# Patient Record
Sex: MALE | Race: WHITE | NOT HISPANIC OR LATINO | Employment: OTHER | URBAN - METROPOLITAN AREA
[De-identification: names, ages, dates, MRNs, and addresses within clinical notes are randomized per-mention and may not be internally consistent; named-entity substitution may affect disease eponyms.]

---

## 2017-01-03 ENCOUNTER — TELEPHONE (OUTPATIENT)
Dept: RADIOLOGY | Facility: HOSPITAL | Age: 62
End: 2017-01-03

## 2017-01-04 ENCOUNTER — HOSPITAL ENCOUNTER (OUTPATIENT)
Dept: RADIOLOGY | Facility: HOSPITAL | Age: 62
Discharge: HOME | End: 2017-01-04
Attending: INTERNAL MEDICINE
Payer: MEDICAID

## 2017-01-04 DIAGNOSIS — R74.01 TRANSAMINITIS: ICD-10-CM

## 2017-01-04 PROCEDURE — 76700 US EXAM ABDOM COMPLETE: CPT | Mod: 26,,, | Performed by: RADIOLOGY

## 2017-01-04 PROCEDURE — 76700 US EXAM ABDOM COMPLETE: CPT | Mod: TC,PO

## 2017-01-06 ENCOUNTER — TELEPHONE (OUTPATIENT)
Dept: RHEUMATOLOGY | Facility: CLINIC | Age: 62
End: 2017-01-06

## 2017-01-06 NOTE — TELEPHONE ENCOUNTER
----- Message from Sheila Bateman MD sent at 1/6/2017  8:12 AM CST -----  Let him know ultrasound of live shows some enlargement consistent with fatty liver and this is most likely the reason for the elevated liver tests. We will repeat again at his visit. He should decrease the fat in his diet.   MB

## 2017-01-12 ENCOUNTER — TELEPHONE (OUTPATIENT)
Dept: RHEUMATOLOGY | Facility: CLINIC | Age: 62
End: 2017-01-12

## 2017-01-12 NOTE — TELEPHONE ENCOUNTER
----- Message from Rachael Tom sent at 1/12/2017 12:43 PM CST -----  Contact: Patient  Patient returned call to Constance. He can be contacted at [phone].    Thanks,  Rachael

## 2017-02-01 ENCOUNTER — TELEPHONE (OUTPATIENT)
Dept: PULMONOLOGY | Facility: CLINIC | Age: 62
End: 2017-02-01

## 2017-02-02 ENCOUNTER — HOSPITAL ENCOUNTER (OUTPATIENT)
Dept: RADIOLOGY | Facility: HOSPITAL | Age: 62
Discharge: HOME | End: 2017-02-02
Attending: INTERNAL MEDICINE
Payer: MEDICAID

## 2017-02-02 ENCOUNTER — OFFICE VISIT (OUTPATIENT)
Dept: PULMONOLOGY | Facility: CLINIC | Age: 62
End: 2017-02-02
Payer: MEDICAID

## 2017-02-02 VITALS
BODY MASS INDEX: 40.69 KG/M2 | RESPIRATION RATE: 20 BRPM | WEIGHT: 274.69 LBS | HEART RATE: 84 BPM | DIASTOLIC BLOOD PRESSURE: 80 MMHG | HEIGHT: 69 IN | SYSTOLIC BLOOD PRESSURE: 130 MMHG | OXYGEN SATURATION: 95 %

## 2017-02-02 DIAGNOSIS — R91.8 MULTIPLE LUNG NODULES ON CT: ICD-10-CM

## 2017-02-02 DIAGNOSIS — R06.89 DYSPNEA AND RESPIRATORY ABNORMALITIES: Chronic | ICD-10-CM

## 2017-02-02 DIAGNOSIS — J96.11 CHRONIC RESPIRATORY FAILURE WITH HYPOXIA: Chronic | ICD-10-CM

## 2017-02-02 DIAGNOSIS — R06.00 DYSPNEA AND RESPIRATORY ABNORMALITIES: Chronic | ICD-10-CM

## 2017-02-02 DIAGNOSIS — F17.210 CIGARETTE SMOKER: Chronic | ICD-10-CM

## 2017-02-02 DIAGNOSIS — R91.8 MULTIPLE LUNG NODULES ON CT: Primary | ICD-10-CM

## 2017-02-02 PROCEDURE — 99215 OFFICE O/P EST HI 40 MIN: CPT | Mod: S$PBB,,, | Performed by: INTERNAL MEDICINE

## 2017-02-02 PROCEDURE — 99999 PR PBB SHADOW E&M-EST. PATIENT-LVL III: CPT | Mod: PBBFAC,,, | Performed by: INTERNAL MEDICINE

## 2017-02-02 PROCEDURE — 99213 OFFICE O/P EST LOW 20 MIN: CPT | Mod: PBBFAC | Performed by: INTERNAL MEDICINE

## 2017-02-02 RX ORDER — ALBUTEROL SULFATE 90 UG/1
AEROSOL, METERED RESPIRATORY (INHALATION)
Qty: 18 G | Refills: 0 | Status: SHIPPED | OUTPATIENT
Start: 2017-02-02 | End: 2017-03-10 | Stop reason: SDUPTHER

## 2017-02-02 RX ADMIN — IOHEXOL 75 ML: 350 INJECTION, SOLUTION INTRAVENOUS at 10:02

## 2017-02-02 NOTE — ASSESSMENT & PLAN NOTE
Assistance with smoking cessation was offered, including:  []  Medications  [x]  Counseling  []  Printed Information on Smoking Cessation  [x]  Referral to a Smoking Cessation Program    Patient was counseled regarding smoking for >10 minutes.

## 2017-02-02 NOTE — PROGRESS NOTES
Subjective:      Established patient    Patient ID: Trey Stevens is a 61 y.o. male.  Patient Active Problem List   Diagnosis    Psoriatic arthritis    Vitamin D deficiency    Psoriasis    Uncontrolled diabetes mellitus type 2 without complications    Multiple lung nodules on CT    Polyarticular psoriatic arthritis    Immunosuppressed status    Long-term use of immunosuppressant medication    Cigarette smoker    Dyspnea and respiratory abnormalities    Chronic respiratory failure with hypoxia       Problem list has been reviewed.    Chief Complaint: Pulmonary Nodules (rev ct)      HPI    CT chest reviewd with pateint who voiced understanding.  All questions were answered to the patients satisfaction. He states that he  is fine and has no specific pulmonary complaints. His current respiratory therapy regimen is albuterol inhaler which provides  relief. He is  adherent with his regimen.   He denies cough sputum, hemoptysis,  pain with breathing, wheezing, asthma.   A full  review of systems, past , family  and social histories was performed except as mentioned in the note above, these are non contributory to the main issues discussed today. He still smokes. He smokes 0.5 pack per day. He is willing to quit. Counseled on smoking cessation.  He is on chronic oxygen supplementation at 2 L /min with activity and with sleep.       Previous Report Reviewed: lab reports, office notes and radiology reports     The following portions of the patient's history were reviewed and updated as appropriate: He  has a past medical history of Arthritis; Diabetes mellitus; Diabetes mellitus type 2, uncontrolled (7/19/2016); Gall stones; Obesity; Psoriasis (a type of skin inflammation); and Rheumatoid arthritis of foot.  He  has a past surgical history that includes Appendectomy and Cholecystectomy.  His family history includes Cancer in his mother; Cataracts in his mother; Diabetes in his mother; Hypertension in his mother;  "Stroke in his father. There is no history of Psoriasis.  He  reports that he has quit smoking. He does not have any smokeless tobacco history on file. He reports that he does not drink alcohol or use illicit drugs.  He has a current medication list which includes the following prescription(s): adalimumab, benzonatate, calcipotriene, clobetasol, ergocalciferol, folic acid, ketoconazole, methotrexate, montelukast, naproxen sodium, onetouch delica lancets, onetouch ultra test, onetouch ultramini, proair hfa, tramadol, triamcinolone acetonide 0.1%, and furosemide.  He has No Known Allergies..    Review of Systems   Constitutional: Negative for fever, fatigue and night sweats.   HENT: Positive for sore throat. Negative for nosebleeds, postnasal drip, rhinorrhea, congestion and hearing loss.    Eyes: Negative for itching.   Respiratory: Positive for snoring, cough, hemoptysis and wheezing. Negative for sputum production, orthopnea and Paroxysmal Nocturnal Dyspnea.    Cardiovascular: Negative for chest pain and leg swelling.   Genitourinary: Negative for difficulty urinating and hematuria.   Endocrine: Negative for cold intolerance and heat intolerance.    Musculoskeletal: Positive for arthralgias and back pain.   Skin: Positive for rash.        Diffuse Psoriasis   Gastrointestinal: Positive for abdominal pain. Negative for vomiting and acid reflux.   Neurological: Negative for weakness.   Hematological: Does not bruise/bleed easily and no excessive bruising.   Psychiatric/Behavioral: The patient is not nervous/anxious.    All other systems reviewed and are negative.       Objective:     Visit Vitals    /80    Pulse 84    Resp 20    Ht 5' 9" (1.753 m)    Wt 124.6 kg (274 lb 11.1 oz)    SpO2 95%    BMI 40.57 kg/m2     Body mass index is 40.57 kg/(m^2).     Physical Exam   Constitutional: He is oriented to person, place, and time. He appears well-developed and well-nourished.   Disheveled in appearance   HENT: "   Head: Normocephalic and atraumatic.   Eyes: EOM are normal. Pupils are equal, round, and reactive to light.   Neck: Normal range of motion. Neck supple.   Cardiovascular: Normal rate and regular rhythm.    Pulmonary/Chest: Effort normal. He has decreased breath sounds in the right upper field, the right middle field, the right lower field, the left upper field and the left middle field. He has no wheezes. He has no rales.   Abdominal: Soft. Bowel sounds are normal.   Musculoskeletal: Normal range of motion.   Neurological: He is alert and oriented to person, place, and time.   Skin: Rash noted.   Psychiatric: He has a normal mood and affect. His behavior is normal.   Nursing note and vitals reviewed.      Personal Diagnostic Review    CT Chest: Stable bilateral pulmonary nodules compared to 8/2/16. In a low risk patient an additional followup CT is recommended in 12 months, and if high risk, followup CT in 6 months.       Assessment:     1. Multiple lung nodules on CT Stable   2. Dyspnea and respiratory abnormalities Active   3. Cigarette smoker Stable   4. Chronic respiratory failure with hypoxia      Outpatient Encounter Prescriptions as of 2/2/2017   Medication Sig Dispense Refill    adalimumab (HUMIRA) PnKt injection Inject 0.8 mLs (40 mg total) into the skin every 14 (fourteen) days. 6 each 2    benzonatate (TESSALON) 100 MG capsule Take 1 capsule (100 mg total) by mouth 3 (three) times daily as needed for Cough. Swallow whole 60 capsule 0    calcipotriene (DOVONOX) 0.005 % ointment Apply on psoriasis on body twice daily 120 g 3    clobetasol (OLUX) 0.05 % Foam AAA of scalp and behind ears twice daily.  Do not use on face, underarms or groin. 100 g 3    ergocalciferol (VITAMIN D2) 50,000 unit Cap Take 1 capsule (50,000 Units total) by mouth twice a week. 8 capsule 6    folic acid (FOLVITE) 1 MG tablet Take 1 tablet (1 mg total) by mouth once daily. 90 tablet 3    ketoconazole (NIZORAL) 2 % shampoo Wash  hair with medicated shampoo at least 2x/week - let sit on scalp at least 5 minutes prior to rinsing 120 mL 5    methotrexate 2.5 MG Tab Take 6 tablets (15 mg total) by mouth every 7 days. 24 tablet 0    montelukast (SINGULAIR) 10 mg tablet Take 10 mg by mouth once daily.  0    naproxen sodium (ANAPROX) 220 MG tablet Take 220 mg by mouth every 12 (twelve) hours.      ONETOUCH DELICA LANCETS 33 gauge Misc       ONETOUCH ULTRA TEST Strp       ONETOUCH ULTRAMINI kit       PROAIR HFA 90 mcg/actuation inhaler USE 2 INHALATIONS INHALATION EVERY 4 HOURS AS NEEDED :FOR WHEEZING 18 g 0    tramadol (ULTRAM) 50 mg tablet Take 1 tablet (50 mg total) by mouth every 6 (six) hours as needed for Pain. 60 tablet 2    triamcinolone acetonide 0.1% (KENALOG) 0.1 % cream AAA bid for psoriasis on body.  Do not use on face, underarms or groin. 454 g 3    [DISCONTINUED] PROAIR HFA 90 mcg/actuation inhaler USE 2 INHALATIONS INHALATION EVERY 4 HOURS AS NEEDED :FOR WHEEZING  0    furosemide (LASIX) 20 MG tablet Take 1 tablet (20 mg total) by mouth 2 (two) times daily. 10 tablet 0     Facility-Administered Encounter Medications as of 2/2/2017   Medication Dose Route Frequency Provider Last Rate Last Dose    [COMPLETED] omnipaque 350 iohexol 75 mL  75 mL Intravenous ONCE PRN Asif Demarco MD   75 mL at 02/02/17 1020     Orders Placed This Encounter   Procedures    CT Chest With Contrast     Standing Status:   Future     Standing Expiration Date:   2/2/2018     Order Specific Question:   Reason for Exam:     Answer:   Multiple lung nodules     Order Specific Question:   Is the patient allergic to iodine or contrast? Has a steroid / antihistamine prep been administered?     Answer:   Yes     Order Specific Question:   Is the patient on ANY Metformin drug such as Glugophage/Glucovance?           Should be off drug 48 hours after contrast. Check renal function before restart.     Answer:   No     Order Specific Question:   Age > 60  years?     Answer:   No     Order Specific Question:   History of Kidney Disease - including: decreased kidney function, dialysis, kidney transplay, single kidney, kidney cancer, kidney surgery?     Answer:   None     Order Specific Question:   Does the patient have high blood preasure requiring medical treatment?     Answer:   No     Order Specific Question:   Diabetes?     Answer:   No     Order Specific Question:   May the Radiologist modify the order per protocol to meet the clinical needs of the patient?     Answer:   Yes     Order Specific Question:   Recist criteria?     Answer:   No     Order Specific Question:   Will this service be billed to a Worker's Comp policy?     Answer:   No    Complete PFT with bronchodilator     Standing Status:   Future     Standing Expiration Date:   3/15/2018     Plan:       Discussed diagnosis, its evaluation, treatment and usual course. All questions answered.      Multiple lung nodules on CT  Repeat CT chest in 1 year.     Cigarette smoker  Assistance with smoking cessation was offered, including:  []  Medications  [x]  Counseling  []  Printed Information on Smoking Cessation  [x]  Referral to a Smoking Cessation Program    Patient was counseled regarding smoking for >10 minutes.      Dyspnea and respiratory abnormalities  Albuterol refilled. PFT in 6 months.    Chronic respiratory failure with hypoxia  Continue oxygen supplementation at 2 L /min with activity and with sleep.         TIME SPENT WITH PATIENT: Time spent: 40 minutes in face to face  discussion concerning diagnosis, prognosis, review of lab and test results, benefits of treatment as well as management of disease, counseling of patient and coordination of care between various health  care providers . Greater than half the time spent was used for coordination of care and counseling of patient.        Return in about 6 months (around 8/2/2017) for Shortness of breath, Chronic Respiratory Failure, Multiple lung  nodules.

## 2017-02-02 NOTE — PATIENT INSTRUCTIONS
Lung Anatomy  Your lungs take air in to give your body oxygen, which the body needs to work. Your lungs, like all the tissues in your body, are made up of billions of tiny specialized cells. Old lung cells die and are replaced by new, identical lung cells. This natural process helps ensure healthy lungs.    © 8013-7455 [employer] [address] [place] [address], [place] [address]. All rights reserved. This information is not intended as a substitute for professional medical care. Always follow your healthcare professional's instructions.

## 2017-02-06 DIAGNOSIS — L40.59 POLYARTICULAR PSORIATIC ARTHRITIS: ICD-10-CM

## 2017-02-06 RX ORDER — METHOTREXATE 2.5 MG/1
TABLET ORAL
Qty: 24 TABLET | Refills: 0 | Status: SHIPPED | OUTPATIENT
Start: 2017-02-06 | End: 2017-03-22 | Stop reason: SDUPTHER

## 2017-02-13 ENCOUNTER — PATIENT OUTREACH (OUTPATIENT)
Dept: ADMINISTRATIVE | Facility: HOSPITAL | Age: 62
End: 2017-02-13

## 2017-02-13 NOTE — LETTER
February 13, 2017    Trey Stevens  [address] [address] [facility] [address]             [facility]  [address] [facility] [address]  Phone: [phone] Dear Mr. Stevens:    [facility] is committed to your overall health.  To help you get the most out of each of your visits, we will review your information to make sure you are up to date on all of your recommended tests and/or procedures.      Brittanie Kaba MD has found that you may be due for    Foot Exam         If you have had any of the above done at another facility, please bring the records or information with you so that your record at [facility] will be complete.    If you are currently taking medication, please bring it with you to your appointment for review.    We will be happy to assist you with scheduling any necessary appointments or you may contact the [facility] appointment desk at [phone] to schedule at your convenience.     Thank you for choosing [facility] for your healthcare needs,        Bobbye, LPN  Care Coordination Department  [facility]

## 2017-02-20 ENCOUNTER — LAB VISIT (OUTPATIENT)
Dept: LAB | Facility: HOSPITAL | Age: 62
End: 2017-02-20
Attending: INTERNAL MEDICINE
Payer: MEDICAID

## 2017-02-20 DIAGNOSIS — Z29.9 PREVENTIVE MEASURE: ICD-10-CM

## 2017-02-20 DIAGNOSIS — E55.9 VITAMIN D DEFICIENCY: ICD-10-CM

## 2017-02-20 LAB
25(OH)D3+25(OH)D2 SERPL-MCNC: 37 NG/ML
ALBUMIN SERPL BCP-MCNC: 3.9 G/DL
ALP SERPL-CCNC: 96 U/L
ALT SERPL W/O P-5'-P-CCNC: 137 U/L
ANION GAP SERPL CALC-SCNC: 11 MMOL/L
AST SERPL-CCNC: 69 U/L
BASOPHILS # BLD AUTO: 0.03 K/UL
BASOPHILS NFR BLD: 0.4 %
BILIRUB SERPL-MCNC: 0.5 MG/DL
BUN SERPL-MCNC: 9 MG/DL
CALCIUM SERPL-MCNC: 8.7 MG/DL
CHLORIDE SERPL-SCNC: 109 MMOL/L
CHOLEST/HDLC SERPL: 3.9 {RATIO}
CO2 SERPL-SCNC: 19 MMOL/L
COMPLEXED PSA SERPL-MCNC: 0.28 NG/ML
CREAT SERPL-MCNC: 1.1 MG/DL
DIFFERENTIAL METHOD: ABNORMAL
EOSINOPHIL # BLD AUTO: 0.4 K/UL
EOSINOPHIL NFR BLD: 5.9 %
ERYTHROCYTE [DISTWIDTH] IN BLOOD BY AUTOMATED COUNT: 15.1 %
EST. GFR  (AFRICAN AMERICAN): >60 ML/MIN/1.73 M^2
EST. GFR  (NON AFRICAN AMERICAN): >60 ML/MIN/1.73 M^2
GLUCOSE SERPL-MCNC: 107 MG/DL
HCT VFR BLD AUTO: 49.2 %
HDL/CHOLESTEROL RATIO: 25.4 %
HDLC SERPL-MCNC: 126 MG/DL
HDLC SERPL-MCNC: 32 MG/DL
HGB BLD-MCNC: 16.6 G/DL
LDLC SERPL CALC-MCNC: 74.2 MG/DL
LYMPHOCYTES # BLD AUTO: 2.1 K/UL
LYMPHOCYTES NFR BLD: 29.7 %
MCH RBC QN AUTO: 30.2 PG
MCHC RBC AUTO-ENTMCNC: 33.7 %
MCV RBC AUTO: 90 FL
MONOCYTES # BLD AUTO: 0.6 K/UL
MONOCYTES NFR BLD: 8.1 %
NEUTROPHILS # BLD AUTO: 3.9 K/UL
NEUTROPHILS NFR BLD: 55.8 %
NONHDLC SERPL-MCNC: 94 MG/DL
PLATELET # BLD AUTO: 236 K/UL
PMV BLD AUTO: 11.7 FL
POTASSIUM SERPL-SCNC: 3.8 MMOL/L
PROT SERPL-MCNC: 7.9 G/DL
RBC # BLD AUTO: 5.49 M/UL
SODIUM SERPL-SCNC: 139 MMOL/L
TRIGL SERPL-MCNC: 99 MG/DL
TSH SERPL DL<=0.005 MIU/L-ACNC: 1.03 UIU/ML
WBC # BLD AUTO: 7.06 K/UL

## 2017-02-20 PROCEDURE — 80053 COMPREHEN METABOLIC PANEL: CPT

## 2017-02-20 PROCEDURE — 85025 COMPLETE CBC W/AUTO DIFF WBC: CPT

## 2017-02-20 PROCEDURE — 36415 COLL VENOUS BLD VENIPUNCTURE: CPT | Mod: PO

## 2017-02-20 PROCEDURE — 83036 HEMOGLOBIN GLYCOSYLATED A1C: CPT

## 2017-02-20 PROCEDURE — 80061 LIPID PANEL: CPT

## 2017-02-20 PROCEDURE — 84443 ASSAY THYROID STIM HORMONE: CPT

## 2017-02-20 PROCEDURE — 84153 ASSAY OF PSA TOTAL: CPT

## 2017-02-20 PROCEDURE — 82306 VITAMIN D 25 HYDROXY: CPT

## 2017-02-21 LAB
ESTIMATED AVG GLUCOSE: 146 MG/DL
HBA1C MFR BLD HPLC: 6.7 %

## 2017-02-28 ENCOUNTER — OFFICE VISIT (OUTPATIENT)
Dept: INTERNAL MEDICINE | Facility: CLINIC | Age: 62
End: 2017-02-28
Payer: MEDICAID

## 2017-02-28 VITALS
TEMPERATURE: 97 F | HEART RATE: 96 BPM | WEIGHT: 272.5 LBS | SYSTOLIC BLOOD PRESSURE: 140 MMHG | HEIGHT: 69 IN | DIASTOLIC BLOOD PRESSURE: 62 MMHG | BODY MASS INDEX: 40.36 KG/M2

## 2017-02-28 DIAGNOSIS — I15.2 HYPERTENSION ASSOCIATED WITH DIABETES: ICD-10-CM

## 2017-02-28 DIAGNOSIS — L40.50 PSORIATIC ARTHRITIS: ICD-10-CM

## 2017-02-28 DIAGNOSIS — E11.59 HYPERTENSION ASSOCIATED WITH DIABETES: ICD-10-CM

## 2017-02-28 DIAGNOSIS — Z79.60 LONG-TERM USE OF IMMUNOSUPPRESSANT MEDICATION: ICD-10-CM

## 2017-02-28 DIAGNOSIS — E55.9 VITAMIN D DEFICIENCY: ICD-10-CM

## 2017-02-28 DIAGNOSIS — E11.49 DIABETES MELLITUS TYPE 2 WITH NEUROLOGICAL MANIFESTATIONS: ICD-10-CM

## 2017-02-28 DIAGNOSIS — J96.11 CHRONIC RESPIRATORY FAILURE WITH HYPOXIA: Primary | Chronic | ICD-10-CM

## 2017-02-28 DIAGNOSIS — R74.8 ELEVATED LIVER ENZYMES: ICD-10-CM

## 2017-02-28 DIAGNOSIS — Z00.00 ENCOUNTER FOR PREVENTIVE HEALTH EXAMINATION: ICD-10-CM

## 2017-02-28 DIAGNOSIS — E11.21 TYPE 2 DIABETES MELLITUS WITH NEPHROPATHY: ICD-10-CM

## 2017-02-28 DIAGNOSIS — M79.673 PAIN OF FOOT, UNSPECIFIED LATERALITY: ICD-10-CM

## 2017-02-28 PROCEDURE — 99999 PR PBB SHADOW E&M-EST. PATIENT-LVL IV: CPT | Mod: PBBFAC,,, | Performed by: INTERNAL MEDICINE

## 2017-02-28 PROCEDURE — 99214 OFFICE O/P EST MOD 30 MIN: CPT | Mod: S$PBB,,, | Performed by: INTERNAL MEDICINE

## 2017-02-28 PROCEDURE — 99214 OFFICE O/P EST MOD 30 MIN: CPT | Mod: PBBFAC,PO | Performed by: INTERNAL MEDICINE

## 2017-02-28 RX ORDER — LOSARTAN POTASSIUM 50 MG/1
50 TABLET ORAL DAILY
Qty: 30 TABLET | Refills: 6 | Status: SHIPPED | OUTPATIENT
Start: 2017-02-28 | End: 2017-03-14 | Stop reason: SDUPTHER

## 2017-02-28 NOTE — MR AVS SNAPSHOT
[facility]  [address] [place] [address]  Phone: [phone]  Fax: [phone]                  Trey Stevens   2017 2:00 PM   Office Visit    Description:  Male : 1955   Provider:  Brittanie Chaparro MD   Department:  [facility]           Reason for Visit     Follow-up           Diagnoses this Visit        Comments    Chronic respiratory failure with hypoxia    -  Primary     Type 2 diabetes mellitus with nephropathy         Vitamin D deficiency         Psoriatic arthritis         Long-term use of immunosuppressant medication         Encounter for preventive health examination         Elevated liver enzymes         Hypertension associated with diabetes         Diabetes mellitus type 2 with neurological manifestations         Pain of foot, unspecified laterality                To Do List           Future Appointments        Provider Department Dept Phone    2017 2:00 PM Brittanie Chaparro MD [facility] [phone]    3/9/2017 1:00 PM Joy Hadley DPM [facility] Podiatry [phone]    3/14/2017 1:20 PM DARREL Roca [facility] [phone]    3/22/2017 2:40 PM LABORATORY, [facility] [phone]    3/22/2017 3:00 PM Sheila Bateman MD [facility] Rheumatology [phone]      Goals (5 Years of Data)     None      Follow-Up and Disposition     Return in about 3 months (around 2017).    Follow-up and Disposition History       These Medications        Disp Refills Start End    losartan (COZAAR) 50 MG tablet 30 tablet 6 2017     Take 1 tablet (50 mg total) by mouth once daily. - Oral    Pharmacy: [facility]/pharmacy #[id number] - [place] - [address] AT [facility] Ph #: [phone]         [facility]     [facility] On Call Nurse Care Line -  Assistance  Registered nurses in the [facility] provide clinical advisement, health education, appointment  booking, and other advisory services.  Call for this free service at [phone].             Medications           Message regarding Medications     Verify the changes and/or additions to your medication regime listed below are the same as discussed with your clinician today.  If any of these changes or additions are incorrect, please notify your healthcare provider.        START taking these NEW medications        Refills    losartan (COZAAR) 50 MG tablet 6    Sig: Take 1 tablet (50 mg total) by mouth once daily.    Class: Normal    Route: Oral      STOP taking these medications     montelukast (SINGULAIR) 10 mg tablet Take 10 mg by mouth once daily.    naproxen sodium (ANAPROX) 220 MG tablet Take 220 mg by mouth every 12 (twelve) hours.           Verify that the below list of medications is an accurate representation of the medications you are currently taking.  If none reported, the list may be blank. If incorrect, please contact your healthcare provider. Carry this list with you in case of emergency.           Current Medications     adalimumab (HUMIRA) PnKt injection Inject 0.8 mLs (40 mg total) into the skin every 14 (fourteen) days.    benzonatate (TESSALON) 100 MG capsule Take 1 capsule (100 mg total) by mouth 3 (three) times daily as needed for Cough. Swallow whole    calcipotriene (DOVONOX) 0.005 % ointment Apply on psoriasis on body twice daily    clobetasol (OLUX) 0.05 % Foam AAA of scalp and behind ears twice daily.  Do not use on face, underarms or groin.    ergocalciferol (VITAMIN D2) 50,000 unit Cap Take 1 capsule (50,000 Units total) by mouth twice a week.    folic acid (FOLVITE) 1 MG tablet Take 1 tablet (1 mg total) by mouth once daily.    ketoconazole (NIZORAL) 2 % shampoo Wash hair with medicated shampoo at least 2x/week - let sit on scalp at least 5 minutes prior to rinsing    methotrexate 2.5 MG Tab TAKE 6 TABLETS (15 MG TOTAL) BY MOUTH EVERY 7 DAYS.    ONETOUCH DELICA LANCETS 33 gauge  Misc     ONETOUCH ULTRA TEST Strp     ONETOUCH ULTRAMINI kit     PROAIR HFA 90 mcg/actuation inhaler USE 2 INHALATIONS INHALATION EVERY 4 HOURS AS NEEDED :FOR WHEEZING    tramadol (ULTRAM) 50 mg tablet Take 1 tablet (50 mg total) by mouth every 6 (six) hours as needed for Pain.    triamcinolone acetonide 0.1% (KENALOG) 0.1 % cream AAA bid for psoriasis on body.  Do not use on face, underarms or groin.    furosemide (LASIX) 20 MG tablet Take 1 tablet (20 mg total) by mouth 2 (two) times daily.    losartan (COZAAR) 50 MG tablet Take 1 tablet (50 mg total) by mouth once daily.           Clinical Reference Information           Your Vitals Were     BP                   140/62           Blood Pressure          Most Recent Value    BP  (!)  140/62      Allergies as of 2/28/2017     No Known Allergies      Immunizations Administered on Date of Encounter - 2/28/2017     None      Orders Placed During Today's Visit      Normal Orders This Visit    Ambulatory consult to Podiatry     Future Labs/Procedures Expected by Expires    Basic metabolic panel  2/28/2017 2/28/2018    Comprehensive metabolic panel  2/28/2017 2/28/2018    Hemoglobin A1c  2/28/2017 4/29/2018      [facility] Sign-Up     Activating your [facility] account is as easy as 1-2-3!     1) Visit [email or web address], select Sign Up Now, enter this activation code and your date of birth, then select Next.  [email or web address]  Expires: 4/14/2017  1:34 PM      2) Create a username and password to use when you visit [facility] in the future and select a security question in case you lose your password and select Next.    3) Enter your e-mail address and click Sign Up!    Additional Information  If you have questions, please e-mail [email or web address] or call [phone] to talk to our [facility] staff. Remember, [facility] is NOT to be used for urgent needs. For medical emergencies, dial 911.         Language Assistance Services     ATTENTION: Language assistance services are  available, free of charge. Please call [phone].      ATENCIÓN: Si habla mere, tiene a mendoza disposición servicios gratuitos de asistencia lingüística. Llame al [phone].     CHÚ Ý: N?u b?n nói Ti?ng Vi?t, có các d?ch v? h? tr? ngôn ng? mi?n phí dành cho b?n. G?i s? [phone].         [facility] complies with applicable Federal civil rights laws and does not discriminate on the basis of race, color, national origin, age, disability, or sex.

## 2017-02-28 NOTE — PROGRESS NOTES
"Subjective:       Patient ID: Trey Stevens is a 61 y.o. male.    Chief Complaint: Follow-up    HPI Comments: Here for f/u medical problems and preventive exam.  On O2 at night and prn.  Chronic dyspnea on exertion.  No f/c/sw.  Occas cough, takes tessalon perles prn.  BMs normal.  No cp/palp.  Sugars AC breakfast . PP dinner 130s-160s.  1/17 abd u/s enlarged liver c/w fatty infiltration.    HM: 10/16 fluvax, 2/16 [id number], 4/13 [id number], 2/16 TDaP, Cscope in the past normal and pt refuses more, 2/16 HCV neg, 8/16 Eye Dr. Germain.        Review of Systems   Constitutional: Negative for appetite change, chills, diaphoresis, fatigue and fever.   HENT: Negative for congestion, ear pain, rhinorrhea and sinus pressure.    Respiratory: Negative for cough and shortness of breath.    Cardiovascular: Negative for chest pain and palpitations.   Gastrointestinal: Negative for abdominal distention, abdominal pain, blood in stool, constipation, diarrhea, nausea and vomiting.   Genitourinary: Negative for difficulty urinating, dysuria, frequency, hematuria and urgency.   Musculoskeletal: Negative for arthralgias.   Skin: Negative for rash.   Neurological: Negative for dizziness and headaches.   Psychiatric/Behavioral: The patient is not nervous/anxious.        Objective:   BP (!) 140/62  Pulse 96  Temp 97 °F (36.1 °C) (Tympanic)   Ht 5' 9" (1.753 m)  Wt 123.6 kg (272 lb 7.8 oz)  BMI 40.24 kg/m2    Physical Exam   Constitutional: He is oriented to person, place, and time. He appears well-developed and well-nourished.   HENT:   Right Ear: External ear normal. Tympanic membrane is not injected.   Left Ear: External ear normal. Tympanic membrane is not injected.   Mouth/Throat: Oropharynx is clear and moist.   Eyes: Conjunctivae are normal.   Neck: Normal range of motion. Neck supple. No thyromegaly present.   Cardiovascular: Normal rate, regular rhythm and intact distal pulses.  Exam reveals no gallop and no friction rub.  "   No murmur heard.  Pulses:       Dorsalis pedis pulses are 2+ on the right side, and 2+ on the left side.        Posterior tibial pulses are 2+ on the right side, and 2+ on the left side.   Pulmonary/Chest: Effort normal and breath sounds normal. He has no wheezes. He has no rales.   Abdominal: Soft. Bowel sounds are normal. He exhibits distension. He exhibits no mass. There is no tenderness.   Musculoskeletal: He exhibits no edema.   Feet:   Right Foot:   Protective Sensation: 10 sites tested. 0 sites sensed.   Skin Integrity: Negative for ulcer, blister, skin breakdown, erythema, warmth, callus or dry skin.   Left Foot:   Protective Sensation: 10 sites tested. 0 sites sensed.   Skin Integrity: Negative for ulcer, blister, skin breakdown, erythema, warmth, callus or dry skin.   Lymphadenopathy:     He has no cervical adenopathy.   Neurological: He is alert and oriented to person, place, and time.   Psychiatric: He has a normal mood and affect.     Results for SYDNEY PETERSON (MRN [id number]) as of 2/28/2017 12:55   Ref. Range 2/20/2017 07:40 2/20/2017 07:43   WBC Latest Ref Range: 3.90 - 12.70 K/uL  7.06   RBC Latest Ref Range: 4.60 - 6.20 M/uL  5.49   Hemoglobin Latest Ref Range: 14.0 - 18.0 g/dL  16.6   Hematocrit Latest Ref Range: 40.0 - 54.0 %  49.2   MCV Latest Ref Range: 82 - 98 fL  90   MCH Latest Ref Range: 27.0 - 31.0 pg  30.2   MCHC Latest Ref Range: 32.0 - 36.0 %  33.7   RDW Latest Ref Range: 11.5 - 14.5 %  15.1 (H)   Platelets Latest Ref Range: 150 - 350 K/uL  236   MPV Latest Ref Range: 9.2 - 12.9 fL  11.7   Gran% Latest Ref Range: 38.0 - 73.0 %  55.8   Gran # Latest Ref Range: 1.8 - 7.7 K/uL  3.9   Lymph% Latest Ref Range: 18.0 - 48.0 %  29.7   Lymph # Latest Ref Range: 1.0 - 4.8 K/uL  2.1   Mono% Latest Ref Range: 4.0 - 15.0 %  8.1   Mono # Latest Ref Range: 0.3 - 1.0 K/uL  0.6   Eosinophil% Latest Ref Range: 0.0 - 8.0 %  5.9   Eos # Latest Ref Range: 0.0 - 0.5 K/uL  0.4   Basophil% Latest Ref Range:  0.0 - 1.9 %  0.4   Baso # Latest Ref Range: 0.00 - 0.20 K/uL  0.03   Sodium Latest Ref Range: 136 - 145 mmol/L  139   Potassium Latest Ref Range: 3.5 - 5.1 mmol/L  3.8   Chloride Latest Ref Range: 95 - 110 mmol/L  109   CO2 Latest Ref Range: 23 - 29 mmol/L  19 (L)   Anion Gap Latest Ref Range: 8 - 16 mmol/L  11   BUN, Bld Latest Ref Range: 6 - 20 mg/dL  9   Creatinine Latest Ref Range: 0.5 - 1.4 mg/dL  1.1   eGFR if non African American Latest Ref Range: >60 mL/min/1.73 m^2  >60.0   eGFR if African American Latest Ref Range: >60 mL/min/1.73 m^2  >60.0   Glucose Latest Ref Range: 70 - 110 mg/dL  107   Calcium Latest Ref Range: 8.7 - 10.5 mg/dL  8.7   Alkaline Phosphatase Latest Ref Range: 55 - 135 U/L  96   Total Protein Latest Ref Range: 6.0 - 8.4 g/dL  7.9   Albumin Latest Ref Range: 3.5 - 5.2 g/dL  3.9   Total Bilirubin Latest Ref Range: 0.1 - 1.0 mg/dL  0.5   AST Latest Ref Range: 10 - 40 U/L  69 (H)   ALT Latest Ref Range: 10 - 44 U/L  137 (H)   Triglycerides Latest Ref Range: 30 - 150 mg/dL  99   Cholesterol Latest Ref Range: 120 - 199 mg/dL  126   HDL Latest Ref Range: 40 - 75 mg/dL  32 (L)   LDL Cholesterol Latest Ref Range: 63.0 - 159.0 mg/dL  74.2   Total Cholesterol/HDL Ratio Latest Ref Range: 2.0 - 5.0   3.9   Vit D, 25-Hydroxy Latest Ref Range: 30 - 96 ng/mL  37   Hemoglobin A1C Latest Ref Range: 4.5 - 6.2 %  6.7 (H)   Estimated Avg Glucose Latest Ref Range: 68 - 131 mg/dL  146 (H)   TSH Latest Ref Range: 0.400 - 4.000 uIU/mL  1.031   PSA, SCREEN Latest Ref Range: 0.00 - 4.00 ng/mL  0.28   Microalbum.,U,Random Latest Units: ug/mL 1253.0    Microalb Creat Ratio Latest Ref Range: 0.0 - 30.0 ug/mg 360.1 (H)      Assessment:       1. Chronic respiratory failure with hypoxia    2. Type 2 diabetes mellitus with nephropathy    3. Vitamin D deficiency    4. Psoriatic arthritis    5. Long-term use of immunosuppressant medication    6. Encounter for preventive health examination    7. Elevated liver enzymes    8.  Hypertension associated with diabetes    9. Diabetes mellitus type 2 with neurological manifestations    10. Pain of foot, unspecified laterality        Plan:       Trey was seen today for follow-up.    Diagnoses and all orders for this visit:    Chronic respiratory failure with hypoxia    Type 2 diabetes mellitus with nephropathy- add ARB.  RTC 2 weeks with BMP prior.  -     Hemoglobin A1c; Future  -     Basic metabolic panel; Future  -     losartan (COZAAR) 50 MG tablet; Take 1 tablet (50 mg total) by mouth once daily.    Vitamin D deficiency- stable on rx.    Foot pain and deformity- to Podiatry.    Psoriatic arthritis/ Long-term use of immunosuppressant medication    Encounter for preventive health examination- utd.    Elevated liver enzymes- recheck 3mo.  -     Comprehensive metabolic panel; Future    Hypertension associated with diabetes  -     Basic metabolic panel; Future  -     losartan (COZAAR) 50 MG tablet; Take 1 tablet (50 mg total) by mouth once daily.    RTC 3mo.

## 2017-03-09 ENCOUNTER — OFFICE VISIT (OUTPATIENT)
Dept: PODIATRY | Facility: CLINIC | Age: 62
End: 2017-03-09
Payer: MEDICAID

## 2017-03-09 VITALS
BODY MASS INDEX: 39.83 KG/M2 | SYSTOLIC BLOOD PRESSURE: 120 MMHG | HEIGHT: 69 IN | WEIGHT: 268.94 LBS | HEART RATE: 84 BPM | DIASTOLIC BLOOD PRESSURE: 81 MMHG

## 2017-03-09 DIAGNOSIS — E11.9 COMPREHENSIVE DIABETIC FOOT EXAMINATION, TYPE 2 DM, ENCOUNTER FOR: Primary | ICD-10-CM

## 2017-03-09 DIAGNOSIS — E11.49 TYPE II DIABETES MELLITUS WITH NEUROLOGICAL MANIFESTATIONS: ICD-10-CM

## 2017-03-09 DIAGNOSIS — L84 CORN OR CALLUS: ICD-10-CM

## 2017-03-09 DIAGNOSIS — B35.1 DERMATOPHYTOSIS OF NAIL: ICD-10-CM

## 2017-03-09 DIAGNOSIS — R23.4 SKIN FISSURES: ICD-10-CM

## 2017-03-09 PROCEDURE — 11056 PARNG/CUTG B9 HYPRKR LES 2-4: CPT | Mod: Q9,PBBFAC,PO | Performed by: PODIATRIST

## 2017-03-09 PROCEDURE — 99213 OFFICE O/P EST LOW 20 MIN: CPT | Mod: PBBFAC,PO | Performed by: PODIATRIST

## 2017-03-09 PROCEDURE — 11721 DEBRIDE NAIL 6 OR MORE: CPT | Mod: Q9,PBBFAC,PO | Performed by: PODIATRIST

## 2017-03-09 PROCEDURE — 11721 DEBRIDE NAIL 6 OR MORE: CPT | Mod: 59,S$PBB,, | Performed by: PODIATRIST

## 2017-03-09 PROCEDURE — 99999 PR PBB SHADOW E&M-EST. PATIENT-LVL III: CPT | Mod: PBBFAC,,, | Performed by: PODIATRIST

## 2017-03-09 PROCEDURE — 99204 OFFICE O/P NEW MOD 45 MIN: CPT | Mod: 25,S$PBB,, | Performed by: PODIATRIST

## 2017-03-09 PROCEDURE — 11056 PARNG/CUTG B9 HYPRKR LES 2-4: CPT | Mod: S$PBB,,, | Performed by: PODIATRIST

## 2017-03-09 RX ORDER — AMMONIUM LACTATE 12 G/100G
1 CREAM TOPICAL 2 TIMES DAILY
Qty: 1 TUBE | Refills: 3 | Status: SHIPPED | OUTPATIENT
Start: 2017-03-09 | End: 2021-10-05

## 2017-03-09 NOTE — PROGRESS NOTES
Subjective:     Patient ID: Trey Stevens is a 61 y.o. male.    Chief Complaint: Foot Pain (diabetic pt., bilateral foot pain with neuropathy, current pain rated at a 6) and feet check (dry heels, callouses are present )    Trey is a 61 y.o. male who presents to the clinic for evaluation and treatment of high risk feet. Trey has a past medical history of Arthritis; Diabetes mellitus; Diabetes mellitus type 2, uncontrolled (7/19/2016); Gall stones; Obesity; Psoriasis (a type of skin inflammation); and Rheumatoid arthritis of foot. The patient's chief complaint is thick calluses. This patient has documented high risk feet requiring routine maintenance secondary to diabetes mellitis and those secondary complications of diabetes, as mentioned. Patient states he was sent by Dr. Kaba because she did test and he can't feel the bottom of his feet.     PCP: Brittanie Kaba MD    Date Last Seen by PCP: 2/28/17    Current shoe gear:  Affected Foot: Extra depth shoes     Unaffected Foot: Extra depth shoes    Hemoglobin A1C   Date Value Ref Range Status   02/20/2017 6.7 (H) 4.5 - 6.2 % Final     Comment:     According to ADA guidelines, hemoglobin A1C <7.0% represents  optimal control in non-pregnant diabetic patients.  Different  metrics may apply to specific populations.   Standards of Medical Care in Diabetes - 2016.  For the purpose of screening for the presence of diabetes:  <5.7%     Consistent with the absence of diabetes  5.7-6.4%  Consistent with increasing risk for diabetes   (prediabetes)  >or=6.5%  Consistent with diabetes  Currently no consensus exists for use of hemoglobin A1C  for diagnosis of diabetes for children.     10/24/2016 6.5 (H) 4.5 - 6.2 % Final     Comment:     According to ADA guidelines, hemoglobin A1C <7.0% represents  optimal control in non-pregnant diabetic patients.  Different  metrics may apply to specific populations.   Standards of Medical Care in Diabetes - 2016.  For the purpose of  screening for the presence of diabetes:  <5.7%     Consistent with the absence of diabetes  5.7-6.4%  Consistent with increasing risk for diabetes   (prediabetes)  >or=6.5%  Consistent with diabetes  Currently no consensus exists for use of hemoglobin A1C  for diagnosis of diabetes for children.     04/29/2016 7.0 (H) 4.5 - 6.2 % Final           Patient Active Problem List   Diagnosis    Psoriatic arthritis    Vitamin D deficiency    Psoriasis    Multiple lung nodules on CT    Polyarticular psoriatic arthritis    Immunosuppressed status    Long-term use of immunosuppressant medication    Cigarette smoker    Dyspnea and respiratory abnormalities    Chronic respiratory failure with hypoxia    Type 2 diabetes mellitus with nephropathy       Medication List with Changes/Refills   New Medications    AMMONIUM LACTATE 12 % CREA    Apply 1 Act topically 2 (two) times daily.   Current Medications    ADALIMUMAB (HUMIRA) PNKT INJECTION    Inject 0.8 mLs (40 mg total) into the skin every 14 (fourteen) days.    BENZONATATE (TESSALON) 100 MG CAPSULE    Take 1 capsule (100 mg total) by mouth 3 (three) times daily as needed for Cough. Swallow whole    CALCIPOTRIENE (DOVONOX) 0.005 % OINTMENT    Apply on psoriasis on body twice daily    CLOBETASOL (OLUX) 0.05 % FOAM    AAA of scalp and behind ears twice daily.  Do not use on face, underarms or groin.    ERGOCALCIFEROL (VITAMIN D2) 50,000 UNIT CAP    Take 1 capsule (50,000 Units total) by mouth twice a week.    FOLIC ACID (FOLVITE) 1 MG TABLET    Take 1 tablet (1 mg total) by mouth once daily.    FUROSEMIDE (LASIX) 20 MG TABLET    Take 1 tablet (20 mg total) by mouth 2 (two) times daily.    KETOCONAZOLE (NIZORAL) 2 % SHAMPOO    Wash hair with medicated shampoo at least 2x/week - let sit on scalp at least 5 minutes prior to rinsing    LOSARTAN (COZAAR) 50 MG TABLET    Take 1 tablet (50 mg total) by mouth once daily.    METHOTREXATE 2.5 MG TAB    TAKE 6 TABLETS (15 MG TOTAL)  "BY MOUTH EVERY 7 DAYS.    ONETOUCH DELICA LANCETS 33 GAUGE MISC        ONETOUCH ULTRA TEST STRP        ONETOUCH ULTRAMINI KIT        PROAIR HFA 90 MCG/ACTUATION INHALER    USE 2 INHALATIONS INHALATION EVERY 4 HOURS AS NEEDED :FOR WHEEZING    TRAMADOL (ULTRAM) 50 MG TABLET    Take 1 tablet (50 mg total) by mouth every 6 (six) hours as needed for Pain.    TRIAMCINOLONE ACETONIDE 0.1% (KENALOG) 0.1 % CREAM    AAA bid for psoriasis on body.  Do not use on face, underarms or groin.       Review of patient's allergies indicates:  No Known Allergies    Past Surgical History:   Procedure Laterality Date    APPENDECTOMY      CHOLECYSTECTOMY         Family History   Problem Relation Age of Onset    Cancer Mother     Hypertension Mother     Diabetes Mother     Cataracts Mother     Stroke Father     Psoriasis Neg Hx        Social History     Social History    Marital status: Single     Spouse name: N/A    Number of children: N/A    Years of education: N/A     Occupational History    Not on file.     Social History Main Topics    Smoking status: Former Smoker    Smokeless tobacco: Not on file    Alcohol use No    Drug use: No    Sexual activity: No     Other Topics Concern    Not on file     Social History Narrative       Vitals:    03/09/17 1312   BP: 120/81   Pulse: 84   Weight: 122 kg (268 lb 15.4 oz)   Height: 5' 9" (1.753 m)   PainSc: 0-No pain       Hemoglobin A1C   Date Value Ref Range Status   02/20/2017 6.7 (H) 4.5 - 6.2 % Final     Comment:     According to ADA guidelines, hemoglobin A1C <7.0% represents  optimal control in non-pregnant diabetic patients.  Different  metrics may apply to specific populations.   Standards of Medical Care in Diabetes - 2016.  For the purpose of screening for the presence of diabetes:  <5.7%     Consistent with the absence of diabetes  5.7-6.4%  Consistent with increasing risk for diabetes   (prediabetes)  >or=6.5%  Consistent with diabetes  Currently no consensus exists " for use of hemoglobin A1C  for diagnosis of diabetes for children.     10/24/2016 6.5 (H) 4.5 - 6.2 % Final     Comment:     According to ADA guidelines, hemoglobin A1C <7.0% represents  optimal control in non-pregnant diabetic patients.  Different  metrics may apply to specific populations.   Standards of Medical Care in Diabetes - 2016.  For the purpose of screening for the presence of diabetes:  <5.7%     Consistent with the absence of diabetes  5.7-6.4%  Consistent with increasing risk for diabetes   (prediabetes)  >or=6.5%  Consistent with diabetes  Currently no consensus exists for use of hemoglobin A1C  for diagnosis of diabetes for children.     04/29/2016 7.0 (H) 4.5 - 6.2 % Final       Review of Systems   Constitutional: Negative for chills and fever.   Respiratory: Negative for shortness of breath.    Cardiovascular: Positive for leg swelling. Negative for chest pain, palpitations, orthopnea and claudication.   Gastrointestinal: Negative for diarrhea, nausea and vomiting.   Musculoskeletal: Negative for joint pain.   Skin: Negative for rash.   Neurological: Positive for tingling and sensory change. Negative for dizziness, focal weakness and weakness.   Psychiatric/Behavioral: Negative.          Objective:      PHYSICAL EXAM: Apperance: Alert and orient in no distress,well developed, and with good attention to grooming and body habits  Patient presents ambulating in extra depth shoes.   LOWER EXTREMITY EXAM:  VASCULAR: Dorsalis pedis pulses 0/4 bilateral and Posterior Tibial pulses 1/4 bilateral. Capillary fill time <4 seconds bilateral. Mild edema observed bilateral. Varicosities present bilateral. Skin temperature of the lower extremities is warm to warm, proximal to distal. Hair growth absent bilateral.  DERMATOLOGICAL: No skin rashes, subcutaneous nodules, lesions, or ulcers observed bilateral. Nails 1-5 bilateral elongated, thickened, and discolored with subungual debris. Webspaces 1-4 clean, dry and  without evidence of break in skin integrity bilateral. Moderate dry and hyperkeratotic tissue noted to bilateral heels and medial 1st MPJ. Skin fissures noted to bilateral heels. No erythema, drainage, or increased temp noted to area.   NEUROLOGICAL: Light touch, sharp-dull, proprioception all present and equal bilaterally.  Vibratory sensation absent at bilateral hallux. Protective sensation absent at 6/10 sites as tested with a [place]-Rusty 5.07 monofilament.   MUSCULOSKELETAL: Muscle strength is 5/5 for foot inverters, everters, plantarflexors, and dorsiflexors. Muscle tone is normal.         Assessment:       Encounter Diagnoses   Name Primary?    Comprehensive diabetic foot examination, type 2 DM, encounter for Yes    Skin fissures     Type II diabetes mellitus with neurological manifestations     Dermatophytosis of nail     Corn or callus          Plan:   Comprehensive diabetic foot examination, type 2 DM, encounter for    Skin fissures  -     ammonium lactate 12 % Crea; Apply 1 Act topically 2 (two) times daily.  Dispense: 1 Tube; Refill: 3    Type II diabetes mellitus with neurological manifestations    Dermatophytosis of nail    Corn or callus    I counseled the patient on his conditions, their implications and medical management.  1. The patient was counseled regarding findings of my examination, my impressions, and usual treatment plan.   2.Greater than 50% of this visit spent on counseling and coordination of care.  Greater than 20 minutes spent on education about the diabetic foot, neuropathy, and prevention of limb loss.  3. Shoe inspection. Diabetic Foot Education. Patient reminded of the importance of good nutrition and blood sugar control to help prevent podiatric complications of diabetes. Patient instructed on proper foot hygeine. We discussed wearing proper shoe gear, daily foot inspections, never walking without protective shoe gear, never putting sharp instruments to feet.    4. With  patient's permission, nails 1-5 bilateral were trimmed in length and thickness aggressively to their soft tissue attachment mechanically and with electric , removing all offending nail and debris. Patient relates relief following the procedure.  5. With patient's permission bilateral skin fissure and callus were trimmed in thickness with #15 blade without incident.   6. Prescription written for Ammonium Lactate 12% to be applied twice daily.   7. Patient  will continue to monitor the areas daily, inspect feet, wear protective shoe gear when ambulatory, moisturizer to maintain skin integrity. Patient reminded of the importance of good nutrition and blood sugar control to help prevent podiatric complications of diabetes.  8. Patient to return in this office in approximately 3-4 months, or sooner if needed.                     Joy Hadley DPM  [facility]

## 2017-03-09 NOTE — MR AVS SNAPSHOT
[facility]  [address] [place] [address]  Phone: [phone]  Fax: [phone]                  Trey Stevens   3/9/2017 1:00 PM   Office Visit    Description:  Male : 1955   Provider:  Joy Hadley DPM   Department:  [facility] - Pod[facility]           Reason for Visit     Foot Pain     feet check           Diagnoses this Visit        Comments    Skin fissures    -  Primary            To Do List           Future Appointments        Provider Department Dept Phone    3/14/2017 1:20 PM DARREL Roca [facility] Internal Medicine [phone]    3/22/2017 2:40 PM LABORATORY, [facility] [phone]    3/22/2017 3:00 PM Sheila Bateman MD [facility] Rheumatology [phone]    2017 10:15 AM LABORATORY, [facility] [phone]    2017 3:40 PM Brittanie Chaparro MD [facility] Medicine [phone]      Goals (5 Years of Data)     None       These Medications        Disp Refills Start End    ammonium lactate 12 % Crea 1 Tube 3 3/9/2017     Apply 1 Act topically 2 (two) times daily. - Topical (Top)    Pharmacy: [facility] #[id number] - [place] - [address] [facility] #: [phone]         [facility]     [facility] Care Line -  Assistance  Registered nurses in the [facility] provide clinical advisement, health education, appointment booking, and other advisory services.  Call for this free service at [phone].             Medications           Message regarding Medications     Verify the changes and/or additions to your medication regime listed below are the same as discussed with your clinician today.  If any of these changes or additions are incorrect, please notify your healthcare provider.        START taking these NEW medications        Refills    ammonium lactate 12 % Crea 3    Sig: Apply 1 Act topically 2 (two) times daily.    Class: Normal    Route:  Topical (Top)           Verify that the below list of medications is an accurate representation of the medications you are currently taking.  If none reported, the list may be blank. If incorrect, please contact your healthcare provider. Carry this list with you in case of emergency.           Current Medications     adalimumab (HUMIRA) PnKt injection Inject 0.8 mLs (40 mg total) into the skin every 14 (fourteen) days.    benzonatate (TESSALON) 100 MG capsule Take 1 capsule (100 mg total) by mouth 3 (three) times daily as needed for Cough. Swallow whole    calcipotriene (DOVONOX) 0.005 % ointment Apply on psoriasis on body twice daily    clobetasol (OLUX) 0.05 % Foam AAA of scalp and behind ears twice daily.  Do not use on face, underarms or groin.    ergocalciferol (VITAMIN D2) 50,000 unit Cap Take 1 capsule (50,000 Units total) by mouth twice a week.    folic acid (FOLVITE) 1 MG tablet Take 1 tablet (1 mg total) by mouth once daily.    ketoconazole (NIZORAL) 2 % shampoo Wash hair with medicated shampoo at least 2x/week - let sit on scalp at least 5 minutes prior to rinsing    losartan (COZAAR) 50 MG tablet Take 1 tablet (50 mg total) by mouth once daily.    methotrexate 2.5 MG Tab TAKE 6 TABLETS (15 MG TOTAL) BY MOUTH EVERY 7 DAYS.    ONETOUCH DELICA LANCETS 33 gauge Misc     ONETOUCH ULTRA TEST Strp     ONETOUCH ULTRAMINI kit     PROAIR HFA 90 mcg/actuation inhaler USE 2 INHALATIONS INHALATION EVERY 4 HOURS AS NEEDED :FOR WHEEZING    tramadol (ULTRAM) 50 mg tablet Take 1 tablet (50 mg total) by mouth every 6 (six) hours as needed for Pain.    triamcinolone acetonide 0.1% (KENALOG) 0.1 % cream AAA bid for psoriasis on body.  Do not use on face, underarms or groin.    ammonium lactate 12 % Crea Apply 1 Act topically 2 (two) times daily.    furosemide (LASIX) 20 MG tablet Take 1 tablet (20 mg total) by mouth 2 (two) times daily.           Clinical Reference Information           Your Vitals Were     BP Pulse Height  "Weight BMI    120/81 (BP Location: Right arm, Patient Position: Sitting, BP Method: Automatic) 84 5' 9" (1.753 m) 122 kg (268 lb 15.4 oz) 39.72 kg/m2      Blood Pressure          Most Recent Value    BP  120/81      Allergies as of 3/9/2017     No Known Allergies      Immunizations Administered on Date of Encounter - 3/9/2017     None      [facility] Sign-Up     Activating your [facility] account is as easy as 1-2-3!     1) Visit [email or web address], select Sign Up Now, enter this activation code and your date of birth, then select Next.  [email or web address]  Expires: 4/14/2017  1:34 PM      2) Create a username and password to use when you visit [facility] in the future and select a security question in case you lose your password and select Next.    3) Enter your e-mail address and click Sign Up!    Additional Information  If you have questions, please e-mail [email or web address] or call [phone] to talk to our [facility] staff. Remember, [facility] is NOT to be used for urgent needs. For medical emergencies, dial 911.         Language Assistance Services     ATTENTION: Language assistance services are available, free of charge. Please call [phone].      ATENCIÓN: Si habla español, tiene a mendoza disposición servicios gratuitos de asistencia lingüística. Llame al [phone].     CHÚ Ý: N?u b?n nói Ti?ng Vi?t, có các d?ch v? h? tr? ngôn ng? mi?n phí dành cho b?n. G?i s? [phone].         [facility] complies with applicable Federal civil rights laws and does not discriminate on the basis of race, color, national origin, age, disability, or sex.        "

## 2017-03-09 NOTE — LETTER
March 9, 2017      Brittanie Chaparro MD  [address]  [place] [address]           [facility]  [address] [place] [address]  Phone: [phone]  Fax: [phone]          Patient: Trey Stevens   MR Number: [id number]   YOB: 1955   Date of Visit: 3/9/2017       Dear Dr. Brittanie Chaparro:    Thank you for referring Trey Stevens to me for evaluation. Attached you will find relevant portions of my assessment and plan of care.    If you have questions, please do not hesitate to call me. I look forward to following Trey Stevens along with you.    Sincerely,    Joy Hadley DPM    Enclosure  CC:  No Recipients    If you would like to receive this communication electronically, please contact [email or web address] or [phone] to request more information on [facility] Link access.    For providers and/or their staff who would like to refer a patient to [facility], please contact us through our one-stop-shop provider referral line, [facility] , at [phone].    If you feel you have received this communication in error or would no longer like to receive these types of communications, please e-mail [email or web address]

## 2017-03-10 DIAGNOSIS — R06.00 DYSPNEA AND RESPIRATORY ABNORMALITIES: Chronic | ICD-10-CM

## 2017-03-10 DIAGNOSIS — R06.89 DYSPNEA AND RESPIRATORY ABNORMALITIES: Chronic | ICD-10-CM

## 2017-03-10 RX ORDER — ALBUTEROL SULFATE 90 UG/1
AEROSOL, METERED RESPIRATORY (INHALATION)
Qty: 8.5 INHALER | Refills: 11 | Status: SHIPPED | OUTPATIENT
Start: 2017-03-10 | End: 2018-07-30 | Stop reason: SDUPTHER

## 2017-03-14 ENCOUNTER — OFFICE VISIT (OUTPATIENT)
Dept: INTERNAL MEDICINE | Facility: CLINIC | Age: 62
End: 2017-03-14
Payer: MEDICAID

## 2017-03-14 VITALS
HEIGHT: 69 IN | BODY MASS INDEX: 40.29 KG/M2 | DIASTOLIC BLOOD PRESSURE: 84 MMHG | WEIGHT: 272.06 LBS | SYSTOLIC BLOOD PRESSURE: 158 MMHG | TEMPERATURE: 98 F

## 2017-03-14 DIAGNOSIS — E11.59 HYPERTENSION ASSOCIATED WITH DIABETES: Primary | ICD-10-CM

## 2017-03-14 DIAGNOSIS — I15.2 HYPERTENSION ASSOCIATED WITH DIABETES: Primary | ICD-10-CM

## 2017-03-14 PROBLEM — E66.01 OBESITY, CLASS III, BMI 40-49.9 (MORBID OBESITY): Status: ACTIVE | Noted: 2017-03-14

## 2017-03-14 PROBLEM — L80 VITILIGO: Status: ACTIVE | Noted: 2017-03-14

## 2017-03-14 PROBLEM — E66.813 OBESITY, CLASS III, BMI 40-49.9 (MORBID OBESITY): Chronic | Status: ACTIVE | Noted: 2017-03-14

## 2017-03-14 PROBLEM — E11.21 TYPE 2 DIABETES MELLITUS WITH NEPHROPATHY: Chronic | Status: ACTIVE | Noted: 2017-02-28

## 2017-03-14 PROBLEM — E66.813 OBESITY, CLASS III, BMI 40-49.9 (MORBID OBESITY): Status: ACTIVE | Noted: 2017-03-14

## 2017-03-14 PROBLEM — E66.01 OBESITY, CLASS III, BMI 40-49.9 (MORBID OBESITY): Chronic | Status: ACTIVE | Noted: 2017-03-14

## 2017-03-14 PROCEDURE — 99999 PR PBB SHADOW E&M-EST. PATIENT-LVL III: CPT | Mod: PBBFAC,,, | Performed by: PHYSICIAN ASSISTANT

## 2017-03-14 PROCEDURE — 99213 OFFICE O/P EST LOW 20 MIN: CPT | Mod: S$PBB,,, | Performed by: PHYSICIAN ASSISTANT

## 2017-03-14 PROCEDURE — 99213 OFFICE O/P EST LOW 20 MIN: CPT | Mod: PBBFAC,PO | Performed by: PHYSICIAN ASSISTANT

## 2017-03-14 RX ORDER — LOSARTAN POTASSIUM 100 MG/1
100 TABLET ORAL DAILY
Qty: 30 TABLET | Refills: 1 | Status: SHIPPED | OUTPATIENT
Start: 2017-03-14 | End: 2017-06-10 | Stop reason: SDUPTHER

## 2017-03-14 NOTE — PROGRESS NOTES
Subjective:       Patient ID: Trey Stevens is a 61 y.o./ male.    Chief Complaint: Hypertension (2 week follow up)    HPI         He comes in today as a result of seeing his PCP 2 weeks ago.  She started him on Cozaar 50 mg because his blood pressure was too high.  Today it's actually higher than it was 2 weeks ago but he says he forgot to take a pill on Sunday.  He has a bad habit of not taking his pills because he can't remember.  He hasn't experienced any dyspnea of any modality.,  CP, pleurisy, tightness in his chest, heavy sweating, tachyarrhythmia, swelling of his feet and ankles etc.    Review of Systems    Otherwise negative concerning the PULMONARY, CV, VASCULAR, and GI system review.    Objective:      Physical Exam    CHEST: Breath sounds anterior to posterior clear.  He has no wheeze, rhonchi, rale, or rub.  Intensity is about 90% of normal.  Percussion is normal.  Fremitus is normal.  HEART: RSR.  Pulse is 78 bpm and regular.  S1 is greater than S2.  There is no murmur or gallop.  He has no peripheral edema.  Carotids are quiet without bruit.    Assessment:       1. Hypertension associated with diabetes        Plan:     1.  Increase his Cozaar to 100 mg per day and recheck him in 2 weeks.  He has no means of checking his blood pressure at home except for going to the pharmacy.

## 2017-03-22 ENCOUNTER — OFFICE VISIT (OUTPATIENT)
Dept: RHEUMATOLOGY | Facility: CLINIC | Age: 62
End: 2017-03-22
Payer: MEDICAID

## 2017-03-22 ENCOUNTER — LAB VISIT (OUTPATIENT)
Dept: LAB | Facility: HOSPITAL | Age: 62
End: 2017-03-22
Attending: INTERNAL MEDICINE
Payer: MEDICAID

## 2017-03-22 VITALS
SYSTOLIC BLOOD PRESSURE: 132 MMHG | DIASTOLIC BLOOD PRESSURE: 69 MMHG | BODY MASS INDEX: 39.33 KG/M2 | WEIGHT: 266.31 LBS | HEART RATE: 84 BPM

## 2017-03-22 DIAGNOSIS — E66.01 OBESITY, CLASS III, BMI 40-49.9 (MORBID OBESITY): Chronic | ICD-10-CM

## 2017-03-22 DIAGNOSIS — Z79.60 LONG-TERM USE OF IMMUNOSUPPRESSANT MEDICATION: ICD-10-CM

## 2017-03-22 DIAGNOSIS — L40.59 POLYARTICULAR PSORIATIC ARTHRITIS: ICD-10-CM

## 2017-03-22 DIAGNOSIS — L40.9 PSORIASIS: ICD-10-CM

## 2017-03-22 DIAGNOSIS — L40.50 PSORIATIC ARTHRITIS: Primary | ICD-10-CM

## 2017-03-22 DIAGNOSIS — D84.9 IMMUNOSUPPRESSED STATUS: ICD-10-CM

## 2017-03-22 DIAGNOSIS — E55.9 VITAMIN D DEFICIENCY: ICD-10-CM

## 2017-03-22 LAB
ALBUMIN SERPL BCP-MCNC: 3.7 G/DL
ALP SERPL-CCNC: 83 U/L
ALT SERPL W/O P-5'-P-CCNC: 49 U/L
ANION GAP SERPL CALC-SCNC: 9 MMOL/L
AST SERPL-CCNC: 36 U/L
BASOPHILS # BLD AUTO: 0.05 K/UL
BASOPHILS NFR BLD: 0.8 %
BILIRUB SERPL-MCNC: 0.5 MG/DL
BUN SERPL-MCNC: 15 MG/DL
CALCIUM SERPL-MCNC: 9.5 MG/DL
CHLORIDE SERPL-SCNC: 106 MMOL/L
CO2 SERPL-SCNC: 26 MMOL/L
CREAT SERPL-MCNC: 1.2 MG/DL
CRP SERPL-MCNC: 5.4 MG/L
DIFFERENTIAL METHOD: NORMAL
EOSINOPHIL # BLD AUTO: 0.4 K/UL
EOSINOPHIL NFR BLD: 6.5 %
ERYTHROCYTE [DISTWIDTH] IN BLOOD BY AUTOMATED COUNT: 14.4 %
ERYTHROCYTE [SEDIMENTATION RATE] IN BLOOD BY WESTERGREN METHOD: 9 MM/HR
EST. GFR  (AFRICAN AMERICAN): >60 ML/MIN/1.73 M^2
EST. GFR  (NON AFRICAN AMERICAN): >60 ML/MIN/1.73 M^2
GLUCOSE SERPL-MCNC: 97 MG/DL
HCT VFR BLD AUTO: 46.7 %
HGB BLD-MCNC: 15.5 G/DL
LYMPHOCYTES # BLD AUTO: 2.7 K/UL
LYMPHOCYTES NFR BLD: 40.8 %
MCH RBC QN AUTO: 30 PG
MCHC RBC AUTO-ENTMCNC: 33.2 %
MCV RBC AUTO: 90 FL
MONOCYTES # BLD AUTO: 0.4 K/UL
MONOCYTES NFR BLD: 6.2 %
NEUTROPHILS # BLD AUTO: 3 K/UL
NEUTROPHILS NFR BLD: 45.7 %
PLATELET # BLD AUTO: 217 K/UL
PMV BLD AUTO: 10.5 FL
POTASSIUM SERPL-SCNC: 4.2 MMOL/L
PROT SERPL-MCNC: 7.7 G/DL
RBC # BLD AUTO: 5.17 M/UL
SODIUM SERPL-SCNC: 141 MMOL/L
WBC # BLD AUTO: 6.59 K/UL

## 2017-03-22 PROCEDURE — 99999 PR PBB SHADOW E&M-EST. PATIENT-LVL IV: CPT | Mod: PBBFAC,,, | Performed by: PHYSICIAN ASSISTANT

## 2017-03-22 PROCEDURE — 99214 OFFICE O/P EST MOD 30 MIN: CPT | Mod: PBBFAC,PO | Performed by: PHYSICIAN ASSISTANT

## 2017-03-22 PROCEDURE — 99214 OFFICE O/P EST MOD 30 MIN: CPT | Mod: S$PBB,,, | Performed by: PHYSICIAN ASSISTANT

## 2017-03-22 RX ORDER — METHOTREXATE 2.5 MG/1
TABLET ORAL
Qty: 24 TABLET | Refills: 5 | Status: SHIPPED | OUTPATIENT
Start: 2017-03-22 | End: 2017-10-11 | Stop reason: SDUPTHER

## 2017-03-22 RX ORDER — FOLIC ACID 1 MG/1
1 TABLET ORAL DAILY
Qty: 90 TABLET | Refills: 3 | Status: SHIPPED | OUTPATIENT
Start: 2017-03-22 | End: 2017-10-30 | Stop reason: SDUPTHER

## 2017-03-22 NOTE — MR AVS SNAPSHOT
[facility] - Rheumatology  [address] [facility] [address] [place] [address]  Phone: [phone]  Fax: [phone]                  Trey Stevens   3/22/2017 3:00 PM   Office Visit    Description:  Male : 1955   Provider:  Izzy Pedraza PA-C   Department:  [facility] - Rheumatology           Reason for Visit     Psoriatic Arthritis     immunosuppressed           Diagnoses this Visit        Comments    Psoriatic arthritis    -  Primary     Long-term use of immunosuppressant medication         Immunosuppressed status         Obesity, Class III, BMI 40-49.9 (morbid obesity)         Vitamin D deficiency         Psoriasis         Polyarticular psoriatic arthritis                To Do List           Future Appointments        Provider Department Dept Phone    3/28/2017 5:40 PM Brittanie Chaparro MD [facility] Internal Medicine [phone]    2017 10:15 AM LABORATORY, [facility] [phone]    2017 3:40 PM Brittanie Chaparro MD [facility] Internal Medicine [phone]    2017 1:00 PM Joy Hadley DPM [facility] Podiatry [phone]    2017 10:15 AM LABORATORY, [facility] [phone]      Goals (5 Years of Data)     None      Follow-Up and Disposition     Return in about 4 months (around 2017) for dr. PEREZ, reg 4 labs.       These Medications        Disp Refills Start End    folic acid (FOLVITE) 1 MG tablet 90 tablet 3 3/22/2017 3/22/2018    Take 1 tablet (1 mg total) by mouth once daily. - Oral    Pharmacy: [facility]/pharmacy #[id number] [place] [address] Ph #: [phone]       methotrexate 2.5 MG Tab 24 tablet 5 3/22/2017     TAKE 6 TABLETS (15 MG TOTAL) BY MOUTH EVERY 7 DAYS. (3 in the morning and 3 in the evening)    Pharmacy: [facility]/pharmacy #[id number] - [place] - [address] Ph #: [phone]         [facility] On Call     [facility] On Call Nurse Care Line -  Assistance  Registered nurses in  the [facility] provide clinical advisement, health education, appointment booking, and other advisory services.  Call for this free service at [phone].             Medications           Message regarding Medications     Verify the changes and/or additions to your medication regime listed below are the same as discussed with your clinician today.  If any of these changes or additions are incorrect, please notify your healthcare provider.        CHANGE how you are taking these medications     Start Taking Instead of    methotrexate 2.5 MG Tab methotrexate 2.5 MG Tab    Dosage:  TAKE 6 TABLETS (15 MG TOTAL) BY MOUTH EVERY 7 DAYS. (3 in the morning and 3 in the evening) Dosage:  TAKE 6 TABLETS (15 MG TOTAL) BY MOUTH EVERY 7 DAYS.    Reason for Change:  Reorder            Verify that the below list of medications is an accurate representation of the medications you are currently taking.  If none reported, the list may be blank. If incorrect, please contact your healthcare provider. Carry this list with you in case of emergency.           Current Medications     adalimumab (HUMIRA) PnKt injection Inject 0.8 mLs (40 mg total) into the skin every 14 (fourteen) days.    ammonium lactate 12 % Crea Apply 1 Act topically 2 (two) times daily.    benzonatate (TESSALON) 100 MG capsule Take 1 capsule (100 mg total) by mouth 3 (three) times daily as needed for Cough. Swallow whole    calcipotriene (DOVONOX) 0.005 % ointment Apply on psoriasis on body twice daily    clobetasol (OLUX) 0.05 % Foam AAA of scalp and behind ears twice daily.  Do not use on face, underarms or groin.    ergocalciferol (VITAMIN D2) 50,000 unit Cap Take 1 capsule (50,000 Units total) by mouth twice a week.    folic acid (FOLVITE) 1 MG tablet Take 1 tablet (1 mg total) by mouth once daily.    ketoconazole (NIZORAL) 2 % shampoo Wash hair with medicated shampoo at least 2x/week - let sit on scalp at least 5 minutes prior to rinsing    losartan  (COZAAR) 100 MG tablet Take 1 tablet (100 mg total) by mouth once daily.    methotrexate 2.5 MG Tab TAKE 6 TABLETS (15 MG TOTAL) BY MOUTH EVERY 7 DAYS. (3 in the morning and 3 in the evening)    ONETOUCH DELICA LANCETS 33 gauge Misc     ONETOUCH ULTRA TEST Strp     ONETOUCH ULTRAMINI kit     PROAIR HFA 90 mcg/actuation inhaler INHALE 2 PUFFS EVERY 4 HOURS AS NEEDED :FOR WHEEZING    tramadol (ULTRAM) 50 mg tablet Take 1 tablet (50 mg total) by mouth every 6 (six) hours as needed for Pain.    triamcinolone acetonide 0.1% (KENALOG) 0.1 % cream AAA bid for psoriasis on body.  Do not use on face, underarms or groin.    furosemide (LASIX) 20 MG tablet Take 1 tablet (20 mg total) by mouth 2 (two) times daily.           Clinical Reference Information           Your Vitals Were     BP Pulse Weight BMI       132/69 84 120.8 kg (266 lb 5.1 oz) 39.33 kg/m2       Blood Pressure          Most Recent Value    BP  132/69      Allergies as of 3/22/2017     No Known Allergies      Immunizations Administered on Date of Encounter - 3/22/2017     None      Orders Placed During Today's Visit     Future Labs/Procedures Expected by Expires    X-Ray Foot Complete Bilateral  3/22/2017 3/22/2018    X-Ray Hand 3 View Bilateral  3/22/2017 3/22/2018      [facility] Sign-Up     Activating your [facility] account is as easy as 1-2-3!     1) Visit [email or web address], select Sign Up Now, enter this activation code and your date of birth, then select Next.  [email or web address]  Expires: 4/14/2017  2:34 PM      2) Create a username and password to use when you visit [facility] in the future and select a security question in case you lose your password and select Next.    3) Enter your e-mail address and click Sign Up!    Additional Information  If you have questions, please e-mail [email or web address] or call [phone] to talk to our [facility] staff. Remember, [facility] is NOT to be used for urgent needs. For medical emergencies, dial 911.          Instructions    Refilled folic acid and methotrexate    No change in medications     Stay active, eat lots of vegetables and protein  Stay away from carbs, sodas       Language Assistance Services     ATTENTION: Language assistance services are available, free of charge. Please call [phone].      ATENCIÓN: Si habla mere, tiene a mendoza disposición servicios gratuitos de asistencia lingüística. Llame al [phone].     CHÚ Ý: N?u b?n nói Ti?ng Vi?t, có các d?ch v? h? tr? ngôn ng? mi?n phí dành cho b?n. G?i s? [phone].         [facility] - [facility]umatology complies with applicable Federal civil rights laws and does not discriminate on the basis of race, color, national origin, age, disability, or sex.

## 2017-03-22 NOTE — PROGRESS NOTES
Subjective:       Patient ID: Trey Stevens is a 61 y.o. male.    Chief Complaint: Psoriatic Arthritis and immunosuppressed    HPI Comments: Trey is here for routine follow up. He saw Dr. Bateman in December for psoriatic arthritis and psoriasis.  He was on mtx 20mg/week and humira every other week, but mtx was decreased to 15mg/week at his last visit due to chronic lung issues.  He has multiple lung nodules which are followed by pulm and stable per his last month pulmonary visit, he also has chronic shortness of breath.  He sees dermatology also for psoriasis as well as vitiligo.  He has some chronic arthritis changes to his dip joints bilaterally.  He complains of occasional ankle and foot swelling, he does have chronic deformities to his feet with lateral deviation of his toes.  He saw podiatry recently and said he has diabetic neuropathy.      Overall he is happy with his treatment regimen, he has a few small areas of psoriasis that he says are getting better, bilateral elbows and left hip. His pain is 0/10.  No issues with humira or mtx.      Review of Systems   Constitutional: Negative for chills, fatigue, fever and unexpected weight change.        Poor hygiene      HENT: Negative for congestion, mouth sores and rhinorrhea.    Eyes: Negative for pain, discharge and redness.   Respiratory: Positive for shortness of breath and wheezing. Negative for cough.    Cardiovascular: Positive for leg swelling. Negative for chest pain.   Gastrointestinal: Negative for abdominal pain, diarrhea, nausea and vomiting.   Endocrine: Negative for cold intolerance and heat intolerance.   Genitourinary: Negative for dysuria.   Musculoskeletal: Positive for joint swelling. Negative for arthralgias and myalgias.   Skin: Positive for color change and rash.   Allergic/Immunologic: Negative for immunocompromised state.   Neurological: Negative for weakness and headaches.        Diabetic neuropathy   Psychiatric/Behavioral: The  patient is not nervous/anxious.          Objective:   /69  Pulse 84  Wt 120.8 kg (266 lb 5.1 oz)  BMI 39.33 kg/m2     Physical Exam   Constitutional: He is oriented to person, place, and time and well-developed, well-nourished, and in no distress.   HENT:   Head: Normocephalic and atraumatic.   Eyes: Pupils are equal, round, and reactive to light. Right eye exhibits no discharge.   Neck: Normal range of motion.   Cardiovascular: Normal rate, regular rhythm and normal heart sounds.  Exam reveals no friction rub.    Pulmonary/Chest: Effort normal and breath sounds normal. No respiratory distress.   Abdominal: Soft. He exhibits no distension. There is no tenderness.   Lymphadenopathy:     He has no cervical adenopathy.   Neurological: He is alert and oriented to person, place, and time.   Skin: Rash noted. No erythema.     Psoriasis rash bilateral elbows and small patch left hip    Vitiligo bilateral forearms       Psychiatric: Mood normal.   Musculoskeletal: Normal range of motion. He exhibits edema and deformity.   Bilateral wrists, mcps no synovitis good rom   nestor dips with chronic damage, bony enlargement     nestor Right 5th dip flexion deformity,   Left thumb no flexion to the IP joint, right thumb ip joint motion decreased    Left ankle swelling, good rom to nestor ankles  nestor feet very dirty, 2-5 toes deviate laterally           Recent Results (from the past 168 hour(s))   Comprehensive metabolic panel    Collection Time: 03/22/17  1:45 PM   Result Value Ref Range    Sodium 141 136 - 145 mmol/L    Potassium 4.2 3.5 - 5.1 mmol/L    Chloride 106 95 - 110 mmol/L    CO2 26 23 - 29 mmol/L    Glucose 97 70 - 110 mg/dL    BUN, Bld 15 6 - 20 mg/dL    Creatinine 1.2 0.5 - 1.4 mg/dL    Calcium 9.5 8.7 - 10.5 mg/dL    Total Protein 7.7 6.0 - 8.4 g/dL    Albumin 3.7 3.5 - 5.2 g/dL    Total Bilirubin 0.5 0.1 - 1.0 mg/dL    Alkaline Phosphatase 83 55 - 135 U/L    AST 36 10 - 40 U/L    ALT 49 (H) 10 - 44 U/L    Anion Gap 9  8 - 16 mmol/L    eGFR if African American >60 >60 mL/min/1.73 m^2    eGFR if non African American >60 >60 mL/min/1.73 m^2   CBC auto differential    Collection Time: 03/22/17  1:45 PM   Result Value Ref Range    WBC 6.59 3.90 - 12.70 K/uL    RBC 5.17 4.60 - 6.20 M/uL    Hemoglobin 15.5 14.0 - 18.0 g/dL    Hematocrit 46.7 40.0 - 54.0 %    MCV 90 82 - 98 fL    MCH 30.0 27.0 - 31.0 pg    MCHC 33.2 32.0 - 36.0 %    RDW 14.4 11.5 - 14.5 %    Platelets 217 150 - 350 K/uL    MPV 10.5 9.2 - 12.9 fL    Gran # 3.0 1.8 - 7.7 K/uL    Lymph # 2.7 1.0 - 4.8 K/uL    Mono # 0.4 0.3 - 1.0 K/uL    Eos # 0.4 0.0 - 0.5 K/uL    Baso # 0.05 0.00 - 0.20 K/uL    Gran% 45.7 38.0 - 73.0 %    Lymph% 40.8 18.0 - 48.0 %    Mono% 6.2 4.0 - 15.0 %    Eosinophil% 6.5 0.0 - 8.0 %    Basophil% 0.8 0.0 - 1.9 %    Differential Method Automated    Sedimentation rate, manual    Collection Time: 03/22/17  1:45 PM   Result Value Ref Range    Sed Rate 9 0 - 10 mm/Hr        4/2016 XR hands reviewed: psoriatic arthritis changes noted to dip joints bilaterally, nestor thumb IP joints  Assessment:       1. Psoriatic arthritis    2. Long-term use of immunosuppressant medication    3. Immunosuppressed status    4. Obesity, Class III, BMI 40-49.9 (morbid obesity)    5. Vitamin D deficiency    6. Psoriasis    7. Polyarticular psoriatic arthritis        1. Psoriatic arthritis with psoriasis: stable on Humira 40mg SC every other week, mtx 15mg week split dose, daily folic acid,   2. Medication monitoring: no toxicity from humira or mtx, monitor lfts (last month these were elevated but improved today)  3. Immunocompromised: utd, except zoster, possible verve study?  4. Vit D deficiency: taking vit D 50,000U/weekly, last level low normal  5. Obesity:  39 BMI    Plan:       Continue humira 40mg SC every other week, cont mtx 15mg/week with daily folic acid  Cont vit D 50,000Units/weekly  Refill mtx and folic acid today   counseled patient on weight loss measures    encouraged patient to make better dietary decisions and  incorporate some regular exercise into their lifestyle      Return in 4 mos with Dr. PEREZ and reg 4 labs. Check xrays hands and feet next visit    Send copy of chart to ana maría eddy

## 2017-03-22 NOTE — PATIENT INSTRUCTIONS
Refilled folic acid and methotrexate    No change in medications     Stay active, eat lots of vegetables and protein  Stay away from carbs, sodas

## 2017-03-28 ENCOUNTER — OFFICE VISIT (OUTPATIENT)
Dept: INTERNAL MEDICINE | Facility: CLINIC | Age: 62
End: 2017-03-28
Payer: MEDICAID

## 2017-03-28 VITALS
HEIGHT: 69 IN | SYSTOLIC BLOOD PRESSURE: 130 MMHG | OXYGEN SATURATION: 96 % | BODY MASS INDEX: 39.38 KG/M2 | DIASTOLIC BLOOD PRESSURE: 78 MMHG | WEIGHT: 265.88 LBS | TEMPERATURE: 96 F | HEART RATE: 82 BPM

## 2017-03-28 DIAGNOSIS — D84.9 IMMUNOSUPPRESSED STATUS: ICD-10-CM

## 2017-03-28 DIAGNOSIS — R74.8 ELEVATED LIVER ENZYMES: ICD-10-CM

## 2017-03-28 DIAGNOSIS — J96.11 CHRONIC RESPIRATORY FAILURE WITH HYPOXIA: Chronic | ICD-10-CM

## 2017-03-28 DIAGNOSIS — E11.21 TYPE 2 DIABETES MELLITUS WITH NEPHROPATHY: Primary | Chronic | ICD-10-CM

## 2017-03-28 DIAGNOSIS — L40.50 PSORIATIC ARTHRITIS: ICD-10-CM

## 2017-03-28 DIAGNOSIS — R05.9 COUGH: ICD-10-CM

## 2017-03-28 DIAGNOSIS — E11.59 HYPERTENSION ASSOCIATED WITH DIABETES: Chronic | ICD-10-CM

## 2017-03-28 DIAGNOSIS — I15.2 HYPERTENSION ASSOCIATED WITH DIABETES: Chronic | ICD-10-CM

## 2017-03-28 PROCEDURE — 99999 PR PBB SHADOW E&M-EST. PATIENT-LVL III: CPT | Mod: PBBFAC,,, | Performed by: INTERNAL MEDICINE

## 2017-03-28 PROCEDURE — 99213 OFFICE O/P EST LOW 20 MIN: CPT | Mod: PBBFAC,PO | Performed by: INTERNAL MEDICINE

## 2017-03-28 PROCEDURE — 99213 OFFICE O/P EST LOW 20 MIN: CPT | Mod: S$PBB,,, | Performed by: INTERNAL MEDICINE

## 2017-03-28 RX ORDER — BENZONATATE 100 MG/1
100 CAPSULE ORAL 3 TIMES DAILY PRN
Qty: 60 CAPSULE | Refills: 2 | Status: SHIPPED | OUTPATIENT
Start: 2017-03-28 | End: 2018-07-10 | Stop reason: ALTCHOICE

## 2017-03-28 NOTE — MR AVS SNAPSHOT
[facility] Internal Medicine  [address] [place] [address]  Phone: [phone]  Fax: [phone]                  Trey Stevens   3/28/2017 5:40 PM   Office Visit    Description:  Male : 1955   Provider:  Brittanie Chaparro MD   Department:  [facility] - Internal Medicine           Reason for Visit     Follow-up     Hypertension           Diagnoses this Visit        Comments    Type 2 diabetes mellitus with nephropathy    -  Primary     Hypertension associated with diabetes         Chronic respiratory failure with hypoxia         Elevated liver enzymes         Cough         Immunosuppressed status         Psoriatic arthritis                To Do List           Future Appointments        Provider Department Dept Phone    3/28/2017 5:40 PM Brittanie Chaparro MD [facility] Internal Medicine [phone]    2017 1:00 PM Joy Hadley DPM [facility] Podiatry [phone]    2017 10:15 AM LAB[facility]ATORY, [facility] [phone]    2017 10:30 AM [facility] [phone]    2017 11:00 AM Sheila Bateman MD [facility] Rheumatology [phone]      Goals (5 Years of Data)     None      Follow-Up and Disposition     Return in about 5 months (around 2017).    Follow-up and Disposition History       These Medications        Disp Refills Start End    benzonatate (TESSALON) 100 MG capsule 60 capsule 2 3/28/2017     Take 1 capsule (100 mg total) by mouth 3 (three) times daily as needed for Cough. Swallow whole - Oral    Pharmacy: [facility]/pharmacy #[id number] - [place] - [address] Ph #: [phone]         [facility] On [facility]     [facility] On [facility] Care [facility] -  Assistance  Registered nurses in the [facility] provide clinical advisement, health education, appointment booking, and other advisory services.  Call for this free service at [phone].             Medications           Message  regarding Medications     Verify the changes and/or additions to your medication regime listed below are the same as discussed with your clinician today.  If any of these changes or additions are incorrect, please notify your healthcare provider.             Verify that the below list of medications is an accurate representation of the medications you are currently taking.  If none reported, the list may be blank. If incorrect, please contact your healthcare provider. Carry this list with you in case of emergency.           Current Medications     adalimumab (HUMIRA) PnKt injection Inject 0.8 mLs (40 mg total) into the skin every 14 (fourteen) days.    ammonium lactate 12 % Crea Apply 1 Act topically 2 (two) times daily.    benzonatate (TESSALON) 100 MG capsule Take 1 capsule (100 mg total) by mouth 3 (three) times daily as needed for Cough. Swallow whole    calcipotriene (DOVONOX) 0.005 % ointment Apply on psoriasis on body twice daily    clobetasol (OLUX) 0.05 % Foam AAA of scalp and behind ears twice daily.  Do not use on face, underarms or groin.    ergocalciferol (VITAMIN D2) 50,000 unit Cap Take 1 capsule (50,000 Units total) by mouth twice a week.    folic acid (FOLVITE) 1 MG tablet Take 1 tablet (1 mg total) by mouth once daily.    furosemide (LASIX) 20 MG tablet Take 1 tablet (20 mg total) by mouth 2 (two) times daily.    ketoconazole (NIZORAL) 2 % shampoo Wash hair with medicated shampoo at least 2x/week - let sit on scalp at least 5 minutes prior to rinsing    losartan (COZAAR) 100 MG tablet Take 1 tablet (100 mg total) by mouth once daily.    methotrexate 2.5 MG Tab TAKE 6 TABLETS (15 MG TOTAL) BY MOUTH EVERY 7 DAYS. (3 in the morning and 3 in the evening)    ONETOUCH DELICA LANCETS 33 gauge Misc     ONETOUCH ULTRA TEST Strp     ONETOUCH ULTRAMINI kit     PROAIR HFA 90 mcg/actuation inhaler INHALE 2 PUFFS EVERY 4 HOURS AS NEEDED :FOR WHEEZING    tramadol (ULTRAM) 50 mg tablet Take 1 tablet (50 mg total)  "by mouth every 6 (six) hours as needed for Pain.    triamcinolone acetonide 0.1% (KENALOG) 0.1 % cream AAA bid for psoriasis on body.  Do not use on face, underarms or groin.           Clinical Reference Information           Your Vitals Were     BP Pulse Temp Height Weight SpO2    130/78 (BP Location: Left arm) 82 96.2 °F (35.7 °C) (Tympanic) 5' 9" (1.753 m) 120.6 kg (265 lb 14 oz) 96%    BMI                39.26 kg/m2          Blood Pressure          Most Recent Value    BP  130/78      Allergies as of 3/28/2017     No Known Allergies      Immunizations Administered on Date of Encounter - 3/28/2017     None      Orders Placed During Today's Visit     Future Labs/Procedures Expected by Expires    Comprehensive metabolic panel  3/28/2017 3/28/2018    Hemoglobin A1c  3/28/2017 5/27/2018      [facility] Sign-Up     Activating your [facility] account is as easy as 1-2-3!     1) Visit [email or web address], select Sign Up Now, enter this activation code and your date of birth, then select Next.  [email or web address]  Expires: 4/14/2017  2:34 PM      2) Create a username and password to use when you visit [facility] in the future and select a security question in case you lose your password and select Next.    3) Enter your e-mail address and click Sign Up!    Additional Information  If you have questions, please e-mail [email or web address] or call [phone] to talk to our [facility] staff. Remember, [facility] is NOT to be used for urgent needs. For medical emergencies, dial 911.         Language Assistance Services     ATTENTION: Language assistance services are available, free of charge. Please call [phone].      ATENCIÓN: Si habla mere, tiene a mendoza disposición servicios gratuitos de asistencia lingüística. Llame al [phone].     CHÚ Ý: N?u b?n nói Ti?ng Vi?t, có các d?ch v? h? tr? ngôn ng? mi?n phí dành cho b?n. G?i s? [phone].         [facility] - Internal Medicine complies with applicable Federal civil rights " laws and does not discriminate on the basis of race, color, national origin, age, disability, or sex.

## 2017-03-28 NOTE — PROGRESS NOTES
"Subjective:       Patient ID: Trey Peterson is a 62 y.o. male.    Chief Complaint: Follow-up and Hypertension    HPI Comments: Here for follow up of medical problems.  ARB started 1 mo ago.    Updated/ annual due 2/18:  HM: 10/16 fluvax, 2/16 [id number], 4/13 [id number], 2/16 TDaP, Cscope in the past normal and pt refuses more, 2/16 HCV neg, 8/16 Eye Dr. Germain.        Hypertension   Pertinent negatives include no chest pain, palpitations or shortness of breath.     Review of Systems   Constitutional: Negative for chills, diaphoresis, fatigue and fever.   Respiratory: Negative for cough, chest tightness and shortness of breath.    Cardiovascular: Negative for chest pain, palpitations and leg swelling.   Gastrointestinal: Negative for blood in stool, constipation, diarrhea, nausea and vomiting.   Genitourinary: Negative for difficulty urinating and frequency.   Musculoskeletal: Negative for arthralgias.       Objective:   /78 (BP Location: Left arm)  Pulse 82  Temp 96.2 °F (35.7 °C) (Tympanic)   Ht 5' 9" (1.753 m)  Wt 120.6 kg (265 lb 14 oz)  SpO2 96%  BMI 39.26 kg/m2    Physical Exam   Constitutional: He is oriented to person, place, and time. He appears well-developed and well-nourished.   HENT:   Mouth/Throat: Oropharynx is clear and moist.   Neck: Normal range of motion. Neck supple.   Cardiovascular: Normal rate, regular rhythm and intact distal pulses.  Exam reveals no gallop and no friction rub.    No murmur heard.  Pulmonary/Chest: Effort normal and breath sounds normal. He has no wheezes. He has no rales.   Abdominal: Soft. Bowel sounds are normal. He exhibits no mass. There is no tenderness.   Musculoskeletal: He exhibits no edema.   Lymphadenopathy:     He has no cervical adenopathy.   Neurological: He is alert and oriented to person, place, and time.   Psychiatric: He has a normal mood and affect.     Results for TREY PETERSON (MRN [id number]) as of 3/28/2017 11:31   Ref. Range 2/20/2017 07:43 " 3/22/2017 13:45   WBC Latest Ref Range: 3.90 - 12.70 K/uL 7.06 6.59   RBC Latest Ref Range: 4.60 - 6.20 M/uL 5.49 5.17   Hemoglobin Latest Ref Range: 14.0 - 18.0 g/dL 16.6 15.5   Hematocrit Latest Ref Range: 40.0 - 54.0 % 49.2 46.7   MCV Latest Ref Range: 82 - 98 fL 90 90   MCH Latest Ref Range: 27.0 - 31.0 pg 30.2 30.0   MCHC Latest Ref Range: 32.0 - 36.0 % 33.7 33.2   RDW Latest Ref Range: 11.5 - 14.5 % 15.1 (H) 14.4   Platelets Latest Ref Range: 150 - 350 K/uL 236 217   MPV Latest Ref Range: 9.2 - 12.9 fL 11.7 10.5   Gran% Latest Ref Range: 38.0 - 73.0 % 55.8 45.7   Gran # Latest Ref Range: 1.8 - 7.7 K/uL 3.9 3.0   Lymph% Latest Ref Range: 18.0 - 48.0 % 29.7 40.8   Lymph # Latest Ref Range: 1.0 - 4.8 K/uL 2.1 2.7   Mono% Latest Ref Range: 4.0 - 15.0 % 8.1 6.2   Mono # Latest Ref Range: 0.3 - 1.0 K/uL 0.6 0.4   Eosinophil% Latest Ref Range: 0.0 - 8.0 % 5.9 6.5   Eos # Latest Ref Range: 0.0 - 0.5 K/uL 0.4 0.4   Basophil% Latest Ref Range: 0.0 - 1.9 % 0.4 0.8   Baso # Latest Ref Range: 0.00 - 0.20 K/uL 0.03 0.05   Sed Rate Latest Ref Range: 0 - 10 mm/Hr  9   Sodium Latest Ref Range: 136 - 145 mmol/L 139 141   Potassium Latest Ref Range: 3.5 - 5.1 mmol/L 3.8 4.2   Chloride Latest Ref Range: 95 - 110 mmol/L 109 106   CO2 Latest Ref Range: 23 - 29 mmol/L 19 (L) 26   Anion Gap Latest Ref Range: 8 - 16 mmol/L 11 9   BUN, Bld Latest Ref Range: 6 - 20 mg/dL 9 15   Creatinine Latest Ref Range: 0.5 - 1.4 mg/dL 1.1 1.2   eGFR if non African American Latest Ref Range: >60 mL/min/1.73 m^2 >60.0 >60   eGFR if  Latest Ref Range: >60 mL/min/1.73 m^2 >60.0 >60   Glucose Latest Ref Range: 70 - 110 mg/dL 107 97   Calcium Latest Ref Range: 8.7 - 10.5 mg/dL 8.7 9.5   Alkaline Phosphatase Latest Ref Range: 55 - 135 U/L 96 83   Total Protein Latest Ref Range: 6.0 - 8.4 g/dL 7.9 7.7   Albumin Latest Ref Range: 3.5 - 5.2 g/dL 3.9 3.7   Total Bilirubin Latest Ref Range: 0.1 - 1.0 mg/dL 0.5 0.5   AST Latest Ref Range: 10 - 40  U/L 69 (H) 36   ALT Latest Ref Range: 10 - 44 U/L 137 (H) 49 (H)     Assessment:       1. Type 2 diabetes mellitus with nephropathy    2. Hypertension associated with diabetes    3. Chronic respiratory failure with hypoxia    4. Elevated liver enzymes    5. Cough    6. Immunosuppressed status    7. Psoriatic arthritis        Plan:       Trey was seen today for follow-up and hypertension.    Diagnoses and all orders for this visit:    Type 2 diabetes mellitus with nephropathy- recheck 5mo.  -     Hemoglobin A1c; Future    Hypertension associated with diabetes- now doing well on current tx.  -     Comprehensive metabolic panel; Future    Chronic respiratory failure with hypoxia    Elevated liver enzymes- recheck 5mo.  -     Comprehensive metabolic panel; Future

## 2017-04-04 ENCOUNTER — TELEPHONE (OUTPATIENT)
Dept: PHARMACY | Facility: CLINIC | Age: 62
End: 2017-04-04

## 2017-04-04 NOTE — TELEPHONE ENCOUNTER
Spoke with pt today to inform him that his rx for humira is ready. Pt picked up his medication today and did not have any questions. Pt verbalized understanding of what was told to him.

## 2017-05-12 RX ORDER — TRAMADOL HYDROCHLORIDE 50 MG/1
TABLET ORAL
Qty: 60 TABLET | Refills: 3 | Status: SHIPPED | OUTPATIENT
Start: 2017-05-12 | End: 2017-12-26 | Stop reason: SDUPTHER

## 2017-05-16 ENCOUNTER — TELEPHONE (OUTPATIENT)
Dept: INTERNAL MEDICINE | Facility: CLINIC | Age: 62
End: 2017-05-16

## 2017-05-16 NOTE — TELEPHONE ENCOUNTER
TAI pt refill request for tramadol 50 mg was fax to [facility] pharmacy.  elma

## 2017-05-16 NOTE — TELEPHONE ENCOUNTER
----- Message from Dilia Boyce sent at 5/16/2017  1:16 PM CDT -----  Contact: self [phone]  States that he needs refill on tramadol. Pt uses     [id number] - [place] - [address] [place] [address] [address]  Phone: [phone] Fax: [phone]    Please call back at [phone]//thank you acc

## 2017-06-09 ENCOUNTER — OFFICE VISIT (OUTPATIENT)
Dept: PODIATRY | Facility: CLINIC | Age: 62
End: 2017-06-09
Payer: MEDICAID

## 2017-06-09 VITALS
BODY MASS INDEX: 40.07 KG/M2 | WEIGHT: 270.5 LBS | HEART RATE: 89 BPM | HEIGHT: 69 IN | SYSTOLIC BLOOD PRESSURE: 135 MMHG | DIASTOLIC BLOOD PRESSURE: 80 MMHG

## 2017-06-09 DIAGNOSIS — B35.1 DERMATOPHYTOSIS OF NAIL: ICD-10-CM

## 2017-06-09 DIAGNOSIS — L84 CORN OR CALLUS: ICD-10-CM

## 2017-06-09 DIAGNOSIS — R23.4 SKIN FISSURES: ICD-10-CM

## 2017-06-09 DIAGNOSIS — E11.49 TYPE II DIABETES MELLITUS WITH NEUROLOGICAL MANIFESTATIONS: Primary | ICD-10-CM

## 2017-06-09 PROCEDURE — 11056 PARNG/CUTG B9 HYPRKR LES 2-4: CPT | Mod: Q9,PBBFAC,PO | Performed by: PODIATRIST

## 2017-06-09 PROCEDURE — 11056 PARNG/CUTG B9 HYPRKR LES 2-4: CPT | Mod: S$PBB,,, | Performed by: PODIATRIST

## 2017-06-09 PROCEDURE — 11721 DEBRIDE NAIL 6 OR MORE: CPT | Mod: 59,S$PBB,, | Performed by: PODIATRIST

## 2017-06-09 PROCEDURE — 99499 UNLISTED E&M SERVICE: CPT | Mod: S$PBB,,, | Performed by: PODIATRIST

## 2017-06-09 PROCEDURE — 99213 OFFICE O/P EST LOW 20 MIN: CPT | Mod: PBBFAC,PO,25 | Performed by: PODIATRIST

## 2017-06-09 PROCEDURE — 99999 PR PBB SHADOW E&M-EST. PATIENT-LVL III: CPT | Mod: PBBFAC,,, | Performed by: PODIATRIST

## 2017-06-09 PROCEDURE — 11721 DEBRIDE NAIL 6 OR MORE: CPT | Mod: Q9,PBBFAC,PO,59 | Performed by: PODIATRIST

## 2017-06-09 NOTE — PROGRESS NOTES
Subjective:     Patient ID: Trey Stevens is a 62 y.o. male.    Chief Complaint: Nail Care (Patient states no current pain.) and Diabetes Mellitus (Patient states he is having trouble with feelins the bottom of his feet; numbness. last PCP visit with - 3/28/17. 126 mg/dL-AM glucose. )    Trey is a 62 y.o. male who presents to the clinic for evaluation and treatment of high risk feet. Trey has a past medical history of Arthritis; Diabetes mellitus; Diabetes mellitus type 2, uncontrolled (7/19/2016); Gall stones; Obesity; Psoriasis (a type of skin inflammation); and Rheumatoid arthritis of foot. The patient's chief complaint is thick calluses and  nails. This patient has documented high risk feet requiring routine maintenance secondary to diabetes mellitis and those secondary complications of diabetes, as mentioned. Patient states his blood sugar this morning was 126mg/dl.     PCP: Brittanie Kaba MD    Date Last Seen by PCP: 3/28/17    Current shoe gear:  Affected Foot: Extra depth shoes     Unaffected Foot: Extra depth shoes    Hemoglobin A1C   Date Value Ref Range Status   02/20/2017 6.7 (H) 4.5 - 6.2 % Final     Comment:     According to ADA guidelines, hemoglobin A1C <7.0% represents  optimal control in non-pregnant diabetic patients.  Different  metrics may apply to specific populations.   Standards of Medical Care in Diabetes - 2016.  For the purpose of screening for the presence of diabetes:  <5.7%     Consistent with the absence of diabetes  5.7-6.4%  Consistent with increasing risk for diabetes   (prediabetes)  >or=6.5%  Consistent with diabetes  Currently no consensus exists for use of hemoglobin A1C  for diagnosis of diabetes for children.     10/24/2016 6.5 (H) 4.5 - 6.2 % Final     Comment:     According to ADA guidelines, hemoglobin A1C <7.0% represents  optimal control in non-pregnant diabetic patients.  Different  metrics may apply to specific populations.   Standards of Medical Care  in Diabetes - 2016.  For the purpose of screening for the presence of diabetes:  <5.7%     Consistent with the absence of diabetes  5.7-6.4%  Consistent with increasing risk for diabetes   (prediabetes)  >or=6.5%  Consistent with diabetes  Currently no consensus exists for use of hemoglobin A1C  for diagnosis of diabetes for children.     04/29/2016 7.0 (H) 4.5 - 6.2 % Final           Patient Active Problem List   Diagnosis    Psoriatic arthritis    Vitamin D deficiency    Psoriasis    Multiple lung nodules on CT    Polyarticular psoriatic arthritis    Immunosuppressed status    Long-term use of immunosuppressant medication    Cigarette smoker    Dyspnea and respiratory abnormalities    Chronic respiratory failure with hypoxia    Type 2 diabetes mellitus with nephropathy    Obesity, Class III, BMI 40-49.9 (morbid obesity)    Hypertension associated with diabetes    Vitiligo       Medication List with Changes/Refills   Current Medications    ADALIMUMAB (HUMIRA) PNKT INJECTION    Inject 0.8 mLs (40 mg total) into the skin every 14 (fourteen) days.    AMMONIUM LACTATE 12 % CREA    Apply 1 Act topically 2 (two) times daily.    BENZONATATE (TESSALON) 100 MG CAPSULE    Take 1 capsule (100 mg total) by mouth 3 (three) times daily as needed for Cough. Swallow whole    CALCIPOTRIENE (DOVONOX) 0.005 % OINTMENT    Apply on psoriasis on body twice daily    CLOBETASOL (OLUX) 0.05 % FOAM    AAA of scalp and behind ears twice daily.  Do not use on face, underarms or groin.    ERGOCALCIFEROL (VITAMIN D2) 50,000 UNIT CAP    Take 1 capsule (50,000 Units total) by mouth twice a week.    FOLIC ACID (FOLVITE) 1 MG TABLET    Take 1 tablet (1 mg total) by mouth once daily.    FUROSEMIDE (LASIX) 20 MG TABLET    Take 1 tablet (20 mg total) by mouth 2 (two) times daily.    KETOCONAZOLE (NIZORAL) 2 % SHAMPOO    Wash hair with medicated shampoo at least 2x/week - let sit on scalp at least 5 minutes prior to rinsing     "METHOTREXATE 2.5 MG TAB    TAKE 6 TABLETS (15 MG TOTAL) BY MOUTH EVERY 7 DAYS. (3 in the morning and 3 in the evening)    ONETOUCH DELICA LANCETS 33 GAUGE MISC        ONETOUCH ULTRA TEST STRP        ONETOUCH ULTRAMINI KIT        PROAIR HFA 90 MCG/ACTUATION INHALER    INHALE 2 PUFFS EVERY 4 HOURS AS NEEDED :FOR WHEEZING    TRAMADOL (ULTRAM) 50 MG TABLET    TAKE 1 TABLET BY MOUTH EVERY 6 HOURS AS NEEDED FOR PAIN    TRIAMCINOLONE ACETONIDE 0.1% (KENALOG) 0.1 % CREAM    AAA bid for psoriasis on body.  Do not use on face, underarms or groin.   Changed and/or Refilled Medications    Modified Medication Previous Medication    LOSARTAN (COZAAR) 100 MG TABLET losartan (COZAAR) 100 MG tablet       TAKE 1 TABLET (100 MG TOTAL) BY MOUTH ONCE DAILY.    Take 1 tablet (100 mg total) by mouth once daily.       Review of patient's allergies indicates:  No Known Allergies    Past Surgical History:   Procedure Laterality Date    APPENDECTOMY      CHOLECYSTECTOMY         Family History   Problem Relation Age of Onset    Cancer Mother     Hypertension Mother     Diabetes Mother     Cataracts Mother     Stroke Father     Psoriasis Neg Hx        Social History     Social History    Marital status: Single     Spouse name: N/A    Number of children: N/A    Years of education: N/A     Occupational History    Not on file.     Social History Main Topics    Smoking status: Former Smoker    Smokeless tobacco: Not on file    Alcohol use No    Drug use: No    Sexual activity: No     Other Topics Concern    Not on file     Social History Narrative    No narrative on file       Vitals:    06/09/17 1303   BP: 135/80   Pulse: 89   Weight: 122.7 kg (270 lb 8.1 oz)   Height: 5' 9" (1.753 m)   PainSc: 0-No pain       Hemoglobin A1C   Date Value Ref Range Status   02/20/2017 6.7 (H) 4.5 - 6.2 % Final     Comment:     According to ADA guidelines, hemoglobin A1C <7.0% represents  optimal control in non-pregnant diabetic patients.  " Different  metrics may apply to specific populations.   Standards of Medical Care in Diabetes - 2016.  For the purpose of screening for the presence of diabetes:  <5.7%     Consistent with the absence of diabetes  5.7-6.4%  Consistent with increasing risk for diabetes   (prediabetes)  >or=6.5%  Consistent with diabetes  Currently no consensus exists for use of hemoglobin A1C  for diagnosis of diabetes for children.     10/24/2016 6.5 (H) 4.5 - 6.2 % Final     Comment:     According to ADA guidelines, hemoglobin A1C <7.0% represents  optimal control in non-pregnant diabetic patients.  Different  metrics may apply to specific populations.   Standards of Medical Care in Diabetes - 2016.  For the purpose of screening for the presence of diabetes:  <5.7%     Consistent with the absence of diabetes  5.7-6.4%  Consistent with increasing risk for diabetes   (prediabetes)  >or=6.5%  Consistent with diabetes  Currently no consensus exists for use of hemoglobin A1C  for diagnosis of diabetes for children.     04/29/2016 7.0 (H) 4.5 - 6.2 % Final       Review of Systems   Constitutional: Negative for chills and fever.   Respiratory: Negative for shortness of breath.    Cardiovascular: Positive for leg swelling. Negative for chest pain, palpitations, orthopnea and claudication.   Gastrointestinal: Negative for diarrhea, nausea and vomiting.   Musculoskeletal: Negative for joint pain.   Skin: Negative for rash.   Neurological: Positive for tingling and sensory change. Negative for dizziness, focal weakness and weakness.   Psychiatric/Behavioral: Negative.          Objective:      PHYSICAL EXAM: Apperance: Alert and orient in no distress,well developed, and with good attention to grooming and body habits  Patient presents ambulating in extra depth shoes.   LOWER EXTREMITY EXAM:  VASCULAR: Dorsalis pedis pulses 0/4 bilateral and Posterior Tibial pulses 1/4 bilateral. Capillary fill time <4 seconds bilateral. Mild edema observed  bilateral. Varicosities present bilateral. Skin temperature of the lower extremities is warm to warm, proximal to distal. Hair growth absent bilateral.  DERMATOLOGICAL: No skin rashes, subcutaneous nodules, lesions, or ulcers observed bilateral. Nails 1-5 bilateral elongated, thickened, and discolored with subungual debris. Webspaces 1-4 clean, dry and without evidence of break in skin integrity bilateral. Moderate dry and hyperkeratotic tissue noted to bilateral heels and medial 1st MPJ. Skin fissures noted to bilateral heels. No erythema, drainage, or increased temp noted to area.   NEUROLOGICAL: Light touch, sharp-dull, proprioception all present and equal bilaterally.  Vibratory sensation absent at bilateral hallux. Protective sensation absent at 6/10 sites as tested with a [place] 5.07 monofilament.   MUSCULOSKELETAL: Muscle strength is 5/5 for foot inverters, everters, plantarflexors, and dorsiflexors. Muscle tone is normal.         Assessment:       Encounter Diagnoses   Name Primary?    Type II diabetes mellitus with neurological manifestations Yes    Dermatophytosis of nail     Skin fissures     Corn or callus          Plan:   Type II diabetes mellitus with neurological manifestations    Dermatophytosis of nail    Skin fissures    Corn or callus      I counseled the patient on his conditions, their implications and medical management.  Shoe inspection. Diabetic Foot Education. Patient reminded of the importance of good nutrition and blood sugar control to help prevent podiatric complications of diabetes. Patient instructed on proper foot hygeine. We discussed wearing proper shoe gear, daily foot inspections, never walking without protective shoe gear, never putting sharp instruments to feet.    With patient's permission, nails 1-5 bilateral were trimmed in length and thickness aggressively to their soft tissue attachment mechanically and with electric , removing all offending nail and  debris. Patient relates relief following the procedure.  With patient's permission bilateral skin fissure and callus were trimmed in thickness with #15 blade without incident.   Patient  will continue to monitor the areas daily, inspect feet, wear protective shoe gear when ambulatory, moisturizer to maintain skin integrity. Patient reminded of the importance of good nutrition and blood sugar control to help prevent podiatric complications of diabetes.  Patient to return in this office in approximately 3-4 months, or sooner if needed.                     Joy Hadley DPM  [facility]

## 2017-06-10 DIAGNOSIS — E11.59 HYPERTENSION ASSOCIATED WITH DIABETES: ICD-10-CM

## 2017-06-10 DIAGNOSIS — I15.2 HYPERTENSION ASSOCIATED WITH DIABETES: ICD-10-CM

## 2017-06-12 RX ORDER — LOSARTAN POTASSIUM 100 MG/1
100 TABLET ORAL DAILY
Qty: 30 TABLET | Refills: 11 | Status: SHIPPED | OUTPATIENT
Start: 2017-06-12 | End: 2018-06-10 | Stop reason: SDUPTHER

## 2017-06-13 DIAGNOSIS — R10.9 ABDOMINAL PAIN, ACUTE: ICD-10-CM

## 2017-06-13 DIAGNOSIS — K59.09 OTHER CONSTIPATION: ICD-10-CM

## 2017-06-13 RX ORDER — LACTULOSE 10 G/15ML
10 SOLUTION ORAL; RECTAL DAILY
Qty: 150 ML | Refills: 0 | OUTPATIENT
Start: 2017-06-13 | End: 2017-06-23

## 2017-07-05 RX ORDER — ERGOCALCIFEROL 1.25 MG/1
50000 CAPSULE ORAL
Qty: 8 CAPSULE | Refills: 6 | Status: SHIPPED | OUTPATIENT
Start: 2017-07-06 | End: 2018-01-21 | Stop reason: SDUPTHER

## 2017-08-02 ENCOUNTER — HOSPITAL ENCOUNTER (OUTPATIENT)
Dept: RADIOLOGY | Facility: HOSPITAL | Age: 62
Discharge: HOME | End: 2017-08-02
Attending: INTERNAL MEDICINE
Payer: MEDICAID

## 2017-08-02 ENCOUNTER — PROCEDURE VISIT (OUTPATIENT)
Dept: PULMONOLOGY | Facility: CLINIC | Age: 62
End: 2017-08-02
Payer: MEDICAID

## 2017-08-02 ENCOUNTER — OFFICE VISIT (OUTPATIENT)
Dept: PULMONOLOGY | Facility: CLINIC | Age: 62
End: 2017-08-02
Payer: MEDICAID

## 2017-08-02 VITALS
SYSTOLIC BLOOD PRESSURE: 121 MMHG | HEIGHT: 69 IN | BODY MASS INDEX: 40.43 KG/M2 | WEIGHT: 273 LBS | HEART RATE: 79 BPM | OXYGEN SATURATION: 92 % | RESPIRATION RATE: 18 BRPM | DIASTOLIC BLOOD PRESSURE: 82 MMHG

## 2017-08-02 DIAGNOSIS — E66.01 MORBID OBESITY DUE TO EXCESS CALORIES: Chronic | ICD-10-CM

## 2017-08-02 DIAGNOSIS — J96.11 CHRONIC RESPIRATORY FAILURE WITH HYPOXIA: Chronic | ICD-10-CM

## 2017-08-02 DIAGNOSIS — R06.00 DYSPNEA AND RESPIRATORY ABNORMALITIES: Chronic | ICD-10-CM

## 2017-08-02 DIAGNOSIS — J44.9 CHRONIC AIRWAY OBSTRUCTION, MIXED TYPE: Chronic | ICD-10-CM

## 2017-08-02 DIAGNOSIS — R91.8 MULTIPLE LUNG NODULES ON CT: ICD-10-CM

## 2017-08-02 DIAGNOSIS — F17.210 CIGARETTE SMOKER: Chronic | ICD-10-CM

## 2017-08-02 DIAGNOSIS — R91.8 MULTIPLE LUNG NODULES ON CT: Primary | Chronic | ICD-10-CM

## 2017-08-02 DIAGNOSIS — R06.89 DYSPNEA AND RESPIRATORY ABNORMALITIES: Chronic | ICD-10-CM

## 2017-08-02 DIAGNOSIS — R91.8 MULTIPLE LUNG NODULES ON CT: Primary | ICD-10-CM

## 2017-08-02 DIAGNOSIS — G47.33 OSA (OBSTRUCTIVE SLEEP APNEA): ICD-10-CM

## 2017-08-02 PROCEDURE — 94726 PLETHYSMOGRAPHY LUNG VOLUMES: CPT | Mod: 26,S$PBB,, | Performed by: INTERNAL MEDICINE

## 2017-08-02 PROCEDURE — 99213 OFFICE O/P EST LOW 20 MIN: CPT | Mod: PBBFAC,25 | Performed by: INTERNAL MEDICINE

## 2017-08-02 PROCEDURE — 94729 DIFFUSING CAPACITY: CPT | Mod: 26,S$PBB,, | Performed by: INTERNAL MEDICINE

## 2017-08-02 PROCEDURE — 99215 OFFICE O/P EST HI 40 MIN: CPT | Mod: 25,S$PBB,, | Performed by: INTERNAL MEDICINE

## 2017-08-02 PROCEDURE — 94060 EVALUATION OF WHEEZING: CPT | Mod: 26,S$PBB,, | Performed by: INTERNAL MEDICINE

## 2017-08-02 PROCEDURE — 99999 PR PBB SHADOW E&M-EST. PATIENT-LVL III: CPT | Mod: PBBFAC,,, | Performed by: INTERNAL MEDICINE

## 2017-08-02 RX ADMIN — IOHEXOL 75 ML: 350 INJECTION, SOLUTION INTRAVENOUS at 12:08

## 2017-08-02 NOTE — ASSESSMENT & PLAN NOTE
My recommendation at this point would be to set up a sleep study through [facility].  We have discussed weight loss and how this may improve his situation.  We have discussed behavioral modifications, as well.  After his study, he could certainly try a CPAP.

## 2017-08-02 NOTE — PATIENT INSTRUCTIONS
What is a Sleep Study?  Do you often have problems sleeping? Do you feel tired most days of the week? Talk to your healthcare provider or a sleep specialist. He or she may suggest that you have a sleep study. It can help diagnose a sleep disorder such as sleep apnea or narcolepsy. During the study, a special machine is used to monitor your sleep.    Who needs a sleep study?  If you have sleep problems that last longer than a few weeks, you may need a sleep study. Talk to your healthcare provider. Be prepared to answer questions about your health history. Try to keep a daily sleep diary for a week or 2. Write down the time you go to bed, the time you wake up, and anything that seems to affect your sleep. Then your healthcare provider can refer you to a sleep specialist and recommend a sleep study.  Monitoring your sleep  Your sleep can be monitored at a sleep clinic or at your home. In either case, your healthcare provider will discuss the results with you at a future visit:  · At a sleep clinic. Most sleep studies are done at a sleep clinic or a sleep lab. In many cases, you will need to stay overnight. You will sleep in a private room, much like a hotel or hospital room. A family member or a friend can come along, but cannot stay overnight. Most people dont have trouble sleeping during the study. In the morning you can go home. Sometimes you may be asked to remain at the lab the next day for a daytime nap study.  · At home. At times, a sleep study can be done at home. A home sleep study provides most of the same information as a study done at a clinic. A special computer is loaned to you by a sleep clinic or a medical supplier. You will be given instructions on how to use it. Or, someone may come to your home to help. Before bedtime, the computer is turned on to monitor your sleep all night. In the morning, you return the computer.  Date Last Reviewed: 8/9/2015  © 7279-5785 [employer]. [address]  [place] [address]. All rights reserved. This information is not intended as a substitute for professional medical care. Always follow your healthcare professional's instructions.

## 2017-08-02 NOTE — PROGRESS NOTES
Subjective:      Patient ID: Trey Stevens is a 62 y.o. male.  Patient Active Problem List   Diagnosis    Psoriatic arthritis    Vitamin D deficiency    Psoriasis    Multiple lung nodules on CT    Polyarticular psoriatic arthritis    Immunosuppressed status    Long-term use of immunosuppressant medication    Cigarette smoker    Chronic airway obstruction, mixed type    Chronic respiratory failure with hypoxia    Type 2 diabetes mellitus with nephropathy    Morbid obesity due to excess calories    Hypertension associated with diabetes    Vitiligo    SCHUYLER (obstructive sleep apnea)     Problem list has been reviewed.  Chief Complaint: multiple lung nodules; Hypoxemia; and Shortness of Breath    HPI  CT and PFT reviews with patient who voiced understanding. He reports excessive daytime sleepiness and fatigue. He report snoring.   He denies cough sputum, hemoptysis,  pain with breathing, wheezing, asthma.   A full  review of systems, past , family  and social histories was performed except as mentioned in the note above, these are non contributory to the main issues discussed  Today      Snoring / Sleep Apnea:     Patient has observed snoring.   Patient reports non restful' sleep.  he reports morning headache.   hereports impairment of activity during wakefulness.    [place] sleepiness score was 11.  Neck circumference is 48 cm.  he reports recent weight gain.  Mallampati score 3  Cardiovascular risk factors: hypertension and obesity  Bed time is 2200  Wake time is 0600  Sleep onset is within  30 Minutes.  Sleep maintenance difficulties related to frequent night time awakening  Wake after sleep onset occurs three times a night.  Nocturia occurs one time a night,   Sleep aids : No  Dry mouth : Yes,   Sleep walking: No,   Sleep talking : No,   Sleep eating:No  Vivid Dreams : Yes,   Cataplexy : No,   Hypnogogic hallucinations:  No    COMORBIDITIES:  BP Readings from Last 3 Encounters:   08/02/17 121/82    06/09/17 135/80   03/28/17 130/78       CARDIAC RISK FACTORS:  hypertension, obesity      [place]spring Questionnaire (validated SCHUYLER screening questionnaire)    POSITIVE -- Snoring/apnea    POSITIVE -- Fatigue    Body mass index is 40.32 kg/m².  (>25 is overweight, >30 is obese)  Blood Pressure = Hypertension    (PreHTN 120-139/80-89, Stg1 140-159/90-99, Stg2 >160/>100)  Belspring = Three of three SCHUYLER categories are positive (high risk is 2-3 positive categories)     [place] Sleepiness Scale   EPWORTH SLEEPINESS SCALE 8/2/2017   Sitting and reading 1   Watching TV 2   Sitting, inactive in a public place (e.g. a theatre or a meeting) 2   As a passenger in a car for an hour without a break 0   Lying down to rest in the afternoon when circumstances permit 3   Sitting and talking to someone 1   Sitting quietly after a lunch without alcohol 2   In a car, while stopped for a few minutes in traffic 0   Total score 11       Reference: Artie YOUNG. A new method for measuring daytime sleepiness: The [place]  Sleepiness Scale. Sleep 1991; 14(6):540-5.    STOP-Bang Questionnaire (validated SCHUYLER screening questionnaire)  Negative unless checked off.  [x] Snoring    [x]  Tired/Fatigued/Sleepy  [x] Obstruction (apneas/choking)  [x] Pressure (HTN)  [x] BMI >35  [x] Age >50  [x] Neck >40 cm  [x] Gender male   STOP-Bang = 8 (low risk 0-2,high risk 3-8)    References:   STOP Questionnaire   A Tool to Screen Patients for Obstructive Sleep Apnea: ANDERS Monaco.R.C.P.C., CIHNTAN Barksdale.B.B.S., Barb You M.D.,Tamra Bray, Ph.D., CHINTAN Kline.B.B.S.,_ Nicolle Finley.,_ Leanne Beard M.D., ANDERS Chand.R.C.P.C.; Anesthesiology 2008; 108:812-21 Copyright © 2008, the American Society of Anesthesiologists, Inc. [employer]ian [employer]      Neck circumference 48 cm [?SCHUYLER risk if >43cm (17in) male or >41cm (15.5 in) female]    Sleep position Supine = rare    Non-supine = frequent  Mouth breathing during  sleep - frequent     Previous Report Reviewed: lab reports, office notes and radiology reports     The following portions of the patient's history were reviewed and updated as appropriate: He  has a past medical history of Arthritis; Diabetes mellitus; Diabetes mellitus type 2, uncontrolled (7/19/2016); Gall stones; Obesity; Psoriasis (a type of skin inflammation); and Rheumatoid arthritis of foot.  He  has a past surgical history that includes Appendectomy and Cholecystectomy.  His family history includes Cancer in his mother; Cataracts in his mother; Diabetes in his mother; Hypertension in his mother; Stroke in his father.  He  reports that he has quit smoking. He does not have any smokeless tobacco history on file. He reports that he does not drink alcohol or use drugs.  He has a current medication list which includes the following prescription(s): adalimumab, ammonium lactate, benzonatate, calcipotriene, clobetasol, folic acid, ketoconazole, losartan, methotrexate, onetouch delica lancets, onetouch ultra test, onetouch ultramini, proair hfa, tramadol, triamcinolone acetonide 0.1%, and vitamin d2.  He has No Known Allergies..    Review of Systems   Constitutional: Negative for fever, fatigue and night sweats.   HENT: Positive for sore throat. Negative for nosebleeds, postnasal drip, rhinorrhea, congestion and hearing loss.    Eyes: Negative for itching.   Respiratory: Positive for snoring, cough, hemoptysis and wheezing. Negative for sputum production, orthopnea and Paroxysmal Nocturnal Dyspnea.    Cardiovascular: Negative for chest pain and leg swelling.   Genitourinary: Negative for difficulty urinating and hematuria.   Endocrine: Negative for cold intolerance and heat intolerance.    Musculoskeletal: Positive for arthralgias and back pain.   Skin: Positive for rash.        Diffuse Psoriasis   Gastrointestinal: Positive for abdominal pain. Negative for vomiting and acid reflux.   Neurological: Negative for  "weakness.   Hematological: Does not bruise/bleed easily and no excessive bruising.   Psychiatric/Behavioral: The patient is not nervous/anxious.    All other systems reviewed and are negative.     Objective:   /82   Pulse 79   Resp 18   Ht 5' 9" (1.753 m)   Wt 123.8 kg (273 lb)   SpO2 (!) 92% Comment: pt on 2 liters oxygen  BMI 40.32 kg/m²   Body mass index is 40.32 kg/m².    Physical Exam   Constitutional: He is oriented to person, place, and time. He appears well-developed and well-nourished.   Disheveled in appearance   HENT:   Head: Normocephalic and atraumatic.   Mallampati 3    Eyes: EOM are normal. Pupils are equal, round, and reactive to light.   Neck: Normal range of motion. Neck supple.   Neck 48 cm   Cardiovascular: Normal rate and regular rhythm.    Pulmonary/Chest: Effort normal. He has decreased breath sounds in the right upper field, the right middle field, the right lower field, the left upper field and the left middle field. He has no wheezes. He has no rales.   Abdominal: Soft. Bowel sounds are normal.   Musculoskeletal: Normal range of motion.   Neurological: He is alert and oriented to person, place, and time.   Skin: Rash noted.   Psychiatric: He has a normal mood and affect. His behavior is normal.   Nursing note and vitals reviewed.      Personal Diagnostic Review    CT of chest performed on 08/02/17 with contrast:   Lungs: There is no change in the multiple bilateral pulmonary nodules, most noncalcified, but some calcified. The largest measures 11 mm in the left upper lobe and contains punctate central calcifications. No pulmonary infiltrates or pleural effusions. No abnormality of the airway. No pathologically enlarged hilar or mediastinal lymph nodes.  -Pleura: No thickening or fluid.  -Mediastinum/Lupe:No significant adenopathy.   -Axilla: No adenopathy.  -Thyroid: Normal lower gland.  -Heart/Aorta: No pericardial effusion. Aorta normal caliber.  -Bones/Chest Wall: Intact with " mild degenerative changes   -Upper Abdomen: Tiny myelolipoma within the left adrenal gland    Pulmonary function tests: FEV1: 1.44  (41 % predicted), FVC:  1.93 (42 % predicted), FEV1/FVC:  75, T.43 (69% predicted), RV/TLVC: 52 (140 % predicted), DLCO: 15.8 (55 % predicted)   Severe mixed obstruction with restriction. Diffusion capacity is moderately reduced.     Assessment:     1. Multiple lung nodules on CT Stable   2. SCHUYLER (obstructive sleep apnea) Active   3. Chronic airway obstruction, mixed type Stable   4. Chronic respiratory failure with hypoxia Stable   5. Morbid obesity due to excess calories Stable   6. Cigarette smoker Stable     Outpatient Encounter Prescriptions as of 2017   Medication Sig Dispense Refill    adalimumab (HUMIRA) PnKt injection Inject 0.8 mLs (40 mg total) into the skin every 14 (fourteen) days. 6 each 2    ammonium lactate 12 % Crea Apply 1 Act topically 2 (two) times daily. 1 Tube 3    benzonatate (TESSALON) 100 MG capsule Take 1 capsule (100 mg total) by mouth 3 (three) times daily as needed for Cough. Swallow whole 60 capsule 2    calcipotriene (DOVONOX) 0.005 % ointment Apply on psoriasis on body twice daily 120 g 3    clobetasol (OLUX) 0.05 % Foam AAA of scalp and behind ears twice daily.  Do not use on face, underarms or groin. 100 g 3    folic acid (FOLVITE) 1 MG tablet Take 1 tablet (1 mg total) by mouth once daily. 90 tablet 3    ketoconazole (NIZORAL) 2 % shampoo Wash hair with medicated shampoo at least 2x/week - let sit on scalp at least 5 minutes prior to rinsing 120 mL 5    losartan (COZAAR) 100 MG tablet TAKE 1 TABLET (100 MG TOTAL) BY MOUTH ONCE DAILY. 30 tablet 11    methotrexate 2.5 MG Tab TAKE 6 TABLETS (15 MG TOTAL) BY MOUTH EVERY 7 DAYS. (3 in the morning and 3 in the evening) 24 tablet 5    ONETOUCH DELICA LANCETS 33 gauge Misc       ONETOUCH ULTRA TEST Strp       ONETOUCH ULTRAMINI kit       PROAIR HFA 90 mcg/actuation inhaler INHALE 2 PUFFS  EVERY 4 HOURS AS NEEDED :FOR WHEEZING 8.5 Inhaler 11    tramadol (ULTRAM) 50 mg tablet TAKE 1 TABLET BY MOUTH EVERY 6 HOURS AS NEEDED FOR PAIN 60 tablet 3    triamcinolone acetonide 0.1% (KENALOG) 0.1 % cream AAA bid for psoriasis on body.  Do not use on face, underarms or groin. 454 g 3    VITAMIN D2 50,000 unit capsule TAKE 1 CAPSULE (50,000 UNITS TOTAL) BY MOUTH TWICE A WEEK. 8 capsule 6    [DISCONTINUED] furosemide (LASIX) 20 MG tablet Take 1 tablet (20 mg total) by mouth 2 (two) times daily. 10 tablet 0     Facility-Administered Encounter Medications as of 8/2/2017   Medication Dose Route Frequency Provider Last Rate Last Dose    [COMPLETED] omnipaque 350 iohexol 75 mL  75 mL Intravenous ONCE PRN Asif Demarco MD   75 mL at 08/02/17 1255     Orders Placed This Encounter   Procedures    Polysomnogram (CPAP will be added if patient meets diagnostic criteria.)     Standing Status:   Future     Standing Expiration Date:   8/2/2018     Plan:     Discussed diagnosis, its evaluation, treatment and usual course. All questions answered.    Chronic airway obstruction, mixed type  MIXED COPD ROS: taking medications as instructed, no medication side effects noted, no significant ongoing wheezing or shortness of breath, using bronchodilator MDI less than twice a week.   New concerns: NONE.   Exam: appears well, vitals normal, no respiratory distress, acyanotic, normal RR, chest clear, no wheezing, crepitations, rhonchi, normal symmetric air entry.   Assessment:  MIXED COPD stable.   Plan: PROAIR. Current treatment plan is effective, no change in therapy.    Multiple lung nodules on CT  STABLE RADIOLOGICALLY.  Continued radiologic monitoring.     Morbid obesity due to excess calories  General weight loss/lifestyle modification strategies discussed (elicit support from others; identify saboteurs; non-food rewards, etc).  Diet interventions: low calorie (1000 kCal/d) deficit diet.    Cigarette smoker  Assistance with  smoking cessation was offered, including:  []  Medications  [x]  Counseling  []  Printed Information on Smoking Cessation  [x]  Referral to a Smoking Cessation Program    Patient was counseled regarding smoking for >10 minutes.    Chronic respiratory failure with hypoxia  Continue oxygen supplementation at 2 L /min with activity and with sleep.     SCHUYLER (obstructive sleep apnea)  My recommendation at this point would be to set up a [facility].  We have discussed weight loss and how this may improve his situation.  We have discussed behavioral modifications, as well.  After his study, he could certainly try a CPAP.       TIME SPENT WITH PATIENT: Time spent: 40 minutes in face to face  discussion concerning diagnosis, prognosis, review of lab and test results, benefits of treatment as well as management of disease, counseling of patient and coordination of care between various health  care providers . Greater than half the time spent was used for coordination of care and counseling of patient.       Return in about 1 month (around 9/2/2017) for Mixed obstructive and restrictive lung disease, Chronic Respiratory Failure, Morbid Obesity, Tobacco use disorder, SCHUYLER.

## 2017-08-02 NOTE — ASSESSMENT & PLAN NOTE
General weight loss/lifestyle modification strategies discussed (elicit support from others; identify saboteurs; non-food rewards, etc).  Diet interventions: low calorie (1000 kCal/d) deficit diet.

## 2017-08-02 NOTE — ASSESSMENT & PLAN NOTE
MIXED COPD ROS: taking medications as instructed, no medication side effects noted, no significant ongoing wheezing or shortness of breath, using bronchodilator MDI less than twice a week.   New concerns: NONE.   Exam: appears well, vitals normal, no respiratory distress, acyanotic, normal RR, chest clear, no wheezing, crepitations, rhonchi, normal symmetric air entry.   Assessment:  MIXED COPD stable.   Plan: PROAIR. Current treatment plan is effective, no change in therapy.

## 2017-08-03 LAB
POST FEF 25 75: 1.21 L/S (ref 2.1–3.65)
POST FET 100: 9.4 S
POST FEV1 FVC: 73 %
POST FEV1: 1.38 L (ref 3.09–3.85)
POST FIF 50: 1.21 L/S
POST FVC: 1.88 L (ref 4.13–5.04)
POST PEF: 2.85 L/S (ref 7.85–10.1)
PRE DLCO: 15.83 ML/MMHG/MIN (ref 24.85–33.14)
PRE ERV: 0.16 L
PRE FEF 25 75: 1.14 L/S (ref 2.1–3.65)
PRE FET 100: 8.22 S
PRE FEV1 FVC: 75 %
PRE FEV1: 1.44 L (ref 3.09–3.85)
PRE FIF 50: 2.46 L/S
PRE FRC PL: 2.47 L (ref 2.28–3.49)
PRE FVC: 1.93 L (ref 4.13–5.04)
PRE KROGHS K: 5.19 1/MIN
PRE PEF: 3.57 L/S (ref 7.85–10.1)
PRE RV: 2.32 L (ref 1.91–2.7)
PRE SVC: 2.11 L
PRE TLC: 4.43 L (ref 5.92–6.92)
PREDICTED DLCO: 28.99 ML/MMHG/MIN (ref 24.85–33.14)
PREDICTED FEV1 FVC: 75.67 % (ref 70.83–80.51)
PREDICTED FEV1: 3.47 L (ref 3.09–3.85)
PREDICTED FRC N2: 2.89 L (ref 2.28–3.49)
PREDICTED FRC PL: 2.89 L (ref 2.28–3.49)
PREDICTED FVC: 4.59 L (ref 4.13–5.04)
PREDICTED RV: 2.3 L (ref 1.91–2.7)
PREDICTED SVC: 4.2 L
PREDICTED TLC: 6.42 L (ref 5.92–6.92)
PROVOCATION PROTOCOL: ABNORMAL

## 2017-08-12 ENCOUNTER — HOSPITAL ENCOUNTER (OUTPATIENT)
Dept: SLEEP MEDICINE | Facility: HOSPITAL | Age: 62
Discharge: HOME | End: 2017-08-12
Attending: INTERNAL MEDICINE
Payer: MEDICAID

## 2017-08-12 DIAGNOSIS — G47.33 OSA (OBSTRUCTIVE SLEEP APNEA): ICD-10-CM

## 2017-08-12 PROCEDURE — 95811 POLYSOM 6/>YRS CPAP 4/> PARM: CPT | Mod: 26,,, | Performed by: PSYCHOLOGIST

## 2017-08-12 PROCEDURE — 95811 POLYSOM 6/>YRS CPAP 4/> PARM: CPT

## 2017-08-23 ENCOUNTER — LAB VISIT (OUTPATIENT)
Dept: LAB | Facility: HOSPITAL | Age: 62
End: 2017-08-23
Attending: INTERNAL MEDICINE
Payer: MEDICAID

## 2017-08-23 DIAGNOSIS — E11.21 TYPE 2 DIABETES MELLITUS WITH NEPHROPATHY: Chronic | ICD-10-CM

## 2017-08-23 DIAGNOSIS — R74.8 ELEVATED LIVER ENZYMES: ICD-10-CM

## 2017-08-23 DIAGNOSIS — E11.59 HYPERTENSION ASSOCIATED WITH DIABETES: Chronic | ICD-10-CM

## 2017-08-23 DIAGNOSIS — I15.2 HYPERTENSION ASSOCIATED WITH DIABETES: Chronic | ICD-10-CM

## 2017-08-23 LAB
ALBUMIN SERPL BCP-MCNC: 3.6 G/DL
ALP SERPL-CCNC: 79 U/L
ALT SERPL W/O P-5'-P-CCNC: 56 U/L
ANION GAP SERPL CALC-SCNC: 10 MMOL/L
AST SERPL-CCNC: 27 U/L
BILIRUB SERPL-MCNC: 0.3 MG/DL
BUN SERPL-MCNC: 15 MG/DL
CALCIUM SERPL-MCNC: 9.7 MG/DL
CHLORIDE SERPL-SCNC: 104 MMOL/L
CO2 SERPL-SCNC: 26 MMOL/L
CREAT SERPL-MCNC: 1.2 MG/DL
EST. GFR  (AFRICAN AMERICAN): >60 ML/MIN/1.73 M^2
EST. GFR  (NON AFRICAN AMERICAN): >60 ML/MIN/1.73 M^2
GLUCOSE SERPL-MCNC: 93 MG/DL
POTASSIUM SERPL-SCNC: 4.3 MMOL/L
PROT SERPL-MCNC: 7.7 G/DL
SODIUM SERPL-SCNC: 140 MMOL/L

## 2017-08-23 PROCEDURE — 83036 HEMOGLOBIN GLYCOSYLATED A1C: CPT

## 2017-08-23 PROCEDURE — 80053 COMPREHEN METABOLIC PANEL: CPT

## 2017-08-23 PROCEDURE — 36415 COLL VENOUS BLD VENIPUNCTURE: CPT | Mod: PO

## 2017-08-24 LAB
ESTIMATED AVG GLUCOSE: 134 MG/DL
HBA1C MFR BLD HPLC: 6.3 %

## 2017-08-29 ENCOUNTER — TELEPHONE (OUTPATIENT)
Dept: PULMONOLOGY | Facility: CLINIC | Age: 62
End: 2017-08-29

## 2017-08-29 DIAGNOSIS — G47.33 OSA (OBSTRUCTIVE SLEEP APNEA): Primary | ICD-10-CM

## 2017-08-29 NOTE — TELEPHONE ENCOUNTER
Sleep study reviewed. Pateint has moderate SCHUYLER . Treatment with CPAP is recommended.  AUTO TITRATING CPAP   4 - 20  CMWP ordered. Please schedule follow up in 6 weeks for complaince evaluation and inform patient.

## 2017-08-30 ENCOUNTER — OFFICE VISIT (OUTPATIENT)
Dept: INTERNAL MEDICINE | Facility: CLINIC | Age: 62
End: 2017-08-30
Payer: MEDICAID

## 2017-08-30 VITALS
WEIGHT: 278.25 LBS | TEMPERATURE: 96 F | HEART RATE: 92 BPM | DIASTOLIC BLOOD PRESSURE: 80 MMHG | SYSTOLIC BLOOD PRESSURE: 131 MMHG | HEIGHT: 69 IN | BODY MASS INDEX: 41.21 KG/M2 | OXYGEN SATURATION: 96 %

## 2017-08-30 DIAGNOSIS — G47.33 OSA (OBSTRUCTIVE SLEEP APNEA): ICD-10-CM

## 2017-08-30 DIAGNOSIS — I15.2 HYPERTENSION ASSOCIATED WITH DIABETES: Primary | Chronic | ICD-10-CM

## 2017-08-30 DIAGNOSIS — E11.59 HYPERTENSION ASSOCIATED WITH DIABETES: Primary | Chronic | ICD-10-CM

## 2017-08-30 DIAGNOSIS — E11.21 TYPE 2 DIABETES MELLITUS WITH NEPHROPATHY: Chronic | ICD-10-CM

## 2017-08-30 DIAGNOSIS — E55.9 VITAMIN D DEFICIENCY: ICD-10-CM

## 2017-08-30 DIAGNOSIS — L40.59 POLYARTICULAR PSORIATIC ARTHRITIS: ICD-10-CM

## 2017-08-30 DIAGNOSIS — Z29.9 PREVENTIVE MEASURE: ICD-10-CM

## 2017-08-30 DIAGNOSIS — J96.11 CHRONIC RESPIRATORY FAILURE WITH HYPOXIA: Chronic | ICD-10-CM

## 2017-08-30 PROCEDURE — 3044F HG A1C LEVEL LT 7.0%: CPT | Mod: ,,, | Performed by: INTERNAL MEDICINE

## 2017-08-30 PROCEDURE — 99214 OFFICE O/P EST MOD 30 MIN: CPT | Mod: S$PBB,,, | Performed by: INTERNAL MEDICINE

## 2017-08-30 PROCEDURE — 99213 OFFICE O/P EST LOW 20 MIN: CPT | Mod: PBBFAC,PO | Performed by: INTERNAL MEDICINE

## 2017-08-30 PROCEDURE — 3079F DIAST BP 80-89 MM HG: CPT | Mod: ,,, | Performed by: INTERNAL MEDICINE

## 2017-08-30 PROCEDURE — 99999 PR PBB SHADOW E&M-EST. PATIENT-LVL III: CPT | Mod: PBBFAC,,, | Performed by: INTERNAL MEDICINE

## 2017-08-30 PROCEDURE — 4010F ACE/ARB THERAPY RXD/TAKEN: CPT | Mod: ,,, | Performed by: INTERNAL MEDICINE

## 2017-08-30 PROCEDURE — 3008F BODY MASS INDEX DOCD: CPT | Mod: ,,, | Performed by: INTERNAL MEDICINE

## 2017-08-30 PROCEDURE — 3075F SYST BP GE 130 - 139MM HG: CPT | Mod: ,,, | Performed by: INTERNAL MEDICINE

## 2017-08-30 NOTE — PROGRESS NOTES
"Subjective:       Patient ID: Trey Stevens is a 62 y.o. male.    Chief Complaint: Follow-up    Here for follow up of medical problems.  Feeling well.  AC breakfast 130-156, PC dinner 180s.  BPs at -142/ 80s.  Breathing is stable,sees Pulm, O2 at home and portable for outside but out of canisters right now.  No f/c/sw/cough.  No cp/palp.  BMs and urine ok.  Taking vit D.    Updated/ annual due 2/18:  HM: 10/16 fluvax, 2/16 [id number], 4/13 [id number], 2/16 TDaP, Cscope in the past normal and pt refuses more, 2/16 HCV neg, 8/16 Eye Dr. Germain.          Review of Systems   Constitutional: Negative for chills, diaphoresis, fatigue and fever.   Respiratory: Negative for cough, chest tightness and shortness of breath.    Cardiovascular: Negative for chest pain, palpitations and leg swelling.   Gastrointestinal: Negative for blood in stool, constipation, diarrhea, nausea and vomiting.   Genitourinary: Negative for difficulty urinating and frequency.   Musculoskeletal: Negative for arthralgias.       Objective:   /80   Pulse 92   Temp 96 °F (35.6 °C) (Tympanic)   Ht 5' 9" (1.753 m)   Wt 126.2 kg (278 lb 3.5 oz)   SpO2 96%   BMI 41.09 kg/m²     Physical Exam   Constitutional: He is oriented to person, place, and time. He appears well-developed and well-nourished.   HENT:   Mouth/Throat: Oropharynx is clear and moist.   Neck: Normal range of motion. Neck supple.   Cardiovascular: Normal rate, regular rhythm and intact distal pulses.  Exam reveals no gallop and no friction rub.    No murmur heard.  Pulmonary/Chest: Effort normal and breath sounds normal. He has no wheezes. He has no rales.   Abdominal: Soft. Bowel sounds are normal. He exhibits no mass. There is no tenderness.   Musculoskeletal: He exhibits no edema.   Lymphadenopathy:     He has no cervical adenopathy.   Neurological: He is alert and oriented to person, place, and time.   Psychiatric: He has a normal mood and affect.           Results for KRISTEN, " SYDNEY PENN (MRN [id number]) as of 8/30/2017 12:12   Ref. Range 2/20/2017 07:43 3/22/2017 13:45 3/22/2017 13:46 8/2/2017 11:14 8/23/2017 09:38   Sodium Latest Ref Range: 136 - 145 mmol/L 139 141   140   Potassium Latest Ref Range: 3.5 - 5.1 mmol/L 3.8 4.2   4.3   Chloride Latest Ref Range: 95 - 110 mmol/L 109 106   104   CO2 Latest Ref Range: 23 - 29 mmol/L 19 (L) 26   26   Anion Gap Latest Ref Range: 8 - 16 mmol/L 11 9   10   BUN, Bld Latest Ref Range: 6 - 20 mg/dL 9 15   15   Creatinine Latest Ref Range: 0.5 - 1.4 mg/dL 1.1 1.2  1.2 1.2   eGFR if non African American Latest Ref Range: >60 mL/min/1.73 m^2 >60.0 >60  >60 >60.0   eGFR if African American Latest Ref Range: >60 mL/min/1.73 m^2 >60.0 >60  >60 >60.0   Glucose Latest Ref Range: 70 - 110 mg/dL 107 97   93   Calcium Latest Ref Range: 8.7 - 10.5 mg/dL 8.7 9.5   9.7   Alkaline Phosphatase Latest Ref Range: 55 - 135 U/L 96 83   79   Total Protein Latest Ref Range: 6.0 - 8.4 g/dL 7.9 7.7   7.7   Albumin Latest Ref Range: 3.5 - 5.2 g/dL 3.9 3.7   3.6   Total Bilirubin Latest Ref Range: 0.1 - 1.0 mg/dL 0.5 0.5   0.3   AST Latest Ref Range: 10 - 40 U/L 69 (H) 36   27   ALT Latest Ref Range: 10 - 44 U/L 137 (H) 49 (H)   56 (H)   CRP Latest Ref Range: 0.0 - 8.2 mg/L   5.4     Triglycerides Latest Ref Range: 30 - 150 mg/dL 99       Cholesterol Latest Ref Range: 120 - 199 mg/dL 126       HDL Latest Ref Range: 40 - 75 mg/dL 32 (L)       LDL Cholesterol Latest Ref Range: 63.0 - 159.0 mg/dL 74.2       Total Cholesterol/HDL Ratio Latest Ref Range: 2.0 - 5.0  3.9       Vit D, 25-Hydroxy Latest Ref Range: 30 - 96 ng/mL 37       Hemoglobin A1C Latest Ref Range: 4.0 - 5.6 % 6.7 (H)    6.3 (H)   Estimated Avg Glucose Latest Ref Range: 68 - 131 mg/dL 146 (H)    134 (H)     Assessment:       1. Hypertension associated with diabetes    2. Type 2 diabetes mellitus with nephropathy    3. Vitamin D deficiency    4. Polyarticular psoriatic arthritis    5. SCHUYLER (obstructive sleep apnea)     6. Chronic respiratory failure with hypoxia    7. Preventive measure        Plan:       Trey was seen today for follow-up.    Diagnoses and all orders for this visit:    Hypertension associated with diabetes- stable at home/[facility].    Type 2 diabetes mellitus with nephropathy- doing well.  -     Hemoglobin A1c; Future  -     Microalbumin/creatinine urine ratio; Future    Vitamin D deficiency- check lab.  -     Vitamin D; Future    Polyarticular psoriatic arthritis on Humira and MTX.    SCHUYLER (obstructive sleep apnea)    Chronic respiratory failure with hypoxia, on O2- f/w Pulm.    Preventive measure- due in 6mo for annual.  -     Comprehensive metabolic panel; Future  -     Lipid panel; Future  -     PSA, Screening; Future  -     CBC auto differential; Future  -     Vitamin D; Future  -     TSH; Future  -     Hemoglobin A1c; Future  -     Microalbumin/creatinine urine ratio; Future

## 2017-08-30 NOTE — TELEPHONE ENCOUNTER
Patient notified of sleep study results and instructed to scheduled an appointment for CPAP compliance 6 weeks after he received CPAP machine. Patient verbalized understanding

## 2017-09-05 ENCOUNTER — OFFICE VISIT (OUTPATIENT)
Dept: OPHTHALMOLOGY | Facility: CLINIC | Age: 62
End: 2017-09-05
Payer: MEDICAID

## 2017-09-05 DIAGNOSIS — E11.9 DIABETES MELLITUS TYPE 2 WITHOUT RETINOPATHY: Primary | ICD-10-CM

## 2017-09-05 DIAGNOSIS — H52.7 REFRACTIVE ERROR: ICD-10-CM

## 2017-09-05 DIAGNOSIS — Z13.5 SCREENING FOR GLAUCOMA: ICD-10-CM

## 2017-09-05 PROCEDURE — 92014 COMPRE OPH EXAM EST PT 1/>: CPT | Mod: S$PBB,,, | Performed by: OPTOMETRIST

## 2017-09-05 PROCEDURE — 92015 DETERMINE REFRACTIVE STATE: CPT | Mod: ,,, | Performed by: OPTOMETRIST

## 2017-09-05 PROCEDURE — 99999 PR PBB SHADOW E&M-EST. PATIENT-LVL I: CPT | Mod: PBBFAC,,, | Performed by: OPTOMETRIST

## 2017-09-05 PROCEDURE — 99211 OFF/OP EST MAY X REQ PHY/QHP: CPT | Mod: PBBFAC,PO | Performed by: OPTOMETRIST

## 2017-09-05 NOTE — PROGRESS NOTES
HPI     NIDDM exam. Blurred vision sometimes. Last eye exam 08/01/2016 [facility]. New   patient with [facility].    Last edited by Leslie De Los Santos on 9/5/2017 10:45 AM. (History)            Assessment /Plan     For exam results, see Encounter Report.    Diabetes mellitus type 2 without retinopathy    Cataract, nuclear, bilateral    Screening for glaucoma    Refractive error      No diabetic retinopathy OU.  Mild NS cataracts OU = will follow.  OH OK OU otherwise.  Spec Rx given.  RTC one year.

## 2017-09-05 NOTE — LETTER
September 5, 2017      Brittanie Chaparro MD  [address] [facility] [address] [facility]  [place] [address]           [facility] - [facility]  [address] [place] [address]  Phone: [phone]  Fax: [phone]          Patient: Trey Stevens   MR Number: [id number]   YOB: 1955   Date of Visit: 9/5/2017       Dear Dr. Brittanie Chaparro:    Thank you for referring Trey Stevens to me for evaluation. Attached you will find relevant portions of my assessment and plan of care.    If you have questions, please do not hesitate to call me. I look forward to following Trey Stevens along with you.    Sincerely,    REINALDO Germain, OD    Enclosure  CC:  No Recipients    If you would like to receive this communication electronically, please contact [email or web address] or [phone] to request more information on [employer] Link access.    For providers and/or their staff who would like to refer a patient to [facility], please contact us through our one-stop-shop provider referral line, [facility] , at [phone].    If you feel you have received this communication in error or would no longer like to receive these types of communications, please e-mail [email or web address]

## 2017-09-15 ENCOUNTER — OFFICE VISIT (OUTPATIENT)
Dept: PODIATRY | Facility: CLINIC | Age: 62
End: 2017-09-15
Payer: MEDICAID

## 2017-09-15 VITALS
WEIGHT: 276.44 LBS | SYSTOLIC BLOOD PRESSURE: 123 MMHG | BODY MASS INDEX: 40.94 KG/M2 | DIASTOLIC BLOOD PRESSURE: 83 MMHG | HEIGHT: 69 IN | HEART RATE: 89 BPM

## 2017-09-15 DIAGNOSIS — R23.4 SKIN FISSURES: ICD-10-CM

## 2017-09-15 DIAGNOSIS — E11.49 TYPE II DIABETES MELLITUS WITH NEUROLOGICAL MANIFESTATIONS: Primary | ICD-10-CM

## 2017-09-15 DIAGNOSIS — L84 CORN OR CALLUS: ICD-10-CM

## 2017-09-15 DIAGNOSIS — B35.1 DERMATOPHYTOSIS OF NAIL: ICD-10-CM

## 2017-09-15 PROCEDURE — 11721 DEBRIDE NAIL 6 OR MORE: CPT | Mod: Q9,PBBFAC,PO | Performed by: PODIATRIST

## 2017-09-15 PROCEDURE — 99999 PR PBB SHADOW E&M-EST. PATIENT-LVL III: CPT | Mod: PBBFAC,,, | Performed by: PODIATRIST

## 2017-09-15 PROCEDURE — 99213 OFFICE O/P EST LOW 20 MIN: CPT | Mod: PBBFAC,PO | Performed by: PODIATRIST

## 2017-09-15 PROCEDURE — 99499 UNLISTED E&M SERVICE: CPT | Mod: S$PBB,,, | Performed by: PODIATRIST

## 2017-09-15 PROCEDURE — 11056 PARNG/CUTG B9 HYPRKR LES 2-4: CPT | Mod: Q9,PBBFAC,PO | Performed by: PODIATRIST

## 2017-09-15 PROCEDURE — 11056 PARNG/CUTG B9 HYPRKR LES 2-4: CPT | Mod: S$PBB,,, | Performed by: PODIATRIST

## 2017-09-15 PROCEDURE — 11721 DEBRIDE NAIL 6 OR MORE: CPT | Mod: 59,S$PBB,, | Performed by: PODIATRIST

## 2017-09-15 NOTE — PROGRESS NOTES
Subjective:     Patient ID: Trey Stevens is a 62 y.o. male.    Chief Complaint: Nail Care (Patient states no current pain other than neuropathy.) and Diabetes Mellitus (Last PCP visit with - 8/30/17.)    Trey is a 62 y.o. male who presents to the clinic for evaluation and treatment of high risk feet. Trey has a past medical history of Arthritis; Diabetes mellitus; Diabetes mellitus type 2, uncontrolled (7/19/2016); DM (diabetes mellitus) (2015); Gall stones; Obesity; Psoriasis (a type of skin inflammation); and Rheumatoid arthritis of foot. The patient's chief complaint is thick calluses and  nails. This patient has documented high risk feet requiring routine maintenance secondary to diabetes mellitis and those secondary complications of diabetes, as mentioned. Patient states his blood sugar this morning was 132mg/dl.     PCP: Brittanie Kaba MD    Date Last Seen by PCP: 8/30/17    Current shoe gear:  Affected Foot: Extra depth shoes     Unaffected Foot: Extra depth shoes    Hemoglobin A1C   Date Value Ref Range Status   08/23/2017 6.3 (H) 4.0 - 5.6 % Final     Comment:     According to ADA guidelines, hemoglobin A1c <7.0% represents  optimal control in non-pregnant diabetic patients. Different  metrics may apply to specific patient populations.   Standards of Medical Care in Diabetes-2016.  For the purpose of screening for the presence of diabetes:  <5.7%     Consistent with the absence of diabetes  5.7-6.4%  Consistent with increasing risk for diabetes   (prediabetes)  >or=6.5%  Consistent with diabetes  Currently, no consensus exists for use of hemoglobin A1c  for diagnosis of diabetes for children.  This Hemoglobin A1c assay has significant interference with fetal   hemoglobin   (HbF). The results are invalid for patients with abnormal amounts of   HbF,   including those with known Hereditary Persistence   of Fetal Hemoglobin. Heterozygous hemoglobin variants (HbAS, HbAC,   HbAD, HbAE, HbA2) do  not significantly interfere with this assay;   however, presence of multiple variants in a sample may impact the %   interference.     02/20/2017 6.7 (H) 4.5 - 6.2 % Final     Comment:     According to ADA guidelines, hemoglobin A1C <7.0% represents  optimal control in non-pregnant diabetic patients.  Different  metrics may apply to specific populations.   Standards of Medical Care in Diabetes - 2016.  For the purpose of screening for the presence of diabetes:  <5.7%     Consistent with the absence of diabetes  5.7-6.4%  Consistent with increasing risk for diabetes   (prediabetes)  >or=6.5%  Consistent with diabetes  Currently no consensus exists for use of hemoglobin A1C  for diagnosis of diabetes for children.     10/24/2016 6.5 (H) 4.5 - 6.2 % Final     Comment:     According to ADA guidelines, hemoglobin A1C <7.0% represents  optimal control in non-pregnant diabetic patients.  Different  metrics may apply to specific populations.   Standards of Medical Care in Diabetes - 2016.  For the purpose of screening for the presence of diabetes:  <5.7%     Consistent with the absence of diabetes  5.7-6.4%  Consistent with increasing risk for diabetes   (prediabetes)  >or=6.5%  Consistent with diabetes  Currently no consensus exists for use of hemoglobin A1C  for diagnosis of diabetes for children.             Patient Active Problem List   Diagnosis    Psoriatic arthritis    Vitamin D deficiency    Psoriasis    Multiple lung nodules on CT    Polyarticular psoriatic arthritis    Immunosuppressed status    Long-term use of immunosuppressant medication    Cigarette smoker    Chronic airway obstruction, mixed type    Chronic respiratory failure with hypoxia    Type 2 diabetes mellitus with nephropathy    Morbid obesity due to excess calories    Hypertension associated with diabetes    Vitiligo    SCHUYLER (obstructive sleep apnea)       Medication List with Changes/Refills   Current Medications    ADALIMUMAB (HUMIRA)  PNKT INJECTION    Inject 0.8 mLs (40 mg total) into the skin every 14 (fourteen) days.    AMMONIUM LACTATE 12 % CREA    Apply 1 Act topically 2 (two) times daily.    BENZONATATE (TESSALON) 100 MG CAPSULE    Take 1 capsule (100 mg total) by mouth 3 (three) times daily as needed for Cough. Swallow whole    CALCIPOTRIENE (DOVONOX) 0.005 % OINTMENT    Apply on psoriasis on body twice daily    CLOBETASOL (OLUX) 0.05 % FOAM    AAA of scalp and behind ears twice daily.  Do not use on face, underarms or groin.    FOLIC ACID (FOLVITE) 1 MG TABLET    Take 1 tablet (1 mg total) by mouth once daily.    KETOCONAZOLE (NIZORAL) 2 % SHAMPOO    Wash hair with medicated shampoo at least 2x/week - let sit on scalp at least 5 minutes prior to rinsing    LOSARTAN (COZAAR) 100 MG TABLET    TAKE 1 TABLET (100 MG TOTAL) BY MOUTH ONCE DAILY.    METHOTREXATE 2.5 MG TAB    TAKE 6 TABLETS (15 MG TOTAL) BY MOUTH EVERY 7 DAYS. (3 in the morning and 3 in the evening)    ONETOUCH DELICA LANCETS 33 GAUGE MISC        ONETOUCH ULTRA TEST STRP        ONETOUCH ULTRAMINI KIT        PROAIR HFA 90 MCG/ACTUATION INHALER    INHALE 2 PUFFS EVERY 4 HOURS AS NEEDED :FOR WHEEZING    TRAMADOL (ULTRAM) 50 MG TABLET    TAKE 1 TABLET BY MOUTH EVERY 6 HOURS AS NEEDED FOR PAIN    TRIAMCINOLONE ACETONIDE 0.1% (KENALOG) 0.1 % CREAM    AAA bid for psoriasis on body.  Do not use on face, underarms or groin.    VITAMIN D2 50,000 UNIT CAPSULE    TAKE 1 CAPSULE (50,000 UNITS TOTAL) BY MOUTH TWICE A WEEK.       Review of patient's allergies indicates:  No Known Allergies    Past Surgical History:   Procedure Laterality Date    APPENDECTOMY      CHOLECYSTECTOMY         Family History   Problem Relation Age of Onset    Cancer Mother     Hypertension Mother     Diabetes Mother     Cataracts Mother     Stroke Father     Psoriasis Neg Hx        Social History     Social History    Marital status: Single     Spouse name: N/A    Number of children: N/A    Years of  "education: N/A     Occupational History    Not on file.     Social History Main Topics    Smoking status: Former Smoker    Smokeless tobacco: Never Used    Alcohol use No    Drug use: No    Sexual activity: No     Other Topics Concern    Not on file     Social History Narrative    No narrative on file       Vitals:    09/15/17 1345   BP: 123/83   Pulse: 89   Weight: 125.4 kg (276 lb 7.3 oz)   Height: 5' 9" (1.753 m)   PainSc: 0-No pain       Hemoglobin A1C   Date Value Ref Range Status   08/23/2017 6.3 (H) 4.0 - 5.6 % Final     Comment:     According to ADA guidelines, hemoglobin A1c <7.0% represents  optimal control in non-pregnant diabetic patients. Different  metrics may apply to specific patient populations.   Standards of Medical Care in Diabetes-2016.  For the purpose of screening for the presence of diabetes:  <5.7%     Consistent with the absence of diabetes  5.7-6.4%  Consistent with increasing risk for diabetes   (prediabetes)  >or=6.5%  Consistent with diabetes  Currently, no consensus exists for use of hemoglobin A1c  for diagnosis of diabetes for children.  This Hemoglobin A1c assay has significant interference with fetal   hemoglobin   (HbF). The results are invalid for patients with abnormal amounts of   HbF,   including those with known Hereditary Persistence   of Fetal Hemoglobin. Heterozygous hemoglobin variants (HbAS, HbAC,   HbAD, HbAE, HbA2) do not significantly interfere with this assay;   however, presence of multiple variants in a sample may impact the %   interference.     02/20/2017 6.7 (H) 4.5 - 6.2 % Final     Comment:     According to ADA guidelines, hemoglobin A1C <7.0% represents  optimal control in non-pregnant diabetic patients.  Different  metrics may apply to specific populations.   Standards of Medical Care in Diabetes - 2016.  For the purpose of screening for the presence of diabetes:  <5.7%     Consistent with the absence of diabetes  5.7-6.4%  Consistent with increasing " risk for diabetes   (prediabetes)  >or=6.5%  Consistent with diabetes  Currently no consensus exists for use of hemoglobin A1C  for diagnosis of diabetes for children.     10/24/2016 6.5 (H) 4.5 - 6.2 % Final     Comment:     According to ADA guidelines, hemoglobin A1C <7.0% represents  optimal control in non-pregnant diabetic patients.  Different  metrics may apply to specific populations.   Standards of Medical Care in Diabetes - 2016.  For the purpose of screening for the presence of diabetes:  <5.7%     Consistent with the absence of diabetes  5.7-6.4%  Consistent with increasing risk for diabetes   (prediabetes)  >or=6.5%  Consistent with diabetes  Currently no consensus exists for use of hemoglobin A1C  for diagnosis of diabetes for children.         Review of Systems   Constitutional: Negative for chills and fever.   Respiratory: Negative for shortness of breath.    Cardiovascular: Positive for leg swelling. Negative for chest pain, palpitations, orthopnea and claudication.   Gastrointestinal: Negative for diarrhea, nausea and vomiting.   Musculoskeletal: Negative for joint pain.   Skin: Negative for rash.   Neurological: Positive for tingling and sensory change. Negative for dizziness, focal weakness and weakness.   Psychiatric/Behavioral: Negative.          Objective:      PHYSICAL EXAM: Apperance: Alert and orient in no distress,well developed, and with good attention to grooming and body habits  Patient presents ambulating in extra depth shoes.   LOWER EXTREMITY EXAM:  VASCULAR: Dorsalis pedis pulses 0/4 bilateral and Posterior Tibial pulses 1/4 bilateral. Capillary fill time <4 seconds bilateral. Mild edema observed bilateral. Varicosities present bilateral. Skin temperature of the lower extremities is warm to warm, proximal to distal. Hair growth absent bilateral.  DERMATOLOGICAL: No skin rashes, subcutaneous nodules, lesions, or ulcers observed bilateral. Nails 1,2,3,4,5 bilateral elongated, thickened,  and discolored with subungual debris. Webspaces 1-4 clean, dry and without evidence of break in skin integrity bilateral. Moderate dry and hyperkeratotic tissue noted to bilateral heels and medial 1st MPJ. Skin fissures noted to bilateral heels. No erythema, drainage, or increased temp noted to area.   NEUROLOGICAL: Light touch, sharp-dull, proprioception all present and equal bilaterally.  Vibratory sensation absent at bilateral hallux. Protective sensation absent at 6/10 sites as tested with a [employer] 5.07 monofilament.   MUSCULOSKELETAL: Muscle strength is 5/5 for foot inverters, everters, plantarflexors, and dorsiflexors. Muscle tone is normal.         Assessment:       Encounter Diagnoses   Name Primary?    Type II diabetes mellitus with neurological manifestations Yes    Dermatophytosis of nail     Skin fissures     Corn or callus          Plan:   Type II diabetes mellitus with neurological manifestations    Dermatophytosis of nail    Skin fissures    Corn or callus      I counseled the patient on his conditions, their implications and medical management.  Shoe inspection. Diabetic Foot Education. Patient reminded of the importance of good nutrition and blood sugar control to help prevent podiatric complications of diabetes. Patient instructed on proper foot hygeine. We discussed wearing proper shoe gear, daily foot inspections, never walking without protective shoe gear, never putting sharp instruments to feet.    With patient's permission, nails 1-5 bilateral were debrided/trimmed in length and thickness aggressively to their soft tissue attachment mechanically and with electric , removing all offending nail and debris. Patient relates relief following the procedure.  With patient's permission bilateral skin fissure and callus were trimmed in thickness with #15 blade without incident.   Patient  will continue to monitor the areas daily, inspect feet, wear protective shoe gear when  ambulatory, moisturizer to maintain skin integrity. Patient reminded of the importance of good nutrition and blood sugar control to help prevent podiatric complications of diabetes.  Patient to return in this office in approximately 3-4 months, or sooner if needed.                     Joy Hadley DPM  [facility]

## 2017-09-27 ENCOUNTER — TELEPHONE (OUTPATIENT)
Dept: PULMONOLOGY | Facility: CLINIC | Age: 62
End: 2017-09-27

## 2017-09-28 ENCOUNTER — OFFICE VISIT (OUTPATIENT)
Dept: PULMONOLOGY | Facility: CLINIC | Age: 62
End: 2017-09-28
Payer: MEDICAID

## 2017-09-28 VITALS
HEART RATE: 72 BPM | WEIGHT: 271.5 LBS | DIASTOLIC BLOOD PRESSURE: 7 MMHG | OXYGEN SATURATION: 92 % | SYSTOLIC BLOOD PRESSURE: 130 MMHG | HEIGHT: 69 IN | BODY MASS INDEX: 40.21 KG/M2 | RESPIRATION RATE: 18 BRPM

## 2017-09-28 DIAGNOSIS — J96.11 CHRONIC RESPIRATORY FAILURE WITH HYPOXIA: Chronic | ICD-10-CM

## 2017-09-28 DIAGNOSIS — E66.01 MORBID OBESITY DUE TO EXCESS CALORIES: Chronic | ICD-10-CM

## 2017-09-28 DIAGNOSIS — G47.33 OSA (OBSTRUCTIVE SLEEP APNEA): Primary | ICD-10-CM

## 2017-09-28 DIAGNOSIS — R91.8 MULTIPLE LUNG NODULES ON CT: Chronic | ICD-10-CM

## 2017-09-28 DIAGNOSIS — F17.210 CIGARETTE SMOKER: Chronic | ICD-10-CM

## 2017-09-28 DIAGNOSIS — J44.9 CHRONIC AIRWAY OBSTRUCTION, MIXED TYPE: Chronic | ICD-10-CM

## 2017-09-28 PROCEDURE — 3075F SYST BP GE 130 - 139MM HG: CPT | Mod: ,,, | Performed by: INTERNAL MEDICINE

## 2017-09-28 PROCEDURE — 99213 OFFICE O/P EST LOW 20 MIN: CPT | Mod: PBBFAC | Performed by: INTERNAL MEDICINE

## 2017-09-28 PROCEDURE — 99999 PR PBB SHADOW E&M-EST. PATIENT-LVL III: CPT | Mod: PBBFAC,,, | Performed by: INTERNAL MEDICINE

## 2017-09-28 PROCEDURE — 99215 OFFICE O/P EST HI 40 MIN: CPT | Mod: S$PBB,,, | Performed by: INTERNAL MEDICINE

## 2017-09-28 PROCEDURE — 3078F DIAST BP <80 MM HG: CPT | Mod: ,,, | Performed by: INTERNAL MEDICINE

## 2017-09-28 PROCEDURE — 3008F BODY MASS INDEX DOCD: CPT | Mod: ,,, | Performed by: INTERNAL MEDICINE

## 2017-09-28 NOTE — ASSESSMENT & PLAN NOTE
Assistance with smoking cessation was offered, including:  []  Medications  [x]  Counseling  []  Printed Information on Smoking Cessation  []  Referral to a Smoking Cessation Program    Patient was counseled regarding smoking for >10 minutes.

## 2017-09-28 NOTE — PROGRESS NOTES
Subjective:      Patient ID: Trey Stevens is a 62 y.o. male.  Patient Active Problem List   Diagnosis    Psoriatic arthritis    Vitamin D deficiency    Psoriasis    Multiple lung nodules on CT    Polyarticular psoriatic arthritis    Immunosuppressed status    Long-term use of immunosuppressant medication    Cigarette smoker    Chronic airway obstruction, mixed type    Chronic respiratory failure with hypoxia    Type 2 diabetes mellitus with nephropathy    Morbid obesity due to excess calories    Hypertension associated with diabetes    Vitiligo    SCHUYLER (obstructive sleep apnea)     Problem list has been reviewed.    Chief Complaint: multiple lung noduleson CT; Sleep Apnea; and Mixed obstructive / restrictive lung disease    HPI    Stable bilateral pulmonary nodules measuring up to 11 mm.      Moderate mixed obstruction with restriction with moderately reduced diffusion capacity.  He denies cough sputum, hemoptysis,  pain with breathing, wheezing, asthma.    COPD QUESTIONNAIRE  How often do you cough?: Some of the time  How often do you have phlegm (mucus) in your chest?: A little of the time  How often does your chest feel tight?: Most of the time  When you walk up a hill or one flight of stairs, how often are you breathless?: All of the time  How often are you limited doing any activities at home?: Most of the time  How often are you confident leaving the house despite your lung condition?: Some of the time  How often do you sleep soundly?: A little of the time  How often do you have energy?: Most of the time  Total score: 24     His current respiratory therapy regimen is albuterol inhaler  which provides relief. He is  adherent with his regimen.      CPAP titration PSG  performed by [facility] [place] even though patient does not have a formal diagnostic PSG.  AUTO CPAP has been ordered but per pateint it has not yet been delivered.     [place] Sleepiness Scale   EPWORTH SLEEPINESS SCALE 9/28/2017 8/2/2017    Sitting and reading 1 1   Watching TV 1 2   Sitting, inactive in a public place (e.g. a theatre or a meeting) 0 2   As a passenger in a car for an hour without a break 2 0   Lying down to rest in the afternoon when circumstances permit 2 3   Sitting and talking to someone 1 1   Sitting quietly after a lunch without alcohol 3 2   In a car, while stopped for a few minutes in traffic 0 0   Total score 10 11           A full  review of systems, past , family  and social histories was performed except as mentioned in the note above, these are non contributory to the main issues discussed  Today      Previous Report Reviewed: lab reports, office notes and radiology reports     The following portions of the patient's history were reviewed and updated as appropriate: He  has a past medical history of Arthritis; Diabetes mellitus; Diabetes mellitus type 2, uncontrolled (7/19/2016); DM (diabetes mellitus) (2015); Gall stones; Obesity; Psoriasis (a type of skin inflammation); and Rheumatoid arthritis of foot.  He  has a past surgical history that includes Appendectomy and Cholecystectomy.  His family history includes Cancer in his mother; Cataracts in his mother; Diabetes in his mother; Hypertension in his mother; Stroke in his father.  He  reports that he has quit smoking. He has never used smokeless tobacco. He reports that he does not drink alcohol or use drugs.  He has a current medication list which includes the following prescription(s): adalimumab, ammonium lactate, benzonatate, calcipotriene, clobetasol, folic acid, ketoconazole, losartan, methotrexate, onetouch delica lancets, onetouch ultra test, onetouch ultramini, proair hfa, tramadol, triamcinolone acetonide 0.1%, and vitamin d2.  He has No Known Allergies..    Review of Systems   Constitutional: Negative for fever, fatigue and night sweats.   HENT: Positive for sore throat. Negative for nosebleeds, postnasal drip, rhinorrhea, congestion and hearing loss.   "  Eyes: Negative for itching.   Respiratory: Positive for snoring, cough, hemoptysis and wheezing. Negative for sputum production, orthopnea and Paroxysmal Nocturnal Dyspnea.    Cardiovascular: Negative for chest pain and leg swelling.   Genitourinary: Negative for difficulty urinating and hematuria.   Endocrine: Negative for cold intolerance and heat intolerance.    Musculoskeletal: Positive for arthralgias and back pain.   Skin: Positive for rash.        Diffuse Psoriasis   Gastrointestinal: Positive for abdominal pain. Negative for vomiting and acid reflux.   Neurological: Negative for weakness.   Hematological: Does not bruise/bleed easily and no excessive bruising.   Psychiatric/Behavioral: The patient is not nervous/anxious.    All other systems reviewed and are negative.     Objective:   BP (!) 130/7   Pulse 72   Resp 18   Ht 5' 9" (1.753 m)   Wt 123.1 kg (271 lb 7.9 oz)   SpO2 (!) 92% Comment: 2 LITERS  BMI 40.09 kg/m²   Body mass index is 40.09 kg/m².    Physical Exam   Constitutional: He is oriented to person, place, and time. He appears well-developed and well-nourished.   Disheveled in appearance   HENT:   Head: Normocephalic and atraumatic.   Mallampati 3    Eyes: EOM are normal. Pupils are equal, round, and reactive to light.   Neck: Normal range of motion. Neck supple.   Neck 48 cm   Cardiovascular: Normal rate and regular rhythm.    Pulmonary/Chest: Effort normal. He has decreased breath sounds in the right upper field, the right middle field, the right lower field, the left upper field and the left middle field. He has no wheezes. He has no rales.   Abdominal: Soft. Bowel sounds are normal.   Musculoskeletal: Normal range of motion.   Neurological: He is alert and oriented to person, place, and time.   Skin: Rash noted.   Psychiatric: He has a normal mood and affect. His behavior is normal.   Nursing note and vitals reviewed.      Personal Diagnostic Review    CT of chest performed on 08/02/17 " with contrast:   Lungs: There is no change in the multiple bilateral pulmonary nodules, most noncalcified, but some calcified. The largest measures 11 mm in the left upper lobe and contains punctate central calcifications. No pulmonary infiltrates or pleural effusions. No abnormality of the airway. No pathologically enlarged hilar or mediastinal lymph nodes.  -Pleura: No thickening or fluid.  -Mediastinum/Lupe:No significant adenopathy.   -Axilla: No adenopathy.  -Thyroid: Normal lower gland.  -Heart/Aorta: No pericardial effusion. Aorta normal caliber.  -Bones/Chest Wall: Intact with mild degenerative changes   -Upper Abdomen: Tiny myelolipoma within the left adrenal gland    Pulmonary function tests: FEV1: 1.44  (41 % predicted), FVC:  1.93 (42 % predicted), FEV1/FVC:  75, T.43 (69% predicted), RV/TLVC: 52 (140 % predicted), DLCO: 15.8 (55 % predicted). Severe mixed obstruction with restriction. Diffusion capacity is moderately reduced.       PSG: The CPAP titration polysomnography revealed that 12 cm H2O pressure (C - Flex 3, humidity 2), the only effective pressure  completely tested, was optimal, as it produced a very low rate of respiratory event, an A + H Index = 2.0 events / hr asleep in  1.6 hrs sleep (1.3 hrs REM sleep). Snoring was eliminated at 12 cm. No higher or lower pressure was fully tested, though  the A + H I s for the three other pressures titrated 30 min or more (6 cm, 8 cm, 14 cm) were all under 2.0, but with no  REM sleep. AutoCPAP (4 cm - 20 cm) could be tried if necessary. The overall A + H Index was 2.0 / hr asleep, with 0.3 respiratory event - related arousals / hr asleep (and no RERAs) for the titration trial. The mean SpO2 value was 88.8 % throughout the study, with a minimum oxygen saturation during sleep of 81.0 % (waking baseline SpO2 was 91 %).      Assessment:     1. SCHUYLER (obstructive sleep apnea) Active   2. Multiple lung nodules on CT Stable   3. Chronic airway obstruction,  mixed type Stable   4. Chronic respiratory failure with hypoxia Stable   5. Morbid obesity due to excess calories Stable   6. Cigarette smoker Stable     Outpatient Encounter Prescriptions as of 9/28/2017   Medication Sig Dispense Refill    adalimumab (HUMIRA) PnKt injection Inject 0.8 mLs (40 mg total) into the skin every 14 (fourteen) days. 6 each 2    ammonium lactate 12 % Crea Apply 1 Act topically 2 (two) times daily. 1 Tube 3    benzonatate (TESSALON) 100 MG capsule Take 1 capsule (100 mg total) by mouth 3 (three) times daily as needed for Cough. Swallow whole 60 capsule 2    calcipotriene (DOVONOX) 0.005 % ointment Apply on psoriasis on body twice daily 120 g 3    clobetasol (OLUX) 0.05 % Foam AAA of scalp and behind ears twice daily.  Do not use on face, underarms or groin. 100 g 3    folic acid (FOLVITE) 1 MG tablet Take 1 tablet (1 mg total) by mouth once daily. 90 tablet 3    ketoconazole (NIZORAL) 2 % shampoo Wash hair with medicated shampoo at least 2x/week - let sit on scalp at least 5 minutes prior to rinsing 120 mL 5    losartan (COZAAR) 100 MG tablet TAKE 1 TABLET (100 MG TOTAL) BY MOUTH ONCE DAILY. 30 tablet 11    methotrexate 2.5 MG Tab TAKE 6 TABLETS (15 MG TOTAL) BY MOUTH EVERY 7 DAYS. (3 in the morning and 3 in the evening) 24 tablet 5    ONETOUCH DELICA LANCETS 33 gauge Misc       ONETOUCH ULTRA TEST Strp       ONETOUCH ULTRAMINI kit       PROAIR HFA 90 mcg/actuation inhaler INHALE 2 PUFFS EVERY 4 HOURS AS NEEDED :FOR WHEEZING 8.5 Inhaler 11    tramadol (ULTRAM) 50 mg tablet TAKE 1 TABLET BY MOUTH EVERY 6 HOURS AS NEEDED FOR PAIN 60 tablet 3    triamcinolone acetonide 0.1% (KENALOG) 0.1 % cream AAA bid for psoriasis on body.  Do not use on face, underarms or groin. 454 g 3    VITAMIN D2 50,000 unit capsule TAKE 1 CAPSULE (50,000 UNITS TOTAL) BY MOUTH TWICE A WEEK. 8 capsule 6     No facility-administered encounter medications on file as of 9/28/2017.      Orders Placed This  Encounter   Procedures    CPAP FOR HOME USE     Order Specific Question:   Type:     Answer:   Auto CPAP     Order Specific Question:   Auto CPAP pressure setting range (cmH20):     Answer:   4 - 20     Order Specific Question:   Length of need (1-99 months):     Answer:   99     Order Specific Question:   Humidification:     Answer:   Heated     Order Specific Question:   Type of mask:     Answer:   FFM     Order Specific Question:   Headgear?     Answer:   Yes     Order Specific Question:   Tubing?     Answer:   Yes     Order Specific Question:   Humidifier chamber?     Answer:   Yes     Order Specific Question:   Chin strap?     Answer:   Yes     Order Specific Question:   Filters?     Answer:   Yes    CT Chest With Contrast     Standing Status:   Future     Standing Expiration Date:   9/28/2018     Order Specific Question:   Reason for Exam:     Answer:   MULTIPLE LUNG NODULES.     Order Specific Question:   Is the patient allergic to iodine or contrast? Has a steroid / antihistamine prep been administered?     Answer:   No     Order Specific Question:   Is the patient on ANY Metformin drug such as Glugophage/Glucovance?           Should be off drug 48 hours after contrast. Check renal function before restart.     Answer:   No     Order Specific Question:   History of Kidney Disease - including: decreased kidney function, dialysis, kidney transplay, single kidney, kidney cancer, kidney surgery?     Answer:   None     Order Specific Question:   Does the patient have high blood pressure requiring medical treatment?     Answer:   Yes     Order Specific Question:   Diabetes?     Answer:   No     Order Specific Question:   May the Radiologist modify the order per protocol to meet the clinical needs of the patient?     Answer:   Yes     Order Specific Question:   Will this service be billed to a Worker's Comp policy?     Answer:   No    Complete PFT with bronchodilator     Standing Status:   Future     Standing  Expiration Date:   11/8/2018    Stress test, pulmonary     Standing Status:   Future     Standing Expiration Date:   11/8/2018     Plan:     Discussed diagnosis, its evaluation, treatment and usual course. All questions answered.    SCHUYLER (obstructive sleep apnea)  Start AUTOCPAP of 4 - 20 CMWP ([facility] [place] [facility] [place])     Discussed therapeutic goals for positive airway pressure therapy(CPAP or BiPAP): Ideal is usage 100% of nights for 6 - 8 hours per night. Minimum usage is 70% of night for at least 4 hours per night used. Pateint expressed understanding. All Questions answered.    Multiple lung nodules on CT  Stable and controlled. RADIOLOGIC SURVEILLANCE. Repeat CT chest in 1 year.    Fleischner Society Guidelines:    High risk patient:   Smoking history, history of malignancy or risk factors for malignancy.( non-solid ground glass opacities and partially solid nodules may require longer follow up to exclude indolent adenocarcinoma)      Nodule size Low risk patient High risk patient   4 mm  No follow up Follow up CT in 12 months. If unchanged, no further follow up.   4 - 6 mm Follow up CT in 12 months; if unchanged, no further follow up.  Initial follow up CT in 6 - 12 months, then at 18 - 24 months if no change.      6 - 8 mm  Initial follow up CT at 6 - 12 months and at 18 months if no change.  Initial follow up CT at 3 - 6 months. Then at 9-12 months and 24 months if no change.      > 8 mm Initial follow up CT at 3, 6, 9 and 24 months, dynamic contrast enhanced CT, PET-CT and/or biopsy. Initial follow up CT at 3, 6, 9 and 24 months, dynamic contrast enhanced CT, PET-CT and/or biopsy.        Chronic airway obstruction, mixed type  MIXED COPD ROS: taking medications as instructed, no medication side effects noted, no significant ongoing wheezing or shortness of breath, using bronchodilator MDI less than twice a week.   New concerns: NONE.   Exam: appears well, vitals normal, no respiratory distress, acyanotic, normal RR,  chest clear, no wheezing, crepitations, rhonchi, normal symmetric air entry.   Assessment:  MIXED COPD stable.   Plan: PROAIR. Current treatment plan is effective, no change in therapy. PFT IN 1 YEAR.      Morbid obesity due to excess calories  General weight loss/lifestyle modification strategies discussed (elicit support from others; identify saboteurs; non-food rewards, etc).  Diet interventions: low calorie (1000 kCal/d) deficit diet.    Cigarette smoker  Assistance with smoking cessation was offered, including:  []  Medications  [x]  Counseling  []  Printed Information on Smoking Cessation  []  Referral to a Smoking Cessation Program    Patient was counseled regarding smoking for >10 minutes.    Chronic respiratory failure with hypoxia  Continue oxygen supplementation at 2 L /min with activity and with sleep. 6MWT IN 1 YEAR.        TIME SPENT WITH PATIENT: Time spent: 40 minutes in face to face  discussion concerning diagnosis, prognosis, review of lab and test results, benefits of treatment as well as management of disease, counseling of patient and coordination of care between various health  care providers . Greater than half the time spent was used for coordination of care and counseling of patient.       Return in about 6 months (around 3/28/2018) for Mixed obstructive and restrictive lung disease, Tobacco use disorder, Chronic Respiratory Failure, Morbid Obesity, Multiple lung nodules, SCHUYLER.

## 2017-09-28 NOTE — ASSESSMENT & PLAN NOTE
MIXED COPD ROS: taking medications as instructed, no medication side effects noted, no significant ongoing wheezing or shortness of breath, using bronchodilator MDI less than twice a week.   New concerns: NONE.   Exam: appears well, vitals normal, no respiratory distress, acyanotic, normal RR, chest clear, no wheezing, crepitations, rhonchi, normal symmetric air entry.   Assessment:  MIXED COPD stable.   Plan: PROAIR. Current treatment plan is effective, no change in therapy. PFT IN 1 YEAR.

## 2017-09-28 NOTE — ASSESSMENT & PLAN NOTE
Stable and controlled. RADIOLOGIC SURVEILLANCE. Repeat CT chest in 1 year.    Fleischner Society Guidelines:    High risk patient:   Smoking history, history of malignancy or risk factors for malignancy.( non-solid ground glass opacities and partially solid nodules may require longer follow up to exclude indolent adenocarcinoma)      Nodule size Low risk patient High risk patient   4 mm  No follow up Follow up CT in 12 months. If unchanged, no further follow up.   4 - 6 mm Follow up CT in 12 months; if unchanged, no further follow up.  Initial follow up CT in 6 - 12 months, then at 18 - 24 months if no change.      6 - 8 mm  Initial follow up CT at 6 - 12 months and at 18 months if no change.  Initial follow up CT at 3 - 6 months. Then at 9-12 months and 24 months if no change.      > 8 mm Initial follow up CT at 3, 6, 9 and 24 months, dynamic contrast enhanced CT, PET-CT and/or biopsy. Initial follow up CT at 3, 6, 9 and 24 months, dynamic contrast enhanced CT, PET-CT and/or biopsy.

## 2017-09-28 NOTE — PATIENT INSTRUCTIONS
Lung Anatomy  Your lungs take air in to give your body oxygen, which the body needs to work. Your lungs, like all the tissues in your body, are made up of billions of tiny specialized cells. Old lung cells die and are replaced by new, identical lung cells. This natural process helps ensure healthy lungs.    Date Last Reviewed: 11/1/2016  © 6283-5401 [employer]. [address] [place] [address], [place] [address]. All rights reserved. This information is not intended as a substitute for professional medical care. Always follow your healthcare professional's instructions.

## 2017-09-28 NOTE — ASSESSMENT & PLAN NOTE
Start AUTOCPAP of 4 - 20 CMWP ([facility])     Discussed therapeutic goals for positive airway pressure therapy(CPAP or BiPAP): Ideal is usage 100% of nights for 6 - 8 hours per night. Minimum usage is 70% of night for at least 4 hours per night used. Pateint expressed understanding. All Questions answered.

## 2017-10-09 ENCOUNTER — TELEPHONE (OUTPATIENT)
Dept: INTERNAL MEDICINE | Facility: CLINIC | Age: 62
End: 2017-10-09

## 2017-10-09 NOTE — TELEPHONE ENCOUNTER
----- Message from Tiffanie Monroe sent at 10/9/2017  2:28 PM CDT -----  Contact: pt  He's returning a missed call, please advise [phone] (home)

## 2017-10-09 NOTE — TELEPHONE ENCOUNTER
----- Message from Brian Rosario sent at 10/9/2017 11:15 AM CDT -----  Contact: Self- [phone]   Pt would like to schedule a 1 week f/u  appt to see .  Please call back at [phone].  Thx-AH

## 2017-10-11 DIAGNOSIS — L40.59 POLYARTICULAR PSORIATIC ARTHRITIS: ICD-10-CM

## 2017-10-11 RX ORDER — METHOTREXATE 2.5 MG/1
TABLET ORAL
Qty: 24 TABLET | Refills: 5 | Status: SHIPPED | OUTPATIENT
Start: 2017-10-11 | End: 2017-10-30 | Stop reason: SDUPTHER

## 2017-10-30 ENCOUNTER — HOSPITAL ENCOUNTER (OUTPATIENT)
Dept: RADIOLOGY | Facility: HOSPITAL | Age: 62
Discharge: HOME | End: 2017-10-30
Attending: INTERNAL MEDICINE
Payer: MEDICAID

## 2017-10-30 ENCOUNTER — OFFICE VISIT (OUTPATIENT)
Dept: RHEUMATOLOGY | Facility: CLINIC | Age: 62
End: 2017-10-30
Payer: MEDICAID

## 2017-10-30 VITALS
BODY MASS INDEX: 40.29 KG/M2 | DIASTOLIC BLOOD PRESSURE: 97 MMHG | WEIGHT: 272.06 LBS | HEART RATE: 84 BPM | SYSTOLIC BLOOD PRESSURE: 129 MMHG | HEIGHT: 69 IN

## 2017-10-30 DIAGNOSIS — L40.9 PSORIASIS: ICD-10-CM

## 2017-10-30 DIAGNOSIS — L40.59 POLYARTICULAR PSORIATIC ARTHRITIS: ICD-10-CM

## 2017-10-30 DIAGNOSIS — Z79.631 METHOTREXATE, LONG TERM, CURRENT USE: ICD-10-CM

## 2017-10-30 DIAGNOSIS — D84.9 IMMUNOSUPPRESSED STATUS: ICD-10-CM

## 2017-10-30 DIAGNOSIS — Z79.60 LONG-TERM USE OF IMMUNOSUPPRESSANT MEDICATION: ICD-10-CM

## 2017-10-30 DIAGNOSIS — L40.52 PSORIATIC ARTHRITIS, DESTRUCTIVE TYPE: Primary | ICD-10-CM

## 2017-10-30 DIAGNOSIS — L40.50 PSORIATIC ARTHRITIS: ICD-10-CM

## 2017-10-30 DIAGNOSIS — Z23 NEED FOR INFLUENZA VACCINATION: ICD-10-CM

## 2017-10-30 PROCEDURE — 73630 X-RAY EXAM OF FOOT: CPT | Mod: 26,50,, | Performed by: RADIOLOGY

## 2017-10-30 PROCEDURE — 99999 PR PBB SHADOW E&M-EST. PATIENT-LVL III: CPT | Mod: PBBFAC,,, | Performed by: PHYSICIAN ASSISTANT

## 2017-10-30 PROCEDURE — 73130 X-RAY EXAM OF HAND: CPT | Mod: 26,50,, | Performed by: RADIOLOGY

## 2017-10-30 PROCEDURE — 99213 OFFICE O/P EST LOW 20 MIN: CPT | Mod: PBBFAC,25,PO | Performed by: PHYSICIAN ASSISTANT

## 2017-10-30 PROCEDURE — 73630 X-RAY EXAM OF FOOT: CPT | Mod: 50,TC,PO

## 2017-10-30 PROCEDURE — 73130 X-RAY EXAM OF HAND: CPT | Mod: 50,TC,PO

## 2017-10-30 PROCEDURE — 99214 OFFICE O/P EST MOD 30 MIN: CPT | Mod: S$PBB,,, | Performed by: PHYSICIAN ASSISTANT

## 2017-10-30 RX ORDER — FOLIC ACID 1 MG/1
1 TABLET ORAL DAILY
Qty: 90 TABLET | Refills: 3 | Status: SHIPPED | OUTPATIENT
Start: 2017-10-30 | End: 2018-10-18 | Stop reason: SDUPTHER

## 2017-10-30 RX ORDER — METHOTREXATE 2.5 MG/1
TABLET ORAL
Qty: 24 TABLET | Refills: 5 | Status: SHIPPED | OUTPATIENT
Start: 2017-10-30 | End: 2018-02-19 | Stop reason: SDUPTHER

## 2017-10-30 NOTE — PROGRESS NOTES
Subjective:       Patient ID: Trey Stevens is a 62 y.o. male.    Chief Complaint: Psoriatic Arthritis; Vitamin D Deficiency; and immunosuppressed    Trey is here for routine follow up. He saw Izzy Pedraza PA-C in march 2017 for  psoriatic arthritis and psoriasis.  He is on humira 40 mg Q 2 weeks and  mtx was decreased to 15mg/week (splitting dose 3 tabs am and 3 tabs pm) last  Year his mtx was cut back from 20 mg weekly  due to chronic lung issues.  He has multiple lung nodules which are followed by pulm and stable per his last month pulmonary visit, he also has chronic shortness of breath.  He sees dermatology also for psoriasis as well as vitiligo.  He has some chronic arthritis changes to his dip joints bilaterally. . he does have chronic deformities to his feet with lateral deviation of his toes.  Pain today 5/10. Feet and anklesHe missed the past 2 humira injections, out of his med. Has been lost to follow up. Missed his last apt.  His skin rash has recurred on his elbows and stomach. Joints doing ok. No issues with humira or mtx. Tolerating his meds well.       Review of Systems   Constitutional: Negative for chills, fatigue, fever and unexpected weight change.        Poor hygiene      HENT: Negative for congestion, mouth sores and rhinorrhea.    Eyes: Negative for pain, discharge and redness.   Respiratory: Positive for shortness of breath. Negative for cough and wheezing.    Cardiovascular: Positive for leg swelling. Negative for chest pain.   Gastrointestinal: Negative for abdominal pain, diarrhea, nausea and vomiting.   Endocrine: Negative for cold intolerance and heat intolerance.   Genitourinary: Negative for dysuria.   Musculoskeletal: Positive for joint swelling. Negative for arthralgias and myalgias.   Skin: Positive for color change and rash.   Allergic/Immunologic: Negative for immunocompromised state.   Neurological: Negative for weakness and headaches.        Diabetic neuropathy  "  Psychiatric/Behavioral: The patient is not nervous/anxious.          Objective:   BP (!) 129/97   Pulse 84   Ht 5' 9" (1.753 m)   Wt 123.4 kg (272 lb 0.8 oz)   BMI 40.17 kg/m²      Physical Exam   Constitutional: He is oriented to person, place, and time and well-developed, well-nourished, and in no distress.   HENT:   Head: Normocephalic and atraumatic.   Eyes: Pupils are equal, round, and reactive to light. Right eye exhibits no discharge.   Neck: Normal range of motion.   Cardiovascular: Normal rate, regular rhythm and normal heart sounds.  Exam reveals no friction rub.    Pulmonary/Chest: Effort normal and breath sounds normal. No respiratory distress.   Abdominal: Soft. He exhibits no distension. There is no tenderness.   Lymphadenopathy:     He has no cervical adenopathy.   Neurological: He is alert and oriented to person, place, and time.   Skin: Rash noted. No erythema.          Psoriasis rash bilateral elbows and small patch left hip    Vitiligo bilateral forearms       Psychiatric: Mood normal.   Musculoskeletal: Normal range of motion. He exhibits edema and deformity.   Bilateral wrists, mcps no synovitis good rom   nestor dips with chronic damage, bony enlargement     nestor Right 5th dip flexion deformity,   Left thumb no flexion to the IP joint, right thumb ip joint motion decreased    Left ankle swelling, good rom to nestor ankles  nestor feet very dirty, 2-5 toes deviate laterally           Recent Results (from the past 168 hour(s))   Comprehensive metabolic panel    Collection Time: 10/30/17  2:41 PM   Result Value Ref Range    Sodium 139 136 - 145 mmol/L    Potassium 4.4 3.5 - 5.1 mmol/L    Chloride 103 95 - 110 mmol/L    CO2 25 23 - 29 mmol/L    Glucose 72 70 - 110 mg/dL    BUN, Bld 11 6 - 20 mg/dL    Creatinine 1.0 0.5 - 1.4 mg/dL    Calcium 9.7 8.7 - 10.5 mg/dL    Total Protein 8.0 6.0 - 8.4 g/dL    Albumin 3.4 (L) 3.5 - 5.2 g/dL    Total Bilirubin 0.3 0.1 - 1.0 mg/dL    Alkaline Phosphatase 90 55 - " 135 U/L    AST 26 10 - 40 U/L    ALT 30 10 - 44 U/L    Anion Gap 11 8 - 16 mmol/L    eGFR if African American >60 >60 mL/min/1.73 m^2    eGFR if non African American >60 >60 mL/min/1.73 m^2   CBC auto differential    Collection Time: 10/30/17  2:41 PM   Result Value Ref Range    WBC 6.83 3.90 - 12.70 K/uL    RBC 4.84 4.60 - 6.20 M/uL    Hemoglobin 14.9 14.0 - 18.0 g/dL    Hematocrit 44.2 40.0 - 54.0 %    MCV 91 82 - 98 fL    MCH 30.8 27.0 - 31.0 pg    MCHC 33.7 32.0 - 36.0 g/dL    RDW 14.1 11.5 - 14.5 %    Platelets 260 150 - 350 K/uL    MPV 10.9 9.2 - 12.9 fL    Gran # 4.0 1.8 - 7.7 K/uL    Lymph # 2.0 1.0 - 4.8 K/uL    Mono # 0.6 0.3 - 1.0 K/uL    Eos # 0.3 0.0 - 0.5 K/uL    Baso # 0.04 0.00 - 0.20 K/uL    Gran% 57.8 38.0 - 73.0 %    Lymph% 29.3 18.0 - 48.0 %    Mono% 8.1 4.0 - 15.0 %    Eosinophil% 4.2 0.0 - 8.0 %    Basophil% 0.6 0.0 - 1.9 %    Differential Method Automated        Component      Latest Ref Rng & Units 2/20/2017   Vit D, 25-Hydroxy      30 - 96 ng/mL 37       10/30/17 nestor hand and foot x-ray- damage in both hand and feet consistent with PsA   4/2016 XR hands reviewed: psoriatic arthritis changes noted to dip joints bilaterally, nestor thumb IP joints        Assessment:       1. Psoriatic arthritis, destructive type    2. Psoriasis    3. Immunosuppressed status    4. Long-term use of immunosuppressant medication    5. Need for influenza vaccination    6. Psoriatic arthritis    7. Polyarticular psoriatic arthritis    8. Methotrexate, long term, current use        1. Psoriatic arthritis with psoriasis: increased rash with missing his humira X 2 shots, still on mtx 15mg week split dose, daily folic acid,   Increased skin BSA ~10% but stable joint exam    2. Medication monitoring: no toxicity from humira or mtx, monitor lfts     3. Immunocompromised: utd, except zoster, possible verve study?- needs flu vaccine     4. Vit D deficiency: taking vit D 50,000U/2Xweekly, last level low normal 37  (2/2017)        Plan:       Refill and resume his  humira 40mg SC every other week, cont mtx 15mg/week with daily folic acid, both refilled as well   HD flu shot today    Cont vit D 50,000 Units/twice weekly, check d level Q year     Return in 4 mos with aayush and  reg 4 labs.     Repeat  xrays hands and feet in 1 year    Send copy of chart to ana maría correa for verve study

## 2017-11-10 ENCOUNTER — PROCEDURE VISIT (OUTPATIENT)
Dept: PULMONOLOGY | Facility: CLINIC | Age: 62
End: 2017-11-10
Payer: MEDICAID

## 2017-11-10 ENCOUNTER — HOSPITAL ENCOUNTER (OUTPATIENT)
Dept: RADIOLOGY | Facility: HOSPITAL | Age: 62
Discharge: HOME | End: 2017-11-10
Attending: INTERNAL MEDICINE
Payer: MEDICAID

## 2017-11-10 ENCOUNTER — OFFICE VISIT (OUTPATIENT)
Dept: PULMONOLOGY | Facility: CLINIC | Age: 62
End: 2017-11-10
Payer: MEDICAID

## 2017-11-10 VITALS
BODY MASS INDEX: 40.29 KG/M2 | HEIGHT: 69 IN | WEIGHT: 272 LBS | HEART RATE: 85 BPM | DIASTOLIC BLOOD PRESSURE: 62 MMHG | HEIGHT: 69 IN | OXYGEN SATURATION: 90 % | SYSTOLIC BLOOD PRESSURE: 140 MMHG | BODY MASS INDEX: 40.14 KG/M2 | WEIGHT: 271 LBS | RESPIRATION RATE: 18 BRPM

## 2017-11-10 DIAGNOSIS — J44.9 CHRONIC AIRWAY OBSTRUCTION, MIXED TYPE: Chronic | ICD-10-CM

## 2017-11-10 DIAGNOSIS — F17.210 CIGARETTE SMOKER: Chronic | ICD-10-CM

## 2017-11-10 DIAGNOSIS — G47.33 OSA (OBSTRUCTIVE SLEEP APNEA): Chronic | ICD-10-CM

## 2017-11-10 DIAGNOSIS — R91.8 MULTIPLE LUNG NODULES ON CT: Primary | Chronic | ICD-10-CM

## 2017-11-10 DIAGNOSIS — R91.8 MULTIPLE LUNG NODULES ON CT: Chronic | ICD-10-CM

## 2017-11-10 DIAGNOSIS — J44.9 MIXED TYPE COPD (CHRONIC OBSTRUCTIVE PULMONARY DISEASE): ICD-10-CM

## 2017-11-10 DIAGNOSIS — E66.01 MORBID OBESITY DUE TO EXCESS CALORIES: Chronic | ICD-10-CM

## 2017-11-10 DIAGNOSIS — J96.11 CHRONIC RESPIRATORY FAILURE WITH HYPOXIA: Chronic | ICD-10-CM

## 2017-11-10 PROCEDURE — 94729 DIFFUSING CAPACITY: CPT | Mod: 26,S$PBB,, | Performed by: INTERNAL MEDICINE

## 2017-11-10 PROCEDURE — 94726 PLETHYSMOGRAPHY LUNG VOLUMES: CPT | Mod: PBBFAC

## 2017-11-10 PROCEDURE — 94726 PLETHYSMOGRAPHY LUNG VOLUMES: CPT | Mod: 26,S$PBB,, | Performed by: INTERNAL MEDICINE

## 2017-11-10 PROCEDURE — 94620 PR PULMONARY STRESS TESTING,SIMPLE: CPT | Mod: 26,S$PBB,, | Performed by: INTERNAL MEDICINE

## 2017-11-10 PROCEDURE — 94060 EVALUATION OF WHEEZING: CPT | Mod: PBBFAC

## 2017-11-10 PROCEDURE — 99999 PR PBB SHADOW E&M-EST. PATIENT-LVL III: CPT | Mod: PBBFAC,,, | Performed by: INTERNAL MEDICINE

## 2017-11-10 PROCEDURE — 99213 OFFICE O/P EST LOW 20 MIN: CPT | Mod: PBBFAC,25 | Performed by: INTERNAL MEDICINE

## 2017-11-10 PROCEDURE — 94729 DIFFUSING CAPACITY: CPT | Mod: PBBFAC

## 2017-11-10 PROCEDURE — 99215 OFFICE O/P EST HI 40 MIN: CPT | Mod: 25,S$PBB,, | Performed by: INTERNAL MEDICINE

## 2017-11-10 PROCEDURE — 71260 CT THORAX DX C+: CPT | Mod: TC

## 2017-11-10 PROCEDURE — 25500020 PHARM REV CODE 255: Performed by: INTERNAL MEDICINE

## 2017-11-10 PROCEDURE — 94060 EVALUATION OF WHEEZING: CPT | Mod: 26,59,S$PBB, | Performed by: INTERNAL MEDICINE

## 2017-11-10 PROCEDURE — 94620 PR PULMONARY STRESS TESTING,SIMPLE: CPT | Mod: PBBFAC

## 2017-11-10 RX ADMIN — IOHEXOL 75 ML: 350 INJECTION, SOLUTION INTRAVENOUS at 09:11

## 2017-11-10 NOTE — PROCEDURES
"[facility]  Six Minute Walk     SUMMARY     Ordering Provider: Dr Demarco      Performing nurse/tech/RT: susan  Diagnosis:  (Chronic Respiratory Failure )  Height: 5' 9" (175.3 cm)  Weight: 122.9 kg (271 lb)  BMI (Calculated): 40.1   Patient Race:             Phase Oxygen Assessment Supplemental O2 Heart   Rate Blood Pressure Christian Dyspnea Scale Rating   Resting 97 % Room Air 80 bpm 145/68 2   Exercise        Minute        1 94 % Room Air 104 bpm     2 95 % Room Air 107 bpm     3 95 % Room Air 112 bpm     4 96 % Room Air 116 bpm     5 95 % Room Air 117 bpm     6  96 % Room Air 117 bpm 182/81 5-6   Recovery        Minute        1 97 % Room Air 105 bpm     2 97 % Room Air 96 bpm     3 97 % Room Air 90 bpm     4 97 % Room Air 90 bpm 140/62 3     Six Minute Walk Summary  6MWT Status: completed without stopping  Patient Reported: Dyspnea     Interpretation:  Did the patient stop or pause?: No        Total Time Walked (Calculated): 360 seconds  Final Partial Lap Distance (feet): 25 feet  Total Distance Meters (Calculated): 312.42 meters  Predicted Distance Meters (Calculated): 500.47 meters  Percentage of Predicted (Calculated): 62.43  Peak VO2 (Calculated): 13.35  Mets: 3.81  Has The Patient Had a Previous Six Minute Walk Test?: Yes       Previous 6MWT Results  Has The Patient Had a Previous Six Minute Walk Test?: Yes  Date of Previous Test: 08/02/16  Total Time Walked: 360 seconds  Total Distance (meters): 243.84  Predicted Distance (meters): 495.54 meters  Percentage of Predicted: 49.21  Percent Change (Calculated): -0.28      PHYSICIAN INTERPRETATION:    Six minute walk distance is 312.42 / 500.47 meters (62 % predicted) with somewhat heavy dyspnea. Peak VO2 during walking was 13.35  Ml/min/kg [ 3.81 METS]. During exercise, there was no significant desaturation while breathing room air. Both blood pressure and heart rate increased significantly with walking. Hypertension was present prior " to exercise. The patient did not report non-pulmonary symptoms during exercise. Significant exercise impairment is likely due to subjective symptoms. The patient did complete the study, walking 360 seconds of the 360 second test. Since the previous study in 08/02/16, exercise capacity may be somewhat improved. Based upon age and body mass index, exercise capacity is less than predicted

## 2017-11-10 NOTE — ASSESSMENT & PLAN NOTE
Continue  AUTOCPAP of 4 - 20 CMWP ([facility])     Discussed therapeutic goals for positive airway pressure therapy(CPAP or BiPAP): Ideal is usage 100% of nights for 6 - 8 hours per night. Minimum usage is 70% of night for at least 4 hours per night used. Pateint expressed understanding. All Questions answered.    CPAP complaince evaluation in 6 weeks.

## 2017-11-10 NOTE — PROGRESS NOTES
Subjective:      Patient ID: Trey Stevens is a 62 y.o. male.  Patient Active Problem List   Diagnosis    Psoriatic arthritis, destructive type    Vitamin D deficiency    Psoriasis    Multiple lung nodules on CT    Immunosuppressed status    Long-term use of immunosuppressant medication    Cigarette smoker    Mixed type COPD (chronic obstructive pulmonary disease)    Type 2 diabetes mellitus with nephropathy    Morbid obesity due to excess calories    Hypertension associated with diabetes    Vitiligo    SCHUYLER (obstructive sleep apnea)     Problem list has been reviewed.    Chief Complaint: Sleep Apnea; multiple lung noduleason ct; and chronic respiratory failure with hypoxia    HPI    Group Spirometric Classification Exacerbations / year mMRC CAT   Group D: High risk; more sypmtoms  GOLD 3-4 > = 2 >= 2 >= 10     FEV1 = 1.55  (45 % predicted)    Stable bilateral pulmonary nodules on CT chest.     Moderate mixed obstruction with restriction with moderately reduced diffusion capacity.  He denies cough sputum, hemoptysis,  pain with breathing, wheezing, asthma.      The patient does not currently have symptoms / an exacerbation. He states that he is at his respiratory baseline and denies any new onset specific pulmonary complaints.       COPD QUESTIONNAIRE  How often do you cough?: A little of the time  How often do you have phlegm (mucus) in your chest?: A little of the time  How often does your chest feel tight?: Some of the time  When you walk up a hill or one flight of stairs, how often are you breathless?: All of the time  How often are you limited doing any activities at home?: Some of the time  How often are you confident leaving the house despite your lung condition?: All of the time  How often do you sleep soundly?: Some of the time  How often do you have energy?: Almost never  Total score: 21     His current respiratory therapy regimen is albuterol inhaler  which provides relief. He is  adherent with  his regimen.      He is on APAP 4 - 20 CMWP. He is complaint with his CPAP. He did not bring his SD card for complaince assessment. He definitely thinks PAP is beneficial to his health and he wants to continue with PAP therapy.    [place] Sleepiness Scale   EPWORTH SLEEPINESS SCALE 11/10/2017 9/28/2017 8/2/2017   Sitting and reading 0 1 1   Watching TV 2 1 2   Sitting, inactive in a public place (e.g. a theatre or a meeting) 0 0 2   As a passenger in a car for an hour without a break 0 2 0   Lying down to rest in the afternoon when circumstances permit 3 2 3   Sitting and talking to someone 0 1 1   Sitting quietly after a lunch without alcohol 2 3 2   In a car, while stopped for a few minutes in traffic 0 0 0   Total score 7 10 11           A full  review of systems, past , family  and social histories was performed except as mentioned in the note above, these are non contributory to the main issues discussed  Today    Immunization status reviewed and up to date.    Previous Report Reviewed: lab reports, office notes and radiology reports     The following portions of the patient's history were reviewed and updated as appropriate: He  has a past medical history of Arthritis; Diabetes mellitus; Diabetes mellitus type 2, uncontrolled (7/19/2016); DM (diabetes mellitus) (2015); Gall stones; Obesity; Psoriasis (a type of skin inflammation); and Rheumatoid arthritis of foot.  He  has a past surgical history that includes Appendectomy and Cholecystectomy.  His family history includes Cancer in his mother; Cataracts in his mother; Diabetes in his mother; Hypertension in his mother; Stroke in his father.  He  reports that he has quit smoking. He has never used smokeless tobacco. He reports that he does not drink alcohol or use drugs.  He has a current medication list which includes the following prescription(s): adalimumab, ammonium lactate, benzonatate, calcipotriene, clobetasol, fluzone quad [id number] (pf), folic acid,  "ketoconazole, losartan, methotrexate, onetouch delica lancets, onetouch ultra test, onetouch ultramini, proair hfa, tramadol, triamcinolone acetonide 0.1%, and vitamin d2.  He has No Known Allergies..    Review of Systems   Constitutional: Negative for fever, fatigue and night sweats.   HENT: Positive for sore throat. Negative for nosebleeds, postnasal drip, rhinorrhea, congestion and hearing loss.    Eyes: Negative for itching.   Respiratory: Positive for snoring, cough, hemoptysis and wheezing. Negative for sputum production, orthopnea and Paroxysmal Nocturnal Dyspnea.    Cardiovascular: Negative for chest pain and leg swelling.   Genitourinary: Negative for difficulty urinating and hematuria.   Endocrine: Negative for cold intolerance and heat intolerance.    Musculoskeletal: Positive for arthralgias and back pain.   Skin: Positive for rash.        Diffuse Psoriasis   Gastrointestinal: Positive for abdominal pain. Negative for vomiting and acid reflux.   Neurological: Negative for weakness.   Hematological: Does not bruise/bleed easily and no excessive bruising.   Psychiatric/Behavioral: The patient is not nervous/anxious.    All other systems reviewed and are negative.     Objective:   BP (!) 140/62   Pulse 85   Resp 18   Ht 5' 9" (1.753 m)   Wt 123.4 kg (272 lb)   SpO2 (!) 90%   BMI 40.17 kg/m²   Body mass index is 40.17 kg/m².    Physical Exam   Constitutional: He is oriented to person, place, and time. He appears well-developed and well-nourished.   Disheveled in appearance   HENT:   Head: Normocephalic and atraumatic.   Mallampati 3    Eyes: EOM are normal. Pupils are equal, round, and reactive to light.   Neck: Normal range of motion. Neck supple.   Neck 48 cm   Cardiovascular: Normal rate and regular rhythm.    Pulmonary/Chest: Effort normal. He has decreased breath sounds in the right upper field, the right middle field, the right lower field, the left upper field and the left middle field. He has " no wheezes. He has no rales.   Abdominal: Soft. Bowel sounds are normal.   Musculoskeletal: Normal range of motion.   Neurological: He is alert and oriented to person, place, and time.   Skin: Rash noted.   Psychiatric: He has a normal mood and affect. His behavior is normal.   Nursing note and vitals reviewed.      Personal Diagnostic Review    CT of chest performed on 11/10/17 with contrast:   No significant interval change in multiple bilateral pulmonary nodules. Consider one-year followup.      Pulmonary function tests: FEV1: 1.55  (45 % predicted), FVC:  2.36 (51 % predicted), FEV1/FVC:  66, T.60 (72% predicted), RV/TLVC: 49 (131 % predicted), DLCO: 17.4 (60 % predicted). Severe mixed obstruction with restriction. Diffusion capacity is moderately reduced - STABLE    Six minute walk test: Six minute walk distance is 312.42 / 500.47 meters (62 % predicted) with somewhat heavy dyspnea. Peak VO2 during walking was 13.35  Ml/min/kg [ 3.81 METS]. During exercise, there was no significant desaturation while breathing room air. Both blood pressure and heart rate increased significantly with walking. Hypertension was present prior to exercise.  The patient did not report non-pulmonary symptoms during exercise. Significant exercise impairment is likely due to subjective symptoms. The patient did complete the study, walking 360 seconds of the 360 second test. Since the previous study in 16, exercise capacity may be somewhat improved. Based upon age and body mass index, exercise capacity is less than predicted.        Assessment:     1. Multiple lung nodules on CT Stable   2. SCHUYLER (obstructive sleep apnea) Stable   3. Morbid obesity due to excess calories Stable   4. Cigarette smoker Stable   5. Mixed type COPD (chronic obstructive pulmonary disease)      Outpatient Encounter Prescriptions as of 11/10/2017   Medication Sig Dispense Refill    adalimumab (HUMIRA) PnKt injection Inject 0.8 mLs (40 mg total) into  the skin every 14 (fourteen) days. 1.6 mL 5    ammonium lactate 12 % Crea Apply 1 Act topically 2 (two) times daily. 1 Tube 3    benzonatate (TESSALON) 100 MG capsule Take 1 capsule (100 mg total) by mouth 3 (three) times daily as needed for Cough. Swallow whole 60 capsule 2    calcipotriene (DOVONOX) 0.005 % ointment Apply on psoriasis on body twice daily 120 g 3    clobetasol (OLUX) 0.05 % Foam AAA of scalp and behind ears twice daily.  Do not use on face, underarms or groin. 100 g 3    FLUZONE QUAD [id number], PF, 60 mcg (15 mcg x 4)/0.5 mL vaccine       folic acid (FOLVITE) 1 MG tablet Take 1 tablet (1 mg total) by mouth once daily. 90 tablet 3    ketoconazole (NIZORAL) 2 % shampoo Wash hair with medicated shampoo at least 2x/week - let sit on scalp at least 5 minutes prior to rinsing 120 mL 5    losartan (COZAAR) 100 MG tablet TAKE 1 TABLET (100 MG TOTAL) BY MOUTH ONCE DAILY. 30 tablet 11    methotrexate 2.5 MG Tab TAKE 6 TABLETS (15 MG TOTAL) BY MOUTH EVERY 7 DAYS. (3 IN THE MORNING AND 3 IN THE EVENING) 24 tablet 5    ONETOUCH DELICA LANCETS 33 gauge Misc       ONETOUCH ULTRA TEST Strp       ONETOUCH ULTRAMINI kit       PROAIR HFA 90 mcg/actuation inhaler INHALE 2 PUFFS EVERY 4 HOURS AS NEEDED :FOR WHEEZING 8.5 Inhaler 11    tramadol (ULTRAM) 50 mg tablet TAKE 1 TABLET BY MOUTH EVERY 6 HOURS AS NEEDED FOR PAIN 60 tablet 3    triamcinolone acetonide 0.1% (KENALOG) 0.1 % cream AAA bid for psoriasis on body.  Do not use on face, underarms or groin. 454 g 3    VITAMIN D2 50,000 unit capsule TAKE 1 CAPSULE (50,000 UNITS TOTAL) BY MOUTH TWICE A WEEK. 8 capsule 6     Facility-Administered Encounter Medications as of 11/10/2017   Medication Dose Route Frequency Provider Last Rate Last Dose    [COMPLETED] omnipaque 350 iohexol 75 mL  75 mL Intravenous ONCE PRN Asif Demarco MD   75 mL at 11/10/17 0957     Orders Placed This Encounter   Procedures    CT Chest With Contrast     Standing Status:    Future     Standing Expiration Date:   11/10/2018     Order Specific Question:   Reason for Exam:     Answer:   Multiple lung nodules     Order Specific Question:   Is the patient allergic to iodine or contrast? Has a steroid / antihistamine prep been administered?     Answer:   No     Order Specific Question:   Is the patient on ANY Metformin drug such as Glugophage/Glucovance?           Should be off drug 48 hours after contrast. Check renal function before restart.     Answer:   No     Order Specific Question:   Does the patient have any of the following risk factors?     Answer:   None     Order Specific Question:   Does the patient have any of the following risk factors?     Answer:   Diabetes Mellitus     Order Specific Question:   Age > 60 years?     Answer:   Yes     Order Specific Question:   History of Kidney Disease - including: decreased kidney function, dialysis, kidney transplay, single kidney, kidney cancer, kidney surgery?     Answer:   None     Order Specific Question:   Does the patient have high blood pressure requiring medical treatment?     Answer:   Yes     Order Specific Question:   Diabetes?     Answer:   Yes    Complete PFT with bronchodilator     Standing Status:   Future     Standing Expiration Date:   12/21/2018     Plan:     Discussed diagnosis, its evaluation, treatment and usual course. All questions answered.    Multiple lung nodules on CT  No significant interval change in multiple bilateral pulmonary nodules.   Likely drug induced lung nodules - Methotrexate   REPEAT CT CHEST IN 1 YEAR.      SCHUYLER (obstructive sleep apnea)  Continue  AUTOCPAP of 4 - 20 CMWP ([facility])     Discussed therapeutic goals for positive airway pressure therapy(CPAP or BiPAP): Ideal is usage 100% of nights for 6 - 8 hours per night. Minimum usage is 70% of night for at least 4 hours per night used. Pateint expressed understanding. All Questions answered.    CPAP complaince evaluation in 6 weeks.      Morbid obesity due to excess calories  General weight loss/lifestyle modification strategies discussed (elicit support from others; identify saboteurs; non-food rewards, etc).  Diet interventions: low calorie (1000 kCal/d) deficit diet.    Cigarette smoker  Assistance with smoking cessation was offered, including:  []  Medications  [x]  Counseling  []  Printed Information on Smoking Cessation  []  Referral to a Smoking Cessation Program    Patient was counseled regarding smoking for >10 minutes.    Mixed type COPD (chronic obstructive pulmonary disease)  MIXED COPD ROS: taking medications as instructed, no medication side effects noted, no significant ongoing wheezing or shortness of breath, using bronchodilator MDI less than twice a week.   New concerns: NONE.   Exam: appears well, vitals normal, no respiratory distress, acyanotic, normal RR, chest clear, no wheezing, crepitations, rhonchi, normal symmetric air entry.   Assessment:  MIXED COPD stable.   Plan: PROAIR. Current treatment plan is effective, no change in therapy. PFT IN 1 YEAR.         TIME SPENT WITH PATIENT: Time spent: 40 minutes in face to face  discussion concerning diagnosis, prognosis, review of lab and test results, benefits of treatment as well as management of disease, counseling of patient and coordination of care between various health  care providers . Greater than half the time spent was used for coordination of care and counseling of patient.       Return in about 6 weeks (around 12/22/2017) for Mixed obstructive and restrictive lung disease, SCHUYLER, Nocturnal hypoxemia, Multiple lung nodules.

## 2017-11-10 NOTE — PATIENT INSTRUCTIONS
Lung Anatomy  Your lungs take air in to give your body oxygen, which the body needs to work. Your lungs, like all the tissues in your body, are made up of billions of tiny specialized cells. Old lung cells die and are replaced by new, identical lung cells. This natural process helps ensure healthy lungs.    Date Last Reviewed: 11/1/2016  © 2106-0389 [employer]. [address] [place] [address], [place] [address]. All rights reserved. This information is not intended as a substitute for professional medical care. Always follow your healthcare professional's instructions.

## 2017-11-11 LAB
POST FEF 25 75: 1.12 L/S (ref 2.1–3.65)
POST FET 100: 11.94 S
POST FEV1 FVC: 73 %
POST FEV1: 1.63 L (ref 3.09–3.85)
POST FIF 50: 1.31 L/S
POST FVC: 2.24 L (ref 4.13–5.04)
POST PEF: 3.31 L/S (ref 7.85–10.1)
PRE DLCO: 17.39 ML/MMHG/MIN (ref 24.8–33.09)
PRE ERV: 0.14 L
PRE FEF 25 75: 0.61 L/S (ref 2.1–3.65)
PRE FET 100: 11.87 S
PRE FEV1 FVC: 66 %
PRE FEV1: 1.55 L (ref 3.09–3.85)
PRE FIF 50: 1.69 L/S
PRE FRC PL: 2.38 L (ref 2.29–3.5)
PRE FVC: 2.36 L (ref 4.13–5.04)
PRE KROGHS K: 4.44 1/MIN
PRE PEF: 3.08 L/S (ref 7.85–10.1)
PRE RV: 2.24 L (ref 1.91–2.7)
PRE SVC: 2.36 L
PRE TLC: 4.6 L (ref 5.92–6.92)
PREDICTED DLCO: 28.94 ML/MMHG/MIN (ref 24.8–33.09)
PREDICTED FEV1 FVC: 75.67 % (ref 70.83–80.51)
PREDICTED FEV1: 3.47 L (ref 3.09–3.85)
PREDICTED FRC N2: 2.89 L (ref 2.29–3.5)
PREDICTED FRC PL: 2.89 L (ref 2.29–3.5)
PREDICTED FVC: 4.59 L (ref 4.13–5.04)
PREDICTED RV: 2.3 L (ref 1.91–2.7)
PREDICTED SVC: 4.2 L
PREDICTED TLC: 6.42 L (ref 5.92–6.92)
PROVOCATION PROTOCOL: ABNORMAL

## 2017-12-14 ENCOUNTER — OFFICE VISIT (OUTPATIENT)
Dept: PODIATRY | Facility: CLINIC | Age: 62
End: 2017-12-14
Payer: MEDICAID

## 2017-12-14 VITALS
SYSTOLIC BLOOD PRESSURE: 126 MMHG | DIASTOLIC BLOOD PRESSURE: 76 MMHG | HEART RATE: 98 BPM | HEIGHT: 69 IN | BODY MASS INDEX: 41.47 KG/M2 | WEIGHT: 280 LBS

## 2017-12-14 DIAGNOSIS — E11.49 TYPE II DIABETES MELLITUS WITH NEUROLOGICAL MANIFESTATIONS: Primary | ICD-10-CM

## 2017-12-14 DIAGNOSIS — L84 CORN OR CALLUS: ICD-10-CM

## 2017-12-14 DIAGNOSIS — R23.4 SKIN FISSURES: ICD-10-CM

## 2017-12-14 DIAGNOSIS — B35.1 DERMATOPHYTOSIS OF NAIL: ICD-10-CM

## 2017-12-14 PROCEDURE — 99213 OFFICE O/P EST LOW 20 MIN: CPT | Mod: PBBFAC,PO | Performed by: PODIATRIST

## 2017-12-14 PROCEDURE — 11056 PARNG/CUTG B9 HYPRKR LES 2-4: CPT | Mod: Q9,PBBFAC,PO | Performed by: PODIATRIST

## 2017-12-14 PROCEDURE — 99999 PR PBB SHADOW E&M-EST. PATIENT-LVL III: CPT | Mod: PBBFAC,,, | Performed by: PODIATRIST

## 2017-12-14 PROCEDURE — 11721 DEBRIDE NAIL 6 OR MORE: CPT | Mod: 59,S$PBB,, | Performed by: PODIATRIST

## 2017-12-14 PROCEDURE — 99499 UNLISTED E&M SERVICE: CPT | Mod: S$PBB,,, | Performed by: PODIATRIST

## 2017-12-14 PROCEDURE — 11721 DEBRIDE NAIL 6 OR MORE: CPT | Mod: Q9,PBBFAC,PO | Performed by: PODIATRIST

## 2017-12-14 PROCEDURE — 11056 PARNG/CUTG B9 HYPRKR LES 2-4: CPT | Mod: S$PBB,,, | Performed by: PODIATRIST

## 2017-12-26 NOTE — PROGRESS NOTES
Subjective:     Patient ID: Trey Stevens is a 62 y.o. male.    Chief Complaint: Nail Care (Patient states pain and swelling is still occuring. ) and Diabetes Mellitus (Last PCP visit with - 8/30/17.)    Trey is a 62 y.o. male who presents to the clinic for evaluation and treatment of high risk feet. Trey has a past medical history of Arthritis; Diabetes mellitus; Diabetes mellitus type 2, uncontrolled (7/19/2016); DM (diabetes mellitus) (2015); Gall stones; Obesity; Psoriasis (a type of skin inflammation); and Rheumatoid arthritis of foot. The patient's chief complaint is thick calluses and  nails. This patient has documented high risk feet requiring routine maintenance secondary to diabetes mellitis and those secondary complications of diabetes, as mentioned. Patient states his blood sugar this morning was 117mg/dl.     PCP: Brittanie Kaba MD    Date Last Seen by PCP: 8/30/17    Current shoe gear:  Affected Foot: Extra depth shoes     Unaffected Foot: Extra depth shoes    Hemoglobin A1C   Date Value Ref Range Status   08/23/2017 6.3 (H) 4.0 - 5.6 % Final     Comment:     According to ADA guidelines, hemoglobin A1c <7.0% represents  optimal control in non-pregnant diabetic patients. Different  metrics may apply to specific patient populations.   Standards of Medical Care in Diabetes-2016.  For the purpose of screening for the presence of diabetes:  <5.7%     Consistent with the absence of diabetes  5.7-6.4%  Consistent with increasing risk for diabetes   (prediabetes)  >or=6.5%  Consistent with diabetes  Currently, no consensus exists for use of hemoglobin A1c  for diagnosis of diabetes for children.  This Hemoglobin A1c assay has significant interference with fetal   hemoglobin   (HbF). The results are invalid for patients with abnormal amounts of   HbF,   including those with known Hereditary Persistence   of Fetal Hemoglobin. Heterozygous hemoglobin variants (HbAS, HbAC,   HbAD, HbAE, HbA2) do  not significantly interfere with this assay;   however, presence of multiple variants in a sample may impact the %   interference.     02/20/2017 6.7 (H) 4.5 - 6.2 % Final     Comment:     According to ADA guidelines, hemoglobin A1C <7.0% represents  optimal control in non-pregnant diabetic patients.  Different  metrics may apply to specific populations.   Standards of Medical Care in Diabetes - 2016.  For the purpose of screening for the presence of diabetes:  <5.7%     Consistent with the absence of diabetes  5.7-6.4%  Consistent with increasing risk for diabetes   (prediabetes)  >or=6.5%  Consistent with diabetes  Currently no consensus exists for use of hemoglobin A1C  for diagnosis of diabetes for children.     10/24/2016 6.5 (H) 4.5 - 6.2 % Final     Comment:     According to ADA guidelines, hemoglobin A1C <7.0% represents  optimal control in non-pregnant diabetic patients.  Different  metrics may apply to specific populations.   Standards of Medical Care in Diabetes - 2016.  For the purpose of screening for the presence of diabetes:  <5.7%     Consistent with the absence of diabetes  5.7-6.4%  Consistent with increasing risk for diabetes   (prediabetes)  >or=6.5%  Consistent with diabetes  Currently no consensus exists for use of hemoglobin A1C  for diagnosis of diabetes for children.             Patient Active Problem List   Diagnosis    Psoriatic arthritis, destructive type    Vitamin D deficiency    Psoriasis    Multiple lung nodules on CT    Immunosuppressed status    Long-term use of immunosuppressant medication    Cigarette smoker    Mixed type COPD (chronic obstructive pulmonary disease)    Type 2 diabetes mellitus with nephropathy    Morbid obesity due to excess calories    Hypertension associated with diabetes    Vitiligo    SCHUYLER (obstructive sleep apnea)       Medication List with Changes/Refills   Current Medications    ADALIMUMAB (HUMIRA) PNKT INJECTION    Inject 0.8 mLs (40 mg total)  into the skin every 14 (fourteen) days.    AMMONIUM LACTATE 12 % CREA    Apply 1 Act topically 2 (two) times daily.    BENZONATATE (TESSALON) 100 MG CAPSULE    Take 1 capsule (100 mg total) by mouth 3 (three) times daily as needed for Cough. Swallow whole    CALCIPOTRIENE (DOVONOX) 0.005 % OINTMENT    Apply on psoriasis on body twice daily    CLOBETASOL (OLUX) 0.05 % FOAM    AAA of scalp and behind ears twice daily.  Do not use on face, underarms or groin.    FLUZONE QUAD 4663-0032, PF, 60 MCG (15 MCG X 4)/0.5 ML VACCINE        FOLIC ACID (FOLVITE) 1 MG TABLET    Take 1 tablet (1 mg total) by mouth once daily.    KETOCONAZOLE (NIZORAL) 2 % SHAMPOO    Wash hair with medicated shampoo at least 2x/week - let sit on scalp at least 5 minutes prior to rinsing    LOSARTAN (COZAAR) 100 MG TABLET    TAKE 1 TABLET (100 MG TOTAL) BY MOUTH ONCE DAILY.    METHOTREXATE 2.5 MG TAB    TAKE 6 TABLETS (15 MG TOTAL) BY MOUTH EVERY 7 DAYS. (3 IN THE MORNING AND 3 IN THE EVENING)    ONETOUCH DELICA LANCETS 33 GAUGE MISC        ONETOUCH ULTRA TEST STRP        ONETOUCH ULTRAMINI KIT        PROAIR HFA 90 MCG/ACTUATION INHALER    INHALE 2 PUFFS EVERY 4 HOURS AS NEEDED :FOR WHEEZING    TRAMADOL (ULTRAM) 50 MG TABLET    TAKE 1 TABLET BY MOUTH EVERY 6 HOURS AS NEEDED FOR PAIN    TRIAMCINOLONE ACETONIDE 0.1% (KENALOG) 0.1 % CREAM    AAA bid for psoriasis on body.  Do not use on face, underarms or groin.    VITAMIN D2 50,000 UNIT CAPSULE    TAKE 1 CAPSULE (50,000 UNITS TOTAL) BY MOUTH TWICE A WEEK.       Review of patient's allergies indicates:  No Known Allergies    Past Surgical History:   Procedure Laterality Date    APPENDECTOMY      CHOLECYSTECTOMY         Family History   Problem Relation Age of Onset    Cancer Mother     Hypertension Mother     Diabetes Mother     Cataracts Mother     Stroke Father     Psoriasis Neg Hx        Social History     Social History    Marital status: Single     Spouse name: N/A    Number of children:  "N/A    Years of education: N/A     Occupational History    Not on file.     Social History Main Topics    Smoking status: Former Smoker    Smokeless tobacco: Never Used    Alcohol use No    Drug use: No    Sexual activity: No     Other Topics Concern    Not on file     Social History Narrative    No narrative on file       Vitals:    12/14/17 1351   BP: 126/76   Pulse: 98   Weight: 127 kg (279 lb 15.8 oz)   Height: 5' 9" (1.753 m)   PainSc: 0-No pain       Hemoglobin A1C   Date Value Ref Range Status   08/23/2017 6.3 (H) 4.0 - 5.6 % Final     Comment:     According to ADA guidelines, hemoglobin A1c <7.0% represents  optimal control in non-pregnant diabetic patients. Different  metrics may apply to specific patient populations.   Standards of Medical Care in Diabetes-2016.  For the purpose of screening for the presence of diabetes:  <5.7%     Consistent with the absence of diabetes  5.7-6.4%  Consistent with increasing risk for diabetes   (prediabetes)  >or=6.5%  Consistent with diabetes  Currently, no consensus exists for use of hemoglobin A1c  for diagnosis of diabetes for children.  This Hemoglobin A1c assay has significant interference with fetal   hemoglobin   (HbF). The results are invalid for patients with abnormal amounts of   HbF,   including those with known Hereditary Persistence   of Fetal Hemoglobin. Heterozygous hemoglobin variants (HbAS, HbAC,   HbAD, HbAE, HbA2) do not significantly interfere with this assay;   however, presence of multiple variants in a sample may impact the %   interference.     02/20/2017 6.7 (H) 4.5 - 6.2 % Final     Comment:     According to ADA guidelines, hemoglobin A1C <7.0% represents  optimal control in non-pregnant diabetic patients.  Different  metrics may apply to specific populations.   Standards of Medical Care in Diabetes - 2016.  For the purpose of screening for the presence of diabetes:  <5.7%     Consistent with the absence of diabetes  5.7-6.4%  Consistent " with increasing risk for diabetes   (prediabetes)  >or=6.5%  Consistent with diabetes  Currently no consensus exists for use of hemoglobin A1C  for diagnosis of diabetes for children.     10/24/2016 6.5 (H) 4.5 - 6.2 % Final     Comment:     According to ADA guidelines, hemoglobin A1C <7.0% represents  optimal control in non-pregnant diabetic patients.  Different  metrics may apply to specific populations.   Standards of Medical Care in Diabetes - 2016.  For the purpose of screening for the presence of diabetes:  <5.7%     Consistent with the absence of diabetes  5.7-6.4%  Consistent with increasing risk for diabetes   (prediabetes)  >or=6.5%  Consistent with diabetes  Currently no consensus exists for use of hemoglobin A1C  for diagnosis of diabetes for children.         Review of Systems   Constitutional: Negative for chills and fever.   Respiratory: Negative for shortness of breath.    Cardiovascular: Positive for leg swelling. Negative for chest pain, palpitations, orthopnea and claudication.   Gastrointestinal: Negative for diarrhea, nausea and vomiting.   Musculoskeletal: Negative for joint pain.   Skin: Negative for rash.   Neurological: Positive for tingling and sensory change. Negative for dizziness, focal weakness and weakness.   Psychiatric/Behavioral: Negative.          Objective:      PHYSICAL EXAM: Apperance: Alert and orient in no distress,well developed, and with good attention to grooming and body habits  Patient presents ambulating in extra depth shoes.   LOWER EXTREMITY EXAM:  VASCULAR: Dorsalis pedis pulses 0/4 bilateral and Posterior Tibial pulses 1/4 bilateral. Capillary fill time <4 seconds bilateral. Mild edema observed bilateral. Varicosities present bilateral. Skin temperature of the lower extremities is warm to warm, proximal to distal. Hair growth absent bilateral.  DERMATOLOGICAL: No skin rashes, subcutaneous nodules, lesions, or ulcers observed bilateral. Nails 1,2,3,4,5 bilateral  elongated, thickened, and discolored with subungual debris. Webspaces 1-4 clean, dry and without evidence of break in skin integrity bilateral. Moderate dry and hyperkeratotic tissue noted to bilateral heels and medial 1st MPJ. Skin fissures noted to bilateral heels. No erythema, drainage, or increased temp noted to area.   NEUROLOGICAL: Light touch, sharp-dull, proprioception all present and equal bilaterally.  Vibratory sensation absent at bilateral hallux. Protective sensation absent at 6/10 sites as tested with a Easton-Rusty 5.07 monofilament.   MUSCULOSKELETAL: Muscle strength is 5/5 for foot inverters, everters, plantarflexors, and dorsiflexors. Muscle tone is normal.         Assessment:       Encounter Diagnoses   Name Primary?    Type II diabetes mellitus with neurological manifestations Yes    Dermatophytosis of nail     Skin fissures     Corn or callus          Plan:   Type II diabetes mellitus with neurological manifestations    Dermatophytosis of nail    Skin fissures    Corn or callus      I counseled the patient on his conditions, their implications and medical management.  Shoe inspection. Diabetic Foot Education. Patient reminded of the importance of good nutrition and blood sugar control to help prevent podiatric complications of diabetes. Patient instructed on proper foot hygeine. We discussed wearing proper shoe gear, daily foot inspections, never walking without protective shoe gear, never putting sharp instruments to feet.    With patient's permission, nails 1-5 bilateral were debrided/trimmed in length and thickness aggressively to their soft tissue attachment mechanically and with electric , removing all offending nail and debris. Patient relates relief following the procedure.  With patient's permission bilateral skin fissure and callus were trimmed in thickness with #15 blade without incident.   Patient  will continue to monitor the areas daily, inspect feet, wear protective  shoe gear when ambulatory, moisturizer to maintain skin integrity. Patient reminded of the importance of good nutrition and blood sugar control to help prevent podiatric complications of diabetes.  Patient to return in this office in approximately 3-4 months, or sooner if needed.                     Joy Hadley DPM  [facility]

## 2017-12-27 RX ORDER — TRAMADOL HYDROCHLORIDE 50 MG/1
TABLET ORAL
Qty: 60 TABLET | Refills: 2 | Status: SHIPPED | OUTPATIENT
Start: 2017-12-27 | End: 2018-10-18 | Stop reason: SDUPTHER

## 2018-01-02 ENCOUNTER — TELEPHONE (OUTPATIENT)
Dept: PULMONOLOGY | Facility: CLINIC | Age: 63
End: 2018-01-02

## 2018-01-03 ENCOUNTER — TELEPHONE (OUTPATIENT)
Dept: PHARMACY | Facility: HOSPITAL | Age: 63
End: 2018-01-03

## 2018-01-03 ENCOUNTER — OFFICE VISIT (OUTPATIENT)
Dept: PULMONOLOGY | Facility: CLINIC | Age: 63
End: 2018-01-03
Payer: MEDICAID

## 2018-01-03 VITALS
SYSTOLIC BLOOD PRESSURE: 132 MMHG | HEART RATE: 84 BPM | HEIGHT: 69 IN | RESPIRATION RATE: 19 BRPM | DIASTOLIC BLOOD PRESSURE: 80 MMHG | WEIGHT: 274.25 LBS | OXYGEN SATURATION: 98 % | BODY MASS INDEX: 40.62 KG/M2

## 2018-01-03 DIAGNOSIS — G47.33 OSA (OBSTRUCTIVE SLEEP APNEA): Primary | Chronic | ICD-10-CM

## 2018-01-03 DIAGNOSIS — F17.210 CIGARETTE SMOKER: Chronic | ICD-10-CM

## 2018-01-03 DIAGNOSIS — E66.01 MORBID OBESITY DUE TO EXCESS CALORIES: Chronic | ICD-10-CM

## 2018-01-03 DIAGNOSIS — J44.9 MIXED TYPE COPD (CHRONIC OBSTRUCTIVE PULMONARY DISEASE): Chronic | ICD-10-CM

## 2018-01-03 DIAGNOSIS — R91.8 MULTIPLE LUNG NODULES ON CT: Chronic | ICD-10-CM

## 2018-01-03 PROCEDURE — 99999 PR PBB SHADOW E&M-EST. PATIENT-LVL III: CPT | Mod: PBBFAC,,, | Performed by: INTERNAL MEDICINE

## 2018-01-03 PROCEDURE — 99214 OFFICE O/P EST MOD 30 MIN: CPT | Mod: S$PBB,,, | Performed by: INTERNAL MEDICINE

## 2018-01-03 PROCEDURE — 99213 OFFICE O/P EST LOW 20 MIN: CPT | Mod: PBBFAC | Performed by: INTERNAL MEDICINE

## 2018-01-03 NOTE — TELEPHONE ENCOUNTER
LVM- Hello [facility] received a prescription for Humira and we will contact their insurance company to find out if the medication is covered. We will update patient of status as more information is received. feel free to give us a call with  any questions at [phone].

## 2018-01-03 NOTE — ASSESSMENT & PLAN NOTE
No significant interval change in multiple bilateral pulmonary nodules.   Likely drug induced lung nodules - Methotrexate

## 2018-01-03 NOTE — ASSESSMENT & PLAN NOTE
Continue  AUTOCPAP of 4 - 20 CMWP ([facility])     Discussed therapeutic goals for positive airway pressure therapy(CPAP or BiPAP): Ideal is usage 100% of nights for 6 - 8 hours per night. Minimum usage is 70% of night for at least 4 hours per night used. Pateint expressed understanding. All Questions answered.    CPAP complaince evaluation in 6 weeks.

## 2018-01-03 NOTE — PROGRESS NOTES
Subjective:      Patient ID: Trey Stevens is a 62 y.o. male.    Patient Active Problem List   Diagnosis    Psoriatic arthritis, destructive type    Vitamin D deficiency    Psoriasis    Multiple lung nodules on CT    Immunosuppressed status    Long-term use of immunosuppressant medication    Cigarette smoker    Mixed type COPD (chronic obstructive pulmonary disease)    Type 2 diabetes mellitus with nephropathy    Morbid obesity due to excess calories    Hypertension associated with diabetes    Vitiligo    SCHUYLER (obstructive sleep apnea)     Problem list has been reviewed.      Chief Complaint: oas on cpap and multiple lung nodules         HPI:     Follow up for SCHUYLER and CPAP complaince assessment.   he  is on AUTOPAP  of  4 - 20 CMWP .     He definitely thinks PAP is beneficial to his health and He wants to continue with PAP therapy.    Patient denies snoring, headaches, excessive daytime sleepiness    Compliance Summary  12/3/2017 - 1/1/2018 (30 days)  Days with Device Usage 26 days  Days without Device Usage 4 days  Percent Days with Device Usage 86.7%  Cumulative Usage 4 days 23 hrs. 34 mins. 14 secs.  Maximum Usage (1 Day) 8 hrs. 16 mins. 40 secs.  Average Usage (All Days) 3 hrs. 59 mins. 8 secs.  Average Usage (Days Used) 4 hrs. 35 mins. 55 secs.  Minimum Usage (1 Day) 17 secs.  Percent of Days with Usage >= 4 Hours 53.3%  Percent of Days with Usage < 4 Hours 46.7%  Total Blower Time 4 days 23 hrs. 55 mins. 20 secs.  Average AHI 2.8  Auto CPAP Summary  Auto CPAP Mean Pressure 14.2 cmH2O  Auto CPAP Peak Average Pressure 16.9 cmH2O  Average Device Pressure <= 90% of Time 16.8 cmH2O  Average Time in Large Leak Per Day 2 mins. 12 secs.  [place] Sleepiness Scale       EPWORTH SLEEPINESS SCALE 1/3/2018 11/10/2017 9/28/2017 8/2/2017   Sitting and reading 1 0 1 1   Watching TV 2 2 1 2   Sitting, inactive in a public place (e.g. a theatre or a meeting) 1 0 0 2   As a passenger in a car for an hour without a  break 0 0 2 0   Lying down to rest in the afternoon when circumstances permit 3 3 2 3   Sitting and talking to someone 0 0 1 1   Sitting quietly after a lunch without alcohol 2 2 3 2   In a car, while stopped for a few minutes in traffic 0 0 0 0   Total score 9 7 10 11       He reports that he is recovering form a viral illness.     Patients reports NYHA II dyspnea    The patient does not have currently have symptoms / an exacerbation.       No recent change in breathing.      A full  review of systems, past , family  and social histories was performed except as mentioned in the note above, these are non contributory to the main issues discussed today.     Previous Report Reviewed: office notes     The following portions of the patient's history were reviewed and updated as appropriate: He  has a past medical history of Arthritis; Diabetes mellitus; Diabetes mellitus type 2, uncontrolled (7/19/2016); DM (diabetes mellitus) (2015); Gall stones; Obesity; Psoriasis (a type of skin inflammation); and Rheumatoid arthritis of foot.  He  has a past surgical history that includes Appendectomy and Cholecystectomy.  His family history includes Cancer in his mother; Cataracts in his mother; Diabetes in his mother; Hypertension in his mother; Stroke in his father.  He  reports that he has quit smoking. He has never used smokeless tobacco. He reports that he does not drink alcohol or use drugs.  He has a current medication list which includes the following prescription(s): adalimumab, ammonium lactate, benzonatate, calcipotriene, clobetasol, fluzone quad 0517-8630 (pf), folic acid, ketoconazole, losartan, methotrexate, onetouch delica lancets, onetouch ultra test, onetouch ultramini, proair hfa, tramadol, triamcinolone acetonide 0.1%, and vitamin d2.  He has No Known Allergies..    Review of Systems   Constitutional: Negative for fever, fatigue and night sweats.   HENT: Positive for sore throat. Negative for nosebleeds,  "postnasal drip, rhinorrhea, congestion and hearing loss.    Eyes: Negative for itching.   Respiratory: Positive for snoring, cough, hemoptysis and wheezing. Negative for sputum production, orthopnea and Paroxysmal Nocturnal Dyspnea.    Cardiovascular: Negative for chest pain and leg swelling.   Genitourinary: Negative for difficulty urinating and hematuria.   Endocrine: Negative for cold intolerance and heat intolerance.    Musculoskeletal: Positive for arthralgias and back pain.   Skin: Positive for rash.        Diffuse Psoriasis   Gastrointestinal: Positive for abdominal pain. Negative for vomiting and acid reflux.   Neurological: Negative for weakness.   Hematological: Does not bruise/bleed easily and no excessive bruising.   Psychiatric/Behavioral: The patient is not nervous/anxious.    All other systems reviewed and are negative.       Objective:     Vitals:    01/03/18 1113   BP: 132/80   Pulse: 84   Resp: 19   SpO2: 98%   Weight: 124.4 kg (274 lb 4 oz)   Height: 5' 9" (1.753 m)     body mass index is 40.5 kg/m².     Physical Exam   Constitutional: He is oriented to person, place, and time. He appears well-developed and well-nourished.   Disheveled in appearance   HENT:   Head: Normocephalic and atraumatic.   Mallampati 3    Eyes: EOM are normal. Pupils are equal, round, and reactive to light.   Neck: Normal range of motion. Neck supple.   Neck 48 cm   Cardiovascular: Normal rate and regular rhythm.    Pulmonary/Chest: Effort normal. He has decreased breath sounds in the right upper field, the right middle field, the right lower field, the left upper field and the left middle field. He has no wheezes. He has no rales.   Abdominal: Soft. Bowel sounds are normal.   Musculoskeletal: Normal range of motion.   Neurological: He is alert and oriented to person, place, and time.   Skin: Rash noted.   Psychiatric: He has a normal mood and affect. His behavior is normal.   Nursing note and vitals reviewed.      Personal " Diagnostic Review  CPAP complaince download.   Assessment:     1. SCHUYLER (obstructive sleep apnea) Active   2. Mixed type COPD (chronic obstructive pulmonary disease) Stable   3. Multiple lung nodules on CT Stable   4. Cigarette smoker Stable   5. Morbid obesity due to excess calories Stable       No orders of the defined types were placed in this encounter.      Plan:     Discussed diagnosis, its evaluation, treatment and usual course. All questions answered.    Multiple lung nodules on CT  No significant interval change in multiple bilateral pulmonary nodules.   Likely drug induced lung nodules - Methotrexate         Mixed type COPD (chronic obstructive pulmonary disease)  MIXED COPD ROS: taking medications as instructed, no medication side effects noted, no significant ongoing wheezing or shortness of breath, using bronchodilator MDI less than twice a week.   New concerns: NONE.   Exam: appears well, vitals normal, no respiratory distress, acyanotic, normal RR, chest clear, no wheezing, crepitations, rhonchi, normal symmetric air entry.   Assessment:  MIXED COPD stable.   Plan: PROAIR. Current treatment plan is effective, no change in therapy.     SCHUYLER (obstructive sleep apnea)  Continue  AUTOCPAP of 4 - 20 CMWP ([facility])     Discussed therapeutic goals for positive airway pressure therapy(CPAP or BiPAP): Ideal is usage 100% of nights for 6 - 8 hours per night. Minimum usage is 70% of night for at least 4 hours per night used. Pateint expressed understanding. All Questions answered.    CPAP complaince evaluation in 6 weeks.     Cigarette smoker  Assistance with smoking cessation was offered, including:  []  Medications  [x]  Counseling  []  Printed Information on Smoking Cessation  []  Referral to a Smoking Cessation Program    Patient was counseled regarding smoking for >10 minutes.        TIME SPENT WITH PATIENT: Time spent: 25 minutes in face to face  discussion concerning diagnosis, prognosis, review of lab  and test results, benefits of treatment as well as management of disease, counseling of patient and coordination of care between various health  care providers . Greater than half the time spent was used for coordination of care and counseling of patient.     Return in about 6 weeks (around 2/14/2018) for Mixed obstructive and restrictive lung disease, SCHUYLER, Multiple lung nodules, Tobacco use disorder.

## 2018-01-03 NOTE — PATIENT INSTRUCTIONS
Continuous Positive Airway Pressure (CPAP)  Your healthcare provider has prescribed continuous positive airway pressure (CPAP) therapy for you. A CPAP device helps you breathe better at night. The device sends air through your nose or mouth when you breathe in to keep your air passages open. CPAP is:  · Used most often to treat sleep apnea and some other problems. (Sleep apnea is a chronic condition with periods of sleep in which you briefly stop breathing.)  · Safe and very effective. But it takes time to get used to the mask.  Your healthcare provider, nurse, or medical supplier will give you tips for wearing and caring for your CPAP device.  General guidelines  Recommendations include the following:  · It's very important not to give up! It takes time to get used to wearing the mask at night.  · Practice using your CPAP device during the day, especially whenever you take a nap.  · Remember, there are several different types of masks. If you cant get used to your mask, ask your provider or medical supply company about trying another style.  · If you have nasal stuffiness or dryness when using your CPAP device, talk with your provider or medical supply company. There are ways to ease these problems. For example, your provider may recommend using a moistening nasal spray. Or the medical supply company may recommend a device with a humidifier.  · The goal is to use your CPAP all night, every night, during all naps, and even when you travel.  · Keep your mask clean. Wash it with soap and water. Be sure to rinse the mask and tubing well with water to remove any soap. Let them air-dry completely before using.  · Make yourself comfortable when sleeping with CPAP. Try using extra pillows.  Work with your medical supply company so that you know how to correctly use your CPAP. The company's representative will be able to help you:  · Use the CPAP correctly  · Troubleshoot any problems that come up  · Learn to clean and  maintain the device  · Adjust to regular use of the CPAP     The CPAP device settings are given as centimeters of water, or cm/H2O. Each persons pressure settings are different. Your healthcare provider will tell you what settings to use. Never change your CPAP pressure setting unless your provider tells you to.  CPAP ____________cm/H20 pressure when you breathe in   Date Last Reviewed: 5/1/2016  © 6255-1041 [employer]. [address] [place] [address]. All rights reserved. This information is not intended as a substitute for professional medical care. Always follow your healthcare professional's instructions.

## 2018-01-21 RX ORDER — ERGOCALCIFEROL 1.25 MG/1
50000 CAPSULE ORAL
Qty: 8 CAPSULE | Refills: 6 | Status: SHIPPED | OUTPATIENT
Start: 2018-01-22 | End: 2018-06-19 | Stop reason: ALTCHOICE

## 2018-02-05 NOTE — TELEPHONE ENCOUNTER
Humira initial shipment. Declined consult. Experienced to medication. Copay $0 (004). Address confirmed: [id number] [place] [address] [place] [address]. Shipping out 2/5/18. Dose due 2/7/18.

## 2018-02-19 ENCOUNTER — LAB VISIT (OUTPATIENT)
Dept: LAB | Facility: HOSPITAL | Age: 63
End: 2018-02-19
Attending: INTERNAL MEDICINE
Payer: MEDICAID

## 2018-02-19 ENCOUNTER — OFFICE VISIT (OUTPATIENT)
Dept: RHEUMATOLOGY | Facility: CLINIC | Age: 63
End: 2018-02-19
Payer: MEDICAID

## 2018-02-19 VITALS
SYSTOLIC BLOOD PRESSURE: 147 MMHG | HEART RATE: 87 BPM | WEIGHT: 266.56 LBS | HEIGHT: 69 IN | BODY MASS INDEX: 39.48 KG/M2 | DIASTOLIC BLOOD PRESSURE: 83 MMHG

## 2018-02-19 DIAGNOSIS — Z79.631 METHOTREXATE, LONG TERM, CURRENT USE: ICD-10-CM

## 2018-02-19 DIAGNOSIS — D84.9 IMMUNOSUPPRESSED STATUS: ICD-10-CM

## 2018-02-19 DIAGNOSIS — L40.59 POLYARTICULAR PSORIATIC ARTHRITIS: ICD-10-CM

## 2018-02-19 DIAGNOSIS — L40.52 PSORIATIC ARTHRITIS, DESTRUCTIVE TYPE: Primary | ICD-10-CM

## 2018-02-19 DIAGNOSIS — Z79.60 LONG-TERM USE OF IMMUNOSUPPRESSANT MEDICATION: ICD-10-CM

## 2018-02-19 DIAGNOSIS — L40.9 PSORIASIS: ICD-10-CM

## 2018-02-19 DIAGNOSIS — L40.50 PSORIATIC ARTHRITIS: ICD-10-CM

## 2018-02-19 DIAGNOSIS — L40.52 PSORIATIC ARTHRITIS, DESTRUCTIVE TYPE: ICD-10-CM

## 2018-02-19 LAB
ALBUMIN SERPL BCP-MCNC: 3.9 G/DL
ALP SERPL-CCNC: 73 U/L
ALT SERPL W/O P-5'-P-CCNC: 74 U/L
ANION GAP SERPL CALC-SCNC: 12 MMOL/L
AST SERPL-CCNC: 46 U/L
BASOPHILS # BLD AUTO: 0.03 K/UL
BASOPHILS NFR BLD: 0.5 %
BILIRUB SERPL-MCNC: 0.7 MG/DL
BUN SERPL-MCNC: 11 MG/DL
CALCIUM SERPL-MCNC: 9.3 MG/DL
CHLORIDE SERPL-SCNC: 104 MMOL/L
CO2 SERPL-SCNC: 23 MMOL/L
CREAT SERPL-MCNC: 1 MG/DL
CRP SERPL-MCNC: 3.1 MG/L
DIFFERENTIAL METHOD: ABNORMAL
EOSINOPHIL # BLD AUTO: 0.3 K/UL
EOSINOPHIL NFR BLD: 4.9 %
ERYTHROCYTE [DISTWIDTH] IN BLOOD BY AUTOMATED COUNT: 16.5 %
ERYTHROCYTE [SEDIMENTATION RATE] IN BLOOD BY WESTERGREN METHOD: 5 MM/HR
EST. GFR  (AFRICAN AMERICAN): >60 ML/MIN/1.73 M^2
EST. GFR  (NON AFRICAN AMERICAN): >60 ML/MIN/1.73 M^2
GLUCOSE SERPL-MCNC: 104 MG/DL
HCT VFR BLD AUTO: 46.4 %
HGB BLD-MCNC: 15.6 G/DL
LYMPHOCYTES # BLD AUTO: 2.1 K/UL
LYMPHOCYTES NFR BLD: 38.3 %
MCH RBC QN AUTO: 30.6 PG
MCHC RBC AUTO-ENTMCNC: 33.6 G/DL
MCV RBC AUTO: 91 FL
MONOCYTES # BLD AUTO: 0.4 K/UL
MONOCYTES NFR BLD: 6.3 %
NEUTROPHILS # BLD AUTO: 2.8 K/UL
NEUTROPHILS NFR BLD: 50 %
PLATELET # BLD AUTO: 224 K/UL
PMV BLD AUTO: 10.7 FL
POTASSIUM SERPL-SCNC: 4.3 MMOL/L
PROT SERPL-MCNC: 7.8 G/DL
RBC # BLD AUTO: 5.09 M/UL
SODIUM SERPL-SCNC: 139 MMOL/L
WBC # BLD AUTO: 5.54 K/UL

## 2018-02-19 PROCEDURE — 99999 PR PBB SHADOW E&M-EST. PATIENT-LVL III: CPT | Mod: PBBFAC,,, | Performed by: PHYSICIAN ASSISTANT

## 2018-02-19 PROCEDURE — 85025 COMPLETE CBC W/AUTO DIFF WBC: CPT | Mod: PO

## 2018-02-19 PROCEDURE — 99213 OFFICE O/P EST LOW 20 MIN: CPT | Mod: PBBFAC,PO | Performed by: PHYSICIAN ASSISTANT

## 2018-02-19 PROCEDURE — 80053 COMPREHEN METABOLIC PANEL: CPT | Mod: PO

## 2018-02-19 PROCEDURE — 36415 COLL VENOUS BLD VENIPUNCTURE: CPT | Mod: PO

## 2018-02-19 PROCEDURE — 99214 OFFICE O/P EST MOD 30 MIN: CPT | Mod: S$PBB,,, | Performed by: PHYSICIAN ASSISTANT

## 2018-02-19 PROCEDURE — 86140 C-REACTIVE PROTEIN: CPT

## 2018-02-19 PROCEDURE — 85651 RBC SED RATE NONAUTOMATED: CPT | Mod: PO

## 2018-02-19 PROCEDURE — 3008F BODY MASS INDEX DOCD: CPT | Mod: ,,, | Performed by: PHYSICIAN ASSISTANT

## 2018-02-19 RX ORDER — ADALIMUMAB 40MG/0.8ML
40 KIT SUBCUTANEOUS
Qty: 1.6 ML | Refills: 5 | Status: SHIPPED | OUTPATIENT
Start: 2018-02-19 | End: 2018-09-17 | Stop reason: CLARIF

## 2018-02-19 RX ORDER — METHOTREXATE 2.5 MG/1
TABLET ORAL
Qty: 24 TABLET | Refills: 5 | Status: SHIPPED | OUTPATIENT
Start: 2018-02-19 | End: 2018-04-16 | Stop reason: SDUPTHER

## 2018-02-19 NOTE — PROGRESS NOTES
Subjective:       Patient ID: Trey Stevens is a 62 y.o. male.    Chief Complaint: Psoriasis; Psoriatic Arthritis; and immunocompromised    Trey is here for routine follow up.     He has chronic psoriatic arthritis and psoriasis.  He is on humira 40 mg Q 2 weeks and  mtx 15mg/week (splitting dose 3 tabs am and 3 tabs pm) last  Year his mtx was cut back from 20 mg weekly  due to chronic lung issues.  He has multiple lung nodules which are followed by pulm and stable per his last pulmonary visit 2017, his breathing is stable. He sees dermatology also for psoriasis as well as vitiligo.  He has some chronic arthritis changes to his dip joints bilaterally and chronic deformities to his feet with lateral deviation of his toes.  Pain today 5/10. Feet and ankles. Feet hurt more at the end of the day. Using otc tylenol and tramadol which helps.  His skin rash is stll present on his elbows and abdomen. He ran out of his meds recently and now back on. His skin did flare off his meds. Joints doing ok. No issues with humira or mtx. Tolerating his meds well.     In the past failed mtx monotherapy, failed topicals and UV.       Review of Systems   Constitutional: Negative for chills, fatigue, fever and unexpected weight change.        Poor hygiene      HENT: Negative for congestion, mouth sores and rhinorrhea.    Eyes: Negative for pain, discharge and redness.   Respiratory: Positive for shortness of breath. Negative for cough and wheezing.    Cardiovascular: Positive for leg swelling. Negative for chest pain.   Gastrointestinal: Negative for abdominal pain, diarrhea, nausea and vomiting.   Endocrine: Negative for cold intolerance and heat intolerance.   Genitourinary: Negative for dysuria.   Musculoskeletal: Positive for joint swelling. Negative for arthralgias and myalgias.   Skin: Positive for color change and rash.   Allergic/Immunologic: Negative for immunocompromised state.   Neurological: Negative for weakness and  "headaches.        Diabetic neuropathy   Psychiatric/Behavioral: The patient is not nervous/anxious.          Objective:   BP (!) 147/83   Pulse 87   Ht 5' 9" (1.753 m)   Wt 120.9 kg (266 lb 8.6 oz)   BMI 39.36 kg/m²      Physical Exam   Constitutional: He is oriented to person, place, and time and well-developed, well-nourished, and in no distress.   HENT:   Head: Normocephalic and atraumatic.   Eyes: Pupils are equal, round, and reactive to light. Right eye exhibits no discharge.   Neck: Normal range of motion.   Cardiovascular: Normal rate, regular rhythm and normal heart sounds.  Exam reveals no friction rub.    Pulmonary/Chest: Effort normal and breath sounds normal. No respiratory distress.   Abdominal: Soft. He exhibits no distension. There is no tenderness.   Lymphadenopathy:     He has no cervical adenopathy.   Neurological: He is alert and oriented to person, place, and time.   Skin: Rash noted. No erythema.          Psoriasis rash bilateral elbows and small patch left hip    Vitiligo bilateral forearms       Psychiatric: Mood normal.   Musculoskeletal: Normal range of motion. He exhibits edema and deformity.   Bilateral wrists, mcps no synovitis good rom   nestor dips with chronic damage, bony enlargement     nestor Right 5th dip flexion deformity,   Left thumb no flexion to the IP joint, right thumb ip joint motion decreased    Left ankle swelling, good rom to nestor ankles  nestor feet very dirty, 2-5 toes deviate laterally           Recent Results (from the past 168 hour(s))   CBC auto differential    Collection Time: 02/19/18 11:52 AM   Result Value Ref Range    WBC 5.54 3.90 - 12.70 K/uL    RBC 5.09 4.60 - 6.20 M/uL    Hemoglobin 15.6 14.0 - 18.0 g/dL    Hematocrit 46.4 40.0 - 54.0 %    MCV 91 82 - 98 fL    MCH 30.6 27.0 - 31.0 pg    MCHC 33.6 32.0 - 36.0 g/dL    RDW 16.5 (H) 11.5 - 14.5 %    Platelets 224 150 - 350 K/uL    MPV 10.7 9.2 - 12.9 fL    Gran # (ANC) 2.8 1.8 - 7.7 K/uL    Lymph # 2.1 1.0 - 4.8 " K/uL    Mono # 0.4 0.3 - 1.0 K/uL    Eos # 0.3 0.0 - 0.5 K/uL    Baso # 0.03 0.00 - 0.20 K/uL    Gran% 50.0 38.0 - 73.0 %    Lymph% 38.3 18.0 - 48.0 %    Mono% 6.3 4.0 - 15.0 %    Eosinophil% 4.9 0.0 - 8.0 %    Basophil% 0.5 0.0 - 1.9 %    Differential Method Automated    Comprehensive metabolic panel    Collection Time: 02/19/18 11:52 AM   Result Value Ref Range    Sodium 139 136 - 145 mmol/L    Potassium 4.3 3.5 - 5.1 mmol/L    Chloride 104 95 - 110 mmol/L    CO2 23 23 - 29 mmol/L    Glucose 104 70 - 110 mg/dL    BUN, Bld 11 6 - 20 mg/dL    Creatinine 1.0 0.5 - 1.4 mg/dL    Calcium 9.3 8.7 - 10.5 mg/dL    Total Protein 7.8 6.0 - 8.4 g/dL    Albumin 3.9 3.5 - 5.2 g/dL    Total Bilirubin 0.7 0.1 - 1.0 mg/dL    Alkaline Phosphatase 73 55 - 135 U/L    AST 46 (H) 10 - 40 U/L    ALT 74 (H) 10 - 44 U/L    Anion Gap 12 8 - 16 mmol/L    eGFR if African American >60 >60 mL/min/1.73 m^2    eGFR if non African American >60 >60 mL/min/1.73 m^2       Component      Latest Ref Rng & Units 2/20/2017   Vit D, 25-Hydroxy      30 - 96 ng/mL 37       10/30/17 nestor hand and foot x-ray- damage in both hand and feet consistent with PsA   4/2016 XR hands reviewed: psoriatic arthritis changes noted to dip joints bilaterally, nestor thumb IP joints        Assessment:       1. Psoriatic arthritis, destructive type    2. Psoriasis    3. Immunosuppressed status    4. Long-term use of immunosuppressant medication        1. Psoriatic arthritis with psoriasis: increased rash with missing his humira and mtx 15mg week split dose- mild rash  Increased skin BSA ~10% but stable joint exam    2. Medication monitoring: no toxicity from humira or mtx, monitor lfts     3. Immunocompromised: utd, except zoster, possible verve study?-    4. Vit D deficiency: taking vit D 50,000U/2Xweekly, last level low normal 37 (2/2017)        Plan:       Continue his humira 40mg SC every other week, cont mtx 15mg/week with daily folic acid, both refilled as well     Add  topical back bid to residual rash    Let see how he does may consider cosentyx if rash persists, has never gotten complete skin clearance, also need to make sure he is not missing any doses of his meds    Cont vit D 50,000 Units/twice weekly, check d level Q year     Return in 4 mos with dr her and labs cbc, cmp, esr and crp      Repeat  xrays hands and feet in 1 year

## 2018-03-01 ENCOUNTER — TELEPHONE (OUTPATIENT)
Dept: PHARMACY | Facility: CLINIC | Age: 63
End: 2018-03-01

## 2018-03-01 NOTE — TELEPHONE ENCOUNTER
Humira refill. Copay $0 (004). Shipping out 3/5/18. Address confirmed: [address] [facility] [place] [address].  F/U: Injects every other Wednesday. No new medications. Has noticed on his side he occasionally gets a tingling rash. He thinks it may be his nerves, but will be going to provider soon to discuss. Stressed should it worsen or give him any pain to call provider.

## 2018-03-15 ENCOUNTER — OFFICE VISIT (OUTPATIENT)
Dept: PODIATRY | Facility: CLINIC | Age: 63
End: 2018-03-15
Payer: MEDICAID

## 2018-03-15 VITALS
WEIGHT: 269 LBS | BODY MASS INDEX: 39.84 KG/M2 | HEART RATE: 91 BPM | SYSTOLIC BLOOD PRESSURE: 150 MMHG | HEIGHT: 69 IN | DIASTOLIC BLOOD PRESSURE: 86 MMHG

## 2018-03-15 DIAGNOSIS — R23.4 SKIN FISSURES: ICD-10-CM

## 2018-03-15 DIAGNOSIS — B35.1 DERMATOPHYTOSIS OF NAIL: ICD-10-CM

## 2018-03-15 DIAGNOSIS — E11.9 COMPREHENSIVE DIABETIC FOOT EXAMINATION, TYPE 2 DM, ENCOUNTER FOR: Primary | ICD-10-CM

## 2018-03-15 DIAGNOSIS — E11.49 TYPE II DIABETES MELLITUS WITH NEUROLOGICAL MANIFESTATIONS: ICD-10-CM

## 2018-03-15 DIAGNOSIS — L84 CORN OR CALLUS: ICD-10-CM

## 2018-03-15 PROCEDURE — 99213 OFFICE O/P EST LOW 20 MIN: CPT | Mod: S$PBB,25,, | Performed by: PODIATRIST

## 2018-03-15 PROCEDURE — 11056 PARNG/CUTG B9 HYPRKR LES 2-4: CPT | Mod: S$PBB,,, | Performed by: PODIATRIST

## 2018-03-15 PROCEDURE — 11721 DEBRIDE NAIL 6 OR MORE: CPT | Mod: 59,S$PBB,, | Performed by: PODIATRIST

## 2018-03-15 PROCEDURE — 99213 OFFICE O/P EST LOW 20 MIN: CPT | Mod: PBBFAC,PO | Performed by: PODIATRIST

## 2018-03-15 PROCEDURE — 99999 PR PBB SHADOW E&M-EST. PATIENT-LVL III: CPT | Mod: PBBFAC,,, | Performed by: PODIATRIST

## 2018-03-15 PROCEDURE — 11056 PARNG/CUTG B9 HYPRKR LES 2-4: CPT | Mod: Q9,PBBFAC,PO | Performed by: PODIATRIST

## 2018-03-15 PROCEDURE — 11721 DEBRIDE NAIL 6 OR MORE: CPT | Mod: Q9,PBBFAC,PO | Performed by: PODIATRIST

## 2018-03-15 NOTE — PROGRESS NOTES
Subjective:     Patient ID: Trey Stevens is a 62 y.o. male.    Chief Complaint: Routine Foot Care (PCP: Brittanie Kaba 8/30/2017 , diabetic nail care/feet check )    Trey is a 62 y.o. male who presents to the clinic for evaluation and treatment of high risk feet. Trey has a past medical history of Arthritis; Diabetes mellitus; Diabetes mellitus type 2, uncontrolled (7/19/2016); DM (diabetes mellitus) (2015); Gall stones; Obesity; Psoriasis (a type of skin inflammation); and Rheumatoid arthritis of foot. The patient's chief complaint is thick calluses and  nails. This patient has documented high risk feet requiring routine maintenance secondary to diabetes mellitis and those secondary complications of diabetes, as mentioned. Patient states his blood sugar this morning was 102mg/dl.     PCP: Brittanie Kaba MD    Date Last Seen by PCP: 8/30/17    Current shoe gear:  Affected Foot: Extra depth shoes     Unaffected Foot: Extra depth shoes    Hemoglobin A1C   Date Value Ref Range Status   02/19/2018 6.4 (H) 4.0 - 5.6 % Final     Comment:     According to ADA guidelines, hemoglobin A1c <7.0% represents  optimal control in non-pregnant diabetic patients. Different  metrics may apply to specific patient populations.   Standards of Medical Care in Diabetes-2016.  For the purpose of screening for the presence of diabetes:  <5.7%     Consistent with the absence of diabetes  5.7-6.4%  Consistent with increasing risk for diabetes   (prediabetes)  >or=6.5%  Consistent with diabetes  Currently, no consensus exists for use of hemoglobin A1c  for diagnosis of diabetes for children.  This Hemoglobin A1c assay has significant interference with fetal   hemoglobin   (HbF). The results are invalid for patients with abnormal amounts of   HbF,   including those with known Hereditary Persistence   of Fetal Hemoglobin. Heterozygous hemoglobin variants (HbAS, HbAC,   HbAD, HbAE, HbA2) do not significantly interfere with this assay;    however, presence of multiple variants in a sample may impact the %   interference.     08/23/2017 6.3 (H) 4.0 - 5.6 % Final     Comment:     According to ADA guidelines, hemoglobin A1c <7.0% represents  optimal control in non-pregnant diabetic patients. Different  metrics may apply to specific patient populations.   Standards of Medical Care in Diabetes-2016.  For the purpose of screening for the presence of diabetes:  <5.7%     Consistent with the absence of diabetes  5.7-6.4%  Consistent with increasing risk for diabetes   (prediabetes)  >or=6.5%  Consistent with diabetes  Currently, no consensus exists for use of hemoglobin A1c  for diagnosis of diabetes for children.  This Hemoglobin A1c assay has significant interference with fetal   hemoglobin   (HbF). The results are invalid for patients with abnormal amounts of   HbF,   including those with known Hereditary Persistence   of Fetal Hemoglobin. Heterozygous hemoglobin variants (HbAS, HbAC,   HbAD, HbAE, HbA2) do not significantly interfere with this assay;   however, presence of multiple variants in a sample may impact the %   interference.     02/20/2017 6.7 (H) 4.5 - 6.2 % Final     Comment:     According to ADA guidelines, hemoglobin A1C <7.0% represents  optimal control in non-pregnant diabetic patients.  Different  metrics may apply to specific populations.   Standards of Medical Care in Diabetes - 2016.  For the purpose of screening for the presence of diabetes:  <5.7%     Consistent with the absence of diabetes  5.7-6.4%  Consistent with increasing risk for diabetes   (prediabetes)  >or=6.5%  Consistent with diabetes  Currently no consensus exists for use of hemoglobin A1C  for diagnosis of diabetes for children.             Patient Active Problem List   Diagnosis    Psoriatic arthritis, destructive type    Vitamin D deficiency    Psoriasis    Multiple lung nodules on CT    Immunosuppressed status    Long-term use of immunosuppressant medication     Cigarette smoker    Mixed type COPD (chronic obstructive pulmonary disease)    Type 2 diabetes mellitus with nephropathy    Morbid obesity due to excess calories    Hypertension associated with diabetes    Vitiligo    SCHUYLER (obstructive sleep apnea)       Medication List with Changes/Refills   Current Medications    ADALIMUMAB (HUMIRA) PNKT INJECTION    Inject 0.8 mLs (40 mg total) into the skin every 14 (fourteen) days.    AMMONIUM LACTATE 12 % CREA    Apply 1 Act topically 2 (two) times daily.    BENZONATATE (TESSALON) 100 MG CAPSULE    Take 1 capsule (100 mg total) by mouth 3 (three) times daily as needed for Cough. Swallow whole    CALCIPOTRIENE (DOVONOX) 0.005 % OINTMENT    Apply on psoriasis on body twice daily    CLOBETASOL (OLUX) 0.05 % FOAM    AAA of scalp and behind ears twice daily.  Do not use on face, underarms or groin.    FLUZONE QUAD 3333-7295, PF, 60 MCG (15 MCG X 4)/0.5 ML VACCINE        FOLIC ACID (FOLVITE) 1 MG TABLET    Take 1 tablet (1 mg total) by mouth once daily.    KETOCONAZOLE (NIZORAL) 2 % SHAMPOO    Wash hair with medicated shampoo at least 2x/week - let sit on scalp at least 5 minutes prior to rinsing    LOSARTAN (COZAAR) 100 MG TABLET    TAKE 1 TABLET (100 MG TOTAL) BY MOUTH ONCE DAILY.    METHOTREXATE 2.5 MG TAB    TAKE 6 TABLETS (15 MG TOTAL) BY MOUTH EVERY 7 DAYS. (3 IN THE MORNING AND 3 IN THE EVENING)    ONETOUCH DELICA LANCETS 33 GAUGE MISC        ONETOUCH ULTRA TEST STRP        ONETOUCH ULTRAMINI KIT        PROAIR HFA 90 MCG/ACTUATION INHALER    INHALE 2 PUFFS EVERY 4 HOURS AS NEEDED :FOR WHEEZING    TRAMADOL (ULTRAM) 50 MG TABLET    TAKE 1 TABLET BY MOUTH EVERY 6 HOURS AS NEEDED FOR PAIN    TRIAMCINOLONE ACETONIDE 0.1% (KENALOG) 0.1 % CREAM    AAA bid for psoriasis on body.  Do not use on face, underarms or groin.    VITAMIN D2 50,000 UNIT CAPSULE    TAKE 1 CAPSULE (50,000 UNITS TOTAL) BY MOUTH TWICE A WEEK.       Review of patient's allergies indicates:  No Known  "Allergies    Past Surgical History:   Procedure Laterality Date    APPENDECTOMY      CHOLECYSTECTOMY         Family History   Problem Relation Age of Onset    Cancer Mother     Hypertension Mother     Diabetes Mother     Cataracts Mother     Stroke Father     Psoriasis Neg Hx        Social History     Social History    Marital status: Single     Spouse name: N/A    Number of children: N/A    Years of education: N/A     Occupational History    Not on file.     Social History Main Topics    Smoking status: Former Smoker    Smokeless tobacco: Never Used    Alcohol use No    Drug use: No    Sexual activity: No     Other Topics Concern    Not on file     Social History Narrative    No narrative on file       Vitals:    03/15/18 1340   BP: (!) 150/86   Pulse: 91   Weight: 122 kg (269 lb)   Height: 5' 9" (1.753 m)   PainSc: 0-No pain       Hemoglobin A1C   Date Value Ref Range Status   02/19/2018 6.4 (H) 4.0 - 5.6 % Final     Comment:     According to ADA guidelines, hemoglobin A1c <7.0% represents  optimal control in non-pregnant diabetic patients. Different  metrics may apply to specific patient populations.   Standards of Medical Care in Diabetes-2016.  For the purpose of screening for the presence of diabetes:  <5.7%     Consistent with the absence of diabetes  5.7-6.4%  Consistent with increasing risk for diabetes   (prediabetes)  >or=6.5%  Consistent with diabetes  Currently, no consensus exists for use of hemoglobin A1c  for diagnosis of diabetes for children.  This Hemoglobin A1c assay has significant interference with fetal   hemoglobin   (HbF). The results are invalid for patients with abnormal amounts of   HbF,   including those with known Hereditary Persistence   of Fetal Hemoglobin. Heterozygous hemoglobin variants (HbAS, HbAC,   HbAD, HbAE, HbA2) do not significantly interfere with this assay;   however, presence of multiple variants in a sample may impact the %   interference.   "   08/23/2017 6.3 (H) 4.0 - 5.6 % Final     Comment:     According to ADA guidelines, hemoglobin A1c <7.0% represents  optimal control in non-pregnant diabetic patients. Different  metrics may apply to specific patient populations.   Standards of Medical Care in Diabetes-2016.  For the purpose of screening for the presence of diabetes:  <5.7%     Consistent with the absence of diabetes  5.7-6.4%  Consistent with increasing risk for diabetes   (prediabetes)  >or=6.5%  Consistent with diabetes  Currently, no consensus exists for use of hemoglobin A1c  for diagnosis of diabetes for children.  This Hemoglobin A1c assay has significant interference with fetal   hemoglobin   (HbF). The results are invalid for patients with abnormal amounts of   HbF,   including those with known Hereditary Persistence   of Fetal Hemoglobin. Heterozygous hemoglobin variants (HbAS, HbAC,   HbAD, HbAE, HbA2) do not significantly interfere with this assay;   however, presence of multiple variants in a sample may impact the %   interference.     02/20/2017 6.7 (H) 4.5 - 6.2 % Final     Comment:     According to ADA guidelines, hemoglobin A1C <7.0% represents  optimal control in non-pregnant diabetic patients.  Different  metrics may apply to specific populations.   Standards of Medical Care in Diabetes - 2016.  For the purpose of screening for the presence of diabetes:  <5.7%     Consistent with the absence of diabetes  5.7-6.4%  Consistent with increasing risk for diabetes   (prediabetes)  >or=6.5%  Consistent with diabetes  Currently no consensus exists for use of hemoglobin A1C  for diagnosis of diabetes for children.         Review of Systems   Constitutional: Negative for chills and fever.   Respiratory: Negative for shortness of breath.    Cardiovascular: Positive for leg swelling. Negative for chest pain, palpitations, orthopnea and claudication.   Gastrointestinal: Negative for diarrhea, nausea and vomiting.   Musculoskeletal: Negative  for joint pain.   Skin: Negative for rash.   Neurological: Positive for tingling and sensory change. Negative for dizziness, focal weakness and weakness.   Psychiatric/Behavioral: Negative.          Objective:      PHYSICAL EXAM: Apperance: Alert and orient in no distress,well developed, and with good attention to grooming and body habits  Patient presents ambulating in extra depth shoes.   LOWER EXTREMITY EXAM:  VASCULAR: Dorsalis pedis pulses 0/4 bilateral and Posterior Tibial pulses 1/4 bilateral. Capillary fill time <4 seconds bilateral. Mild edema observed bilateral. Varicosities present bilateral. Skin temperature of the lower extremities is warm to warm, proximal to distal. Hair growth absent bilateral.  DERMATOLOGICAL: No skin rashes, subcutaneous nodules, lesions, or ulcers observed bilateral. Nails 1,2,3,4,5 bilateral elongated, thickened, and discolored with subungual debris. Webspaces 1,2,3,4 clean, dry and without evidence of break in skin integrity bilateral. Moderate dry and hyperkeratotic tissue noted to bilateral heels and medial 1st MPJ. Skin fissures noted to bilateral heels. No erythema, drainage, or increased temp noted to area.   NEUROLOGICAL: Light touch, sharp-dull, proprioception all present and equal bilaterally.  Vibratory sensation absent at bilateral hallux. Protective sensation absent at 6/10 sites as tested with a Preston-Rusty 5.07 monofilament.   MUSCULOSKELETAL: Muscle strength is 5/5 for foot inverters, everters, plantarflexors, and dorsiflexors. Muscle tone is normal.         Assessment:       Encounter Diagnoses   Name Primary?    Comprehensive diabetic foot examination, type 2 DM, encounter for Yes    Type II diabetes mellitus with neurological manifestations     Dermatophytosis of nail     Skin fissures     Corn or callus          Plan:   Comprehensive diabetic foot examination, type 2 DM, encounter for    Type II diabetes mellitus with neurological  manifestations    Dermatophytosis of nail    Skin fissures    Corn or callus      I counseled the patient on his conditions, their implications and medical management.  Greater than 50% of this visit spent on counseling and coordination of care.  Greater than 15 minutes of a 20 minute appointment spent on education about the diabetic foot, neuropathy, and prevention of limb loss.  Shoe inspection. Diabetic Foot Education. Patient reminded of the importance of good nutrition and blood sugar control to help prevent podiatric complications of diabetes. Patient instructed on proper foot hygeine. We discussed wearing proper shoe gear, daily foot inspections, never walking without protective shoe gear, never putting sharp instruments to feet.    With patient's permission, nails 1-5 bilateral were debrided/trimmed in length and thickness aggressively to their soft tissue attachment mechanically and with electric , removing all offending nail and debris. Patient relates relief following the procedure.  With patient's permission bilateral skin fissure and callus were trimmed in thickness with #15 blade without incident.   Patient  will continue to monitor the areas daily, inspect feet, wear protective shoe gear when ambulatory, moisturizer to maintain skin integrity. Patient reminded of the importance of good nutrition and blood sugar control to help prevent podiatric complications of diabetes.  Patient to return in this office in approximately 3-4 months, or sooner if needed.                     Joy Hadley DPM  [facility]

## 2018-03-26 ENCOUNTER — TELEPHONE (OUTPATIENT)
Dept: PHARMACY | Facility: CLINIC | Age: 63
End: 2018-03-26

## 2018-03-27 ENCOUNTER — PATIENT OUTREACH (OUTPATIENT)
Dept: ADMINISTRATIVE | Facility: HOSPITAL | Age: 63
End: 2018-03-27

## 2018-03-29 ENCOUNTER — TELEPHONE (OUTPATIENT)
Dept: PHARMACY | Facility: CLINIC | Age: 63
End: 2018-03-29

## 2018-03-29 NOTE — TELEPHONE ENCOUNTER
Refill readiness for humira confirmed with patient; name/ confirmed; no missed doses; no new medications; no side effects noted; address confirmed for  shipment and 4/3 delivery    HOMA Felipe.Ph.  Clinical Pharmacist  [facility]  Phone: [phone]

## 2018-04-08 PROBLEM — E66.01 SEVERE OBESITY (BMI 35.0-35.9 WITH COMORBIDITY): Status: ACTIVE | Noted: 2018-04-08

## 2018-04-08 NOTE — PROGRESS NOTES
"Subjective:      Patient ID: Trey Stevens is a 63 y.o. male.    Chief Complaint: Annual Exam      HPI  Here for f/u medical problems and preventive exam.  AC breakfast sugars 140- 160.  9pm sugars 160-170s.  No f/c/sw/cough.  Uses albuterol 1-2x per week.  No cp/sob/palp.  BMs and urine normal.  Taking vit D Rx twice a week.    HM: 10/17 fluvax, 2/16 [id number], 4/13 [id number], 2/16 TDaP, Cscope in the past normal and pt refuses more, 4/18 psa, 2/16 HCV neg, 8/16 Eye Dr. Germain, 11/18 needs chest CT.     Review of Systems   Constitutional: Negative for appetite change, chills, diaphoresis, fatigue and fever.   HENT: Negative for congestion, ear pain, rhinorrhea and sinus pressure.    Respiratory: Negative for cough and shortness of breath.    Cardiovascular: Negative for chest pain and palpitations.   Gastrointestinal: Negative for abdominal distention, abdominal pain, blood in stool, constipation, diarrhea, nausea and vomiting.   Genitourinary: Negative for difficulty urinating, dysuria, frequency, hematuria and urgency.   Musculoskeletal: Negative for arthralgias.   Skin: Negative for rash.   Neurological: Negative for dizziness and headaches.   Psychiatric/Behavioral: The patient is not nervous/anxious.          Objective:   /76 (BP Location: Right arm, Patient Position: Sitting)   Pulse 86   Temp 97 °F (36.1 °C) (Tympanic)   Ht 5' 9" (1.753 m)   Wt 122.8 kg (270 lb 11.6 oz)   SpO2 95%   BMI 39.98 kg/m²     Physical Exam   Constitutional: He is oriented to person, place, and time. He appears well-developed and well-nourished.   HENT:   Right Ear: External ear normal. Tympanic membrane is not injected.   Left Ear: External ear normal. Tympanic membrane is not injected.   Mouth/Throat: Oropharynx is clear and moist.   Eyes: Conjunctivae are normal.   Neck: Normal range of motion. Neck supple. No thyromegaly present.   Cardiovascular: Normal rate, regular rhythm and intact distal pulses.  Exam reveals no " gallop and no friction rub.    No murmur heard.  Pulses:       Dorsalis pedis pulses are 2+ on the right side, and 2+ on the left side.        Posterior tibial pulses are 2+ on the right side, and 2+ on the left side.   Pulmonary/Chest: Effort normal and breath sounds normal. He has no wheezes. He has no rales.   Abdominal: Soft. Bowel sounds are normal. He exhibits no mass. There is no tenderness.   Musculoskeletal: He exhibits no edema.   Feet:   Right Foot:   Protective Sensation: 10 sites tested. 0 sites sensed.   Skin Integrity: Positive for callus and dry skin. Negative for ulcer, blister, skin breakdown, erythema or warmth.   Left Foot:   Protective Sensation: 10 sites tested. 0 sites sensed.   Skin Integrity: Positive for callus and dry skin. Negative for ulcer, blister, skin breakdown, erythema or warmth.   Lymphadenopathy:     He has no cervical adenopathy.   Neurological: He is alert and oriented to person, place, and time.   Psychiatric: He has a normal mood and affect.            Results for SYDNEY PETERSON (MRN [id number]) as of 4/8/2018 09:19   Ref. Range 2/19/2018 11:30 2/19/2018 11:52   WBC Latest Ref Range: 3.90 - 12.70 K/uL  5.54   RBC Latest Ref Range: 4.60 - 6.20 M/uL  5.09   Hemoglobin Latest Ref Range: 14.0 - 18.0 g/dL  15.6   Hematocrit Latest Ref Range: 40.0 - 54.0 %  46.4   MCV Latest Ref Range: 82 - 98 fL  91   MCH Latest Ref Range: 27.0 - 31.0 pg  30.6   MCHC Latest Ref Range: 32.0 - 36.0 g/dL  33.6   RDW Latest Ref Range: 11.5 - 14.5 %  16.5 (H)   Platelets Latest Ref Range: 150 - 350 K/uL  224   MPV Latest Ref Range: 9.2 - 12.9 fL  10.7   Gran% Latest Ref Range: 38.0 - 73.0 %  50.0   Gran # (ANC) Latest Ref Range: 1.8 - 7.7 K/uL  2.8   Lymph% Latest Ref Range: 18.0 - 48.0 %  38.3   Lymph # Latest Ref Range: 1.0 - 4.8 K/uL  2.1   Mono% Latest Ref Range: 4.0 - 15.0 %  6.3   Mono # Latest Ref Range: 0.3 - 1.0 K/uL  0.4   Eosinophil% Latest Ref Range: 0.0 - 8.0 %  4.9   Eos # Latest Ref Range:  0.0 - 0.5 K/uL  0.3   Basophil% Latest Ref Range: 0.0 - 1.9 %  0.5   Baso # Latest Ref Range: 0.00 - 0.20 K/uL  0.03   Sed Rate Latest Ref Range: 0 - 10 mm/Hr  5   Sodium Latest Ref Range: 136 - 145 mmol/L  139   Potassium Latest Ref Range: 3.5 - 5.1 mmol/L  4.3   Chloride Latest Ref Range: 95 - 110 mmol/L  104   CO2 Latest Ref Range: 23 - 29 mmol/L  23   Anion Gap Latest Ref Range: 8 - 16 mmol/L  12   BUN, Bld Latest Ref Range: 6 - 20 mg/dL  11   Creatinine Latest Ref Range: 0.5 - 1.4 mg/dL  1.0   eGFR if non African American Latest Ref Range: >60 mL/min/1.73 m^2  >60   eGFR if  Latest Ref Range: >60 mL/min/1.73 m^2  >60   Glucose Latest Ref Range: 70 - 110 mg/dL  104   Calcium Latest Ref Range: 8.7 - 10.5 mg/dL  9.3   Alkaline Phosphatase Latest Ref Range: 55 - 135 U/L  73   Total Protein Latest Ref Range: 6.0 - 8.4 g/dL  7.8   Albumin Latest Ref Range: 3.5 - 5.2 g/dL  3.9   Total Bilirubin Latest Ref Range: 0.1 - 1.0 mg/dL  0.7   AST Latest Ref Range: 10 - 40 U/L  46 (H)   ALT Latest Ref Range: 10 - 44 U/L  74 (H)   CRP Latest Ref Range: 0.0 - 8.2 mg/L  3.1   Triglycerides Latest Ref Range: 30 - 150 mg/dL  167 (H)   Cholesterol Latest Ref Range: 120 - 199 mg/dL  233 (H)   HDL Latest Ref Range: 40 - 75 mg/dL  41   LDL Cholesterol Latest Ref Range: 63.0 - 159.0 mg/dL  158.6   Total Cholesterol/HDL Ratio Latest Ref Range: 2.0 - 5.0   5.7 (H)   Vit D, 25-Hydroxy Latest Ref Range: 30 - 96 ng/mL  29 (L)   Hemoglobin A1C Latest Ref Range: 4.0 - 5.6 %  6.4 (H)   Estimated Avg Glucose Latest Ref Range: 68 - 131 mg/dL  137 (H)   TSH Latest Ref Range: 0.400 - 4.000 uIU/mL  0.939   PSA, SCREEN Latest Ref Range: 0.00 - 4.00 ng/mL  0.27   Microalbum.,U,Random Latest Units: ug/mL 298.0    Microalb Creat Ratio Latest Ref Range: 0.0 - 30.0 ug/mg 125.7 (H)            Assessment:       1. Encounter for preventive health examination    2. Type 2 diabetes mellitus with nephropathy    3. Hypertension associated with  diabetes    4. Mixed type COPD (chronic obstructive pulmonary disease)    5. Severe obesity (BMI 35.0-35.9 with comorbidity)    6. Psoriasis    7. SCHUYLER (obstructive sleep apnea)    8. Long-term use of immunosuppressant medication    9. Vitamin D deficiency    10. Hyperlipidemia associated with type 2 diabetes mellitus    11. Elevated liver enzymes    12. Protuberant abdomen          Plan:     Encounter for preventive health examination- utd.    Type 2 diabetes mellitus with nephropathy- adeq control.    Hypertension associated with diabetes- stable on rx.    Mixed type COPD (chronic obstructive pulmonary disease)- on O2 nocturnal.    Severe obesity (BMI 35.0-35.9 with comorbidity)    Psoriasis/ Long-term use of immunosuppressant medication- per Rheum.    SCHUYLER (obstructive sleep apnea) doing well on cpap.    Vitamin D deficiency- change to daily 2K.    Hyperlipidemia associated with type 2 diabetes mellitus- 10yr vasc risk 28.6%, start statin and ASA, recheck 2mo.  -     pravastatin (PRAVACHOL) 20 MG tablet; Take 1 tablet (20 mg total) by mouth once daily.  Dispense: 30 tablet; Refill: 11  -     aspirin 81 MG Chew; Take 1 tablet (81 mg total) by mouth once daily.  Dispense: 100 tablet; Refill: 0  -     Lipid panel; Future; Expected date: 04/10/2018  -     Comprehensive metabolic panel; Future; Expected date: 04/10/2018  -     Ambulatory referral to Optometry    Elevated liver enzymes- recheck 2mo.  Protuberant abdomen- u/s abd.  -     Comprehensive metabolic panel; Future; Expected date: 04/10/2018

## 2018-04-10 ENCOUNTER — OFFICE VISIT (OUTPATIENT)
Dept: INTERNAL MEDICINE | Facility: CLINIC | Age: 63
End: 2018-04-10
Payer: MEDICAID

## 2018-04-10 VITALS
HEIGHT: 69 IN | HEART RATE: 86 BPM | BODY MASS INDEX: 40.1 KG/M2 | TEMPERATURE: 97 F | WEIGHT: 270.75 LBS | DIASTOLIC BLOOD PRESSURE: 76 MMHG | SYSTOLIC BLOOD PRESSURE: 138 MMHG | OXYGEN SATURATION: 95 %

## 2018-04-10 DIAGNOSIS — R19.8 PROTUBERANT ABDOMEN: ICD-10-CM

## 2018-04-10 DIAGNOSIS — R74.8 ELEVATED LIVER ENZYMES: ICD-10-CM

## 2018-04-10 DIAGNOSIS — E78.5 HYPERLIPIDEMIA ASSOCIATED WITH TYPE 2 DIABETES MELLITUS: ICD-10-CM

## 2018-04-10 DIAGNOSIS — E11.69 HYPERLIPIDEMIA ASSOCIATED WITH TYPE 2 DIABETES MELLITUS: ICD-10-CM

## 2018-04-10 DIAGNOSIS — J44.9 MIXED TYPE COPD (CHRONIC OBSTRUCTIVE PULMONARY DISEASE): ICD-10-CM

## 2018-04-10 DIAGNOSIS — Z00.00 ENCOUNTER FOR PREVENTIVE HEALTH EXAMINATION: Primary | ICD-10-CM

## 2018-04-10 DIAGNOSIS — E55.9 VITAMIN D DEFICIENCY: ICD-10-CM

## 2018-04-10 DIAGNOSIS — E66.01 SEVERE OBESITY (BMI 35.0-35.9 WITH COMORBIDITY): ICD-10-CM

## 2018-04-10 DIAGNOSIS — I15.2 HYPERTENSION ASSOCIATED WITH DIABETES: Chronic | ICD-10-CM

## 2018-04-10 DIAGNOSIS — Z79.60 LONG-TERM USE OF IMMUNOSUPPRESSANT MEDICATION: ICD-10-CM

## 2018-04-10 DIAGNOSIS — G47.33 OSA (OBSTRUCTIVE SLEEP APNEA): ICD-10-CM

## 2018-04-10 DIAGNOSIS — E11.59 HYPERTENSION ASSOCIATED WITH DIABETES: Chronic | ICD-10-CM

## 2018-04-10 DIAGNOSIS — L40.9 PSORIASIS: ICD-10-CM

## 2018-04-10 DIAGNOSIS — E11.21 TYPE 2 DIABETES MELLITUS WITH NEPHROPATHY: Chronic | ICD-10-CM

## 2018-04-10 PROCEDURE — 99213 OFFICE O/P EST LOW 20 MIN: CPT | Mod: PBBFAC,PO | Performed by: INTERNAL MEDICINE

## 2018-04-10 PROCEDURE — 99999 PR PBB SHADOW E&M-EST. PATIENT-LVL III: CPT | Mod: PBBFAC,,, | Performed by: INTERNAL MEDICINE

## 2018-04-10 PROCEDURE — 99396 PREV VISIT EST AGE 40-64: CPT | Mod: S$PBB,,, | Performed by: INTERNAL MEDICINE

## 2018-04-10 RX ORDER — PRAVASTATIN SODIUM 20 MG/1
20 TABLET ORAL DAILY
Qty: 30 TABLET | Refills: 11 | Status: SHIPPED | OUTPATIENT
Start: 2018-04-10 | End: 2018-06-26

## 2018-04-10 RX ORDER — NAPROXEN SODIUM 220 MG/1
81 TABLET, FILM COATED ORAL DAILY
Qty: 100 TABLET | Refills: 0 | Status: SHIPPED | OUTPATIENT
Start: 2018-04-10

## 2018-04-10 RX ORDER — ACETAMINOPHEN 500 MG
1 TABLET ORAL DAILY
Qty: 100 CAPSULE | Refills: 6 | Status: SHIPPED | OUTPATIENT
Start: 2018-04-10 | End: 2019-04-02

## 2018-04-16 DIAGNOSIS — L40.9 PSORIASIS: ICD-10-CM

## 2018-04-16 DIAGNOSIS — L40.52 PSORIATIC ARTHRITIS, DESTRUCTIVE TYPE: ICD-10-CM

## 2018-04-16 DIAGNOSIS — L40.59 POLYARTICULAR PSORIATIC ARTHRITIS: ICD-10-CM

## 2018-04-16 RX ORDER — METHOTREXATE 2.5 MG/1
TABLET ORAL
Qty: 24 TABLET | Refills: 5 | Status: SHIPPED | OUTPATIENT
Start: 2018-04-16 | End: 2018-10-18 | Stop reason: SDUPTHER

## 2018-04-17 ENCOUNTER — TELEPHONE (OUTPATIENT)
Dept: RADIOLOGY | Facility: HOSPITAL | Age: 63
End: 2018-04-17

## 2018-04-18 ENCOUNTER — HOSPITAL ENCOUNTER (OUTPATIENT)
Dept: RADIOLOGY | Facility: HOSPITAL | Age: 63
Discharge: HOME | End: 2018-04-18
Attending: INTERNAL MEDICINE
Payer: MEDICAID

## 2018-04-18 DIAGNOSIS — R74.8 ELEVATED LIVER ENZYMES: ICD-10-CM

## 2018-04-18 DIAGNOSIS — R19.8 PROTUBERANT ABDOMEN: ICD-10-CM

## 2018-04-18 PROCEDURE — 76700 US EXAM ABDOM COMPLETE: CPT | Mod: 26,,, | Performed by: RADIOLOGY

## 2018-04-18 PROCEDURE — 76700 US EXAM ABDOM COMPLETE: CPT | Mod: TC,PO

## 2018-04-23 ENCOUNTER — TELEPHONE (OUTPATIENT)
Dept: PHARMACY | Facility: CLINIC | Age: 63
End: 2018-04-23

## 2018-05-31 ENCOUNTER — TELEPHONE (OUTPATIENT)
Dept: PHARMACY | Facility: CLINIC | Age: 63
End: 2018-05-31

## 2018-05-31 NOTE — TELEPHONE ENCOUNTER
Humira refill. $0 (004). Address confirmed - parents home address (saved as Rx address). Inject every other Wednesday. Shipping out 6/4/18. No missed doses. No new medications. Has noticed improvement in feet. No longer experiencing terrible swelling and pain. Sometimes when he works really hard and over does it he does not some pain, but overall much better.

## 2018-06-10 DIAGNOSIS — I15.2 HYPERTENSION ASSOCIATED WITH DIABETES: ICD-10-CM

## 2018-06-10 DIAGNOSIS — E11.59 HYPERTENSION ASSOCIATED WITH DIABETES: ICD-10-CM

## 2018-06-11 RX ORDER — LOSARTAN POTASSIUM 100 MG/1
100 TABLET ORAL DAILY
Qty: 30 TABLET | Refills: 11 | Status: SHIPPED | OUTPATIENT
Start: 2018-06-11 | End: 2019-05-30 | Stop reason: SDUPTHER

## 2018-06-15 ENCOUNTER — OFFICE VISIT (OUTPATIENT)
Dept: PODIATRY | Facility: CLINIC | Age: 63
End: 2018-06-15
Payer: MEDICAID

## 2018-06-15 VITALS
BODY MASS INDEX: 41.18 KG/M2 | DIASTOLIC BLOOD PRESSURE: 77 MMHG | WEIGHT: 278 LBS | HEIGHT: 69 IN | HEART RATE: 84 BPM | SYSTOLIC BLOOD PRESSURE: 126 MMHG

## 2018-06-15 DIAGNOSIS — L84 CORN OR CALLUS: ICD-10-CM

## 2018-06-15 DIAGNOSIS — R23.4 SKIN FISSURES: ICD-10-CM

## 2018-06-15 DIAGNOSIS — E11.49 TYPE II DIABETES MELLITUS WITH NEUROLOGICAL MANIFESTATIONS: Primary | ICD-10-CM

## 2018-06-15 DIAGNOSIS — B35.1 DERMATOPHYTOSIS OF NAIL: ICD-10-CM

## 2018-06-15 PROCEDURE — 11056 PARNG/CUTG B9 HYPRKR LES 2-4: CPT | Mod: S$PBB,,, | Performed by: PODIATRIST

## 2018-06-15 PROCEDURE — 99499 UNLISTED E&M SERVICE: CPT | Mod: S$PBB,,, | Performed by: PODIATRIST

## 2018-06-15 PROCEDURE — 11721 DEBRIDE NAIL 6 OR MORE: CPT | Mod: Q9,PBBFAC,PO | Performed by: PODIATRIST

## 2018-06-15 PROCEDURE — 99999 PR PBB SHADOW E&M-EST. PATIENT-LVL III: CPT | Mod: PBBFAC,,, | Performed by: PODIATRIST

## 2018-06-15 PROCEDURE — 99213 OFFICE O/P EST LOW 20 MIN: CPT | Mod: PBBFAC,PO | Performed by: PODIATRIST

## 2018-06-15 PROCEDURE — 11056 PARNG/CUTG B9 HYPRKR LES 2-4: CPT | Mod: Q9,PBBFAC,PO | Performed by: PODIATRIST

## 2018-06-15 PROCEDURE — 11721 DEBRIDE NAIL 6 OR MORE: CPT | Mod: 59,S$PBB,, | Performed by: PODIATRIST

## 2018-06-19 ENCOUNTER — OFFICE VISIT (OUTPATIENT)
Dept: RHEUMATOLOGY | Facility: CLINIC | Age: 63
End: 2018-06-19
Payer: MEDICAID

## 2018-06-19 ENCOUNTER — LAB VISIT (OUTPATIENT)
Dept: LAB | Facility: HOSPITAL | Age: 63
End: 2018-06-19
Attending: INTERNAL MEDICINE
Payer: MEDICAID

## 2018-06-19 VITALS
SYSTOLIC BLOOD PRESSURE: 148 MMHG | HEIGHT: 69 IN | DIASTOLIC BLOOD PRESSURE: 78 MMHG | HEART RATE: 90 BPM | BODY MASS INDEX: 40.91 KG/M2 | WEIGHT: 276.25 LBS

## 2018-06-19 DIAGNOSIS — L40.59 POLYARTICULAR PSORIATIC ARTHRITIS: Primary | ICD-10-CM

## 2018-06-19 DIAGNOSIS — J20.9 ACUTE BRONCHITIS, UNSPECIFIED ORGANISM: ICD-10-CM

## 2018-06-19 DIAGNOSIS — D84.9 IMMUNOSUPPRESSED STATUS: ICD-10-CM

## 2018-06-19 DIAGNOSIS — R91.8 MULTIPLE LUNG NODULES ON CT: ICD-10-CM

## 2018-06-19 DIAGNOSIS — Z79.60 LONG-TERM USE OF IMMUNOSUPPRESSANT MEDICATION: ICD-10-CM

## 2018-06-19 DIAGNOSIS — L40.52 PSORIATIC ARTHRITIS, DESTRUCTIVE TYPE: ICD-10-CM

## 2018-06-19 LAB
BASOPHILS # BLD AUTO: 0.02 K/UL
BASOPHILS NFR BLD: 0.2 %
CRP SERPL-MCNC: 49 MG/L
DIFFERENTIAL METHOD: ABNORMAL
EOSINOPHIL # BLD AUTO: 0.1 K/UL
EOSINOPHIL NFR BLD: 1.2 %
ERYTHROCYTE [DISTWIDTH] IN BLOOD BY AUTOMATED COUNT: 15.1 %
ERYTHROCYTE [SEDIMENTATION RATE] IN BLOOD BY WESTERGREN METHOD: 14 MM/HR
HCT VFR BLD AUTO: 44.4 %
HGB BLD-MCNC: 15.1 G/DL
LYMPHOCYTES # BLD AUTO: 2.2 K/UL
LYMPHOCYTES NFR BLD: 19 %
MCH RBC QN AUTO: 32.1 PG
MCHC RBC AUTO-ENTMCNC: 34 G/DL
MCV RBC AUTO: 94 FL
MONOCYTES # BLD AUTO: 1 K/UL
MONOCYTES NFR BLD: 9 %
NEUTROPHILS # BLD AUTO: 8.2 K/UL
NEUTROPHILS NFR BLD: 70.6 %
PLATELET # BLD AUTO: 217 K/UL
PMV BLD AUTO: 11 FL
RBC # BLD AUTO: 4.71 M/UL
WBC # BLD AUTO: 11.54 K/UL

## 2018-06-19 PROCEDURE — 99999 PR PBB SHADOW E&M-EST. PATIENT-LVL IV: CPT | Mod: PBBFAC,,, | Performed by: INTERNAL MEDICINE

## 2018-06-19 PROCEDURE — 85651 RBC SED RATE NONAUTOMATED: CPT | Mod: PO

## 2018-06-19 PROCEDURE — 99214 OFFICE O/P EST MOD 30 MIN: CPT | Mod: PBBFAC,PO | Performed by: INTERNAL MEDICINE

## 2018-06-19 PROCEDURE — 86140 C-REACTIVE PROTEIN: CPT

## 2018-06-19 PROCEDURE — 99214 OFFICE O/P EST MOD 30 MIN: CPT | Mod: S$PBB,,, | Performed by: INTERNAL MEDICINE

## 2018-06-19 PROCEDURE — 85025 COMPLETE CBC W/AUTO DIFF WBC: CPT | Mod: PO

## 2018-06-19 RX ORDER — AZITHROMYCIN 250 MG/1
TABLET, FILM COATED ORAL
Qty: 6 TABLET | Refills: 0 | Status: SHIPPED | OUTPATIENT
Start: 2018-06-19 | End: 2018-06-24

## 2018-06-19 NOTE — PROGRESS NOTES
CC:  Chief Complaint   Patient presents with    Psoriatic Arthritis       History of Present Illness:  Trey Rodriguez a 63 y.o.yo male   Patient Active Problem List   Diagnosis    Psoriatic arthritis, destructive type    Vitamin D deficiency    Psoriasis    Multiple lung nodules on CT    Immunosuppressed status    Long-term use of immunosuppressant medication    Cigarette smoker    Mixed type COPD (chronic obstructive pulmonary disease)    Type 2 diabetes mellitus with nephropathy    Hypertension associated with diabetes    Vitiligo    SCHUYLER (obstructive sleep apnea)    Severe obesity (BMI 35.0-35.9 with comorbidity)     Here for routine follow-up  He was seen by Zulema in our clinic previously  He has chronic psoriatic arthritis and psoriasis.  He is on humira 40 mg Q 2 weeks and  mtx 15mg/week (splitting dose 3 tabs am and 3 tabs pm) last  Year his mtx was cut back from 20 mg weekly  due to chronic lung issues.  He has multiple lung nodules which are followed by pulm and stable per his last pulmonary visit 2017, his breathing is stable. He sees dermatology also for psoriasis as well as vitiligo.  He has some chronic arthritis changes to his hip joints bilaterally and chronic deformities to his feet with lateral deviation of his toes.    Today he c/o persistant rash over elbows , left knee   He is using topicals and this is helping  But looks like he always had rash even while he is on Humira and methotrexate  Feet hurt more at the end of the day. Using otc tylenol and tramadol which helps.    In the past failed mtx monotherapy, failed topicals and UV.     He has also noted a cough productive could not bring out any sputum.  For 1 week now  Denies fever, chills, weight loss.  Looks like still smokes       Past Medical History:   Diagnosis Date    Arthritis     Diabetes mellitus     Diabetes mellitus type 2, uncontrolled 7/19/2016    DM (diabetes mellitus) 2015     09/04/2017    Gall stones      Obesity     Psoriasis (a type of skin inflammation)     Rheumatoid arthritis of foot        Past Surgical History:   Procedure Laterality Date    APPENDECTOMY      CHOLECYSTECTOMY           Social History   Substance Use Topics    Smoking status: Current Some Day Smoker    Smokeless tobacco: Never Used    Alcohol use No       Family History   Problem Relation Age of Onset    Cancer Mother     Hypertension Mother     Diabetes Mother     Cataracts Mother     Stroke Father     Psoriasis Neg Hx        Review of patient's allergies indicates:  No Known Allergies          Review of Systems:  Constitutional: Denies fever, chills. No recent weight changes.   Fatigue: no  Muscle weakness: no  Headaches: no new headaches  Eyes: No redness or dryness.  No recent visual changes.  ENT: Denies dry mouth. No oral or nasal ulcers.  Card: No chest pain.  Resp: + cough   Gastro: No nausea or vomiting.  No heartburn.  Constipation: no  Diarrhea: no  Genito:  No dysuria.  No genital ulcers.  Skin: + rash per HPI   Raynauds:no  Neuro: No numbness / tingling.   Psych: No depression, anxiety  Endo:  no excess thirst.  Heme: no abnormal bleeding or bruising  Clots:none       OBJECTIVE:     Vital Signs   Vitals:    06/19/18 0946   BP: (!) 148/78   Pulse: 90     Physical Exam:  General Appearance:  NAD.   Gait: not favoring.  HEENT: PERRL.  Eyes not dry or injected.  No nasal ulcers.  Mouth not dry, no oral lesions.  Lymph: cervical, supraclavicular or axillary nodes: none abnormal   Cardio: no irregularity of S1 or S2.  No gallops or rubs.   Resp: Normal respiratory motion. Clear to auscultation bilaterally.   No abnormal chest conformation.  Abd: Soft, non-tender, nondistended.  No masses.   Skin: Head and neck,  and extremities examined.    Scaly rash noted over elbows, left knee  Patches of vitiligo noted on upper extremities, over abdomen  Neuro: Ox3.   Cranial nerves II-XII grossly intact.   Sensation intact  in both  distal LE and upper extremities to light touch.  Musculoskeletal Exam:  nestor dips with bony enlargement  nestor Right 5th dip flexion deformity,     B/l ankle : T    good rom to nestor ankles  nestor feet  2-5 toes deviate laterally       Muscle strength:Equal and full in all mm groups of the upper and lower ext.    Laboratory:   Results for orders placed or performed in visit on 06/19/18   CBC auto differential   Result Value Ref Range    WBC 11.54 3.90 - 12.70 K/uL    RBC 4.71 4.60 - 6.20 M/uL    Hemoglobin 15.1 14.0 - 18.0 g/dL    Hematocrit 44.4 40.0 - 54.0 %    MCV 94 82 - 98 fL    MCH 32.1 (H) 27.0 - 31.0 pg    MCHC 34.0 32.0 - 36.0 g/dL    RDW 15.1 (H) 11.5 - 14.5 %    Platelets 217 150 - 350 K/uL    MPV 11.0 9.2 - 12.9 fL    Gran # (ANC) 8.2 (H) 1.8 - 7.7 K/uL    Lymph # 2.2 1.0 - 4.8 K/uL    Mono # 1.0 0.3 - 1.0 K/uL    Eos # 0.1 0.0 - 0.5 K/uL    Baso # 0.02 0.00 - 0.20 K/uL    Gran% 70.6 38.0 - 73.0 %    Lymph% 19.0 18.0 - 48.0 %    Mono% 9.0 4.0 - 15.0 %    Eosinophil% 1.2 0.0 - 8.0 %    Basophil% 0.2 0.0 - 1.9 %    Differential Method Automated    Sedimentation rate, manual   Result Value Ref Range    Sed Rate 14 (H) 0 - 10 mm/Hr     Lab Results   Component Value Date    HEPBCAB Negative 04/04/2016    HEPCAB Negative 02/24/2016   x feet :  Imaging :Findings: No acute fractures or dislocations visualized.  Lateral deviation of multiple bilateral toes again noted with joint space loss and pencil in cup erosive/productive changes involving multiple bilateral MTPs as well as the right 4th DIPs.  Findings remain most severe the 5th MTPs and are suggestive of psoriatic arthritis.  Other IP joints appear ankylosed bilaterally.   No new erosive osseous changes demonstrated.  X hands :  Findings: No acute fractures or dislocations visualized.  Possible flexion deformity at the right 5th DIPs which appears unchanged from prior. Moderate osteoarthritis noted throughout the bilateral DIP joints with some suspected central  erosive changes suggesting probable element of erosive OA.  Degenerative findings at the bilateral 1st CMC's and radiocarpal joints also noted.  Minimal degenerative findings present at multiple bilateral MCPs.  Overall no appreciable change from prior  Notes reviewed  Other procedures:    ASSESSMENT/PLAN:     Polyarticular psoriatic arthritis  -     Hepatitis panel, acute; Future  -     Quantiferon Gold TB; Future; Expected date: 06/19/2018    Acute bronchitis, unspecified organism  -     azithromycin (Z-MUKESH) 250 MG tablet; Take 2 tablets by mouth on day 1; Take 1 tablet by mouth on days 2-5  Dispense: 6 tablet; Refill: 0    Multiple lung nodules on CT      1:PsO/ PsA :  With persistent rash on Humira and methotrexate  ESR/CRP pending, but the could be elevated now in view of his acute bronchitis  Will consider consent takes in future  Check hep panel and TB gold  Continue  topicals     Will return in 4 weeks after resolution of bronchitis and will switch him to cosentyx at that point  Might be able to stop methotrexate later on cosentyx , also in view of lung nodulosis may help   Discussed benefits and side effects and literature given    2:  Acute bronchitis:  Z mukesh   Hold Humira/methotrexate until cough resolves.  Advised to hold Humira/methotrexate always during periods of infection  Quit smoking    3:Vit D deficiency: taking vit D 50,000U/2Xweekly, last level low normal 37 (2/2017)       Risks vs Benefits and potential side effects of medication prescribed today were discussed with patient. Medication literature given to patient up discharge  Went over uptodate information /literature on the meds prescribed today      Biologics-infection, malignancy risk discussed    4 week return with me or Zulema

## 2018-06-21 ENCOUNTER — TELEPHONE (OUTPATIENT)
Dept: PHARMACY | Facility: CLINIC | Age: 63
End: 2018-06-21

## 2018-06-21 PROBLEM — E11.69 HYPERLIPIDEMIA ASSOCIATED WITH TYPE 2 DIABETES MELLITUS: Status: ACTIVE | Noted: 2018-06-21

## 2018-06-21 PROBLEM — E78.5 HYPERLIPIDEMIA ASSOCIATED WITH TYPE 2 DIABETES MELLITUS: Status: ACTIVE | Noted: 2018-06-21

## 2018-06-21 NOTE — PROGRESS NOTES
"Subjective:      Patient ID: Trey Stevens is a 63 y.o. male.    Chief Complaint: Follow-up and Anorexia      HPI  Here for follow up of medical problems.  Tolerating pravastatin.  Zithromax resolved bronchitis.  To see Rheum back to change to cosentyx.  Morning sugars 160.  No f/c/sw.  Rare cough.  Still smoking.  No cp/palp.  BMs normal.  No n/v.    Impression       1. Hepatomegaly with diffuse fatty infiltration of the liver.  2. Status post cholecystectomy.      Electronically signed by: Artemio Harris MD  Date: 04/18/2018  Time: 08:29         Updated/ annual due 4/19:  HM: 10/17 fluvax, 2/16 [id number], 4/13 [id number], 2/16 TDaP, Cscope in the past normal and pt refuses more, 4/18 psa, 2/16 HCV neg, 8/16 Eye Dr. Germain, 11/18 needs chest CT.     Review of Systems   Constitutional: Negative for chills, diaphoresis, fatigue and fever.   Respiratory: Negative for cough, chest tightness and shortness of breath.    Cardiovascular: Negative for chest pain, palpitations and leg swelling.   Gastrointestinal: Negative for blood in stool, constipation, diarrhea, nausea and vomiting.   Genitourinary: Negative for difficulty urinating, dysuria, frequency and hematuria.   Musculoskeletal: Negative for arthralgias.   Psychiatric/Behavioral: The patient is not nervous/anxious.          Objective:   /82 (BP Location: Right arm, Patient Position: Sitting)   Pulse 96   Temp 97.4 °F (36.3 °C) (Tympanic)   Ht 5' 9" (1.753 m)   Wt 123.6 kg (272 lb 7.8 oz)   SpO2 97%   BMI 40.24 kg/m²     Physical Exam   Constitutional: He is oriented to person, place, and time. He appears well-developed and well-nourished.   HENT:   Mouth/Throat: Oropharynx is clear and moist.   Neck: Normal range of motion. Neck supple.   Cardiovascular: Normal rate, regular rhythm and intact distal pulses.  Exam reveals no gallop and no friction rub.    No murmur heard.  Pulmonary/Chest: Effort normal and breath sounds normal. He has no wheezes. He has " no rales.   Abdominal: Soft. Bowel sounds are normal. He exhibits no mass. There is no tenderness.   Musculoskeletal: He exhibits no edema.   Lymphadenopathy:     He has no cervical adenopathy.   Neurological: He is alert and oriented to person, place, and time.   Psychiatric: He has a normal mood and affect.         Results for SYDNEY PETERSON (MRN [id number]) as of 6/21/2018 07:49   Ref. Range 2/19/2018 11:52 4/18/2018 08:09 6/19/2018 07:56   WBC Latest Ref Range: 3.90 - 12.70 K/uL 5.54  11.54   RBC Latest Ref Range: 4.60 - 6.20 M/uL 5.09  4.71   Hemoglobin Latest Ref Range: 14.0 - 18.0 g/dL 15.6  15.1   Hematocrit Latest Ref Range: 40.0 - 54.0 % 46.4  44.4   MCV Latest Ref Range: 82 - 98 fL 91  94   MCH Latest Ref Range: 27.0 - 31.0 pg 30.6  32.1 (H)   MCHC Latest Ref Range: 32.0 - 36.0 g/dL 33.6  34.0   RDW Latest Ref Range: 11.5 - 14.5 % 16.5 (H)  15.1 (H)   Platelets Latest Ref Range: 150 - 350 K/uL 224  217   MPV Latest Ref Range: 9.2 - 12.9 fL 10.7  11.0   Gran% Latest Ref Range: 38.0 - 73.0 % 50.0  70.6   Gran # (ANC) Latest Ref Range: 1.8 - 7.7 K/uL 2.8  8.2 (H)   Lymph% Latest Ref Range: 18.0 - 48.0 % 38.3  19.0   Lymph # Latest Ref Range: 1.0 - 4.8 K/uL 2.1  2.2   Mono% Latest Ref Range: 4.0 - 15.0 % 6.3  9.0   Mono # Latest Ref Range: 0.3 - 1.0 K/uL 0.4  1.0   Eosinophil% Latest Ref Range: 0.0 - 8.0 % 4.9  1.2   Eos # Latest Ref Range: 0.0 - 0.5 K/uL 0.3  0.1   Basophil% Latest Ref Range: 0.0 - 1.9 % 0.5  0.2   Baso # Latest Ref Range: 0.00 - 0.20 K/uL 0.03  0.02   Sed Rate Latest Ref Range: 0 - 10 mm/Hr 5  14 (H)   Sodium Latest Ref Range: 136 - 145 mmol/L 139  137   Potassium Latest Ref Range: 3.5 - 5.1 mmol/L 4.3  4.2   Chloride Latest Ref Range: 95 - 110 mmol/L 104  100   CO2 Latest Ref Range: 23 - 29 mmol/L 23  25   Anion Gap Latest Ref Range: 8 - 16 mmol/L 12  12   BUN, Bld Latest Ref Range: 8 - 23 mg/dL 11  10   Creatinine Latest Ref Range: 0.5 - 1.4 mg/dL 1.0  1.2   eGFR if non African American  Latest Ref Range: >60 mL/min/1.73 m^2 >60  >60.0   eGFR if African American Latest Ref Range: >60 mL/min/1.73 m^2 >60  >60.0   Glucose Latest Ref Range: 70 - 110 mg/dL 104  147 (H)   Calcium Latest Ref Range: 8.7 - 10.5 mg/dL 9.3  9.3   Alkaline Phosphatase Latest Ref Range: 55 - 135 U/L 73  83   Total Protein Latest Ref Range: 6.0 - 8.4 g/dL 7.8  8.0   Albumin Latest Ref Range: 3.5 - 5.2 g/dL 3.9  3.7   Total Bilirubin Latest Ref Range: 0.1 - 1.0 mg/dL 0.7  1.0   AST Latest Ref Range: 10 - 40 U/L 46 (H)  50 (H)   ALT Latest Ref Range: 10 - 44 U/L 74 (H)  100 (H)   CRP Latest Ref Range: 0.0 - 8.2 mg/L 3.1  49.0 (H)   Triglycerides Latest Ref Range: 30 - 150 mg/dL 167 (H)  127   Cholesterol Latest Ref Range: 120 - 199 mg/dL 233 (H)  220 (H)   HDL Latest Ref Range: 40 - 75 mg/dL 41  51   LDL Cholesterol Latest Ref Range: 63.0 - 159.0 mg/dL 158.6  143.6   Total Cholesterol/HDL Ratio Latest Ref Range: 2.0 - 5.0  5.7 (H)  4.3     Assessment:       1. Hypertension associated with diabetes    2. Type 2 diabetes mellitus with nephropathy    3. Hyperlipidemia associated with type 2 diabetes mellitus    4. Elevated liver enzymes    5. Vitamin D deficiency    6. Cigarette smoker    7. Mixed type COPD (chronic obstructive pulmonary disease)          Plan:     Hypertension associated with diabetes- adeq control.  Monitor BPs at [facility] for next visit.    Type 2 diabetes mellitus with nephropathy- adeq control.    Hyperlipidemia associated with type 2 diabetes mellitus- STOP PRAVA due to elevated liver enzymes.  10yr vasc risk very high, 38%.    Elevated liver enzymes, higher since starting pravastatin.  Stop prava and recheck 3mo.  -     Comprehensive metabolic panel; Future; Expected date: 06/26/2018    Vitamin D deficiency    Cigarette smoker/ COPD- cont albuterol prn.

## 2018-06-24 NOTE — PROGRESS NOTES
Subjective:     Patient ID: Trey Stevens is a 63 y.o. male.    Chief Complaint: Routine Foot Care (PCP: ELLIS Kaba 4/10/2018 , diabetic nail care/feet check)    Trey is a 63 y.o. male who presents to the clinic for evaluation and treatment of high risk feet. Trey has a past medical history of Arthritis; Diabetes mellitus; Diabetes mellitus type 2, uncontrolled (7/19/2016); DM (diabetes mellitus) (2015); Gall stones; Obesity; Psoriasis (a type of skin inflammation); and Rheumatoid arthritis of foot. The patient's chief complaint is thick calluses and  nails. This patient has documented high risk feet requiring routine maintenance secondary to diabetes mellitis and those secondary complications of diabetes, as mentioned. Patient states his blood sugar this morning was 87mg/dl.     PCP: Brittanie Kaba MD    Date Last Seen by PCP: 4/10/18    Current shoe gear:  Affected Foot: Extra depth shoes     Unaffected Foot: Extra depth shoes    Hemoglobin A1C   Date Value Ref Range Status   02/19/2018 6.4 (H) 4.0 - 5.6 % Final     Comment:     According to ADA guidelines, hemoglobin A1c <7.0% represents  optimal control in non-pregnant diabetic patients. Different  metrics may apply to specific patient populations.   Standards of Medical Care in Diabetes-2016.  For the purpose of screening for the presence of diabetes:  <5.7%     Consistent with the absence of diabetes  5.7-6.4%  Consistent with increasing risk for diabetes   (prediabetes)  >or=6.5%  Consistent with diabetes  Currently, no consensus exists for use of hemoglobin A1c  for diagnosis of diabetes for children.  This Hemoglobin A1c assay has significant interference with fetal   hemoglobin   (HbF). The results are invalid for patients with abnormal amounts of   HbF,   including those with known Hereditary Persistence   of Fetal Hemoglobin. Heterozygous hemoglobin variants (HbAS, HbAC,   HbAD, HbAE, HbA2) do not significantly interfere with this assay;   however,  presence of multiple variants in a sample may impact the %   interference.     08/23/2017 6.3 (H) 4.0 - 5.6 % Final     Comment:     According to ADA guidelines, hemoglobin A1c <7.0% represents  optimal control in non-pregnant diabetic patients. Different  metrics may apply to specific patient populations.   Standards of Medical Care in Diabetes-2016.  For the purpose of screening for the presence of diabetes:  <5.7%     Consistent with the absence of diabetes  5.7-6.4%  Consistent with increasing risk for diabetes   (prediabetes)  >or=6.5%  Consistent with diabetes  Currently, no consensus exists for use of hemoglobin A1c  for diagnosis of diabetes for children.  This Hemoglobin A1c assay has significant interference with fetal   hemoglobin   (HbF). The results are invalid for patients with abnormal amounts of   HbF,   including those with known Hereditary Persistence   of Fetal Hemoglobin. Heterozygous hemoglobin variants (HbAS, HbAC,   HbAD, HbAE, HbA2) do not significantly interfere with this assay;   however, presence of multiple variants in a sample may impact the %   interference.     02/20/2017 6.7 (H) 4.5 - 6.2 % Final     Comment:     According to ADA guidelines, hemoglobin A1C <7.0% represents  optimal control in non-pregnant diabetic patients.  Different  metrics may apply to specific populations.   Standards of Medical Care in Diabetes - 2016.  For the purpose of screening for the presence of diabetes:  <5.7%     Consistent with the absence of diabetes  5.7-6.4%  Consistent with increasing risk for diabetes   (prediabetes)  >or=6.5%  Consistent with diabetes  Currently no consensus exists for use of hemoglobin A1C  for diagnosis of diabetes for children.             Patient Active Problem List   Diagnosis    Psoriatic arthritis, destructive type    Vitamin D deficiency    Psoriasis    Multiple lung nodules on CT    Immunosuppressed status    Long-term use of immunosuppressant medication     Cigarette smoker    Mixed type COPD (chronic obstructive pulmonary disease)    Type 2 diabetes mellitus with nephropathy    Hypertension associated with diabetes    Vitiligo    SCHUYLER (obstructive sleep apnea)    Severe obesity (BMI 35.0-35.9 with comorbidity)    Hyperlipidemia associated with type 2 diabetes mellitus       Medication List with Changes/Refills   New Medications    AZITHROMYCIN (Z-MUKESH) 250 MG TABLET    Take 2 tablets by mouth on day 1; Take 1 tablet by mouth on days 2-5   Current Medications    AMMONIUM LACTATE 12 % CREA    Apply 1 Act topically 2 (two) times daily.    ASPIRIN 81 MG CHEW    Take 1 tablet (81 mg total) by mouth once daily.    BENZONATATE (TESSALON) 100 MG CAPSULE    Take 1 capsule (100 mg total) by mouth 3 (three) times daily as needed for Cough. Swallow whole    CALCIPOTRIENE (DOVONOX) 0.005 % OINTMENT    Apply on psoriasis on body twice daily    CHOLECALCIFEROL, VITAMIN D3, 2,000 UNIT CAP    Take 1 capsule (2,000 Units total) by mouth once daily.    CLOBETASOL (OLUX) 0.05 % FOAM    AAA of scalp and behind ears twice daily.  Do not use on face, underarms or groin.    FOLIC ACID (FOLVITE) 1 MG TABLET    Take 1 tablet (1 mg total) by mouth once daily.    HUMIRA PEN PNKT INJECTION    Inject 0.8 mLs (40 mg total) into the skin every 14 (fourteen) days.    KETOCONAZOLE (NIZORAL) 2 % SHAMPOO    Wash hair with medicated shampoo at least 2x/week - let sit on scalp at least 5 minutes prior to rinsing    LOSARTAN (COZAAR) 100 MG TABLET    TAKE 1 TABLET (100 MG TOTAL) BY MOUTH ONCE DAILY.    METHOTREXATE 2.5 MG TAB    TAKE 6 TABLETS (15 MG TOTAL) BY MOUTH EVERY 7 DAYS. (3 IN THE MORNING AND 3 IN THE EVENING)    ONETOUCH DELICA LANCETS 33 GAUGE MISC        ONETOUCH ULTRA TEST STRP        ONETOUCH ULTRAMINI KIT        PRAVASTATIN (PRAVACHOL) 20 MG TABLET    Take 1 tablet (20 mg total) by mouth once daily.    PROAIR HFA 90 MCG/ACTUATION INHALER    INHALE 2 PUFFS EVERY 4 HOURS AS NEEDED :FOR  "WHEEZING    TRAMADOL (ULTRAM) 50 MG TABLET    TAKE 1 TABLET BY MOUTH EVERY 6 HOURS AS NEEDED FOR PAIN    TRIAMCINOLONE ACETONIDE 0.1% (KENALOG) 0.1 % CREAM    AAA bid for psoriasis on body.  Do not use on face, underarms or groin.   Discontinued Medications    VITAMIN D2 50,000 UNIT CAPSULE    TAKE 1 CAPSULE (50,000 UNITS TOTAL) BY MOUTH TWICE A WEEK.       Review of patient's allergies indicates:  No Known Allergies    Past Surgical History:   Procedure Laterality Date    APPENDECTOMY      CHOLECYSTECTOMY         Family History   Problem Relation Age of Onset    Cancer Mother     Hypertension Mother     Diabetes Mother     Cataracts Mother     Stroke Father     Psoriasis Neg Hx        Social History     Social History    Marital status: Single     Spouse name: N/A    Number of children: N/A    Years of education: N/A     Occupational History    Not on file.     Social History Main Topics    Smoking status: Current Some Day Smoker    Smokeless tobacco: Never Used    Alcohol use No    Drug use: No    Sexual activity: No     Other Topics Concern    Not on file     Social History Narrative    No narrative on file       Vitals:    06/15/18 1346   BP: 126/77   Pulse: 84   Weight: 126.1 kg (278 lb)   Height: 5' 9" (1.753 m)   PainSc: 0-No pain       Hemoglobin A1C   Date Value Ref Range Status   02/19/2018 6.4 (H) 4.0 - 5.6 % Final     Comment:     According to ADA guidelines, hemoglobin A1c <7.0% represents  optimal control in non-pregnant diabetic patients. Different  metrics may apply to specific patient populations.   Standards of Medical Care in Diabetes-2016.  For the purpose of screening for the presence of diabetes:  <5.7%     Consistent with the absence of diabetes  5.7-6.4%  Consistent with increasing risk for diabetes   (prediabetes)  >or=6.5%  Consistent with diabetes  Currently, no consensus exists for use of hemoglobin A1c  for diagnosis of diabetes for children.  This Hemoglobin A1c assay " has significant interference with fetal   hemoglobin   (HbF). The results are invalid for patients with abnormal amounts of   HbF,   including those with known Hereditary Persistence   of Fetal Hemoglobin. Heterozygous hemoglobin variants (HbAS, HbAC,   HbAD, HbAE, HbA2) do not significantly interfere with this assay;   however, presence of multiple variants in a sample may impact the %   interference.     08/23/2017 6.3 (H) 4.0 - 5.6 % Final     Comment:     According to ADA guidelines, hemoglobin A1c <7.0% represents  optimal control in non-pregnant diabetic patients. Different  metrics may apply to specific patient populations.   Standards of Medical Care in Diabetes-2016.  For the purpose of screening for the presence of diabetes:  <5.7%     Consistent with the absence of diabetes  5.7-6.4%  Consistent with increasing risk for diabetes   (prediabetes)  >or=6.5%  Consistent with diabetes  Currently, no consensus exists for use of hemoglobin A1c  for diagnosis of diabetes for children.  This Hemoglobin A1c assay has significant interference with fetal   hemoglobin   (HbF). The results are invalid for patients with abnormal amounts of   HbF,   including those with known Hereditary Persistence   of Fetal Hemoglobin. Heterozygous hemoglobin variants (HbAS, HbAC,   HbAD, HbAE, HbA2) do not significantly interfere with this assay;   however, presence of multiple variants in a sample may impact the %   interference.     02/20/2017 6.7 (H) 4.5 - 6.2 % Final     Comment:     According to ADA guidelines, hemoglobin A1C <7.0% represents  optimal control in non-pregnant diabetic patients.  Different  metrics may apply to specific populations.   Standards of Medical Care in Diabetes - 2016.  For the purpose of screening for the presence of diabetes:  <5.7%     Consistent with the absence of diabetes  5.7-6.4%  Consistent with increasing risk for diabetes   (prediabetes)  >or=6.5%  Consistent with diabetes  Currently no  consensus exists for use of hemoglobin A1C  for diagnosis of diabetes for children.         Review of Systems   Constitutional: Negative for chills and fever.   Respiratory: Negative for shortness of breath.    Cardiovascular: Positive for leg swelling. Negative for chest pain, palpitations, orthopnea and claudication.   Gastrointestinal: Negative for diarrhea, nausea and vomiting.   Musculoskeletal: Negative for joint pain.   Skin: Negative for rash.   Neurological: Positive for tingling and sensory change. Negative for dizziness, focal weakness and weakness.   Psychiatric/Behavioral: Negative.          Objective:      PHYSICAL EXAM: Apperance: Alert and orient in no distress,well developed, and with good attention to grooming and body habits  Patient presents ambulating in extra depth shoes.   LOWER EXTREMITY EXAM:  VASCULAR: Dorsalis pedis pulses 0/4 bilateral and Posterior Tibial pulses 1/4 bilateral. Capillary fill time <4 seconds bilateral. Mild edema observed bilateral. Varicosities present bilateral. Skin temperature of the lower extremities is warm to warm, proximal to distal. Hair growth absent bilateral.  DERMATOLOGICAL: No skin rashes, subcutaneous nodules, lesions, or ulcers observed bilateral. Nails 1,2,3,4,5 bilateral elongated, thickened, and discolored with subungual debris. Webspaces 1,2,3,4 clean, dry and without evidence of break in skin integrity bilateral. Moderate dry and hyperkeratotic tissue noted to bilateral heels and medial 1st MPJ. Skin fissures noted to bilateral heels. No erythema, drainage, or increased temp noted to area.   NEUROLOGICAL: Light touch, sharp-dull, proprioception all present and equal bilaterally.  Vibratory sensation absent at bilateral hallux. Protective sensation absent at 6/10 sites as tested with a Seville-Rusty 5.07 monofilament.   MUSCULOSKELETAL: Muscle strength is 5/5 for foot inverters, everters, plantarflexors, and dorsiflexors. Muscle tone is normal.          Assessment:       Encounter Diagnoses   Name Primary?    Type II diabetes mellitus with neurological manifestations Yes    Dermatophytosis of nail     Skin fissures     Corn or callus          Plan:   Type II diabetes mellitus with neurological manifestations    Dermatophytosis of nail    Skin fissures    Corn or callus      I counseled the patient on his conditions, their implications and medical management.  Greater than 15 minutes of a 20 minute appointment spent on education about the diabetic foot, neuropathy, and prevention of limb loss.  Shoe inspection. Diabetic Foot Education. Patient reminded of the importance of good nutrition and blood sugar control to help prevent podiatric complications of diabetes. Patient instructed on proper foot hygeine. We discussed wearing proper shoe gear, daily foot inspections, never walking without protective shoe gear, never putting sharp instruments to feet.    With patient's permission, nails 1-5 bilateral were debrided/trimmed in length and thickness aggressively to their soft tissue attachment mechanically and with electric , removing all offending nail and debris. Patient relates relief following the procedure.  With patient's permission bilateral skin fissure and callus were trimmed in thickness with #15 blade without incident.   Patient  will continue to monitor the areas daily, inspect feet, wear protective shoe gear when ambulatory, moisturizer to maintain skin integrity. Patient reminded of the importance of good nutrition and blood sugar control to help prevent podiatric complications of diabetes.  Patient to return in this office in approximately 3-4 months, or sooner if needed.                     Joy Hadley DPM  [facility]

## 2018-06-26 ENCOUNTER — OFFICE VISIT (OUTPATIENT)
Dept: INTERNAL MEDICINE | Facility: CLINIC | Age: 63
End: 2018-06-26
Payer: MEDICAID

## 2018-06-26 VITALS
TEMPERATURE: 97 F | SYSTOLIC BLOOD PRESSURE: 106 MMHG | HEIGHT: 69 IN | HEART RATE: 96 BPM | BODY MASS INDEX: 40.36 KG/M2 | DIASTOLIC BLOOD PRESSURE: 82 MMHG | OXYGEN SATURATION: 97 % | WEIGHT: 272.5 LBS

## 2018-06-26 DIAGNOSIS — R74.8 ELEVATED LIVER ENZYMES: ICD-10-CM

## 2018-06-26 DIAGNOSIS — E11.21 TYPE 2 DIABETES MELLITUS WITH NEPHROPATHY: Chronic | ICD-10-CM

## 2018-06-26 DIAGNOSIS — F17.210 CIGARETTE SMOKER: Chronic | ICD-10-CM

## 2018-06-26 DIAGNOSIS — E11.59 HYPERTENSION ASSOCIATED WITH DIABETES: Primary | Chronic | ICD-10-CM

## 2018-06-26 DIAGNOSIS — E11.69 HYPERLIPIDEMIA ASSOCIATED WITH TYPE 2 DIABETES MELLITUS: ICD-10-CM

## 2018-06-26 DIAGNOSIS — I15.2 HYPERTENSION ASSOCIATED WITH DIABETES: Primary | Chronic | ICD-10-CM

## 2018-06-26 DIAGNOSIS — E55.9 VITAMIN D DEFICIENCY: ICD-10-CM

## 2018-06-26 DIAGNOSIS — J44.9 MIXED TYPE COPD (CHRONIC OBSTRUCTIVE PULMONARY DISEASE): ICD-10-CM

## 2018-06-26 DIAGNOSIS — E78.5 HYPERLIPIDEMIA ASSOCIATED WITH TYPE 2 DIABETES MELLITUS: ICD-10-CM

## 2018-06-26 PROCEDURE — 99214 OFFICE O/P EST MOD 30 MIN: CPT | Mod: S$PBB,,, | Performed by: INTERNAL MEDICINE

## 2018-06-26 PROCEDURE — 99213 OFFICE O/P EST LOW 20 MIN: CPT | Mod: PBBFAC,PO | Performed by: INTERNAL MEDICINE

## 2018-06-26 PROCEDURE — 99999 PR PBB SHADOW E&M-EST. PATIENT-LVL III: CPT | Mod: PBBFAC,,, | Performed by: INTERNAL MEDICINE

## 2018-06-27 NOTE — TELEPHONE ENCOUNTER
Patient called to refill Humira.  Patient confirmed he has no doses left.  Due for injection on 7/4/18.  Ship 6/28/18 for 6/29/18 delivery.  Copay $0 in 004.  Patient confirmed no new meds, allergies, or health conditions.  Verified address.  Declined [facility] .

## 2018-07-10 ENCOUNTER — OFFICE VISIT (OUTPATIENT)
Dept: URGENT CARE | Facility: CLINIC | Age: 63
End: 2018-07-10
Payer: MEDICAID

## 2018-07-10 ENCOUNTER — HOSPITAL ENCOUNTER (OUTPATIENT)
Dept: RADIOLOGY | Facility: HOSPITAL | Age: 63
Discharge: HOME | End: 2018-07-10
Attending: NURSE PRACTITIONER
Payer: MEDICAID

## 2018-07-10 VITALS
RESPIRATION RATE: 20 BRPM | DIASTOLIC BLOOD PRESSURE: 78 MMHG | OXYGEN SATURATION: 97 % | BODY MASS INDEX: 41.37 KG/M2 | HEIGHT: 68 IN | HEART RATE: 94 BPM | WEIGHT: 273 LBS | TEMPERATURE: 99 F | SYSTOLIC BLOOD PRESSURE: 130 MMHG

## 2018-07-10 DIAGNOSIS — J18.9 PNEUMONIA OF RIGHT LOWER LOBE DUE TO INFECTIOUS ORGANISM: Primary | ICD-10-CM

## 2018-07-10 DIAGNOSIS — Z79.60 LONG-TERM USE OF IMMUNOSUPPRESSANT MEDICATION: ICD-10-CM

## 2018-07-10 DIAGNOSIS — R05.9 COUGH: ICD-10-CM

## 2018-07-10 DIAGNOSIS — J44.1 COPD WITH ACUTE EXACERBATION: ICD-10-CM

## 2018-07-10 PROCEDURE — 71046 X-RAY EXAM CHEST 2 VIEWS: CPT | Mod: TC,FY,PO

## 2018-07-10 PROCEDURE — 99215 OFFICE O/P EST HI 40 MIN: CPT | Mod: PBBFAC,PO,25 | Performed by: NURSE PRACTITIONER

## 2018-07-10 PROCEDURE — 99214 OFFICE O/P EST MOD 30 MIN: CPT | Mod: S$PBB,,, | Performed by: NURSE PRACTITIONER

## 2018-07-10 PROCEDURE — 99999 PR PBB SHADOW E&M-EST. PATIENT-LVL V: CPT | Mod: PBBFAC,,, | Performed by: NURSE PRACTITIONER

## 2018-07-10 PROCEDURE — 71046 X-RAY EXAM CHEST 2 VIEWS: CPT | Mod: 26,,, | Performed by: RADIOLOGY

## 2018-07-10 RX ORDER — DOXYCYCLINE 100 MG/1
100 CAPSULE ORAL 2 TIMES DAILY
Qty: 20 CAPSULE | Refills: 0 | Status: SHIPPED | OUTPATIENT
Start: 2018-07-10 | End: 2018-07-10 | Stop reason: ALTCHOICE

## 2018-07-10 RX ORDER — LEVOFLOXACIN 750 MG/1
750 TABLET ORAL DAILY
Qty: 5 TABLET | Refills: 0 | Status: SHIPPED | OUTPATIENT
Start: 2018-07-10 | End: 2018-07-15

## 2018-07-10 RX ORDER — BENZONATATE 200 MG/1
200 CAPSULE ORAL 3 TIMES DAILY PRN
Qty: 30 CAPSULE | Refills: 0 | Status: SHIPPED | OUTPATIENT
Start: 2018-07-10 | End: 2018-07-30 | Stop reason: SDUPTHER

## 2018-07-10 NOTE — PATIENT INSTRUCTIONS
· Rest and increase fluids.   · May apply warm compresses as needed.   · Saline nasal spray or saline irrigation (Neti pot) to loosen nasal congestion.  · Flonase or Nasacort to reduce inflammation in the sinus cavities.  · Take antibiotics exactly as prescribed. Make sure to complete the entire course of antibiotics even if you start feeling better. This will prevent recurrence of your infection and bacterial resistance.   · Do not drive, drink alcohol, or take any other sedating medications or substances while taking cough syrup.   · Follow up with your primary care provider or with ENT if not improved within a few days or sooner for any new or worsening symptoms.   · Go to the ER for any fever that does not improve with Tylenol/Ibuprofen, neck stiffness, rash, severe headache, vision changes, shortness of breath, chest pain, severe facial pain or swelling, or for any other new and concerning symptoms.     COPD Flare    You have had a flare-up of your COPD.  COPD, or chronic obstructive pulmonary disease, is a common lung disease. It causes your airways to become irritated and narrower. This makes it harder for you to breathe. Emphysema and chronic bronchitis are both types of COPD. This is a chronic condition, which means you always have it. Sometimes it gets worse. When this happens, it is called a flare-up.  Symptoms of COPD  People with COPD may have symptoms most of the time. In a flare-up, your symptoms get worse. These symptoms may mean you are having a flare-up:  · Shortness of breath, shallow or rapid breathing, or wheezing that gets worse  · Lung infection  · Cough that gets worse  · More mucus, thicker mucus or mucus of a different color  · Tiredness, decreased energy, or trouble doing your usual activities  · Fever  · Chest tightness  · Your symptoms dont get better even when you use your usual medicines, inhalers, and nebulizer  · Trouble talking  · You feel confused  Causes of  flare-ups  Unfortunately, a flare-up can happen even though you did everything right, and you followed your doctors instructions. Some causes of flare-ups are:  · Smoking or secondhand smoke  · Colds, the flu, or respiratory infections  · Air pollution  · Sudden change in the weather  · Dust, irritating chemicals, or strong fumes  · Not taking your medicines as prescribed  Home care  Here are some things you can do at home to treat a flare-up:  · Try not to panic. This makes it harder to breathe, and keeps you from doing the right things.  · Dont smoke or be around others who are smoking.  · Try to drink more fluids than usual during a flare-up, unless your doctor has told you not to because of heart and kidney problems. More fluids can help loosen the mucus.  · Use your inhalers and nebulizer, if you have one, as you have been told to.  · If you were given antibiotics, take them until they are used up or your doctor tells you to stop. Its important to finish the antibiotics, even though you feel better. This will make sure the infection has cleared.  · If you were given prednisone or another steroid, finish it even if you feel better.  Preventing a flare-up  Even though flare-ups happen, the best way to treat one is to prevent it before it starts. Here are some pointers:  · Dont smoke or be around others who are smoking.  · Take your medicines as you have been told.  · Talk with your doctor about getting a flu shot every year. Also find out if you need a pneumonia shot.  · If there is a weather advisory warning to stay indoors, try to stay inside when possible.  · Try to eat healthy and get plenty of sleep.  · Try to avoid things that usually set you off, like dust, chemical fumes, hairsprays, or strong perfumes.  Follow-up care  Follow up with your healthcare provider, or as advised.  If a culture was done, you will be told if your treatment needs to be changed. You can call as directed for the results.  If  X-rays were done, you will be notified of any new findings that may affect your care.  Call 911  Call 911 if any of these occur:  · You have trouble breathing  · You feel confused or its difficult to wake you up  · You faint or lose consciousness  · You have a rapid heart rate  · You have new pain in your chest, arm, shoulder, neck or upper back  When to seek medical advice  Call your healthcare provider right away if any of these occur:  · Wheezing or shortness of breath gets worse  · You need to use your inhalers more often than usual without relief  · Fever of 100.4°F (38ºC) or higher, or as directed by your healthcare provider  · Coughing up lots of dark-colored or bloody mucus (sputum)  · Chest pain with each breath  · You do not start to get better within 24 hours  · Swelling of your ankles gets worse  · Dizziness or weakness  Date Last Reviewed: 9/1/2016  © 8350-8523 [employer]. [address] [place] [address], [place] [address]. All rights reserved. This information is not intended as a substitute for professional medical care. Always follow your healthcare professional's instructions.

## 2018-07-10 NOTE — PROGRESS NOTES
"Subjective:      Patient ID: Trey Stevens is a 63 y.o. male.    Chief Complaint: Cough (congestion x 4 days )    Cough   This is a new problem. The current episode started in the past 7 days. The problem has been gradually worsening. The problem occurs every few minutes. The cough is productive of sputum. Associated symptoms include nasal congestion, shortness of breath and wheezing. Pertinent negatives include no fever. Nothing aggravates the symptoms. He has tried a beta-agonist inhaler for the symptoms. The treatment provided no relief. His past medical history is significant for COPD.     Review of Systems   Constitutional: Positive for fatigue. Negative for fever.   HENT: Positive for congestion.    Respiratory: Positive for cough, shortness of breath and wheezing.    Cardiovascular: Negative.    Gastrointestinal: Negative.    Genitourinary: Negative.    Musculoskeletal: Negative.    Skin: Negative.    Allergic/Immunologic: Positive for immunocompromised state (on Humira for psoriatic arthritis).   Hematological: Negative.        Objective:   /78   Pulse 94   Temp 98.6 °F (37 °C) (Tympanic)   Resp 20   Ht 5' 8" (1.727 m)   Wt 123.8 kg (273 lb)   SpO2 97%   BMI 41.51 kg/m²   Physical Exam   Constitutional: He is oriented to person, place, and time. He appears well-developed and well-nourished. No distress.   HENT:   Head: Normocephalic and atraumatic.   Right Ear: Tympanic membrane normal.   Left Ear: Tympanic membrane normal.   Nose: Nose normal.   Mouth/Throat: Oropharynx is clear and moist.   Eyes: Conjunctivae are normal.   Neck: Normal range of motion. Neck supple.   Cardiovascular: Normal rate, regular rhythm and normal heart sounds.    Pulmonary/Chest: Effort normal. No respiratory distress. He has wheezes (diffuse expiratory wheezing).   Musculoskeletal: Normal range of motion.   Neurological: He is alert and oriented to person, place, and time.   Skin: Skin is warm and dry. No rash noted. " He is not diaphoretic.   Nursing note and vitals reviewed.    Assessment:      1. Pneumonia of right lower lobe due to infectious organism    2. COPD with acute exacerbation    3. Long-term use of immunosuppressant medication       Plan:   Pneumonia of right lower lobe due to infectious organism  -     X-Ray Chest PA And Lateral; Future; Expected date: 07/10/2018  -     levoFLOXacin (LEVAQUIN) 750 MG tablet; Take 1 tablet (750 mg total) by mouth once daily. for 5 days  Dispense: 5 tablet; Refill: 0    COPD with acute exacerbation  -     Discontinue: doxycycline (MONODOX) 100 MG capsule; Take 1 capsule (100 mg total) by mouth 2 (two) times daily. for 10 days  Dispense: 20 capsule; Refill: 0  -     benzonatate (TESSALON) 200 MG capsule; Take 1 capsule (200 mg total) by mouth 3 (three) times daily as needed for Cough.  Dispense: 30 capsule; Refill: 0    Long-term use of immunosuppressant medication    X-ray shows patchy infiltrate in the right lower lobe suggestive of pneumonia. Switched antibiotic to Levaquin and notified Mr. Setvens via telephone of results and change in plan of care. Recheck with primary care in 48 hrs. ER for any worsening.

## 2018-07-11 ENCOUNTER — TELEPHONE (OUTPATIENT)
Dept: INTERNAL MEDICINE | Facility: CLINIC | Age: 63
End: 2018-07-11

## 2018-07-11 NOTE — TELEPHONE ENCOUNTER
----- Message from Jackie Radford LPN sent at 7/10/2018  4:31 PM CDT -----  Pt was seen today in urgent care. He was diagnosed with Pneumonia. Ramona Leggett NP would like pt to follow up in 2-3 days with Primary Care. Due to Pts insurance we are unable to schedule at this time. Please get Pt scheduled for follow up, or call if you have any questions, Thanks!

## 2018-07-11 NOTE — TELEPHONE ENCOUNTER
----- Message from Kenisha Palmer sent at 7/11/2018 10:39 AM CDT -----  Contact: Pt  Please give pt a call at ..[phone] (home) he was returning the nurse call.

## 2018-07-16 ENCOUNTER — OFFICE VISIT (OUTPATIENT)
Dept: FAMILY MEDICINE | Facility: CLINIC | Age: 63
End: 2018-07-16
Payer: MEDICAID

## 2018-07-16 ENCOUNTER — HOSPITAL ENCOUNTER (OUTPATIENT)
Dept: RADIOLOGY | Facility: HOSPITAL | Age: 63
Discharge: HOME | End: 2018-07-16
Attending: FAMILY MEDICINE
Payer: MEDICAID

## 2018-07-16 VITALS
BODY MASS INDEX: 44.56 KG/M2 | OXYGEN SATURATION: 97 % | SYSTOLIC BLOOD PRESSURE: 125 MMHG | TEMPERATURE: 98 F | HEIGHT: 66 IN | HEART RATE: 99 BPM | DIASTOLIC BLOOD PRESSURE: 85 MMHG | WEIGHT: 277.25 LBS

## 2018-07-16 DIAGNOSIS — J18.9 PNEUMONIA OF RIGHT LOWER LOBE DUE TO INFECTIOUS ORGANISM: ICD-10-CM

## 2018-07-16 DIAGNOSIS — J44.9 MIXED TYPE COPD (CHRONIC OBSTRUCTIVE PULMONARY DISEASE): Primary | ICD-10-CM

## 2018-07-16 PROCEDURE — 71046 X-RAY EXAM CHEST 2 VIEWS: CPT | Mod: TC,FY,PO

## 2018-07-16 PROCEDURE — 99213 OFFICE O/P EST LOW 20 MIN: CPT | Mod: S$PBB,,, | Performed by: FAMILY MEDICINE

## 2018-07-16 PROCEDURE — 99213 OFFICE O/P EST LOW 20 MIN: CPT | Mod: PBBFAC,PO | Performed by: FAMILY MEDICINE

## 2018-07-16 PROCEDURE — 99999 PR PBB SHADOW E&M-EST. PATIENT-LVL III: CPT | Mod: PBBFAC,,, | Performed by: FAMILY MEDICINE

## 2018-07-16 PROCEDURE — 71046 X-RAY EXAM CHEST 2 VIEWS: CPT | Mod: 26,,, | Performed by: RADIOLOGY

## 2018-07-16 RX ORDER — PRAVASTATIN SODIUM 20 MG/1
20 TABLET ORAL DAILY
Refills: 11 | COMMUNITY
Start: 2018-06-29 | End: 2019-01-08 | Stop reason: DRUGHIGH

## 2018-07-16 NOTE — PROGRESS NOTES
Chief Complaint:    Chief Complaint   Patient presents with    Follow-up       History of Present Illness:    Patient of Dr. Chaparro he is here for follow-up.  He has been diagnosed with right lower lobe pneumonia be taking Levaquin.  He says 2 days after starting the medication he felt better his symptoms of tiredness improved.  He still has mild cough but he is taking a cough medication.  He has COPD he says he is supposed to be using portable oxygen but he does not have enough supply to make an last all day.  He denies any fever.    ROS:  Review of Systems   Constitutional: Negative for appetite change, chills and fever.   HENT: Negative for congestion, ear discharge, ear pain, facial swelling, mouth sores, postnasal drip, rhinorrhea, sinus pressure, sneezing, sore throat, trouble swallowing and voice change.    Eyes: Negative for discharge, redness and itching.   Respiratory: Positive for cough and shortness of breath (chronic). Negative for chest tightness and wheezing.    Cardiovascular: Negative for chest pain.       Past Medical History:   Diagnosis Date    Arthritis     Diabetes mellitus     Diabetes mellitus type 2, uncontrolled 7/19/2016    DM (diabetes mellitus) 2015     09/04/2017    Gall stones     Obesity     Psoriasis (a type of skin inflammation)     Rheumatoid arthritis of foot        Social History:  Social History     Social History    Marital status: Single     Spouse name: N/A    Number of children: N/A    Years of education: N/A     Social History Main Topics    Smoking status: Current Some Day Smoker    Smokeless tobacco: Never Used    Alcohol use No    Drug use: No    Sexual activity: No     Other Topics Concern    None     Social History Narrative    None       Family History:   family history includes Cancer in his mother; Cataracts in his mother; Diabetes in his mother; Hypertension in his mother; Stroke in his father.    Health Maintenance   Topic Date Due     "Zoster Vaccine  03/23/2017    Influenza Vaccine  08/01/2018    Hemoglobin A1c  08/19/2018    Eye Exam  09/05/2018    Foot Exam  04/10/2019    Lipid Panel  06/19/2019    Pneumococcal PPSV23 (Medium Risk) (2) 03/23/2022    TETANUS VACCINE  02/24/2026    Colonoscopy  10/24/2026    Hepatitis C Screening  Completed       Physical Exam:    Vital Signs  Temp: 98.1 °F (36.7 °C)  Temp src: Tympanic  Pulse: 99  SpO2: 97 %  BP: 125/85  BP Location: Left arm  Patient Position: Sitting  Pain Score:   4  Pain Loc: Ankle  Height and Weight  Height: 5' 6" (167.6 cm)  Weight: 125.8 kg (277 lb 3.7 oz)  BSA (Calculated - sq m): 2.42 sq meters  BMI (Calculated): 44.8  Weight in (lb) to have BMI = 25: 154.6]    Body mass index is 44.75 kg/m².    Physical Exam   Constitutional: He appears well-developed and well-nourished.   HENT:   Head: Normocephalic and atraumatic.   Right Ear: Tympanic membrane is not bulging.   Left Ear: Tympanic membrane is not bulging.   Nose: No rhinorrhea. Right sinus exhibits no maxillary sinus tenderness and no frontal sinus tenderness. Left sinus exhibits no maxillary sinus tenderness and no frontal sinus tenderness.   Mouth/Throat: Mucous membranes are normal. No posterior oropharyngeal erythema or tonsillar abscesses.   Eyes: Conjunctivae are normal. Pupils are equal, round, and reactive to light.   Neck: Normal range of motion.   Cardiovascular: Normal rate and normal heart sounds.    Pulmonary/Chest: Effort normal and breath sounds normal. No stridor. No respiratory distress. He has no wheezes. He has no rales. He exhibits no tenderness.   Abdominal: Soft. There is no tenderness.   Lymphadenopathy:     He has no cervical adenopathy.       Results for orders placed or performed in visit on 06/19/18   Quantiferon Gold TB   Result Value Ref Range    NIL 0.034 See text IU/mL    TB Antigen 0.115 See text IU/mL    TB Antigen - Nil 0.081 See Text IU/mL    Mitogen - Nil >10.000 See text IU/mL    TB Gold " Negative          Lab Results   Component Value Date    HGBA1C 6.4 (H) 02/19/2018       Assessment:      ICD-10-CM ICD-9-CM   1. Mixed type COPD (chronic obstructive pulmonary disease) J44.9 496   2. Pneumonia of right lower lobe due to infectious organism J18.1 486         Plan:    Patient has seen symptomatic improvement in his pneumonia following antibiotic administration.  Will do a chest x-ray to ensure radiologic improvement.  He has some baseline dyspnea on exertion possibly secondary to his underlying COPD.  He follows with pulmonology and his PCP and he has been instructed to keep those appointments.        Orders Placed This Encounter   Procedures    X-Ray Chest PA And Lateral       Current Outpatient Prescriptions   Medication Sig Dispense Refill    ammonium lactate 12 % Crea Apply 1 Act topically 2 (two) times daily. 1 Tube 3    aspirin 81 MG Chew Take 1 tablet (81 mg total) by mouth once daily. 100 tablet 0    benzonatate (TESSALON) 200 MG capsule Take 1 capsule (200 mg total) by mouth 3 (three) times daily as needed for Cough. 30 capsule 0    calcipotriene (DOVONOX) 0.005 % ointment Apply on psoriasis on body twice daily 120 g 3    cholecalciferol, vitamin D3, 2,000 unit Cap Take 1 capsule (2,000 Units total) by mouth once daily. 100 capsule 6    clobetasol (OLUX) 0.05 % Foam AAA of scalp and behind ears twice daily.  Do not use on face, underarms or groin. 100 g 3    folic acid (FOLVITE) 1 MG tablet Take 1 tablet (1 mg total) by mouth once daily. 90 tablet 3    HUMIRA PEN PnKt injection Inject 0.8 mLs (40 mg total) into the skin every 14 (fourteen) days. 1.6 mL 5    ketoconazole (NIZORAL) 2 % shampoo Wash hair with medicated shampoo at least 2x/week - let sit on scalp at least 5 minutes prior to rinsing 120 mL 5    losartan (COZAAR) 100 MG tablet TAKE 1 TABLET (100 MG TOTAL) BY MOUTH ONCE DAILY. 30 tablet 11    methotrexate 2.5 MG Tab TAKE 6 TABLETS (15 MG TOTAL) BY MOUTH EVERY 7 DAYS. (3  IN THE MORNING AND 3 IN THE EVENING) 24 tablet 5    ONETOUCH DELICA LANCETS 33 gauge Misc       ONETOUCH ULTRA TEST Strp       ONETOUCH ULTRAMINI kit       PROAIR HFA 90 mcg/actuation inhaler INHALE 2 PUFFS EVERY 4 HOURS AS NEEDED :FOR WHEEZING 8.5 Inhaler 11    traMADol (ULTRAM) 50 mg tablet TAKE 1 TABLET BY MOUTH EVERY 6 HOURS AS NEEDED FOR PAIN 60 tablet 2    triamcinolone acetonide 0.1% (KENALOG) 0.1 % cream AAA bid for psoriasis on body.  Do not use on face, underarms or groin. 454 g 3    pravastatin (PRAVACHOL) 20 MG tablet Take 20 mg by mouth once daily.  11     No current facility-administered medications for this visit.        There are no discontinued medications.    No Follow-up on file.      Tariq Hammer MD

## 2018-07-17 ENCOUNTER — OFFICE VISIT (OUTPATIENT)
Dept: RHEUMATOLOGY | Facility: CLINIC | Age: 63
End: 2018-07-17
Payer: MEDICAID

## 2018-07-17 VITALS
DIASTOLIC BLOOD PRESSURE: 72 MMHG | BODY MASS INDEX: 44.75 KG/M2 | HEART RATE: 99 BPM | SYSTOLIC BLOOD PRESSURE: 112 MMHG | HEIGHT: 66 IN | WEIGHT: 278.44 LBS

## 2018-07-17 DIAGNOSIS — D84.9 IMMUNOSUPPRESSED STATUS: ICD-10-CM

## 2018-07-17 DIAGNOSIS — Z79.60 LONG-TERM USE OF IMMUNOSUPPRESSANT MEDICATION: ICD-10-CM

## 2018-07-17 DIAGNOSIS — R74.8 ELEVATED LIVER ENZYMES: ICD-10-CM

## 2018-07-17 DIAGNOSIS — L40.52 PSORIATIC ARTHRITIS, DESTRUCTIVE TYPE: Primary | ICD-10-CM

## 2018-07-17 DIAGNOSIS — L40.9 PSORIASIS: ICD-10-CM

## 2018-07-17 PROCEDURE — 99214 OFFICE O/P EST MOD 30 MIN: CPT | Mod: S$PBB,,, | Performed by: PHYSICIAN ASSISTANT

## 2018-07-17 PROCEDURE — 99213 OFFICE O/P EST LOW 20 MIN: CPT | Mod: PBBFAC,PO | Performed by: PHYSICIAN ASSISTANT

## 2018-07-17 PROCEDURE — 99999 PR PBB SHADOW E&M-EST. PATIENT-LVL III: CPT | Mod: PBBFAC,,, | Performed by: PHYSICIAN ASSISTANT

## 2018-07-17 NOTE — PROGRESS NOTES
Subjective:       Patient ID: Trey Stevens is a 63 y.o. male.    Chief Complaint: Psoriatic Arthritis    Trey is here for routine follow up.     He has chronic psoriatic arthritis and psoriasis.  He is on humira 40 mg Q 2 weeks and  mtx 15mg/week (splitting dose 3 tabs am and 3 tabs pm) last  Year his mtx was cut back from 20 mg weekly  due to chronic lung issues.  He has multiple lung nodules which are followed by pulm and stable per his last pulmonary visit 2017, his breathing is stable. He sees dermatology also for psoriasis as well as vitiligo.  He has some chronic arthritis changes to his dip joints bilaterally and chronic deformities to his feet with lateral deviation of his toes.  Pain today 3/10. Feet and ankles. Feet hurt more at the end of the day. Using otc tylenol and tramadol which helps. His skin is doing better. Mild rash nestor elbows only. The rest of his skin is clear. Vitiligo better as well. Tolerating his meds well.     In the past failed mtx monotherapy, failed topicals and UV.     Dx pnx given abx. Will finish his abx in 2 days, his humira and mtx not due til later this week. He forgot to skip his mtx but humira was not due last week.       Review of Systems   Constitutional: Negative for chills, fatigue, fever and unexpected weight change.        Poor hygiene      HENT: Negative for congestion, mouth sores and rhinorrhea.    Eyes: Negative for pain, discharge and redness.   Respiratory: Positive for shortness of breath. Negative for cough and wheezing.    Cardiovascular: Positive for leg swelling. Negative for chest pain.   Gastrointestinal: Negative for abdominal pain, diarrhea, nausea and vomiting.   Endocrine: Negative for cold intolerance and heat intolerance.   Genitourinary: Negative for dysuria.   Musculoskeletal: Negative for arthralgias, joint swelling and myalgias.   Skin: Positive for color change and rash.   Allergic/Immunologic: Negative for immunocompromised state.   Neurological:  "Negative for weakness and headaches.        Diabetic neuropathy   Psychiatric/Behavioral: The patient is not nervous/anxious.          Objective:   /72   Pulse 99   Ht 5' 6" (1.676 m)   Wt 126.3 kg (278 lb 7.1 oz)   BMI 44.94 kg/m²      Physical Exam   Constitutional: He is oriented to person, place, and time and well-developed, well-nourished, and in no distress.   HENT:   Head: Normocephalic and atraumatic.   Eyes: Pupils are equal, round, and reactive to light. Right eye exhibits no discharge.   Neck: Normal range of motion.   Cardiovascular: Normal rate, regular rhythm and normal heart sounds.  Exam reveals no friction rub.    Pulmonary/Chest: Effort normal and breath sounds normal. No respiratory distress.   Abdominal: Soft. He exhibits no distension. There is no tenderness.   Lymphadenopathy:     He has no cervical adenopathy.   Neurological: He is alert and oriented to person, place, and time.   Skin: Rash noted. No erythema.          Psoriasis rash bilateral elbows and small patch left hip    Vitiligo bilateral forearms       Psychiatric: Mood normal.   Musculoskeletal: Normal range of motion. He exhibits edema and deformity.   Bilateral wrists, mcps no synovitis good rom   nestor dips with chronic damage, bony enlargement     nestor Right 5th dip flexion deformity,   Left thumb no flexion to the IP joint, right thumb ip joint motion decreased     good rom to nestor ankles, no swelling  nestor feet very dirty, 2-5 toes deviate laterally.  Mild lower ext edema           No results found for this or any previous visit (from the past 168 hour(s)).    Component      Latest Ref Rng & Units 2/20/2017   Vit D, 25-Hydroxy      30 - 96 ng/mL 37       10/30/17 nestor hand and foot x-ray- damage in both hand and feet consistent with PsA   4/2016 XR hands reviewed: psoriatic arthritis changes noted to dip joints bilaterally, nestor thumb IP joints        Assessment:       1. Psoriatic arthritis, destructive type    2. Psoriasis "    3. Immunosuppressed status    4. Long-term use of immunosuppressant medication    5. Elevated liver enzymes        1. Psoriatic arthritis with psoriasis: stable on humira and mtx 15mg week split dose-   Mild  skin psoriasis BSA ~3%,  stable joint exam    2. Medication monitoring: no toxicity from humira or mtx, monitor lfts     3. Immunocompromised: utd, except zoster, possible verve study?-    4. Vit D deficiency: taking vit D 50,000U/2Xweekly, last level low normal 37 (2/2017)    5. Chronic lung nodules, copy    6. tob use- smoking       Plan:       Continue his humira 40mg SC every other week, cont mtx 15mg/week with daily folic acid, both refilled as well   Always remember to Hold mtx and humira for signs of infection or for surgery   Pt verbalized understanding    Hold his mtx and humira until later this week early  Next week, will complete abx on Friday      topical back bid prn for his  rash    Let see how he does may consider cosentyx if rash persists, has never gotten complete skin clearance, also need to make sure he is not missing any doses of his meds    Cont vit D 50,000 Units/twice weekly, check d level Q year     Return in 3 mos  With labs cbc, cmp, esr and crp      Repeat  xrays hands and feet in 1 year    Counseled pt on smoking cessation    Please call or send portal message with any questions or concerns

## 2018-07-19 ENCOUNTER — TELEPHONE (OUTPATIENT)
Dept: PHARMACY | Facility: CLINIC | Age: 63
End: 2018-07-19

## 2018-07-20 ENCOUNTER — HOSPITAL ENCOUNTER (OUTPATIENT)
Facility: HOSPITAL | Age: 63
Discharge: HOME | End: 2018-07-21
Attending: EMERGENCY MEDICINE | Admitting: INTERNAL MEDICINE
Payer: MEDICAID

## 2018-07-20 ENCOUNTER — TELEPHONE (OUTPATIENT)
Dept: INTERNAL MEDICINE | Facility: CLINIC | Age: 63
End: 2018-07-20

## 2018-07-20 DIAGNOSIS — J40 BRONCHITIS: ICD-10-CM

## 2018-07-20 DIAGNOSIS — J18.9 PNEUMONIA OF BOTH LUNGS DUE TO INFECTIOUS ORGANISM, UNSPECIFIED PART OF LUNG: ICD-10-CM

## 2018-07-20 DIAGNOSIS — J18.9 BILATERAL PNEUMONIA: ICD-10-CM

## 2018-07-20 DIAGNOSIS — J44.1 COPD EXACERBATION: Primary | ICD-10-CM

## 2018-07-20 DIAGNOSIS — R06.02 SOB (SHORTNESS OF BREATH): ICD-10-CM

## 2018-07-20 LAB
ALBUMIN SERPL BCP-MCNC: 3.7 G/DL
ALLENS TEST: ABNORMAL
ALP SERPL-CCNC: 86 U/L
ALT SERPL W/O P-5'-P-CCNC: 181 U/L
ANION GAP SERPL CALC-SCNC: 13 MMOL/L
APTT BLDCRRT: 28.5 SEC
AST SERPL-CCNC: 209 U/L
BASOPHILS # BLD AUTO: 0.02 K/UL
BASOPHILS NFR BLD: 0.3 %
BILIRUB SERPL-MCNC: 0.4 MG/DL
BNP SERPL-MCNC: <10 PG/ML
BUN SERPL-MCNC: 15 MG/DL
CALCIUM SERPL-MCNC: 9.9 MG/DL
CHLORIDE SERPL-SCNC: 102 MMOL/L
CO2 SERPL-SCNC: 21 MMOL/L
CREAT SERPL-MCNC: 1.1 MG/DL
DELSYS: ABNORMAL
DIFFERENTIAL METHOD: ABNORMAL
EOSINOPHIL # BLD AUTO: 0.2 K/UL
EOSINOPHIL NFR BLD: 2.9 %
ERYTHROCYTE [DISTWIDTH] IN BLOOD BY AUTOMATED COUNT: 15 %
EST. GFR  (AFRICAN AMERICAN): >60 ML/MIN/1.73 M^2
EST. GFR  (NON AFRICAN AMERICAN): >60 ML/MIN/1.73 M^2
FIO2: 21
GLUCOSE SERPL-MCNC: 176 MG/DL
HCO3 UR-SCNC: 25.5 MMOL/L (ref 24–28)
HCT VFR BLD AUTO: 43.5 %
HGB BLD-MCNC: 14.8 G/DL
INR PPP: 1
LACTATE SERPL-SCNC: 2.2 MMOL/L
LYMPHOCYTES # BLD AUTO: 1.6 K/UL
LYMPHOCYTES NFR BLD: 23.5 %
MCH RBC QN AUTO: 31.5 PG
MCHC RBC AUTO-ENTMCNC: 34 G/DL
MCV RBC AUTO: 93 FL
MODE: ABNORMAL
MONOCYTES # BLD AUTO: 0.7 K/UL
MONOCYTES NFR BLD: 10.4 %
NEUTROPHILS # BLD AUTO: 4.3 K/UL
NEUTROPHILS NFR BLD: 62.9 %
PCO2 BLDA: 40.3 MMHG (ref 35–45)
PH SMN: 7.41 [PH] (ref 7.35–7.45)
PLATELET # BLD AUTO: 203 K/UL
PMV BLD AUTO: 10.7 FL
PO2 BLDA: 67 MMHG (ref 80–100)
POC BE: 1 MMOL/L
POC SATURATED O2: 93 % (ref 95–100)
POCT GLUCOSE: 243 MG/DL (ref 70–110)
POTASSIUM SERPL-SCNC: 4.2 MMOL/L
PROT SERPL-MCNC: 8.7 G/DL
PROTHROMBIN TIME: 10.5 SEC
RBC # BLD AUTO: 4.7 M/UL
SAMPLE: ABNORMAL
SITE: ABNORMAL
SODIUM SERPL-SCNC: 136 MMOL/L
TROPONIN I SERPL DL<=0.01 NG/ML-MCNC: 0.01 NG/ML
WBC # BLD AUTO: 6.8 K/UL

## 2018-07-20 PROCEDURE — 83605 ASSAY OF LACTIC ACID: CPT

## 2018-07-20 PROCEDURE — 27000221 HC OXYGEN, UP TO 24 HOURS

## 2018-07-20 PROCEDURE — 84484 ASSAY OF TROPONIN QUANT: CPT

## 2018-07-20 PROCEDURE — 85025 COMPLETE CBC W/AUTO DIFF WBC: CPT

## 2018-07-20 PROCEDURE — 25000242 PHARM REV CODE 250 ALT 637 W/ HCPCS: Performed by: NURSE PRACTITIONER

## 2018-07-20 PROCEDURE — 85730 THROMBOPLASTIN TIME PARTIAL: CPT

## 2018-07-20 PROCEDURE — 82800 BLOOD PH: CPT

## 2018-07-20 PROCEDURE — 82962 GLUCOSE BLOOD TEST: CPT

## 2018-07-20 PROCEDURE — 85610 PROTHROMBIN TIME: CPT

## 2018-07-20 PROCEDURE — 80053 COMPREHEN METABOLIC PANEL: CPT

## 2018-07-20 PROCEDURE — 96376 TX/PRO/DX INJ SAME DRUG ADON: CPT

## 2018-07-20 PROCEDURE — 83036 HEMOGLOBIN GLYCOSYLATED A1C: CPT

## 2018-07-20 PROCEDURE — 94799 UNLISTED PULMONARY SVC/PX: CPT

## 2018-07-20 PROCEDURE — G0378 HOSPITAL OBSERVATION PER HR: HCPCS

## 2018-07-20 PROCEDURE — 83880 ASSAY OF NATRIURETIC PEPTIDE: CPT

## 2018-07-20 PROCEDURE — 99900035 HC TECH TIME PER 15 MIN (STAT)

## 2018-07-20 PROCEDURE — 25000242 PHARM REV CODE 250 ALT 637 W/ HCPCS: Performed by: EMERGENCY MEDICINE

## 2018-07-20 PROCEDURE — 93010 ELECTROCARDIOGRAM REPORT: CPT | Mod: ,,, | Performed by: INTERNAL MEDICINE

## 2018-07-20 PROCEDURE — 96372 THER/PROPH/DIAG INJ SC/IM: CPT | Mod: 59

## 2018-07-20 PROCEDURE — 96372 THER/PROPH/DIAG INJ SC/IM: CPT

## 2018-07-20 PROCEDURE — 96375 TX/PRO/DX INJ NEW DRUG ADDON: CPT

## 2018-07-20 PROCEDURE — 36600 WITHDRAWAL OF ARTERIAL BLOOD: CPT

## 2018-07-20 PROCEDURE — 63600175 PHARM REV CODE 636 W HCPCS: Performed by: NURSE PRACTITIONER

## 2018-07-20 PROCEDURE — 99285 EMERGENCY DEPT VISIT HI MDM: CPT | Mod: 25

## 2018-07-20 PROCEDURE — 96374 THER/PROPH/DIAG INJ IV PUSH: CPT

## 2018-07-20 PROCEDURE — 82803 BLOOD GASES ANY COMBINATION: CPT

## 2018-07-20 PROCEDURE — 94640 AIRWAY INHALATION TREATMENT: CPT

## 2018-07-20 RX ORDER — GLUCAGON 1 MG
1 KIT INJECTION
Status: DISCONTINUED | OUTPATIENT
Start: 2018-07-20 | End: 2018-07-21 | Stop reason: HOSPADM

## 2018-07-20 RX ORDER — CHOLECALCIFEROL (VITAMIN D3) 25 MCG
2000 TABLET ORAL DAILY
Status: DISCONTINUED | OUTPATIENT
Start: 2018-07-21 | End: 2018-07-21 | Stop reason: HOSPADM

## 2018-07-20 RX ORDER — ENOXAPARIN SODIUM 100 MG/ML
40 INJECTION SUBCUTANEOUS EVERY 24 HOURS
Status: DISCONTINUED | OUTPATIENT
Start: 2018-07-20 | End: 2018-07-21 | Stop reason: HOSPADM

## 2018-07-20 RX ORDER — NAPROXEN SODIUM 220 MG/1
81 TABLET, FILM COATED ORAL DAILY
Status: DISCONTINUED | OUTPATIENT
Start: 2018-07-21 | End: 2018-07-21 | Stop reason: HOSPADM

## 2018-07-20 RX ORDER — IPRATROPIUM BROMIDE AND ALBUTEROL SULFATE 2.5; .5 MG/3ML; MG/3ML
3 SOLUTION RESPIRATORY (INHALATION) EVERY 4 HOURS
Status: DISCONTINUED | OUTPATIENT
Start: 2018-07-20 | End: 2018-07-21 | Stop reason: HOSPADM

## 2018-07-20 RX ORDER — TRAMADOL HYDROCHLORIDE 50 MG/1
50 TABLET ORAL EVERY 6 HOURS PRN
Status: DISCONTINUED | OUTPATIENT
Start: 2018-07-20 | End: 2018-07-21 | Stop reason: HOSPADM

## 2018-07-20 RX ORDER — MOXIFLOXACIN HYDROCHLORIDE 400 MG/1
400 TABLET ORAL DAILY
Status: DISCONTINUED | OUTPATIENT
Start: 2018-07-21 | End: 2018-07-21 | Stop reason: HOSPADM

## 2018-07-20 RX ORDER — IBUPROFEN 200 MG
16 TABLET ORAL
Status: DISCONTINUED | OUTPATIENT
Start: 2018-07-20 | End: 2018-07-21 | Stop reason: HOSPADM

## 2018-07-20 RX ORDER — METHYLPREDNISOLONE SOD SUCC 125 MG
80 VIAL (EA) INJECTION EVERY 8 HOURS
Status: DISCONTINUED | OUTPATIENT
Start: 2018-07-20 | End: 2018-07-21

## 2018-07-20 RX ORDER — ONDANSETRON 2 MG/ML
4 INJECTION INTRAMUSCULAR; INTRAVENOUS EVERY 8 HOURS PRN
Status: DISCONTINUED | OUTPATIENT
Start: 2018-07-20 | End: 2018-07-21 | Stop reason: HOSPADM

## 2018-07-20 RX ORDER — PANTOPRAZOLE SODIUM 40 MG/1
40 TABLET, DELAYED RELEASE ORAL DAILY
Status: DISCONTINUED | OUTPATIENT
Start: 2018-07-21 | End: 2018-07-21 | Stop reason: HOSPADM

## 2018-07-20 RX ORDER — IPRATROPIUM BROMIDE AND ALBUTEROL SULFATE 2.5; .5 MG/3ML; MG/3ML
3 SOLUTION RESPIRATORY (INHALATION) EVERY 4 HOURS PRN
Status: DISCONTINUED | OUTPATIENT
Start: 2018-07-20 | End: 2018-07-20

## 2018-07-20 RX ORDER — BENZONATATE 100 MG/1
200 CAPSULE ORAL 3 TIMES DAILY PRN
Status: DISCONTINUED | OUTPATIENT
Start: 2018-07-20 | End: 2018-07-21 | Stop reason: HOSPADM

## 2018-07-20 RX ORDER — LOSARTAN POTASSIUM 50 MG/1
100 TABLET ORAL DAILY
Status: DISCONTINUED | OUTPATIENT
Start: 2018-07-21 | End: 2018-07-21 | Stop reason: HOSPADM

## 2018-07-20 RX ORDER — ALBUTEROL SULFATE 90 UG/1
2 AEROSOL, METERED RESPIRATORY (INHALATION) EVERY 6 HOURS PRN
Status: DISCONTINUED | OUTPATIENT
Start: 2018-07-20 | End: 2018-07-20

## 2018-07-20 RX ORDER — INSULIN ASPART 100 [IU]/ML
1-10 INJECTION, SOLUTION INTRAVENOUS; SUBCUTANEOUS
Status: DISCONTINUED | OUTPATIENT
Start: 2018-07-20 | End: 2018-07-21 | Stop reason: HOSPADM

## 2018-07-20 RX ORDER — METHYLPREDNISOLONE SOD SUCC 125 MG
125 VIAL (EA) INJECTION ONCE
Status: COMPLETED | OUTPATIENT
Start: 2018-07-20 | End: 2018-07-20

## 2018-07-20 RX ORDER — IBUPROFEN 200 MG
24 TABLET ORAL
Status: DISCONTINUED | OUTPATIENT
Start: 2018-07-20 | End: 2018-07-21 | Stop reason: HOSPADM

## 2018-07-20 RX ORDER — IPRATROPIUM BROMIDE AND ALBUTEROL SULFATE 2.5; .5 MG/3ML; MG/3ML
3 SOLUTION RESPIRATORY (INHALATION)
Status: COMPLETED | OUTPATIENT
Start: 2018-07-20 | End: 2018-07-20

## 2018-07-20 RX ORDER — FUROSEMIDE 10 MG/ML
40 INJECTION INTRAMUSCULAR; INTRAVENOUS ONCE
Status: COMPLETED | OUTPATIENT
Start: 2018-07-20 | End: 2018-07-20

## 2018-07-20 RX ORDER — FOLIC ACID 1 MG/1
1 TABLET ORAL DAILY
Status: DISCONTINUED | OUTPATIENT
Start: 2018-07-21 | End: 2018-07-21 | Stop reason: HOSPADM

## 2018-07-20 RX ORDER — PRAVASTATIN SODIUM 20 MG/1
20 TABLET ORAL DAILY
Status: DISCONTINUED | OUTPATIENT
Start: 2018-07-21 | End: 2018-07-21 | Stop reason: HOSPADM

## 2018-07-20 RX ADMIN — IPRATROPIUM BROMIDE AND ALBUTEROL SULFATE 3 ML: .5; 3 SOLUTION RESPIRATORY (INHALATION) at 11:07

## 2018-07-20 RX ADMIN — METHYLPREDNISOLONE SODIUM SUCCINATE 80 MG: 125 INJECTION, POWDER, FOR SOLUTION INTRAMUSCULAR; INTRAVENOUS at 10:07

## 2018-07-20 RX ADMIN — IPRATROPIUM BROMIDE AND ALBUTEROL SULFATE 3 ML: .5; 3 SOLUTION RESPIRATORY (INHALATION) at 07:07

## 2018-07-20 RX ADMIN — IPRATROPIUM BROMIDE AND ALBUTEROL SULFATE 3 ML: .5; 3 SOLUTION RESPIRATORY (INHALATION) at 02:07

## 2018-07-20 RX ADMIN — INSULIN ASPART 2 UNITS: 100 INJECTION, SOLUTION INTRAVENOUS; SUBCUTANEOUS at 09:07

## 2018-07-20 RX ADMIN — METHYLPREDNISOLONE SODIUM SUCCINATE 125 MG: 125 INJECTION, POWDER, FOR SOLUTION INTRAMUSCULAR; INTRAVENOUS at 07:07

## 2018-07-20 RX ADMIN — ENOXAPARIN SODIUM 40 MG: 100 INJECTION SUBCUTANEOUS at 09:07

## 2018-07-20 RX ADMIN — FUROSEMIDE 40 MG: 10 INJECTION, SOLUTION INTRAMUSCULAR; INTRAVENOUS at 07:07

## 2018-07-20 NOTE — HPI
Trey Stevens is a 63 y.o. male patient with history of DM,COPD, and sleep apnea who presents to the Emergency Department for increasing SOB which onset gradually about 3 days ago. Pt was dx with pneumonia on 7/16/18 and started on doxycycline.  Associated sxs include productive cough and congestion. Patient denies any fever, chills, sore throat, CP, chest tightness. Pt reports using CPAP at home. ED evaluation revealed hyperglycemia, , , ABG: PO2 67%. CT chest shows no acute infiltrate.  Nonspecific bronchial wall thickening with increasing bibasilar atelectasis most pronounced involving the right lower lobe. Patient admitted to observation for COPD exacerbation with hypoxia under the care of hospital medicine.

## 2018-07-20 NOTE — H&P
[facility] Hospital Medicine  History & Physical    Patient Name: Trey Stevens  MRN: [id number]  Admission Date: 7/20/2018  Attending Physician: Cherie Laurent MD   Primary Care Provider: Brittanie Kaba MD         Patient information was obtained from patient and ER records.     Subjective:     Principal Problem:COPD exacerbation    Chief Complaint:   Chief Complaint   Patient presents with    Shortness of Breath     recently diagnosed with pneumonia, treated by [facility]        HPI: Trey Stevens is a 63 y.o. male patient  with history of DM,COPD, and sleep apnea who presents to the Emergency Department for increasing SOB which onset gradually about 3 days ago. Pt was dx with pneumonia on 7/16/18 and started on doxycycline.  Associated sxs include productive cough and congestion. Patient denies any fever, chills, sore throat, CP, chest tightness. Pt reports using CPAP at home. ED evaluation revealed hyperglycemia, , , ABG: PO2 67%. CT chest shows no acute infiltrate.  Nonspecific bronchial wall thickening with increasing bibasilar atelectasis most pronounced involving the right lower lobe. Patient admitted to observation for COPD exacerbation with hypoxia under the care of hospital medicine.     Past Medical History:   Diagnosis Date    Arthritis     Diabetes mellitus     Diabetes mellitus type 2, uncontrolled 7/19/2016    DM (diabetes mellitus) 2015     09/04/2017    Gall stones     Obesity     Psoriasis (a type of skin inflammation)     Rheumatoid arthritis of foot        Past Surgical History:   Procedure Laterality Date    APPENDECTOMY      CHOLECYSTECTOMY         Review of patient's allergies indicates:  No Known Allergies    No current facility-administered medications on file prior to encounter.      Current Outpatient Prescriptions on File Prior to Encounter   Medication Sig    aspirin 81 MG Chew Take 1 tablet (81 mg total) by mouth once daily.     benzonatate (TESSALON) 200 MG capsule Take 1 capsule (200 mg total) by mouth 3 (three) times daily as needed for Cough.    folic acid (FOLVITE) 1 MG tablet Take 1 tablet (1 mg total) by mouth once daily.    losartan (COZAAR) 100 MG tablet TAKE 1 TABLET (100 MG TOTAL) BY MOUTH ONCE DAILY.    pravastatin (PRAVACHOL) 20 MG tablet Take 20 mg by mouth once daily.    traMADol (ULTRAM) 50 mg tablet TAKE 1 TABLET BY MOUTH EVERY 6 HOURS AS NEEDED FOR PAIN    ammonium lactate 12 % Crea Apply 1 Act topically 2 (two) times daily.    calcipotriene (DOVONOX) 0.005 % ointment Apply on psoriasis on body twice daily    cholecalciferol, vitamin D3, 2,000 unit Cap Take 1 capsule (2,000 Units total) by mouth once daily.    clobetasol (OLUX) 0.05 % Foam AAA of scalp and behind ears twice daily.  Do not use on face, underarms or groin.    HUMIRA PEN PnKt injection Inject 0.8 mLs (40 mg total) into the skin every 14 (fourteen) days.    ketoconazole (NIZORAL) 2 % shampoo Wash hair with medicated shampoo at least 2x/week - let sit on scalp at least 5 minutes prior to rinsing    methotrexate 2.5 MG Tab TAKE 6 TABLETS (15 MG TOTAL) BY MOUTH EVERY 7 DAYS. (3 IN THE MORNING AND 3 IN THE EVENING)    ONETOUCH DELICA LANCETS 33 gauge Misc     ONETOUCH ULTRA TEST Strp     ONETOUCH ULTRAMINI kit     PROAIR HFA 90 mcg/actuation inhaler INHALE 2 PUFFS EVERY 4 HOURS AS NEEDED :FOR WHEEZING    triamcinolone acetonide 0.1% (KENALOG) 0.1 % cream AAA bid for psoriasis on body.  Do not use on face, underarms or groin.     Family History     Problem Relation (Age of Onset)    Cancer Mother    Cataracts Mother    Diabetes Mother    Hypertension Mother    Stroke Father        Social History Main Topics    Smoking status: Current Some Day Smoker    Smokeless tobacco: Never Used    Alcohol use No    Drug use: No    Sexual activity: No     Review of Systems   Constitutional: Negative.  Negative for activity change, appetite change, chills,  fatigue and fever.   HENT: Negative.    Eyes: Negative.    Respiratory: Positive for cough, shortness of breath and wheezing. Negative for chest tightness.    Cardiovascular: Negative.  Negative for chest pain, palpitations and leg swelling.   Gastrointestinal: Negative.  Negative for abdominal distention, abdominal pain, diarrhea, nausea and vomiting.   Endocrine: Negative.    Genitourinary: Negative.  Negative for dysuria, flank pain, frequency and urgency.   Musculoskeletal: Negative.    Skin: Negative.    Allergic/Immunologic: Negative.    Neurological: Negative.  Negative for dizziness, weakness, light-headedness and headaches.   Hematological: Negative.    Psychiatric/Behavioral: Negative.      Objective:     Vital Signs (Most Recent):  Temp: 98.1 °F (36.7 °C) (07/20/18 1247)  Pulse: 88 (07/20/18 1812)  Resp: 16 (07/20/18 1812)  BP: (!) 168/74 (has not taken BP meds today) (07/20/18 1247)  SpO2: (!) 94 % (07/20/18 1812) Vital Signs (24h Range):  Temp:  [98.1 °F (36.7 °C)] 98.1 °F (36.7 °C)  Pulse:  [] 88  Resp:  [16-24] 16  SpO2:  [91 %-94 %] 94 %  BP: (168)/(74) 168/74     Weight:  (please obtain bed weight)  There is no height or weight on file to calculate BMI.    Physical Exam   Constitutional: He is oriented to person, place, and time. He appears well-developed and well-nourished. Obese   HENT:   Head: Normocephalic and atraumatic.   Eyes: EOM are normal. Pupils are equal, round, and reactive to light.   Neck: Normal range of motion. Neck supple.   Cardiovascular: Normal rate, regular rhythm, normal heart sounds, intact distal pulses and normal pulses.    No murmur heard.  Pulmonary/Chest: Effort normal. No accessory muscle usage. No tachypnea. No respiratory distress. He has wheezes (diffuse ). + decreased breath sounds throughout.  Abdominal: Soft. Normal appearance and bowel sounds are normal. There is no tenderness. + distention   Musculoskeletal: Normal range of motion.   Neurological: He is  alert and oriented to person, place, and time. He has normal reflexes.   Skin: Skin is warm, dry and intact. Capillary refill takes less than 2 seconds.   Psychiatric: He has a normal mood and affect. His speech is normal and behavior is normal. Judgment and thought content normal. Cognition and memory are normal.   Nursing note and vitals reviewed.        CRANIAL NERVES     CN III, IV, VI   Pupils are equal, round, and reactive to light.  Extraocular motions are normal.        Significant Labs:   BMP:   Recent Labs  Lab 07/20/18  1559   *      K 4.2      CO2 21*   BUN 15   CREATININE 1.1   CALCIUM 9.9     CBC:   Recent Labs  Lab 07/20/18  1345   WBC 6.80   HGB 14.8   HCT 43.5        CMP:   Recent Labs  Lab 07/20/18  1559      K 4.2      CO2 21*   *   BUN 15   CREATININE 1.1   CALCIUM 9.9   PROT 8.7*   ALBUMIN 3.7   BILITOT 0.4   ALKPHOS 86   *   *   ANIONGAP 13   EGFRNONAA >60     All pertinent labs within the past 24 hours have been reviewed.    Significant Imaging:   Imaging Results          CT Chest Without Contrast (Final result)  Result time 07/20/18 18:15:54    Final result by Bib Naylor MD (07/20/18 18:15:54)                 Impression:      No acute infiltrate.  Nonspecific bronchial wall thickening with increasing bibasilar atelectasis most pronounced involving the right lower lobe.    Numerous bilateral noncalcified as well as calcified lung nodules consistent with granulomatous disease, similar and unchanged to the previous CT scan of 11/10/2017.  Calcified mediastinal lymph nodes.      Electronically signed by: Bib Naylor MD  Date:    07/20/2018  Time:    18:15             Narrative:    EXAMINATION:  CT CHEST WITHOUT CONTRAST    CLINICAL HISTORY:  Shortness of breath; SOB, pulmonary edema suspected;    TECHNIQUE:  Standard noncontrast CT of the chest.  All CT scans at this facility use dose modulation, iterative reconstruction  and/or weight based dosing when appropriate to reduce radiation dose to as low as reasonably achievable.    COMPARISON:  11/10/2017    FINDINGS:  There are multiple bilateral noncalcified lung nodules.  There are several calcified lung nodules.  The overall extent and distribution are similar to the previous exam of 2017.    Mild diffuse bronchial wall thickening is present.  There is minor discoid atelectasis in the left lower and left upper lobes.  The right lung also reveals bronchial wall thickening particularly prominent in the right middle and right lower lobes.  Discoid type atelectasis can be seen most pronounced in the right lower lobe.    No evidence of acute congestive heart failure.  No pleural effusion.  No pneumothorax.    Heart size is normal with minor coronary artery calcification.    In the upper abdomen there is evidence of prior cholecystectomy.                               X-Ray Chest PA And Lateral (Final result)  Result time 07/20/18 14:42:08    Final result by VERONICA Bernal Sr., MD (07/20/18 14:42:08)                 Impression:      1. There has been interval worsening of the appearance of the lungs. There is a mild amount of interstitial alveolar opacities seen in both lungs with Kerley B-lines visualized bilaterally.  This is characteristic of pulmonary edema.  2. There is mild cardiomegaly.  .      Electronically signed by: Fito Bernal MD  Date:    07/20/2018  Time:    14:42             Narrative:    EXAMINATION:  XR CHEST PA AND LATERAL    CLINICAL HISTORY:  Shortness of breath    COMPARISON:  07/16/2018    FINDINGS:  There is mild cardiomegaly.  There has been interval worsening of the appearance of the lungs.  There is a mild amount of interstitial alveolar opacities seen in both lungs with Kerley B-lines visualized bilaterally.  There is no pneumothorax or pleural effusion.                              Assessment/Plan:     * COPD exacerbation    Scheduled duoneb treatments  IV  steroids transition to po prednisone   Po Avelox   Oxygen therapy to maintain o2 sats >89%          Elevated liver enzymes        Likely reactive   Monitor           SCHUYLER (obstructive sleep apnea)    Cpap qhs           Type 2 diabetes mellitus with nephropathy    Accuchecks   Moderate dose SSI   Diabetic diet             VTE Risk Mitigation         Ordered     enoxaparin injection 40 mg  Daily      07/20/18 1854     IP VTE HIGH RISK PATIENT  Once      07/20/18 1818     Reason for No Pharmacological VTE Prophylaxis  Once      07/20/18 1818             Dipti Avila NP  Department of Hospital Medicine   [facility]

## 2018-07-20 NOTE — ED NOTES
Patient sitting up in bed, no acute distress noted, awake, alert, and oriented x 3, calm, respirations even and unlabored, and skin is warm and dry. Patient verbalized understanding of status and plan of care. Patient denies needs at this time. Side rails up x 1, call light in reach, bed low and locked. Will continue to monitor.

## 2018-07-20 NOTE — SUBJECTIVE & OBJECTIVE
Past Medical History:   Diagnosis Date    Arthritis     Diabetes mellitus     Diabetes mellitus type 2, uncontrolled 7/19/2016    DM (diabetes mellitus) 2015     09/04/2017    Gall stones     Obesity     Psoriasis (a type of skin inflammation)     Rheumatoid arthritis of foot        Past Surgical History:   Procedure Laterality Date    APPENDECTOMY      CHOLECYSTECTOMY         Review of patient's allergies indicates:  No Known Allergies    No current facility-administered medications on file prior to encounter.      Current Outpatient Prescriptions on File Prior to Encounter   Medication Sig    aspirin 81 MG Chew Take 1 tablet (81 mg total) by mouth once daily.    benzonatate (TESSALON) 200 MG capsule Take 1 capsule (200 mg total) by mouth 3 (three) times daily as needed for Cough.    folic acid (FOLVITE) 1 MG tablet Take 1 tablet (1 mg total) by mouth once daily.    losartan (COZAAR) 100 MG tablet TAKE 1 TABLET (100 MG TOTAL) BY MOUTH ONCE DAILY.    pravastatin (PRAVACHOL) 20 MG tablet Take 20 mg by mouth once daily.    traMADol (ULTRAM) 50 mg tablet TAKE 1 TABLET BY MOUTH EVERY 6 HOURS AS NEEDED FOR PAIN    ammonium lactate 12 % Crea Apply 1 Act topically 2 (two) times daily.    calcipotriene (DOVONOX) 0.005 % ointment Apply on psoriasis on body twice daily    cholecalciferol, vitamin D3, 2,000 unit Cap Take 1 capsule (2,000 Units total) by mouth once daily.    clobetasol (OLUX) 0.05 % Foam AAA of scalp and behind ears twice daily.  Do not use on face, underarms or groin.    HUMIRA PEN PnKt injection Inject 0.8 mLs (40 mg total) into the skin every 14 (fourteen) days.    ketoconazole (NIZORAL) 2 % shampoo Wash hair with medicated shampoo at least 2x/week - let sit on scalp at least 5 minutes prior to rinsing    methotrexate 2.5 MG Tab TAKE 6 TABLETS (15 MG TOTAL) BY MOUTH EVERY 7 DAYS. (3 IN THE MORNING AND 3 IN THE EVENING)    ONETOUCH DELICA LANCETS 33 gauge Misc     ONETOUCH ULTRA  TEST Strp     ONETOUCH ULTRAMINI kit     PROAIR HFA 90 mcg/actuation inhaler INHALE 2 PUFFS EVERY 4 HOURS AS NEEDED :FOR WHEEZING    triamcinolone acetonide 0.1% (KENALOG) 0.1 % cream AAA bid for psoriasis on body.  Do not use on face, underarms or groin.     Family History     Problem Relation (Age of Onset)    Cancer Mother    Cataracts Mother    Diabetes Mother    Hypertension Mother    Stroke Father        Social History Main Topics    Smoking status: Current Some Day Smoker    Smokeless tobacco: Never Used    Alcohol use No    Drug use: No    Sexual activity: No     Review of Systems   Constitutional: Negative.  Negative for activity change, appetite change, chills, fatigue and fever.   HENT: Negative.    Eyes: Negative.    Respiratory: Positive for cough, shortness of breath and wheezing. Negative for chest tightness.    Cardiovascular: Negative.  Negative for chest pain, palpitations and leg swelling.   Gastrointestinal: Negative.  Negative for abdominal distention, abdominal pain, diarrhea, nausea and vomiting.   Endocrine: Negative.    Genitourinary: Negative.  Negative for dysuria, flank pain, frequency and urgency.   Musculoskeletal: Negative.    Skin: Negative.    Allergic/Immunologic: Negative.    Neurological: Negative.  Negative for dizziness, weakness, light-headedness and headaches.   Hematological: Negative.    Psychiatric/Behavioral: Negative.      Objective:     Vital Signs (Most Recent):  Temp: 98.1 °F (36.7 °C) (07/20/18 1247)  Pulse: 88 (07/20/18 1812)  Resp: 16 (07/20/18 1812)  BP: (!) 168/74 (has not taken BP meds today) (07/20/18 1247)  SpO2: (!) 94 % (07/20/18 1812) Vital Signs (24h Range):  Temp:  [98.1 °F (36.7 °C)] 98.1 °F (36.7 °C)  Pulse:  [] 88  Resp:  [16-24] 16  SpO2:  [91 %-94 %] 94 %  BP: (168)/(74) 168/74     Weight:  (please obtain bed weight)  There is no height or weight on file to calculate BMI.    Physical Exam   Constitutional: He is oriented to person,  place, and time. He appears well-developed and well-nourished.   HENT:   Head: Normocephalic and atraumatic.   Eyes: EOM are normal. Pupils are equal, round, and reactive to light.   Neck: Normal range of motion. Neck supple.   Cardiovascular: Normal rate, regular rhythm, normal heart sounds, intact distal pulses and normal pulses.    No murmur heard.  Pulmonary/Chest: Effort normal. No accessory muscle usage. No tachypnea. No respiratory distress. He has wheezes (diffuse ). He has rhonchi.   Abdominal: Soft. Normal appearance and bowel sounds are normal. There is no tenderness.   Musculoskeletal: Normal range of motion.   Neurological: He is alert and oriented to person, place, and time. He has normal reflexes.   Skin: Skin is warm, dry and intact. Capillary refill takes less than 2 seconds.   Psychiatric: He has a normal mood and affect. His speech is normal and behavior is normal. Judgment and thought content normal. Cognition and memory are normal.   Nursing note and vitals reviewed.        CRANIAL NERVES     CN III, IV, VI   Pupils are equal, round, and reactive to light.  Extraocular motions are normal.        Significant Labs:   BMP:   Recent Labs  Lab 07/20/18  1559   *      K 4.2      CO2 21*   BUN 15   CREATININE 1.1   CALCIUM 9.9     CBC:   Recent Labs  Lab 07/20/18  1345   WBC 6.80   HGB 14.8   HCT 43.5        CMP:   Recent Labs  Lab 07/20/18  1559      K 4.2      CO2 21*   *   BUN 15   CREATININE 1.1   CALCIUM 9.9   PROT 8.7*   ALBUMIN 3.7   BILITOT 0.4   ALKPHOS 86   *   *   ANIONGAP 13   EGFRNONAA >60     All pertinent labs within the past 24 hours have been reviewed.    Significant Imaging:   Imaging Results          CT Chest Without Contrast (Final result)  Result time 07/20/18 18:15:54    Final result by Bib Naylor MD (07/20/18 18:15:54)                 Impression:      No acute infiltrate.  Nonspecific bronchial wall thickening  with increasing bibasilar atelectasis most pronounced involving the right lower lobe.    Numerous bilateral noncalcified as well as calcified lung nodules consistent with granulomatous disease, similar and unchanged to the previous CT scan of 11/10/2017.  Calcified mediastinal lymph nodes.      Electronically signed by: Bib Naylor MD  Date:    07/20/2018  Time:    18:15             Narrative:    EXAMINATION:  CT CHEST WITHOUT CONTRAST    CLINICAL HISTORY:  Shortness of breath; SOB, pulmonary edema suspected;    TECHNIQUE:  Standard noncontrast CT of the chest.  All CT scans at this facility use dose modulation, iterative reconstruction and/or weight based dosing when appropriate to reduce radiation dose to as low as reasonably achievable.    COMPARISON:  11/10/2017    FINDINGS:  There are multiple bilateral noncalcified lung nodules.  There are several calcified lung nodules.  The overall extent and distribution are similar to the previous exam of 2017.    Mild diffuse bronchial wall thickening is present.  There is minor discoid atelectasis in the left lower and left upper lobes.  The right lung also reveals bronchial wall thickening particularly prominent in the right middle and right lower lobes.  Discoid type atelectasis can be seen most pronounced in the right lower lobe.    No evidence of acute congestive heart failure.  No pleural effusion.  No pneumothorax.    Heart size is normal with minor coronary artery calcification.    In the upper abdomen there is evidence of prior cholecystectomy.                               X-Ray Chest PA And Lateral (Final result)  Result time 07/20/18 14:42:08    Final result by VERONICA Bernal Sr., MD (07/20/18 14:42:08)                 Impression:      1. There has been interval worsening of the appearance of the lungs. There is a mild amount of interstitial alveolar opacities seen in both lungs with Kerley B-lines visualized bilaterally.  This is characteristic of  pulmonary edema.  2. There is mild cardiomegaly.  .      Electronically signed by: Fito Bernal MD  Date:    07/20/2018  Time:    14:42             Narrative:    EXAMINATION:  XR CHEST PA AND LATERAL    CLINICAL HISTORY:  Shortness of breath    COMPARISON:  07/16/2018    FINDINGS:  There is mild cardiomegaly.  There has been interval worsening of the appearance of the lungs.  There is a mild amount of interstitial alveolar opacities seen in both lungs with Kerley B-lines visualized bilaterally.  There is no pneumothorax or pleural effusion.

## 2018-07-20 NOTE — TELEPHONE ENCOUNTER
----- Message from Taylor De Los Santos sent at 7/20/2018 10:55 AM CDT -----  Contact: wife  The pt states he may have pneumonia and wants to be worked in today, the pt can be reached at [phone]///thxMW

## 2018-07-20 NOTE — ED PROVIDER NOTES
SCRIBE #1 NOTE: I, Ree Perea, am scribing for, and in the presence of, Lei Zimmer Jr., MD. I have scribed the entire note.      History      Chief Complaint   Patient presents with    Shortness of Breath     recently diagnosed with pneumonia, treated by [facility]       Review of patient's allergies indicates:  No Known Allergies     HPI   HPI    7/20/2018, 2:12 PM   History obtained from the patient      History of Present Illness: Trey Stevens is a 63 y.o. male patient who presents to the Emergency Department for increasing SOB which onset gradually about 3 days ago. Pt was dx with pneumonia on 7/16/18 and started on doxycycline. Symptoms are intermittent and moderate in severity. No mitigating or exacerbating factors reported. Associated sxs include productive cough and congestion. Patient denies any fever, chills, sore throat, CP, chest tightness, diaphoresis, palpitations, extremity weakness/numbness, leg pain/swelling, dizziness, n/v, and all other sxs at this time. Pt's hx includes sleep apnea. Pt uses CPAP at home. No further complaints or concerns at this time.         Arrival mode: Personal vehicle     PCP: Brittanie Kaba MD       Past Medical History:  Past Medical History:   Diagnosis Date    Arthritis     Diabetes mellitus     Diabetes mellitus type 2, uncontrolled 7/19/2016    DM (diabetes mellitus) 2015     09/04/2017    Gall stones     Obesity     Psoriasis (a type of skin inflammation)     Rheumatoid arthritis of foot        Past Surgical History:  Past Surgical History:   Procedure Laterality Date    APPENDECTOMY      CHOLECYSTECTOMY           Family History:  Family History   Problem Relation Age of Onset    Cancer Mother     Hypertension Mother     Diabetes Mother     Cataracts Mother     Stroke Father     Psoriasis Neg Hx        Social History:  Social History     Social History Main Topics    Smoking status: Current Some Day Smoker    Smokeless tobacco:  Never Used    Alcohol use No    Drug use: No    Sexual activity: No       ROS   Review of Systems   Constitutional: Negative for chills, diaphoresis and fever.   HENT: Positive for congestion. Negative for sore throat.    Respiratory: Positive for cough and shortness of breath. Negative for chest tightness.    Cardiovascular: Negative for chest pain, palpitations and leg swelling.   Gastrointestinal: Negative for nausea and vomiting.   Genitourinary: Negative for dysuria.   Musculoskeletal: Negative for back pain.        -calf pain   Skin: Negative for rash.   Neurological: Negative for dizziness, weakness and numbness.   Hematological: Does not bruise/bleed easily.   All other systems reviewed and are negative.      Physical Exam      Initial Vitals [07/20/18 1247]   BP Pulse Resp Temp SpO2   (!) 168/74 107 (!) 24 98.1 °F (36.7 °C) (!) 91 %      MAP       --          Physical Exam  Nursing Notes and Vital Signs Reviewed.  Constitutional: Patient is in no acute distress. Obese.  Head: Atraumatic. Normocephalic.  Eyes: PERRL. EOM intact. Conjunctivae are not pale. No scleral icterus.  ENT: Mucous membranes are moist. Oropharynx is clear and symmetric.    Neck: Supple. Full ROM. No lymphadenopathy.  Cardiovascular: Tachycardic. Regular rhythm. No murmurs, rubs, or gallops. Distal pulses are 2+ and symmetric.  Pulmonary/Chest: Coarse breath sounds bilaterally. Wheezing diffusely.   Abdominal: Soft and non-distended.  There is no tenderness.  No rebound, guarding, or rigidity.   Musculoskeletal: Moves all extremities. No obvious deformities. No edema. No calf tenderness.  Skin: Warm and dry.  Neurological:  Alert, awake, and appropriate.  Normal speech.  No acute focal neurological deficits are appreciated.  Psychiatric: Normal affect. Good eye contact. Appropriate in content.    ED Course    Procedures  ED Vital Signs:  Vitals:    07/20/18 1247 07/20/18 1457 07/20/18 1511   BP: (!) 168/74     Pulse: 107 94 96    Resp: (!) 24 (!) 21 (!) 22   Temp: 98.1 °F (36.7 °C)     TempSrc: Oral     SpO2: (!) 91% (!) 94% (!) 93%       Abnormal Lab Results:  Labs Reviewed   CBC W/ AUTO DIFFERENTIAL - Abnormal; Notable for the following:        Result Value    MCH 31.5 (*)     RDW 15.0 (*)     All other components within normal limits   COMPREHENSIVE METABOLIC PANEL - Abnormal; Notable for the following:     CO2 21 (*)     Glucose 176 (*)     Total Protein 8.7 (*)      (*)      (*)     All other components within normal limits   ISTAT PROCEDURE - Abnormal; Notable for the following:     POC PO2 67 (*)     POC SATURATED O2 93 (*)     All other components within normal limits   B-TYPE NATRIURETIC PEPTIDE   PROTIME-INR   APTT   TROPONIN I   LACTIC ACID, PLASMA        All Lab Results:  Results for orders placed or performed during the hospital encounter of 07/20/18   CBC auto differential   Result Value Ref Range    WBC 6.80 3.90 - 12.70 K/uL    RBC 4.70 4.60 - 6.20 M/uL    Hemoglobin 14.8 14.0 - 18.0 g/dL    Hematocrit 43.5 40.0 - 54.0 %    MCV 93 82 - 98 fL    MCH 31.5 (H) 27.0 - 31.0 pg    MCHC 34.0 32.0 - 36.0 g/dL    RDW 15.0 (H) 11.5 - 14.5 %    Platelets 203 150 - 350 K/uL    MPV 10.7 9.2 - 12.9 fL    Gran # (ANC) 4.3 1.8 - 7.7 K/uL    Lymph # 1.6 1.0 - 4.8 K/uL    Mono # 0.7 0.3 - 1.0 K/uL    Eos # 0.2 0.0 - 0.5 K/uL    Baso # 0.02 0.00 - 0.20 K/uL    Gran% 62.9 38.0 - 73.0 %    Lymph% 23.5 18.0 - 48.0 %    Mono% 10.4 4.0 - 15.0 %    Eosinophil% 2.9 0.0 - 8.0 %    Basophil% 0.3 0.0 - 1.9 %    Differential Method Automated    Brain natriuretic peptide   Result Value Ref Range    BNP <10 0 - 99 pg/mL   Protime-INR   Result Value Ref Range    Prothrombin Time 10.5 9.0 - 12.5 sec    INR 1.0 0.8 - 1.2   APTT   Result Value Ref Range    aPTT 28.5 21.0 - 32.0 sec   Comprehensive metabolic panel   Result Value Ref Range    Sodium 136 136 - 145 mmol/L    Potassium 4.2 3.5 - 5.1 mmol/L    Chloride 102 95 - 110 mmol/L    CO2 21  (L) 23 - 29 mmol/L    Glucose 176 (H) 70 - 110 mg/dL    BUN, Bld 15 8 - 23 mg/dL    Creatinine 1.1 0.5 - 1.4 mg/dL    Calcium 9.9 8.7 - 10.5 mg/dL    Total Protein 8.7 (H) 6.0 - 8.4 g/dL    Albumin 3.7 3.5 - 5.2 g/dL    Total Bilirubin 0.4 0.1 - 1.0 mg/dL    Alkaline Phosphatase 86 55 - 135 U/L     (H) 10 - 40 U/L     (H) 10 - 44 U/L    Anion Gap 13 8 - 16 mmol/L    eGFR if African American >60 >60 mL/min/1.73 m^2    eGFR if non African American >60 >60 mL/min/1.73 m^2   Troponin I   Result Value Ref Range    Troponin I 0.007 0.000 - 0.026 ng/mL   Lactic acid, plasma   Result Value Ref Range    Lactate (Lactic Acid) 2.2 0.5 - 2.2 mmol/L   ISTAT PROCEDURE   Result Value Ref Range    POC PH 7.409 7.35 - 7.45    POC PCO2 40.3 35 - 45 mmHg    POC PO2 67 (L) 80 - 100 mmHg    POC HCO3 25.5 24 - 28 mmol/L    POC BE 1 -2 to 2 mmol/L    POC SATURATED O2 93 (L) 95 - 100 %    Sample ARTERIAL     Site LR     Allens Test Pass     DelSys Room Air     Mode SPONT     FiO2 21          Imaging Results:  Imaging Results          CT Chest Without Contrast (In process)                X-Ray Chest PA And Lateral (Final result)  Result time 07/20/18 14:42:08    Final result by VERONICA Bernal Sr., MD (07/20/18 14:42:08)                 Impression:      1. There has been interval worsening of the appearance of the lungs. There is a mild amount of interstitial alveolar opacities seen in both lungs with Kerley B-lines visualized bilaterally.  This is characteristic of pulmonary edema.  2. There is mild cardiomegaly.  .      Electronically signed by: Fito Bernal MD  Date:    07/20/2018  Time:    14:42             Narrative:    EXAMINATION:  XR CHEST PA AND LATERAL    CLINICAL HISTORY:  Shortness of breath    COMPARISON:  07/16/2018    FINDINGS:  There is mild cardiomegaly.  There has been interval worsening of the appearance of the lungs.  There is a mild amount of interstitial alveolar opacities seen in both lungs with  Kerley B-lines visualized bilaterally.  There is no pneumothorax or pleural effusion.                               The EKG was ordered, reviewed, and independently interpreted by the ED provider.  Interpretation time: 1305  Rate: 103 BPM  Rhythm: sinus tachycardia  Interpretation: No acute ST changes. No STEMI.             The Emergency Provider reviewed the vital signs and test results, which are outlined above.    ED Discussion     4:00 PM: Re-evaluated pt. Pt is resting comfortably and is in no acute distress.  D/w pt all pertinent results. D/w pt any concerns expressed at this time. Answered all questions. Pt expresses understanding at this time.      4:27 PM: Discussed case with DARREL Epps ([facility]).     5:42 PM: Discussed case with Nkechi Rao NP ([facility]). Dr. Laurent agrees with current care and management of pt and accepts admission.   Admitting Service: Hospital medicine   Admitting Physician: Dr. Laurent  Admit to: Obs      5:42 PM: Re-evaluated pt. I have discussed test results, shared treatment plan, and the need for admission with patient and family at bedside. Pt and family express understanding at this time and agree with all information. All questions answered. Pt and family have no further questions or concerns at this time. Pt is ready for admit.          ED Medication(s):  Medications   albuterol-ipratropium 2.5 mg-0.5 mg/3 mL nebulizer solution 3 mL (3 mLs Nebulization Given 7/20/18 1457)       New Prescriptions    No medications on file             Medical Decision Making    Medical Decision Making:   Clinical Tests:   Lab Tests: Ordered and Reviewed  Radiological Study: Ordered and Reviewed  Medical Tests: Ordered and Reviewed           Scribe Attestation:   Scribe #1: I performed the above scribed service and the documentation accurately describes the services I performed. I attest to the accuracy of the note.    Attending:   Physician Attestation Statement for  Scribe #1: I, Lei Zimmer Jr., MD, personally performed the services described in this documentation, as scribed by Ree Perea, in my presence, and it is both accurate and complete.          Clinical Impression       ICD-10-CM ICD-9-CM   1. Bronchitis J40 490   2. SOB (shortness of breath) R06.02 786.05   3. Pneumonia of both lungs due to infectious organism, unspecified part of lung J18.9 483.8   4. Bilateral pneumonia J18.9 486       Disposition:   Disposition: Placed in Observation  Condition: Fair         Lei Zimmer Jr., MD  07/22/18 0982

## 2018-07-20 NOTE — TELEPHONE ENCOUNTER
Pt's wife reports that pt is having difficulty breathing w/sob & coughing.  She states that he has been having fever but didn't know how high.  Reports pt cannot breath without coughing.  It's worsening.  Advised to to to ER for eval and if he can't catch his breath 911 should be called.  Verbalized understanding./rpr

## 2018-07-20 NOTE — ASSESSMENT & PLAN NOTE
Scheduled duoneb treatments  IV steroids transition to po prednisone   Po Avelox   Oxygen therapy to maintain o2 sats >89%

## 2018-07-21 ENCOUNTER — NURSE TRIAGE (OUTPATIENT)
Dept: ADMINISTRATIVE | Facility: CLINIC | Age: 63
End: 2018-07-21

## 2018-07-21 VITALS
SYSTOLIC BLOOD PRESSURE: 119 MMHG | WEIGHT: 279.56 LBS | TEMPERATURE: 98 F | RESPIRATION RATE: 20 BRPM | HEIGHT: 67 IN | DIASTOLIC BLOOD PRESSURE: 57 MMHG | BODY MASS INDEX: 43.88 KG/M2 | OXYGEN SATURATION: 95 % | HEART RATE: 90 BPM

## 2018-07-21 LAB
ALBUMIN SERPL BCP-MCNC: 3.5 G/DL
ALP SERPL-CCNC: 76 U/L
ALT SERPL W/O P-5'-P-CCNC: 165 U/L
ANION GAP SERPL CALC-SCNC: 15 MMOL/L
AST SERPL-CCNC: 154 U/L
BASOPHILS # BLD AUTO: 0.01 K/UL
BASOPHILS NFR BLD: 0.1 %
BILIRUB SERPL-MCNC: 0.4 MG/DL
BUN SERPL-MCNC: 25 MG/DL
CALCIUM SERPL-MCNC: 9.7 MG/DL
CHLORIDE SERPL-SCNC: 99 MMOL/L
CO2 SERPL-SCNC: 22 MMOL/L
CREAT SERPL-MCNC: 1.5 MG/DL
DIFFERENTIAL METHOD: ABNORMAL
EOSINOPHIL # BLD AUTO: 0 K/UL
EOSINOPHIL NFR BLD: 0.1 %
ERYTHROCYTE [DISTWIDTH] IN BLOOD BY AUTOMATED COUNT: 15.2 %
EST. GFR  (AFRICAN AMERICAN): 57 ML/MIN/1.73 M^2
EST. GFR  (NON AFRICAN AMERICAN): 50 ML/MIN/1.73 M^2
ESTIMATED AVG GLUCOSE: 174 MG/DL
GLUCOSE SERPL-MCNC: 269 MG/DL
HBA1C MFR BLD HPLC: 7.7 %
HCT VFR BLD AUTO: 43.6 %
HGB BLD-MCNC: 14.9 G/DL
LYMPHOCYTES # BLD AUTO: 1.1 K/UL
LYMPHOCYTES NFR BLD: 16.8 %
MCH RBC QN AUTO: 31.8 PG
MCHC RBC AUTO-ENTMCNC: 34.2 G/DL
MCV RBC AUTO: 93 FL
MONOCYTES # BLD AUTO: 0.1 K/UL
MONOCYTES NFR BLD: 1 %
NEUTROPHILS # BLD AUTO: 5.6 K/UL
NEUTROPHILS NFR BLD: 82 %
PLATELET # BLD AUTO: 231 K/UL
PMV BLD AUTO: 10.9 FL
POCT GLUCOSE: 270 MG/DL (ref 70–110)
POCT GLUCOSE: 279 MG/DL (ref 70–110)
POTASSIUM SERPL-SCNC: 5.2 MMOL/L
PROT SERPL-MCNC: 8.6 G/DL
RBC # BLD AUTO: 4.68 M/UL
SODIUM SERPL-SCNC: 136 MMOL/L
WBC # BLD AUTO: 6.78 K/UL

## 2018-07-21 PROCEDURE — 25000003 PHARM REV CODE 250: Performed by: EMERGENCY MEDICINE

## 2018-07-21 PROCEDURE — 63600175 PHARM REV CODE 636 W HCPCS: Performed by: INTERNAL MEDICINE

## 2018-07-21 PROCEDURE — G0378 HOSPITAL OBSERVATION PER HR: HCPCS

## 2018-07-21 PROCEDURE — 96376 TX/PRO/DX INJ SAME DRUG ADON: CPT

## 2018-07-21 PROCEDURE — 27000221 HC OXYGEN, UP TO 24 HOURS

## 2018-07-21 PROCEDURE — 36415 COLL VENOUS BLD VENIPUNCTURE: CPT

## 2018-07-21 PROCEDURE — 80053 COMPREHEN METABOLIC PANEL: CPT

## 2018-07-21 PROCEDURE — 85025 COMPLETE CBC W/AUTO DIFF WBC: CPT

## 2018-07-21 PROCEDURE — 63600175 PHARM REV CODE 636 W HCPCS: Performed by: NURSE PRACTITIONER

## 2018-07-21 PROCEDURE — 94760 N-INVAS EAR/PLS OXIMETRY 1: CPT

## 2018-07-21 PROCEDURE — 94761 N-INVAS EAR/PLS OXIMETRY MLT: CPT

## 2018-07-21 PROCEDURE — 94640 AIRWAY INHALATION TREATMENT: CPT | Mod: 76

## 2018-07-21 PROCEDURE — 25000003 PHARM REV CODE 250: Performed by: INTERNAL MEDICINE

## 2018-07-21 PROCEDURE — 25000242 PHARM REV CODE 250 ALT 637 W/ HCPCS: Performed by: NURSE PRACTITIONER

## 2018-07-21 PROCEDURE — 94799 UNLISTED PULMONARY SVC/PX: CPT

## 2018-07-21 PROCEDURE — 96372 THER/PROPH/DIAG INJ SC/IM: CPT

## 2018-07-21 RX ORDER — SODIUM CHLORIDE 9 MG/ML
INJECTION, SOLUTION INTRAVENOUS CONTINUOUS
Status: DISCONTINUED | OUTPATIENT
Start: 2018-07-21 | End: 2018-07-21 | Stop reason: HOSPADM

## 2018-07-21 RX ORDER — SODIUM CHLORIDE AND POTASSIUM CHLORIDE 150; 450 MG/100ML; MG/100ML
INJECTION, SOLUTION INTRAVENOUS CONTINUOUS
Status: DISCONTINUED | OUTPATIENT
Start: 2018-07-21 | End: 2018-07-21

## 2018-07-21 RX ORDER — PREDNISONE 10 MG/1
TABLET ORAL
Qty: 30 TABLET | Refills: 0 | Status: SHIPPED | OUTPATIENT
Start: 2018-07-21 | End: 2018-10-18 | Stop reason: ALTCHOICE

## 2018-07-21 RX ORDER — MOXIFLOXACIN HYDROCHLORIDE 400 MG/1
400 TABLET ORAL DAILY
Qty: 4 TABLET | Refills: 0 | Status: SHIPPED | OUTPATIENT
Start: 2018-07-22 | End: 2018-07-26

## 2018-07-21 RX ADMIN — LOSARTAN POTASSIUM 100 MG: 50 TABLET, FILM COATED ORAL at 08:07

## 2018-07-21 RX ADMIN — SODIUM POLYSTYRENE SULFONATE 15 G: 15 SUSPENSION ORAL; RECTAL at 08:07

## 2018-07-21 RX ADMIN — VITAMIN D, TAB 1000IU (100/BT) 2000 UNITS: 25 TAB at 08:07

## 2018-07-21 RX ADMIN — SODIUM CHLORIDE: 0.9 INJECTION, SOLUTION INTRAVENOUS at 08:07

## 2018-07-21 RX ADMIN — IPRATROPIUM BROMIDE AND ALBUTEROL SULFATE 3 ML: .5; 3 SOLUTION RESPIRATORY (INHALATION) at 03:07

## 2018-07-21 RX ADMIN — IPRATROPIUM BROMIDE AND ALBUTEROL SULFATE 3 ML: .5; 3 SOLUTION RESPIRATORY (INHALATION) at 07:07

## 2018-07-21 RX ADMIN — METHYLPREDNISOLONE SODIUM SUCCINATE 40 MG: 40 INJECTION, POWDER, FOR SOLUTION INTRAMUSCULAR; INTRAVENOUS at 01:07

## 2018-07-21 RX ADMIN — METHYLPREDNISOLONE SODIUM SUCCINATE 80 MG: 125 INJECTION, POWDER, FOR SOLUTION INTRAMUSCULAR; INTRAVENOUS at 06:07

## 2018-07-21 RX ADMIN — ASPIRIN 81 MG 81 MG: 81 TABLET ORAL at 08:07

## 2018-07-21 RX ADMIN — FOLIC ACID 1 MG: 1 TABLET ORAL at 08:07

## 2018-07-21 RX ADMIN — PRAVASTATIN SODIUM 20 MG: 20 TABLET ORAL at 08:07

## 2018-07-21 RX ADMIN — INSULIN ASPART 6 UNITS: 100 INJECTION, SOLUTION INTRAVENOUS; SUBCUTANEOUS at 06:07

## 2018-07-21 RX ADMIN — MOXIFLOXACIN HYDROCHLORIDE 400 MG: 400 TABLET, FILM COATED ORAL at 08:07

## 2018-07-21 RX ADMIN — IPRATROPIUM BROMIDE AND ALBUTEROL SULFATE 3 ML: .5; 3 SOLUTION RESPIRATORY (INHALATION) at 11:07

## 2018-07-21 RX ADMIN — INSULIN ASPART 6 UNITS: 100 INJECTION, SOLUTION INTRAVENOUS; SUBCUTANEOUS at 11:07

## 2018-07-21 RX ADMIN — PANTOPRAZOLE SODIUM 40 MG: 40 TABLET, DELAYED RELEASE ORAL at 08:07

## 2018-07-21 NOTE — HOSPITAL COURSE
Patient admitted for COPD exacerbation. Patient has home O2. CT chest shows no acute infiltrate.  Nonspecific bronchial wall thickening with increasing bibasilar atelectasis most pronounced involving the right lower lobe. Patient being treated with continuous oxygen, Duoneb treatments, po abx, and IV steroids. Will transition to oral prednisone. K+ 5.2, kayexalate given. Creatinine 1.5.  Case discussed with Dr. Laurent. Patient ready for discharge. Patient to follow-up with PCP in 3 days for hospital follow-up of COPD exacerbation and repeat BMP. Patient also to follow-up with Dr. Demarco (Pulmonology) outpatient. Patient seen and examined on the date of discharge and found suitable for discharge.

## 2018-07-21 NOTE — PROGRESS NOTES
Discharge instructions given, and patient verbalized understanding.  Patient remained free of falls, accidents, and trama during the day shift.  IV and cardiac monitor has been d/c.  Cardiac monitor returned to the cardiac monitoring room.  Patient discharged to a private vehicle with private oxygen via a wheel chair.

## 2018-07-21 NOTE — ED NOTES
Report to YOLA Terry. Patient requesting water. Denies other needs at this time. Call light in reach.

## 2018-07-21 NOTE — TELEPHONE ENCOUNTER
Reason for Disposition   Caller has URGENT medication question about med that PCP prescribed and triager unable to answer question    Protocols used: ST MEDICATION QUESTION CALL-A-    Pt calling regarding antibiotic prescribed.  Pt discharged from the hospital, went to  medication from pharmacy.  Medication (Moxifloxacin) is not covered by insurance and Pt cannot afford medication.  Called and discussed with prescriber (NOAH Avila NP); Provider states Pt does not need to take medication, WBC looked fine.  Called and notified Pt that he did not need medication Per NP.  Pt verbalized understanding.  Advised Pt to continue prescribed medication and follow up with PCP.

## 2018-07-21 NOTE — PLAN OF CARE
Problem: Patient Care Overview  Goal: Plan of Care Review  Outcome: Ongoing (interventions implemented as appropriate)  Assessment complete per flowsheet   Vital signs stable   No c/o pain or discomfort   Tolerated duoneb treatments well  Blood glucose monitoring; insulin tolerated well  2L NC intact   IV site lean dry and intact   Patient rounds assessed; safety measures performed   Free of falls on current shift  Will continue to monitor for any signs or symptoms of vital decline

## 2018-07-21 NOTE — NURSING
AAO3 name date and situation   Vital signs stable   No c/o pain or discomfort   Will continue to monitor for any signs or symptoms of vital decline

## 2018-07-21 NOTE — DISCHARGE SUMMARY
[facility] Hospital Medicine  Discharge Summary      Patient Name: Trey Stevens  MRN: [id number]  Admission Date: 7/20/2018  Hospital Length of Stay: 0 days  Discharge Date and Time:  07/21/2018 12:46 PM  Attending Physician: Cherie Laurent MD   Discharging Provider: Dipti Avila NP  Primary Care Provider: Brittanie Kaba MD      HPI:   Trey Stevens is a 63 y.o. male patient  with history of DM,COPD, and sleep apnea who presents to the Emergency Department for increasing SOB which onset gradually about 3 days ago. Pt was dx with pneumonia on 7/16/18 and started on doxycycline.  Associated sxs include productive cough and congestion. Patient denies any fever, chills, sore throat, CP, chest tightness. Pt reports using CPAP at home. ED evaluation revealed hyperglycemia, , , ABG: PO2 67%. CT chest shows no acute infiltrate.  Nonspecific bronchial wall thickening with increasing bibasilar atelectasis most pronounced involving the right lower lobe. Patient admitted to observation for COPD exacerbation with hypoxia under the care of [facility] medicine.     * No surgery found *      Hospital Course:   Patient admitted for COPD exacerbation. Patient has home O2. CT chest shows no acute infiltrate.  Nonspecific bronchial wall thickening with increasing bibasilar atelectasis most pronounced involving the right lower lobe. Patient being treated with continuous oxygen, Duoneb treatments, po abx, and IV steroids. Will transition to oral prednisone. K+ 5.2, kayexalate given. Creatinine 1.5.  Case discussed with Dr. Laurent. Patient ready for discharge. Patient to follow-up with PCP in 3 days for hospital follow-up of COPD exacerbation and repeat BMP. Patient also to follow-up with Dr. Demarco (Pulmonology) outpatient. Patient seen and examined on the date of discharge and found suitable for discharge.     Consults:     No new Assessment & Plan notes have been filed under this hospital service  since the last note was generated.  Service: Hospital Medicine    Final Active Diagnoses:    Diagnosis Date Noted POA    PRINCIPAL PROBLEM:  COPD exacerbation [J44.1] 07/20/2018 Yes    Elevated liver enzymes [R74.8] 06/26/2018 Yes    SCHUYLER (obstructive sleep apnea) [G47.33] 08/02/2017 Yes    Type 2 diabetes mellitus with nephropathy [E11.21] 02/28/2017 Yes     Chronic      Problems Resolved During this Admission:    Diagnosis Date Noted Date Resolved POA       Discharged Condition: stable    Disposition: Home or Self Care    Follow Up:  Follow-up Information     Brittanie Kaba MD In 3 days.    Specialty:  Internal Medicine  Why:  hospital follow-up   Contact information:  [address]  [place] [address] [phone]             Asif Demarco MD In 1 week.    Specialty:  Pulmonary Disease  Why:  hospital follow-up   Contact information:  [address]  [place] [address] [phone]                 Patient Instructions:     Notify your health care provider if you experience any of the following:  difficulty breathing or increased cough     Notify your health care provider if you experience any of the following:  persistent dizziness, light-headedness, or visual disturbances     Notify your health care provider if you experience any of the following:  increased confusion or weakness     Activity as tolerated         Significant Diagnostic Studies: Labs:   BMP:   Recent Labs  Lab 07/20/18  1559 07/21/18  0445   * 269*    136   K 4.2 5.2*    99   CO2 21* 22*   BUN 15 25*   CREATININE 1.1 1.5*   CALCIUM 9.9 9.7   , CMP   Recent Labs  Lab 07/20/18  1559 07/21/18  0445    136   K 4.2 5.2*    99   CO2 21* 22*   * 269*   BUN 15 25*   CREATININE 1.1 1.5*   CALCIUM 9.9 9.7   PROT 8.7* 8.6*   ALBUMIN 3.7 3.5   BILITOT 0.4 0.4   ALKPHOS 86 76   * 154*   * 165*   ANIONGAP 13 15   ESTGFRAFRICA >60 57*   EGFRNONAA >60 50*   , CBC   Recent Labs  Lab  07/20/18  1345 07/21/18  0445   WBC 6.80 6.78   HGB 14.8 14.9   HCT 43.5 43.6    231    and All labs within the past 24 hours have been reviewed    Pending Diagnostic Studies:     None         Medications:  Reconciled Home Medications:      Medication List      START taking these medications    moxifloxacin 400 mg tablet  Commonly known as:  AVELOX  Take 1 tablet (400 mg total) by mouth once daily. for 4 days  Start taking on:  7/22/2018     predniSONE 10 MG tablet  Commonly known as:  DELTASONE  4 tabs daily x 3 days, then 3 tabs daily x3 days, then 2 tabs daily x3days, then 1 tab daily x3 days        CONTINUE taking these medications    ammonium lactate 12 % Crea  Apply 1 Act topically 2 (two) times daily.     aspirin 81 MG Chew  Take 1 tablet (81 mg total) by mouth once daily.     benzonatate 200 MG capsule  Commonly known as:  TESSALON  Take 1 capsule (200 mg total) by mouth 3 (three) times daily as needed for Cough.     calcipotriene 0.005 % ointment  Commonly known as:  DOVONOX  Apply on psoriasis on body twice daily     cholecalciferol (vitamin D3) 2,000 unit Cap  Take 1 capsule (2,000 Units total) by mouth once daily.     clobetasol 0.05 % Foam  Commonly known as:  OLUX  AAA of scalp and behind ears twice daily.  Do not use on face, underarms or groin.     folic acid 1 MG tablet  Commonly known as:  FOLVITE  Take 1 tablet (1 mg total) by mouth once daily.     HUMIRA PEN Pnkt injection  Generic drug:  adalimumab  Inject 0.8 mLs (40 mg total) into the skin every 14 (fourteen) days.     ketoconazole 2 % shampoo  Commonly known as:  NIZORAL  Wash hair with medicated shampoo at least 2x/week - let sit on scalp at least 5 minutes prior to rinsing     losartan 100 MG tablet  Commonly known as:  COZAAR  TAKE 1 TABLET (100 MG TOTAL) BY MOUTH ONCE DAILY.     methotrexate 2.5 MG Tab  TAKE 6 TABLETS (15 MG TOTAL) BY MOUTH EVERY 7 DAYS. (3 IN THE MORNING AND 3 IN THE EVENING)     ONEUCH DELICA LANCETS 33 gauge  Misc  Generic drug:  lancets     ONETOUCH ULTRA TEST Strp  Generic drug:  blood sugar diagnostic     ONETOUCH ULTRAMINI kit  Generic drug:  blood-glucose meter     pravastatin 20 MG tablet  Commonly known as:  PRAVACHOL  Take 20 mg by mouth once daily.     PROAIR HFA 90 mcg/actuation inhaler  Generic drug:  albuterol  INHALE 2 PUFFS EVERY 4 HOURS AS NEEDED :FOR WHEEZING     traMADol 50 mg tablet  Commonly known as:  ULTRAM  TAKE 1 TABLET BY MOUTH EVERY 6 HOURS AS NEEDED FOR PAIN     triamcinolone acetonide 0.1% 0.1 % cream  Commonly known as:  KENALOG  AAA bid for psoriasis on body.  Do not use on face, underarms or groin.            Indwelling Lines/Drains at time of discharge:   Lines/Drains/Airways          No matching active lines, drains, or airways          Time spent on the discharge of patient: 45 minutes  Patient was seen and examined on the date of discharge and determined to be suitable for discharge.         Dipti Avila NP  Department of Hospital Medicine  [facility]

## 2018-07-21 NOTE — ED NOTES
I walked in room to reassess pt. Pt was 89% oxygen RA. Pt was put on 2L. Contacted Dipti SAAVEDRA to notify her that pt states that he is normally on 2L at home. She states that it is fine to keep pt on 2L oxygen. Will continue to monitor pt.

## 2018-07-21 NOTE — PLAN OF CARE
Problem: Patient Care Overview  Goal: Plan of Care Review  Outcome: Outcome(s) achieved Date Met: 07/21/18  Patient has discharge order.  KERI Resendez said patient can drive himself home with the use of his home oxygen, and to give methylprednisolone before discharging.

## 2018-07-23 ENCOUNTER — TELEPHONE (OUTPATIENT)
Dept: PULMONOLOGY | Facility: CLINIC | Age: 63
End: 2018-07-23

## 2018-07-23 NOTE — TELEPHONE ENCOUNTER
----- Message from Chelsea Marie sent at 7/23/2018 11:15 AM CDT -----  Contact: pt- [phone]  Pt states he is waiting on return call from department needing appt for hosp f/u.

## 2018-07-23 NOTE — TELEPHONE ENCOUNTER
----- Message from Lynsey Oneill sent at 7/23/2018  7:39 AM CDT -----  Contact: Patient  Patient needs a hospital follow up for trouble breathing, but here are no openings until after November, Please call call whim back at [phone]. Thank you

## 2018-07-30 ENCOUNTER — OFFICE VISIT (OUTPATIENT)
Dept: PULMONOLOGY | Facility: CLINIC | Age: 63
End: 2018-07-30
Payer: MEDICAID

## 2018-07-30 ENCOUNTER — HOSPITAL ENCOUNTER (OUTPATIENT)
Dept: RADIOLOGY | Facility: HOSPITAL | Age: 63
Discharge: HOME | End: 2018-07-30
Attending: NURSE PRACTITIONER
Payer: MEDICAID

## 2018-07-30 VITALS
SYSTOLIC BLOOD PRESSURE: 120 MMHG | DIASTOLIC BLOOD PRESSURE: 82 MMHG | HEART RATE: 94 BPM | RESPIRATION RATE: 17 BRPM | WEIGHT: 273.81 LBS | OXYGEN SATURATION: 97 % | HEIGHT: 67 IN | BODY MASS INDEX: 42.98 KG/M2

## 2018-07-30 DIAGNOSIS — J44.9 MIXED TYPE COPD (CHRONIC OBSTRUCTIVE PULMONARY DISEASE): ICD-10-CM

## 2018-07-30 DIAGNOSIS — F17.210 CIGARETTE NICOTINE DEPENDENCE WITHOUT COMPLICATION: ICD-10-CM

## 2018-07-30 DIAGNOSIS — G47.33 OSA (OBSTRUCTIVE SLEEP APNEA): ICD-10-CM

## 2018-07-30 DIAGNOSIS — E66.01 SEVERE OBESITY (BMI 35.0-35.9 WITH COMORBIDITY): ICD-10-CM

## 2018-07-30 DIAGNOSIS — J44.9 MIXED TYPE COPD (CHRONIC OBSTRUCTIVE PULMONARY DISEASE): Primary | ICD-10-CM

## 2018-07-30 PROBLEM — J44.1 COPD EXACERBATION: Status: RESOLVED | Noted: 2018-07-20 | Resolved: 2018-07-30

## 2018-07-30 PROCEDURE — 99999 PR PBB SHADOW E&M-EST. PATIENT-LVL V: CPT | Mod: PBBFAC,,, | Performed by: NURSE PRACTITIONER

## 2018-07-30 PROCEDURE — 99215 OFFICE O/P EST HI 40 MIN: CPT | Mod: PBBFAC,25 | Performed by: NURSE PRACTITIONER

## 2018-07-30 PROCEDURE — 99214 OFFICE O/P EST MOD 30 MIN: CPT | Mod: S$PBB,,, | Performed by: NURSE PRACTITIONER

## 2018-07-30 PROCEDURE — 71046 X-RAY EXAM CHEST 2 VIEWS: CPT | Mod: TC

## 2018-07-30 PROCEDURE — 71046 X-RAY EXAM CHEST 2 VIEWS: CPT | Mod: 26,,, | Performed by: RADIOLOGY

## 2018-07-30 RX ORDER — BENZONATATE 200 MG/1
200 CAPSULE ORAL 3 TIMES DAILY PRN
Qty: 30 CAPSULE | Refills: 1 | Status: SHIPPED | OUTPATIENT
Start: 2018-07-30 | End: 2021-10-05

## 2018-07-30 RX ORDER — ALBUTEROL SULFATE 90 UG/1
2 AEROSOL, METERED RESPIRATORY (INHALATION) EVERY 4 HOURS PRN
Qty: 18 G | Refills: 11 | Status: SHIPPED | OUTPATIENT
Start: 2018-07-30 | End: 2019-02-14 | Stop reason: SDUPTHER

## 2018-07-30 NOTE — ASSESSMENT & PLAN NOTE
Benefits and complaint auto CPAP 4-20 cm with home oxygen 2 lm bled in to CPAP   Average AHI 3.2  Auto-CPAP Summary  Auto-CPAP Mean Pressure 13.6 cmH2O  Auto-CPAP Peak Average Pressure 16.4 cmH2O  Average Device Pressure <= 90% of Time 16.2 cmH2O  Continue auto CPAP 4-20 cm

## 2018-07-30 NOTE — ASSESSMENT & PLAN NOTE
Back at baseline post hospital d/c 7/21/2018.  Stable on albuterol inhaler use every 2-3 days.  No pulmonary complaints.   No edema of lower extremities  Request refill on tessalon.  Refill on albuterol and tessalon perles.  Keep follow up planned with Dr. Demarco

## 2018-07-30 NOTE — PROGRESS NOTES
Subjective:      Patient ID: Trey Stevens is a 63 y.o. male.    Patient Active Problem List   Diagnosis    Psoriatic arthritis, destructive type    Vitamin D deficiency    Psoriasis    Multiple lung nodules on CT    Immunosuppressed status    Long-term use of immunosuppressant medication    Mixed type COPD (chronic obstructive pulmonary disease)    Type 2 diabetes mellitus with nephropathy    Hypertension associated with diabetes    Vitiligo    SCHUYLER (obstructive sleep apnea)    Severe obesity (BMI 35.0-35.9 with comorbidity)    Hyperlipidemia associated with type 2 diabetes mellitus    Elevated liver enzymes    Bilateral pneumonia    Cigarette nicotine dependence without complication     Problem list has been reviewed.    he has been referred by No ref. provider found for evaluation and management for   Chief Complaint   Patient presents with    Hospital Follow Up     after copd exacerbation/pul edema     Chief Complaint: Hospital Follow Up (after copd exacerbation/pul edema)    HPI:  Trey Stevens is a 63 y.o. male presents for hospital follow-up pulmonary visit after he was admitted for COPD exacerbation.  Admission Date: 7/20/2018  Discharge Date and Time:  07/21/2018   Patient is followed in Pulmonary Clinic by Dr. Demarco for mixed restrictive/obstructive lung disease, pulmonary nodules, cigarette smoker last seen in pulmonary clinic 1/3/2018.  Current medication regimen: 12 day prednisone taper, on day 9. Albuterol inhaler used about every 2-3 days, when chest congestion is noticed.    Cigarette smoker some day smoker, in process of trying to quit, currently smoking 1/2 pack day, cut back from 1 pk/day over past 3-4 months. Referral to smoking cessation.   COPD score: 26  [place] 4  He feels he is back at his respiratory baseline. He has returned to his daily activity of cutting grass weekly riding mower, picking up limbs daily, working around farm, he has chickens, ducks, cats, and dog.      Home oxygen nightly 2 lm with CPAP 4-20 cm     Previous Report Reviewed: lab reports and office notes     Past Medical History: The following portions of the patient's history were reviewed and updated as appropriate:   He  has a past surgical history that includes Appendectomy and Cholecystectomy.  His family history includes Cancer in his mother; Cataracts in his mother; Diabetes in his mother; Hypertension in his mother; Stroke in his father.  He  reports that he has been smoking Cigarettes.  He started smoking about 24 years ago. He has a 24.00 pack-year smoking history. He has never used smokeless tobacco. He reports that he does not drink alcohol or use drugs.  He has a current medication list which includes the following prescription(s): ammonium lactate, aspirin, benzonatate, calcipotriene, cholecalciferol (vitamin d3), clobetasol, folic acid, humira pen, ketoconazole, losartan, methotrexate, onetouch delica lancets, onetouch ultra test, onetouch ultramini, pravastatin, prednisone, proair hfa, tramadol, and triamcinolone acetonide 0.1%.  He has No Known Allergies.    Review of Systems   Constitutional: Negative for fever, chills, weight loss, weight gain, activity change, appetite change, fatigue and night sweats.   HENT: Negative for postnasal drip, rhinorrhea, sinus pressure, voice change and congestion.    Eyes: Negative for redness and itching.   Respiratory: Positive for use of rescue inhaler (about every 2-3 days for chest congestion). Negative for snoring, cough, sputum production, chest tightness, shortness of breath, wheezing, orthopnea, asthma nighttime symptoms, dyspnea on extertion, somnolence and Paroxysmal Nocturnal Dyspnea (on home oxygen 2 liters with CPAP nightly).    Cardiovascular: Negative.  Negative for chest pain, palpitations and leg swelling.   Genitourinary: Negative for difficulty urinating and hematuria.   Endocrine: Negative for cold intolerance and heat intolerance.   "  Musculoskeletal: Negative for arthralgias, gait problem, joint swelling and myalgias.   Skin: Negative.    Gastrointestinal: Negative for nausea, vomiting, abdominal pain and acid reflux.   Neurological: Negative for dizziness, weakness, light-headedness and headaches.   Hematological: Negative for adenopathy. No excessive bruising.   All other systems reviewed and are negative.     Objective:     Physical Exam   Constitutional: He is oriented to person, place, and time. Vital signs are normal. He appears well-developed and well-nourished. He is active and cooperative.  Non-toxic appearance. He does not appear ill. No distress.   HENT:   Head: Normocephalic and atraumatic.   Right Ear: External ear normal.   Left Ear: External ear normal.   Nose: Nose normal.   Mouth/Throat: Oropharynx is clear and moist. No oropharyngeal exudate.   Eyes: Conjunctivae are normal.   Neck: Normal range of motion. Neck supple.   Cardiovascular: Normal rate, regular rhythm, normal heart sounds and intact distal pulses.    Pulmonary/Chest: Effort normal and breath sounds normal. He has no wheezes. He has no rales.   Abdominal: Soft.   Musculoskeletal: He exhibits no edema (no edema).   Neurological: He is alert and oriented to person, place, and time.   Skin: Skin is warm and dry.   Psychiatric: He has a normal mood and affect. His behavior is normal. Judgment and thought content normal.   Vitals reviewed.    Vitals:    07/30/18 1020   BP: 120/82   Pulse: 94   Resp: 17   SpO2: 97%   Weight: 124.2 kg (273 lb 13 oz)   Height: 5' 7" (1.702 m)     Estimated body mass index is 42.88 kg/m² as calculated from the following:    Height as of this encounter: 5' 7" (1.702 m).    Weight as of this encounter: 124.2 kg (273 lb 13 oz).    Personal Diagnostic Review  Chest xray 7/30/2018  EXAMINATION:  XR CHEST PA AND LATERAL   CLINICAL HISTORY:  Chronic obstructive pulmonary disease, unspecified   TECHNIQUE:  PA and lateral views of the chest were " performed.   COMPARISON:  July 20, 2018   FINDINGS:  Cardiac silhouette and mediastinal contours are stable.  Lungs improved aeration of the lung bases with minimal interstitial changes remaining which may be chronic.  Osseous structures are intact.   IMPRESSION: Improved basilar aeration.     Electronically signed by: Kayode Brown MD  Date:                                            07/30/2018    CT CHEST WITHOUT CONTRAST 7/20/2018   CLINICAL HISTORY:  Shortness of breath; SOB, pulmonary edema suspected;  TECHNIQUE:  Standard noncontrast CT of the chest.  All CT scans at this facility use dose modulation, iterative reconstruction and/or weight based dosing when appropriate to reduce radiation dose to as low as reasonably achievable.  COMPARISON:  11/10/2017  FINDINGS:  There are multiple bilateral noncalcified lung nodules.  There are several calcified lung nodules.  The overall extent and distribution are similar to the previous exam of 2017.  Mild diffuse bronchial wall thickening is present.  There is minor discoid atelectasis in the left lower and left upper lobes.  The right lung also reveals bronchial wall thickening particularly prominent in the right middle and right lower lobes.  Discoid type atelectasis can be seen most pronounced in the right lower lobe.  No evidence of acute congestive heart failure.  No pleural effusion.  No pneumothorax.  Heart size is normal with minor coronary artery calcification.  In the upper abdomen there is evidence of prior cholecystectomy.     IMPRESSION:   No acute infiltrate.  Nonspecific bronchial wall thickening with increasing bibasilar atelectasis most pronounced involving the right lower lobe.  Numerous bilateral noncalcified as well as calcified lung nodules consistent with granulomatous disease, similar and unchanged to the previous CT scan of 11/10/2017.  Calcified mediastinal lymph nodes.   Electronically signed by: Bib Naylor MD  Date:                                             07/20/2018    Chest xray 7/20/2018  CLINICAL HISTORY:  Shortness of breath    COMPARISON:  07/16/2018    FINDINGS:  There is mild cardiomegaly.  There has been interval worsening of the appearance of the lungs.  There is a mild amount of interstitial alveolar opacities seen in both lungs with Kerley B-lines visualized bilaterally.  There is no pneumothorax or pleural effusion.      Impression       1. There has been interval worsening of the appearance of the lungs. There is a mild amount of interstitial alveolar opacities seen in both lungs with Kerley B-lines visualized bilaterally.  This is characteristic of pulmonary edema.  2. There is mild cardiomegaly.  .  Electronically signed by: Fito Bernal MD  Date: 07/20/2018     cpft 11/10/2017 FEV1 45% predicted. TLC 72% predicted.   Severe mixed obstruction with restriction. Diffusion capacity is moderately reduced.  Compared to previous test of 08/02/17: There has been a significant improvement in  forced vital capacity ( FVC). Suggest clinical correlation.  (Physician 11/11/2017    Pul stress 11/11/2017   Phase Oxygen Assessment Supplemental O2 Heart   Rate Blood Pressure Christian Dyspnea Scale Rating   Resting 97 % Room Air 80 bpm 145/68 2   Exercise        Minute        1 94 % Room Air 104 bpm     2 95 % Room Air 107 bpm     3 95 % Room Air 112 bpm     4 96 % Room Air 116 bpm     5 95 % Room Air 117 bpm     6  96 % Room Air 117 bpm 182/81 5-6   Recovery        Minute        1 97 % Room Air 105 bpm     2 97 % Room Air 96 bpm     3 97 % Room Air 90 bpm     4 97 % Room Air 90 bpm 140/62 3     Compliance Summary  Auto CPAP 4 -20 cm with 2 liters Oxygen bled in.   6/27/2018 - 7/26/2018 (30 days)  Days with Device Usage 29 days  Days without Device Usage 1 day  Percent Days with Device Usage 96.7%  Cumulative Usage 7 days 21 hrs. 52 mins. 6 secs.  Maximum Usage (1 Day) 10 hrs. 20 mins. 20 secs.  Average Usage (All Days) 6 hrs. 19 mins. 44  secs.  Average Usage (Days Used) 6 hrs. 32 mins. 49 secs.  Minimum Usage (1 Day) 3 hrs. 32 mins. 49 secs.  Percent of Days with Usage >= 4 Hours 93.3%  Percent of Days with Usage < 4 Hours 6.7%  Date Range  Total Blower Time 7 days 23 hrs. 26 mins. 51 secs.  Average AHI 3.2  Auto-CPAP Summary  Auto-CPAP Mean Pressure 13.6 cmH2O  Auto-CPAP Peak Average Pressure 16.4 cmH2O  Average Device Pressure <= 90% of Time 16.2 cmH2O  Average Time in Large Leak Per Day 4 mins. 10 secs.    Assessment:     1. Mixed type COPD (chronic obstructive pulmonary disease)    2. SCHUYLER (obstructive sleep apnea)    3. Cigarette nicotine dependence without complication    4. Severe obesity (BMI 35.0-35.9 with comorbidity)      Orders Placed This Encounter   Procedures    X-Ray Chest PA And Lateral     Standing Status:   Future     Standing Expiration Date:   7/30/2019     Order Specific Question:   May the Radiologist modify the order per protocol to meet the clinical needs of the patient?     Answer:   Yes    Ambulatory referral to Smoking Cessation Program     Referral Priority:   Routine     Referral Type:   Consultation     Referral Reason:   Specialty Services Required     Requested Specialty:   CTTS     Number of Visits Requested:   1     Plan:     Discussed diagnosis, its evaluation, treatment and usual course. All questions answered.    Problem List Items Addressed This Visit     Cigarette nicotine dependence without complication     Current every day smoker, reduced from 1 pk to 1/2 pk over past 3-4 months  Interested in quitting, referral to smoking cessation  Strong recommendation for complete smoking cessation with review of quit tips and written information per after visit summary.  The patient states knowledge of importance of quitting and multiple health risk to continued smoking.             Relevant Orders    Ambulatory referral to Smoking Cessation Program    Mixed type COPD (chronic obstructive pulmonary disease) - Primary      Back at baseline post hospital d/c 7/21/2018.  Stable on albuterol inhaler use every 2-3 days.  No pulmonary complaints.   No edema of lower extremities  Request refill on tessalon.  Refill on albuterol and tessalon perles.  Keep follow up planned with Dr. Demarco          Relevant Medications    PROAIR HFA 90 mcg/actuation inhaler    benzonatate (TESSALON) 200 MG capsule    Other Relevant Orders    X-Ray Chest PA And Lateral    SCHUYLRE (obstructive sleep apnea)     Benefits and complaint auto CPAP 4-20 cm with home oxygen 2 lm bled in to CPAP   Average AHI 3.2  Auto-CPAP Summary  Auto-CPAP Mean Pressure 13.6 cmH2O  Auto-CPAP Peak Average Pressure 16.4 cmH2O  Average Device Pressure <= 90% of Time 16.2 cmH2O  Continue auto CPAP 4-20 cm            Severe obesity (BMI 35.0-35.9 with comorbidity)     Encouraged calorie reduction and 30 minutes of exercise daily. Discussed impact of obesity on general health.                 Follow-up in about 6 months (around 1/30/2019) for Mixed obstructive and restrictive lung disease, SCHUYLER, Multiple lung nodules, tobacco use/w Dr. Demarco .

## 2018-07-30 NOTE — ASSESSMENT & PLAN NOTE
Current every day smoker, reduced from 1 pk to 1/2 pk over past 3-4 months  Interested in quitting, referral to smoking cessation  Strong recommendation for complete smoking cessation with review of quit tips and written information per after visit summary.  The patient states knowledge of importance of quitting and multiple health risk to continued smoking.

## 2018-08-20 ENCOUNTER — TELEPHONE (OUTPATIENT)
Dept: PHARMACY | Facility: CLINIC | Age: 63
End: 2018-08-20

## 2018-09-03 RX ORDER — ERGOCALCIFEROL 1.25 MG/1
50000 CAPSULE ORAL
Qty: 8 CAPSULE | Refills: 6 | Status: SHIPPED | OUTPATIENT
Start: 2018-09-03 | End: 2018-10-18

## 2018-09-05 ENCOUNTER — OFFICE VISIT (OUTPATIENT)
Dept: OPHTHALMOLOGY | Facility: CLINIC | Age: 63
End: 2018-09-05
Payer: MEDICAID

## 2018-09-05 DIAGNOSIS — H52.13 MYOPIA OF BOTH EYES: ICD-10-CM

## 2018-09-05 DIAGNOSIS — I15.2 HYPERTENSION ASSOCIATED WITH DIABETES: ICD-10-CM

## 2018-09-05 DIAGNOSIS — Z13.5 GLAUCOMA SCREENING: ICD-10-CM

## 2018-09-05 DIAGNOSIS — E11.9 DIABETES MELLITUS TYPE 2 WITHOUT RETINOPATHY: Primary | ICD-10-CM

## 2018-09-05 DIAGNOSIS — H52.4 PRESBYOPIA: ICD-10-CM

## 2018-09-05 DIAGNOSIS — H25.13 CATARACT, NUCLEAR SCLEROTIC SENILE, BILATERAL: ICD-10-CM

## 2018-09-05 DIAGNOSIS — H35.033 HYPERTENSIVE RETINOPATHY OF BOTH EYES, GRADE 1: ICD-10-CM

## 2018-09-05 DIAGNOSIS — E11.59 HYPERTENSION ASSOCIATED WITH DIABETES: ICD-10-CM

## 2018-09-05 PROCEDURE — 92014 COMPRE OPH EXAM EST PT 1/>: CPT | Mod: S$PBB,,, | Performed by: OPTOMETRIST

## 2018-09-05 PROCEDURE — 92015 DETERMINE REFRACTIVE STATE: CPT | Mod: ,,, | Performed by: OPTOMETRIST

## 2018-09-05 PROCEDURE — 99212 OFFICE O/P EST SF 10 MIN: CPT | Mod: PBBFAC,PO | Performed by: OPTOMETRIST

## 2018-09-05 PROCEDURE — 99999 PR PBB SHADOW E&M-EST. PATIENT-LVL II: CPT | Mod: PBBFAC,,, | Performed by: OPTOMETRIST

## 2018-09-05 NOTE — LETTER
September 5, 2018      Brittanie Chaparro MD  [address]  [place] [address]           [facility]  [address] [place] [address]  Phone: [phone]  Fax: [phone]          Patient: Trey Stevens   MR Number: [id number]   YOB: 1955   Date of Visit: 9/5/2018       Dear Dr. Brittanie Chapraro:    Thank you for referring Trey Stevens to me for evaluation. Attached you will find relevant portions of my assessment and plan of care.    If you have questions, please do not hesitate to call me. I look forward to following Trey Stevens along with you.    Sincerely,    REINALDO Germain, OD    Enclosure  CC:  No Recipients    If you would like to receive this communication electronically, please contact [email or web address] or [phone] to request more information on [facility] [employer] access.    For providers and/or their staff who would like to refer a patient to [facility], please contact us through our one-stop-shop provider referral line, [facility] , at [phone].    If you feel you have received this communication in error or would no longer like to receive these types of communications, please e-mail [email or web address]

## 2018-09-05 NOTE — PROGRESS NOTES
HPI     Last [facility] exam 09/05/2017  Diabetic/NIDDM  Cataract, nuclear, bilateral  Screening for glaucoma  No visual complaints  Uses OTC Readers   Did not bring readers to exam      Last edited by Joel Moore MA on 9/5/2018 10:08 AM. (History)            Assessment /Plan     For exam results, see Encounter Report.    Diabetes mellitus type 2 without retinopathy    Cataract, nuclear sclerotic senile, bilateral    Glaucoma screening    Myopia of both eyes    Presbyopia      No diabetic retinopathy OU.  Moderate NS cataracts OU = discussed CE option versus waiting.  He is to decide.  OH OK OU otherwise.  Spec Rx given.  RTC for CE evaluation prn or one year.

## 2018-09-17 ENCOUNTER — TELEPHONE (OUTPATIENT)
Dept: PHARMACY | Facility: CLINIC | Age: 63
End: 2018-09-17

## 2018-09-20 ENCOUNTER — LAB VISIT (OUTPATIENT)
Dept: LAB | Facility: HOSPITAL | Age: 63
End: 2018-09-20
Attending: INTERNAL MEDICINE
Payer: MEDICAID

## 2018-09-20 ENCOUNTER — OFFICE VISIT (OUTPATIENT)
Dept: PODIATRY | Facility: CLINIC | Age: 63
End: 2018-09-20
Payer: MEDICAID

## 2018-09-20 VITALS
DIASTOLIC BLOOD PRESSURE: 82 MMHG | BODY MASS INDEX: 43.49 KG/M2 | WEIGHT: 277.13 LBS | SYSTOLIC BLOOD PRESSURE: 130 MMHG | HEIGHT: 67 IN | HEART RATE: 86 BPM

## 2018-09-20 DIAGNOSIS — R74.8 ELEVATED LIVER ENZYMES: ICD-10-CM

## 2018-09-20 DIAGNOSIS — R23.4 SKIN FISSURES: ICD-10-CM

## 2018-09-20 DIAGNOSIS — B35.1 DERMATOPHYTOSIS OF NAIL: ICD-10-CM

## 2018-09-20 DIAGNOSIS — E11.49 TYPE II DIABETES MELLITUS WITH NEUROLOGICAL MANIFESTATIONS: Primary | ICD-10-CM

## 2018-09-20 DIAGNOSIS — L84 CORN OR CALLUS: ICD-10-CM

## 2018-09-20 LAB
ALBUMIN SERPL BCP-MCNC: 3.5 G/DL
ALP SERPL-CCNC: 84 U/L
ALT SERPL W/O P-5'-P-CCNC: 91 U/L
ANION GAP SERPL CALC-SCNC: 11 MMOL/L
AST SERPL-CCNC: 74 U/L
BILIRUB SERPL-MCNC: 0.7 MG/DL
BUN SERPL-MCNC: 11 MG/DL
CALCIUM SERPL-MCNC: 9.9 MG/DL
CHLORIDE SERPL-SCNC: 103 MMOL/L
CO2 SERPL-SCNC: 26 MMOL/L
CREAT SERPL-MCNC: 1.4 MG/DL
EST. GFR  (AFRICAN AMERICAN): >60 ML/MIN/1.73 M^2
EST. GFR  (NON AFRICAN AMERICAN): 53.8 ML/MIN/1.73 M^2
GLUCOSE SERPL-MCNC: 104 MG/DL
POTASSIUM SERPL-SCNC: 4.3 MMOL/L
PROT SERPL-MCNC: 8.1 G/DL
SODIUM SERPL-SCNC: 140 MMOL/L

## 2018-09-20 PROCEDURE — 80053 COMPREHEN METABOLIC PANEL: CPT

## 2018-09-20 PROCEDURE — 99999 PR PBB SHADOW E&M-EST. PATIENT-LVL III: CPT | Mod: PBBFAC,,, | Performed by: PODIATRIST

## 2018-09-20 PROCEDURE — 11056 PARNG/CUTG B9 HYPRKR LES 2-4: CPT | Mod: S$PBB,,, | Performed by: PODIATRIST

## 2018-09-20 PROCEDURE — 11721 DEBRIDE NAIL 6 OR MORE: CPT | Mod: Q9,PBBFAC,PO,59 | Performed by: PODIATRIST

## 2018-09-20 PROCEDURE — 36415 COLL VENOUS BLD VENIPUNCTURE: CPT | Mod: PO

## 2018-09-20 PROCEDURE — 11056 PARNG/CUTG B9 HYPRKR LES 2-4: CPT | Mod: Q9,PBBFAC,PO | Performed by: PODIATRIST

## 2018-09-20 PROCEDURE — 11721 DEBRIDE NAIL 6 OR MORE: CPT | Mod: 59,S$PBB,, | Performed by: PODIATRIST

## 2018-09-20 PROCEDURE — 99499 UNLISTED E&M SERVICE: CPT | Mod: S$PBB,,, | Performed by: PODIATRIST

## 2018-09-20 PROCEDURE — 99213 OFFICE O/P EST LOW 20 MIN: CPT | Mod: PBBFAC,PO | Performed by: PODIATRIST

## 2018-09-20 NOTE — PROGRESS NOTES
Subjective:     Patient ID: Trey Stevens is a 63 y.o. male.    Chief Complaint: Routine Foot Care (PCP: ELLIS Kaba 6/26/2018 , diabetic nail care/feet check )    Trey is a 63 y.o. male who presents to the clinic for evaluation and treatment of high risk feet. Trey has a past medical history of Arthritis, Diabetes mellitus, Diabetes mellitus type 2, uncontrolled (7/19/2016), DM (diabetes mellitus) (2015), Gall stones, Obesity, Psoriasis (a type of skin inflammation), and Rheumatoid arthritis of foot. The patient's chief complaint is thick calluses and  nails. This patient has documented high risk feet requiring routine maintenance secondary to diabetes mellitis and those secondary complications of diabetes, as mentioned. Patient states his blood sugar this morning was 140mg/dl.     PCP: Brittanie Kaba MD    Date Last Seen by PCP: 6/26/18    Current shoe gear:  Affected Foot: Extra depth shoes     Unaffected Foot: Extra depth shoes    Hemoglobin A1C   Date Value Ref Range Status   07/20/2018 7.7 (H) 4.0 - 5.6 % Final     Comment:     ADA Screening Guidelines:  5.7-6.4%  Consistent with prediabetes  >or=6.5%  Consistent with diabetes  High levels of fetal hemoglobin interfere with the HbA1C  assay. Heterozygous hemoglobin variants (HbS, HgC, etc)do  not significantly interfere with this assay.   However, presence of multiple variants may affect accuracy.     02/19/2018 6.4 (H) 4.0 - 5.6 % Final     Comment:     According to ADA guidelines, hemoglobin A1c <7.0% represents  optimal control in non-pregnant diabetic patients. Different  metrics may apply to specific patient populations.   Standards of Medical Care in Diabetes-2016.  For the purpose of screening for the presence of diabetes:  <5.7%     Consistent with the absence of diabetes  5.7-6.4%  Consistent with increasing risk for diabetes   (prediabetes)  >or=6.5%  Consistent with diabetes  Currently, no consensus exists for use of hemoglobin A1c  for  diagnosis of diabetes for children.  This Hemoglobin A1c assay has significant interference with fetal   hemoglobin   (HbF). The results are invalid for patients with abnormal amounts of   HbF,   including those with known Hereditary Persistence   of Fetal Hemoglobin. Heterozygous hemoglobin variants (HbAS, HbAC,   HbAD, HbAE, HbA2) do not significantly interfere with this assay;   however, presence of multiple variants in a sample may impact the %   interference.     08/23/2017 6.3 (H) 4.0 - 5.6 % Final     Comment:     According to ADA guidelines, hemoglobin A1c <7.0% represents  optimal control in non-pregnant diabetic patients. Different  metrics may apply to specific patient populations.   Standards of Medical Care in Diabetes-2016.  For the purpose of screening for the presence of diabetes:  <5.7%     Consistent with the absence of diabetes  5.7-6.4%  Consistent with increasing risk for diabetes   (prediabetes)  >or=6.5%  Consistent with diabetes  Currently, no consensus exists for use of hemoglobin A1c  for diagnosis of diabetes for children.  This Hemoglobin A1c assay has significant interference with fetal   hemoglobin   (HbF). The results are invalid for patients with abnormal amounts of   HbF,   including those with known Hereditary Persistence   of Fetal Hemoglobin. Heterozygous hemoglobin variants (HbAS, HbAC,   HbAD, HbAE, HbA2) do not significantly interfere with this assay;   however, presence of multiple variants in a sample may impact the %   interference.             Patient Active Problem List   Diagnosis    Psoriatic arthritis, destructive type    Vitamin D deficiency    Psoriasis    Multiple lung nodules on CT    Immunosuppressed status    Long-term use of immunosuppressant medication    Mixed type COPD (chronic obstructive pulmonary disease)    Type 2 diabetes mellitus with nephropathy    Hypertension associated with diabetes    Vitiligo    SCHUYLER (obstructive sleep apnea)    Severe  obesity (BMI 35.0-35.9 with comorbidity)    Hyperlipidemia associated with type 2 diabetes mellitus    Elevated liver enzymes    Bilateral pneumonia    Cigarette nicotine dependence without complication          Medication List           Accurate as of 9/20/18  1:51 PM. If you have any questions, ask your nurse or doctor.               CONTINUE taking these medications    ammonium lactate 12 % Crea  Apply 1 Act topically 2 (two) times daily.     aspirin 81 MG Chew  Take 1 tablet (81 mg total) by mouth once daily.     benzonatate 200 MG capsule  Commonly known as:  TESSALON  Take 1 capsule (200 mg total) by mouth 3 (three) times daily as needed for Cough.     calcipotriene 0.005 % ointment  Commonly known as:  DOVONOX  Apply on psoriasis on body twice daily     cholecalciferol (vitamin D3) 2,000 unit Cap  Commonly known as:  VITAMIN D3  Take 1 capsule (2,000 Units total) by mouth once daily.     clobetasol 0.05 % Foam  Commonly known as:  OLUX  AAA of scalp and behind ears twice daily.  Do not use on face, underarms or groin.     folic acid 1 MG tablet  Commonly known as:  FOLVITE  Take 1 tablet (1 mg total) by mouth once daily.     HUMIRA PEN 40 mg/0.8 mL Pnkt  Inject 0.8 mLs (40 mg total) into the skin every 14 (fourteen) days.     ketoconazole 2 % shampoo  Commonly known as:  NIZORAL  Wash hair with medicated shampoo at least 2x/week - let sit on scalp at least 5 minutes prior to rinsing     losartan 100 MG tablet  Commonly known as:  COZAAR  TAKE 1 TABLET (100 MG TOTAL) BY MOUTH ONCE DAILY.     methotrexate 2.5 MG Tab  TAKE 6 TABLETS (15 MG TOTAL) BY MOUTH EVERY 7 DAYS. (3 IN THE MORNING AND 3 IN THE EVENING)     ONETOUCH DELICA LANCETS 33 gauge Misc  Generic drug:  lancets     ONETOUCH ULTRA TEST Strp  Generic drug:  blood sugar diagnostic     ONETOUCH ULTRAMINI kit  Generic drug:  blood-glucose meter     pravastatin 20 MG tablet  Commonly known as:  PRAVACHOL     predniSONE 10 MG tablet  Commonly known as:   DELTASONE  4 tabs daily x 3 days, then 3 tabs daily x3 days, then 2 tabs daily x3days, then 1 tab daily x3 days     PROAIR HFA 90 mcg/actuation inhaler  Generic drug:  albuterol  Inhale 2 puffs into the lungs every 4 (four) hours as needed for Wheezing. Rescue     traMADol 50 mg tablet  Commonly known as:  ULTRAM  TAKE 1 TABLET BY MOUTH EVERY 6 HOURS AS NEEDED FOR PAIN     triamcinolone acetonide 0.1% 0.1 % cream  Commonly known as:  KENALOG  AAA bid for psoriasis on body.  Do not use on face, underarms or groin.     VITAMIN D2 50,000 unit Cap  Generic drug:  ergocalciferol  TAKE 1 CAPSULE (50,000 UNITS TOTAL) BY MOUTH TWICE A WEEK.            Review of patient's allergies indicates:  No Known Allergies    Past Surgical History:   Procedure Laterality Date    APPENDECTOMY      CHOLECYSTECTOMY         Family History   Problem Relation Age of Onset    Cancer Mother     Hypertension Mother     Diabetes Mother     Cataracts Mother     Stroke Father     Psoriasis Neg Hx        Social History     Socioeconomic History    Marital status: Single     Spouse name: Not on file    Number of children: Not on file    Years of education: Not on file    Highest education level: Not on file   Social Needs    Financial resource strain: Not on file    Food insecurity - worry: Not on file    Food insecurity - inability: Not on file    Transportation needs - medical: Not on file    Transportation needs - non-medical: Not on file   Occupational History    Not on file   Tobacco Use    Smoking status: Former Smoker     Packs/day: 1.00     Years: 24.00     Pack years: 24.00     Types: Cigarettes     Start date: 1994    Smokeless tobacco: Never Used    Tobacco comment: 7/30/2018 referral to smoking cessation program   Substance and Sexual Activity    Alcohol use: No     Alcohol/week: 0.0 oz    Drug use: No    Sexual activity: No   Other Topics Concern    Not on file   Social History Narrative    Not on file  "      Vitals:    09/20/18 1343   BP: 130/82   Pulse: 86   Weight: 125.7 kg (277 lb 1.9 oz)   Height: 5' 7" (1.702 m)   PainSc: 0-No pain       Hemoglobin A1C   Date Value Ref Range Status   07/20/2018 7.7 (H) 4.0 - 5.6 % Final     Comment:     ADA Screening Guidelines:  5.7-6.4%  Consistent with prediabetes  >or=6.5%  Consistent with diabetes  High levels of fetal hemoglobin interfere with the HbA1C  assay. Heterozygous hemoglobin variants (HbS, HgC, etc)do  not significantly interfere with this assay.   However, presence of multiple variants may affect accuracy.     02/19/2018 6.4 (H) 4.0 - 5.6 % Final     Comment:     According to ADA guidelines, hemoglobin A1c <7.0% represents  optimal control in non-pregnant diabetic patients. Different  metrics may apply to specific patient populations.   Standards of Medical Care in Diabetes-2016.  For the purpose of screening for the presence of diabetes:  <5.7%     Consistent with the absence of diabetes  5.7-6.4%  Consistent with increasing risk for diabetes   (prediabetes)  >or=6.5%  Consistent with diabetes  Currently, no consensus exists for use of hemoglobin A1c  for diagnosis of diabetes for children.  This Hemoglobin A1c assay has significant interference with fetal   hemoglobin   (HbF). The results are invalid for patients with abnormal amounts of   HbF,   including those with known Hereditary Persistence   of Fetal Hemoglobin. Heterozygous hemoglobin variants (HbAS, HbAC,   HbAD, HbAE, HbA2) do not significantly interfere with this assay;   however, presence of multiple variants in a sample may impact the %   interference.     08/23/2017 6.3 (H) 4.0 - 5.6 % Final     Comment:     According to ADA guidelines, hemoglobin A1c <7.0% represents  optimal control in non-pregnant diabetic patients. Different  metrics may apply to specific patient populations.   Standards of Medical Care in Diabetes-2016.  For the purpose of screening for the presence of diabetes:  <5.7%     " Consistent with the absence of diabetes  5.7-6.4%  Consistent with increasing risk for diabetes   (prediabetes)  >or=6.5%  Consistent with diabetes  Currently, no consensus exists for use of hemoglobin A1c  for diagnosis of diabetes for children.  This Hemoglobin A1c assay has significant interference with fetal   hemoglobin   (HbF). The results are invalid for patients with abnormal amounts of   HbF,   including those with known Hereditary Persistence   of Fetal Hemoglobin. Heterozygous hemoglobin variants (HbAS, HbAC,   HbAD, HbAE, HbA2) do not significantly interfere with this assay;   however, presence of multiple variants in a sample may impact the %   interference.         Review of Systems   Constitutional: Negative for chills and fever.   Respiratory: Negative for shortness of breath.    Cardiovascular: Positive for leg swelling. Negative for chest pain, palpitations, orthopnea and claudication.   Gastrointestinal: Negative for diarrhea, nausea and vomiting.   Musculoskeletal: Negative for joint pain.   Skin: Negative for rash.   Neurological: Positive for tingling and sensory change. Negative for dizziness, focal weakness and weakness.   Psychiatric/Behavioral: Negative.          Objective:      PHYSICAL EXAM: Apperance: Alert and orient in no distress,well developed, and with good attention to grooming and body habits  Patient presents ambulating in extra depth shoes.   LOWER EXTREMITY EXAM:  VASCULAR: Dorsalis pedis pulses 0/4 bilateral and Posterior Tibial pulses 1/4 bilateral. Capillary fill time <4 seconds bilateral. Mild edema observed bilateral. Varicosities present bilateral. Skin temperature of the lower extremities is warm to warm, proximal to distal. Hair growth absent bilateral.  DERMATOLOGICAL: No skin rashes, subcutaneous nodules, lesions, or ulcers observed bilateral. Nails 1,2,3,4,5 bilateral elongated, thickened, and discolored with subungual debris. Webspaces 1,2,3,4 clean, dry and without  evidence of break in skin integrity bilateral. Moderate dry and hyperkeratotic tissue noted to bilateral heels and medial 1st MPJ. Skin fissures noted to bilateral heels. No erythema, drainage, or increased temp noted to area.   NEUROLOGICAL: Light touch, sharp-dull, proprioception all present and equal bilaterally.  Vibratory sensation absent at bilateral hallux. Protective sensation absent at 6/10 sites as tested with a [place]usty 5.07 monofilament.   MUSCULOSKELETAL: Muscle strength is 5/5 for foot inverters, everters, plantarflexors, and dorsiflexors. Muscle tone is normal.         Assessment:       Encounter Diagnoses   Name Primary?    Type II diabetes mellitus with neurological manifestations Yes    Dermatophytosis of nail     Skin fissures     Corn or callus          Plan:   Type II diabetes mellitus with neurological manifestations    Dermatophytosis of nail    Skin fissures    Corn or callus      I counseled the patient on his conditions, their implications and medical management.  Greater than 15 minutes of a 20 minute appointment spent on education about the diabetic foot, neuropathy, and prevention of limb loss.  Shoe inspection. Diabetic Foot Education. Patient reminded of the importance of good nutrition and blood sugar control to help prevent podiatric complications of diabetes. Patient instructed on proper foot hygeine. We discussed wearing proper shoe gear, daily foot inspections, never walking without protective shoe gear, never putting sharp instruments to feet.    With patient's permission, nails 1-5 bilateral were debrided/trimmed in length and thickness aggressively to their soft tissue attachment mechanically and with electric , removing all offending nail and debris. Patient relates relief following the procedure.  With patient's permission bilateral skin fissure and callus were trimmed in thickness with #15 blade without incident.   Patient  will continue to monitor the  areas daily, inspect feet, wear protective shoe gear when ambulatory, moisturizer to maintain skin integrity. Patient reminded of the importance of good nutrition and blood sugar control to help prevent podiatric complications of diabetes.  Patient to return in this office in approximately 3-4 months, or sooner if needed.                     Joy Hadley DPM  [facility]

## 2018-10-15 DIAGNOSIS — L40.9 PSORIASIS: Primary | ICD-10-CM

## 2018-10-15 RX ORDER — ADALIMUMAB 40MG/0.8ML
40 KIT SUBCUTANEOUS
Qty: 2 PEN | Refills: 4 | Status: SHIPPED | OUTPATIENT
Start: 2018-10-15 | End: 2019-02-20

## 2018-10-16 ENCOUNTER — TELEPHONE (OUTPATIENT)
Dept: PHARMACY | Facility: CLINIC | Age: 63
End: 2018-10-16

## 2018-10-18 ENCOUNTER — LAB VISIT (OUTPATIENT)
Dept: LAB | Facility: HOSPITAL | Age: 63
End: 2018-10-18
Attending: PHYSICIAN ASSISTANT
Payer: MEDICAID

## 2018-10-18 ENCOUNTER — OFFICE VISIT (OUTPATIENT)
Dept: RHEUMATOLOGY | Facility: CLINIC | Age: 63
End: 2018-10-18
Payer: MEDICAID

## 2018-10-18 VITALS
HEIGHT: 67 IN | DIASTOLIC BLOOD PRESSURE: 78 MMHG | SYSTOLIC BLOOD PRESSURE: 136 MMHG | HEART RATE: 83 BPM | WEIGHT: 277 LBS | BODY MASS INDEX: 43.47 KG/M2

## 2018-10-18 DIAGNOSIS — L40.52 PSORIATIC ARTHRITIS, DESTRUCTIVE TYPE: ICD-10-CM

## 2018-10-18 DIAGNOSIS — L80 VITILIGO: ICD-10-CM

## 2018-10-18 DIAGNOSIS — D84.9 IMMUNOSUPPRESSED STATUS: ICD-10-CM

## 2018-10-18 DIAGNOSIS — L40.9 PSORIASIS: ICD-10-CM

## 2018-10-18 DIAGNOSIS — Z79.60 LONG-TERM USE OF IMMUNOSUPPRESSANT MEDICATION: ICD-10-CM

## 2018-10-18 DIAGNOSIS — L40.59 POLYARTICULAR PSORIATIC ARTHRITIS: ICD-10-CM

## 2018-10-18 DIAGNOSIS — L40.52 PSORIATIC ARTHRITIS, DESTRUCTIVE TYPE: Primary | ICD-10-CM

## 2018-10-18 DIAGNOSIS — L40.50 PSORIATIC ARTHRITIS: ICD-10-CM

## 2018-10-18 DIAGNOSIS — Z79.631 METHOTREXATE, LONG TERM, CURRENT USE: ICD-10-CM

## 2018-10-18 DIAGNOSIS — E55.9 VITAMIN D DEFICIENCY: ICD-10-CM

## 2018-10-18 LAB
ALBUMIN SERPL BCP-MCNC: 3.7 G/DL
ALP SERPL-CCNC: 74 U/L
ALT SERPL W/O P-5'-P-CCNC: 94 U/L
ANION GAP SERPL CALC-SCNC: 10 MMOL/L
AST SERPL-CCNC: 92 U/L
BASOPHILS # BLD AUTO: 0.03 K/UL
BASOPHILS NFR BLD: 0.5 %
BILIRUB SERPL-MCNC: 0.4 MG/DL
BUN SERPL-MCNC: 10 MG/DL
CALCIUM SERPL-MCNC: 9.9 MG/DL
CHLORIDE SERPL-SCNC: 104 MMOL/L
CO2 SERPL-SCNC: 24 MMOL/L
CREAT SERPL-MCNC: 1 MG/DL
CRP SERPL-MCNC: 2.4 MG/L
DIFFERENTIAL METHOD: ABNORMAL
EOSINOPHIL # BLD AUTO: 0.3 K/UL
EOSINOPHIL NFR BLD: 4.7 %
ERYTHROCYTE [DISTWIDTH] IN BLOOD BY AUTOMATED COUNT: 14.6 %
ERYTHROCYTE [SEDIMENTATION RATE] IN BLOOD BY WESTERGREN METHOD: 16 MM/HR
EST. GFR  (AFRICAN AMERICAN): >60 ML/MIN/1.73 M^2
EST. GFR  (NON AFRICAN AMERICAN): >60 ML/MIN/1.73 M^2
GLUCOSE SERPL-MCNC: 133 MG/DL
HCT VFR BLD AUTO: 45.3 %
HGB BLD-MCNC: 15.2 G/DL
LYMPHOCYTES # BLD AUTO: 2.2 K/UL
LYMPHOCYTES NFR BLD: 34.8 %
MCH RBC QN AUTO: 30 PG
MCHC RBC AUTO-ENTMCNC: 33.6 G/DL
MCV RBC AUTO: 90 FL
MONOCYTES # BLD AUTO: 0.5 K/UL
MONOCYTES NFR BLD: 7.6 %
NEUTROPHILS # BLD AUTO: 3.2 K/UL
NEUTROPHILS NFR BLD: 52.4 %
PLATELET # BLD AUTO: 200 K/UL
PMV BLD AUTO: 10.9 FL
POTASSIUM SERPL-SCNC: 4.2 MMOL/L
PROT SERPL-MCNC: 8.3 G/DL
RBC # BLD AUTO: 5.06 M/UL
SODIUM SERPL-SCNC: 138 MMOL/L
WBC # BLD AUTO: 6.17 K/UL

## 2018-10-18 PROCEDURE — 85651 RBC SED RATE NONAUTOMATED: CPT | Mod: PO

## 2018-10-18 PROCEDURE — 99214 OFFICE O/P EST MOD 30 MIN: CPT | Mod: PBBFAC,PO | Performed by: PHYSICIAN ASSISTANT

## 2018-10-18 PROCEDURE — 90662 IIV NO PRSV INCREASED AG IM: CPT | Mod: PBBFAC,PO

## 2018-10-18 PROCEDURE — 36415 COLL VENOUS BLD VENIPUNCTURE: CPT | Mod: PO

## 2018-10-18 PROCEDURE — 85025 COMPLETE CBC W/AUTO DIFF WBC: CPT | Mod: PO

## 2018-10-18 PROCEDURE — 99999 PR PBB SHADOW E&M-EST. PATIENT-LVL IV: CPT | Mod: PBBFAC,,, | Performed by: PHYSICIAN ASSISTANT

## 2018-10-18 PROCEDURE — 86140 C-REACTIVE PROTEIN: CPT

## 2018-10-18 PROCEDURE — 99214 OFFICE O/P EST MOD 30 MIN: CPT | Mod: S$PBB,,, | Performed by: PHYSICIAN ASSISTANT

## 2018-10-18 PROCEDURE — 80053 COMPREHEN METABOLIC PANEL: CPT | Mod: PO

## 2018-10-18 RX ORDER — METHOTREXATE 2.5 MG/1
TABLET ORAL
Qty: 24 TABLET | Refills: 5 | Status: SHIPPED | OUTPATIENT
Start: 2018-10-18 | End: 2019-04-19 | Stop reason: SDUPTHER

## 2018-10-18 RX ORDER — TRAMADOL HYDROCHLORIDE 50 MG/1
50 TABLET ORAL EVERY 6 HOURS PRN
Qty: 60 TABLET | Refills: 1 | Status: SHIPPED | OUTPATIENT
Start: 2018-10-18 | End: 2019-01-08 | Stop reason: SDUPTHER

## 2018-10-18 RX ORDER — FOLIC ACID 1 MG/1
1 TABLET ORAL DAILY
Qty: 90 TABLET | Refills: 3 | Status: SHIPPED | OUTPATIENT
Start: 2018-10-18 | End: 2019-09-30 | Stop reason: SDUPTHER

## 2018-10-18 NOTE — PROGRESS NOTES
Subjective:       Patient ID: Trey Stevens is a 63 y.o. male.    Chief Complaint: Psoriatic Arthritis    Trey is here for routine follow up.     He has chronic psoriatic arthritis and psoriasis.  He is on humira 40 mg Q 2 weeks and  mtx 15mg/week (splitting dose 3 tabs am and 3 tabs pm) last  Year his mtx was cut back from 20 mg weekly  due to chronic lung issues.  He has multiple lung nodules which are followed by pulm and stable per his last pulmonary visit jan 2018, his breathing is stable. He does still smoke. He sees dermatology also for psoriasis as well as vitiligo.  He has some chronic arthritis changes to his dip joints bilaterally and chronic deformities to his feet with lateral deviation of his toes.  Pain today 3/10. Feet and ankles. Feet hurt more at the end of the day. Using otc tylenol  Every day but only takes the tramadol 1-2 X weekly at night when his feet hurt. His skin is doing better. Minimal rash nestor elbows only. The rest of his skin is clear. He has topicals to apply to his elbows.  Vitiligo better as well. Tolerating his meds well.     In the past failed mtx monotherapy, failed topicals and UV. Was on vit D 50K weekly but now on otc Vit D3 5000 twice weekly     In July Dx pnx given abx. Completed trt. No recurrence.       Review of Systems   Constitutional: Negative for chills, fatigue, fever and unexpected weight change.        Poor hygiene      HENT: Negative for congestion, mouth sores and rhinorrhea.    Eyes: Negative for pain, discharge and redness.   Respiratory: Negative for cough, shortness of breath and wheezing.    Cardiovascular: Negative for chest pain and leg swelling.   Gastrointestinal: Negative for abdominal pain, diarrhea, nausea and vomiting.   Endocrine: Negative for cold intolerance and heat intolerance.   Genitourinary: Negative for dysuria.   Musculoskeletal: Negative for arthralgias, joint swelling and myalgias.   Skin: Positive for color change and rash.  "  Allergic/Immunologic: Negative for immunocompromised state.   Neurological: Negative for weakness and headaches.        Diabetic neuropathy   Psychiatric/Behavioral: The patient is not nervous/anxious.          Objective:   /78   Pulse 83   Ht 5' 7" (1.702 m)   Wt 125.7 kg (277 lb 0.5 oz)   BMI 43.39 kg/m²      Physical Exam   Constitutional: He is oriented to person, place, and time and well-developed, well-nourished, and in no distress.   HENT:   Head: Normocephalic and atraumatic.   Eyes: Pupils are equal, round, and reactive to light. Right eye exhibits no discharge.   Neck: Normal range of motion.   Cardiovascular: Normal rate, regular rhythm and normal heart sounds.  Exam reveals no friction rub.    Pulmonary/Chest: Effort normal and breath sounds normal. No respiratory distress.   Abdominal: Soft. He exhibits no distension. There is no tenderness.   Lymphadenopathy:     He has no cervical adenopathy.   Neurological: He is alert and oriented to person, place, and time.   Skin: Rash noted. No erythema.          Psoriasis rash bilateral elbows and small patch left hip    Vitiligo bilateral forearms       Psychiatric: Mood normal.   Musculoskeletal: Normal range of motion. He exhibits edema and deformity.   Bilateral wrists, mcps no synovitis good rom   nestor dips with chronic damage, bony enlargement     nestor Right 5th dip flexion deformity,   Left thumb no flexion to the IP joint, right thumb ip joint motion decreased     good rom to nestor ankles, no swelling  nestor feet very dirty, 2-5 toes deviate laterally.  Mild lower ext edema           Recent Results (from the past 168 hour(s))   CBC auto differential    Collection Time: 10/18/18 10:55 AM   Result Value Ref Range    WBC 6.17 3.90 - 12.70 K/uL    RBC 5.06 4.60 - 6.20 M/uL    Hemoglobin 15.2 14.0 - 18.0 g/dL    Hematocrit 45.3 40.0 - 54.0 %    MCV 90 82 - 98 fL    MCH 30.0 27.0 - 31.0 pg    MCHC 33.6 32.0 - 36.0 g/dL    RDW 14.6 (H) 11.5 - 14.5 %    " Platelets 200 150 - 350 K/uL    MPV 10.9 9.2 - 12.9 fL    Gran # (ANC) 3.2 1.8 - 7.7 K/uL    Lymph # 2.2 1.0 - 4.8 K/uL    Mono # 0.5 0.3 - 1.0 K/uL    Eos # 0.3 0.0 - 0.5 K/uL    Baso # 0.03 0.00 - 0.20 K/uL    Gran% 52.4 38.0 - 73.0 %    Lymph% 34.8 18.0 - 48.0 %    Mono% 7.6 4.0 - 15.0 %    Eosinophil% 4.7 0.0 - 8.0 %    Basophil% 0.5 0.0 - 1.9 %    Differential Method Automated      Results for SYDNEY PETERSON (MRN [id number]) as of 10/18/2018 11:22   Ref. Range 2/19/2018 11:52   Vit D, 25-Hydroxy Latest Ref Range: 30 - 96 ng/mL 29 (L)       10/30/17 nestor hand and foot x-ray- damage in both hand and feet consistent with PsA   4/2016 XR hands reviewed: psoriatic arthritis changes noted to dip joints bilaterally, nestor thumb IP joints        Assessment:       1. Psoriatic arthritis, destructive type    2. Psoriasis    3. Immunosuppressed status    4. Long-term use of immunosuppressant medication    5. Vitiligo    6. Vitamin D deficiency        1. Psoriatic arthritis with psoriasis: stable on humira and mtx 15mg week split dose-   Mild  skin psoriasis BSA ~6%,  stable joint exam    2. Medication monitoring: no toxicity from humira or mtx, monitor lfts     3. Immunocompromised: utd, except zoster, possible verve study?-    4. Vit D deficiency: comp.eted vit D 50,000U/2Xweekly, last level low normal 26 (2/2018) now on otc Vit D3 5000 twice weekly     5. Chronic lung nodules, copy    6. tob use- smoking       Plan:       Continue his humira 40mg SC every other week, cont mtx 15mg/week with daily folic acid, both refilled as well   Always remember to Hold mtx and humira for signs of infection or for surgery   Pt verbalized understanding    Add  topical back bid prn for his elbow rash    Let see how he does may consider cosentyx if rash persists, has never gotten complete skin clearance, also need to make sure he is not missing any doses of his meds    Increase otc vit D3 to 5K every day, check d level Q year     Return in 3  mos  With labs cbc, cmp, esr and crp      Repeat  xrays hands and feet in 1 year    Counseled pt on smoking cessation    HD flu shot today     Please call or send portal message with any questions or concerns

## 2018-10-18 NOTE — PROGRESS NOTES
Administered 0.5 2 cc High dose Influenza vaccination to Right Deltoid. Pt tolerated well. No acute reaction noted to site. Pt instructed on S/S to report. Pt verbalized understanding. Patient waited 15 minutes post injection    Lot:[id number]  Exp:5/31/2019  Manu:[employer]

## 2018-10-19 ENCOUNTER — TELEPHONE (OUTPATIENT)
Dept: INTERNAL MEDICINE | Facility: CLINIC | Age: 63
End: 2018-10-19

## 2018-10-19 NOTE — TELEPHONE ENCOUNTER
----- Message from Candida Ruiz sent at 10/19/2018  9:24 AM CDT -----  Patient needs call back, will elaborate..[phone] (home)

## 2018-11-01 ENCOUNTER — TELEPHONE (OUTPATIENT)
Dept: OPHTHALMOLOGY | Facility: CLINIC | Age: 63
End: 2018-11-01

## 2018-11-01 NOTE — TELEPHONE ENCOUNTER
----- Message from Arabella De Los Santos sent at 11/1/2018 11:32 AM CDT -----  Contact: pt  Would like a copy of eyeglass rx mailed to home address pls send today

## 2018-11-12 ENCOUNTER — TELEPHONE (OUTPATIENT)
Dept: PHARMACY | Facility: CLINIC | Age: 63
End: 2018-11-12

## 2018-11-22 ENCOUNTER — TELEPHONE (OUTPATIENT)
Dept: PULMONOLOGY | Facility: CLINIC | Age: 63
End: 2018-11-22

## 2018-11-22 DIAGNOSIS — G47.33 OSA ON CPAP: Primary | ICD-10-CM

## 2018-11-22 NOTE — TELEPHONE ENCOUNTER
Orders Placed This Encounter   Procedures    CPAP/BIPAP SUPPLIES     Benefits and compliant  Yearly supply order  FFM  90 day supply. 4 refills.  [facility]: [facility]     Order Specific Question:   Type of mask:     Answer:   FFM     Order Specific Question:   Headgear?     Answer:   Yes     Order Specific Question:   Tubing?     Answer:   Yes     Order Specific Question:   Humidifier chamber?     Answer:   Yes     Order Specific Question:   Chin strap?     Answer:   Yes     Order Specific Question:   Filters?     Answer:   Yes     Order Specific Question:   Length of need (1-99 months):     Answer:   99     1. SCHUYLER on CPAP  CPAP/BIPAP SUPPLIES

## 2018-11-22 NOTE — TELEPHONE ENCOUNTER
----- Message from Mica Jacobs sent at 2018  2:23 PM CST -----  Regarding: Rx Request  Griselda Jackson!    Mr Stevens is in need of cpap supplies and his current rx is . He saw you in July of this year. Can you please write a rx for ffm and all cpap supplies for him?    Thanks,  Mica

## 2018-12-10 ENCOUNTER — TELEPHONE (OUTPATIENT)
Dept: INTERNAL MEDICINE | Facility: CLINIC | Age: 63
End: 2018-12-10

## 2018-12-10 NOTE — TELEPHONE ENCOUNTER
----- Message from Eulalia Harper sent at 12/10/2018 11:03 AM CST -----  Contact: pt  Please call pt @ [phone] regarding scheduling an appt to see doctor before March 2019

## 2018-12-18 ENCOUNTER — TELEPHONE (OUTPATIENT)
Dept: PHARMACY | Facility: CLINIC | Age: 63
End: 2018-12-18

## 2018-12-19 NOTE — PROGRESS NOTES
"Subjective:      Patient ID: Trey Stevens is a 63 y.o. male.    Chief Complaint: Follow-up      HPI  Here for follow up of medical problems.  Hasn't check BPs.  AC breakfast sugars 140-180.  No f/c/sw.  Occas cough,tessalon helps.  No cp/sob/palp.  BMs normal.    Updated/ annual due 4/19:  HM: 10/18 fluvax, 12/18 today HAV, 2/16 [id number], 4/13 mycyhb58, 2/16 TDaP, Cscope in the past normal and pt refuses more, 4/18 psa, 2/16 HCV neg, 8/16 Eye Dr. Germain, 11/18 needs chest CT.      Review of Systems   Constitutional: Negative for chills, diaphoresis and fever.   Respiratory: Negative for cough and shortness of breath.    Cardiovascular: Negative for chest pain, palpitations and leg swelling.   Gastrointestinal: Negative for blood in stool, constipation, diarrhea, nausea and vomiting.   Genitourinary: Negative for dysuria, frequency and hematuria.   Psychiatric/Behavioral: The patient is not nervous/anxious.          Objective:   BP (!) 142/90 (BP Location: Right arm, Patient Position: Sitting)   Pulse 96   Temp 98.2 °F (36.8 °C) (Tympanic)   Ht 5' 7" (1.702 m)   Wt 125.2 kg (276 lb 0.3 oz)   SpO2 95%   BMI 43.23 kg/m²     Physical Exam   Constitutional: He is oriented to person, place, and time. He appears well-developed and well-nourished.   HENT:   Mouth/Throat: Oropharynx is clear and moist.   Neck: Normal range of motion. Neck supple.   Cardiovascular: Normal rate, regular rhythm and intact distal pulses. Exam reveals no gallop and no friction rub.   No murmur heard.  Pulmonary/Chest: Effort normal and breath sounds normal. He has no wheezes. He has no rales.   Abdominal: Soft. Bowel sounds are normal. He exhibits no mass. There is no tenderness.   Musculoskeletal: He exhibits no edema.   Lymphadenopathy:     He has no cervical adenopathy.   Neurological: He is alert and oriented to person, place, and time.   Psychiatric: He has a normal mood and affect.           Assessment:       1. 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 Hypertension associated with diabetes    3. Hyperlipidemia associated with type 2 diabetes mellitus    4. Elevated liver enzymes          Plan:     Type 2 diabetes mellitus with nephropathy- lab now with AK.    -     Lipid panel; Future; Expected date: 12/20/2018  -     Comprehensive metabolic panel; Future; Expected date: 12/20/2018  -     Hemoglobin A1c; Future; Expected date: 12/20/2018    Hypertension associated with diabetes- monitor BPs for RTC 2wk.    Hyperlipidemia associated with type 2 diabetes mellitus- off prava due to liver enz elev- check lab now.  -     Lipid panel; Future; Expected date: 12/20/2018  -     Comprehensive metabolic panel; Future; Expected date: 12/20/2018  -     Hemoglobin A1c; Future; Expected date: 12/20/2018    Elevated liver enzymes- recheck lab now.  HAV now.

## 2018-12-20 ENCOUNTER — OFFICE VISIT (OUTPATIENT)
Dept: INTERNAL MEDICINE | Facility: CLINIC | Age: 63
End: 2018-12-20
Payer: MEDICAID

## 2018-12-20 ENCOUNTER — LAB VISIT (OUTPATIENT)
Dept: LAB | Facility: HOSPITAL | Age: 63
End: 2018-12-20
Attending: INTERNAL MEDICINE
Payer: MEDICAID

## 2018-12-20 ENCOUNTER — OFFICE VISIT (OUTPATIENT)
Dept: PODIATRY | Facility: CLINIC | Age: 63
End: 2018-12-20
Payer: MEDICAID

## 2018-12-20 VITALS
SYSTOLIC BLOOD PRESSURE: 148 MMHG | DIASTOLIC BLOOD PRESSURE: 82 MMHG | HEART RATE: 88 BPM | WEIGHT: 276 LBS | BODY MASS INDEX: 43.32 KG/M2 | HEIGHT: 67 IN | RESPIRATION RATE: 16 BRPM

## 2018-12-20 VITALS
WEIGHT: 276 LBS | DIASTOLIC BLOOD PRESSURE: 90 MMHG | HEART RATE: 96 BPM | BODY MASS INDEX: 43.32 KG/M2 | HEIGHT: 67 IN | SYSTOLIC BLOOD PRESSURE: 142 MMHG | OXYGEN SATURATION: 95 % | TEMPERATURE: 98 F

## 2018-12-20 DIAGNOSIS — E11.21 TYPE 2 DIABETES MELLITUS WITH NEPHROPATHY: Chronic | ICD-10-CM

## 2018-12-20 DIAGNOSIS — E11.21 TYPE 2 DIABETES MELLITUS WITH NEPHROPATHY: Primary | Chronic | ICD-10-CM

## 2018-12-20 DIAGNOSIS — I15.2 HYPERTENSION ASSOCIATED WITH DIABETES: Chronic | ICD-10-CM

## 2018-12-20 DIAGNOSIS — L84 CORN OR CALLUS: ICD-10-CM

## 2018-12-20 DIAGNOSIS — E11.69 HYPERLIPIDEMIA ASSOCIATED WITH TYPE 2 DIABETES MELLITUS: ICD-10-CM

## 2018-12-20 DIAGNOSIS — E78.5 HYPERLIPIDEMIA ASSOCIATED WITH TYPE 2 DIABETES MELLITUS: ICD-10-CM

## 2018-12-20 DIAGNOSIS — E11.49 TYPE II DIABETES MELLITUS WITH NEUROLOGICAL MANIFESTATIONS: Primary | ICD-10-CM

## 2018-12-20 DIAGNOSIS — R23.4 SKIN FISSURES: ICD-10-CM

## 2018-12-20 DIAGNOSIS — B35.1 DERMATOPHYTOSIS OF NAIL: ICD-10-CM

## 2018-12-20 DIAGNOSIS — R74.8 ELEVATED LIVER ENZYMES: ICD-10-CM

## 2018-12-20 DIAGNOSIS — E11.59 HYPERTENSION ASSOCIATED WITH DIABETES: Chronic | ICD-10-CM

## 2018-12-20 LAB
ALBUMIN SERPL BCP-MCNC: 3.6 G/DL
ALP SERPL-CCNC: 76 U/L
ALT SERPL W/O P-5'-P-CCNC: 83 U/L
ANION GAP SERPL CALC-SCNC: 11 MMOL/L
AST SERPL-CCNC: 68 U/L
BILIRUB SERPL-MCNC: 0.7 MG/DL
BUN SERPL-MCNC: 10 MG/DL
CALCIUM SERPL-MCNC: 9.5 MG/DL
CHLORIDE SERPL-SCNC: 101 MMOL/L
CHOLEST SERPL-MCNC: 238 MG/DL
CHOLEST/HDLC SERPL: 4.9 {RATIO}
CO2 SERPL-SCNC: 25 MMOL/L
CREAT SERPL-MCNC: 1 MG/DL
EST. GFR  (AFRICAN AMERICAN): >60 ML/MIN/1.73 M^2
EST. GFR  (NON AFRICAN AMERICAN): >60 ML/MIN/1.73 M^2
ESTIMATED AVG GLUCOSE: 197 MG/DL
GLUCOSE SERPL-MCNC: 156 MG/DL
HBA1C MFR BLD HPLC: 8.5 %
HDLC SERPL-MCNC: 49 MG/DL
HDLC SERPL: 20.6 %
LDLC SERPL CALC-MCNC: 160.6 MG/DL
NONHDLC SERPL-MCNC: 189 MG/DL
POTASSIUM SERPL-SCNC: 4.2 MMOL/L
PROT SERPL-MCNC: 8.2 G/DL
SODIUM SERPL-SCNC: 137 MMOL/L
TRIGL SERPL-MCNC: 142 MG/DL

## 2018-12-20 PROCEDURE — 11056 PARNG/CUTG B9 HYPRKR LES 2-4: CPT | Mod: Q9,S$PBB,, | Performed by: PODIATRIST

## 2018-12-20 PROCEDURE — 99213 OFFICE O/P EST LOW 20 MIN: CPT | Mod: PBBFAC,25,27,PO | Performed by: INTERNAL MEDICINE

## 2018-12-20 PROCEDURE — 36415 COLL VENOUS BLD VENIPUNCTURE: CPT | Mod: PO

## 2018-12-20 PROCEDURE — 11721 DEBRIDE NAIL 6 OR MORE: CPT | Mod: Q9,59,S$PBB, | Performed by: PODIATRIST

## 2018-12-20 PROCEDURE — 99999 PR PBB SHADOW E&M-EST. PATIENT-LVL III: CPT | Mod: PBBFAC,,, | Performed by: PODIATRIST

## 2018-12-20 PROCEDURE — 99999 PR PBB SHADOW E&M-EST. PATIENT-LVL III: CPT | Mod: PBBFAC,,, | Performed by: INTERNAL MEDICINE

## 2018-12-20 PROCEDURE — 99499 UNLISTED E&M SERVICE: CPT | Mod: S$PBB,,, | Performed by: PODIATRIST

## 2018-12-20 PROCEDURE — 99214 PR OFFICE/OUTPT VISIT, EST, LEVL IV, 30-39 MIN: ICD-10-PCS | Mod: 25,S$PBB,, | Performed by: INTERNAL MEDICINE

## 2018-12-20 PROCEDURE — 99999 PR PBB SHADOW E&M-EST. PATIENT-LVL III: ICD-10-PCS | Mod: PBBFAC,,, | Performed by: INTERNAL MEDICINE

## 2018-12-20 PROCEDURE — 80053 COMPREHEN METABOLIC PANEL: CPT

## 2018-12-20 PROCEDURE — 99213 OFFICE O/P EST LOW 20 MIN: CPT | Mod: PBBFAC,PO,25 | Performed by: PODIATRIST

## 2018-12-20 PROCEDURE — 11056 PARNG/CUTG B9 HYPRKR LES 2-4: CPT | Mod: Q9,PBBFAC,PO | Performed by: PODIATRIST

## 2018-12-20 PROCEDURE — 99214 OFFICE O/P EST MOD 30 MIN: CPT | Mod: 25,S$PBB,, | Performed by: INTERNAL MEDICINE

## 2018-12-20 PROCEDURE — 80061 LIPID PANEL: CPT

## 2018-12-20 PROCEDURE — 83036 HEMOGLOBIN GLYCOSYLATED A1C: CPT

## 2018-12-20 PROCEDURE — 11721 DEBRIDE NAIL 6 OR MORE: CPT | Mod: Q9,PBBFAC,PO | Performed by: PODIATRIST

## 2018-12-24 ENCOUNTER — TELEPHONE (OUTPATIENT)
Dept: INTERNAL MEDICINE | Facility: CLINIC | Age: 63
End: 2018-12-24

## 2018-12-31 NOTE — PROGRESS NOTES
Subjective:     Patient ID: Trey Stevens is a 63 y.o. male.    Chief Complaint: Routine Foot Care (c/o bilateral foot pain, rates pain6/10, wers casual shoes with socks, diabetic Pt, PCP Dr. Kaba.)    Trey is a 63 y.o. male who presents to the clinic for evaluation and treatment of high risk feet. Trey has a past medical history of Arthritis, Diabetes mellitus, Diabetes mellitus type 2, uncontrolled (7/19/2016), DM (diabetes mellitus) (2015), Gall stones, Obesity, Psoriasis (a type of skin inflammation), and Rheumatoid arthritis of foot. The patient's chief complaint is thick calluses and  nails. This patient has documented high risk feet requiring routine maintenance secondary to diabetes mellitis and those secondary complications of diabetes, as mentioned. Patient states his blood sugar this morning was 182mg/dl.     PCP: Brittanie Kaba MD     Date Last Seen by PCP: 12/20/18    Current shoe gear:  Affected Foot: Extra depth shoes     Unaffected Foot: Extra depth shoes    Hemoglobin A1C   Date Value Ref Range Status   12/20/2018 8.5 (H) 4.0 - 5.6 % Final     Comment:     ADA Screening Guidelines:  5.7-6.4%  Consistent with prediabetes  >or=6.5%  Consistent with diabetes  High levels of fetal hemoglobin interfere with the HbA1C  assay. Heterozygous hemoglobin variants (HbS, HgC, etc)do  not significantly interfere with this assay.   However, presence of multiple variants may affect accuracy.     07/20/2018 7.7 (H) 4.0 - 5.6 % Final     Comment:     ADA Screening Guidelines:  5.7-6.4%  Consistent with prediabetes  >or=6.5%  Consistent with diabetes  High levels of fetal hemoglobin interfere with the HbA1C  assay. Heterozygous hemoglobin variants (HbS, HgC, etc)do  not significantly interfere with this assay.   However, presence of multiple variants may affect accuracy.     02/19/2018 6.4 (H) 4.0 - 5.6 % Final     Comment:     According to ADA guidelines, hemoglobin A1c <7.0% represents  optimal control in  non-pregnant diabetic patients. Different  metrics may apply to specific patient populations.   Standards of Medical Care in Diabetes-2016.  For the purpose of screening for the presence of diabetes:  <5.7%     Consistent with the absence of diabetes  5.7-6.4%  Consistent with increasing risk for diabetes   (prediabetes)  >or=6.5%  Consistent with diabetes  Currently, no consensus exists for use of hemoglobin A1c  for diagnosis of diabetes for children.  This Hemoglobin A1c assay has significant interference with fetal   hemoglobin   (HbF). The results are invalid for patients with abnormal amounts of   HbF,   including those with known Hereditary Persistence   of Fetal Hemoglobin. Heterozygous hemoglobin variants (HbAS, HbAC,   HbAD, HbAE, HbA2) do not significantly interfere with this assay;   however, presence of multiple variants in a sample may impact the %   interference.             Patient Active Problem List   Diagnosis    Psoriatic arthritis, destructive type    Vitamin D deficiency    Psoriasis    Multiple lung nodules on CT    Immunosuppressed status    Long-term use of immunosuppressant medication    Mixed type COPD (chronic obstructive pulmonary disease)    Type 2 diabetes mellitus with nephropathy    Hypertension associated with diabetes    Vitiligo    SCHUYLER (obstructive sleep apnea)    Severe obesity (BMI 35.0-35.9 with comorbidity)    Hyperlipidemia associated with type 2 diabetes mellitus    Elevated liver enzymes    Cigarette nicotine dependence without complication          Medication List           Accurate as of 12/20/18 11:59 PM. If you have any questions, ask your nurse or doctor.               CONTINUE taking these medications    ammonium lactate 12 % Crea  Apply 1 Act topically 2 (two) times daily.     aspirin 81 MG Chew  Take 1 tablet (81 mg total) by mouth once daily.     benzonatate 200 MG capsule  Commonly known as:  TESSALON  Take 1 capsule (200 mg total) by mouth 3  (three) times daily as needed for Cough.     calcipotriene 0.005 % ointment  Commonly known as:  DOVONOX  Apply on psoriasis on body twice daily     cholecalciferol (vitamin D3) 2,000 unit Cap  Commonly known as:  VITAMIN D3  Take 1 capsule (2,000 Units total) by mouth once daily.     clobetasol 0.05 % Foam  Commonly known as:  OLUX  AAA of scalp and behind ears twice daily.  Do not use on face, underarms or groin.     folic acid 1 MG tablet  Commonly known as:  FOLVITE  Take 1 tablet (1 mg total) by mouth once daily.     HUMIRA PEN 40 mg/0.8 mL Pnkt  Inject 0.8 mLs (40 mg total) into the skin every 14 (fourteen) days.     ketoconazole 2 % shampoo  Commonly known as:  NIZORAL  Wash hair with medicated shampoo at least 2x/week - let sit on scalp at least 5 minutes prior to rinsing     losartan 100 MG tablet  Commonly known as:  COZAAR  TAKE 1 TABLET (100 MG TOTAL) BY MOUTH ONCE DAILY.     methotrexate 2.5 MG Tab  TAKE 6 TABLETS (15 MG TOTAL) BY MOUTH EVERY 7 DAYS. (3 IN THE MORNING AND 3 IN THE EVENING).     ONETOUCH DELICA LANCETS 33 gauge Misc  Generic drug:  lancets     ONETOUCH ULTRA TEST Strp  Generic drug:  blood sugar diagnostic     ONETOUCH ULTRAMINI kit  Generic drug:  blood-glucose meter     pravastatin 20 MG tablet  Commonly known as:  PRAVACHOL     PROAIR HFA 90 mcg/actuation inhaler  Generic drug:  albuterol  Inhale 2 puffs into the lungs every 4 (four) hours as needed for Wheezing. Rescue     traMADol 50 mg tablet  Commonly known as:  ULTRAM  Take 1 tablet (50 mg total) by mouth every 6 (six) hours as needed.     triamcinolone acetonide 0.1% 0.1 % cream  Commonly known as:  KENALOG  AAA bid for psoriasis on body.  Do not use on face, underarms or groin.            Review of patient's allergies indicates:  No Known Allergies    Past Surgical History:   Procedure Laterality Date    APPENDECTOMY      CHOLECYSTECTOMY         Family History   Problem Relation Age of Onset    Cancer Mother      "Hypertension Mother     Diabetes Mother     Cataracts Mother     Stroke Father     Psoriasis Neg Hx        Social History     Socioeconomic History    Marital status: Single     Spouse name: Not on file    Number of children: Not on file    Years of education: Not on file    Highest education level: Not on file   Social Needs    Financial resource strain: Not on file    Food insecurity - worry: Not on file    Food insecurity - inability: Not on file    Transportation needs - medical: Not on file    Transportation needs - non-medical: Not on file   Occupational History    Not on file   Tobacco Use    Smoking status: Former Smoker     Packs/day: 1.00     Years: 24.00     Pack years: 24.00     Types: Cigarettes     Start date: 1994    Smokeless tobacco: Never Used    Tobacco comment: 7/30/2018 referral to smoking cessation program   Substance and Sexual Activity    Alcohol use: No     Alcohol/week: 0.0 oz    Drug use: No    Sexual activity: No   Other Topics Concern    Not on file   Social History Narrative    Not on file       Vitals:    12/20/18 1115   BP: (!) 148/82   Pulse: 88   Resp: 16   Weight: 125.2 kg (276 lb)   Height: 5' 7" (1.702 m)   PainSc:   6   PainLoc: Foot       Hemoglobin A1C   Date Value Ref Range Status   12/20/2018 8.5 (H) 4.0 - 5.6 % Final     Comment:     ADA Screening Guidelines:  5.7-6.4%  Consistent with prediabetes  >or=6.5%  Consistent with diabetes  High levels of fetal hemoglobin interfere with the HbA1C  assay. Heterozygous hemoglobin variants (HbS, HgC, etc)do  not significantly interfere with this assay.   However, presence of multiple variants may affect accuracy.     07/20/2018 7.7 (H) 4.0 - 5.6 % Final     Comment:     ADA Screening Guidelines:  5.7-6.4%  Consistent with prediabetes  >or=6.5%  Consistent with diabetes  High levels of fetal hemoglobin interfere with the HbA1C  assay. Heterozygous hemoglobin variants (HbS, HgC, etc)do  not significantly interfere " with this assay.   However, presence of multiple variants may affect accuracy.     02/19/2018 6.4 (H) 4.0 - 5.6 % Final     Comment:     According to ADA guidelines, hemoglobin A1c <7.0% represents  optimal control in non-pregnant diabetic patients. Different  metrics may apply to specific patient populations.   Standards of Medical Care in Diabetes-2016.  For the purpose of screening for the presence of diabetes:  <5.7%     Consistent with the absence of diabetes  5.7-6.4%  Consistent with increasing risk for diabetes   (prediabetes)  >or=6.5%  Consistent with diabetes  Currently, no consensus exists for use of hemoglobin A1c  for diagnosis of diabetes for children.  This Hemoglobin A1c assay has significant interference with fetal   hemoglobin   (HbF). The results are invalid for patients with abnormal amounts of   HbF,   including those with known Hereditary Persistence   of Fetal Hemoglobin. Heterozygous hemoglobin variants (HbAS, HbAC,   HbAD, HbAE, HbA2) do not significantly interfere with this assay;   however, presence of multiple variants in a sample may impact the %   interference.         Review of Systems   Constitutional: Negative for chills and fever.   Respiratory: Negative for shortness of breath.    Cardiovascular: Positive for leg swelling. Negative for chest pain, palpitations, orthopnea and claudication.   Gastrointestinal: Negative for diarrhea, nausea and vomiting.   Musculoskeletal: Negative for joint pain.   Skin: Negative for rash.   Neurological: Positive for tingling and sensory change. Negative for dizziness, focal weakness and weakness.   Psychiatric/Behavioral: Negative.          Objective:      PHYSICAL EXAM: Apperance: Alert and orient in no distress,well developed, and with good attention to grooming and body habits  Patient presents ambulating in extra depth shoes.   LOWER EXTREMITY EXAM:  VASCULAR: Dorsalis pedis pulses 0/4 bilateral and Posterior Tibial pulses 1/4 bilateral.  Capillary fill time <4 seconds bilateral. Mild edema observed bilateral. Varicosities present bilateral. Skin temperature of the lower extremities is warm to warm, proximal to distal. Hair growth absent bilateral.  DERMATOLOGICAL: No skin rashes, subcutaneous nodules, lesions, or ulcers observed bilateral. Nails 1,2,3,4,5 bilateral elongated, thickened, and discolored with subungual debris. Webspaces 1,2,3,4 clean, dry and without evidence of break in skin integrity bilateral. Moderate dry and hyperkeratotic tissue noted to bilateral heels and medial 1st MPJ. Skin fissures noted to bilateral heels. No erythema, drainage, or increased temp noted to area.   NEUROLOGICAL: Light touch, sharp-dull, proprioception all present and equal bilaterally.  Vibratory sensation absent at bilateral hallux. Protective sensation absent at 6/10 sites as tested with a [place] 5.07 monofilament.   MUSCULOSKELETAL: Muscle strength is 5/5 for foot inverters, everters, plantarflexors, and dorsiflexors. Muscle tone is normal.         Assessment:       Encounter Diagnoses   Name Primary?    Type II diabetes mellitus with neurological manifestations Yes    Dermatophytosis of nail     Skin fissures     Corn or callus          Plan:   Type II diabetes mellitus with neurological manifestations    Dermatophytosis of nail    Skin fissures    Corn or callus      I counseled the patient on his conditions, their implications and medical management.  Greater than 15 minutes of a 20 minute appointment spent on education about the diabetic foot, neuropathy, and prevention of limb loss.  Shoe inspection. Diabetic Foot Education. Patient reminded of the importance of good nutrition and blood sugar control to help prevent podiatric complications of diabetes. Patient instructed on proper foot hygeine. We discussed wearing proper shoe gear, daily foot inspections, never walking without protective shoe gear, never putting sharp instruments to  feet.    With patient's permission, nails 1-5 bilateral were debrided/trimmed in length and thickness aggressively to their soft tissue attachment mechanically and with electric , removing all offending nail and debris. Patient relates relief following the procedure.  With patient's permission bilateral skin fissure and callus were trimmed in thickness with #15 blade without incident.   Patient  will continue to monitor the areas daily, inspect feet, wear protective shoe gear when ambulatory, moisturizer to maintain skin integrity. Patient reminded of the importance of good nutrition and blood sugar control to help prevent podiatric complications of diabetes.  Patient to return in this office in approximately 3-4 months, or sooner if needed.                     Joy Hadley DPM  [facility]

## 2019-01-08 ENCOUNTER — OFFICE VISIT (OUTPATIENT)
Dept: INTERNAL MEDICINE | Facility: CLINIC | Age: 64
End: 2019-01-08
Payer: MEDICAID

## 2019-01-08 VITALS
TEMPERATURE: 98 F | HEIGHT: 67 IN | HEART RATE: 100 BPM | BODY MASS INDEX: 43.18 KG/M2 | DIASTOLIC BLOOD PRESSURE: 78 MMHG | WEIGHT: 275.13 LBS | OXYGEN SATURATION: 94 % | SYSTOLIC BLOOD PRESSURE: 144 MMHG

## 2019-01-08 DIAGNOSIS — I15.2 HYPERTENSION ASSOCIATED WITH DIABETES: Primary | Chronic | ICD-10-CM

## 2019-01-08 DIAGNOSIS — E11.21 TYPE 2 DIABETES MELLITUS WITH NEPHROPATHY: Chronic | ICD-10-CM

## 2019-01-08 DIAGNOSIS — L40.9 GENERALIZED PSORIASIS: ICD-10-CM

## 2019-01-08 DIAGNOSIS — R74.8 ELEVATED LIVER ENZYMES: ICD-10-CM

## 2019-01-08 DIAGNOSIS — E11.69 HYPERLIPIDEMIA ASSOCIATED WITH TYPE 2 DIABETES MELLITUS: ICD-10-CM

## 2019-01-08 DIAGNOSIS — L40.52 PSORIATIC ARTHRITIS, DESTRUCTIVE TYPE: ICD-10-CM

## 2019-01-08 DIAGNOSIS — D84.9 IMMUNOSUPPRESSED STATUS: ICD-10-CM

## 2019-01-08 DIAGNOSIS — J44.9 MIXED TYPE COPD (CHRONIC OBSTRUCTIVE PULMONARY DISEASE): ICD-10-CM

## 2019-01-08 DIAGNOSIS — E11.59 HYPERTENSION ASSOCIATED WITH DIABETES: Primary | Chronic | ICD-10-CM

## 2019-01-08 DIAGNOSIS — E78.5 HYPERLIPIDEMIA ASSOCIATED WITH TYPE 2 DIABETES MELLITUS: ICD-10-CM

## 2019-01-08 PROCEDURE — 99999 PR PBB SHADOW E&M-EST. PATIENT-LVL IV: CPT | Mod: PBBFAC,,, | Performed by: PHYSICIAN ASSISTANT

## 2019-01-08 PROCEDURE — 99214 OFFICE O/P EST MOD 30 MIN: CPT | Mod: PBBFAC,PO | Performed by: PHYSICIAN ASSISTANT

## 2019-01-08 PROCEDURE — 99999 PR PBB SHADOW E&M-EST. PATIENT-LVL IV: ICD-10-PCS | Mod: PBBFAC,,, | Performed by: PHYSICIAN ASSISTANT

## 2019-01-08 PROCEDURE — 99214 OFFICE O/P EST MOD 30 MIN: CPT | Mod: S$PBB,,, | Performed by: PHYSICIAN ASSISTANT

## 2019-01-08 PROCEDURE — 99214 PR OFFICE/OUTPT VISIT, EST, LEVL IV, 30-39 MIN: ICD-10-PCS | Mod: S$PBB,,, | Performed by: PHYSICIAN ASSISTANT

## 2019-01-08 RX ORDER — PRAVASTATIN SODIUM 40 MG/1
40 TABLET ORAL DAILY
Qty: 90 TABLET | Refills: 0 | Status: SHIPPED | OUTPATIENT
Start: 2019-01-08 | End: 2019-04-01 | Stop reason: SDUPTHER

## 2019-01-08 RX ORDER — KETOCONAZOLE 20 MG/ML
SHAMPOO, SUSPENSION TOPICAL
Qty: 120 ML | Refills: 0 | Status: SHIPPED | OUTPATIENT
Start: 2019-01-08 | End: 2023-01-25 | Stop reason: SDUPTHER

## 2019-01-08 RX ORDER — AMLODIPINE BESYLATE 5 MG/1
5 TABLET ORAL DAILY
Qty: 30 TABLET | Refills: 3 | Status: SHIPPED | OUTPATIENT
Start: 2019-01-08 | End: 2019-05-01 | Stop reason: SDUPTHER

## 2019-01-08 RX ORDER — TRAMADOL HYDROCHLORIDE 50 MG/1
50 TABLET ORAL EVERY 6 HOURS PRN
Qty: 60 TABLET | Refills: 1 | Status: SHIPPED | OUTPATIENT
Start: 2019-01-08 | End: 2019-04-26 | Stop reason: SDUPTHER

## 2019-01-08 NOTE — PROGRESS NOTES
Subjective:       Patient ID: Trey Stevens is a 63 y.o. male.    Chief Complaint: Follow-up (HTN )    63 year old male presents to clinic for 3 week f/u from last PCP visit for HTN. He has not checked his BP since last visit. BP elevated again today in clinic at 144/78. He reports taking his medications as prescribed. He requests refills of tramadol (prescribed by rheum) and ketoconazole shampoo (prescribed by derm). He reports glucose has been 140-187 AC breakfast and he admits to not always following a healthy diet. Pt was off of statin in the past but he says he has been taking pravastatin 20mg QD. Labs results from last PCP visit reviewed with pt today. He reports no CP, SOB, fever, chills, or other medical complaints.    PCP: Dr. Chaparro    Patient Active Problem List:     Psoriatic arthritis, destructive type     Vitamin D deficiency     Psoriasis     Multiple lung nodules on CT     Immunosuppressed status     Long-term use of immunosuppressant medication     Mixed type COPD (chronic obstructive pulmonary disease)     Type 2 diabetes mellitus with nephropathy     Hypertension associated with diabetes     Vitiligo     SCHUYLER (obstructive sleep apnea)     Severe obesity (BMI 35.0-35.9 with comorbidity)     Hyperlipidemia associated with type 2 diabetes mellitus     Elevated liver enzymes     Cigarette nicotine dependence without complication      Review of Systems   Constitutional: Negative for chills and fever.   HENT: Negative for congestion, ear pain, rhinorrhea, sore throat and trouble swallowing.    Eyes: Negative for visual disturbance.   Respiratory: Negative for cough and shortness of breath.    Cardiovascular: Negative for chest pain and leg swelling.   Gastrointestinal: Negative for abdominal pain, nausea and vomiting.   Endocrine: Negative for polydipsia and polyuria.   Genitourinary: Negative for difficulty urinating.   Skin: Negative for rash.   Neurological: Negative for dizziness, weakness, numbness  "and headaches.   Psychiatric/Behavioral: Negative for confusion.       Objective:       Vitals:    01/08/19 1107   BP: (!) 144/78   BP Location: Right arm   Patient Position: Sitting   BP Method: Medium (Manual)   Pulse: 100   Temp: 98.2 °F (36.8 °C)   TempSrc: Tympanic   SpO2: (!) 94%   Weight: 124.8 kg (275 lb 2.2 oz)   Height: 5' 7" (1.702 m)     Physical Exam   Constitutional: He is oriented to person, place, and time. He appears well-developed and well-nourished. No distress.   HENT:   Head: Normocephalic and atraumatic.   Eyes: EOM are normal. No scleral icterus.   Neck: Neck supple.   Cardiovascular: Normal rate and regular rhythm.   Pulmonary/Chest: Effort normal and breath sounds normal. No stridor. No respiratory distress. He has no decreased breath sounds. He has no wheezes. He has no rhonchi. He has no rales.   Musculoskeletal: Normal range of motion.   Neurological: He is alert and oriented to person, place, and time. No cranial nerve deficit.   Skin: Skin is dry. No rash noted. He is not diaphoretic.   Psychiatric: He has a normal mood and affect. His speech is normal and behavior is normal. Thought content normal.       Component      Latest Ref Rng & Units 12/20/2018 7/20/2018   Sodium      136 - 145 mmol/L 137    Potassium      3.5 - 5.1 mmol/L 4.2    Chloride      95 - 110 mmol/L 101    CO2      23 - 29 mmol/L 25    Glucose      70 - 110 mg/dL 156 (H)    BUN, Bld      8 - 23 mg/dL 10    Creatinine      0.5 - 1.4 mg/dL 1.0    Calcium      8.7 - 10.5 mg/dL 9.5    Total Protein      6.0 - 8.4 g/dL 8.2    Albumin      3.5 - 5.2 g/dL 3.6    Total Bilirubin      0.1 - 1.0 mg/dL 0.7    Alkaline Phosphatase      55 - 135 U/L 76    AST      10 - 40 U/L 68 (H)    ALT      10 - 44 U/L 83 (H)    Anion Gap      8 - 16 mmol/L 11    eGFR if African American      >60 mL/min/1.73 m:2 >60.0    eGFR if non African American      >60 mL/min/1.73 m:2 >60.0    Cholesterol      120 - 199 mg/dL 238 (H)    Triglycerides     "  30 - 150 mg/dL 142    HDL      40 - 75 mg/dL 49    LDL Cholesterol      63.0 - 159.0 mg/dL 160.6 (H)    HDL/Chol Ratio      20.0 - 50.0 % 20.6    Total Cholesterol/HDL Ratio      2.0 - 5.0 4.9    Non-HDL Cholesterol      mg/dL 189    Hemoglobin A1C      4.0 - 5.6 % 8.5 (H) 7.7 (H)   Estimated Avg Glucose      68 - 131 mg/dL 197 (H) 174 (H)     Assessment:       1. Hypertension associated with diabetes    2. Type 2 diabetes mellitus with nephropathy    3. Hyperlipidemia associated with type 2 diabetes mellitus    4. Generalized psoriasis    5. Immunosuppressed status    6. Mixed type COPD (chronic obstructive pulmonary disease)    7. Psoriatic arthritis, destructive type        Plan:         Trey was seen today for follow-up.    Diagnoses and all orders for this visit:    Hypertension associated with diabetes  -     amLODIPine (NORVASC) 5 MG tablet; Take 1 tablet (5 mg total) by mouth once daily.  Continue losartan 100mg QD. Start amlodipine 5mg QD. Monitor BP. RTC in 3 weeks for further HTN management.    Type 2 diabetes mellitus with nephropathy  DM not controlled. Pt declines to start medication at this time. Work on healthy diet and regular exercise as tolerated. RTC in 3 weeks for glucose review on healthier diet.    Hyperlipidemia associated with type 2 diabetes mellitus  -     pravastatin (PRAVACHOL) 40 MG tablet; Take 1 tablet (40 mg total) by mouth once daily.  Current ASCVD risk = 29%. Increase pravastatin to 40mg QD. Healthy diet.    Generalized psoriasis  -     ketoconazole (NIZORAL) 2 % shampoo; Wash hair with medicated shampoo at least 2x/week - let sit on scalp at least 5 minutes prior to rinsing  Rx refilled per pt request. F/u with derm as recommended.    Immunosuppressed status, Psoriatic arthritis, destructive type  Refill request for tramadol sent to prescribing rheum provider. F/u with rheum as recommended.    Mixed type COPD (chronic obstructive pulmonary disease)  Pt reports feeling at his  baseline today. F/u with pulm as recommended.

## 2019-01-18 ENCOUNTER — TELEPHONE (OUTPATIENT)
Dept: PHARMACY | Facility: CLINIC | Age: 64
End: 2019-01-18

## 2019-01-29 ENCOUNTER — LAB VISIT (OUTPATIENT)
Dept: LAB | Facility: HOSPITAL | Age: 64
End: 2019-01-29
Attending: PHYSICIAN ASSISTANT
Payer: MEDICAID

## 2019-01-29 ENCOUNTER — OFFICE VISIT (OUTPATIENT)
Dept: INTERNAL MEDICINE | Facility: CLINIC | Age: 64
End: 2019-01-29
Payer: MEDICAID

## 2019-01-29 VITALS
BODY MASS INDEX: 43.04 KG/M2 | SYSTOLIC BLOOD PRESSURE: 122 MMHG | WEIGHT: 274.25 LBS | OXYGEN SATURATION: 95 % | HEART RATE: 82 BPM | TEMPERATURE: 98 F | HEIGHT: 67 IN | DIASTOLIC BLOOD PRESSURE: 78 MMHG

## 2019-01-29 DIAGNOSIS — J44.9 MIXED TYPE COPD (CHRONIC OBSTRUCTIVE PULMONARY DISEASE): ICD-10-CM

## 2019-01-29 DIAGNOSIS — I15.2 HYPERTENSION ASSOCIATED WITH DIABETES: Primary | Chronic | ICD-10-CM

## 2019-01-29 DIAGNOSIS — E11.69 HYPERLIPIDEMIA ASSOCIATED WITH TYPE 2 DIABETES MELLITUS: ICD-10-CM

## 2019-01-29 DIAGNOSIS — I15.2 HYPERTENSION ASSOCIATED WITH DIABETES: Chronic | ICD-10-CM

## 2019-01-29 DIAGNOSIS — E11.59 HYPERTENSION ASSOCIATED WITH DIABETES: Primary | Chronic | ICD-10-CM

## 2019-01-29 DIAGNOSIS — F17.210 CIGARETTE NICOTINE DEPENDENCE WITHOUT COMPLICATION: ICD-10-CM

## 2019-01-29 DIAGNOSIS — R74.8 ELEVATED LIVER ENZYMES: ICD-10-CM

## 2019-01-29 DIAGNOSIS — G47.33 OSA (OBSTRUCTIVE SLEEP APNEA): ICD-10-CM

## 2019-01-29 DIAGNOSIS — E11.21 TYPE 2 DIABETES MELLITUS WITH NEPHROPATHY: Chronic | ICD-10-CM

## 2019-01-29 DIAGNOSIS — E78.5 HYPERLIPIDEMIA ASSOCIATED WITH TYPE 2 DIABETES MELLITUS: Primary | ICD-10-CM

## 2019-01-29 DIAGNOSIS — E11.59 HYPERTENSION ASSOCIATED WITH DIABETES: Chronic | ICD-10-CM

## 2019-01-29 DIAGNOSIS — L40.52 PSORIATIC ARTHRITIS, DESTRUCTIVE TYPE: ICD-10-CM

## 2019-01-29 DIAGNOSIS — E78.5 HYPERLIPIDEMIA ASSOCIATED WITH TYPE 2 DIABETES MELLITUS: ICD-10-CM

## 2019-01-29 DIAGNOSIS — E11.69 HYPERLIPIDEMIA ASSOCIATED WITH TYPE 2 DIABETES MELLITUS: Primary | ICD-10-CM

## 2019-01-29 DIAGNOSIS — Z79.60 LONG-TERM USE OF IMMUNOSUPPRESSANT MEDICATION: ICD-10-CM

## 2019-01-29 LAB
ALBUMIN SERPL BCP-MCNC: 3.7 G/DL
ALP SERPL-CCNC: 67 U/L
ALT SERPL W/O P-5'-P-CCNC: 47 U/L
ANION GAP SERPL CALC-SCNC: 11 MMOL/L
AST SERPL-CCNC: 33 U/L
BILIRUB SERPL-MCNC: 0.5 MG/DL
BUN SERPL-MCNC: 11 MG/DL
CALCIUM SERPL-MCNC: 9.3 MG/DL
CHLORIDE SERPL-SCNC: 102 MMOL/L
CO2 SERPL-SCNC: 26 MMOL/L
CREAT SERPL-MCNC: 1.1 MG/DL
EST. GFR  (AFRICAN AMERICAN): >60 ML/MIN/1.73 M^2
EST. GFR  (NON AFRICAN AMERICAN): >60 ML/MIN/1.73 M^2
GLUCOSE SERPL-MCNC: 120 MG/DL
POTASSIUM SERPL-SCNC: 4.3 MMOL/L
PROT SERPL-MCNC: 8.1 G/DL
SODIUM SERPL-SCNC: 139 MMOL/L

## 2019-01-29 PROCEDURE — 99999 PR PBB SHADOW E&M-EST. PATIENT-LVL IV: CPT | Mod: PBBFAC,,, | Performed by: PHYSICIAN ASSISTANT

## 2019-01-29 PROCEDURE — 36415 COLL VENOUS BLD VENIPUNCTURE: CPT

## 2019-01-29 PROCEDURE — 99999 PR PBB SHADOW E&M-EST. PATIENT-LVL IV: ICD-10-PCS | Mod: PBBFAC,,, | Performed by: PHYSICIAN ASSISTANT

## 2019-01-29 PROCEDURE — 99214 PR OFFICE/OUTPT VISIT, EST, LEVL IV, 30-39 MIN: ICD-10-PCS | Mod: S$PBB,,, | Performed by: PHYSICIAN ASSISTANT

## 2019-01-29 PROCEDURE — 80053 COMPREHEN METABOLIC PANEL: CPT

## 2019-01-29 PROCEDURE — 99214 OFFICE O/P EST MOD 30 MIN: CPT | Mod: PBBFAC,PN | Performed by: PHYSICIAN ASSISTANT

## 2019-01-29 PROCEDURE — 99214 OFFICE O/P EST MOD 30 MIN: CPT | Mod: S$PBB,,, | Performed by: PHYSICIAN ASSISTANT

## 2019-01-29 NOTE — PROGRESS NOTES
Subjective:       Patient ID: Trey Stevens is a 63 y.o. male.    Chief Complaint: Follow-up    63 year old male presents to clinic for 3 week f/u. He reports BP has been mostly 120s-130s/80s, rarely syst into 140s-150s. Glucose has been 127-234 AC breakfast. Pt still hesitant to begin DM medication. Pt reports smoking about one pack of cigarettes over 4 days. Liver enzymes were elevated in past - rechecked today but lab results still pending. He reports feeling at his baseline and reports no CP, SOB, fever, chills, or other medical complaints.    PCP: Dr. Chaparro    Patient Active Problem List:     Psoriatic arthritis, destructive type     Vitamin D deficiency     Psoriasis     Multiple lung nodules on CT     Immunosuppressed status     Long-term use of immunosuppressant medication     Mixed type COPD (chronic obstructive pulmonary disease)     Type 2 diabetes mellitus with nephropathy     Hypertension associated with diabetes     Vitiligo     SCHUYLER (obstructive sleep apnea)     Severe obesity (BMI 35.0-35.9 with comorbidity)     Hyperlipidemia associated with type 2 diabetes mellitus     Elevated liver enzymes     Cigarette nicotine dependence without complication      Review of Systems   Constitutional: Negative for chills and fever.   Eyes: Negative for visual disturbance.   Respiratory: Negative for cough and shortness of breath.    Cardiovascular: Negative for chest pain and leg swelling.   Gastrointestinal: Negative for abdominal pain, nausea and vomiting.   Endocrine: Negative for polydipsia and polyuria.   Genitourinary: Negative for difficulty urinating.   Skin: Negative for rash.   Neurological: Negative for dizziness, weakness, numbness and headaches.   Psychiatric/Behavioral: Negative for confusion.       Objective:       Vitals:    01/29/19 1039   BP: 122/78   BP Location: Right arm   Patient Position: Sitting   BP Method: Large (Manual)   Pulse: 82   Temp: 98.2 °F (36.8 °C)   TempSrc: Tympanic   SpO2:  "95%   Weight: 124.4 kg (274 lb 4 oz)   Height: 5' 7" (1.702 m)     Physical Exam   Constitutional: He is oriented to person, place, and time. He appears well-developed and well-nourished. No distress.   HENT:   Head: Normocephalic and atraumatic.   Eyes: EOM are normal. No scleral icterus.   Neck: Neck supple.   Cardiovascular: Normal rate and regular rhythm.   Pulmonary/Chest: Effort normal and breath sounds normal. No stridor. No respiratory distress. He has no decreased breath sounds. He has no wheezes. He has no rhonchi. He has no rales.   Musculoskeletal: Normal range of motion. He exhibits no edema.   Neurological: He is alert and oriented to person, place, and time. No cranial nerve deficit.   Skin: Skin is dry. No rash noted. He is not diaphoretic.   Psychiatric: He has a normal mood and affect. His speech is normal and behavior is normal. Thought content normal.       Assessment:       1. Hypertension associated with diabetes    2. Hyperlipidemia associated with type 2 diabetes mellitus    3. Type 2 diabetes mellitus with nephropathy    4. Mixed type COPD (chronic obstructive pulmonary disease)    5. Elevated liver enzymes    6. Psoriatic arthritis, destructive type    7. Long-term use of immunosuppressant medication    8. SCHUYLER (obstructive sleep apnea)    9. Cigarette nicotine dependence without complication        Plan:         Trey was seen today for follow-up.    Diagnoses and all orders for this visit:    Hypertension associated with diabetes  Currently controlled. Continue Rx and continue to monitor BP.    Hyperlipidemia associated with type 2 diabetes mellitus, Elevated liver enzymes  Pt taking statin. Rechecking CMP today due to past elevated liver enzymes. May need to discontinue statin if liver enzymes elevated.    Type 2 diabetes mellitus with nephropathy  -     Ambulatory consult to Diabetic Education  Healthy diet. Continue to monitor glucose. Refer to DM clinic for consult.    Mixed type COPD " (chronic obstructive pulmonary disease)  Stable.    Psoriatic arthritis, destructive type; Long-term use of immunosuppressant medication  Stable.     SCHUYLER (obstructive sleep apnea)  Stable.    Cigarette nicotine dependence without complication  Try to stop smoking.    F/u with PCP Dr. Chaparro in 2 months for further health management.

## 2019-02-14 ENCOUNTER — CLINICAL SUPPORT (OUTPATIENT)
Dept: PULMONOLOGY | Facility: CLINIC | Age: 64
End: 2019-02-14
Payer: MEDICAID

## 2019-02-14 ENCOUNTER — OFFICE VISIT (OUTPATIENT)
Dept: PULMONOLOGY | Facility: CLINIC | Age: 64
End: 2019-02-14
Payer: MEDICAID

## 2019-02-14 VITALS
BODY MASS INDEX: 43.16 KG/M2 | RESPIRATION RATE: 18 BRPM | DIASTOLIC BLOOD PRESSURE: 80 MMHG | OXYGEN SATURATION: 96 % | WEIGHT: 275 LBS | HEIGHT: 67 IN | SYSTOLIC BLOOD PRESSURE: 120 MMHG | HEART RATE: 80 BPM

## 2019-02-14 DIAGNOSIS — F17.210 CIGARETTE NICOTINE DEPENDENCE WITHOUT COMPLICATION: Chronic | ICD-10-CM

## 2019-02-14 DIAGNOSIS — G47.33 OSA (OBSTRUCTIVE SLEEP APNEA): Chronic | ICD-10-CM

## 2019-02-14 DIAGNOSIS — J44.9 MIXED TYPE COPD (CHRONIC OBSTRUCTIVE PULMONARY DISEASE): ICD-10-CM

## 2019-02-14 DIAGNOSIS — J44.9 MIXED TYPE COPD (CHRONIC OBSTRUCTIVE PULMONARY DISEASE): Primary | Chronic | ICD-10-CM

## 2019-02-14 DIAGNOSIS — E66.01 SEVERE OBESITY (BMI 35.0-35.9 WITH COMORBIDITY): Chronic | ICD-10-CM

## 2019-02-14 DIAGNOSIS — R91.8 MULTIPLE LUNG NODULES ON CT: Chronic | ICD-10-CM

## 2019-02-14 LAB
BRPFT: ABNORMAL
DLCO ADJ PRE: 16.22 ML/(MIN*MMHG) (ref 20.44–34.29)
DLCO SINGLE BREATH LLN: 20.44
DLCO SINGLE BREATH PRE REF: 59.3 %
DLCO SINGLE BREATH REF: 27.36
DLCOC SBVA LLN: 2.82
DLCOC SBVA PRE REF: 162.5 %
DLCOC SBVA REF: 4.06
DLCOC SINGLE BREATH LLN: 20.44
DLCOC SINGLE BREATH PRE REF: 59.3 %
DLCOC SINGLE BREATH REF: 27.36
DLCOVA LLN: 2.82
DLCOVA PRE REF: 162.5 %
DLCOVA PRE: 6.59 ML/(MIN*MMHG*L) (ref 2.82–5.3)
DLCOVA REF: 4.06
DLVAADJ PRE: 6.59 ML/(MIN*MMHG*L) (ref 2.82–5.3)
ERV LLN: 1.13
ERV PRE REF: 19.2 %
ERV REF: 1.13
FEF 25 75 CHG: 144.1 %
FEF 25 75 LLN: 1.32
FEF 25 75 POST REF: 76.5 %
FEF 25 75 PRE REF: 31.4 %
FEF 25 75 REF: 2.75
FET100 CHG: 19.5 %
FEV1 CHG: 19.7 %
FEV1 FVC CHG: 16.8 %
FEV1 FVC LLN: 65
FEV1 FVC POST REF: 104.9 %
FEV1 FVC PRE REF: 89.8 %
FEV1 FVC REF: 78
FEV1 LLN: 2.48
FEV1 POST REF: 55.1 %
FEV1 PRE REF: 46.1 %
FEV1 REF: 3.31
FEV6 CHG: 5 %
FEV6 LLN: 3.35
FEV6 POST REF: 51.9 %
FEV6 POST: 2.19 L (ref 3.35–5.07)
FEV6 PRE REF: 49.4 %
FEV6 PRE: 2.08 L (ref 3.35–5.07)
FEV6 REF: 4.21
FRCPLETH LLN: 2.52
FRCPLETH PREREF: 80.5 %
FRCPLETH REF: 3.51
FVC CHG: 2.4 %
FVC LLN: 3.25
FVC POST REF: 52.5 %
FVC PRE REF: 51.2 %
FVC REF: 4.28
IVC PRE: 1.07 L (ref 3.25–5.31)
IVC SINGLE BREATH LLN: 3.25
IVC SINGLE BREATH PRE REF: 25.1 %
IVC SINGLE BREATH REF: 4.28
MVV LLN: 108
MVV PRE REF: 40.9 %
MVV REF: 127
PEF CHG: 46.2 %
PEF LLN: 6.54
PEF POST REF: 62.2 %
PEF PRE REF: 42.5 %
PEF REF: 8.73
POST FEF 25 75: 2.1 L/S (ref 1.32–4.18)
POST FET 100: 11.7 SEC
POST FEV1 FVC: 81.33 % (ref 65.23–89.83)
POST FEV1: 1.82 L (ref 2.48–4.14)
POST FVC: 2.24 L (ref 3.25–5.31)
POST PEF: 5.43 L/S (ref 6.54–10.92)
PRE DLCO: 16.22 ML/(MIN*MMHG) (ref 20.44–34.29)
PRE ERV: 0.22 L (ref 1.13–1.13)
PRE FEF 25 75: 0.86 L/S (ref 1.32–4.18)
PRE FET 100: 9.78 SEC
PRE FEV1 FVC: 69.6 % (ref 65.23–89.83)
PRE FEV1: 1.52 L (ref 2.48–4.14)
PRE FRC PL: 2.83 L
PRE FVC: 2.19 L (ref 3.25–5.31)
PRE MVV: 52 L/MIN (ref 108.12–146.28)
PRE PEF: 3.71 L/S (ref 6.54–10.92)
PRE RV: 2.16 L (ref 1.7–3.05)
PRE TLC: 4.36 L (ref 5.59–7.89)
RAW LLN: 3.06
RAW PRE REF: 135.1 %
RAW PRE: 4.13 CMH2O*S/L (ref 3.06–3.06)
RAW REF: 3.06
RV LLN: 1.7
RV PRE REF: 91 %
RV REF: 2.38
RVTLC LLN: 29
RVTLC PRE REF: 131.6 %
RVTLC PRE: 49.69 % (ref 28.77–46.73)
RVTLC REF: 38
TLC LLN: 5.59
TLC PRE REF: 64.6 %
TLC REF: 6.74
VA PRE: 2.46 L (ref 6.59–6.59)
VA SINGLE BREATH LLN: 6.59
VA SINGLE BREATH PRE REF: 37.3 %
VA SINGLE BREATH REF: 6.59
VC LLN: 3.25
VC PRE REF: 51.2 %
VC PRE: 2.19 L (ref 3.25–5.31)
VC REF: 4.28
VTGRAWPRE: 2.69 L

## 2019-02-14 PROCEDURE — 99215 PR OFFICE/OUTPT VISIT, EST, LEVL V, 40-54 MIN: ICD-10-PCS | Mod: 25,S$PBB,, | Performed by: INTERNAL MEDICINE

## 2019-02-14 PROCEDURE — 94726 PLETHYSMOGRAPHY LUNG VOLUMES: CPT | Mod: 26,S$PBB,, | Performed by: INTERNAL MEDICINE

## 2019-02-14 PROCEDURE — 99999 PR PBB SHADOW E&M-EST. PATIENT-LVL III: ICD-10-PCS | Mod: PBBFAC,,, | Performed by: INTERNAL MEDICINE

## 2019-02-14 PROCEDURE — 99213 OFFICE O/P EST LOW 20 MIN: CPT | Mod: PBBFAC | Performed by: INTERNAL MEDICINE

## 2019-02-14 PROCEDURE — 94060 EVALUATION OF WHEEZING: CPT | Mod: 26,S$PBB,, | Performed by: INTERNAL MEDICINE

## 2019-02-14 PROCEDURE — 99999 PR PBB SHADOW E&M-EST. PATIENT-LVL III: CPT | Mod: PBBFAC,,, | Performed by: INTERNAL MEDICINE

## 2019-02-14 PROCEDURE — 94729 DIFFUSING CAPACITY: CPT | Mod: PBBFAC

## 2019-02-14 PROCEDURE — 94729 PR C02/MEMBANE DIFFUSE CAPACITY: ICD-10-PCS | Mod: 26,S$PBB,, | Performed by: INTERNAL MEDICINE

## 2019-02-14 PROCEDURE — 94729 DIFFUSING CAPACITY: CPT | Mod: 26,S$PBB,, | Performed by: INTERNAL MEDICINE

## 2019-02-14 PROCEDURE — 94726 PLETHYSMOGRAPHY LUNG VOLUMES: CPT | Mod: PBBFAC

## 2019-02-14 PROCEDURE — 94060 EVALUATION OF WHEEZING: CPT | Mod: PBBFAC

## 2019-02-14 PROCEDURE — 94726 PULM FUNCT TST PLETHYSMOGRAP: ICD-10-PCS | Mod: 26,S$PBB,, | Performed by: INTERNAL MEDICINE

## 2019-02-14 PROCEDURE — 99215 OFFICE O/P EST HI 40 MIN: CPT | Mod: 25,S$PBB,, | Performed by: INTERNAL MEDICINE

## 2019-02-14 PROCEDURE — 94060 PR EVAL OF BRONCHOSPASM: ICD-10-PCS | Mod: 26,S$PBB,, | Performed by: INTERNAL MEDICINE

## 2019-02-14 RX ORDER — ALBUTEROL SULFATE 90 UG/1
2 AEROSOL, METERED RESPIRATORY (INHALATION) EVERY 4 HOURS PRN
Qty: 18 G | Refills: 11 | Status: SHIPPED | OUTPATIENT
Start: 2019-02-14 | End: 2020-02-13 | Stop reason: SDUPTHER

## 2019-02-14 NOTE — ASSESSMENT & PLAN NOTE
MIXED COPD ROS: taking medications as instructed, no medication side effects noted, no significant ongoing wheezing or shortness of breath, using bronchodilator MDI less than twice a week.   New concerns: NONE.   Exam: appears well, vitals normal, no respiratory distress, acyanotic, normal RR, chest clear, no wheezing, crepitations, rhonchi, normal symmetric air entry.   Assessment:  MIXED COPD stable.   Plan: PROAIR. Current treatment plan is effective, no change in therapy.  PROAIR REFILLED. PFT in 1 year.

## 2019-02-14 NOTE — PATIENT INSTRUCTIONS
Lung Anatomy  Your lungs take air in to give your body oxygen, which the body needs to work. Your lungs, like all the tissues in your body, are made up of billions of tiny specialized cells. Old lung cells die and are replaced by new, identical lung cells. This natural process helps ensure healthy lungs.    Date Last Reviewed: 11/1/2016  © 8067-0750 [employer] [address] [place] [address], [place] [address]. All rights reserved. This information is not intended as a substitute for professional medical care. Always follow your healthcare professional's instructions.

## 2019-02-14 NOTE — ASSESSMENT & PLAN NOTE
Continue  AUTOCPAP of 4 - 20 CMWP ([facility])     Discussed therapeutic goals for positive airway pressure therapy(CPAP or BiPAP): Ideal is usage 100% of nights for 6 - 8 hours per night. Minimum usage is 70% of night for at least 4 hours per night used. Pateint expressed understanding. All Questions answered.    CPAP complaince evaluation in 1 year.

## 2019-02-14 NOTE — PROGRESS NOTES
Subjective:      Patient ID: Trey Stevens is a 63 y.o. male.    Patient Active Problem List   Diagnosis    Psoriatic arthritis, destructive type    Vitamin D deficiency    Psoriasis    Multiple lung nodules on CT    Immunosuppressed status    Long-term use of immunosuppressant medication    Mixed type COPD (chronic obstructive pulmonary disease)    Type 2 diabetes mellitus with nephropathy    Hypertension associated with diabetes    Vitiligo    SCHUYLER (obstructive sleep apnea)    Severe obesity (BMI 35.0-35.9 with comorbidity)    Hyperlipidemia associated with type 2 diabetes mellitus    Elevated liver enzymes    Cigarette nicotine dependence without complication     Problem list has been reviewed.      Chief Complaint: Sleep Apnea; COPD; and multiple lung nodules         HPI:     Follow up for SCHUYLER and CPAP complaince assessment.  he  is on AUTOPAP  of  4 - 20 CMWP .     He definitely thinks PAP is beneficial to his health and He wants to continue with PAP therapy.    Patient denies snoring, headaches, excessive daytime sleepiness      EPWORTH SLEEPINESS SCALE 2/14/2019 7/30/2018 1/3/2018 11/10/2017 9/28/2017 8/2/2017   Sitting and reading 0 0 1 0 1 1   Watching TV 0 2 2 2 1 2   Sitting, inactive in a public place (e.g. a theatre or a meeting) 3 1 1 0 0 2   As a passenger in a car for an hour without a break 0 0 0 0 2 0   Lying down to rest in the afternoon when circumstances permit 3 0 3 3 2 3   Sitting and talking to someone 0 0 0 0 1 1   Sitting quietly after a lunch without alcohol 1 1 2 2 3 2   In a car, while stopped for a few minutes in traffic 0 0 0 0 0 0   Total score 7 4 9 7 10 11         Patients reports NYHA II dyspnea    The patient does not have currently have symptoms / an exacerbation.      No recent change in breathing.     His current respiratory therapy regimen is PROAIR which provides relief. He is  adherent with his regimen.      A full  review of systems, past , family  and social  histories was performed except as mentioned in the note above, these are non contributory to the main issues discussed today.     Previous Report Reviewed: office notes     The following portions of the patient's history were reviewed and updated as appropriate: He  has a past medical history of Arthritis, Diabetes mellitus, Diabetes mellitus type 2, uncontrolled (7/19/2016), DM (diabetes mellitus) (2015), Gall stones, Obesity, Psoriasis (a type of skin inflammation), and Rheumatoid arthritis of foot.  He  has a past surgical history that includes Appendectomy and Cholecystectomy.  His family history includes Cancer in his mother; Cataracts in his mother; Diabetes in his mother; Hypertension in his mother; Stroke in his father.  He  reports that he has quit smoking. His smoking use included cigarettes. He started smoking about 25 years ago. He has a 24.00 pack-year smoking history. he has never used smokeless tobacco. He reports that he does not drink alcohol or use drugs.  He has a current medication list which includes the following prescription(s): humira pen, amlodipine, ammonium lactate, aspirin, benzonatate, calcipotriene, cholecalciferol (vitamin d3), clobetasol, folic acid, ketoconazole, losartan, methotrexate, onetouch delica lancets, onetouch ultra test, onetouch ultramini, pravastatin, proair hfa, tramadol, and triamcinolone acetonide 0.1%.  He has No Known Allergies..    Review of Systems   Constitutional: Negative for fever, fatigue and night sweats.   HENT: Positive for sore throat. Negative for nosebleeds, postnasal drip, rhinorrhea, congestion and hearing loss.    Eyes: Negative for itching.   Respiratory: Positive for snoring, cough, hemoptysis and wheezing. Negative for sputum production, orthopnea and Paroxysmal Nocturnal Dyspnea.    Cardiovascular: Negative for chest pain and leg swelling.   Genitourinary: Negative for difficulty urinating and hematuria.   Endocrine: Negative for cold intolerance  "and heat intolerance.    Musculoskeletal: Positive for arthralgias and back pain.   Skin: Positive for rash.        Diffuse Psoriasis   Gastrointestinal: Positive for abdominal pain. Negative for vomiting and acid reflux.   Neurological: Negative for weakness.   Hematological: Does not bruise/bleed easily and no excessive bruising.   Psychiatric/Behavioral: The patient is not nervous/anxious.    All other systems reviewed and are negative.       Objective:     Vitals:    19 1358   BP: 120/80   Pulse: 80   Resp: 18   SpO2: 96%   Weight: 124.7 kg (275 lb)   Height: 5' 7" (1.702 m)     body mass index is 43.07 kg/m².     Physical Exam   Constitutional: He is oriented to person, place, and time. He appears well-developed and well-nourished.   Disheveled in appearance   HENT:   Head: Normocephalic and atraumatic.   Mallampati 3    Eyes: EOM are normal. Pupils are equal, round, and reactive to light.   Neck: Normal range of motion. Neck supple.   Neck 48 cm   Cardiovascular: Normal rate and regular rhythm.   Pulmonary/Chest: Effort normal. He has decreased breath sounds in the right upper field, the right middle field, the right lower field, the left upper field and the left middle field. He has no wheezes. He has no rales.   Abdominal: Soft. Bowel sounds are normal.   Musculoskeletal: Normal range of motion.   Neurological: He is alert and oriented to person, place, and time.   Skin: Rash noted.   Psychiatric: He has a normal mood and affect. His behavior is normal.   Nursing note and vitals reviewed.      Personal Diagnostic Review  CPAP complaince download.       Pulmonary function tests: FEV1: 1.52  (46.1 % predicted), FVC:  2.19 (51.2 % predicted), FEV1/FVC:70, T.36 (64.6 % predicted), RV/TLVC: 50 (131.6 % predicted), DLCO: 16.22 (59.3 % predicted)   Severe mixed obstruction with restriction. Diffusion capacity is moderately reduced but corrects for alveolar volume      Assessment / plan:     Discussed " diagnosis, its evaluation, treatment and usual course. All questions answered.    Problem List Items Addressed This Visit        Pulmonary    Multiple lung nodules on CT    Current Assessment & Plan     No significant interval change in multiple bilateral pulmonary nodules.   Likely drug induced lung nodules - Methotrexate   Contiue to monitor radiologically.   Annual CXR.           Relevant Orders    X-Ray Chest PA And Lateral    Mixed type COPD (chronic obstructive pulmonary disease) - Primary    Current Assessment & Plan     MIXED COPD ROS: taking medications as instructed, no medication side effects noted, no significant ongoing wheezing or shortness of breath, using bronchodilator MDI less than twice a week.   New concerns: NONE.   Exam: appears well, vitals normal, no respiratory distress, acyanotic, normal RR, chest clear, no wheezing, crepitations, rhonchi, normal symmetric air entry.   Assessment:  MIXED COPD stable.   Plan: PROAIR. Current treatment plan is effective, no change in therapy.  PROAIR REFILLED. PFT in 1 year.          Relevant Medications    PROAIR HFA 90 mcg/actuation inhaler    Other Relevant Orders    Complete PFT with bronchodilator       Endocrine    Severe obesity (BMI 35.0-35.9 with comorbidity)    Current Assessment & Plan     General weight loss/lifestyle modification strategies discussed (elicit support from others; identify saboteurs; non-food rewards, etc).  Diet interventions: low calorie (1000 kCal/d) deficit diet.            Other    SCHUYLER (obstructive sleep apnea)    Current Assessment & Plan     Continue  AUTOCPAP of 4 - 20 CMWP ([facility])     Discussed therapeutic goals for positive airway pressure therapy(CPAP or BiPAP): Ideal is usage 100% of nights for 6 - 8 hours per night. Minimum usage is 70% of night for at least 4 hours per night used. Pateint expressed understanding. All Questions answered.    CPAP complaince evaluation in 1 year.          Cigarette nicotine  dependence without complication    Current Assessment & Plan     Assistance with smoking cessation was offered, including:  []  Medications  [x]  Counseling  []  Printed Information on Smoking Cessation  []  Referral to a Smoking Cessation Program    Patient was counseled regarding smoking for >10 minutes.               TIME SPENT WITH PATIENT: Time spent: 40 minutes in face to face  discussion concerning diagnosis, prognosis, review of lab and test results, benefits of treatment as well as management of disease, counseling of patient and coordination of care between various health  care providers . Greater than half the time spent was used for coordination of care and counseling of patient.     Follow-up in about 1 year (around 2/14/2020) for Mixed obstructive and restrictive lung disease, SCHUYLER, Obesity, Multiple lung nodules, Tobacco use dis.

## 2019-02-14 NOTE — ASSESSMENT & PLAN NOTE
No significant interval change in multiple bilateral pulmonary nodules.   Likely drug induced lung nodules - Methotrexate   Contiue to monitor radiologically.   Annual CXR.

## 2019-02-18 ENCOUNTER — TELEPHONE (OUTPATIENT)
Dept: RHEUMATOLOGY | Facility: CLINIC | Age: 64
End: 2019-02-18

## 2019-02-18 NOTE — TELEPHONE ENCOUNTER
----- Message from Kai Juan sent at 2/18/2019  8:39 AM CST -----  Contact: pt           ..Type:  Patient Returning Call    Who Called: pt   Who Left Message for Patient:pt not sure   Does the patient know what this is regarding?:reschedule   Would the patient rather a call back or a response via [facility]? Callback   Best Call Back Number:..[phone] (home)    Additional Information:

## 2019-02-20 ENCOUNTER — OFFICE VISIT (OUTPATIENT)
Dept: RHEUMATOLOGY | Facility: CLINIC | Age: 64
End: 2019-02-20
Payer: MEDICAID

## 2019-02-20 ENCOUNTER — LAB VISIT (OUTPATIENT)
Dept: LAB | Facility: HOSPITAL | Age: 64
End: 2019-02-20
Attending: PHYSICIAN ASSISTANT
Payer: MEDICAID

## 2019-02-20 VITALS
SYSTOLIC BLOOD PRESSURE: 129 MMHG | HEIGHT: 67 IN | BODY MASS INDEX: 43.04 KG/M2 | HEART RATE: 88 BPM | DIASTOLIC BLOOD PRESSURE: 78 MMHG | WEIGHT: 274.25 LBS

## 2019-02-20 DIAGNOSIS — Z79.60 LONG-TERM USE OF IMMUNOSUPPRESSANT MEDICATION: ICD-10-CM

## 2019-02-20 DIAGNOSIS — L40.52 PSORIATIC ARTHRITIS, DESTRUCTIVE TYPE: Primary | ICD-10-CM

## 2019-02-20 DIAGNOSIS — L40.9 PSORIASIS: ICD-10-CM

## 2019-02-20 DIAGNOSIS — R74.8 ELEVATED LIVER ENZYMES: ICD-10-CM

## 2019-02-20 DIAGNOSIS — E55.9 VITAMIN D DEFICIENCY: ICD-10-CM

## 2019-02-20 DIAGNOSIS — D84.9 IMMUNOSUPPRESSED STATUS: ICD-10-CM

## 2019-02-20 DIAGNOSIS — L40.52 PSORIATIC ARTHRITIS, DESTRUCTIVE TYPE: ICD-10-CM

## 2019-02-20 LAB
ALBUMIN SERPL BCP-MCNC: 3.8 G/DL
ALP SERPL-CCNC: 73 U/L
ALT SERPL W/O P-5'-P-CCNC: 42 U/L
ANION GAP SERPL CALC-SCNC: 11 MMOL/L
AST SERPL-CCNC: 28 U/L
BASOPHILS # BLD AUTO: 0.03 K/UL
BASOPHILS NFR BLD: 0.4 %
BILIRUB SERPL-MCNC: 0.4 MG/DL
BUN SERPL-MCNC: 15 MG/DL
CALCIUM SERPL-MCNC: 9.7 MG/DL
CHLORIDE SERPL-SCNC: 104 MMOL/L
CO2 SERPL-SCNC: 25 MMOL/L
CREAT SERPL-MCNC: 1.3 MG/DL
CRP SERPL-MCNC: 1.3 MG/L
DIFFERENTIAL METHOD: ABNORMAL
EOSINOPHIL # BLD AUTO: 0.4 K/UL
EOSINOPHIL NFR BLD: 4.9 %
ERYTHROCYTE [DISTWIDTH] IN BLOOD BY AUTOMATED COUNT: 15.7 %
ERYTHROCYTE [SEDIMENTATION RATE] IN BLOOD BY WESTERGREN METHOD: 23 MM/HR
EST. GFR  (AFRICAN AMERICAN): >60 ML/MIN/1.73 M^2
EST. GFR  (NON AFRICAN AMERICAN): 59 ML/MIN/1.73 M^2
GLUCOSE SERPL-MCNC: 149 MG/DL
HCT VFR BLD AUTO: 48.6 %
HGB BLD-MCNC: 15.5 G/DL
IMM GRANULOCYTES # BLD AUTO: 0.03 K/UL
IMM GRANULOCYTES NFR BLD AUTO: 0.4 %
LYMPHOCYTES # BLD AUTO: 2.8 K/UL
LYMPHOCYTES NFR BLD: 37 %
MCH RBC QN AUTO: 29 PG
MCHC RBC AUTO-ENTMCNC: 31.9 G/DL
MCV RBC AUTO: 91 FL
MONOCYTES # BLD AUTO: 0.5 K/UL
MONOCYTES NFR BLD: 7.1 %
NEUTROPHILS # BLD AUTO: 3.8 K/UL
NEUTROPHILS NFR BLD: 50.6 %
NRBC BLD-RTO: 0 /100 WBC
PLATELET # BLD AUTO: 257 K/UL
PMV BLD AUTO: 10.3 FL
POTASSIUM SERPL-SCNC: 4.3 MMOL/L
PROT SERPL-MCNC: 8.4 G/DL
RBC # BLD AUTO: 5.34 M/UL
SODIUM SERPL-SCNC: 140 MMOL/L
WBC # BLD AUTO: 7.56 K/UL

## 2019-02-20 PROCEDURE — 99999 PR PBB SHADOW E&M-EST. PATIENT-LVL V: CPT | Mod: PBBFAC,,, | Performed by: PHYSICIAN ASSISTANT

## 2019-02-20 PROCEDURE — 99999 PR PBB SHADOW E&M-EST. PATIENT-LVL V: ICD-10-PCS | Mod: PBBFAC,,, | Performed by: PHYSICIAN ASSISTANT

## 2019-02-20 PROCEDURE — 85652 RBC SED RATE AUTOMATED: CPT

## 2019-02-20 PROCEDURE — 36415 COLL VENOUS BLD VENIPUNCTURE: CPT

## 2019-02-20 PROCEDURE — 85025 COMPLETE CBC W/AUTO DIFF WBC: CPT

## 2019-02-20 PROCEDURE — 80053 COMPREHEN METABOLIC PANEL: CPT

## 2019-02-20 PROCEDURE — 86140 C-REACTIVE PROTEIN: CPT

## 2019-02-20 PROCEDURE — 99214 OFFICE O/P EST MOD 30 MIN: CPT | Mod: S$PBB,,, | Performed by: PHYSICIAN ASSISTANT

## 2019-02-20 PROCEDURE — 99214 PR OFFICE/OUTPT VISIT, EST, LEVL IV, 30-39 MIN: ICD-10-PCS | Mod: S$PBB,,, | Performed by: PHYSICIAN ASSISTANT

## 2019-02-20 PROCEDURE — 99215 OFFICE O/P EST HI 40 MIN: CPT | Mod: PBBFAC,PN | Performed by: PHYSICIAN ASSISTANT

## 2019-02-20 RX ORDER — ETANERCEPT 50 MG/ML
50 SOLUTION SUBCUTANEOUS WEEKLY
Qty: 3.92 ML | Refills: 6 | Status: SHIPPED | OUTPATIENT
Start: 2019-02-20 | End: 2019-07-30

## 2019-02-20 NOTE — PROGRESS NOTES
Subjective:       Patient ID: Trey Stevens is a 63 y.o. male.    Chief Complaint: Psoriatic Arthritis and Psoriasis    Trey is here for routine follow up.     He has chronic psoriatic arthritis and psoriasis.  He is on humira 40 mg Q 2 weeks (since 2015)  and  mtx 15mg/week (splitting dose 3 tabs am and 3 tabs pm), folic acid once daily. In 2017 his mtx was cut back from 20 mg weekly  due to chronic lung issues and elevated lft.  He has multiple lung nodules which are followed by pulm and stable per his last pulmonary visit feb 2019, his breathing is stable. He does still smoke. He sees dermatology also for psoriasis as well as vitiligo.  He has some chronic arthritis changes to his dip joints bilaterally and chronic deformities to his feet with lateral deviation of his toes.  Pain today 4/10. Feet and ankles. Feet hurt more at the end of the day. Using otc tylenol  Every day but only takes the tramadol 1-2 X weekly at night when his feet hurt. His skin is still not clear, persistent thick plaque PsO nestor elbows and left forearm, forehead and stomach. Taking him meds regularly and using topicals  Bid to rash,   Vitiligo better as well. Tolerating his meds well.     In the past failed mtx monotherapy, failed topicals and UV. Was on vit D 50K weekly but now on otc Vit D3 5000 twice weekly     In July 2018 Dx pnx given abx. Completed trt. No recurrence.       Psoriasis   Associated symptoms include a rash. Pertinent negatives include no abdominal pain, arthralgias, chest pain, chills, congestion, coughing, fatigue, fever, headaches, joint swelling, myalgias, nausea, vomiting or weakness.     Review of Systems   Constitutional: Negative for chills, fatigue, fever and unexpected weight change.        Poor hygiene      HENT: Negative for congestion, mouth sores and rhinorrhea.    Eyes: Negative for pain, discharge and redness.   Respiratory: Negative for cough, shortness of breath and wheezing.    Cardiovascular: Negative  "for chest pain and leg swelling.   Gastrointestinal: Negative for abdominal pain, diarrhea, nausea and vomiting.   Endocrine: Negative for cold intolerance and heat intolerance.   Genitourinary: Negative for dysuria.   Musculoskeletal: Negative for arthralgias, joint swelling and myalgias.   Skin: Positive for color change and rash.   Allergic/Immunologic: Negative for immunocompromised state.   Neurological: Negative for weakness and headaches.        Diabetic neuropathy   Psychiatric/Behavioral: The patient is not nervous/anxious.          Objective:   /78   Pulse 88   Ht 5' 7" (1.702 m)   Wt 124.4 kg (274 lb 4 oz)   BMI 42.95 kg/m²      Physical Exam   Constitutional: He is oriented to person, place, and time and well-developed, well-nourished, and in no distress.   HENT:   Head: Normocephalic and atraumatic.   Eyes: Pupils are equal, round, and reactive to light. Right eye exhibits no discharge.   Neck: Normal range of motion.   Cardiovascular: Normal rate, regular rhythm and normal heart sounds.  Exam reveals no friction rub.    Pulmonary/Chest: Effort normal and breath sounds normal. No respiratory distress.   Abdominal: Soft. He exhibits no distension. There is no tenderness.   Lymphadenopathy:     He has no cervical adenopathy.   Neurological: He is alert and oriented to person, place, and time.   Skin: Rash noted. No erythema.          Psoriasis rash bilateral elbows and small patch left hip    Vitiligo bilateral forearms       Psychiatric: Mood normal.   Musculoskeletal: Normal range of motion. He exhibits edema and deformity.   Bilateral wrists, mcps no synovitis good rom   nestor dips with chronic damage, bony enlargement     nestor Right 5th dip flexion deformity,   Left thumb no flexion to the IP joint, right thumb ip joint motion decreased     good rom to nestor ankles, no swelling  nestor feet very dirty, 2-5 toes deviate laterally.  Mild lower ext edema                                       Recent " Results (from the past 168 hour(s))   Complete PFT with bronchodilator    Collection Time: 02/14/19  4:21 PM   Result Value Ref Range    Interpretation       Severe mixed obstruction with restriction. Airflow is improved follwingthe administration of bronchodilator. Improvement in airflow following bronchodilator therapy suggests an asthmatic component. Diffusion capcity is moderately reduced but corrects for   alveolar volume. Comaprd to previous test of 11/10/17: No significant change.       Post FEV1 1.82 (L) 2.48 - 4.14 L    Post FEV1 FVC 81.33 65.23 - 89.83 %    Post FVC 2.24 (L) 3.25 - 5.31 L    Post PEF 5.43 (L) 6.54 - 10.92 L/s    FEV6 POST 2.19 (L) 3.35 - 5.07 L    Post FEF 25 75 2.10 1.32 - 4.18 L/s    Post  11.70 sec    Pre FEV1 1.52 (L) 2.48 - 4.14 L    Pre FEV1 FVC 69.60 65.23 - 89.83 %    Pre MVV 52.00 (L) 108.12 - 146.28 L/min    Pre FVC 2.19 (L) 3.25 - 5.31 L    Pre PEF 3.71 (L) 6.54 - 10.92 L/s    FEV6 PRE 2.08 (L) 3.35 - 5.07 L    Pre FEF 25 75 0.86 (L) 1.32 - 4.18 L/s    Pre  9.78 sec    Pre FRC PL 2.83 L    Pre RV 2.16 1.70 - 3.05 L    Pre TLC 4.36 (L) 5.59 - 7.89 L    RVTLC PRE 49.69 (H) 28.77 - 46.73 %    VC PRE 2.19 (L) 3.25 - 5.31 L    Pre ERV 0.22 (L) 1.13 - 1.13 L    Raw PRE 4.13 (H) 3.06 - 3.06 cmH2O*s/L    VTGRAWPRE 2.69 L    Pre DLCO 16.22 (L) 20.44 - 34.29 ml/(min*mmHg)    DLCOVA PRE 6.59 (H) 2.82 - 5.30 ml/(min*mmHg*L)    VA PRE 2.46 (L) 6.59 - 6.59 L    IVC PRE 1.07 (L) 3.25 - 5.31 L    DLCO ADJ PRE 16.22 (L) 20.44 - 34.29 ml/(min*mmHg)    DLVAAdj PRE 6.59 (H) 2.82 - 5.30 ml/(min*mmHg*L)    FVC Ref 4.28     FVC LLN 3.25     FVC Pre Ref 51.2 %    FVC Post Ref 52.5 %    FVC Chg 2.4 %    FEV1 Ref 3.31     FEV1 LLN 2.48     FEV1 Pre Ref 46.1 %    FEV1 Post Ref 55.1 %    FEV1 Chg 19.7 %    FEV1 FVC Ref 78     FEV1 FVC LLN 65     FEV1 FVC Pre Ref 89.8 %    FEV1 FVC Post Ref 104.9 %    FEV1 FVC Chg 16.8 %    FEV6 Ref 4.21     FEV6 LLN 3.35     FEV6 Pre Ref 49.4 %    FEV6 Post  Ref 51.9 %    FEV6 Chg 5.0 %    FEF 25 75 Ref 2.75     FEF 25 75 LLN 1.32     FEF 25 75 Pre Ref 31.4 %    FEF 25 75 Post Ref 76.5 %    FEF 25 75 Chg 144.1 %    PEF Ref 8.73     PEF LLN 6.54     PEF Pre Ref 42.5 %    PEF Post Ref 62.2 %    PEF Chg 46.2 %    FPH426 Chg 19.5 %    MVV Ref 127     MVV      MVV Pre Ref 40.9 %    TLC Ref 6.74     TLC LLN 5.59     TLC Pre Ref 64.6 %    VC Ref 4.28     VC LLN 3.25     VC Pre Ref 51.2 %    FRCpleth Ref 3.51     FRCpleth LLN 2.52     FRCpleth PreRef 80.5 %    ERV Ref 1.13     ERV LLN 1.13     ERV Pre Ref 19.2 %    RV Ref 2.38     RV LLN 1.70     RV Pre Ref 91.0 %    RVTLC Ref 38     RVTLC LLN 29     RVTLC Pre Ref 131.6 %    Raw Ref 3.06     Raw LLN 3.06     Raw Pre Ref 135.1 %    DLCO Single Breath Ref 27.36     DLCO Single Breath LLN 20.44     DLCO Single Breath Pre Ref 59.3 %    DLCOc Single Breath Ref 27.36     DLCOc Single Breath LLN 20.44     DLCOc Single Breath Pre Ref 59.3 %    DLCOVA Ref 4.06     DLCOVA LLN 2.82     DLCOVA Pre Ref 162.5 %    DLCOc SBVA Ref 4.06     DLCOc SBVA LLN 2.82     DLCOc SBVA Pre Ref 162.5 %    VA Single Breath Ref 6.59     VA Single Breath LLN 6.59     VA Single Breath Pre Ref 37.3 %    IVC Single Breath Ref 4.28     IVC Single Breath LLN 3.25     IVC Single Breath Pre Ref 25.1 %   CBC auto differential    Collection Time: 02/20/19  2:41 PM   Result Value Ref Range    WBC 7.56 3.90 - 12.70 K/uL    RBC 5.34 4.60 - 6.20 M/uL    Hemoglobin 15.5 14.0 - 18.0 g/dL    Hematocrit 48.6 40.0 - 54.0 %    MCV 91 82 - 98 fL    MCH 29.0 27.0 - 31.0 pg    MCHC 31.9 (L) 32.0 - 36.0 g/dL    RDW 15.7 (H) 11.5 - 14.5 %    Platelets 257 150 - 350 K/uL    MPV 10.3 9.2 - 12.9 fL    Immature Granulocytes 0.4 0.0 - 0.5 %    Gran # (ANC) 3.8 1.8 - 7.7 K/uL    Immature Grans (Abs) 0.03 0.00 - 0.04 K/uL    Lymph # 2.8 1.0 - 4.8 K/uL    Mono # 0.5 0.3 - 1.0 K/uL    Eos # 0.4 0.0 - 0.5 K/uL    Baso # 0.03 0.00 - 0.20 K/uL    nRBC 0 0 /100 WBC    Gran% 50.6 38.0 -  73.0 %    Lymph% 37.0 18.0 - 48.0 %    Mono% 7.1 4.0 - 15.0 %    Eosinophil% 4.9 0.0 - 8.0 %    Basophil% 0.4 0.0 - 1.9 %    Differential Method Automated    Comprehensive metabolic panel    Collection Time: 19  2:41 PM   Result Value Ref Range    Sodium 140 136 - 145 mmol/L    Potassium 4.3 3.5 - 5.1 mmol/L    Chloride 104 95 - 110 mmol/L    CO2 25 23 - 29 mmol/L    Glucose 149 (H) 70 - 110 mg/dL    BUN, Bld 15 8 - 23 mg/dL    Creatinine 1.3 0.5 - 1.4 mg/dL    Calcium 9.7 8.7 - 10.5 mg/dL    Total Protein 8.4 6.0 - 8.4 g/dL    Albumin 3.8 3.5 - 5.2 g/dL    Total Bilirubin 0.4 0.1 - 1.0 mg/dL    Alkaline Phosphatase 73 55 - 135 U/L    AST 28 10 - 40 U/L    ALT 42 10 - 44 U/L    Anion Gap 11 8 - 16 mmol/L    eGFR if African American >60 >60 mL/min/1.73 m^2    eGFR if non African American 59 (A) >60 mL/min/1.73 m^2   C-reactive protein    Collection Time: 19  2:41 PM   Result Value Ref Range    CRP 1.3 0.0 - 8.2 mg/L     Results for ESTRELLA PETERSONGABRIEL PENN (MRN [id number]) as of 10/18/2018 11:22   Ref. Range 2018 11:52   Vit D, 25-Hydroxy Latest Ref Range: 30 - 96 ng/mL 29 (L)       Component      Latest Ref Rng & Units 2018   NIL      See text IU/mL 0.034   TB Antigen      See text IU/mL 0.115   TB Antigen - Nil      See Text IU/mL 0.081   Mitogen - Nil      See text IU/mL >10.000   TB Gold       Negative        19 Pulmonary function tests: FEV1: 1.52  (46.1 % predicted), FVC:  2.19 (51.2 % predicted), FEV1/FVC:70, T.36 (64.6 % predicted), RV/TLVC: 50 (131.6 % predicted), DLCO: 16.22 (59.3 % predicted)   Severe mixed obstruction with restriction. Diffusion capacity is moderately reduced but corrects for alveolar volume      18 Chest x-ray     COMPARISON:  2018    FINDINGS:  Cardiac silhouette and mediastinal contours are stable.  Lungs improved aeration of the lung bases with minimal interstitial changes remaining which may be chronic.  Osseous structures are intact.      Impression        Improved basilar aeration.           10/30/17 nestor hand and foot x-ray- damage in both hand and feet consistent with PsA   4/2016 XR hands reviewed: psoriatic arthritis changes noted to dip joints bilaterally, nestor thumb IP joints        Assessment:       1. Psoriatic arthritis, destructive type    2. Psoriasis    3. Long-term use of immunosuppressant medication    4. Vitamin D deficiency    5. Elevated liver enzymes        1. Psoriatic arthritis with psoriasis:on humira and mtx 15mg week split dose-   Mod skin psoriasis BSA ~14%,  Mild foot/ankle involvement     2. Medication monitoring: no toxicity from humira or mtx, monitor lfts     3. Immunocompromised: utd, except zoster, possible verve study?-    4. Vit D deficiency: completed vit D 50,000U/2Xweekly, last level low normal 26 (2/2018) now on otc Vit D3 5000 twice weekly     5. Chronic lung nodules- stable monitored by pulmonology- will see again feb 2020    6. tob use- smoking       Plan:         Stop humira and move to Enbrel 50 mg Q wk for active psoriasis  His injection is due today 2/20/19- ok to administer and then stop 3/6/19 and start enbrel Q wk   Nurse teaching for home admin    Always remember to Hold mtx and enbrel for signs of infection or for surgery   Pt verbalized understanding    C/w topical back bid prn for his elbow rash    Failed mtx, failed topicals, failing humira  If he also fails enbrel then will move to cosentyx     TB gold done 6/2018- neg, no need to repeat yet  Do every 1-2 years while on biologics    Increase otc vit D3 to 5K every day, check d level Q year     Return in 8 weeks   With labs cbc, cmp, esr and crp      Repeat  xrays hands and feet in 1 year    Counseled pt on smoking cessation    Please call or send portal message with any questions or concerns

## 2019-02-22 ENCOUNTER — TELEPHONE (OUTPATIENT)
Dept: PHARMACY | Facility: CLINIC | Age: 64
End: 2019-02-22

## 2019-02-28 ENCOUNTER — OFFICE VISIT (OUTPATIENT)
Dept: DIABETES | Facility: CLINIC | Age: 64
End: 2019-02-28
Payer: MEDICAID

## 2019-02-28 VITALS
BODY MASS INDEX: 41.36 KG/M2 | DIASTOLIC BLOOD PRESSURE: 72 MMHG | WEIGHT: 272.94 LBS | HEIGHT: 68 IN | SYSTOLIC BLOOD PRESSURE: 126 MMHG

## 2019-02-28 DIAGNOSIS — E66.01 SEVERE OBESITY (BMI 35.0-35.9 WITH COMORBIDITY): ICD-10-CM

## 2019-02-28 DIAGNOSIS — E78.5 HYPERLIPIDEMIA ASSOCIATED WITH TYPE 2 DIABETES MELLITUS: ICD-10-CM

## 2019-02-28 DIAGNOSIS — E11.59 HYPERTENSION ASSOCIATED WITH DIABETES: Chronic | ICD-10-CM

## 2019-02-28 DIAGNOSIS — E11.69 HYPERLIPIDEMIA ASSOCIATED WITH TYPE 2 DIABETES MELLITUS: ICD-10-CM

## 2019-02-28 DIAGNOSIS — E11.21 TYPE 2 DIABETES MELLITUS WITH NEPHROPATHY: Primary | Chronic | ICD-10-CM

## 2019-02-28 DIAGNOSIS — I15.2 HYPERTENSION ASSOCIATED WITH DIABETES: Chronic | ICD-10-CM

## 2019-02-28 LAB — GLUCOSE SERPL-MCNC: 123 MG/DL (ref 70–110)

## 2019-02-28 PROCEDURE — 99215 OFFICE O/P EST HI 40 MIN: CPT | Mod: S$PBB,,, | Performed by: NURSE PRACTITIONER

## 2019-02-28 PROCEDURE — 99215 PR OFFICE/OUTPT VISIT, EST, LEVL V, 40-54 MIN: ICD-10-PCS | Mod: S$PBB,,, | Performed by: NURSE PRACTITIONER

## 2019-02-28 PROCEDURE — 99999 PR PBB SHADOW E&M-EST. PATIENT-LVL III: ICD-10-PCS | Mod: PBBFAC,,, | Performed by: NURSE PRACTITIONER

## 2019-02-28 PROCEDURE — 99213 OFFICE O/P EST LOW 20 MIN: CPT | Mod: PBBFAC,PN | Performed by: NURSE PRACTITIONER

## 2019-02-28 PROCEDURE — 82962 GLUCOSE BLOOD TEST: CPT | Mod: PBBFAC,PN | Performed by: NURSE PRACTITIONER

## 2019-02-28 PROCEDURE — 99999 PR PBB SHADOW E&M-EST. PATIENT-LVL III: CPT | Mod: PBBFAC,,, | Performed by: NURSE PRACTITIONER

## 2019-02-28 RX ORDER — LANCETS 33 GAUGE
EACH MISCELLANEOUS 3 TIMES DAILY
Qty: 100 EACH | Refills: 11 | Status: SHIPPED | OUTPATIENT
Start: 2019-02-28 | End: 2022-01-28 | Stop reason: ALTCHOICE

## 2019-02-28 NOTE — TELEPHONE ENCOUNTER
----- Message from Kai Juan sent at 2/28/2019  2:43 PM CST -----  Contact: [facility]     ..Type:  Diabetic/Medical Supplies Request    [facility] of Call[facility]   What supplies are needed:1 touch ultra blue test strips   What is the brand of the supplies: Ultra Blue   Refill or New Rx:refill   If checking glucose, how many times do they check it?: 2 x day   Who prescribed the original supplies:yanira   Pharmacy/Company Name, Phone #, Location: [facility]   Requesting a Call Back?:no   Would the patient rather a call back or a response via [facility]?    Best Call Back Number: ..[phone] (home)    Additional Information:                ..  [id number] [place] - [address] AT [place] [address] [address] [address]  Phone: [phone] Fax: [phone]

## 2019-02-28 NOTE — PROGRESS NOTES
Subjective:         Patient ID: Trey Stevens is a 63 y.o. male.  Patient's current PCP is Brittanie Kaba MD.   Social History     Socioeconomic History    Marital status: Single     Spouse name: Not on file    Number of children: Not on file    Years of education: Not on file    Highest education level: Not on file   Social Needs    Financial resource strain: Not on file    Food insecurity - worry: Not on file    Food insecurity - inability: Not on file    Transportation needs - medical: Not on file    Transportation needs - non-medical: Not on file   Occupational History    Not on file   Tobacco Use    Smoking status: Former Smoker     Packs/day: 1.00     Years: 24.00     Pack years: 24.00     Types: Cigarettes     Start date: 1994    Smokeless tobacco: Never Used    Tobacco comment: 7/30/2018 referral to smoking cessation program   Substance and Sexual Activity    Alcohol use: No     Alcohol/week: 0.0 oz    Drug use: No    Sexual activity: No   Other Topics Concern    Not on file   Social History Narrative    Not on file       Chief Complaint: Diabetes    HPI  Trey Stevens is a 63 y.o. White male presenting for new consultation for diabetes. Patient has been diagnosed with diabetes for over three years and has the following complications associated with diabetes: peripheral neuropathy. Blood glucose testing is performed regularly. In the past 1 week patient reports blood glucose values to have approximately ranged from 130-180 fasting. Condition is not at goal.     He denies any recent hospital admissions, emergency room visits, hypoglycemia.         //   , Body mass index is 41.5 kg/m².    His blood sugar in clinic today is:   Lab Results   Component Value Date    POCGLU 123 (A) 02/28/2019       Labs reviewed and are noted below.    His most recent A1C is:   Lab Results   Component Value Date    HGBA1C 8.5 (H) 12/20/2018     No results found for: CPEPTIDE  No results found for:  GLUTAMICACID  Lab Results   Component Value Date    WBC 7.56 02/20/2019    HGB 15.5 02/20/2019    HCT 48.6 02/20/2019     02/20/2019    CHOL 238 (H) 12/20/2018    TRIG 142 12/20/2018    HDL 49 12/20/2018    ALT 42 02/20/2019    AST 28 02/20/2019     02/20/2019    K 4.3 02/20/2019     02/20/2019    CREATININE 1.3 02/20/2019    BUN 15 02/20/2019    CO2 25 02/20/2019    TSH 0.939 02/19/2018    PSA 0.27 02/19/2018    INR 1.0 07/20/2018    HGBA1C 8.5 (H) 12/20/2018       CURRENT DM MEDICATIONS: NONE    No current facility-administered medications for this visit.        Health Maintenance   Topic Date Due    Zoster Vaccine  03/23/2017    Foot Exam  04/10/2019    Hemoglobin A1c  06/20/2019    Eye Exam  09/05/2019    Lipid Panel  12/20/2019    Low Dose Statin  02/20/2020    TETANUS VACCINE  02/24/2026    Colonoscopy  10/24/2026    Hepatitis C Screening  Completed    Pneumococcal Vaccine (Medium Risk)  Completed    Influenza Vaccine  Completed       STANDARDS OF CARE:  Current Ophthalmologist/Optometrist: UTD.   Current Podiatrist: UTD  ACE/ARB: No  Statin: Yes  He  is to be enrolled in diabetes education classes.     LIFESTYLE:  BLOOD GLUCOSE TESTING: Patient reports testing on average a total of 2 times per day.      Review of Systems   Constitutional: Negative for activity change, appetite change and fatigue.   Eyes: Negative for visual disturbance.   Gastrointestinal: Negative for constipation, diarrhea and nausea.   Endocrine: Negative for polydipsia, polyphagia and polyuria.         Objective:      Physical Exam   Constitutional: He is oriented to person, place, and time. He appears well-developed and well-nourished.   HENT:   Head: Normocephalic and atraumatic.   Cardiovascular: Normal rate.   Pulmonary/Chest: Effort normal.   Neurological: He is alert and oriented to person, place, and time.   Skin: Skin is warm and dry.   Psychiatric: He has a normal mood and affect. His behavior is  normal. Judgment and thought content normal.   Nursing note and vitals reviewed.      Assessment:       1. Type 2 diabetes mellitus with nephropathy    2. Severe obesity (BMI 35.0-35.9 with comorbidity)    3. Hyperlipidemia associated with type 2 diabetes mellitus    4. Hypertension associated with diabetes        Plan:   Type 2 diabetes mellitus with nephropathy  -     POCT Glucose, Hand-Held Device  -     Discontinue: blood sugar diagnostic Strp; Three times a day  Dispense: 100 strip; Refill: 11  -     lancets 33 gauge Misc; by Misc.(Non-Drug; Combo Route) route 3 (three) times daily.  Dispense: 100 each; Refill: 11  -     empagliflozin (JARDIANCE) 25 mg Tab; Take 25 mg by mouth once daily.  Dispense: 30 tablet; Refill: 0  -     Ambulatory Referral to Diabetes Education  -     blood sugar diagnostic (ONETOUCH ULTRA TEST) Strp; Three times a day  Dispense: 100 strip; Refill: 11    - Condition not at goal. Start Jardiance as noted above. DM education reviewed. Patient encouraged to carb count and exercise per recommendations. Labs and Referrals as noted. Patient instructed to send in log weekly for review and potential medication adjustments. RV scheduled in 1 month.      Severe obesity (BMI 35.0-35.9 with comorbidity)    - DM diet discussed.     Hyperlipidemia associated with type 2 diabetes mellitus    - Patient recently restarted statin. Check lipid later in the year.     Hypertension associated with diabetes    - Controlled.       Additional Plan Details:    1.) Patient was instructed to monitor blood glucose 2 - 3 x daily, fasting and ac dinner or at bedtime. Discussed ADA goal for fasting blood sugar, 80 - 130mg/dL; pp blood sugars below 180 mg/dl. Also, discussed prevention of hypoglycemia and the need to adjust goals to higher levels if persistent hypoglycemia.  Reminded to bring BG records or meter to each visit for review.  2.) Reviewed pathophysiology of Type 2 diabetes, complications related to the  disease, importance of annual dilated eye exam and daily foot examination.  3.) We discussed the ADA recommendations, which are as follows:  Hemoglobin A1c below 7.0 %. All patients with diabetes should be on statins unless contraindicated.  ACE or ARB therapy if not contraindicated.    4.) Continue medications as prescribed.  [facility] e-mail or phone review in one week with BG records for adjustment of medication.  5.) Meal planning teaching: Reviewed carb counting, portion control, importance of spacing meals throughout the day to prevent post prandial elevations.  6.) Discussed activity with related benefits, methods, and precautions. Recommended patient start or continue some form of exercise and increase as tolerated to 30 minutes per day to aid in control of BGs.  7.) A1C, TSH, Lipid Panel, CMP with eGFR and Micro/Creatinine are utd or were ordered per ADA protocol.  8.) Return to clinic in 1 month for follow up. The patient was explained the above plan and given opportunity to ask questions.  He understands, chooses and consents to this plan and accepts all the risks, which include but are not limited to the risks mentioned above. He understands the alternative of having no testing, interventions or treatments at this time. He left content and without further questions.     A total of 60 minutes was spent in face to face time, of which over 50% was spent in counseling patient on disease process, complications, treatment, and side effects of medications.        RAIN Grimm

## 2019-02-28 NOTE — TELEPHONE ENCOUNTER
S/w patient. Patient states he was told test strips were sent to [facility]. Advised strips were sent to [facility] but [facility]y Matos corrected it and sent it to [facility] in [place]. Understanding was verbalized.

## 2019-02-28 NOTE — PATIENT INSTRUCTIONS
BLOOD SUGAR GOALS    BEFORE BREAKFAST: LESS THAN 130  BEDTIME: LESS THAN 160      START JARDIANCE ONCE DAILY IN THE MORNING. THIS WILL HELP BRING BLOOD SUGARS DOWN.

## 2019-03-01 ENCOUNTER — TELEPHONE (OUTPATIENT)
Dept: INTERNAL MEDICINE | Facility: CLINIC | Age: 64
End: 2019-03-01

## 2019-03-01 DIAGNOSIS — E11.21 TYPE 2 DIABETES MELLITUS WITH NEPHROPATHY: Chronic | ICD-10-CM

## 2019-03-01 NOTE — TELEPHONE ENCOUNTER
Spoke with patient. States his strips were canceled by Jossy Matos. Informed patient he would need to contact Jossy Matos office to inquire why strips were denied.//ddw

## 2019-03-01 NOTE — TELEPHONE ENCOUNTER
----- Message from Taylor De Los Santos sent at 3/1/2019  4:52 PM CST -----  Contact: pt  The pt states on of his medication where denied and wants to be advises, the pt gave no additional info given, the pt can be reached at [phone]///[id number]

## 2019-03-01 NOTE — TELEPHONE ENCOUNTER
DOCUMENTATION ONLY:  Prior authorization for Enbrel Sureclick 50mg approved from 02/28/2019 to 07/01/2019  Case ID: [id number]    Co-pay: $3.00    Patient Assistance  IS NOT required. Sending to clinical pharmacist for  and shipment. - KERA

## 2019-03-04 ENCOUNTER — TELEPHONE (OUTPATIENT)
Dept: PHARMACY | Facility: CLINIC | Age: 64
End: 2019-03-04

## 2019-03-04 DIAGNOSIS — E11.21 TYPE 2 DIABETES MELLITUS WITH NEPHROPATHY: Chronic | ICD-10-CM

## 2019-03-04 NOTE — TELEPHONE ENCOUNTER
Called and spoke with Patient regarding test strips. Patient stated he needs test strips but the last one was cancelled. Sent in refill request to NP.     ----- Message from Rachael Tom sent at 3/4/2019 10:11 AM CST -----  Contact: Patient  Type:  Needs Medical Advice    Who Called:  Kaycee  Symptoms (please be specific): n/a  How long has patient had these symptoms:  n/a  Pharmacy name and phone #:    [facility]pharm[id number] - [place] - [address] AT [place] [address] [address] [place] [address]  Phone: [phone] Fax: [phone]    Would the patient rather a call back or a response via [facility]?  Call back  Best Call Back Number: [phone]  Additional Information: A prescription for his test strips was called into his pharmacy, but when he went to go and get them he was told the prescription was cancelled and he needs to know why.     Thanks,  Rachael

## 2019-03-04 NOTE — TELEPHONE ENCOUNTER
Initial Enbrel Sureclick 50mg/ml Injection consult completed on 3/4. Shipment scheduled to arrive at patient's home on 3/12 via [employer] $3 copay. Patient will start Enbrel Sureclick 50mg/ml Injection on 3/20 - 2 weeks after last Humira injection due 3/6. Address confirmed, CC on file. Confirmed 2 patient identifiers - name and . Therapy Appropriate.    Counseled patient on administration directions:  -  Inject Enbrel 50mg into the skin every 7 days.  .- Take out of the refrigerator 30-60 minutes prior to injection.  - Wash hands before and after injection.  - Monthly RX will come with gauze, bandaids, and alcohol swabs.  - Patient may inject in either the tops of the thighs, abdomen- but at least 2 inches away from his belly button, or the outer part of his upper arm.  Patient was instructed to rotate injections sites.  - Patient declined pen instructions for use - says he reviewed with sample demo pen at clinic.  - Patient should rotate injection sites.   - Patient will use sharps container; once full, per MABEL orozco, he may lock the sharps container and place in her trash. He can then contact the Pharmacy and we will replace the sharps at no additional charge.    Patient was counseled on possible side effects:  - Injection site reaction: redness, soreness, itching, bruising, which should resolve within 3-5 days.  - Increased risk for infections. If patient becomes sick, patient is to hold Enbrel use until he is better.     DDIs:  Medication list reviewed and potential DDIs addressed.    Patient verbalized understanding. Compliance stressed. Patient advised to keep a calendar marking dates of injections to ensure better compliance. Patient advised to call myself or provider should any questions arise. Patient plans to start Enbrel on 3/20. Consultation included: indication; goals of treatment; administration; storage and handling; side effects; how to handle side effects; the importance of compliance; how to handle  missed doses; the importance of laboratory monitoring; the importance of keeping all follow up appointments.  Patient understands to report any medication changes to [facility] and provider. All questions answered and addressed to patients satisfaction. I will f/u with her in 1 week from start, [facility] to contact patient in 3 weeks for refills.     Daphnie Patel, PharmD  [facility]  [phone]

## 2019-03-13 ENCOUNTER — TELEPHONE (OUTPATIENT)
Dept: DIABETES | Facility: CLINIC | Age: 64
End: 2019-03-13

## 2019-03-13 ENCOUNTER — TELEPHONE (OUTPATIENT)
Dept: PHARMACY | Facility: CLINIC | Age: 64
End: 2019-03-13

## 2019-03-13 RX ORDER — METFORMIN HYDROCHLORIDE 500 MG/1
1000 TABLET, EXTENDED RELEASE ORAL 2 TIMES DAILY WITH MEALS
Qty: 360 TABLET | Refills: 3 | Status: SHIPPED | OUTPATIENT
Start: 2019-03-13 | End: 2019-06-27

## 2019-03-13 NOTE — TELEPHONE ENCOUNTER
Please let patient know med was declined. I sent Metformin to his [facility] pharmacy.   Please give him instructions.   To help prevent gastric complications when starting Metformin:     Take Metformin 500 mg with a lowfat evening meal for 1 week or until no diarrhea.     Increase to Metformin 500 mg twice a day with meals for 1 week or until no diarrhea.     Increase to Metformin 500 mg in the morning with a meal, and 2--500 mg tablets in the evening with a meal for 1 week or until no diarrhea.     Inrease to Metformin 2--500 mg tablets twice a day with meals.    Please reschedule follow up to 4 weeks out.

## 2019-03-13 NOTE — TELEPHONE ENCOUNTER
----- Message from Meredith Cole LPN sent at 3/13/2019  4:11 PM CDT -----  Regarding: FW: Jardiance PA denied      ----- Message -----  From: Nkechi Lee  Sent: 3/13/2019   4:08 PM  To: Jossy Matos NP, Carlo Fenton Staff  Subject: Jardiance PA denied                              Mr. Fenton,    [employer] wants Mr. Stevens to try 2 preferred alternatives before they will approve Marimar.    Please advise.    Nkechi

## 2019-03-13 NOTE — TELEPHONE ENCOUNTER
Called and spoke patient notifying him PA was not authorized for Jardiance and I sent a message to Ms. Matos for further options.    Patient verbalized understanding.    Thanks,   Nkechi Lee CPhT B.A  Patient Care Advocate   [facility]   Phone: [phone] Ext [phone]  Fax: [phone]

## 2019-03-14 NOTE — PROGRESS NOTES
"Subjective:         Patient ID: Trey Stevens is a 63 y.o. male.  Patient's current PCP is Brittanie Kaba MD.   Social History     Socioeconomic History    Marital status: Single     Spouse name: Not on file    Number of children: Not on file    Years of education: Not on file    Highest education level: Not on file   Social Needs    Financial resource strain: Not on file    Food insecurity - worry: Not on file    Food insecurity - inability: Not on file    Transportation needs - medical: Not on file    Transportation needs - non-medical: Not on file   Occupational History    Not on file   Tobacco Use    Smoking status: Former Smoker     Packs/day: 1.00     Years: 24.00     Pack years: 24.00     Types: Cigarettes     Start date: 1994    Smokeless tobacco: Never Used    Tobacco comment: 7/30/2018 referral to smoking cessation program   Substance and Sexual Activity    Alcohol use: No     Alcohol/week: 0.0 oz    Drug use: No    Sexual activity: No   Other Topics Concern    Not on file   Social History Narrative    Not on file       Chief Complaint: Diabetes    HPI  Trey Stevens is a 63 y.o. White male presenting for follow up for diabetes. Patient has been diagnosed with diabetes for over three years and has the following complications associated with diabetes: peripheral neuropathy. At last visit, Jardiance was prescribed, but insurance did not cover medication. Metformin was ordered. Patient is currently titrating the Metformin up to 1000mg BID per my instructions. Blood glucose testing is performed regularly. In the past 1 week patient reports blood glucose values to have approximately ranged from 120-150 fasting. Condition is not at goal consistently    He denies any recent hospital admissions, emergency room visits, hypoglycemia.      Height: 5' 8" (172.7 cm)  //  Weight: 124.9 kg (275 lb 5.7 oz), Body mass index is 41.87 kg/m².    His blood sugar in clinic today is:   Lab Results   Component " Value Date    POCGLU 127 (A) 03/21/2019       Labs reviewed and are noted below.    His most recent A1C is:   Lab Results   Component Value Date    HGBA1C 8.5 (H) 12/20/2018     No results found for: CPEPTIDE  No results found for: GLUTAMICACID  Lab Results   Component Value Date    WBC 7.56 02/20/2019    HGB 15.5 02/20/2019    HCT 48.6 02/20/2019     02/20/2019    CHOL 238 (H) 12/20/2018    TRIG 142 12/20/2018    HDL 49 12/20/2018    ALT 42 02/20/2019    AST 28 02/20/2019     02/20/2019    K 4.3 02/20/2019     02/20/2019    CREATININE 1.3 02/20/2019    BUN 15 02/20/2019    CO2 25 02/20/2019    TSH 0.939 02/19/2018    PSA 0.27 02/19/2018    INR 1.0 07/20/2018    HGBA1C 8.5 (H) 12/20/2018       CURRENT DM MEDICATIONS:   Metformin XR 1000mg BID- Currently on 2 a day    No current facility-administered medications for this visit.        Health Maintenance   Topic Date Due    Zoster Vaccine  03/23/2017    Foot Exam  04/10/2019    Hemoglobin A1c  06/20/2019    Eye Exam  09/05/2019    Lipid Panel  12/20/2019    Low Dose Statin  02/28/2020    TETANUS VACCINE  02/24/2026    Colonoscopy  10/24/2026    Hepatitis C Screening  Completed    Pneumococcal Vaccine (Medium Risk)  Completed    Influenza Vaccine  Completed       STANDARDS OF CARE:  Current Ophthalmologist/Optometrist: UTD.   Current Podiatrist: UTD  ACE/ARB: No  Statin: Yes  He  is to be enrolled in diabetes education classes.     LIFESTYLE:  BLOOD GLUCOSE TESTING: Patient reports testing on average a total of 2 times per day.      Review of Systems   Constitutional: Negative for activity change, appetite change and fatigue.   Eyes: Negative for visual disturbance.   Gastrointestinal: Negative for constipation, diarrhea and nausea.   Endocrine: Negative for polydipsia, polyphagia and polyuria.         Objective:      Physical Exam   Constitutional: He is oriented to person, place, and time. He appears well-developed and well-nourished.    HENT:   Head: Normocephalic and atraumatic.   Cardiovascular: Normal rate.   Pulmonary/Chest: Effort normal.   Neurological: He is alert and oriented to person, place, and time.   Skin: Skin is warm and dry.   Psychiatric: He has a normal mood and affect. His behavior is normal. Judgment and thought content normal.   Nursing note and vitals reviewed.      Assessment:       1. Type 2 diabetes mellitus with nephropathy    2. Hyperlipidemia associated with type 2 diabetes mellitus    3. Hypertension associated with diabetes        Plan:   Type 2 diabetes mellitus with nephropathy  -     POCT Glucose, Hand-Held Device  -     Hemoglobin A1c; Future; Expected date: 03/21/2019    - Condition improving. Patient should continue to titrate Metformin to full dose.. DM education reviewed. Patient encouraged to carb count and exercise per recommendations. Labs and Referrals as noted. Patient instructed to send in log weekly for review and potential medication adjustments. RV scheduled in 2 months.      Severe obesity (BMI 35.0-35.9 with comorbidity)    - DM diet discussed.     Hyperlipidemia associated with type 2 diabetes mellitus    - Patient recently restarted statin. Check lipid later in the year.     Hypertension associated with diabetes    - Controlled.       Additional Plan Details:    1.) Patient was instructed to monitor blood glucose 2 - 3 x daily, fasting and ac dinner or at bedtime. Discussed ADA goal for fasting blood sugar, 80 - 130mg/dL; pp blood sugars below 180 mg/dl. Also, discussed prevention of hypoglycemia and the need to adjust goals to higher levels if persistent hypoglycemia.  Reminded to bring BG records or meter to each visit for review.  2.) Reviewed pathophysiology of Type 2 diabetes, complications related to the disease, importance of annual dilated eye exam and daily foot examination.  3.) We discussed the ADA recommendations, which are as follows:  Hemoglobin A1c below 7.0 %. All patients with  diabetes should be on statins unless contraindicated.  ACE or ARB therapy if not contraindicated.    4.) Continue medications as prescribed.  My [facility] e-mail or phone review in one week with BG records for adjustment of medication.  5.) Meal planning teaching: Reviewed carb counting, portion control, importance of spacing meals throughout the day to prevent post prandial elevations.  6.) Discussed activity with related benefits, methods, and precautions. Recommended patient start or continue some form of exercise and increase as tolerated to 30 minutes per day to aid in control of BGs.  7.) A1C, TSH, Lipid Panel, CMP with eGFR and Micro/Creatinine are utd or were ordered per ADA protocol.  8.) Return to clinic in 2 months for follow up. The patient was explained the above plan and given opportunity to ask questions.  He understands, chooses and consents to this plan and accepts all the risks, which include but are not limited to the risks mentioned above. He understands the alternative of having no testing, interventions or treatments at this time. He left content and without further questions.     A total of 30 minutes was spent in face to face time, of which over 50% was spent in counseling patient on disease process, complications, treatment, and side effects of medications.        RAIN Grimm

## 2019-03-19 NOTE — PROGRESS NOTES
"Subjective:      Patient ID: Trey Stevens is a 64 y.o. male.    Chief Complaint: Leg Pain; Foot Swelling; Flank Pain; Diabetes; and Follow-up      HPI  Here for f/u medical problems and preventive exam, sees DM nurse and podiatry.  BPs at home 120-144/68-81.  No f/c/sw.  Occas cough.  No cp/sob/palp.  BMs normal.  Urine normal.  AC breakfast sugars 109-211.  Labs scheduled in a couple of weeks.    HM: 10/18 fluvax, 12/18 HAV, 2/16 [id number], 4/13 [id number], 2/16 TDaP, Cscope in the past normal and pt refuses more, 4/19 sched PSA, 2/16 HCV neg, 9/18 Eye Dr. Germain, 11/18 needs chest CT.     Review of Systems   Constitutional: Negative for appetite change, chills, diaphoresis, fatigue and fever.   HENT: Negative for congestion, ear pain, rhinorrhea and sinus pressure.    Respiratory: Negative for cough and shortness of breath.    Cardiovascular: Negative for chest pain and palpitations.   Gastrointestinal: Negative for abdominal distention, abdominal pain, blood in stool, constipation, diarrhea, nausea and vomiting.   Genitourinary: Negative for difficulty urinating, dysuria, frequency, hematuria and urgency.   Musculoskeletal: Negative for arthralgias.   Skin: Negative for rash.   Neurological: Negative for dizziness and headaches.   Psychiatric/Behavioral: The patient is not nervous/anxious.          Objective:   /78 (BP Location: Left arm, Patient Position: Sitting, BP Method: Large (Manual))   Pulse 92   Temp 98.8 °F (37.1 °C) (Tympanic)   Ht 5' 8" (1.727 m)   Wt 123.7 kg (272 lb 11.3 oz)   SpO2 96%   BMI 41.47 kg/m²     Physical Exam   Constitutional: He is oriented to person, place, and time. He appears well-developed and well-nourished.   HENT:   Right Ear: External ear normal. Tympanic membrane is not injected.   Left Ear: External ear normal. Tympanic membrane is not injected.   Mouth/Throat: Oropharynx is clear and moist.   Eyes: Conjunctivae are normal.   Neck: Normal range of motion. Neck supple. " No thyromegaly present.   Cardiovascular: Normal rate, regular rhythm and intact distal pulses. Exam reveals no gallop and no friction rub.   No murmur heard.  Pulmonary/Chest: Effort normal and breath sounds normal. He has no wheezes. He has no rales.   Abdominal: Soft. Bowel sounds are normal. He exhibits no mass. There is no tenderness.   Musculoskeletal: He exhibits no edema.   Lymphadenopathy:     He has no cervical adenopathy.   Neurological: He is alert and oriented to person, place, and time.   Psychiatric: He has a normal mood and affect.         Results for SYDNEY PETERSON (MRN [id number]) as of 4/2/2019 12:34   Ref. Range 12/20/2018 10:42 1/29/2019 10:09 2/20/2019 14:41   WBC Latest Ref Range: 3.90 - 12.70 K/uL   7.56   RBC Latest Ref Range: 4.60 - 6.20 M/uL   5.34   Hemoglobin Latest Ref Range: 14.0 - 18.0 g/dL   15.5   Hematocrit Latest Ref Range: 40.0 - 54.0 %   48.6   MCV Latest Ref Range: 82 - 98 fL   91   MCH Latest Ref Range: 27.0 - 31.0 pg   29.0   MCHC Latest Ref Range: 32.0 - 36.0 g/dL   31.9 (L)   RDW Latest Ref Range: 11.5 - 14.5 %   15.7 (H)   Platelets Latest Ref Range: 150 - 350 K/uL   257   MPV Latest Ref Range: 9.2 - 12.9 fL   10.3   Gran% Latest Ref Range: 38.0 - 73.0 %   50.6   Gran # (ANC) Latest Ref Range: 1.8 - 7.7 K/uL   3.8   Immature Grans (Abs) Latest Ref Range: 0.00 - 0.04 K/uL   0.03   Lymph% Latest Ref Range: 18.0 - 48.0 %   37.0   Lymph # Latest Ref Range: 1.0 - 4.8 K/uL   2.8   Mono% Latest Ref Range: 4.0 - 15.0 %   7.1   Mono # Latest Ref Range: 0.3 - 1.0 K/uL   0.5   Eosinophil% Latest Ref Range: 0.0 - 8.0 %   4.9   Eos # Latest Ref Range: 0.0 - 0.5 K/uL   0.4   Basophil% Latest Ref Range: 0.0 - 1.9 %   0.4   Baso # Latest Ref Range: 0.00 - 0.20 K/uL   0.03   nRBC Latest Ref Range: 0 /100 WBC   0   Differential Method Unknown   Automated   Immature Granulocytes Latest Ref Range: 0.0 - 0.5 %   0.4   Sed Rate Latest Ref Range: 0 - 23 mm/Hr   23   Sodium Latest Ref Range: 136 -  145 mmol/L 137 139 140   Potassium Latest Ref Range: 3.5 - 5.1 mmol/L 4.2 4.3 4.3   Chloride Latest Ref Range: 95 - 110 mmol/L 101 102 104   CO2 Latest Ref Range: 23 - 29 mmol/L 25 26 25   Anion Gap Latest Ref Range: 8 - 16 mmol/L 11 11 11   BUN, Bld Latest Ref Range: 8 - 23 mg/dL 10 11 15   Creatinine Latest Ref Range: 0.5 - 1.4 mg/dL 1.0 1.1 1.3   eGFR if non African American Latest Ref Range: >60 mL/min/1.73 m^2 >60.0 >60 59 (A)   eGFR if African American Latest Ref Range: >60 mL/min/1.73 m^2 >60.0 >60 >60   Glucose Latest Ref Range: 70 - 110 mg/dL 156 (H) 120 (H) 149 (H)   Calcium Latest Ref Range: 8.7 - 10.5 mg/dL 9.5 9.3 9.7   Alkaline Phosphatase Latest Ref Range: 55 - 135 U/L 76 67 73   Total Protein Latest Ref Range: 6.0 - 8.4 g/dL 8.2 8.1 8.4   Albumin Latest Ref Range: 3.5 - 5.2 g/dL 3.6 3.7 3.8   Total Bilirubin Latest Ref Range: 0.1 - 1.0 mg/dL 0.7 0.5 0.4   AST Latest Ref Range: 10 - 40 U/L 68 (H) 33 28   ALT Latest Ref Range: 10 - 44 U/L 83 (H) 47 (H) 42   CRP Latest Ref Range: 0.0 - 8.2 mg/L   1.3   Triglycerides Latest Ref Range: 30 - 150 mg/dL 142     Cholesterol Latest Ref Range: 120 - 199 mg/dL 238 (H)     HDL Latest Ref Range: 40 - 75 mg/dL 49     HDL/Chol Ratio Latest Ref Range: 20.0 - 50.0 % 20.6     LDL Cholesterol Latest Ref Range: 63.0 - 159.0 mg/dL 160.6 (H)     Non-HDL Cholesterol Latest Units: mg/dL 189     Total Cholesterol/HDL Ratio Latest Ref Range: 2.0 - 5.0  4.9     Hemoglobin A1C External Latest Ref Range: 4.0 - 5.6 % 8.5 (H)     Estimated Avg Glucose Latest Ref Range: 68 - 131 mg/dL 197 (H)       Assessment:       1. Hypertension associated with diabetes    2. Hyperlipidemia associated with type 2 diabetes mellitus    3. Cigarette nicotine dependence without complication    4. SCHUYLER (obstructive sleep apnea)    5. Mixed type COPD (chronic obstructive pulmonary disease)    6. Psoriatic arthritis, destructive type    7. Immunosuppressed status    8. Type 2 diabetes mellitus with  nephropathy    9. Vitamin D deficiency    10. Encounter for preventive health examination          Plan:     Hypertension associated with diabetes- stable, cont rx.    Hyperlipidemia associated with type 2 diabetes mellitus- lab scheduled.  -     PSA, Screening; Future; Expected date: 04/02/2019    Cigarette nicotine dependence without complication    SCHUYLER (obstructive sleep apnea) on CPAP, doing well.    Mixed type COPD (chronic obstructive pulmonary disease)- doing well with proair prn, about 1x per week.    Psoriatic arthritis, destructive type, Immunosuppressed status- on Enbrel.    Type 2 diabetes mellitus with nephropathy- lab pending.  -     PSA, Screening; Future; Expected date: 04/02/2019    Vitamin D deficiency    Encounter for preventive health examination- lab scheduled.  -     PSA, Screening; Future; Expected date: 04/02/2019    RTC 4 mo.

## 2019-03-20 ENCOUNTER — TELEPHONE (OUTPATIENT)
Dept: PHARMACY | Facility: CLINIC | Age: 64
End: 2019-03-20

## 2019-03-20 NOTE — TELEPHONE ENCOUNTER
Good Afternoon.    The prior authorization for Mr. Stevens's Jardiance has been denied per his [employer] insurance. The patient's insurance prefers step therapy of Stegalatro before they will consider covering Jardiance.    The patient has been notified and is aware of the medication denial.    If there are any additional questions or concerns about the denial please contact the pharmacy at, [phone].    Thanks.    Marion Ledezma CPhT.  Patient Care Advocate  [facility]  [email or web address]

## 2019-03-21 ENCOUNTER — CLINICAL SUPPORT (OUTPATIENT)
Dept: DIABETES | Facility: CLINIC | Age: 64
End: 2019-03-21
Payer: MEDICAID

## 2019-03-21 ENCOUNTER — OFFICE VISIT (OUTPATIENT)
Dept: PODIATRY | Facility: CLINIC | Age: 64
End: 2019-03-21
Payer: MEDICAID

## 2019-03-21 ENCOUNTER — OFFICE VISIT (OUTPATIENT)
Dept: DIABETES | Facility: CLINIC | Age: 64
End: 2019-03-21
Payer: MEDICAID

## 2019-03-21 VITALS
WEIGHT: 275.38 LBS | DIASTOLIC BLOOD PRESSURE: 70 MMHG | SYSTOLIC BLOOD PRESSURE: 128 MMHG | BODY MASS INDEX: 41.74 KG/M2 | HEIGHT: 68 IN

## 2019-03-21 VITALS
HEIGHT: 68 IN | HEART RATE: 77 BPM | WEIGHT: 275.38 LBS | DIASTOLIC BLOOD PRESSURE: 59 MMHG | SYSTOLIC BLOOD PRESSURE: 104 MMHG | BODY MASS INDEX: 41.74 KG/M2

## 2019-03-21 VITALS — WEIGHT: 275.38 LBS | HEIGHT: 68 IN | BODY MASS INDEX: 41.74 KG/M2

## 2019-03-21 DIAGNOSIS — E11.49 TYPE II DIABETES MELLITUS WITH NEUROLOGICAL MANIFESTATIONS: Primary | ICD-10-CM

## 2019-03-21 DIAGNOSIS — R23.4 SKIN FISSURES: ICD-10-CM

## 2019-03-21 DIAGNOSIS — E11.21 TYPE 2 DIABETES MELLITUS WITH NEPHROPATHY: Primary | Chronic | ICD-10-CM

## 2019-03-21 DIAGNOSIS — B35.1 DERMATOPHYTOSIS OF NAIL: ICD-10-CM

## 2019-03-21 DIAGNOSIS — L84 CORN OR CALLUS: ICD-10-CM

## 2019-03-21 DIAGNOSIS — E11.69 HYPERLIPIDEMIA ASSOCIATED WITH TYPE 2 DIABETES MELLITUS: ICD-10-CM

## 2019-03-21 DIAGNOSIS — I15.2 HYPERTENSION ASSOCIATED WITH DIABETES: Chronic | ICD-10-CM

## 2019-03-21 DIAGNOSIS — E11.59 HYPERTENSION ASSOCIATED WITH DIABETES: Chronic | ICD-10-CM

## 2019-03-21 DIAGNOSIS — E78.5 HYPERLIPIDEMIA ASSOCIATED WITH TYPE 2 DIABETES MELLITUS: ICD-10-CM

## 2019-03-21 DIAGNOSIS — E66.01 SEVERE OBESITY (BMI 35.0-35.9 WITH COMORBIDITY): ICD-10-CM

## 2019-03-21 LAB — GLUCOSE SERPL-MCNC: 127 MG/DL (ref 70–110)

## 2019-03-21 PROCEDURE — 11056 PARNG/CUTG B9 HYPRKR LES 2-4: CPT | Mod: S$PBB,,, | Performed by: PODIATRIST

## 2019-03-21 PROCEDURE — 99213 OFFICE O/P EST LOW 20 MIN: CPT | Mod: S$PBB,25,, | Performed by: PODIATRIST

## 2019-03-21 PROCEDURE — 11721 DEBRIDE NAIL 6 OR MORE: CPT | Mod: Q9,PBBFAC,PN | Performed by: PODIATRIST

## 2019-03-21 PROCEDURE — 11721 DEBRIDE NAIL 6 OR MORE: CPT | Mod: 59,S$PBB,, | Performed by: PODIATRIST

## 2019-03-21 PROCEDURE — 82962 GLUCOSE BLOOD TEST: CPT | Mod: PBBFAC,PN | Performed by: NURSE PRACTITIONER

## 2019-03-21 PROCEDURE — 99213 PR OFFICE/OUTPT VISIT, EST, LEVL III, 20-29 MIN: ICD-10-PCS | Mod: S$PBB,25,, | Performed by: PODIATRIST

## 2019-03-21 PROCEDURE — 99214 OFFICE O/P EST MOD 30 MIN: CPT | Mod: S$PBB,,, | Performed by: NURSE PRACTITIONER

## 2019-03-21 PROCEDURE — 11721 PR DEBRIDEMENT OF NAILS, 6 OR MORE: ICD-10-PCS | Mod: 59,S$PBB,, | Performed by: PODIATRIST

## 2019-03-21 PROCEDURE — 99999 PR PBB SHADOW E&M-EST. PATIENT-LVL III: ICD-10-PCS | Mod: PBBFAC,,, | Performed by: PODIATRIST

## 2019-03-21 PROCEDURE — 99214 PR OFFICE/OUTPT VISIT, EST, LEVL IV, 30-39 MIN: ICD-10-PCS | Mod: S$PBB,,, | Performed by: NURSE PRACTITIONER

## 2019-03-21 PROCEDURE — 99999 PR PBB SHADOW E&M-EST. PATIENT-LVL III: CPT | Mod: PBBFAC,,, | Performed by: NURSE PRACTITIONER

## 2019-03-21 PROCEDURE — 99999 PR PBB SHADOW E&M-EST. PATIENT-LVL III: ICD-10-PCS | Mod: PBBFAC,,, | Performed by: NURSE PRACTITIONER

## 2019-03-21 PROCEDURE — 99213 OFFICE O/P EST LOW 20 MIN: CPT | Mod: PBBFAC,PN,27,25 | Performed by: NURSE PRACTITIONER

## 2019-03-21 PROCEDURE — 11056 PARNG/CUTG B9 HYPRKR LES 2-4: CPT | Mod: Q9,PBBFAC,PN | Performed by: PODIATRIST

## 2019-03-21 PROCEDURE — 99999 PR PBB SHADOW E&M-EST. PATIENT-LVL III: CPT | Mod: PBBFAC,,, | Performed by: DIETITIAN, REGISTERED

## 2019-03-21 PROCEDURE — 11056 PR TRIM BENIGN HYPERKERATOTIC SKIN LESION,2-4: ICD-10-PCS | Mod: S$PBB,,, | Performed by: PODIATRIST

## 2019-03-21 PROCEDURE — 99213 OFFICE O/P EST LOW 20 MIN: CPT | Mod: PBBFAC,PN | Performed by: DIETITIAN, REGISTERED

## 2019-03-21 PROCEDURE — G0108 DIAB MANAGE TRN  PER INDIV: HCPCS | Mod: PBBFAC,PN | Performed by: DIETITIAN, REGISTERED

## 2019-03-21 PROCEDURE — 99999 PR PBB SHADOW E&M-EST. PATIENT-LVL III: CPT | Mod: PBBFAC,,, | Performed by: PODIATRIST

## 2019-03-21 PROCEDURE — 99213 OFFICE O/P EST LOW 20 MIN: CPT | Mod: PBBFAC,27,PN | Performed by: PODIATRIST

## 2019-03-21 PROCEDURE — 99999 PR PBB SHADOW E&M-EST. PATIENT-LVL III: ICD-10-PCS | Mod: PBBFAC,,, | Performed by: DIETITIAN, REGISTERED

## 2019-03-21 NOTE — PROGRESS NOTES
Subjective:     Patient ID: Trey Stevens is a 63 y.o. male.    Chief Complaint: Routine Foot Care (RFC. Diabetic Pt. Rates pain 5/10 in B/L Foot. Patient wearing boots with shoes. PCP Ashley , last seen Dec 2018 )    Trey is a 63 y.o. male who presents to the clinic for evaluation and treatment of high risk feet. Trey has a past medical history of Arthritis, Diabetes mellitus, Diabetes mellitus type 2, uncontrolled (7/19/2016), DM (diabetes mellitus) (2015), Gall stones, Obesity, Psoriasis (a type of skin inflammation), and Rheumatoid arthritis of foot. The patient's chief complaint is thick calluses and nails. This patient has documented high risk feet requiring routine maintenance secondary to diabetes mellitis and those secondary complications of diabetes, as mentioned. Patient states his blood sugar this morning was 177mg/dl.     PCP: Brittanie Kaba MD     Date Last Seen by PCP: 12/20/18    Current shoe gear:  Affected Foot: Extra depth shoes     Unaffected Foot: Extra depth shoes    Hemoglobin A1C   Date Value Ref Range Status   12/20/2018 8.5 (H) 4.0 - 5.6 % Final     Comment:     ADA Screening Guidelines:  5.7-6.4%  Consistent with prediabetes  >or=6.5%  Consistent with diabetes  High levels of fetal hemoglobin interfere with the HbA1C  assay. Heterozygous hemoglobin variants (HbS, HgC, etc)do  not significantly interfere with this assay.   However, presence of multiple variants may affect accuracy.     07/20/2018 7.7 (H) 4.0 - 5.6 % Final     Comment:     ADA Screening Guidelines:  5.7-6.4%  Consistent with prediabetes  >or=6.5%  Consistent with diabetes  High levels of fetal hemoglobin interfere with the HbA1C  assay. Heterozygous hemoglobin variants (HbS, HgC, etc)do  not significantly interfere with this assay.   However, presence of multiple variants may affect accuracy.     02/19/2018 6.4 (H) 4.0 - 5.6 % Final     Comment:     According to ADA guidelines, hemoglobin A1c <7.0% represents  optimal  control in non-pregnant diabetic patients. Different  metrics may apply to specific patient populations.   Standards of Medical Care in Diabetes-2016.  For the purpose of screening for the presence of diabetes:  <5.7%     Consistent with the absence of diabetes  5.7-6.4%  Consistent with increasing risk for diabetes   (prediabetes)  >or=6.5%  Consistent with diabetes  Currently, no consensus exists for use of hemoglobin A1c  for diagnosis of diabetes for children.  This Hemoglobin A1c assay has significant interference with fetal   hemoglobin   (HbF). The results are invalid for patients with abnormal amounts of   HbF,   including those with known Hereditary Persistence   of Fetal Hemoglobin. Heterozygous hemoglobin variants (HbAS, HbAC,   HbAD, HbAE, HbA2) do not significantly interfere with this assay;   however, presence of multiple variants in a sample may impact the %   interference.             Patient Active Problem List   Diagnosis    Psoriatic arthritis, destructive type    Vitamin D deficiency    Psoriasis    Multiple lung nodules on CT    Immunosuppressed status    Long-term use of immunosuppressant medication    Mixed type COPD (chronic obstructive pulmonary disease)    Type 2 diabetes mellitus with nephropathy    Hypertension associated with diabetes    Vitiligo    SCHUYLER (obstructive sleep apnea)    Severe obesity (BMI 35.0-35.9 with comorbidity)    Hyperlipidemia associated with type 2 diabetes mellitus    Elevated liver enzymes    Cigarette nicotine dependence without complication       Medication List with Changes/Refills   Current Medications    AMLODIPINE (NORVASC) 5 MG TABLET    Take 1 tablet (5 mg total) by mouth once daily.    AMMONIUM LACTATE 12 % CREA    Apply 1 Act topically 2 (two) times daily.    ASPIRIN 81 MG CHEW    Take 1 tablet (81 mg total) by mouth once daily.    BENZONATATE (TESSALON) 200 MG CAPSULE    Take 1 capsule (200 mg total) by mouth 3 (three) times daily as  needed for Cough.    BLOOD SUGAR DIAGNOSTIC STRP    Use as directed three times a day    CALCIPOTRIENE (DOVONOX) 0.005 % OINTMENT    Apply on psoriasis on body twice daily    CHOLECALCIFEROL, VITAMIN D3, 2,000 UNIT CAP    Take 1 capsule (2,000 Units total) by mouth once daily.    CLOBETASOL (OLUX) 0.05 % FOAM    AAA of scalp and behind ears twice daily.  Do not use on face, underarms or groin.    ENBREL SURECLICK 50 MG/ML (0.98 ML) PNIJ    Inject 50 mg into the skin once a week.    FOLIC ACID (FOLVITE) 1 MG TABLET    Take 1 tablet (1 mg total) by mouth once daily.    KETOCONAZOLE (NIZORAL) 2 % SHAMPOO    Wash hair with medicated shampoo at least 2x/week - let sit on scalp at least 5 minutes prior to rinsing    LANCETS 33 GAUGE MISC    by Misc.(Non-Drug; Combo Route) route 3 (three) times daily.    LOSARTAN (COZAAR) 100 MG TABLET    TAKE 1 TABLET (100 MG TOTAL) BY MOUTH ONCE DAILY.    METFORMIN (GLUCOPHAGE-XR) 500 MG 24 HR TABLET    Take 2 tablets (1,000 mg total) by mouth 2 (two) times daily with meals.    METHOTREXATE 2.5 MG TAB    TAKE 6 TABLETS (15 MG TOTAL) BY MOUTH EVERY 7 DAYS. (3 IN THE MORNING AND 3 IN THE EVENING).    ONETOUCH ULTRAMINI KIT        PRAVASTATIN (PRAVACHOL) 40 MG TABLET    Take 1 tablet (40 mg total) by mouth once daily.    PROAIR HFA 90 MCG/ACTUATION INHALER    Inhale 2 puffs into the lungs every 4 (four) hours as needed for Wheezing. Rescue    TRAMADOL (ULTRAM) 50 MG TABLET    Take 1 tablet (50 mg total) by mouth every 6 (six) hours as needed.    TRIAMCINOLONE ACETONIDE 0.1% (KENALOG) 0.1 % CREAM    AAA bid for psoriasis on body.  Do not use on face, underarms or groin.       Review of patient's allergies indicates:  No Known Allergies    Past Surgical History:   Procedure Laterality Date    APPENDECTOMY      CHOLECYSTECTOMY         Family History   Problem Relation Age of Onset    Cancer Mother     Hypertension Mother     Diabetes Mother     Cataracts Mother     Stroke Father      "Psoriasis Neg Hx        Social History     Socioeconomic History    Marital status: Single     Spouse name: Not on file    Number of children: Not on file    Years of education: Not on file    Highest education level: Not on file   Social Needs    Financial resource strain: Not on file    Food insecurity - worry: Not on file    Food insecurity - inability: Not on file    Transportation needs - medical: Not on file    Transportation needs - non-medical: Not on file   Occupational History    Not on file   Tobacco Use    Smoking status: Former Smoker     Packs/day: 1.00     Years: 24.00     Pack years: 24.00     Types: Cigarettes     Start date: 1994    Smokeless tobacco: Never Used    Tobacco comment: 7/30/2018 referral to smoking cessation program   Substance and Sexual Activity    Alcohol use: No     Alcohol/week: 0.0 oz    Drug use: No    Sexual activity: No   Other Topics Concern    Not on file   Social History Narrative    Not on file       Vitals:    03/21/19 1114   BP: (!) 104/59   Pulse: 77   Weight: 124.9 kg (275 lb 5.7 oz)   Height: 5' 8" (1.727 m)   PainSc:   5   PainLoc: Foot       Hemoglobin A1C   Date Value Ref Range Status   12/20/2018 8.5 (H) 4.0 - 5.6 % Final     Comment:     ADA Screening Guidelines:  5.7-6.4%  Consistent with prediabetes  >or=6.5%  Consistent with diabetes  High levels of fetal hemoglobin interfere with the HbA1C  assay. Heterozygous hemoglobin variants (HbS, HgC, etc)do  not significantly interfere with this assay.   However, presence of multiple variants may affect accuracy.     07/20/2018 7.7 (H) 4.0 - 5.6 % Final     Comment:     ADA Screening Guidelines:  5.7-6.4%  Consistent with prediabetes  >or=6.5%  Consistent with diabetes  High levels of fetal hemoglobin interfere with the HbA1C  assay. Heterozygous hemoglobin variants (HbS, HgC, etc)do  not significantly interfere with this assay.   However, presence of multiple variants may affect accuracy.   "   02/19/2018 6.4 (H) 4.0 - 5.6 % Final     Comment:     According to ADA guidelines, hemoglobin A1c <7.0% represents  optimal control in non-pregnant diabetic patients. Different  metrics may apply to specific patient populations.   Standards of Medical Care in Diabetes-2016.  For the purpose of screening for the presence of diabetes:  <5.7%     Consistent with the absence of diabetes  5.7-6.4%  Consistent with increasing risk for diabetes   (prediabetes)  >or=6.5%  Consistent with diabetes  Currently, no consensus exists for use of hemoglobin A1c  for diagnosis of diabetes for children.  This Hemoglobin A1c assay has significant interference with fetal   hemoglobin   (HbF). The results are invalid for patients with abnormal amounts of   HbF,   including those with known Hereditary Persistence   of Fetal Hemoglobin. Heterozygous hemoglobin variants (HbAS, HbAC,   HbAD, HbAE, HbA2) do not significantly interfere with this assay;   however, presence of multiple variants in a sample may impact the %   interference.         Review of Systems   Constitutional: Negative for chills and fever.   Respiratory: Negative for shortness of breath.    Cardiovascular: Positive for leg swelling. Negative for chest pain, palpitations, orthopnea and claudication.   Gastrointestinal: Negative for diarrhea, nausea and vomiting.   Musculoskeletal: Negative for joint pain.   Skin: Negative for rash.   Neurological: Positive for tingling and sensory change. Negative for dizziness, focal weakness and weakness.   Psychiatric/Behavioral: Negative.          Objective:      PHYSICAL EXAM: Apperance: Alert and orient in no distress,well developed, and with good attention to grooming and body habits  Patient presents ambulating in extra depth shoes.   LOWER EXTREMITY EXAM:  VASCULAR: Dorsalis pedis pulses 0/4 bilateral and Posterior Tibial pulses 1/4 bilateral. Capillary fill time <4 seconds bilateral. Mild edema observed bilateral. Varicosities  present bilateral. Skin temperature of the lower extremities is warm to warm, proximal to distal. Hair growth absent bilateral.  DERMATOLOGICAL: No skin rashes, subcutaneous nodules, lesions, or ulcers observed bilateral. Nails 1,2,3,4,5 bilateral elongated, thickened, and discolored with subungual debris. Webspaces 1,2,3,4 clean, dry and without evidence of break in skin integrity bilateral. Moderate dry and hyperkeratotic tissue noted to bilateral heels and medial 1st MPJ. Skin fissures noted to bilateral heels. No erythema, drainage, or increased temp noted to area.   NEUROLOGICAL: Light touch, sharp-dull, proprioception all present and equal bilaterally.  Vibratory sensation absent at bilateral hallux. Protective sensation absent at 6/10 sites as tested with a [place] 5.07 monofilament.   MUSCULOSKELETAL: Muscle strength is 5/5 for foot inverters, everters, plantarflexors, and dorsiflexors. Muscle tone is normal.         Assessment:       Encounter Diagnoses   Name Primary?    Type II diabetes mellitus with neurological manifestations Yes    Dermatophytosis of nail     Skin fissures     Corn or callus          Plan:   Type II diabetes mellitus with neurological manifestations    Dermatophytosis of nail    Skin fissures    Corn or callus      I counseled the patient on his conditions, their implications and medical management.  Greater than 50% of this visit spent on counseling and coordination of care.  Greater than 15 minutes of a 20 minute appointment spent on education about the diabetic foot, neuropathy, and prevention of limb loss.  Shoe inspection. Diabetic Foot Education. Patient reminded of the importance of good nutrition and blood sugar control to help prevent podiatric complications of diabetes. Patient instructed on proper foot hygeine. We discussed wearing proper shoe gear, daily foot inspections, never walking without protective shoe gear, never putting sharp instruments to feet.     With patient's permission, nails 1-5 bilateral were debrided/trimmed in length and thickness aggressively to their soft tissue attachment mechanically and with electric , removing all offending nail and debris. Patient relates relief following the procedure.  With patient's permission bilateral skin fissure and callus were trimmed in thickness with #15 blade without incident.   Patient  will continue to monitor the areas daily, inspect feet, wear protective shoe gear when ambulatory, moisturizer to maintain skin integrity. Patient reminded of the importance of good nutrition and blood sugar control to help prevent podiatric complications of diabetes.  Patient to return in this office in approximately 3-4 months, or sooner if needed.                     Joy Hadley DPM  [facility]

## 2019-03-21 NOTE — LETTER
March 21, 2019        [place]  [address] [place] [address]             [facility]  [address]  [place] [address]  Phone: [phone]  Fax: [phone]   Patient: Trey Stevens   MR Number: [id number]   YOB: 1955   Date of Visit: 3/21/2019       Dear Dr. Matos:    Thank you for referring Trey Stevens to me for evaluation. Below are the relevant portions of my assessment and plan of care.    If you have questions, please do not hesitate to call me. I look forward to following Trey along with you.    Sincerely,      Rachelle Huertas RD, CDE           CC  Brittanie Chaparro MD

## 2019-03-21 NOTE — PATIENT INSTRUCTIONS
CONTINUE INCREASING METFORMIN    To help prevent gastric complications when starting Metformin:       Increase to Metformin 500 mg twice a day with meals for 1 week or until no diarrhea.     Increase to Metformin 500 mg in the morning with a meal, and 2--500 mg tablets in the evening with a meal for 1 week or until no diarrhea.     Increase to Metformin 2--500 mg tablets twice a day with meals.

## 2019-03-21 NOTE — PROGRESS NOTES
Diabetes Education  Author: Rachelle Huertas RD, CDE  Date: 3/21/2019    Diabetes Care Management Summary  Diabetes Education Record Assessment/Progress: Initial  Current Diabetes Risk Level: Moderate     Last A1c:   Lab Results   Component Value Date    HGBA1C 8.5 (H) 12/20/2018     Last visit with Diabetes Educator: Last Education Visit: Not Found    Diabetes Type  Diabetes Type : Type II    Diabetes History  Diabetes Diagnosis: 0-1 year  Current Treatment: Diet, Exercise, Oral Medication(metformin xr 500mg 2tab twice daily)  Reviewed Problem List with Patient: Yes    Health Maintenance was reviewed today with patient. Discussed with patient importance of routine eye exams, foot exams/foot care, blood work (i.e.: A1c, microalbumin, and lipid), dental visits, yearly flu vaccine, and pneumonia vaccine as indicated by PCP. Patient verbalized understanding.     Health Maintenance Topics with due status: Not Due       Topic Last Completion Date    TETANUS VACCINE 02/24/2016    Colonoscopy 10/24/2016    Eye Exam 09/05/2018    Lipid Panel 12/20/2018    Hemoglobin A1c 12/20/2018    Low Dose Statin 03/21/2019     Health Maintenance Due   Topic Date Due    Zoster Vaccine  03/23/2017    Foot Exam  04/10/2019     Nutrition  Meal Planning: (Intake ~3100 cals/d - excess carb, fat and sodium from irregular meals, portions from hs meal and snacks. Inadeuqate nonstarchy vegetables but likes variety.)  Meal Plan 24 Hour Recall - Breakfast: none  Meal Plan 24 Hour Recall - Lunch: none OR sandwich (white, ham or turkey bologne)  Meal Plan 24 Hour Recall - Dinner: beans, rice (none yesterday), bkd pork chops   Meal Plan 24 Hour Recall - Snack: microwave popcorn (not daily) or chips; jess:     Monitoring   Self Monitoring : Per recall, fst -140; bed 145-180. Denies hypoglycemia.    In the last month, how often have you had a low blood sugar reaction?: never    Exercise   Exercise Type: (Daily yard work -light intensity w/  intermittent duration. )    Current Diabetes Treatment   Current Treatment: Diet, Exercise, Oral Medication(metformin xr 500mg 2tab twice daily)    Social History  Preferred Learning Method: Face to Face  Primary Support: Self  Smoking Status: Ex Smoker  Alcohol Use: Never    PHQ-2 Total Score: 0       DDS-2 Score  ( > 3 = SIGNIFICANT DISTRESS): 1          Barriers to Change  Barriers to Change: None  Learning Challenges : None    Readiness to Learn   Readiness to Learn : Eager    Cultural Influences  Cultural Influences: No    Diabetes Education Assessment/Progress  Diabetes Disease Process (diabetes disease process and treatment options): Discussion, Individual Session, Demonstrates Understanding/Competency(verbalizes/demonstrates), Written Materials Provided  Nutrition (Incorporating nutritional management into one's lifestyle): Discussion, Individual Session, Demonstrates Understanding/Competency (verbalizes/demonstrates), Written Materials Provided  Physical Activity (incorporating physical activity into one's lifestyle): Discussion, Individual Session, Demonstrates Understanding/Competency (verbalizes/demonstrates), Written Materials Provided  Medications (states correct name, dose, onset, peak, duration, side effects & timing of meds): Discussion, Individual Session, Demonstrates Understanding/Competency(verbalizes/demonstrates), Written Materials Provided  Monitoring (monitoring blood glucose/other parameters & using results): Discussion, Individual Session, Demonstrates Understanding/Competency (verbalizes/demonstrates), Written Materials Provided  Acute Complications (preventing, detecting, and treating acute complications): Discussion, Individual Session, Demonstrates Understanding/Competency (verbalizes/demonstrates), Written Materials Provided  Chronic Complications (preventing, detecting, and treating chronic complications): Discussion, Individual Session, Demonstrates Understanding/Competency  (verbalizes/demonstrates), Written Materials Provided  Clinical (diabetes, other pertinent medical history, and relevant comorbidities reviewed during visit): Discussion, Individual Session, Demonstrates Understanding/Competency (verbalizes/demonstrates)  Cognitive (knowledge of self-management skills, functional health literacy): Discussion, Individual Session, Demonstrates Understanding/Competency (verbalizes/demonstrates)  Psychosocial (emotional response to diabetes): Discussion, Individual Session, Demonstrates Understanding/Competency (verbalizes/demonstrates)  Diabetes Distress and Support Systems: Discussion, Individual Session, Demonstrates Understanding/Competency (verbalizes/demonstrates)  Behavioral (readiness for change, lifestyle practices, self-care behaviors): Discussion, Individual Session, Demonstrates Understanding/Competency (verbalizes/demonstrates)    Goals  Patient has selected/evaluated goals during today's session: Yes, selected  Healthy Eating: Set(use meal plan - carb portions/spacing (MR shake 1-2x/d to support; list provided))  Start Date: 03/21/19  Target Date: 04/18/19  Physical Activity: Set(150min/wk -walking program/gardening)  Start Date: 03/21/19  Target Date: 04/18/19  Monitoring: Set(test BG 2x/d -fst, 2hr ppd; bring meter/records to clinic)  Start Date: 03/21/19  Target Date: 04/18/19       Diabetes Care Plan/Intervention  Education Plan/Intervention: (Continue visits w/ [facility] for medical mgmt. Will coord A1C, micro/cr to upcoming labs. ) Noted podiatry appt today.     Diabetes Meal Plan  Restrictions: Low Fat, Low Sodium  Calories: 1600  Carbohydrate Per Meal: 30-45g  Carbohydrate Per Snack : 7-15g    Today's Self-Management Care Plan was developed with the patient's input and is based on barriers identified during today's assessment.    The long and short-term goals in the care plan were written with the patient/caregiver's input. The patient has agreed to work toward these  goals to improve his overall diabetes control.      The patient received a copy of today's self-management plan and verbalized understanding of the care plan, goals, and all of today's instructions.      The patient was encouraged to communicate with his physician and care team regarding his condition(s) and treatment.  I provided the patient with my contact information today and encouraged him to contact me via phone or patient portal as needed.     Education Units of Time   Time Spent: 60 min

## 2019-04-01 DIAGNOSIS — E11.69 HYPERLIPIDEMIA ASSOCIATED WITH TYPE 2 DIABETES MELLITUS: ICD-10-CM

## 2019-04-01 DIAGNOSIS — E78.5 HYPERLIPIDEMIA ASSOCIATED WITH TYPE 2 DIABETES MELLITUS: ICD-10-CM

## 2019-04-01 RX ORDER — PRAVASTATIN SODIUM 40 MG/1
TABLET ORAL
Qty: 90 TABLET | Refills: 0 | Status: SHIPPED | OUTPATIENT
Start: 2019-04-01 | End: 2019-04-23 | Stop reason: SDUPTHER

## 2019-04-02 ENCOUNTER — OFFICE VISIT (OUTPATIENT)
Dept: INTERNAL MEDICINE | Facility: CLINIC | Age: 64
End: 2019-04-02
Payer: MEDICAID

## 2019-04-02 VITALS
DIASTOLIC BLOOD PRESSURE: 78 MMHG | HEIGHT: 68 IN | TEMPERATURE: 99 F | OXYGEN SATURATION: 96 % | WEIGHT: 272.69 LBS | HEART RATE: 92 BPM | BODY MASS INDEX: 41.33 KG/M2 | SYSTOLIC BLOOD PRESSURE: 130 MMHG

## 2019-04-02 DIAGNOSIS — D84.9 IMMUNOSUPPRESSED STATUS: ICD-10-CM

## 2019-04-02 DIAGNOSIS — L40.52 PSORIATIC ARTHRITIS, DESTRUCTIVE TYPE: ICD-10-CM

## 2019-04-02 DIAGNOSIS — E11.59 HYPERTENSION ASSOCIATED WITH DIABETES: Primary | Chronic | ICD-10-CM

## 2019-04-02 DIAGNOSIS — J44.9 MIXED TYPE COPD (CHRONIC OBSTRUCTIVE PULMONARY DISEASE): ICD-10-CM

## 2019-04-02 DIAGNOSIS — I15.2 HYPERTENSION ASSOCIATED WITH DIABETES: Primary | Chronic | ICD-10-CM

## 2019-04-02 DIAGNOSIS — E55.9 VITAMIN D DEFICIENCY: ICD-10-CM

## 2019-04-02 DIAGNOSIS — Z00.00 ENCOUNTER FOR PREVENTIVE HEALTH EXAMINATION: ICD-10-CM

## 2019-04-02 DIAGNOSIS — E78.5 HYPERLIPIDEMIA ASSOCIATED WITH TYPE 2 DIABETES MELLITUS: ICD-10-CM

## 2019-04-02 DIAGNOSIS — E11.69 HYPERLIPIDEMIA ASSOCIATED WITH TYPE 2 DIABETES MELLITUS: ICD-10-CM

## 2019-04-02 DIAGNOSIS — E11.21 TYPE 2 DIABETES MELLITUS WITH NEPHROPATHY: Chronic | ICD-10-CM

## 2019-04-02 DIAGNOSIS — G47.33 OSA (OBSTRUCTIVE SLEEP APNEA): ICD-10-CM

## 2019-04-02 DIAGNOSIS — F17.210 CIGARETTE NICOTINE DEPENDENCE WITHOUT COMPLICATION: ICD-10-CM

## 2019-04-02 PROCEDURE — 99215 OFFICE O/P EST HI 40 MIN: CPT | Mod: PBBFAC,PN | Performed by: INTERNAL MEDICINE

## 2019-04-02 PROCEDURE — 99999 PR PBB SHADOW E&M-EST. PATIENT-LVL V: CPT | Mod: PBBFAC,,, | Performed by: INTERNAL MEDICINE

## 2019-04-02 PROCEDURE — 99396 PREV VISIT EST AGE 40-64: CPT | Mod: S$PBB,,, | Performed by: INTERNAL MEDICINE

## 2019-04-02 PROCEDURE — 99396 PR PREVENTIVE VISIT,EST,40-64: ICD-10-PCS | Mod: S$PBB,,, | Performed by: INTERNAL MEDICINE

## 2019-04-02 PROCEDURE — 99999 PR PBB SHADOW E&M-EST. PATIENT-LVL V: ICD-10-PCS | Mod: PBBFAC,,, | Performed by: INTERNAL MEDICINE

## 2019-04-02 RX ORDER — PRAVASTATIN SODIUM 20 MG/1
20 TABLET ORAL DAILY
COMMUNITY
End: 2019-09-10

## 2019-04-02 RX ORDER — ERGOCALCIFEROL 1.25 MG/1
50000 CAPSULE ORAL
COMMUNITY
End: 2022-12-08 | Stop reason: SDUPTHER

## 2019-04-12 ENCOUNTER — TELEPHONE (OUTPATIENT)
Dept: PHARMACY | Facility: CLINIC | Age: 64
End: 2019-04-12

## 2019-04-12 DIAGNOSIS — Z12.11 COLON CANCER SCREENING: ICD-10-CM

## 2019-04-18 ENCOUNTER — LAB VISIT (OUTPATIENT)
Dept: LAB | Facility: HOSPITAL | Age: 64
End: 2019-04-18
Attending: INTERNAL MEDICINE
Payer: MEDICAID

## 2019-04-18 ENCOUNTER — NUTRITION (OUTPATIENT)
Dept: DIABETES | Facility: CLINIC | Age: 64
End: 2019-04-18
Payer: MEDICAID

## 2019-04-18 ENCOUNTER — OFFICE VISIT (OUTPATIENT)
Dept: RHEUMATOLOGY | Facility: CLINIC | Age: 64
End: 2019-04-18
Payer: MEDICAID

## 2019-04-18 VITALS
HEIGHT: 68 IN | WEIGHT: 270.94 LBS | HEART RATE: 81 BPM | BODY MASS INDEX: 41.06 KG/M2 | SYSTOLIC BLOOD PRESSURE: 118 MMHG | DIASTOLIC BLOOD PRESSURE: 67 MMHG

## 2019-04-18 VITALS — BODY MASS INDEX: 41.06 KG/M2 | WEIGHT: 270.94 LBS | HEIGHT: 68 IN

## 2019-04-18 DIAGNOSIS — E11.69 HYPERLIPIDEMIA ASSOCIATED WITH TYPE 2 DIABETES MELLITUS: ICD-10-CM

## 2019-04-18 DIAGNOSIS — L40.9 PSORIASIS: ICD-10-CM

## 2019-04-18 DIAGNOSIS — Z00.00 ENCOUNTER FOR PREVENTIVE HEALTH EXAMINATION: ICD-10-CM

## 2019-04-18 DIAGNOSIS — E11.21 DIABETIC GLOMERULOPATHY: Primary | ICD-10-CM

## 2019-04-18 DIAGNOSIS — L40.52 PSORIATIC ARTHRITIS, DESTRUCTIVE TYPE: Primary | ICD-10-CM

## 2019-04-18 DIAGNOSIS — L40.52 PSORIATIC ARTHRITIS, DESTRUCTIVE TYPE: ICD-10-CM

## 2019-04-18 DIAGNOSIS — Z79.60 LONG-TERM USE OF IMMUNOSUPPRESSANT MEDICATION: ICD-10-CM

## 2019-04-18 DIAGNOSIS — E78.5 HYPERLIPIDEMIA ASSOCIATED WITH TYPE 2 DIABETES MELLITUS: ICD-10-CM

## 2019-04-18 DIAGNOSIS — R74.8 ELEVATED LIVER ENZYMES: ICD-10-CM

## 2019-04-18 DIAGNOSIS — E11.21 TYPE 2 DIABETES MELLITUS WITH NEPHROPATHY: Chronic | ICD-10-CM

## 2019-04-18 DIAGNOSIS — D84.9 IMMUNOSUPPRESSED STATUS: ICD-10-CM

## 2019-04-18 DIAGNOSIS — E55.9 VITAMIN D DEFICIENCY: ICD-10-CM

## 2019-04-18 LAB
ALBUMIN SERPL BCP-MCNC: 3.8 G/DL (ref 3.5–5.2)
ALP SERPL-CCNC: 69 U/L (ref 55–135)
ALT SERPL W/O P-5'-P-CCNC: 43 U/L (ref 10–44)
ANION GAP SERPL CALC-SCNC: 9 MMOL/L (ref 8–16)
AST SERPL-CCNC: 30 U/L (ref 10–40)
BASOPHILS # BLD AUTO: 0.04 K/UL (ref 0–0.2)
BASOPHILS NFR BLD: 0.5 % (ref 0–1.9)
BILIRUB DIRECT SERPL-MCNC: 0.2 MG/DL (ref 0.1–0.3)
BILIRUB SERPL-MCNC: 0.5 MG/DL (ref 0.1–1)
BUN SERPL-MCNC: 15 MG/DL (ref 8–23)
CALCIUM SERPL-MCNC: 9.9 MG/DL (ref 8.7–10.5)
CHLORIDE SERPL-SCNC: 104 MMOL/L (ref 95–110)
CHOLEST SERPL-MCNC: 217 MG/DL (ref 120–199)
CHOLEST/HDLC SERPL: 4.9 {RATIO} (ref 2–5)
CO2 SERPL-SCNC: 26 MMOL/L (ref 23–29)
COMPLEXED PSA SERPL-MCNC: 0.31 NG/ML (ref 0–4)
CREAT SERPL-MCNC: 1.1 MG/DL (ref 0.5–1.4)
CRP SERPL-MCNC: 2.4 MG/L (ref 0–8.2)
DIFFERENTIAL METHOD: ABNORMAL
EOSINOPHIL # BLD AUTO: 0.4 K/UL (ref 0–0.5)
EOSINOPHIL NFR BLD: 5.4 % (ref 0–8)
ERYTHROCYTE [DISTWIDTH] IN BLOOD BY AUTOMATED COUNT: 15.5 % (ref 11.5–14.5)
ERYTHROCYTE [SEDIMENTATION RATE] IN BLOOD BY WESTERGREN METHOD: 49 MM/HR (ref 0–23)
EST. GFR  (AFRICAN AMERICAN): >60 ML/MIN/1.73 M^2
EST. GFR  (NON AFRICAN AMERICAN): >60 ML/MIN/1.73 M^2
ESTIMATED AVG GLUCOSE: 154 MG/DL (ref 68–131)
GLUCOSE SERPL-MCNC: 86 MG/DL (ref 70–110)
HBA1C MFR BLD HPLC: 7 % (ref 4–5.6)
HCT VFR BLD AUTO: 47.5 % (ref 40–54)
HDLC SERPL-MCNC: 44 MG/DL (ref 40–75)
HDLC SERPL: 20.3 % (ref 20–50)
HGB BLD-MCNC: 15.4 G/DL (ref 14–18)
IMM GRANULOCYTES # BLD AUTO: 0.02 K/UL (ref 0–0.04)
IMM GRANULOCYTES NFR BLD AUTO: 0.3 % (ref 0–0.5)
LDLC SERPL CALC-MCNC: 136.8 MG/DL (ref 63–159)
LYMPHOCYTES # BLD AUTO: 3.1 K/UL (ref 1–4.8)
LYMPHOCYTES NFR BLD: 41.4 % (ref 18–48)
MCH RBC QN AUTO: 29.6 PG (ref 27–31)
MCHC RBC AUTO-ENTMCNC: 32.4 G/DL (ref 32–36)
MCV RBC AUTO: 91 FL (ref 82–98)
MONOCYTES # BLD AUTO: 0.6 K/UL (ref 0.3–1)
MONOCYTES NFR BLD: 8.1 % (ref 4–15)
NEUTROPHILS # BLD AUTO: 3.4 K/UL (ref 1.8–7.7)
NEUTROPHILS NFR BLD: 44.6 % (ref 38–73)
NONHDLC SERPL-MCNC: 173 MG/DL
NRBC BLD-RTO: 0 /100 WBC
PLATELET # BLD AUTO: 250 K/UL (ref 150–350)
PMV BLD AUTO: 10.4 FL (ref 9.2–12.9)
POTASSIUM SERPL-SCNC: 4.1 MMOL/L (ref 3.5–5.1)
PROT SERPL-MCNC: 8.1 G/DL (ref 6–8.4)
RBC # BLD AUTO: 5.2 M/UL (ref 4.6–6.2)
SODIUM SERPL-SCNC: 139 MMOL/L (ref 136–145)
TRIGL SERPL-MCNC: 181 MG/DL (ref 30–150)
WBC # BLD AUTO: 7.53 K/UL (ref 3.9–12.7)

## 2019-04-18 PROCEDURE — 86140 C-REACTIVE PROTEIN: CPT

## 2019-04-18 PROCEDURE — 99214 PR OFFICE/OUTPT VISIT, EST, LEVL IV, 30-39 MIN: ICD-10-PCS | Mod: S$PBB,,, | Performed by: PHYSICIAN ASSISTANT

## 2019-04-18 PROCEDURE — 85652 RBC SED RATE AUTOMATED: CPT

## 2019-04-18 PROCEDURE — 80053 COMPREHEN METABOLIC PANEL: CPT

## 2019-04-18 PROCEDURE — 36415 COLL VENOUS BLD VENIPUNCTURE: CPT

## 2019-04-18 PROCEDURE — 84153 ASSAY OF PSA TOTAL: CPT

## 2019-04-18 PROCEDURE — 80061 LIPID PANEL: CPT

## 2019-04-18 PROCEDURE — 99999 PR PBB SHADOW E&M-EST. PATIENT-LVL III: CPT | Mod: PBBFAC,,, | Performed by: DIETITIAN, REGISTERED

## 2019-04-18 PROCEDURE — 83036 HEMOGLOBIN GLYCOSYLATED A1C: CPT

## 2019-04-18 PROCEDURE — 99214 OFFICE O/P EST MOD 30 MIN: CPT | Mod: PBBFAC,PN | Performed by: PHYSICIAN ASSISTANT

## 2019-04-18 PROCEDURE — 99999 PR PBB SHADOW E&M-EST. PATIENT-LVL III: ICD-10-PCS | Mod: PBBFAC,,, | Performed by: DIETITIAN, REGISTERED

## 2019-04-18 PROCEDURE — 99999 PR PBB SHADOW E&M-EST. PATIENT-LVL IV: ICD-10-PCS | Mod: PBBFAC,,, | Performed by: PHYSICIAN ASSISTANT

## 2019-04-18 PROCEDURE — 99213 OFFICE O/P EST LOW 20 MIN: CPT | Mod: PBBFAC,27,PN | Performed by: DIETITIAN, REGISTERED

## 2019-04-18 PROCEDURE — 99214 OFFICE O/P EST MOD 30 MIN: CPT | Mod: S$PBB,,, | Performed by: PHYSICIAN ASSISTANT

## 2019-04-18 PROCEDURE — 99999 PR PBB SHADOW E&M-EST. PATIENT-LVL IV: CPT | Mod: PBBFAC,,, | Performed by: PHYSICIAN ASSISTANT

## 2019-04-18 PROCEDURE — 85025 COMPLETE CBC W/AUTO DIFF WBC: CPT

## 2019-04-18 PROCEDURE — 82248 BILIRUBIN DIRECT: CPT

## 2019-04-18 PROCEDURE — G0108 DIAB MANAGE TRN  PER INDIV: HCPCS | Mod: PBBFAC,PN | Performed by: DIETITIAN, REGISTERED

## 2019-04-18 NOTE — PROGRESS NOTES
Diabetes Education  Author: Rachelle Huertas RD, CDE  Date: 4/18/2019    Diabetes Care Management Summary  Diabetes Education Record Assessment/Progress: Comprehensive/Group  Current Diabetes Risk Level: Moderate     Last A1c:   Lab Results   Component Value Date    HGBA1C 8.5 (H) 12/20/2018     Last visit with Diabetes Educator: Last Education Visit: Not Found    Diabetes Type  Diabetes Type : Type II    Diabetes History  Diabetes Diagnosis: 0-1 year  Current Treatment: Diet, Exercise(metformin xr 500mg 2tab twice daily)  Reviewed Problem List with Patient: Yes    Health Maintenance was reviewed today with patient. Discussed with patient importance of routine eye exams, foot exams/foot care, blood work (i.e.: A1c, microalbumin, and lipid), dental visits, yearly flu vaccine, and pneumonia vaccine as indicated by PCP. Patient verbalized understanding.     Health Maintenance Topics with due status: Not Due       Topic Last Completion Date    TETANUS VACCINE 02/24/2016    Colonoscopy 10/24/2016    Eye Exam 09/05/2018    Lipid Panel 12/20/2018    Hemoglobin A1c 12/20/2018    Foot Exam 03/21/2019    Low Dose Statin 04/18/2019     Health Maintenance Due   Topic Date Due    Zoster Vaccine  03/23/2017       Nutrition  Meal Planning: (Intake ~3100 cals/d - excess carb, fat and sodium from irregular meals, portions from hs meal, sugary jess, snacks. Inadeuqate nonstarchy vegetables but likes variety.)  Meal Plan 24 Hour Recall - Breakfast: none  Meal Plan 24 Hour Recall - Lunch: none OR sandwich (white, ham or turkey bologne)  Meal Plan 24 Hour Recall - Dinner: beans, rice, sausage OR fried chix   Meal Plan 24 Hour Recall - Snack: chips; jess: sugary type   Pt w/ no meal plan changes from initial visit.    Monitoring   Self Monitoring : Per recall, fst -145; bed 180-200. Denies hypoglycemia.    Blood Glucose Logs: No  In the last month, how often have you had a low blood sugar reaction?: never    Exercise   Frequency:  Never    Current Diabetes Treatment   Current Treatment: Diet, Exercise(metformin xr 500mg 2tab twice daily)    Social History  Preferred Learning Method: Face to Face  Primary Support: Self  Smoking Status: Ex Smoker  Alcohol Use: Never     Barriers to Change  Barriers to Change: None  Learning Challenges : None    Readiness to Learn   Readiness to Learn : Eager    Cultural Influences  Cultural Influences: No    Diabetes Education Assessment/Progress  Diabetes Disease Process (diabetes disease process and treatment options): Discussion, Individual Session, Demonstrates Understanding/Competency(verbalizes/demonstrates)  Nutrition (Incorporating nutritional management into one's lifestyle): Discussion, Individual Session, Demonstrates Understanding/Competency (verbalizes/demonstrates), Written Materials Provided  Physical Activity (incorporating physical activity into one's lifestyle): Discussion, Individual Session, Demonstrates Understanding/Competency (verbalizes/demonstrates)  Medications (states correct name, dose, onset, peak, duration, side effects & timing of meds): Discussion, Individual Session, Demonstrates Understanding/Competency(verbalizes/demonstrates), Written Materials Provided  Monitoring (monitoring blood glucose/other parameters & using results): Discussion, Individual Session, Demonstrates Understanding/Competency (verbalizes/demonstrates), Written Materials Provided  Acute Complications (preventing, detecting, and treating acute complications): Discussion, Individual Session, Demonstrates Understanding/Competency (verbalizes/demonstrates)  Chronic Complications (preventing, detecting, and treating chronic complications): Discussion, Individual Session, Demonstrates Understanding/Competency (verbalizes/demonstrates)  Clinical (diabetes, other pertinent medical history, and relevant comorbidities reviewed during visit): Discussion, Individual Session, Demonstrates Understanding/Competency  (verbalizes/demonstrates)  Cognitive (knowledge of self-management skills, functional health literacy): Discussion, Individual Session, Demonstrates Understanding/Competency (verbalizes/demonstrates)  Psychosocial (emotional response to diabetes): Discussion, Individual Session, Demonstrates Understanding/Competency (verbalizes/demonstrates)  Diabetes Distress and Support Systems: Discussion, Individual Session, Demonstrates Understanding/Competency (verbalizes/demonstrates)  Behavioral (readiness for change, lifestyle practices, self-care behaviors): Discussion, Individual Session, Demonstrates Understanding/Competency (verbalizes/demonstrates)    Goals  Patient has selected/evaluated goals during today's session: Yes, evaluated  Healthy Eating: Set(se meal plan - carb portions/spacing (MR shake 1-2x/d to support; list provided))  Met Percentage : 0%  Start Date: 04/18/19  Target Date: 05/20/19  Physical Activity: % Met(150min/wk -walking program/gardening)  Met Percentage : 0%  Monitoring: Set(test BG 2x/d -fst, 2hr ppd; bring meter/records to clinic)  Met Percentage : 25%  Start Date: 04/18/19  Target Date: 05/20/19    Diabetes Care Plan/Intervention  Education Plan/Intervention: Individual Follow-Up DSMT(Continue visits w/ [facility] for medical mgmt. Will coord A1C, micro/cr to upcoming labs. ) Emphasis on avoiding sugary jess (alternatives listed). Discussed benefits of regular meal pattern, using pro shake for bfst to support (list provided).    Diabetes Meal Plan  Restrictions: Low Fat, Low Sodium  Calories: 1600  Carbohydrate Per Meal: 30-45g  Carbohydrate Per Snack : 7-15g    Today's Self-Management Care Plan was developed with the patient's input and is based on barriers identified during today's assessment.    The long and short-term goals in the care plan were written with the patient/caregiver's input. The patient has agreed to work toward these goals to improve his overall diabetes control.      The  patient received a copy of today's self-management plan and verbalized understanding of the care plan, goals, and all of today's instructions.      The patient was encouraged to communicate with his physician and care team regarding his condition(s) and treatment.  I provided the patient with my contact information today and encouraged him to contact me via phone or patient portal as needed.     Education Units of Time   Time Spent: 30 min

## 2019-04-18 NOTE — PROGRESS NOTES
Subjective:       Patient ID: Trey Stevens is a 64 y.o. male.    Chief Complaint: Pain; Psoriatic Arthritis; and Psoriasis    Trey is here for routine follow up.     He has chronic psoriatic arthritis and psoriasis.  He was taking  humira 40 mg Q 2 weeks (since 2015)- skin rash was persistent despite combo with oral  mtx 15mg/week (splitting dose 3 tabs am and 3 tabs pm), folic acid once daily.    At last visit we stopped humira and wanted to move to cosentyx, insurance required use of enbrel 1st, he is now on enbel 50 mg Q wk X 3 weeks. Skin rash is starting to clear some. Still a lot of rash on his arms and hands  C/w foot pain mostly. Rates his pain today 5/10.      In 2017 his mtx was cut back from 20 mg weekly  due to chronic lung issues and elevated lft.  He has multiple lung nodules which are followed by pulm and stable per his last pulmonary visit feb 2019, his breathing is stable. He does still smoke.     He sees dermatology also for psoriasis as well as vitiligo.  He has some chronic arthritis changes to his dip joints bilaterally and chronic deformities to his feet with lateral deviation of his toes.    Using otc tylenol  Every day but only takes the tramadol 1-2 X weekly at night when his feet hurt. using topicals  Bid to rash,   Vitiligo better as well. Tolerating his meds well.     In the past failed mtx monotherapy, failed topicals and UV. Was on vit D 50K weekly but now on otc Vit D3 5000 twice weekly     In July 2018 Dx pnx given abx. Completed trt. No recurrence.     Psoriasis   Associated symptoms include arthralgias and a rash. Pertinent negatives include no abdominal pain, chest pain, chills, congestion, coughing, fatigue, fever, headaches, joint swelling, myalgias, nausea, vomiting or weakness.           He reports no joint swelling. Pertinent negatives include no dysuria, fatigue, fever, myalgias or headaches.         Review of Systems   Constitutional: Negative for chills, fatigue, fever and  "unexpected weight change.        Poor hygiene      HENT: Negative for congestion, mouth sores and rhinorrhea.    Eyes: Negative for pain, discharge and redness.   Respiratory: Negative for cough, shortness of breath and wheezing.    Cardiovascular: Negative for chest pain and leg swelling.   Gastrointestinal: Negative for abdominal pain, diarrhea, nausea and vomiting.   Endocrine: Negative for cold intolerance and heat intolerance.   Genitourinary: Negative for dysuria.   Musculoskeletal: Positive for arthralgias. Negative for joint swelling and myalgias.   Skin: Positive for color change and rash.   Allergic/Immunologic: Negative for immunocompromised state.   Neurological: Negative for weakness and headaches.        Diabetic neuropathy   Psychiatric/Behavioral: The patient is not nervous/anxious.          Objective:   /67   Pulse 81   Ht 5' 8" (1.727 m)   Wt 122.9 kg (270 lb 15.1 oz)   BMI 41.20 kg/m²      Physical Exam   Constitutional: He is oriented to person, place, and time and well-developed, well-nourished, and in no distress.   HENT:   Head: Normocephalic and atraumatic.   Eyes: Pupils are equal, round, and reactive to light. Right eye exhibits no discharge.   Neck: Normal range of motion.   Cardiovascular: Normal rate, regular rhythm and normal heart sounds.  Exam reveals no friction rub.    Pulmonary/Chest: Effort normal and breath sounds normal. No respiratory distress.   Abdominal: Soft. He exhibits no distension. There is no tenderness.   Lymphadenopathy:     He has no cervical adenopathy.   Neurological: He is alert and oriented to person, place, and time.   Skin: Rash noted. No erythema.          Psoriasis rash bilateral elbows and small patch left hip    Vitiligo bilateral forearms       Psychiatric: Mood normal.   Musculoskeletal: Normal range of motion. He exhibits edema and deformity.   Bilateral wrists, mcps no synovitis good rom   nestor dips with chronic damage, bony enlargement     " nestor Right 5th dip flexion deformity,   Left thumb no flexion to the IP joint, right thumb ip joint motion decreased     good rom to nestor ankles, no swelling  enstor feet very dirty, 2-5 toes deviate laterally.  Mild lower ext edema                                       Recent Results (from the past 168 hour(s))   CBC auto differential    Collection Time: 04/18/19 10:55 AM   Result Value Ref Range    WBC 7.53 3.90 - 12.70 K/uL    RBC 5.20 4.60 - 6.20 M/uL    Hemoglobin 15.4 14.0 - 18.0 g/dL    Hematocrit 47.5 40.0 - 54.0 %    MCV 91 82 - 98 fL    MCH 29.6 27.0 - 31.0 pg    MCHC 32.4 32.0 - 36.0 g/dL    RDW 15.5 (H) 11.5 - 14.5 %    Platelets 250 150 - 350 K/uL    MPV 10.4 9.2 - 12.9 fL    Immature Granulocytes 0.3 0.0 - 0.5 %    Gran # (ANC) 3.4 1.8 - 7.7 K/uL    Immature Grans (Abs) 0.02 0.00 - 0.04 K/uL    Lymph # 3.1 1.0 - 4.8 K/uL    Mono # 0.6 0.3 - 1.0 K/uL    Eos # 0.4 0.0 - 0.5 K/uL    Baso # 0.04 0.00 - 0.20 K/uL    nRBC 0 0 /100 WBC    Gran% 44.6 38.0 - 73.0 %    Lymph% 41.4 18.0 - 48.0 %    Mono% 8.1 4.0 - 15.0 %    Eosinophil% 5.4 0.0 - 8.0 %    Basophil% 0.5 0.0 - 1.9 %    Differential Method Automated    Comprehensive metabolic panel    Collection Time: 04/18/19 10:55 AM   Result Value Ref Range    Sodium 139 136 - 145 mmol/L    Potassium 4.1 3.5 - 5.1 mmol/L    Chloride 104 95 - 110 mmol/L    CO2 26 23 - 29 mmol/L    Glucose 86 70 - 110 mg/dL    BUN, Bld 15 8 - 23 mg/dL    Creatinine 1.1 0.5 - 1.4 mg/dL    Calcium 9.9 8.7 - 10.5 mg/dL    Total Protein 8.1 6.0 - 8.4 g/dL    Albumin 3.8 3.5 - 5.2 g/dL    Total Bilirubin 0.5 0.1 - 1.0 mg/dL    Alkaline Phosphatase 69 55 - 135 U/L    AST 30 10 - 40 U/L    ALT 43 10 - 44 U/L    Anion Gap 9 8 - 16 mmol/L    eGFR if African American >60 >60 mL/min/1.73 m^2    eGFR if non African American >60 >60 mL/min/1.73 m^2   C-reactive protein    Collection Time: 04/18/19 10:55 AM   Result Value Ref Range    CRP 2.4 0.0 - 8.2 mg/L   Lipid panel    Collection Time: 04/18/19  10:55 AM   Result Value Ref Range    Cholesterol 217 (H) 120 - 199 mg/dL    Triglycerides 181 (H) 30 - 150 mg/dL    HDL 44 40 - 75 mg/dL    LDL Cholesterol 136.8 63.0 - 159.0 mg/dL    HDL/Chol Ratio 20.3 20.0 - 50.0 %    Total Cholesterol/HDL Ratio 4.9 2.0 - 5.0    Non-HDL Cholesterol 173 mg/dL   Bilirubin, direct    Collection Time: 19 10:55 AM   Result Value Ref Range    Bilirubin, Direct 0.2 0.1 - 0.3 mg/dL     Results for SYDNEY PETERSON (MRN [id number]) as of 10/18/2018 11:22   Ref. Range 2018 11:52   Vit D, 25-Hydroxy Latest Ref Range: 30 - 96 ng/mL 29 (L)         Results for SYDNEY PETERSON (MRN [id number]) as of 2019 11:51   Ref. Range 2018 11:13   Hep A IgM Unknown Negative   Hep B C IgM Unknown Negative   Hepatitis B Surface Ag Unknown Negative   Hepatitis C Ab Unknown Negative       Component      Latest Ref Rng & Units 2018   NIL      See text IU/mL 0.034   TB Antigen      See text IU/mL 0.115   TB Antigen - Nil      See Text IU/mL 0.081   Mitogen - Nil      See text IU/mL >10.000   TB Gold       Negative        19 Pulmonary function tests: FEV1: 1.52  (46.1 % predicted), FVC:  2.19 (51.2 % predicted), FEV1/FVC:70, T.36 (64.6 % predicted), RV/TLVC: 50 (131.6 % predicted), DLCO: 16.22 (59.3 % predicted)   Severe mixed obstruction with restriction. Diffusion capacity is moderately reduced but corrects for alveolar volume      18 Chest x-ray     COMPARISON:  2018    FINDINGS:  Cardiac silhouette and mediastinal contours are stable.  Lungs improved aeration of the lung bases with minimal interstitial changes remaining which may be chronic.  Osseous structures are intact.      Impression       Improved basilar aeration.           10/30/17 nestor hand and foot x-ray- damage in both hand and feet consistent with PsA   2016 XR hands reviewed: psoriatic arthritis changes noted to dip joints bilaterally, nestor thumb IP joints        Assessment:       1. Psoriatic arthritis,  destructive type    2. Psoriasis    3. Long-term use of immunosuppressant medication    4. Vitamin D deficiency        1. Psoriatic arthritis and skin  Psoriasis:just starting enbrel X 3 weeks, still on mtx 15mg week split dose-   Mild improvement with skin but just starting enbrel       Failed mtx and  humira Mod skin psoriasis BSA ~14%,  Mild foot/ankle involvement     2. Medication monitoring: no toxicity from  mtx or enbrel, labs normal, lft normal      3. Immunocompromised: utd, except zoster, possible verve study?-    4. Vit D deficiency: completed vit D 50,000U/2Xweekly, last level low normal 26 (2/2018) now on otc Vit D3 5000 twice weekly     5. Chronic lung nodules- stable monitored by pulmonology- will see again feb 2020    6. tob use- smoking       Plan:         C/w  Enbrel 50 mg Q wk for active psoriasis    Always remember to Hold mtx and enbrel for signs of infection or for surgery   Pt verbalized understanding    C/w topical back bid prn for his elbow rash    Failed mtx, failed topicals, failing humira  If he also fails enbrel then will move to cosentyx     TB gold and acute hep panel done 6/2018- neg, no need to repeat yet    Do every 1-2 years while on biologics    Increase otc vit D3 to 5K every day, check d level Q year     Return in 12 weeks   With labs cbc, cmp, esr and crp      Repeat  xrays hands and feet in 1 year    Counseled pt on smoking cessation    Please call or send portal message with any questions or concerns

## 2019-04-18 NOTE — LETTER
April 18, 2019        [place]  [address] [place] [address]             [facility]  [address]  [place] [address]  Phone: [phone]  Fax: [phone]   Patient: Trey Stevens   MR Number: [id number]   YOB: 1955   Date of Visit: 4/18/2019       Dear Dr. Matos:    Thank you for referring Trey Stevens to me for evaluation. Below are the relevant portions of my assessment and plan of care.    If you have questions, please do not hesitate to call me. I look forward to following Trey along with you.    Sincerely,      Rachelle Huertas RD, CDE           CC  Brittanie Chaparro MD

## 2019-04-19 DIAGNOSIS — L40.9 PSORIASIS: ICD-10-CM

## 2019-04-19 DIAGNOSIS — L40.59 POLYARTICULAR PSORIATIC ARTHRITIS: ICD-10-CM

## 2019-04-19 DIAGNOSIS — L40.52 PSORIATIC ARTHRITIS, DESTRUCTIVE TYPE: ICD-10-CM

## 2019-04-22 RX ORDER — METHOTREXATE 2.5 MG/1
TABLET ORAL
Qty: 24 TABLET | Refills: 5 | Status: SHIPPED | OUTPATIENT
Start: 2019-04-22 | End: 2019-10-02 | Stop reason: SDUPTHER

## 2019-04-23 ENCOUNTER — TELEPHONE (OUTPATIENT)
Dept: FAMILY MEDICINE | Facility: CLINIC | Age: 64
End: 2019-04-23

## 2019-04-23 DIAGNOSIS — E78.5 HYPERLIPIDEMIA ASSOCIATED WITH TYPE 2 DIABETES MELLITUS: ICD-10-CM

## 2019-04-23 DIAGNOSIS — E11.69 HYPERLIPIDEMIA ASSOCIATED WITH TYPE 2 DIABETES MELLITUS: ICD-10-CM

## 2019-04-23 RX ORDER — PRAVASTATIN SODIUM 40 MG/1
40 TABLET ORAL DAILY
Qty: 90 TABLET | Refills: 3 | Status: SHIPPED | OUTPATIENT
Start: 2019-04-23 | End: 2019-09-10

## 2019-04-23 NOTE — TELEPHONE ENCOUNTER
S/W Pt and notified pt that cholesterol is better, Pt notified that you would like to double his pravastatin to 40mg. Pt states that he did not have a problem in the past with a higher dosage. Also notified pt that a new RX of Pravastatin would be sent to the pharmacy for 40mg tab. Pt verbalized understanding and had no further questions. //rf

## 2019-04-23 NOTE — TELEPHONE ENCOUNTER
S/W Pt and notified pt that he needs some labs drawn. Labs scheduled for 08/02/2019. Pt would like a letter sent out for appt reminder.//rf

## 2019-04-29 RX ORDER — TRAMADOL HYDROCHLORIDE 50 MG/1
TABLET ORAL
Qty: 60 TABLET | Refills: 1 | Status: SHIPPED | OUTPATIENT
Start: 2019-04-29 | End: 2019-07-29

## 2019-05-01 DIAGNOSIS — I15.2 HYPERTENSION ASSOCIATED WITH DIABETES: Chronic | ICD-10-CM

## 2019-05-01 DIAGNOSIS — E11.59 HYPERTENSION ASSOCIATED WITH DIABETES: Chronic | ICD-10-CM

## 2019-05-01 RX ORDER — AMLODIPINE BESYLATE 5 MG/1
TABLET ORAL
Qty: 30 TABLET | Refills: 11 | Status: SHIPPED | OUTPATIENT
Start: 2019-05-01 | End: 2020-04-07 | Stop reason: SDUPTHER

## 2019-05-07 DIAGNOSIS — L40.9 PSORIASIS: ICD-10-CM

## 2019-05-07 RX ORDER — CALCIPOTRIENE 50 UG/G
OINTMENT TOPICAL
Qty: 120 G | Refills: 3 | Status: SHIPPED | OUTPATIENT
Start: 2019-05-07 | End: 2019-10-08 | Stop reason: SDUPTHER

## 2019-05-14 ENCOUNTER — TELEPHONE (OUTPATIENT)
Dept: PHARMACY | Facility: CLINIC | Age: 64
End: 2019-05-14

## 2019-05-30 ENCOUNTER — NUTRITION (OUTPATIENT)
Dept: DIABETES | Facility: CLINIC | Age: 64
End: 2019-05-30
Payer: MEDICAID

## 2019-05-30 ENCOUNTER — TELEPHONE (OUTPATIENT)
Dept: PHARMACY | Facility: CLINIC | Age: 64
End: 2019-05-30

## 2019-05-30 ENCOUNTER — OFFICE VISIT (OUTPATIENT)
Dept: DIABETES | Facility: CLINIC | Age: 64
End: 2019-05-30
Payer: MEDICAID

## 2019-05-30 VITALS
WEIGHT: 270.06 LBS | WEIGHT: 270.06 LBS | SYSTOLIC BLOOD PRESSURE: 128 MMHG | BODY MASS INDEX: 40.93 KG/M2 | HEIGHT: 68 IN | DIASTOLIC BLOOD PRESSURE: 76 MMHG | HEIGHT: 68 IN | BODY MASS INDEX: 40.93 KG/M2

## 2019-05-30 DIAGNOSIS — E11.65 TYPE 2 DIABETES MELLITUS WITH HYPERGLYCEMIA, WITHOUT LONG-TERM CURRENT USE OF INSULIN: Primary | ICD-10-CM

## 2019-05-30 DIAGNOSIS — E11.69 HYPERLIPIDEMIA ASSOCIATED WITH TYPE 2 DIABETES MELLITUS: ICD-10-CM

## 2019-05-30 DIAGNOSIS — I15.2 HYPERTENSION ASSOCIATED WITH DIABETES: ICD-10-CM

## 2019-05-30 DIAGNOSIS — E11.59 HYPERTENSION ASSOCIATED WITH DIABETES: Chronic | ICD-10-CM

## 2019-05-30 DIAGNOSIS — I15.2 HYPERTENSION ASSOCIATED WITH DIABETES: Chronic | ICD-10-CM

## 2019-05-30 DIAGNOSIS — E11.59 HYPERTENSION ASSOCIATED WITH DIABETES: ICD-10-CM

## 2019-05-30 DIAGNOSIS — E66.01 SEVERE OBESITY (BMI 35.0-35.9 WITH COMORBIDITY): ICD-10-CM

## 2019-05-30 DIAGNOSIS — E78.5 HYPERLIPIDEMIA ASSOCIATED WITH TYPE 2 DIABETES MELLITUS: ICD-10-CM

## 2019-05-30 DIAGNOSIS — E11.21 TYPE 2 DIABETES MELLITUS WITH NEPHROPATHY: Primary | ICD-10-CM

## 2019-05-30 LAB — GLUCOSE SERPL-MCNC: 112 MG/DL (ref 70–110)

## 2019-05-30 PROCEDURE — G0108 DIAB MANAGE TRN  PER INDIV: HCPCS | Mod: PBBFAC,PN | Performed by: DIETITIAN, REGISTERED

## 2019-05-30 PROCEDURE — 99214 OFFICE O/P EST MOD 30 MIN: CPT | Mod: S$PBB,,, | Performed by: NURSE PRACTITIONER

## 2019-05-30 PROCEDURE — 99214 PR OFFICE/OUTPT VISIT, EST, LEVL IV, 30-39 MIN: ICD-10-PCS | Mod: S$PBB,,, | Performed by: NURSE PRACTITIONER

## 2019-05-30 PROCEDURE — 82962 GLUCOSE BLOOD TEST: CPT | Mod: PBBFAC,PN | Performed by: NURSE PRACTITIONER

## 2019-05-30 PROCEDURE — 99213 OFFICE O/P EST LOW 20 MIN: CPT | Mod: PBBFAC,PN | Performed by: DIETITIAN, REGISTERED

## 2019-05-30 PROCEDURE — 99999 PR PBB SHADOW E&M-EST. PATIENT-LVL III: CPT | Mod: PBBFAC,,, | Performed by: DIETITIAN, REGISTERED

## 2019-05-30 PROCEDURE — 99999 PR PBB SHADOW E&M-EST. PATIENT-LVL III: ICD-10-PCS | Mod: PBBFAC,,, | Performed by: NURSE PRACTITIONER

## 2019-05-30 PROCEDURE — 99999 PR PBB SHADOW E&M-EST. PATIENT-LVL III: CPT | Mod: PBBFAC,,, | Performed by: NURSE PRACTITIONER

## 2019-05-30 PROCEDURE — 99999 PR PBB SHADOW E&M-EST. PATIENT-LVL III: ICD-10-PCS | Mod: PBBFAC,,, | Performed by: DIETITIAN, REGISTERED

## 2019-05-30 PROCEDURE — 99213 OFFICE O/P EST LOW 20 MIN: CPT | Mod: PBBFAC,27,PN | Performed by: NURSE PRACTITIONER

## 2019-05-30 RX ORDER — LOSARTAN POTASSIUM 100 MG/1
100 TABLET ORAL DAILY
Qty: 30 TABLET | Refills: 11 | Status: SHIPPED | OUTPATIENT
Start: 2019-05-30 | End: 2020-06-07 | Stop reason: SDUPTHER

## 2019-05-30 NOTE — TELEPHONE ENCOUNTER
Pt notified PA required on Jardiance.    PA submitted to [employer] on 5/30/19.     Will notify patient and provider upon approval / denial.    Thank You,   Nkechi Lee  Patient Care Advocate   [facility]  Phone: [phone]  Fax: [phone]

## 2019-05-30 NOTE — PROGRESS NOTES
"Subjective:         Patient ID: Trey Stevens is a 64 y.o. male.  Patient's current PCP is Brittanie Kaba MD.       Chief Complaint: Diabetes Mellitus    HPI  Trey Stevens is a 64 y.o. White male presenting for follow up for diabetes. Patient has been diagnosed with diabetes for over three years and has the following complications associated with diabetes: peripheral neuropathy.  Blood glucose testing is performed regularly. In the past 1 week patient reports blood glucose values to have approximately ranged from 120-150s fasting. Condition is not at goal consistently    He denies any recent hospital admissions, emergency room visits, hypoglycemia.      Height: 5' 7.5" (171.5 cm)  //  Weight: 122.5 kg (270 lb 1 oz), Body mass index is 41.67 kg/m².    His blood sugar in clinic today is:   Lab Results   Component Value Date    POCGLU 112 (A) 05/30/2019       Labs reviewed and are noted below.    His most recent A1C is:   Lab Results   Component Value Date    HGBA1C 7.0 (H) 04/18/2019     No results found for: CPEPTIDE  No results found for: GLUTAMICACID      CURRENT DM MEDICATIONS:   Metformin XR 1000mg BID- Currently on 2 a day    No current facility-administered medications for this visit.        Health Maintenance   Topic Date Due    Influenza Vaccine  08/01/2019    Eye Exam  09/05/2019    Hemoglobin A1c  10/18/2019    Foot Exam  03/21/2020    Lipid Panel  04/18/2020    Low Dose Statin  04/23/2020    TETANUS VACCINE  02/24/2026    Colonoscopy  10/24/2026    Hepatitis C Screening  Completed    Pneumococcal Vaccine (Medium Risk)  Completed       STANDARDS OF CARE:  Current Ophthalmologist/Optometrist: UTD.   Current Podiatrist: UTD  ACE/ARB: No  Statin: Yes  He  is to be enrolled in diabetes education classes.     LIFESTYLE:  BLOOD GLUCOSE TESTING: Patient reports testing on average a total of 2 times per day.      Review of Systems   Constitutional: Negative for activity change, appetite change and " fatigue.   Eyes: Negative for visual disturbance.   Gastrointestinal: Negative for constipation, diarrhea and nausea.   Endocrine: Negative for polydipsia, polyphagia and polyuria.         Objective:      Physical Exam   Constitutional: He is oriented to person, place, and time. He appears well-developed and well-nourished.   HENT:   Head: Normocephalic and atraumatic.   Cardiovascular: Normal rate.   Pulmonary/Chest: Effort normal.   Neurological: He is alert and oriented to person, place, and time.   Skin: Skin is warm and dry.   Psychiatric: He has a normal mood and affect. His behavior is normal. Judgment and thought content normal.   Nursing note and vitals reviewed.      Assessment:       1. Type 2 diabetes mellitus with hyperglycemia, without long-term current use of insulin    2. Severe obesity (BMI 35.0-35.9 with comorbidity)    3. Hyperlipidemia associated with type 2 diabetes mellitus    4. Hypertension associated with diabetes        Plan:   Type 2 diabetes mellitus with hyperglycemia, without long-term current use of insulin  -     empagliflozin (JARDIANCE) 25 mg Tab; Take 25 mg by mouth once daily.  Dispense: 30 tablet; Refill: 6  -     POCT Glucose, Hand-Held Device    - Condition not at goal. Jardiance added to regimen. . DM education reviewed. Patient encouraged to carb count and exercise per recommendations. Labs and Referrals as noted. Patient instructed to send in log weekly for review and potential medication adjustments. RV scheduled in 1 month.      Severe obesity (BMI 35.0-35.9 with comorbidity)    - DM diet discussed.     Hyperlipidemia associated with type 2 diabetes mellitus    - Patient recently restarted statin. Check lipid later in the year.     Hypertension associated with diabetes    - Controlled.       Additional Plan Details:    1.) Patient was instructed to monitor blood glucose 2 - 3 x daily, fasting and ac dinner or at bedtime. Discussed ADA goal for fasting blood sugar, 80 -  130mg/dL; pp blood sugars below 180 mg/dl. Also, discussed prevention of hypoglycemia and the need to adjust goals to higher levels if persistent hypoglycemia.  Reminded to bring BG records or meter to each visit for review.    2.) The patient was explained the above plan and given opportunity to ask questions.  He understands, chooses and consents to this plan and accepts all the risks, which include but are not limited to the risks mentioned above. He understands the alternative of having no testing, interventions or treatments at this time. He left content and without further questions.     A total of 30 minutes was spent in face to face time, of which over 50% was spent in counseling patient on disease process, complications, treatment, and side effects of medications.        YASMANY GrimmC

## 2019-05-30 NOTE — PROGRESS NOTES
Diabetes Education  Author: Rachelle Huertas RD, CDE  Date: 5/30/2019    Diabetes Care Management Summary  Diabetes Education Record Assessment/Progress: Comprehensive/Group  Current Diabetes Risk Level: Moderate     Last A1c:   Lab Results   Component Value Date    HGBA1C 7.0 (H) 04/18/2019     Last visit with Diabetes Educator: Last Education Visit: Not Found    Diabetes Type  Diabetes Type : Type II    Diabetes History  Diabetes Diagnosis: 0-1 year  Current Treatment: Diet, Exercise, Oral Medication(metformin xr 500mg 2tab twice daily, jardiance 25mg daily)  Reviewed Problem List with Patient: Yes    Health Maintenance was reviewed today with patient. Discussed with patient importance of routine eye exams, foot exams/foot care, blood work (i.e.: A1c, microalbumin, and lipid), dental visits, yearly flu vaccine, and pneumonia vaccine as indicated by PCP. Patient verbalized understanding.     Health Maintenance Topics with due status: Not Due       Topic Last Completion Date    TETANUS VACCINE 02/24/2016    Colonoscopy 10/24/2016    Eye Exam 09/05/2018    Influenza Vaccine 10/18/2018    Foot Exam 03/21/2019    Lipid Panel 04/18/2019    Hemoglobin A1c 04/18/2019    Low Dose Statin 05/30/2019     There are no preventive care reminders to display for this patient.    Nutrition  Meal Planning: (Intake ~0050-1372 cals/d; wt decreased 7 lbs since 10/18. Pt working to reduce carb, fat and sodium. Using MR shake occs, decreasing sugary jess and snacks. Inadeuqate nonstarchy vegetables but working to improve. )  Meal Plan 24 Hour Recall - Breakfast: none OR OR equate pro shake OR 2 slc bread - coffee (splenda/sugar)  Meal Plan 24 Hour Recall - Lunch: yesterday white beans 2cups, no rice   Meal Plan 24 Hour Recall - Dinner: yesterday mix veggies 2-3 cups   Meal Plan 24 Hour Recall - Snack: jess: water     Monitoring   Self Monitoring : Per records, fst -226, 301; rare pm at bed 140-218. Denies hypoglycemia. Noted  jardiance added.  Blood Glucose Logs: Yes  In the last month, how often have you had a low blood sugar reaction?: never       Exercise   Frequency: Never    Current Diabetes Treatment   Current Treatment: Diet, Exercise, Oral Medication(metformin xr 500mg 2tab twice daily, jardiance 25mg daily)    Social History  Preferred Learning Method: Face to Face  Primary Support: Self  Smoking Status: Ex Smoker  Alcohol Use: Never      Barriers to Change  Barriers to Change: None  Learning Challenges : None    Readiness to Learn   Readiness to Learn : Eager    Cultural Influences  Cultural Influences: No    Diabetes Education Assessment/Progress  Diabetes Disease Process (diabetes disease process and treatment options): Discussion, Individual Session, Demonstrates Understanding/Competency(verbalizes/demonstrates)  Nutrition (Incorporating nutritional management into one's lifestyle): Discussion, Individual Session, Demonstrates Understanding/Competency (verbalizes/demonstrates), Written Materials Provided  Physical Activity (incorporating physical activity into one's lifestyle): Discussion, Individual Session, Demonstrates Understanding/Competency (verbalizes/demonstrates)  Medications (states correct name, dose, onset, peak, duration, side effects & timing of meds): Discussion, Individual Session, Demonstrates Understanding/Competency(verbalizes/demonstrates), Written Materials Provided  Monitoring (monitoring blood glucose/other parameters & using results): Discussion, Individual Session, Demonstrates Understanding/Competency (verbalizes/demonstrates), Written Materials Provided  Acute Complications (preventing, detecting, and treating acute complications): Discussion, Individual Session, Demonstrates Understanding/Competency (verbalizes/demonstrates)  Chronic Complications (preventing, detecting, and treating chronic complications): Discussion, Individual Session, Demonstrates Understanding/Competency  (verbalizes/demonstrates)  Clinical (diabetes, other pertinent medical history, and relevant comorbidities reviewed during visit): Discussion, Individual Session, Demonstrates Understanding/Competency (verbalizes/demonstrates)  Cognitive (knowledge of self-management skills, functional health literacy): Discussion, Individual Session, Demonstrates Understanding/Competency (verbalizes/demonstrates)  Psychosocial (emotional response to diabetes): Discussion, Individual Session, Demonstrates Understanding/Competency (verbalizes/demonstrates)  Diabetes Distress and Support Systems: Discussion, Individual Session, Demonstrates Understanding/Competency (verbalizes/demonstrates)  Behavioral (readiness for change, lifestyle practices, self-care behaviors): Discussion, Individual Session, Demonstrates Understanding/Competency (verbalizes/demonstrates)    Goals  Patient has selected/evaluated goals during today's session: Yes, evaluated  Healthy Eating: % Met(Use meal plan - carb portions/spacing )  Met Percentage : 50%  Start Date: 05/30/19(MR shake 1-2x/d to support; use written menus provided today; measure foods)  Target Date: 07/15/19  Physical Activity: Set(150min/wk -walking program/gardening)  Met Percentage : 25%  Start Date: 05/30/19  Target Date: 07/15/19  Monitoring: Set(test BG 2x/d -fst, 2hr ppd; bring meter/records to clinic)  Met Percentage : 50%  Start Date: 05/30/19  Target Date: 07/15/19     Diabetes Care Plan/Intervention  Education Plan/Intervention: Individual Follow-Up DSMT(Continue visits w/ [facility] for medical mgmt. Will coord A1C, micro/cr to upcoming labs. ) Emphasis on regular meal patterns, portion measuring (provided written examples, meal plans).     Diabetes Meal Plan  Restrictions: Low Fat, Low Sodium  Calories: 1600  Carbohydrate Per Meal: 30-45g  Carbohydrate Per Snack : 7-15g    Today's Self-Management Care Plan was developed with the patient's input and is based on barriers identified  during today's assessment.    The long and short-term goals in the care plan were written with the patient/caregiver's input. The patient has agreed to work toward these goals to improve his overall diabetes control.      The patient received a copy of today's self-management plan and verbalized understanding of the care plan, goals, and all of today's instructions.      The patient was encouraged to communicate with his physician and care team regarding his condition(s) and treatment.  I provided the patient with my contact information today and encouraged him to contact me via phone or patient portal as needed.     Education Units of Time   Time Spent: 30 min

## 2019-05-30 NOTE — PATIENT INSTRUCTIONS
START JARDIANCE ONCE DAILY    MAKE SURE YOU ARE TAKING METFORMIN 2 PILLS IN MORNING AND 2 PILLS IN EVENING.     RETURN IN 1 MONTH    IF YOU ARE HAPPY WITH YOUR VISIT TODAY, PLEASE FILL OUT THE SURVEY THAT MAY BE SENT TO YOUR EMAIL ADDRESS OR MAILBOX FOLLOWING THIS VISIT. WE LOVE TO HEAR YOUR COMMENTS! HAVE A WONDERFUL DAY!

## 2019-05-30 NOTE — LETTER
May 30, 2019        [place]  [id number] [place] [address]             [facility]  [address]  [place] [address]  Phone: [phone]  Fax: [phone]   Patient: Trey Stevens   MR Number: [id number]   YOB: 1955   Date of Visit: 5/30/2019       Dear Dr. Matos:    Thank you for referring Trey Stevens to me for evaluation. Below are the relevant portions of my assessment and plan of care.     If you have questions, please do not hesitate to call me. I look forward to following Trey along with you.    Sincerely,      Rachelle Huertas RD, CDE           CC  Brittanie Chaparro MD

## 2019-06-03 ENCOUNTER — TELEPHONE (OUTPATIENT)
Dept: PHARMACY | Facility: CLINIC | Age: 64
End: 2019-06-03

## 2019-06-03 NOTE — TELEPHONE ENCOUNTER
Spoke with patient notifying him Jardiance 25mg [place] approved resulting in a $0.00 copayment.    Patient will  later today or tomorrow morning.    PA Information:  Carlos Eduardodanyellelilo [employer] [place]  [phone]  PA Case ID # [id number]  PA Approval Dates: 5/31/19-5/31/2020  PA Approved for Jardiance 25mg #60 per 30 days    Thank You,   Nkechi Lee  Patient Care Advocate   [facility]  Phone: [phone]  Fax: [phone]

## 2019-06-06 ENCOUNTER — TELEPHONE (OUTPATIENT)
Dept: PHARMACY | Facility: CLINIC | Age: 64
End: 2019-06-06

## 2019-06-20 ENCOUNTER — OFFICE VISIT (OUTPATIENT)
Dept: PODIATRY | Facility: CLINIC | Age: 64
End: 2019-06-20
Payer: MEDICAID

## 2019-06-20 VITALS
WEIGHT: 270 LBS | HEART RATE: 80 BPM | HEIGHT: 68 IN | DIASTOLIC BLOOD PRESSURE: 72 MMHG | SYSTOLIC BLOOD PRESSURE: 112 MMHG | BODY MASS INDEX: 40.92 KG/M2

## 2019-06-20 DIAGNOSIS — B35.1 DERMATOPHYTOSIS OF NAIL: ICD-10-CM

## 2019-06-20 DIAGNOSIS — E11.49 TYPE II DIABETES MELLITUS WITH NEUROLOGICAL MANIFESTATIONS: Primary | ICD-10-CM

## 2019-06-20 DIAGNOSIS — R23.4 SKIN FISSURES: ICD-10-CM

## 2019-06-20 DIAGNOSIS — L84 CORN OR CALLUS: ICD-10-CM

## 2019-06-20 PROCEDURE — 11721 DEBRIDE NAIL 6 OR MORE: CPT | Mod: 59,Q9,S$PBB, | Performed by: PODIATRIST

## 2019-06-20 PROCEDURE — 99499 NO LOS: ICD-10-PCS | Mod: S$PBB,,, | Performed by: PODIATRIST

## 2019-06-20 PROCEDURE — 11721 DEBRIDE NAIL 6 OR MORE: CPT | Mod: Q9,PBBFAC | Performed by: PODIATRIST

## 2019-06-20 PROCEDURE — 99499 UNLISTED E&M SERVICE: CPT | Mod: S$PBB,,, | Performed by: PODIATRIST

## 2019-06-20 PROCEDURE — 11056 PARNG/CUTG B9 HYPRKR LES 2-4: CPT | Mod: Q9,S$PBB,, | Performed by: PODIATRIST

## 2019-06-20 PROCEDURE — 99999 PR PBB SHADOW E&M-EST. PATIENT-LVL III: ICD-10-PCS | Mod: PBBFAC,,, | Performed by: PODIATRIST

## 2019-06-20 PROCEDURE — 99999 PR PBB SHADOW E&M-EST. PATIENT-LVL III: CPT | Mod: PBBFAC,,, | Performed by: PODIATRIST

## 2019-06-20 PROCEDURE — 11721 PR DEBRIDEMENT OF NAILS, 6 OR MORE: ICD-10-PCS | Mod: 59,Q9,S$PBB, | Performed by: PODIATRIST

## 2019-06-20 PROCEDURE — 11056 PARNG/CUTG B9 HYPRKR LES 2-4: CPT | Mod: Q9,PBBFAC | Performed by: PODIATRIST

## 2019-06-20 PROCEDURE — 99213 OFFICE O/P EST LOW 20 MIN: CPT | Mod: PBBFAC,25 | Performed by: PODIATRIST

## 2019-06-20 PROCEDURE — 11056 PR TRIM BENIGN HYPERKERATOTIC SKIN LESION,2-4: ICD-10-PCS | Mod: Q9,S$PBB,, | Performed by: PODIATRIST

## 2019-06-20 NOTE — PROGRESS NOTES
Subjective:     Patient ID: Trey Stevens is a 64 y.o. male.    Chief Complaint: Nail Care (c/o bilateral foot soreness while walking. wears casual shoes w/o socks. diabetic Pt. 04/02/19 with PCP Dr. Ramos.)    Trey is a 64 y.o. male who presents to the clinic for evaluation and treatment of high risk feet. Trey has a past medical history of Arthritis, Diabetes mellitus, Diabetes mellitus type 2, uncontrolled (7/19/2016), DM (diabetes mellitus) (2015), Gall stones, Obesity, Psoriasis (a type of skin inflammation), and Rheumatoid arthritis of foot. The patient's chief complaint is thick calluses and nails. This patient has documented high risk feet requiring routine maintenance secondary to diabetes mellitis and those secondary complications of diabetes, as mentioned. Patient states his blood sugar this morning was 153mg/dl.     PCP: Brittanie Kaba MD     Date Last Seen by PCP: 04/02/19    Current shoe gear:  Affected Foot: Extra depth shoes     Unaffected Foot: Extra depth shoes    Hemoglobin A1C   Date Value Ref Range Status   04/18/2019 7.0 (H) 4.0 - 5.6 % Final     Comment:     ADA Screening Guidelines:  5.7-6.4%  Consistent with prediabetes  >or=6.5%  Consistent with diabetes  High levels of fetal hemoglobin interfere with the HbA1C  assay. Heterozygous hemoglobin variants (HbS, HgC, etc)do  not significantly interfere with this assay.   However, presence of multiple variants may affect accuracy.     12/20/2018 8.5 (H) 4.0 - 5.6 % Final     Comment:     ADA Screening Guidelines:  5.7-6.4%  Consistent with prediabetes  >or=6.5%  Consistent with diabetes  High levels of fetal hemoglobin interfere with the HbA1C  assay. Heterozygous hemoglobin variants (HbS, HgC, etc)do  not significantly interfere with this assay.   However, presence of multiple variants may affect accuracy.     07/20/2018 7.7 (H) 4.0 - 5.6 % Final     Comment:     ADA Screening Guidelines:  5.7-6.4%  Consistent with prediabetes  >or=6.5%   Consistent with diabetes  High levels of fetal hemoglobin interfere with the HbA1C  assay. Heterozygous hemoglobin variants (HbS, HgC, etc)do  not significantly interfere with this assay.   However, presence of multiple variants may affect accuracy.             Patient Active Problem List   Diagnosis    Psoriatic arthritis, destructive type    Vitamin D deficiency    Psoriasis    Multiple lung nodules on CT    Immunosuppressed status    Long-term use of immunosuppressant medication    Mixed type COPD (chronic obstructive pulmonary disease)    Type 2 diabetes mellitus with nephropathy    Hypertension associated with diabetes    Vitiligo    SCHUYLER (obstructive sleep apnea)    Severe obesity (BMI 35.0-35.9 with comorbidity)    Hyperlipidemia associated with type 2 diabetes mellitus    Elevated liver enzymes    Cigarette nicotine dependence without complication       Medication List with Changes/Refills   Current Medications    AMLODIPINE (NORVASC) 5 MG TABLET    TAKE 1 TABLET BY MOUTH EVERY DAY    AMMONIUM LACTATE 12 % CREA    Apply 1 Act topically 2 (two) times daily.    ASPIRIN 81 MG CHEW    Take 1 tablet (81 mg total) by mouth once daily.    BENZONATATE (TESSALON) 200 MG CAPSULE    Take 1 capsule (200 mg total) by mouth 3 (three) times daily as needed for Cough.    BLOOD SUGAR DIAGNOSTIC STRP    Use as directed three times a day    CALCIPOTRIENE (DOVONOX) 0.005 % OINTMENT    Apply on psoriasis on body twice daily    CLOBETASOL (OLUX) 0.05 % FOAM    AAA of scalp and behind ears twice daily.  Do not use on face, underarms or groin.    EMPAGLIFLOZIN (JARDIANCE) 25 MG TAB    Take 1 tablet (25 mg) by mouth once daily.    ENBREL SURECLICK 50 MG/ML (0.98 ML) PNIJ    Inject 50 mg into the skin once a week.    ERGOCALCIFEROL (ERGOCALCIFEROL) 50,000 UNIT CAP    Take 50,000 Units by mouth twice a week.    FOLIC ACID (FOLVITE) 1 MG TABLET    Take 1 tablet (1 mg total) by mouth once daily.    KETOCONAZOLE (NIZORAL) 2  % SHAMPOO    Wash hair with medicated shampoo at least 2x/week - let sit on scalp at least 5 minutes prior to rinsing    LANCETS 33 GAUGE MISC    by Misc.(Non-Drug; Combo Route) route 3 (three) times daily.    LOSARTAN (COZAAR) 100 MG TABLET    TAKE 1 TABLET (100 MG TOTAL) BY MOUTH ONCE DAILY.    METFORMIN (GLUCOPHAGE-XR) 500 MG 24 HR TABLET    Take 2 tablets (1,000 mg total) by mouth 2 (two) times daily with meals.    METHOTREXATE 2.5 MG TAB    TAKE 6 TABLETS (15 MG TOTAL) BY MOUTH EVERY 7 DAYS. (3 IN THE MORNING AND 3 IN THE EVENING).    ONETOUCH ULTRAMINI KIT        PRAVASTATIN (PRAVACHOL) 20 MG TABLET    Take 20 mg by mouth once daily.    PRAVASTATIN (PRAVACHOL) 40 MG TABLET    Take 1 tablet (40 mg total) by mouth once daily.    PROAIR HFA 90 MCG/ACTUATION INHALER    Inhale 2 puffs into the lungs every 4 (four) hours as needed for Wheezing. Rescue    TRAMADOL (ULTRAM) 50 MG TABLET    TAKE 1 TABLET BY MOUTH EVERY 6 HOURS AS NEEDED    TRIAMCINOLONE ACETONIDE 0.1% (KENALOG) 0.1 % CREAM    AAA bid for psoriasis on body.  Do not use on face, underarms or groin.       Review of patient's allergies indicates:  No Known Allergies    Past Surgical History:   Procedure Laterality Date    APPENDECTOMY      CHOLECYSTECTOMY         Family History   Problem Relation Age of Onset    Cancer Mother     Hypertension Mother     Diabetes Mother     Cataracts Mother     Stroke Father     Psoriasis Neg Hx        Social History     Socioeconomic History    Marital status: Single     Spouse name: Not on file    Number of children: Not on file    Years of education: Not on file    Highest education level: Not on file   Occupational History    Not on file   Social Needs    Financial resource strain: Not on file    Food insecurity:     Worry: Not on file     Inability: Not on file    Transportation needs:     Medical: Not on file     Non-medical: Not on file   Tobacco Use    Smoking status: Former Smoker     Packs/day:  "1.00     Years: 24.00     Pack years: 24.00     Types: Cigarettes     Start date: 1994    Smokeless tobacco: Never Used    Tobacco comment: 7/30/2018 referral to smoking cessation program   Substance and Sexual Activity    Alcohol use: No     Alcohol/week: 0.0 oz    Drug use: No    Sexual activity: Never   Lifestyle    Physical activity:     Days per week: Not on file     Minutes per session: Not on file    Stress: Not on file   Relationships    Social connections:     Talks on phone: Not on file     Gets together: Not on file     Attends Congregational service: Not on file     Active member of club or organization: Not on file     Attends meetings of clubs or organizations: Not on file     Relationship status: Not on file   Other Topics Concern    Not on file   Social History Narrative    Not on file       Vitals:    06/20/19 1101   BP: 112/72   Pulse: 80   Weight: 122.5 kg (270 lb)   Height: 5' 7.5" (1.715 m)   PainSc: 0-No pain   PainLoc: Foot       Hemoglobin A1C   Date Value Ref Range Status   04/18/2019 7.0 (H) 4.0 - 5.6 % Final     Comment:     ADA Screening Guidelines:  5.7-6.4%  Consistent with prediabetes  >or=6.5%  Consistent with diabetes  High levels of fetal hemoglobin interfere with the HbA1C  assay. Heterozygous hemoglobin variants (HbS, HgC, etc)do  not significantly interfere with this assay.   However, presence of multiple variants may affect accuracy.     12/20/2018 8.5 (H) 4.0 - 5.6 % Final     Comment:     ADA Screening Guidelines:  5.7-6.4%  Consistent with prediabetes  >or=6.5%  Consistent with diabetes  High levels of fetal hemoglobin interfere with the HbA1C  assay. Heterozygous hemoglobin variants (HbS, HgC, etc)do  not significantly interfere with this assay.   However, presence of multiple variants may affect accuracy.     07/20/2018 7.7 (H) 4.0 - 5.6 % Final     Comment:     ADA Screening Guidelines:  5.7-6.4%  Consistent with prediabetes  >or=6.5%  Consistent with diabetes  High " levels of fetal hemoglobin interfere with the HbA1C  assay. Heterozygous hemoglobin variants (HbS, HgC, etc)do  not significantly interfere with this assay.   However, presence of multiple variants may affect accuracy.         Review of Systems   Constitutional: Negative for chills and fever.   Respiratory: Negative for shortness of breath.    Cardiovascular: Positive for leg swelling. Negative for chest pain, palpitations, orthopnea and claudication.   Gastrointestinal: Negative for diarrhea, nausea and vomiting.   Musculoskeletal: Negative for joint pain.   Skin: Negative for rash.   Neurological: Positive for tingling and sensory change. Negative for dizziness, focal weakness and weakness.   Psychiatric/Behavioral: Negative.          Objective:      PHYSICAL EXAM: Apperance: Alert and orient in no distress,well developed, and with good attention to grooming and body habits  Patient presents ambulating in extra depth shoes.   LOWER EXTREMITY EXAM:  VASCULAR: Dorsalis pedis pulses 0/4 bilateral and Posterior Tibial pulses 1/4 bilateral. Capillary fill time <4 seconds bilateral. Mild edema observed bilateral. Varicosities present bilateral. Skin temperature of the lower extremities is warm to warm, proximal to distal. Hair growth absent bilateral.  DERMATOLOGICAL: No skin rashes, subcutaneous nodules, lesions, or ulcers observed bilateral. Nails 1,2,3,4,5 bilateral elongated, thickened, and discolored with subungual debris. Webspaces 1,2,3,4 clean, dry and without evidence of break in skin integrity bilateral. Moderate dry and hyperkeratotic tissue noted to bilateral heels and medial 1st MPJ. Skin fissures noted to bilateral heels. No erythema, drainage, or increased temp noted to area.   NEUROLOGICAL: Light touch, sharp-dull, proprioception all present and equal bilaterally.  Vibratory sensation absent at bilateral hallux. Protective sensation absent at 6/10 sites as tested with a [place] 5.07  monofilament.   MUSCULOSKELETAL: Muscle strength is 5/5 for foot inverters, everters, plantarflexors, and dorsiflexors. Muscle tone is normal.         Assessment:       Encounter Diagnoses   Name Primary?    Type II diabetes mellitus with neurological manifestations Yes    Dermatophytosis of nail     Skin fissures     Corn or callus          Plan:   Type II diabetes mellitus with neurological manifestations    Dermatophytosis of nail    Skin fissures    Corn or callus      I counseled the patient on his conditions, their implications and medical management.  Greater than 15 minutes of a 20 minute appointment spent on education about the diabetic foot, neuropathy, and prevention of limb loss.  Shoe inspection. Diabetic Foot Education. Patient reminded of the importance of good nutrition and blood sugar control to help prevent podiatric complications of diabetes. Patient instructed on proper foot hygeine. We discussed wearing proper shoe gear, daily foot inspections, never walking without protective shoe gear, never putting sharp instruments to feet.    With patient's permission, nails 1-5 bilateral were debrided/trimmed in length and thickness aggressively to their soft tissue attachment mechanically and with electric , removing all offending nail and debris. Patient relates relief following the procedure.  With patient's permission bilateral skin fissure and callus were trimmed in thickness with #15 blade without incident.   Patient  will continue to monitor the areas daily, inspect feet, wear protective shoe gear when ambulatory, moisturizer to maintain skin integrity. Patient reminded of the importance of good nutrition and blood sugar control to help prevent podiatric complications of diabetes.  Patient to return in this office in approximately 3-4 months, or sooner if needed.                     Joy Hadley DPM  [facility]

## 2019-06-26 NOTE — TELEPHONE ENCOUNTER
DOCUMENTATION ONLY:   Dorinda Renteria  prior authorization approved until 6/24/20  CASE ID # [id number]

## 2019-06-27 ENCOUNTER — OFFICE VISIT (OUTPATIENT)
Dept: DIABETES | Facility: CLINIC | Age: 64
End: 2019-06-27
Payer: MEDICAID

## 2019-06-27 VITALS — BODY MASS INDEX: 41.43 KG/M2 | HEIGHT: 68 IN | WEIGHT: 273.38 LBS

## 2019-06-27 DIAGNOSIS — E78.5 HYPERLIPIDEMIA ASSOCIATED WITH TYPE 2 DIABETES MELLITUS: ICD-10-CM

## 2019-06-27 DIAGNOSIS — E11.69 HYPERLIPIDEMIA ASSOCIATED WITH TYPE 2 DIABETES MELLITUS: ICD-10-CM

## 2019-06-27 DIAGNOSIS — E66.01 SEVERE OBESITY (BMI 35.0-35.9 WITH COMORBIDITY): ICD-10-CM

## 2019-06-27 DIAGNOSIS — E11.59 HYPERTENSION ASSOCIATED WITH DIABETES: Chronic | ICD-10-CM

## 2019-06-27 DIAGNOSIS — E11.21 TYPE 2 DIABETES MELLITUS WITH NEPHROPATHY: Primary | Chronic | ICD-10-CM

## 2019-06-27 DIAGNOSIS — I15.2 HYPERTENSION ASSOCIATED WITH DIABETES: Chronic | ICD-10-CM

## 2019-06-27 PROCEDURE — 99999 PR PBB SHADOW E&M-EST. PATIENT-LVL IV: CPT | Mod: PBBFAC,,, | Performed by: NURSE PRACTITIONER

## 2019-06-27 PROCEDURE — 99999 PR PBB SHADOW E&M-EST. PATIENT-LVL IV: ICD-10-PCS | Mod: PBBFAC,,, | Performed by: NURSE PRACTITIONER

## 2019-06-27 PROCEDURE — 99214 OFFICE O/P EST MOD 30 MIN: CPT | Mod: PBBFAC | Performed by: NURSE PRACTITIONER

## 2019-06-27 PROCEDURE — 99214 PR OFFICE/OUTPT VISIT, EST, LEVL IV, 30-39 MIN: ICD-10-PCS | Mod: S$PBB,,, | Performed by: NURSE PRACTITIONER

## 2019-06-27 PROCEDURE — 99214 OFFICE O/P EST MOD 30 MIN: CPT | Mod: S$PBB,,, | Performed by: NURSE PRACTITIONER

## 2019-06-27 RX ORDER — METFORMIN HYDROCHLORIDE 500 MG/1
1000 TABLET, EXTENDED RELEASE ORAL 2 TIMES DAILY WITH MEALS
Qty: 360 TABLET | Refills: 3 | Status: SHIPPED | OUTPATIENT
Start: 2019-06-27 | End: 2020-04-17 | Stop reason: SDUPTHER

## 2019-06-27 NOTE — PROGRESS NOTES
"Subjective:         Patient ID: Trey Stevens is a 64 y.o. male.  Patient's current PCP is Brittanie Kaba MD.       Chief Complaint: Diabetes Mellitus    HPI  Trey Stevens is a 64 y.o. White male presenting for follow up for diabetes. Patient has been diagnosed with diabetes for over three years and has the following complications associated with diabetes: peripheral neuropathy. At last visit, Jardiance was initiated.  Blood glucose testing is performed regularly. In the past 1 week patient reports blood glucose values to have approximately ranged from 120-130s fasting.     He denies any recent hospital admissions, emergency room visits, hypoglycemia.      Height: 5' 8" (172.7 cm)  //  Weight: 124 kg (273 lb 5.9 oz), Body mass index is 41.57 kg/m².    His blood sugar in clinic today is: See Labs      Labs reviewed and are noted below.    His most recent A1C is:   Lab Results   Component Value Date    HGBA1C 7.0 (H) 04/18/2019     No results found for: CPEPTIDE  No results found for: GLUTAMICACID      CURRENT DM MEDICATIONS:   Metformin XR 1000mg BID  Jardiance 25 mg once daily        Health Maintenance   Topic Date Due    Influenza Vaccine  08/01/2019    Eye Exam  09/05/2019    Hemoglobin A1c  10/18/2019    Foot Exam  03/21/2020    Lipid Panel  04/18/2020    Low Dose Statin  06/20/2020    TETANUS VACCINE  02/24/2026    Colonoscopy  10/24/2026    Hepatitis C Screening  Completed    Pneumococcal Vaccine (Medium Risk)  Completed       STANDARDS OF CARE:  Current Ophthalmologist/Optometrist: UTD.   Current Podiatrist: UTD  ACE/ARB: No  Statin: Yes  He  is to be enrolled in diabetes education classes.     LIFESTYLE:  BLOOD GLUCOSE TESTING: Patient reports testing on average a total of 2 times per day.      Review of Systems   Constitutional: Negative for activity change, appetite change and fatigue.   Eyes: Negative for visual disturbance.   Gastrointestinal: Negative for constipation, diarrhea and nausea. "   Endocrine: Negative for polydipsia, polyphagia and polyuria.         Objective:      Physical Exam   Constitutional: He is oriented to person, place, and time. He appears well-developed and well-nourished.   HENT:   Head: Normocephalic and atraumatic.   Cardiovascular: Normal rate.   Pulmonary/Chest: Effort normal.   Neurological: He is alert and oriented to person, place, and time.   Skin: Skin is warm and dry.   Psychiatric: He has a normal mood and affect. His behavior is normal. Judgment and thought content normal.   Nursing note and vitals reviewed.      Assessment:       1. Type 2 diabetes mellitus with nephropathy    2. Hypertension associated with diabetes    3. Hyperlipidemia associated with type 2 diabetes mellitus    4. Severe obesity (BMI 35.0-35.9 with comorbidity)        Plan:     Type 2 diabetes mellitus with nephropathy  -     metFORMIN (GLUCOPHAGE-XR) 500 MG 24 hr tablet; Take 2 tablets (1,000 mg total) by mouth 2 (two) times daily with meals.  Dispense: 360 tablet; Refill: 3  -     empagliflozin (JARDIANCE) 25 mg Tab; Take 1 tablet (25 mg) by mouth once daily.  Dispense: 90 tablet; Refill: 3  -     POCT Glucose, Hand-Held Device    - Condition at goal based on Patient recall. Continue current regimen. DM education reviewed. Patient encouraged to carb count and exercise per recommendations. Labs and Referrals as noted. Patient instructed to send in log weekly for review and potential medication adjustments. RV scheduled in 3 months.      Severe obesity (BMI 35.0-35.9 with comorbidity)    - DM diet discussed.     Hyperlipidemia associated with type 2 diabetes mellitus    - Patient recently restarted statin. Check lipid later in the year.     Hypertension associated with diabetes    - Stable      Additional Plan Details:    1.) Patient was instructed to monitor blood glucose 2 - 3 x daily, fasting and ac dinner or at bedtime. Discussed ADA goal for fasting blood sugar, 80 - 130mg/dL; pp blood sugars  below 180 mg/dl. Also, discussed prevention of hypoglycemia and the need to adjust goals to higher levels if persistent hypoglycemia.  Reminded to bring BG records or meter to each visit for review.    2.) The patient was explained the above plan and given opportunity to ask questions.  He understands, chooses and consents to this plan and accepts all the risks, which include but are not limited to the risks mentioned above. He understands the alternative of having no testing, interventions or treatments at this time. He left content and without further questions.     A total of 30 minutes was spent in face to face time, of which over 50% was spent in counseling patient on disease process, complications, treatment, and side effects of medications.        YASMANY GrimmC

## 2019-06-27 NOTE — PATIENT INSTRUCTIONS
CONTINUE CURRENT REGIMEN.     IF YOU ARE HAPPY WITH YOUR VISIT TODAY, PLEASE FILL OUT THE SURVEY THAT MAY BE SENT TO YOUR EMAIL ADDRESS OR MAILBOX FOLLOWING THIS VISIT. WE LOVE TO HEAR YOUR COMMENTS! HAVE A WONDERFUL DAY!

## 2019-07-12 ENCOUNTER — TELEPHONE (OUTPATIENT)
Dept: RHEUMATOLOGY | Facility: CLINIC | Age: 64
End: 2019-07-12

## 2019-07-15 ENCOUNTER — LAB VISIT (OUTPATIENT)
Dept: LAB | Facility: HOSPITAL | Age: 64
End: 2019-07-15
Attending: PHYSICIAN ASSISTANT
Payer: MEDICAID

## 2019-07-15 ENCOUNTER — OFFICE VISIT (OUTPATIENT)
Dept: RHEUMATOLOGY | Facility: CLINIC | Age: 64
End: 2019-07-15
Payer: MEDICAID

## 2019-07-15 VITALS
DIASTOLIC BLOOD PRESSURE: 76 MMHG | HEIGHT: 68 IN | WEIGHT: 279.13 LBS | SYSTOLIC BLOOD PRESSURE: 136 MMHG | HEART RATE: 85 BPM | BODY MASS INDEX: 42.3 KG/M2

## 2019-07-15 DIAGNOSIS — Z79.60 LONG-TERM USE OF IMMUNOSUPPRESSANT MEDICATION: ICD-10-CM

## 2019-07-15 DIAGNOSIS — L40.52 PSORIATIC ARTHRITIS, DESTRUCTIVE TYPE: Primary | ICD-10-CM

## 2019-07-15 DIAGNOSIS — E55.9 VITAMIN D DEFICIENCY: ICD-10-CM

## 2019-07-15 DIAGNOSIS — D84.9 IMMUNOSUPPRESSED STATUS: ICD-10-CM

## 2019-07-15 DIAGNOSIS — R74.8 ELEVATED LIVER ENZYMES: ICD-10-CM

## 2019-07-15 DIAGNOSIS — L40.52 PSORIATIC ARTHRITIS, DESTRUCTIVE TYPE: ICD-10-CM

## 2019-07-15 DIAGNOSIS — L40.9 PSORIASIS: ICD-10-CM

## 2019-07-15 DIAGNOSIS — L40.0 PLAQUE PSORIASIS: ICD-10-CM

## 2019-07-15 LAB
ALBUMIN SERPL BCP-MCNC: 3.8 G/DL (ref 3.5–5.2)
ALP SERPL-CCNC: 83 U/L (ref 55–135)
ALT SERPL W/O P-5'-P-CCNC: 63 U/L (ref 10–44)
ANION GAP SERPL CALC-SCNC: 8 MMOL/L (ref 8–16)
AST SERPL-CCNC: 36 U/L (ref 10–40)
BASOPHILS # BLD AUTO: 0.06 K/UL (ref 0–0.2)
BASOPHILS NFR BLD: 0.9 % (ref 0–1.9)
BILIRUB SERPL-MCNC: 0.3 MG/DL (ref 0.1–1)
BUN SERPL-MCNC: 13 MG/DL (ref 8–23)
CALCIUM SERPL-MCNC: 9.9 MG/DL (ref 8.7–10.5)
CHLORIDE SERPL-SCNC: 106 MMOL/L (ref 95–110)
CO2 SERPL-SCNC: 27 MMOL/L (ref 23–29)
CREAT SERPL-MCNC: 1.1 MG/DL (ref 0.5–1.4)
CRP SERPL-MCNC: 3.4 MG/L (ref 0–8.2)
DIFFERENTIAL METHOD: ABNORMAL
EOSINOPHIL # BLD AUTO: 0.4 K/UL (ref 0–0.5)
EOSINOPHIL NFR BLD: 5.2 % (ref 0–8)
ERYTHROCYTE [DISTWIDTH] IN BLOOD BY AUTOMATED COUNT: 15 % (ref 11.5–14.5)
ERYTHROCYTE [SEDIMENTATION RATE] IN BLOOD BY WESTERGREN METHOD: 31 MM/HR (ref 0–23)
EST. GFR  (AFRICAN AMERICAN): >60 ML/MIN/1.73 M^2
EST. GFR  (NON AFRICAN AMERICAN): >60 ML/MIN/1.73 M^2
GLUCOSE SERPL-MCNC: 142 MG/DL (ref 70–110)
HCT VFR BLD AUTO: 46.1 % (ref 40–54)
HGB BLD-MCNC: 15 G/DL (ref 14–18)
IMM GRANULOCYTES # BLD AUTO: 0.03 K/UL (ref 0–0.04)
IMM GRANULOCYTES NFR BLD AUTO: 0.4 % (ref 0–0.5)
LYMPHOCYTES # BLD AUTO: 2.5 K/UL (ref 1–4.8)
LYMPHOCYTES NFR BLD: 36.6 % (ref 18–48)
MCH RBC QN AUTO: 30.2 PG (ref 27–31)
MCHC RBC AUTO-ENTMCNC: 32.5 G/DL (ref 32–36)
MCV RBC AUTO: 93 FL (ref 82–98)
MONOCYTES # BLD AUTO: 0.7 K/UL (ref 0.3–1)
MONOCYTES NFR BLD: 11 % (ref 4–15)
NEUTROPHILS # BLD AUTO: 3.1 K/UL (ref 1.8–7.7)
NEUTROPHILS NFR BLD: 46.3 % (ref 38–73)
NRBC BLD-RTO: 0 /100 WBC
PLATELET # BLD AUTO: 246 K/UL (ref 150–350)
PMV BLD AUTO: 10.5 FL (ref 9.2–12.9)
POTASSIUM SERPL-SCNC: 4.4 MMOL/L (ref 3.5–5.1)
PROT SERPL-MCNC: 8.1 G/DL (ref 6–8.4)
RBC # BLD AUTO: 4.96 M/UL (ref 4.6–6.2)
SODIUM SERPL-SCNC: 141 MMOL/L (ref 136–145)
WBC # BLD AUTO: 6.75 K/UL (ref 3.9–12.7)

## 2019-07-15 PROCEDURE — 99999 PR PBB SHADOW E&M-EST. PATIENT-LVL IV: ICD-10-PCS | Mod: PBBFAC,,, | Performed by: PHYSICIAN ASSISTANT

## 2019-07-15 PROCEDURE — 99214 OFFICE O/P EST MOD 30 MIN: CPT | Mod: S$PBB,,, | Performed by: PHYSICIAN ASSISTANT

## 2019-07-15 PROCEDURE — 85025 COMPLETE CBC W/AUTO DIFF WBC: CPT

## 2019-07-15 PROCEDURE — 86140 C-REACTIVE PROTEIN: CPT

## 2019-07-15 PROCEDURE — 85652 RBC SED RATE AUTOMATED: CPT

## 2019-07-15 PROCEDURE — 99999 PR PBB SHADOW E&M-EST. PATIENT-LVL IV: CPT | Mod: PBBFAC,,, | Performed by: PHYSICIAN ASSISTANT

## 2019-07-15 PROCEDURE — 80053 COMPREHEN METABOLIC PANEL: CPT

## 2019-07-15 PROCEDURE — 36415 COLL VENOUS BLD VENIPUNCTURE: CPT

## 2019-07-15 PROCEDURE — 99214 PR OFFICE/OUTPT VISIT, EST, LEVL IV, 30-39 MIN: ICD-10-PCS | Mod: S$PBB,,, | Performed by: PHYSICIAN ASSISTANT

## 2019-07-15 PROCEDURE — 99214 OFFICE O/P EST MOD 30 MIN: CPT | Mod: PBBFAC | Performed by: PHYSICIAN ASSISTANT

## 2019-07-15 RX ORDER — CALCIPOTRIENE, BETAMETHASONE DIPROPIONATE 50; .643 UG/G; MG/G
OINTMENT TOPICAL DAILY
Qty: 100 G | Refills: 2 | Status: SHIPPED | OUTPATIENT
Start: 2019-07-15 | End: 2019-11-07 | Stop reason: SDUPTHER

## 2019-07-15 NOTE — PROGRESS NOTES
Subjective:       Patient ID: Trey Stevens is a 64 y.o. male.    Chief Complaint: Psoriatic Arthritis    Trey is here for routine follow up.     He has chronic psoriatic arthritis and psoriasis.  He was taking  humira 40 mg Q 2 weeks (since 2015)- skin rash was persistent despite combo with oral  mtx 15mg/week (splitting dose 3 tabs am and 3 tabs pm), folic acid once daily.    At last visit we stopped humira and wanted to move to cosentyx, insurance required use of enbrel 1st, he is now on enbel 50 mg Q wk X 15 weeks. Skin rash is doing better on enbrel than it had been on humira but skin is not cleared completely. Still  rash on his arms and hands  C/w foot pain mostly. Rates his pain today 9/10.   Using topical calciprotien which has helped to keep skin rash improved, ran out needs refill.      In 2017 his mtx was cut back from 20 mg weekly  due to chronic lung issues and elevated lft.  He has multiple lung nodules which are followed by pulm and stable per his last pulmonary visit feb 2019, his breathing is stable. He does still smoke.     He sees dermatology also for psoriasis as well as vitiligo.  He has some chronic arthritis changes to his dip joints bilaterally and chronic deformities to his feet with lateral deviation of his toes.    Using otc tylenol  Every day but only takes the tramadol 1-2 X weekly at night when his feet hurt. using topicals  Bid to rash,   Vitiligo better as well. Tolerating his meds well.     In the past failed mtx monotherapy, failed topicals and UV. Was on vit D 50K weekly but now on otc Vit D3 5000 twice weekly     In July 2018 Dx pnx given abx. Completed trt. No recurrence.             He reports no joint swelling. Pertinent negatives include no dysuria, fatigue, fever, myalgias or headaches.       Psoriasis   Associated symptoms include arthralgias and a rash. Pertinent negatives include no abdominal pain, chest pain, chills, congestion, coughing, fatigue, fever, headaches, joint  "swelling, myalgias, nausea, vomiting or weakness.     Review of Systems   Constitutional: Negative for chills, fatigue, fever and unexpected weight change.        Poor hygiene      HENT: Negative for congestion, mouth sores and rhinorrhea.    Eyes: Negative for pain, discharge and redness.   Respiratory: Negative for cough, shortness of breath and wheezing.    Cardiovascular: Negative for chest pain and leg swelling.   Gastrointestinal: Negative for abdominal pain, diarrhea, nausea and vomiting.   Endocrine: Negative for cold intolerance and heat intolerance.   Genitourinary: Negative for dysuria.   Musculoskeletal: Positive for arthralgias. Negative for joint swelling and myalgias.   Skin: Positive for color change and rash.   Allergic/Immunologic: Negative for immunocompromised state.   Neurological: Negative for weakness and headaches.        Diabetic neuropathy   Psychiatric/Behavioral: The patient is not nervous/anxious.          Objective:   /76   Pulse 85   Ht 5' 8" (1.727 m)   Wt 126.6 kg (279 lb 1.6 oz)   BMI 42.44 kg/m²      Physical Exam   Constitutional: He is oriented to person, place, and time and well-developed, well-nourished, and in no distress.   HENT:   Head: Normocephalic and atraumatic.   Eyes: Pupils are equal, round, and reactive to light. Right eye exhibits no discharge.   Neck: Normal range of motion.   Cardiovascular: Normal rate, regular rhythm and normal heart sounds.  Exam reveals no friction rub.    Pulmonary/Chest: Effort normal and breath sounds normal. No respiratory distress.   Abdominal: Soft. He exhibits no distension. There is no tenderness.   Lymphadenopathy:     He has no cervical adenopathy.   Neurological: He is alert and oriented to person, place, and time.   Skin: Rash noted. No erythema.          Psoriasis rash bilateral elbows and small patch left hip    Vitiligo bilateral forearms       Psychiatric: Mood normal.   Musculoskeletal: Normal range of motion. He " exhibits edema and deformity.   Bilateral wrists, mcps no synovitis good rom   nestor dips with chronic damage, bony enlargement     nestor Right 5th dip flexion deformity,   Left thumb no flexion to the IP joint, right thumb ip joint motion decreased     good rom to nestor ankles, no swelling  nestor feet very dirty, 2-5 toes deviate laterally.  Mild lower ext edema                                       Recent Results (from the past 168 hour(s))   CBC auto differential    Collection Time: 07/15/19 12:04 PM   Result Value Ref Range    WBC 6.75 3.90 - 12.70 K/uL    RBC 4.96 4.60 - 6.20 M/uL    Hemoglobin 15.0 14.0 - 18.0 g/dL    Hematocrit 46.1 40.0 - 54.0 %    Mean Corpuscular Volume 93 82 - 98 fL    Mean Corpuscular Hemoglobin 30.2 27.0 - 31.0 pg    Mean Corpuscular Hemoglobin Conc 32.5 32.0 - 36.0 g/dL    RDW 15.0 (H) 11.5 - 14.5 %    Platelets 246 150 - 350 K/uL    MPV 10.5 9.2 - 12.9 fL    Immature Granulocytes 0.4 0.0 - 0.5 %    Gran # (ANC) 3.1 1.8 - 7.7 K/uL    Immature Grans (Abs) 0.03 0.00 - 0.04 K/uL    Lymph # 2.5 1.0 - 4.8 K/uL    Mono # 0.7 0.3 - 1.0 K/uL    Eos # 0.4 0.0 - 0.5 K/uL    Baso # 0.06 0.00 - 0.20 K/uL    nRBC 0 0 /100 WBC    Gran% 46.3 38.0 - 73.0 %    Lymph% 36.6 18.0 - 48.0 %    Mono% 11.0 4.0 - 15.0 %    Eosinophil% 5.2 0.0 - 8.0 %    Basophil% 0.9 0.0 - 1.9 %    Differential Method Automated    Comprehensive metabolic panel    Collection Time: 07/15/19 12:04 PM   Result Value Ref Range    Sodium 141 136 - 145 mmol/L    Potassium 4.4 3.5 - 5.1 mmol/L    Chloride 106 95 - 110 mmol/L    CO2 27 23 - 29 mmol/L    Glucose 142 (H) 70 - 110 mg/dL    BUN, Bld 13 8 - 23 mg/dL    Creatinine 1.1 0.5 - 1.4 mg/dL    Calcium 9.9 8.7 - 10.5 mg/dL    Total Protein 8.1 6.0 - 8.4 g/dL    Albumin 3.8 3.5 - 5.2 g/dL    Total Bilirubin 0.3 0.1 - 1.0 mg/dL    Alkaline Phosphatase 83 55 - 135 U/L    AST 36 10 - 40 U/L    ALT 63 (H) 10 - 44 U/L    Anion Gap 8 8 - 16 mmol/L    eGFR if African American >60 >60 mL/min/1.73  m^2    eGFR if non African American >60 >60 mL/min/1.73 m^2   C-reactive protein    Collection Time: 07/15/19 12:04 PM   Result Value Ref Range    CRP 3.4 0.0 - 8.2 mg/L          Ref. Range 2018 11:52   Vit D, 25-Hydroxy Latest Ref Range: 30 - 96 ng/mL 29 (L)            Ref. Range 2018 11:13   Hep A IgM Unknown Negative   Hep B C IgM Unknown Negative   Hepatitis B Surface Ag Unknown Negative   Hepatitis C Ab Unknown Negative       Component      Latest Ref Rng & Units 2018   NIL      See text IU/mL 0.034   TB Antigen      See text IU/mL 0.115   TB Antigen - Nil      See Text IU/mL 0.081   Mitogen - Nil      See text IU/mL >10.000   TB Gold       Negative        19 Pulmonary function tests: FEV1: 1.52  (46.1 % predicted), FVC:  2.19 (51.2 % predicted), FEV1/FVC:70, T.36 (64.6 % predicted), RV/TLVC: 50 (131.6 % predicted), DLCO: 16.22 (59.3 % predicted)   Severe mixed obstruction with restriction. Diffusion capacity is moderately reduced but corrects for alveolar volume      18 Chest x-ray     COMPARISON:  2018    FINDINGS:  Cardiac silhouette and mediastinal contours are stable.  Lungs improved aeration of the lung bases with minimal interstitial changes remaining which may be chronic.  Osseous structures are intact.      Impression       Improved basilar aeration.           10/30/17 nestor hand and foot x-ray- damage in both hand and feet consistent with PsA   2016 XR hands reviewed: psoriatic arthritis changes noted to dip joints bilaterally, nestor thumb IP joints        Assessment:       1. Psoriatic arthritis, destructive type    2. Psoriasis    3. Immunosuppressed status    4. Vitamin D deficiency    5. Elevated liver enzymes    6. Long-term use of immunosuppressant medication        1. Psoriatic arthritis and skin  Psoriasis:just starting enbrel X 15 weeks, still on mtx 15mg week split dose-   enbrel working better than humira but skin still not clear        Failed mtx and   humira Mod skin psoriasis BSA ~10%,  Mild foot/ankle involvement     2. Medication monitoring: no toxicity from  mtx or enbrel, labs normal, lft normal      3. Immunocompromised: utd, except zoster, possible verve study?-    4. Vit D deficiency: completed vit D 50,000U/2Xweekly, last level low normal 26 (2/2018) now on otc Vit D3 5000 twice weekly     5. Chronic lung nodules- stable monitored by pulmonology- will see again feb 2020    6. tob use- smoking       Plan:         C/w  Enbrel 50 mg Q wk for active psoriasis  C/w mtx 15 mg/wk     Always remember to Hold mtx and enbrel for signs of infection or for surgery   Pt verbalized understanding    C/w topical back bid prn for his elbow rash but change to Calcipotriene-betamethasone topical this will work better, script sent  to pharmacy    Failed mtx, failed topicals, failing humira  If he also fails enbrel then will move to cosentyx     TB gold and acute hep panel done 6/2018- neg, no need to repeat yet    Do every 1-2 years while on biologics    Increase otc vit D3 to 5K every day, check d level Q year     Return in 16 weeks   With labs cbc, cmp, esr and crp      Repeat  xrays hands and feet in 1 year    Counseled pt on smoking cessation    Please call or send portal message with any questions or concerns

## 2019-07-17 ENCOUNTER — TELEPHONE (OUTPATIENT)
Dept: RHEUMATOLOGY | Facility: CLINIC | Age: 64
End: 2019-07-17

## 2019-07-22 ENCOUNTER — NURSE TRIAGE (OUTPATIENT)
Dept: ADMINISTRATIVE | Facility: CLINIC | Age: 64
End: 2019-07-22

## 2019-07-22 ENCOUNTER — HOSPITAL ENCOUNTER (EMERGENCY)
Facility: HOSPITAL | Age: 64
Discharge: HOME | End: 2019-07-22
Attending: FAMILY MEDICINE
Payer: MEDICAID

## 2019-07-22 VITALS
WEIGHT: 276.13 LBS | RESPIRATION RATE: 20 BRPM | BODY MASS INDEX: 41.85 KG/M2 | HEART RATE: 77 BPM | HEIGHT: 68 IN | SYSTOLIC BLOOD PRESSURE: 115 MMHG | TEMPERATURE: 98 F | OXYGEN SATURATION: 95 % | DIASTOLIC BLOOD PRESSURE: 57 MMHG

## 2019-07-22 DIAGNOSIS — R07.9 CHEST PAIN: Primary | ICD-10-CM

## 2019-07-22 LAB
ALBUMIN SERPL BCP-MCNC: 3.8 G/DL (ref 3.5–5.2)
ALP SERPL-CCNC: 74 U/L (ref 55–135)
ALT SERPL W/O P-5'-P-CCNC: 84 U/L (ref 10–44)
ANION GAP SERPL CALC-SCNC: 14 MMOL/L (ref 8–16)
AST SERPL-CCNC: 54 U/L (ref 10–40)
BASOPHILS # BLD AUTO: 0.02 K/UL (ref 0–0.2)
BASOPHILS NFR BLD: 0.2 % (ref 0–1.9)
BILIRUB SERPL-MCNC: 0.5 MG/DL (ref 0.1–1)
BNP SERPL-MCNC: 14 PG/ML (ref 0–99)
BUN SERPL-MCNC: 16 MG/DL (ref 8–23)
CALCIUM SERPL-MCNC: 9.4 MG/DL (ref 8.7–10.5)
CHLORIDE SERPL-SCNC: 106 MMOL/L (ref 95–110)
CO2 SERPL-SCNC: 20 MMOL/L (ref 23–29)
CREAT SERPL-MCNC: 1.1 MG/DL (ref 0.5–1.4)
DIFFERENTIAL METHOD: ABNORMAL
EOSINOPHIL # BLD AUTO: 0.4 K/UL (ref 0–0.5)
EOSINOPHIL NFR BLD: 4.3 % (ref 0–8)
ERYTHROCYTE [DISTWIDTH] IN BLOOD BY AUTOMATED COUNT: 15.5 % (ref 11.5–14.5)
EST. GFR  (AFRICAN AMERICAN): >60 ML/MIN/1.73 M^2
EST. GFR  (NON AFRICAN AMERICAN): >60 ML/MIN/1.73 M^2
GLUCOSE SERPL-MCNC: 102 MG/DL (ref 70–110)
HCT VFR BLD AUTO: 45 % (ref 40–54)
HCV AB SERPL QL IA: NEGATIVE
HGB BLD-MCNC: 15.4 G/DL (ref 14–18)
HIV 1+2 AB+HIV1 P24 AG SERPL QL IA: NEGATIVE
LYMPHOCYTES # BLD AUTO: 2.3 K/UL (ref 1–4.8)
LYMPHOCYTES NFR BLD: 27.5 % (ref 18–48)
MCH RBC QN AUTO: 31 PG (ref 27–31)
MCHC RBC AUTO-ENTMCNC: 34.2 G/DL (ref 32–36)
MCV RBC AUTO: 91 FL (ref 82–98)
MONOCYTES # BLD AUTO: 0.8 K/UL (ref 0.3–1)
MONOCYTES NFR BLD: 8.9 % (ref 4–15)
NEUTROPHILS # BLD AUTO: 5 K/UL (ref 1.8–7.7)
NEUTROPHILS NFR BLD: 59.6 % (ref 38–73)
PLATELET # BLD AUTO: 252 K/UL (ref 150–350)
PMV BLD AUTO: 10.6 FL (ref 9.2–12.9)
POTASSIUM SERPL-SCNC: 4.4 MMOL/L (ref 3.5–5.1)
PROT SERPL-MCNC: 8.2 G/DL (ref 6–8.4)
RBC # BLD AUTO: 4.96 M/UL (ref 4.6–6.2)
SODIUM SERPL-SCNC: 140 MMOL/L (ref 136–145)
TROPONIN I SERPL DL<=0.01 NG/ML-MCNC: <0.006 NG/ML (ref 0–0.03)
WBC # BLD AUTO: 8.39 K/UL (ref 3.9–12.7)

## 2019-07-22 PROCEDURE — 86803 HEPATITIS C AB TEST: CPT

## 2019-07-22 PROCEDURE — 80053 COMPREHEN METABOLIC PANEL: CPT

## 2019-07-22 PROCEDURE — 84484 ASSAY OF TROPONIN QUANT: CPT

## 2019-07-22 PROCEDURE — 93010 EKG 12-LEAD: ICD-10-PCS | Mod: ,,, | Performed by: INTERNAL MEDICINE

## 2019-07-22 PROCEDURE — 85025 COMPLETE CBC W/AUTO DIFF WBC: CPT

## 2019-07-22 PROCEDURE — 25000003 PHARM REV CODE 250: Performed by: FAMILY MEDICINE

## 2019-07-22 PROCEDURE — 93010 ELECTROCARDIOGRAM REPORT: CPT | Mod: ,,, | Performed by: INTERNAL MEDICINE

## 2019-07-22 PROCEDURE — 86703 HIV-1/HIV-2 1 RESULT ANTBDY: CPT

## 2019-07-22 PROCEDURE — 99285 EMERGENCY DEPT VISIT HI MDM: CPT | Mod: 25

## 2019-07-22 PROCEDURE — 83880 ASSAY OF NATRIURETIC PEPTIDE: CPT

## 2019-07-22 PROCEDURE — 93005 ELECTROCARDIOGRAM TRACING: CPT

## 2019-07-22 PROCEDURE — 63600175 PHARM REV CODE 636 W HCPCS: Performed by: FAMILY MEDICINE

## 2019-07-22 PROCEDURE — 96374 THER/PROPH/DIAG INJ IV PUSH: CPT

## 2019-07-22 RX ORDER — MELOXICAM 15 MG/1
15 TABLET ORAL DAILY
Qty: 20 TABLET | Refills: 0 | Status: SHIPPED | OUTPATIENT
Start: 2019-07-22 | End: 2019-07-22 | Stop reason: SDUPTHER

## 2019-07-22 RX ORDER — KETOROLAC TROMETHAMINE 30 MG/ML
30 INJECTION, SOLUTION INTRAMUSCULAR; INTRAVENOUS
Status: COMPLETED | OUTPATIENT
Start: 2019-07-22 | End: 2019-07-22

## 2019-07-22 RX ORDER — CYCLOBENZAPRINE HCL 10 MG
10 TABLET ORAL 3 TIMES DAILY PRN
Qty: 15 TABLET | Refills: 0 | Status: SHIPPED | OUTPATIENT
Start: 2019-07-22 | End: 2019-07-27

## 2019-07-22 RX ORDER — ASPIRIN 325 MG
325 TABLET ORAL
Status: COMPLETED | OUTPATIENT
Start: 2019-07-22 | End: 2019-07-22

## 2019-07-22 RX ORDER — CYCLOBENZAPRINE HCL 10 MG
10 TABLET ORAL 3 TIMES DAILY PRN
Qty: 15 TABLET | Refills: 0 | Status: SHIPPED | OUTPATIENT
Start: 2019-07-22 | End: 2019-07-22 | Stop reason: SDUPTHER

## 2019-07-22 RX ORDER — MELOXICAM 15 MG/1
15 TABLET ORAL DAILY
Qty: 20 TABLET | Refills: 0 | Status: SHIPPED | OUTPATIENT
Start: 2019-07-22 | End: 2021-10-05

## 2019-07-22 RX ADMIN — KETOROLAC TROMETHAMINE 30 MG: 30 INJECTION, SOLUTION INTRAMUSCULAR at 02:07

## 2019-07-22 RX ADMIN — ASPIRIN 325 MG ORAL TABLET 325 MG: 325 PILL ORAL at 12:07

## 2019-07-22 NOTE — ED NOTES
Patient sitting up in bed, no acute distress noted, awake, alert, and oriented x 3, calm, respirations even and unlabored, and skin is warm and dry. Patient verbalized understanding of status and plan of care. Patient states he is in 10/10 pain in upper left back/chest that is worsened with deep breathing. MD notified. Side rails up x 2, call light in reach, bed low and locked. Family at bedside. Will continue to monitor.

## 2019-07-22 NOTE — TELEPHONE ENCOUNTER
Reason for Disposition   Severe difficulty breathing (e.g., struggling for each breath, speaks in single words)    Protocols used: CHEST PAIN-A-[place]    Pt called with pain around heart and chest pain. Pt very sob during call. Instructed to call 911 stated he has someone to bring him. Reinforced the importance of calling 911 pt refused stating it will be quicker for him to go with caregive.

## 2019-07-22 NOTE — ED PROVIDER NOTES
"SCRIBE #1 NOTE: I, Halie Patricia, am scribing for, and in the presence of, Mica Rodriguez MD. I have scribed the entire note.       History     Chief Complaint   Patient presents with    Chest Pain     Pt states, "I am having chest pain that started about an hour ago, it's making my arm feel funny."     Review of patient's allergies indicates:  No Known Allergies      History of Present Illness     HPI    7/22/2019, 12:32 PM  History obtained from the patient      History of Present Illness: Trey Stevens is a 64 y.o. male patient with a PMHx of arthritis, DM, obesity, and s/p APPY and JABIER who presents to the Emergency Department for evaluation of sharp left-sided CP which onset suddenly this morning approximately 9:30-10 AM. Pt states that raising his left arm or coughing increases his pain. Pt describes the pain as "a stick going through to my back". Symptoms are constant and moderate in severity. No associated sxs reported. Patient denies any palpations, BLE edema, HA, dizziness, diaphoresis, injuries, SOB, congestion, dysuria, abdominal pain, n/v/d, numbness, and all other sxs at this time. Pt denies any prior tx. No further complaints or concerns at this time.     Arrival mode: Personal vehicle      PCP: Brittanie Kaba MD        Past Medical History:  Past Medical History:   Diagnosis Date    Arthritis     Diabetes mellitus     Diabetes mellitus type 2, uncontrolled 7/19/2016    DM (diabetes mellitus) 2015     09/04/2017    Gall stones     Obesity     Psoriasis (a type of skin inflammation)     Rheumatoid arthritis of foot        Past Surgical History:  Past Surgical History:   Procedure Laterality Date    APPENDECTOMY      CHOLECYSTECTOMY           Family History:  Family History   Problem Relation Age of Onset    Cancer Mother     Hypertension Mother     Diabetes Mother     Cataracts Mother     Stroke Father     Psoriasis Neg Hx        Social History:  Social History     Tobacco " Use    Smoking status: Former Smoker     Packs/day: 1.00     Years: 24.00     Pack years: 24.00     Types: Cigarettes     Start date: 1994    Smokeless tobacco: Never Used    Tobacco comment: 7/30/2018 referral to smoking cessation program   Substance and Sexual Activity    Alcohol use: No     Alcohol/week: 0.0 oz    Drug use: No    Sexual activity: Never        Review of Systems     Review of Systems   Constitutional: Negative for chills and fever.   HENT: Negative for congestion and sore throat.    Respiratory: Negative for cough and shortness of breath.    Cardiovascular: Positive for chest pain. Negative for palpitations and leg swelling (BLE).   Gastrointestinal: Negative for abdominal pain, diarrhea, nausea and vomiting.   Genitourinary: Negative for dysuria.   Musculoskeletal: Negative for back pain and neck pain.        - injuries   Skin: Negative for rash.   Neurological: Negative for dizziness, weakness, numbness and headaches.   Hematological: Does not bruise/bleed easily.   All other systems reviewed and are negative.     Physical Exam     Initial Vitals [07/22/19 1153]   BP Pulse Resp Temp SpO2   133/73 85 20 97.5 °F (36.4 °C) 97 %      MAP       --          Physical Exam  Nursing Notes and Vital Signs Reviewed.   Constitutional: Patient is in no acute distress. Well-developed and well-nourished.  Head: Atraumatic. Normocephalic.  Eyes: PERRL. EOM intact. Conjunctivae are not pale. No scleral icterus.  ENT: Mucous membranes are moist. Oropharynx is clear and symmetric.    Neck: Supple. Full ROM. No lymphadenopathy.  Cardiovascular: Regular rate. Regular rhythm. No murmurs, rubs, or gallops. Distal pulses are 2+ and symmetric.  Pulmonary/Chest: No respiratory distress. Clear to auscultation bilaterally. No wheezing or rales. Left anterior chest wall tenderness and Left lateral chest wall tenderness with palpation.  Abdominal: Ascites.  There is no tenderness.  No rebound, guarding, or rigidity.  "Good bowel sounds.  Genitourinary: No CVA tenderness  Musculoskeletal: Moves all extremities. No obvious deformities. No edema. No calf tenderness.  Skin: Warm and dry.  Neurological:  Alert, awake, and appropriate.  Normal speech.  No acute focal neurological deficits are appreciated.  Psychiatric: Normal affect. Good eye contact. Appropriate in content.     ED Course   Procedures  ED Vital Signs:  Vitals:    07/22/19 1153 07/22/19 1204 07/22/19 1210 07/22/19 1302   BP: 133/73 121/69  127/67   Pulse: 85 83 84 77   Resp: 20   20   Temp: 97.5 °F (36.4 °C)      TempSrc: Oral      SpO2: 97% 95%  95%   Weight: 125.3 kg (276 lb 2 oz)      Height: 5' 8" (1.727 m)       07/22/19 1350 07/22/19 1501 07/22/19 1517   BP:  (!) 113/57 (!) 115/57   Pulse:  75 77   Resp:  20 20   Temp: 98.2 °F (36.8 °C)     TempSrc: Oral     SpO2:  95% 95%   Weight:      Height:          Abnormal Lab Results:  Labs Reviewed   CBC W/ AUTO DIFFERENTIAL - Abnormal; Notable for the following components:       Result Value    RDW 15.5 (*)     All other components within normal limits   COMPREHENSIVE METABOLIC PANEL - Abnormal; Notable for the following components:    CO2 20 (*)     AST 54 (*)     ALT 84 (*)     All other components within normal limits   HIV 1 / 2 ANTIBODY   HEPATITIS C ANTIBODY   TROPONIN I   B-TYPE NATRIURETIC PEPTIDE        All Lab Results:  Results for orders placed or performed during the hospital encounter of 07/22/19   HIV 1/2 Ag/Ab (4th Gen)   Result Value Ref Range    HIV 1/2 Ag/Ab Negative Negative   Hepatitis C antibody   Result Value Ref Range    Hepatitis C Ab Negative    CBC auto differential   Result Value Ref Range    WBC 8.39 3.90 - 12.70 K/uL    RBC 4.96 4.60 - 6.20 M/uL    Hemoglobin 15.4 14.0 - 18.0 g/dL    Hematocrit 45.0 40.0 - 54.0 %    Mean Corpuscular Volume 91 82 - 98 fL    Mean Corpuscular Hemoglobin 31.0 27.0 - 31.0 pg    Mean Corpuscular Hemoglobin Conc 34.2 32.0 - 36.0 g/dL    RDW 15.5 (H) 11.5 - 14.5 %    " Platelets 252 150 - 350 K/uL    MPV 10.6 9.2 - 12.9 fL    Gran # (ANC) 5.0 1.8 - 7.7 K/uL    Lymph # 2.3 1.0 - 4.8 K/uL    Mono # 0.8 0.3 - 1.0 K/uL    Eos # 0.4 0.0 - 0.5 K/uL    Baso # 0.02 0.00 - 0.20 K/uL    Gran% 59.6 38.0 - 73.0 %    Lymph% 27.5 18.0 - 48.0 %    Mono% 8.9 4.0 - 15.0 %    Eosinophil% 4.3 0.0 - 8.0 %    Basophil% 0.2 0.0 - 1.9 %    Differential Method Automated    Comprehensive metabolic panel   Result Value Ref Range    Sodium 140 136 - 145 mmol/L    Potassium 4.4 3.5 - 5.1 mmol/L    Chloride 106 95 - 110 mmol/L    CO2 20 (L) 23 - 29 mmol/L    Glucose 102 70 - 110 mg/dL    BUN, Bld 16 8 - 23 mg/dL    Creatinine 1.1 0.5 - 1.4 mg/dL    Calcium 9.4 8.7 - 10.5 mg/dL    Total Protein 8.2 6.0 - 8.4 g/dL    Albumin 3.8 3.5 - 5.2 g/dL    Total Bilirubin 0.5 0.1 - 1.0 mg/dL    Alkaline Phosphatase 74 55 - 135 U/L    AST 54 (H) 10 - 40 U/L    ALT 84 (H) 10 - 44 U/L    Anion Gap 14 8 - 16 mmol/L    eGFR if African American >60 >60 mL/min/1.73 m^2    eGFR if non African American >60 >60 mL/min/1.73 m^2   Troponin I #1   Result Value Ref Range    Troponin I <0.006 0.000 - 0.026 ng/mL   B-Type natriuretic peptide (BNP)   Result Value Ref Range    BNP 14 0 - 99 pg/mL         Imaging Results:  Imaging Results          X-Ray Chest AP Portable (Final result)  Result time 07/22/19 12:51:22    Final result by Nain Horne MD (07/22/19 12:51:22)                 Impression:      No acute process seen.      Electronically signed by: Nain Horne MD  Date:    07/22/2019  Time:    12:51             Narrative:    EXAMINATION:  XR CHEST AP PORTABLE    CLINICAL HISTORY:  Chest Pain;    FINDINGS:  Single view of the chest.  Aorta demonstrates atherosclerotic disease.    Cardiac silhouette is normal.  The lungs demonstrate no evidence demonstrate mild basilar atelectasis.  No consolidation no evidence of pleural effusion or pneumothorax.  Bones appear intact.                                 The EKG was ordered,  reviewed, and independently interpreted by the ED provider.  Interpretation time: 11:56  Rate: 84 BPM  Rhythm: normal sinus rhythm  Interpretation: Low voltage QRS. Borderline Abnormal ECG. No STEMI.  When compared to EKG performed 7/20/18, there are no significant changes.           The Emergency Provider reviewed the vital signs and test results, which are outlined above.     ED Discussion     3:04 PM: Reassessed pt at this time.  Pt states his condition has improved at this time. Discussed with pt all pertinent ED information and results. Discussed pt dx and plan of tx. Gave pt all f/u and return to the ED instructions. All questions and concerns were addressed at this time. Pt expresses understanding of information and instructions, and is comfortable with plan to discharge. Pt is stable for discharge.    I have discussed with patient and/or family/caretaker chest pain precautions, specifically to return for worsening chest pain, shortness of breath, fever, or any concern.  I have low suspicion for cardiopulmonary, vascular, infectious, respiratory, or other emergent medical condition based on my evaluation in the ED.    I discussed with patient and/or family/caretaker that evaluation in the ED does not suggest any emergent or life threatening medical conditions requiring immediate intervention beyond what was provided in the ED, and I believe patient is safe for discharge.  Regardless, an unremarkable evaluation in the ED does not preclude the development or presence of a serious of life threatening condition. As such, patient was instructed to return immediately for any worsening or change in current symptoms.      ED Medication(s):  Medications   aspirin tablet 325 mg (325 mg Oral Given 7/22/19 1677)   ketorolac injection 30 mg (30 mg Intravenous Given 7/22/19 5810)       Discharge Medication List as of 7/22/2019  2:41 PM          Follow-up Information     Schedule an appointment as soon as possible for a  visit  with Brittanie Kaba MD.    Specialty:  Internal Medicine  Why:  As needed  Contact information:  [address] [place] [id number] [phone]                         Medical Decision Making:   Clinical Tests:   Lab Tests: Ordered and Reviewed  Radiological Study: Ordered and Reviewed  Medical Tests: Ordered and Reviewed             Scribe Attestation:   Scribe #1: I performed the above scribed service and the documentation accurately describes the services I performed. I attest to the accuracy of the note.     Attending:   Physician Attestation Statement for Scribe #1: I, Mica Rodirguez MD, personally performed the services described in this documentation, as scribed by Halie Gomez, in my presence, and it is both accurate and complete.           Clinical Impression       ICD-10-CM ICD-9-CM   1. Chest pain R07.9 786.50       Disposition:   Disposition: Discharged  Condition: Stable         Mica Rodriguez MD  07/24/19 4165

## 2019-07-23 NOTE — PROGRESS NOTES
"Subjective:      Patient ID: Trey Stevens is a 64 y.o. male.    Chief Complaint: Follow-up      HPI  Here for follow up of medical problems and f/u of 2 ER visits 7/24 and 7/27, for chest pain determined to be costochondritis.  Treated with tramadol.  CP has mostly resolved, only some pleuritic pain with a cough.  No f/c/n/v.  No sputum.  Saw Rheum last week.  Labs drawn today.  AC breakfast [id number].  No exertional cp/sob/palp.  BMs normal.      Updated/ annual due 4/20:  HM: 10/18 fluvax, 12/18 HAV, 2/16 [id number], 4/13 [id number], 2/16 TDaP, Cscope in the past normal and pt refuses more, 4/19 PSA, 2/16 HCV neg, 9/18 Eye Dr. Germain, 11/18 needs chest CT.     Review of Systems   Constitutional: Negative for chills, diaphoresis, fatigue and fever.   Respiratory: Negative for cough, chest tightness and shortness of breath.    Cardiovascular: Negative for chest pain, palpitations and leg swelling.   Gastrointestinal: Negative for blood in stool, constipation, diarrhea, nausea and vomiting.   Genitourinary: Negative for difficulty urinating and frequency.   Musculoskeletal: Negative for arthralgias.         Objective:   /80 (BP Location: Right arm, Patient Position: Sitting, BP Method: Large (Manual))   Pulse 95   Temp 98.8 °F (37.1 °C) (Oral)   Ht 5' 8" (1.727 m)   Wt 124.2 kg (273 lb 13 oz)   SpO2 97%   BMI 41.63 kg/m²     Physical Exam   Constitutional: He is oriented to person, place, and time. He appears well-developed and well-nourished.   HENT:   Mouth/Throat: Oropharynx is clear and moist.   Neck: Normal range of motion. Neck supple.   Cardiovascular: Normal rate, regular rhythm and intact distal pulses. Exam reveals no gallop and no friction rub.   No murmur heard.  Pulmonary/Chest: Effort normal and breath sounds normal. He has no wheezes. He has no rales.   Abdominal: Soft. Bowel sounds are normal. He exhibits no mass. There is no tenderness.   Musculoskeletal: He exhibits no edema. "   Lymphadenopathy:     He has no cervical adenopathy.   Neurological: He is alert and oriented to person, place, and time.   Psychiatric: He has a normal mood and affect.       Results for SYDNEY PETERSON (MRN [id number]) as of 8/6/2019 13:10   Ref. Range 7/26/2019 18:11 7/29/2019 12:38   WBC Latest Ref Range: 3.90 - 12.70 K/uL 9.83    RBC Latest Ref Range: 4.60 - 6.20 M/uL 5.29    Hemoglobin Latest Ref Range: 14.0 - 18.0 g/dL 17.0    Hematocrit Latest Ref Range: 40.0 - 54.0 % 48.3    MCV Latest Ref Range: 82 - 98 fL 91    MCH Latest Ref Range: 27.0 - 31.0 pg 32.1 (H)    MCHC Latest Ref Range: 32.0 - 36.0 g/dL 35.2    RDW Latest Ref Range: 11.5 - 14.5 % 15.6 (H)    Platelets Latest Ref Range: 150 - 350 K/uL 271    MPV Latest Ref Range: 9.2 - 12.9 fL 10.8    Gran% Latest Ref Range: 38.0 - 73.0 % 51.0    Gran # (ANC) Latest Ref Range: 1.8 - 7.7 K/uL 5.0    Lymph% Latest Ref Range: 18.0 - 48.0 % 33.4    Lymph # Latest Ref Range: 1.0 - 4.8 K/uL 3.3    Mono% Latest Ref Range: 4.0 - 15.0 % 11.7    Mono # Latest Ref Range: 0.3 - 1.0 K/uL 1.2 (H)    Eosinophil% Latest Ref Range: 0.0 - 8.0 % 3.9    Eos # Latest Ref Range: 0.0 - 0.5 K/uL 0.4    Basophil% Latest Ref Range: 0.0 - 1.9 % 0.3    Baso # Latest Ref Range: 0.00 - 0.20 K/uL 0.03    Differential Method Unknown Automated    Sodium Latest Ref Range: 136 - 145 mmol/L 141    Potassium Latest Ref Range: 3.5 - 5.1 mmol/L 4.4    Chloride Latest Ref Range: 95 - 110 mmol/L 105    CO2 Latest Ref Range: 23 - 29 mmol/L 21 (L)    Anion Gap Latest Ref Range: 8 - 16 mmol/L 15    BUN, Bld Latest Ref Range: 8 - 23 mg/dL 17    Creatinine Latest Ref Range: 0.5 - 1.4 mg/dL 1.4    eGFR if non African American Latest Ref Range: >60 mL/min/1.73 m^2 53 (A)    eGFR if African American Latest Ref Range: >60 mL/min/1.73 m^2 >60    Glucose Latest Ref Range: 70 - 110 mg/dL 132 (H)    Calcium Latest Ref Range: 8.7 - 10.5 mg/dL 10.4    Alkaline Phosphatase Latest Ref Range: 55 - 135 U/L 86    PROTEIN  TOTAL Latest Ref Range: 6.0 - 8.4 g/dL 9.0 (H)    Albumin Latest Ref Range: 3.5 - 5.2 g/dL 4.1    BILIRUBIN TOTAL Latest Ref Range: 0.1 - 1.0 mg/dL 0.3    AST Latest Ref Range: 10 - 40 U/L 40    ALT Latest Ref Range: 10 - 44 U/L 63 (H)    CRP Latest Ref Range: 0.0 - 8.2 mg/L  3.7   BNP Latest Ref Range: 0 - 99 pg/mL <10    Troponin I Latest Ref Range: 0.000 - 0.026 ng/mL 0.010    Procalcitonin Latest Ref Range: <0.25 ng/mL  0.08       Assessment:       1. Left-sided chest wall pain    2. Mixed type COPD (chronic obstructive pulmonary disease)    3. Psoriatic arthritis, destructive type    4. Immunosuppressed status    5. Hypertension associated with diabetes    6. Type 2 diabetes mellitus with nephropathy          Plan:     Left-sided chest wall pain, resolving.    Mixed type COPD (chronic obstructive pulmonary disease)- doing     Psoriatic arthritis, destructive type, Immunosuppressed status- per Rheum.  Off treatment, to see Rheum again today.    Hypertension associated with diabetes- stable, cont rx.    Type 2 diabetes mellitus with nephropathy- await lab.    RTC  4 mo.

## 2019-07-26 ENCOUNTER — HOSPITAL ENCOUNTER (EMERGENCY)
Facility: HOSPITAL | Age: 64
Discharge: HOME | End: 2019-07-26
Attending: FAMILY MEDICINE
Payer: MEDICAID

## 2019-07-26 ENCOUNTER — TELEPHONE (OUTPATIENT)
Dept: PHARMACY | Facility: CLINIC | Age: 64
End: 2019-07-26

## 2019-07-26 ENCOUNTER — TELEPHONE (OUTPATIENT)
Dept: RHEUMATOLOGY | Facility: CLINIC | Age: 64
End: 2019-07-26

## 2019-07-26 VITALS
HEART RATE: 95 BPM | SYSTOLIC BLOOD PRESSURE: 107 MMHG | RESPIRATION RATE: 18 BRPM | DIASTOLIC BLOOD PRESSURE: 66 MMHG | HEIGHT: 68 IN | TEMPERATURE: 98 F | OXYGEN SATURATION: 94 % | WEIGHT: 271.25 LBS | BODY MASS INDEX: 41.11 KG/M2

## 2019-07-26 DIAGNOSIS — M94.0 COSTOCHONDRITIS: Primary | ICD-10-CM

## 2019-07-26 DIAGNOSIS — R07.9 CHEST PAIN: ICD-10-CM

## 2019-07-26 LAB
ALBUMIN SERPL BCP-MCNC: 4.1 G/DL (ref 3.5–5.2)
ALP SERPL-CCNC: 86 U/L (ref 55–135)
ALT SERPL W/O P-5'-P-CCNC: 63 U/L (ref 10–44)
ANION GAP SERPL CALC-SCNC: 15 MMOL/L (ref 8–16)
AST SERPL-CCNC: 40 U/L (ref 10–40)
BASOPHILS # BLD AUTO: 0.03 K/UL (ref 0–0.2)
BASOPHILS NFR BLD: 0.3 % (ref 0–1.9)
BILIRUB SERPL-MCNC: 0.3 MG/DL (ref 0.1–1)
BNP SERPL-MCNC: <10 PG/ML (ref 0–99)
BUN SERPL-MCNC: 17 MG/DL (ref 8–23)
CALCIUM SERPL-MCNC: 10.4 MG/DL (ref 8.7–10.5)
CHLORIDE SERPL-SCNC: 105 MMOL/L (ref 95–110)
CO2 SERPL-SCNC: 21 MMOL/L (ref 23–29)
CREAT SERPL-MCNC: 1.4 MG/DL (ref 0.5–1.4)
DIFFERENTIAL METHOD: ABNORMAL
EOSINOPHIL # BLD AUTO: 0.4 K/UL (ref 0–0.5)
EOSINOPHIL NFR BLD: 3.9 % (ref 0–8)
ERYTHROCYTE [DISTWIDTH] IN BLOOD BY AUTOMATED COUNT: 15.6 % (ref 11.5–14.5)
EST. GFR  (AFRICAN AMERICAN): >60 ML/MIN/1.73 M^2
EST. GFR  (NON AFRICAN AMERICAN): 53 ML/MIN/1.73 M^2
GLUCOSE SERPL-MCNC: 132 MG/DL (ref 70–110)
HCT VFR BLD AUTO: 48.3 % (ref 40–54)
HGB BLD-MCNC: 17 G/DL (ref 14–18)
LYMPHOCYTES # BLD AUTO: 3.3 K/UL (ref 1–4.8)
LYMPHOCYTES NFR BLD: 33.4 % (ref 18–48)
MCH RBC QN AUTO: 32.1 PG (ref 27–31)
MCHC RBC AUTO-ENTMCNC: 35.2 G/DL (ref 32–36)
MCV RBC AUTO: 91 FL (ref 82–98)
MONOCYTES # BLD AUTO: 1.2 K/UL (ref 0.3–1)
MONOCYTES NFR BLD: 11.7 % (ref 4–15)
NEUTROPHILS # BLD AUTO: 5 K/UL (ref 1.8–7.7)
NEUTROPHILS NFR BLD: 51 % (ref 38–73)
PLATELET # BLD AUTO: 271 K/UL (ref 150–350)
PMV BLD AUTO: 10.8 FL (ref 9.2–12.9)
POTASSIUM SERPL-SCNC: 4.4 MMOL/L (ref 3.5–5.1)
PROT SERPL-MCNC: 9 G/DL (ref 6–8.4)
RBC # BLD AUTO: 5.29 M/UL (ref 4.6–6.2)
SODIUM SERPL-SCNC: 141 MMOL/L (ref 136–145)
TROPONIN I SERPL DL<=0.01 NG/ML-MCNC: 0.01 NG/ML (ref 0–0.03)
WBC # BLD AUTO: 9.83 K/UL (ref 3.9–12.7)

## 2019-07-26 PROCEDURE — 36415 COLL VENOUS BLD VENIPUNCTURE: CPT

## 2019-07-26 PROCEDURE — 93010 ELECTROCARDIOGRAM REPORT: CPT | Mod: ,,, | Performed by: INTERNAL MEDICINE

## 2019-07-26 PROCEDURE — 80053 COMPREHEN METABOLIC PANEL: CPT

## 2019-07-26 PROCEDURE — 85025 COMPLETE CBC W/AUTO DIFF WBC: CPT

## 2019-07-26 PROCEDURE — 63600175 PHARM REV CODE 636 W HCPCS: Performed by: FAMILY MEDICINE

## 2019-07-26 PROCEDURE — 96374 THER/PROPH/DIAG INJ IV PUSH: CPT

## 2019-07-26 PROCEDURE — 93010 EKG 12-LEAD: ICD-10-PCS | Mod: ,,, | Performed by: INTERNAL MEDICINE

## 2019-07-26 PROCEDURE — 25000003 PHARM REV CODE 250: Performed by: FAMILY MEDICINE

## 2019-07-26 PROCEDURE — 83880 ASSAY OF NATRIURETIC PEPTIDE: CPT

## 2019-07-26 PROCEDURE — 84484 ASSAY OF TROPONIN QUANT: CPT

## 2019-07-26 PROCEDURE — 99285 EMERGENCY DEPT VISIT HI MDM: CPT | Mod: 25

## 2019-07-26 PROCEDURE — 93005 ELECTROCARDIOGRAM TRACING: CPT

## 2019-07-26 RX ORDER — KETOROLAC TROMETHAMINE 30 MG/ML
30 INJECTION, SOLUTION INTRAMUSCULAR; INTRAVENOUS
Status: COMPLETED | OUTPATIENT
Start: 2019-07-26 | End: 2019-07-26

## 2019-07-26 RX ORDER — ASPIRIN 325 MG
325 TABLET ORAL
Status: COMPLETED | OUTPATIENT
Start: 2019-07-26 | End: 2019-07-26

## 2019-07-26 RX ORDER — TRAMADOL HYDROCHLORIDE 50 MG/1
50 TABLET ORAL EVERY 6 HOURS PRN
Qty: 12 TABLET | Refills: 0 | Status: SHIPPED | OUTPATIENT
Start: 2019-07-26 | End: 2019-07-29 | Stop reason: SDUPTHER

## 2019-07-26 RX ADMIN — KETOROLAC TROMETHAMINE 30 MG: 30 INJECTION, SOLUTION INTRAMUSCULAR at 07:07

## 2019-07-26 RX ADMIN — ASPIRIN 325 MG ORAL TABLET 325 MG: 325 PILL ORAL at 06:07

## 2019-07-26 RX ADMIN — NITROGLYCERIN 1 INCH: 20 OINTMENT TOPICAL at 06:07

## 2019-07-26 NOTE — TELEPHONE ENCOUNTER
"Spoke with Mr. Stevens who c/o chest pain and SOB. He states that since his hospitalization on 07/22/2019 his pain is now only on the L side of his chest "under the ribs" and is a constant pain. He states that when he coughs, the pain is sharp and cramping. After taking his Flexeril given to him by the ER at 10am, his pain level is now a 6/10. Patient states he was prescribed to take Flexeril TID, which he did on yesterday, but is worried about taking that much so today he only took 10mg.     Mr. Stevens was advised to go to the ER to be re-evaluated for his chest pain and for now to not take his Enbrel to assure there is no underlying infection. This nurse spoke with Dr. Johansen and notified him of the above and agreed Mr. Stevens should go to the ER. If no infection, he can resume his Enbrel.     Mr. Stevens states understanding and will go to the ER.  "

## 2019-07-26 NOTE — TELEPHONE ENCOUNTER
----- Message from Zulema Hill PA-C sent at 7/26/2019  1:45 PM CDT -----  Regarding: RE: Enbrel  Yes if still having chest pain and sob he needs to go to ed or see cardiology or  pcp today    Hold his enbrel for now   The mobic can aggravated and make retain fluid, can raise his bp so best to hold that as well     We can get him back in for follow up  If not heart failure then can continue enbrel but if we may just want to change to cosentyx his skin is not well controlled in enbrel    I will have my nurse to call him to schedule an apt  Jess- please call pt and have him come in to discuss med changes  Last I looked dr best had late afternoon Monday open       Zulema   ----- Message -----  From: Nkechi Fleming PharmD  Sent: 7/26/2019   1:10 PM  To: Zulema Hill PA-C, Liz STEPHENSON Staff  Subject: Enbrel                                           Good Afternoon,    Mr. Stevens reported that he has had swelling in his feet and ankles. He was in the hospital on 7/22 for chest pains. Patient stated he still has some chest pains and also reports some SOB. Concerned if Enbrel may be causing cardiovascular adverse effects. He was prescribed flexeril and meloxicam after hospital visit but not sure if cause was determined. Patient stated he did not understand words doctors were using at visit. Do you recommend that he goes to the ER? He stated he left hospital with chest pains. How would you like the patient to proceed with therapy? Thank you.    Nkechi Covington, PharmD  Clinical Pharmacist  [facility]  [phone]

## 2019-07-26 NOTE — TELEPHONE ENCOUNTER
----- Message from Nkechi Fleming PharmD sent at 7/26/2019  1:10 PM CDT -----  Regarding: Enbrel  Good Afternoon,    Mr. Stevens reported that he has had swelling in his feet and ankles. He was in the hospital on 7/22 for chest pains. Patient stated he still has some chest pains and also reports some SOB. Concerned if Enbrel may be causing cardiovascular adverse effects. He was prescribed flexeril and meloxicam after hospital visit but not sure if cause was determined. Patient stated he did not understand words doctors were using at visit. Do you recommend that he goes to the ER? He stated he left hospital with chest pains. How would you like the patient to proceed with therapy? Thank you.    Nkechi Covington, PharmD  Clinical Pharmacist  [facility]  [phone]

## 2019-07-26 NOTE — ED NOTES
Pt c/o chest pain since Monday, under his left chest - pt denies any other symptoms at this time.    Patient moved to ED room 10, patient assisted onto stretcher and changed into a gown. Patient placed on cardiac monitor, continuous pulse oximetry and automatic blood pressure cuff. Bed placed in low locked position, side rails up x 2, call light is within reach of patient or family, orientation to room and explanation of wait provided to family and patient, alarms set and turned on for monitor and pulse ox, awaiting MD evaluation and orders, will continue to monitor.    Patient identifies self as Trey Stevens      LOC: The patient is awake, alert and aware of environment with an appropriate affect, the patient is oriented x 3 and speaking appropriately.  APPEARANCE: Patient resting comfortably and in no acute distress, patient is clean and well groomed, patient's clothing is properly fastened. Pt is obese.  SKIN: The skin is warm and dry, color consistent with ethnicity, patient has normal skin turgor and moist mucus membranes, skin intact, no breakdown or bruising noted. ** pt has plaques noted to upper extremities r/t dx of psoriasis.   MUSCULOSKELETAL: Patient moving all extremities well, no obvious swelling or deformities noted.  RESPIRATORY: Airway is open and patent, respirations are spontaneous, patient has a normal effort and rate, no accessory muscle use noted.  CARDIAC: Patient has a normal rate and rhythm, no periphreal edema noted, capillary refill < 3 seconds.  ABDOMEN: Hard and non tender to palpation, no distention noted.  NEUROLOGIC: PERRL, eyes open spontaneously, behavior appropriate to situation, follows commands, facial expression symmetrical, bilateral hand grasp equal and even, purposeful motor response noted, normal sensation in all extremities when touched with a finger.

## 2019-07-26 NOTE — ED PROVIDER NOTES
SCRIBE #1 NOTE: I, Aric Warren, am scribing for, and in the presence of, Mica Rodriguez MD. I have scribed the entire note.      History      Chief Complaint   Patient presents with    Chest Pain     pt c/o shortness of breath and left sided chest pain since Monday       Review of patient's allergies indicates:  No Known Allergies     HPI   HPI    7/26/2019, 6:56 PM   History obtained from the patient      History of Present Illness: Trey Stevens is a 64 y.o. male patient with a PMHx of DM2 who presents to the Emergency Department for L-sided chest pain, onset 4 days PTA. Pt presented to the ED with similar sxs on 7/22/19, and states that he is scheduled to follow up with his PCP in 3 days. Symptoms are constant and moderate in severity. Pain is worse with deep breaths. Associated sxs include nonproductive cough. Patient denies any fever, chills, n/v, SOB, weakness, numbness, dizziness, headache, diaphoresis, palpitations, and all other sxs at this time. No further complaints or concerns at this time.     Arrival mode: Personal vehicle    PCP: Brittanie Kaba MD       Past Medical History:  Past Medical History:   Diagnosis Date    Arthritis     Diabetes mellitus     Diabetes mellitus type 2, uncontrolled 7/19/2016    DM (diabetes mellitus) 2015     09/04/2017    Gall stones     Obesity     Psoriasis (a type of skin inflammation)     Rheumatoid arthritis of foot        Past Surgical History:  Past Surgical History:   Procedure Laterality Date    APPENDECTOMY      CHOLECYSTECTOMY           Family History:  Family History   Problem Relation Age of Onset    Cancer Mother     Hypertension Mother     Diabetes Mother     Cataracts Mother     Stroke Father     Psoriasis Neg Hx        Social History:  Social History     Tobacco Use    Smoking status: Former Smoker     Packs/day: 1.00     Years: 24.00     Pack years: 24.00     Types: Cigarettes     Start date: 1994    Smokeless tobacco: Never  Used    Tobacco comment: 7/30/2018 referral to smoking cessation program   Substance and Sexual Activity    Alcohol use: No     Alcohol/week: 0.0 oz    Drug use: No    Sexual activity: Never       ROS   Review of Systems   Constitutional: Negative for chills, diaphoresis, fatigue and fever.   HENT: Negative for sore throat.    Respiratory: Positive for cough (nonproductive). Negative for shortness of breath.    Cardiovascular: Positive for chest pain. Negative for palpitations and leg swelling.   Gastrointestinal: Negative for abdominal pain, nausea and vomiting.   Genitourinary: Negative for dysuria.   Musculoskeletal: Negative for back pain.   Skin: Negative for rash and wound.   Neurological: Negative for dizziness, weakness, light-headedness, numbness and headaches.   Hematological: Does not bruise/bleed easily.   All other systems reviewed and are negative.    Physical Exam      Initial Vitals [07/26/19 1630]   BP Pulse Resp Temp SpO2   (!) 147/73 105 (!) 24 98 °F (36.7 °C) 97 %      MAP       --          Physical Exam  Nursing Notes and Vital Signs Reviewed.  Constitutional: Patient is in no acute distress. Well-developed and well-nourished.  Head: Atraumatic. Normocephalic.  Eyes: PERRL. EOM intact. Conjunctivae are not pale. No scleral icterus.  ENT: Mucous membranes are moist. Oropharynx is clear and symmetric.    Neck: Supple. Full ROM. No lymphadenopathy.  Cardiovascular: Regular rate. Regular rhythm. No murmurs, rubs, or gallops. Distal pulses are 2+ and symmetric.  Pulmonary/Chest: No respiratory distress. Clear to auscultation bilaterally. No wheezing or rales. L lateral chest wall TTP.  Abdominal: Soft and non-distended.  There is no tenderness.  No rebound, guarding, or rigidity. Good bowel sounds.  Musculoskeletal: Moves all extremities. No obvious deformities. No edema. No calf tenderness.  Skin: Warm and dry.  Neurological:  Alert, awake, and appropriate.  Normal speech.  No acute focal  "neurological deficits are appreciated.  Psychiatric: Normal affect. Good eye contact. Appropriate in content.    ED Course    Procedures  ED Vital Signs:  Vitals:    07/26/19 1630 07/26/19 1844 07/26/19 1915 07/26/19 2050   BP: (!) 147/73  (!) 108/54 107/66   Pulse: 105 92 91 95   Resp: (!) 24  (!) 21 18   Temp: 98 °F (36.7 °C)   98 °F (36.7 °C)   TempSrc: Oral   Oral   SpO2: 97%  (!) 94% (!) 94%   Weight: 123.1 kg (271 lb 4.4 oz)      Height: 5' 8" (1.727 m)          Abnormal Lab Results:  Labs Reviewed   CBC W/ AUTO DIFFERENTIAL - Abnormal; Notable for the following components:       Result Value    Mean Corpuscular Hemoglobin 32.1 (*)     RDW 15.6 (*)     Mono # 1.2 (*)     All other components within normal limits   COMPREHENSIVE METABOLIC PANEL - Abnormal; Notable for the following components:    CO2 21 (*)     Glucose 132 (*)     Total Protein 9.0 (*)     ALT 63 (*)     eGFR if non  53 (*)     All other components within normal limits   TROPONIN I   B-TYPE NATRIURETIC PEPTIDE        All Lab Results:  Results for orders placed or performed during the hospital encounter of 07/26/19   CBC auto differential   Result Value Ref Range    WBC 9.83 3.90 - 12.70 K/uL    RBC 5.29 4.60 - 6.20 M/uL    Hemoglobin 17.0 14.0 - 18.0 g/dL    Hematocrit 48.3 40.0 - 54.0 %    Mean Corpuscular Volume 91 82 - 98 fL    Mean Corpuscular Hemoglobin 32.1 (H) 27.0 - 31.0 pg    Mean Corpuscular Hemoglobin Conc 35.2 32.0 - 36.0 g/dL    RDW 15.6 (H) 11.5 - 14.5 %    Platelets 271 150 - 350 K/uL    MPV 10.8 9.2 - 12.9 fL    Gran # (ANC) 5.0 1.8 - 7.7 K/uL    Lymph # 3.3 1.0 - 4.8 K/uL    Mono # 1.2 (H) 0.3 - 1.0 K/uL    Eos # 0.4 0.0 - 0.5 K/uL    Baso # 0.03 0.00 - 0.20 K/uL    Gran% 51.0 38.0 - 73.0 %    Lymph% 33.4 18.0 - 48.0 %    Mono% 11.7 4.0 - 15.0 %    Eosinophil% 3.9 0.0 - 8.0 %    Basophil% 0.3 0.0 - 1.9 %    Differential Method Automated    Comprehensive metabolic panel   Result Value Ref Range    Sodium 141 136 " - 145 mmol/L    Potassium 4.4 3.5 - 5.1 mmol/L    Chloride 105 95 - 110 mmol/L    CO2 21 (L) 23 - 29 mmol/L    Glucose 132 (H) 70 - 110 mg/dL    BUN, Bld 17 8 - 23 mg/dL    Creatinine 1.4 0.5 - 1.4 mg/dL    Calcium 10.4 8.7 - 10.5 mg/dL    Total Protein 9.0 (H) 6.0 - 8.4 g/dL    Albumin 4.1 3.5 - 5.2 g/dL    Total Bilirubin 0.3 0.1 - 1.0 mg/dL    Alkaline Phosphatase 86 55 - 135 U/L    AST 40 10 - 40 U/L    ALT 63 (H) 10 - 44 U/L    Anion Gap 15 8 - 16 mmol/L    eGFR if African American >60 >60 mL/min/1.73 m^2    eGFR if non African American 53 (A) >60 mL/min/1.73 m^2   Troponin I #1   Result Value Ref Range    Troponin I 0.010 0.000 - 0.026 ng/mL   B-Type natriuretic peptide (BNP)   Result Value Ref Range    BNP <10 0 - 99 pg/mL       Imaging Results:  Imaging Results          X-Ray Chest AP Portable (Final result)  Result time 07/26/19 18:21:13    Final result by Nain Horne MD (07/26/19 18:21:13)                 Impression:      Patchy infiltrate left lower lobe which could reflect airspace disease.      Electronically signed by: Nain Horne MD  Date:    07/26/2019  Time:    18:21             Narrative:    EXAMINATION:  XR CHEST AP PORTABLE    CLINICAL HISTORY:  Chest Pain;    FINDINGS:  Single view of the chest.    Cardiac silhouette is normal.  The lungs demonstrate no evidence of consolidation.  Mild basilar interstitial opacities could reflect atelectasis or mild edema.  Left lower lobe infiltrate not excluded.  No evidence of pleural effusion or pneumothorax.  Bones appear intact.                               The EKG was ordered, reviewed, and independently interpreted by the ED provider.  Interpretation time: 17:36  Rate: 98 BPM  Rhythm: normal sinus rhythm  Interpretation: No acute ST changes. No STEMI.           The Emergency Provider reviewed the vital signs and test results, which are outlined above.    ED Discussion     8:25 PM: Reassessed pt at this time. Discussed with pt all pertinent ED  information and results. Discussed pt dx and plan of tx. Gave pt all f/u and return to the ED instructions. All questions and concerns were addressed at this time. Pt expresses understanding of information and instructions, and is comfortable with plan to discharge. Pt is stable for discharge.    I have discussed with patient and/or family/caretaker chest pain precautions, specifically to return for worsening chest pain, shortness of breath, fever, or any concern.  I have low suspicion for cardiopulmonary, vascular, infectious, respiratory, or other emergent medical condition based on my evaluation in the ED.    ED Medication(s):  Medications   aspirin tablet 325 mg (325 mg Oral Given 7/26/19 1802)   nitroGLYCERIN 2% TD oint ointment 1 inch (1 inch Topical (Top) Given 7/26/19 1802)   ketorolac injection 30 mg (30 mg Intravenous Given 7/26/19 1913)     Discharge Medication List as of 7/26/2019  8:25 PM      START taking these medications    Details   !! traMADol (ULTRAM) 50 mg tablet Take 1 tablet (50 mg total) by mouth every 6 (six) hours as needed for Pain., Starting Fri 7/26/2019, Print       !! - Potential duplicate medications found. Please discuss with provider.              Medical Decision Making    Medical Decision Making:   Clinical Tests:   Lab Tests: Ordered and Reviewed  Radiological Study: Ordered and Reviewed  Medical Tests: Ordered and Reviewed           Scribe Attestation:   Scribe #1: I performed the above scribed service and the documentation accurately describes the services I performed. I attest to the accuracy of the note.    Attending:   Physician Attestation Statement for Scribe #1: I, Mica Rodriguez MD, personally performed the services described in this documentation, as scribed by Aric Warren, in my presence, and it is both accurate and complete.          Clinical Impression       ICD-10-CM ICD-9-CM   1. Costochondritis M94.0 733.6   2. Chest pain R07.9 786.50       Disposition:    Disposition: Discharged  Condition: Stable         Mica Rodriguez MD  07/27/19 0003

## 2019-07-26 NOTE — TELEPHONE ENCOUNTER
Patient confirmed shipping of Enbrel on  to arrive . Address and  verified. $0 copay in 004. Patient does not need a sharps at this time. Patient has 1 dose on hand. Next dose is due . Patient reported 0 missed doses. He explained he must have had 1 more dose on hand than he reported at last fill. Patient started Flexeril and Meloxicam on  at hospital visit for chest pains. No DDIs with Humira. Patient had no further questions.

## 2019-07-26 NOTE — TELEPHONE ENCOUNTER
Spoke to patient and told him to hold his Embrel and Mobic until he see Dr. Eller.  Scheduled patient with Dr. Eller on 7/29/19 at 11am.

## 2019-07-29 ENCOUNTER — OFFICE VISIT (OUTPATIENT)
Dept: RHEUMATOLOGY | Facility: CLINIC | Age: 64
End: 2019-07-29
Payer: MEDICAID

## 2019-07-29 ENCOUNTER — HOSPITAL ENCOUNTER (OUTPATIENT)
Dept: RADIOLOGY | Facility: HOSPITAL | Age: 64
Discharge: HOME | End: 2019-07-29
Attending: INTERNAL MEDICINE
Payer: MEDICAID

## 2019-07-29 ENCOUNTER — TELEPHONE (OUTPATIENT)
Dept: PHARMACY | Facility: CLINIC | Age: 64
End: 2019-07-29

## 2019-07-29 VITALS
WEIGHT: 274.06 LBS | SYSTOLIC BLOOD PRESSURE: 134 MMHG | BODY MASS INDEX: 41.53 KG/M2 | HEIGHT: 68 IN | DIASTOLIC BLOOD PRESSURE: 84 MMHG | HEART RATE: 85 BPM

## 2019-07-29 DIAGNOSIS — L40.52 PSORIATIC ARTHRITIS, DESTRUCTIVE TYPE: ICD-10-CM

## 2019-07-29 DIAGNOSIS — R07.89 LEFT-SIDED CHEST WALL PAIN: ICD-10-CM

## 2019-07-29 DIAGNOSIS — R07.89 LEFT-SIDED CHEST WALL PAIN: Primary | ICD-10-CM

## 2019-07-29 DIAGNOSIS — R91.8 MULTIPLE LUNG NODULES ON CT: ICD-10-CM

## 2019-07-29 DIAGNOSIS — J44.9 MIXED TYPE COPD (CHRONIC OBSTRUCTIVE PULMONARY DISEASE): ICD-10-CM

## 2019-07-29 DIAGNOSIS — L40.9 PSORIASIS: ICD-10-CM

## 2019-07-29 PROCEDURE — 71046 X-RAY EXAM CHEST 2 VIEWS: CPT | Mod: TC

## 2019-07-29 PROCEDURE — 99215 OFFICE O/P EST HI 40 MIN: CPT | Mod: S$PBB,,, | Performed by: INTERNAL MEDICINE

## 2019-07-29 PROCEDURE — 71100 XR RIBS 2 VIEW LEFT: ICD-10-PCS | Mod: 26,LT,, | Performed by: RADIOLOGY

## 2019-07-29 PROCEDURE — 99215 PR OFFICE/OUTPT VISIT, EST, LEVL V, 40-54 MIN: ICD-10-PCS | Mod: S$PBB,,, | Performed by: INTERNAL MEDICINE

## 2019-07-29 PROCEDURE — 71100 X-RAY EXAM RIBS UNI 2 VIEWS: CPT | Mod: 26,LT,, | Performed by: RADIOLOGY

## 2019-07-29 PROCEDURE — 99999 PR PBB SHADOW E&M-EST. PATIENT-LVL V: ICD-10-PCS | Mod: PBBFAC,,, | Performed by: INTERNAL MEDICINE

## 2019-07-29 PROCEDURE — 99215 OFFICE O/P EST HI 40 MIN: CPT | Mod: PBBFAC,25 | Performed by: INTERNAL MEDICINE

## 2019-07-29 PROCEDURE — 71046 X-RAY EXAM CHEST 2 VIEWS: CPT | Mod: 26,,, | Performed by: RADIOLOGY

## 2019-07-29 PROCEDURE — 96372 THER/PROPH/DIAG INJ SC/IM: CPT | Mod: PBBFAC

## 2019-07-29 PROCEDURE — 71100 X-RAY EXAM RIBS UNI 2 VIEWS: CPT | Mod: TC,LT

## 2019-07-29 PROCEDURE — 71046 XR CHEST PA AND LATERAL: ICD-10-PCS | Mod: 26,,, | Performed by: RADIOLOGY

## 2019-07-29 PROCEDURE — 99999 PR PBB SHADOW E&M-EST. PATIENT-LVL V: CPT | Mod: PBBFAC,,, | Performed by: INTERNAL MEDICINE

## 2019-07-29 RX ORDER — TRAMADOL HYDROCHLORIDE 50 MG/1
50 TABLET ORAL EVERY 6 HOURS PRN
Qty: 30 TABLET | Refills: 1 | Status: SHIPPED | OUTPATIENT
Start: 2019-07-29 | End: 2019-11-11 | Stop reason: SDUPTHER

## 2019-07-29 RX ORDER — KETOROLAC TROMETHAMINE 30 MG/ML
30 INJECTION, SOLUTION INTRAMUSCULAR; INTRAVENOUS
Status: COMPLETED | OUTPATIENT
Start: 2019-07-29 | End: 2019-07-29

## 2019-07-29 RX ADMIN — KETOROLAC TROMETHAMINE 30 MG: 30 INJECTION, SOLUTION INTRAMUSCULAR; INTRAVENOUS at 12:07

## 2019-07-29 NOTE — PROGRESS NOTES
Allergies and medications were reviewed prior to administration. Administered 1cc Toradol 30mg/cc to left ventrogluteal. Pt tolerated well. No acute reaction noted to site. Pt instructed on S/S to report. Pt verbalized understanding.     Lot:-[id number]  Exp:09/01/2019    Correct NDC#  [id number]  [facility]

## 2019-07-29 NOTE — PATIENT INSTRUCTIONS
Continue to hold Mtx and Enbrel- possible infection in l lung    Increase Tramadol to 1 three x day with 1 tylenol ES    Xray and lab today    See us back in 1 week    Will switch to Cosentyx for the psorasis and atrthritis

## 2019-07-29 NOTE — TELEPHONE ENCOUNTER
LVM to notify the patient we received the prescription for Cosentyx, and it will require authorization from the insurance company. We will continue to follow up.

## 2019-07-30 ENCOUNTER — TELEPHONE (OUTPATIENT)
Dept: RHEUMATOLOGY | Facility: CLINIC | Age: 64
End: 2019-07-30

## 2019-07-30 NOTE — TELEPHONE ENCOUNTER
----- Message from Madelyn Moore sent at 7/30/2019 12:27 PM CDT -----  Yoli damian/TRESSA is calling to check the status of PA on calcipotriene-betamethasone (TACLONEX) ointment     [phone]

## 2019-07-30 NOTE — TELEPHONE ENCOUNTER
----- Message from Asif Eller MD sent at 7/30/2019 10:52 AM CDT -----  Please call this patient and telling his chest x-ray looked good with no signs of pneumonia and the blood test did not show any signs of significant infection.  We will see him back next week or 2 to start the new medication

## 2019-07-30 NOTE — TELEPHONE ENCOUNTER
Spoke with pharmacy to advise them that PA has been started and sent into plan. Awaiting approval. Pharmacy states understanding.

## 2019-07-30 NOTE — PROGRESS NOTES
Subjective:       Patient ID: Trey Stevens is a 64 y.o. male.    Chief Complaint: Disease Management psoriatic arthritis and new problem with left chest pain    Trey is here as emergency follow-up from the emergency room - hadrecent visit there with left chest pain.  He was told he did not have a heart attack and likely had costal chondritis.  However the chest x-ray had potential infiltrate the left lower lung on top of chronic lung disease and it was recommended that he not resume his methotrexate or Enbrel -rather they asked him to come to our office as soon as possible.  The pain is in the left lateral chest wall and sensitive to touch.  It hurts him to breathe.  He denies cough or hemoptysis.  He has had no shortness of breath or dyspnea on exertion or PND.  It was helped by a Toradol shot in the emergency room and has tramadol -taking twice a day with some help.  He denies any recent falls or trauma to the left chest.  He has not developed any rash on the left chest to suggest shingles.  He has been taking Enbrel 50 mg weekly, methotrexate 15 mg a week, and Mobic 15 mg a day up until he went to the emergency room with the chest pain.  He has not taken that since.  He denies fever chills or sweats.  He has continued to have joint pain particularly in his hands and to lesser degree in his feet.  The  psoriasis seems to be worsening he says particularly on his arms where new rashes are developing.  He is using the new topical and has seen dermatology regularly- has associated vitiligo    Pertinent past history- chronic psoriatic arthritis and psoriasis.  He was taking  humira 40 mg Q 2 weeks (since 2015)- skin rash was persistent despite combo with oral  mtx 15mg/week (splitting dose 3 tabs am and 3 tabs pm), folic acid once daily.  Three months ago, we stopped humira and wanted to move to cosentyx, insurance required use of enbrel 1st, he is now on enbel 50 mg Q wk X 16+weeks. Skin rash is better on enbrel than  it had been on humira but skin still has major psoriasis changes with body surface area over 10%.  Worsening  rash on his arms and hands  .  In 2017 his mtx was cut back from 20 mg weekly  due to chronic lung issues and elevated lft.  He has multiple lung nodules which are followed by pulm and stable per his last pulmonary visit feb 2019, his breathing is stable. He does still smoke.     In the past failed mtx monotherapy, failed topicals and UV. Was on vit D 50K weekly but now on otc Vit D3 5000 twice weekly                 He complains of joint swelling. Pertinent negatives include no dysuria, fatigue, fever, myalgias or headaches.       Psoriasis   Associated symptoms include arthralgias, chest pain, joint swelling and a rash. Pertinent negatives include no abdominal pain, chills, congestion, coughing, fatigue, fever, headaches, myalgias, nausea, vomiting or weakness.           He complains of joint swelling. Pertinent negatives include no dysuria, fatigue, fever, myalgias or headaches.         Review of Systems   Constitutional: Negative for chills, fatigue, fever and unexpected weight change.        Poor hygiene      HENT: Negative for congestion, mouth sores and rhinorrhea.    Eyes: Negative for pain, discharge and redness.   Respiratory: Negative for cough, shortness of breath and wheezing.         History of COPD-mixed picture with prior x-rays showing some chronic atelectasis and benign nodules which have been   Cardiovascular: Positive for chest pain. Negative for leg swelling.        Left chest wall-mid axillary line around the 4th and 5th ribs - tender areas but no rash   Gastrointestinal: Negative for abdominal pain, diarrhea, nausea and vomiting.   Endocrine: Negative for cold intolerance and heat intolerance.   Genitourinary: Negative for dysuria.   Musculoskeletal: Positive for arthralgias and joint swelling. Negative for myalgias.        See HPI   Skin: Positive for color change and rash.         Generalized psoriasis greater than 10% body surface area with increasing rashes on his dorsal arms and hands   Allergic/Immunologic: Positive for immunocompromised state.   Neurological: Negative for weakness and headaches.        Diabetic neuropathy   Hematological: Negative for adenopathy. Does not bruise/bleed easily.   Psychiatric/Behavioral: Negative for agitation and suicidal ideas. The patient is not nervous/anxious.        Past Medical History:   Diagnosis Date    Arthritis     Diabetes mellitus     Diabetes mellitus type 2, uncontrolled 7/19/2016    DM (diabetes mellitus) 2015     09/04/2017    Gall stones     Obesity     Psoriasis (a type of skin inflammation)     Rheumatoid arthritis of foot      Past Surgical History:   Procedure Laterality Date    APPENDECTOMY      CHOLECYSTECTOMY       Family History   Problem Relation Age of Onset    Cancer Mother     Hypertension Mother     Diabetes Mother     Cataracts Mother     Stroke Father     Psoriasis Neg Hx      Social History     Socioeconomic History    Marital status: Single     Spouse name: Not on file    Number of children: Not on file    Years of education: Not on file    Highest education level: Not on file   Occupational History    Not on file   Social Needs    Financial resource strain: Not on file    Food insecurity:     Worry: Not on file     Inability: Not on file    Transportation needs:     Medical: Not on file     Non-medical: Not on file   Tobacco Use    Smoking status: Former Smoker     Packs/day: 1.00     Years: 24.00     Pack years: 24.00     Types: Cigarettes     Start date: 1994    Smokeless tobacco: Never Used    Tobacco comment: 7/30/2018 referral to smoking cessation program   Substance and Sexual Activity    Alcohol use: No     Alcohol/week: 0.0 oz    Drug use: No    Sexual activity: Never   Lifestyle    Physical activity:     Days per week: Not on file     Minutes per session: Not on file     "Stress: Not on file   Relationships    Social connections:     Talks on phone: Not on file     Gets together: Not on file     Attends Worship service: Not on file     Active member of club or organization: Not on file     Attends meetings of clubs or organizations: Not on file     Relationship status: Not on file   Other Topics Concern    Not on file   Social History Narrative    Not on file     Review of patient's allergies indicates:  No Known Allergies  Objective:   /84 (BP Location: Left arm, Patient Position: Sitting, BP Method: Large (Automatic))   Pulse 85   Ht 5' 8" (1.727 m)   Wt 124.3 kg (274 lb 0.5 oz)   BMI 41.67 kg/m²      Physical Exam   Constitutional: He is oriented to person, place, and time. He appears distressed.   Well developed and well nourished but in some distress because of left lateral chest pain that hurts with breathing   HENT:   Head: Normocephalic and atraumatic.   Eyes: Pupils are equal, round, and reactive to light. Right eye exhibits no discharge.   Neck: Normal range of motion.   Cardiovascular: Normal rate, regular rhythm and normal heart sounds.  Exam reveals no gallop and no friction rub.    Pulmonary/Chest: Effort normal and breath sounds normal. No respiratory distress. He has no wheezes. He has no rales. He exhibits tenderness.   Chest wall on the left is sore in the mid and posterior axillary lines around the 4th and 5th ribs.  There is no rash to suggest shingles in that dermatomal area   Abdominal: Soft. He exhibits no distension. There is no tenderness.   Lymphadenopathy:     He has no cervical adenopathy.   Neurological: He is alert and oriented to person, place, and time.   Normal muscle strength   Skin: Rash noted. No erythema.          Psoriasis rash bilateral elbows, all across the dorsal forearms bilaterally and involving the hands dorsally particularly.  He has several psoriatic patches on the face and scalp and a small patch left hip.  Nail changes " are apparent-see PIC    Vitiligo bilateral forearms       Psychiatric: Mood and affect normal.   Musculoskeletal: Normal range of motion. He exhibits edema and deformity.     nestor dips with chronic damage, bony enlargement, and moderate tenderness particularly in the left 3rd DIP and both thumb DIP. Left thumb no flexion to the IP joint, right thumb ip joint motion decreased as well.  PIP is have moderate bony enlargement and tenderness with limited flexion. Right 5th dip flexion deformity,     Bilateral wrists, mcps no synovitis good rom    nestor.   Elbows and shoulders with no heat or swelling today though mild tenderness around shoulders    C-spine has mild limited motion with minimal tenderness SI joints are not tender.  Hips have good motion.    Knees have mild tenderness but good range of motion no swelling    MTPs not tender and ankles with trace swelling and tenderness bilaterally   good rom to nestor ankles, no swelling  nestor feet very dirty, 2-5 toes deviate laterally.  Mild lower ext edema                                       Recent Results (from the past 168 hour(s))   CBC auto differential    Collection Time: 07/26/19  6:11 PM   Result Value Ref Range    WBC 9.83 3.90 - 12.70 K/uL    RBC 5.29 4.60 - 6.20 M/uL    Hemoglobin 17.0 14.0 - 18.0 g/dL    Hematocrit 48.3 40.0 - 54.0 %    Mean Corpuscular Volume 91 82 - 98 fL    Mean Corpuscular Hemoglobin 32.1 (H) 27.0 - 31.0 pg    Mean Corpuscular Hemoglobin Conc 35.2 32.0 - 36.0 g/dL    RDW 15.6 (H) 11.5 - 14.5 %    Platelets 271 150 - 350 K/uL    MPV 10.8 9.2 - 12.9 fL    Gran # (ANC) 5.0 1.8 - 7.7 K/uL    Lymph # 3.3 1.0 - 4.8 K/uL    Mono # 1.2 (H) 0.3 - 1.0 K/uL    Eos # 0.4 0.0 - 0.5 K/uL    Baso # 0.03 0.00 - 0.20 K/uL    Gran% 51.0 38.0 - 73.0 %    Lymph% 33.4 18.0 - 48.0 %    Mono% 11.7 4.0 - 15.0 %    Eosinophil% 3.9 0.0 - 8.0 %    Basophil% 0.3 0.0 - 1.9 %    Differential Method Automated    Comprehensive metabolic panel    Collection Time: 07/26/19  6:11  PM   Result Value Ref Range    Sodium 141 136 - 145 mmol/L    Potassium 4.4 3.5 - 5.1 mmol/L    Chloride 105 95 - 110 mmol/L    CO2 21 (L) 23 - 29 mmol/L    Glucose 132 (H) 70 - 110 mg/dL    BUN, Bld 17 8 - 23 mg/dL    Creatinine 1.4 0.5 - 1.4 mg/dL    Calcium 10.4 8.7 - 10.5 mg/dL    Total Protein 9.0 (H) 6.0 - 8.4 g/dL    Albumin 4.1 3.5 - 5.2 g/dL    Total Bilirubin 0.3 0.1 - 1.0 mg/dL    Alkaline Phosphatase 86 55 - 135 U/L    AST 40 10 - 40 U/L    ALT 63 (H) 10 - 44 U/L    Anion Gap 15 8 - 16 mmol/L    eGFR if African American >60 >60 mL/min/1.73 m^2    eGFR if non African American 53 (A) >60 mL/min/1.73 m^2   Troponin I #1    Collection Time: 07/26/19  6:11 PM   Result Value Ref Range    Troponin I 0.010 0.000 - 0.026 ng/mL   B-Type natriuretic peptide (BNP)    Collection Time: 07/26/19  6:11 PM   Result Value Ref Range    BNP <10 0 - 99 pg/mL   Procalcitonin    Collection Time: 07/29/19 12:38 PM   Result Value Ref Range    Procalcitonin 0.08 <0.25 ng/mL   C-reactive protein    Collection Time: 07/29/19 12:38 PM   Result Value Ref Range    CRP 3.7 0.0 - 8.2 mg/L          Ref. Range 2/19/2018 11:52   Vit D, 25-Hydroxy Latest Ref Range: 30 - 96 ng/mL 29 (L)            Ref. Range 6/19/2018 11:13   Hep A IgM Unknown Negative   Hep B C IgM Unknown Negative   Hepatitis B Surface Ag Unknown Negative   Hepatitis C Ab Unknown Negative       Component      Latest Ref Rng & Units 6/19/2018   NIL      See text IU/mL 0.034   TB Antigen      See text IU/mL 0.115   TB Antigen - Nil      See Text IU/mL 0.081   Mitogen - Nil      See text IU/mL >10.000   TB Gold       Negative     Nain Horne MD 7/26/2019 STAT      Narrative     EXAMINATION:  XR CHEST AP PORTABLE    CLINICAL HISTORY:  Chest Pain;    FINDINGS:  Single view of the chest.    Cardiac silhouette is normal.  The lungs demonstrate no evidence of consolidation.  Mild basilar interstitial opacities could reflect atelectasis or mild edema.  Left lower lobe  infiltrate not excluded.  No evidence of pleural effusion or pneumothorax.  Bones appear intact.      Impression       Patchy infiltrate left lower lobe which could reflect airspace disease.        19 Pulmonary function tests: FEV1: 1.52  (46.1 % predicted), FVC:  2.19 (51.2 % predicted), FEV1/FVC:70, T.36 (64.6 % predicted), RV/TLVC: 50 (131.6 % predicted), DLCO: 16.22 (59.3 % predicted)   Severe mixed obstruction with restriction. Diffusion capacity is moderately reduced but corrects for alveolar volume      18 Chest x-ray     COMPARISON:  2018    FINDINGS:  Cardiac silhouette and mediastinal contours are stable.  Lungs improved aeration of the lung bases with minimal interstitial changes remaining which may be chronic.  Osseous structures are intact.      Impression       Improved basilar aeration.           10/30/17 nestor hand and foot x-ray- damage in both hand and feet consistent with PsA   2016 XR hands reviewed: psoriatic arthritis changes noted to dip joints bilaterally, nestor thumb IP joints        Assessment:       1. Left-sided chest wall pain-recent onset -costochondral inflammation versus occult rib fracture risk is referred from a pulmonary process-she chest x-ray above from    2. Psoriatic arthritis, destructive type -ongoing joint pains particularly in the hands   3. Mixed type COPD (chronic obstructive pulmonary disease) with questionable new left lung infiltrate on recent chest x-ray-little pulmonary symptoms however   4. Multiple lung nodules on CT -benign   5.  6 Psoriasis -flaring with increasing rashes occurring particularly on the arms  Chronic vitiligo          Plan:         Continue to hold Mtx and Enbrel- possible infection in l lung-will redo chest x-ray and we may want to redo CT of the lung as well    Increase Tramadol to 1 three x day with 1 tylenol ES    Xray and lab today-will call if infiltrate is worsening or any changes on the rib films seen or any  features of infection       X-Ray Ribs 2 View Left          X-Ray Chest PA And Lateral         Procalcitonin         C-reactive protein            See us back in 1 week    Will switch to Cosentyx for the psorasis and arthritis-  Failed mtx, failed topicals, failed humira, failing Enbrel with increasing rashes and muscle/ joint pains    Maintain the Calcipotriene-betamethasone topical     TB gold and acute hep panel done 6/2018- neg, no need to repeat yet    Do every 1-2 years while on biologics    Counseled pt on smoking cessation    Please call or send portal message with any questions or concerns     Medical decision making: high complexity. Multiple issues were addressed. Overall, the multiple problems listed are of moderate to high severity that may impact quality of life and activities of daily living. Medications, treatment plan, as well as options and alternatives reviewed and discussed with patient. There was counseling of the patient concerning these issues. Time spend with the patient was at least 45 min with 25 min of the time spent in face to face counseling, education, disease state, treatment options, coordination of care and researching records from emergency room      ADDENDUM I have reviewed the x-rays from today and he does not have a rib fracture and just not have any significant pneumonia increasing infiltrates.  The patient CRP and procalcitonin both normal suggesting there is no acute infection.  We will take him off Enbrel and I have written prescriptions for the loading doses of Cosentyx 300 mg weekly.  This can be discussed with him next week as well as getting back on methotrexate and getting his vaccinations taking care of

## 2019-07-31 ENCOUNTER — DOCUMENTATION ONLY (OUTPATIENT)
Dept: RHEUMATOLOGY | Facility: CLINIC | Age: 64
End: 2019-07-31

## 2019-08-06 ENCOUNTER — OFFICE VISIT (OUTPATIENT)
Dept: DIABETES | Facility: CLINIC | Age: 64
End: 2019-08-06
Payer: MEDICAID

## 2019-08-06 ENCOUNTER — LAB VISIT (OUTPATIENT)
Dept: LAB | Facility: HOSPITAL | Age: 64
End: 2019-08-06
Attending: INTERNAL MEDICINE
Payer: MEDICAID

## 2019-08-06 ENCOUNTER — OFFICE VISIT (OUTPATIENT)
Dept: FAMILY MEDICINE | Facility: CLINIC | Age: 64
End: 2019-08-06
Payer: MEDICAID

## 2019-08-06 ENCOUNTER — OFFICE VISIT (OUTPATIENT)
Dept: RHEUMATOLOGY | Facility: CLINIC | Age: 64
End: 2019-08-06
Payer: MEDICAID

## 2019-08-06 VITALS
HEART RATE: 95 BPM | DIASTOLIC BLOOD PRESSURE: 80 MMHG | WEIGHT: 273.81 LBS | HEIGHT: 68 IN | TEMPERATURE: 99 F | SYSTOLIC BLOOD PRESSURE: 134 MMHG | BODY MASS INDEX: 41.5 KG/M2 | OXYGEN SATURATION: 97 %

## 2019-08-06 VITALS
SYSTOLIC BLOOD PRESSURE: 125 MMHG | DIASTOLIC BLOOD PRESSURE: 80 MMHG | HEIGHT: 68 IN | WEIGHT: 274.25 LBS | HEIGHT: 68 IN | SYSTOLIC BLOOD PRESSURE: 134 MMHG | BODY MASS INDEX: 41.57 KG/M2 | BODY MASS INDEX: 41.57 KG/M2 | HEART RATE: 82 BPM | DIASTOLIC BLOOD PRESSURE: 77 MMHG | WEIGHT: 274.25 LBS | RESPIRATION RATE: 16 BRPM

## 2019-08-06 DIAGNOSIS — E78.5 HYPERLIPIDEMIA ASSOCIATED WITH TYPE 2 DIABETES MELLITUS: ICD-10-CM

## 2019-08-06 DIAGNOSIS — R07.89 LEFT-SIDED CHEST WALL PAIN: Primary | ICD-10-CM

## 2019-08-06 DIAGNOSIS — E11.21 TYPE 2 DIABETES MELLITUS WITH NEPHROPATHY: Primary | Chronic | ICD-10-CM

## 2019-08-06 DIAGNOSIS — L40.59 POLYARTICULAR PSORIATIC ARTHRITIS: Primary | ICD-10-CM

## 2019-08-06 DIAGNOSIS — Z79.60 LONG-TERM USE OF IMMUNOSUPPRESSANT MEDICATION: ICD-10-CM

## 2019-08-06 DIAGNOSIS — E66.01 MORBID OBESITY: ICD-10-CM

## 2019-08-06 DIAGNOSIS — F17.200 SMOKING: ICD-10-CM

## 2019-08-06 DIAGNOSIS — J44.9 MIXED TYPE COPD (CHRONIC OBSTRUCTIVE PULMONARY DISEASE): ICD-10-CM

## 2019-08-06 DIAGNOSIS — E11.69 HYPERLIPIDEMIA ASSOCIATED WITH TYPE 2 DIABETES MELLITUS: ICD-10-CM

## 2019-08-06 DIAGNOSIS — L40.52 PSORIATIC ARTHRITIS, DESTRUCTIVE TYPE: ICD-10-CM

## 2019-08-06 DIAGNOSIS — M15.9 PRIMARY OSTEOARTHRITIS INVOLVING MULTIPLE JOINTS: ICD-10-CM

## 2019-08-06 DIAGNOSIS — E11.59 HYPERTENSION ASSOCIATED WITH DIABETES: Chronic | ICD-10-CM

## 2019-08-06 DIAGNOSIS — E11.21 TYPE 2 DIABETES MELLITUS WITH NEPHROPATHY: Chronic | ICD-10-CM

## 2019-08-06 DIAGNOSIS — I15.2 HYPERTENSION ASSOCIATED WITH DIABETES: Chronic | ICD-10-CM

## 2019-08-06 DIAGNOSIS — Z71.89 COUNSELING ON HEALTH PROMOTION AND DISEASE PREVENTION: ICD-10-CM

## 2019-08-06 DIAGNOSIS — D84.9 IMMUNOSUPPRESSED STATUS: ICD-10-CM

## 2019-08-06 DIAGNOSIS — E66.01 SEVERE OBESITY (BMI 35.0-35.9 WITH COMORBIDITY): ICD-10-CM

## 2019-08-06 PROCEDURE — 80061 LIPID PANEL: CPT

## 2019-08-06 PROCEDURE — 99214 OFFICE O/P EST MOD 30 MIN: CPT | Mod: S$PBB,,, | Performed by: INTERNAL MEDICINE

## 2019-08-06 PROCEDURE — 99999 PR PBB SHADOW E&M-EST. PATIENT-LVL III: CPT | Mod: PBBFAC,,, | Performed by: INTERNAL MEDICINE

## 2019-08-06 PROCEDURE — 99999 PR PBB SHADOW E&M-EST. PATIENT-LVL III: ICD-10-PCS | Mod: PBBFAC,,, | Performed by: INTERNAL MEDICINE

## 2019-08-06 PROCEDURE — 99213 OFFICE O/P EST LOW 20 MIN: CPT | Mod: PBBFAC,27 | Performed by: NURSE PRACTITIONER

## 2019-08-06 PROCEDURE — 36415 COLL VENOUS BLD VENIPUNCTURE: CPT | Mod: PO

## 2019-08-06 PROCEDURE — 99999 PR PBB SHADOW E&M-EST. PATIENT-LVL III: ICD-10-PCS | Mod: PBBFAC,,, | Performed by: NURSE PRACTITIONER

## 2019-08-06 PROCEDURE — 99213 OFFICE O/P EST LOW 20 MIN: CPT | Mod: PBBFAC,PO | Performed by: INTERNAL MEDICINE

## 2019-08-06 PROCEDURE — 99999 PR PBB SHADOW E&M-EST. PATIENT-LVL III: CPT | Mod: PBBFAC,,, | Performed by: NURSE PRACTITIONER

## 2019-08-06 PROCEDURE — 83036 HEMOGLOBIN GLYCOSYLATED A1C: CPT

## 2019-08-06 PROCEDURE — 99213 OFFICE O/P EST LOW 20 MIN: CPT | Mod: PBBFAC,27 | Performed by: INTERNAL MEDICINE

## 2019-08-06 PROCEDURE — 80053 COMPREHEN METABOLIC PANEL: CPT

## 2019-08-06 PROCEDURE — 99214 PR OFFICE/OUTPT VISIT, EST, LEVL IV, 30-39 MIN: ICD-10-PCS | Mod: S$PBB,,, | Performed by: INTERNAL MEDICINE

## 2019-08-06 RX ORDER — METHOCARBAMOL 500 MG/1
500 TABLET, FILM COATED ORAL NIGHTLY PRN
Qty: 40 TABLET | Refills: 0 | Status: SHIPPED | OUTPATIENT
Start: 2019-08-06 | End: 2019-08-16

## 2019-08-06 NOTE — PATIENT INSTRUCTIONS
Secukinumab injection  What is this medicine?  SECUKINUMAB (sek ue KIN ue mab) is used to treat psoriasis. It is also used to treat psoriatic arthritis and ankylosing spondylitis.  How should I use this medicine?  This medicine is for injection under the skin. It may be administered by a healthcare professional in a hospital or clinic setting or at home. If you get this medicine at home, you will be taught how to prepare and give this medicine. Use exactly as directed. Take your medicine at regular intervals. Do not take your medicine more often than directed.  It is important that you put your used needles and syringes in a special sharps container. Do not put them in a trash can. If you do not have a sharps container, call your pharmacist or healthcare provider to get one.  A special MedGuide will be given to you by the pharmacist with each prescription and refill. Be sure to read this information carefully each time.  Talk to your pediatrician regarding the use of this medicine in children. Special care may be needed.  What side effects may I notice from receiving this medicine?  Side effects that you should report to your doctor or health care professional as soon as possible:  · allergic reactions like skin rash, itching or hives, swelling of the face, lips, or tongue  · signs and symptoms of infection like fever or chills; cough; sore throat; pain or trouble passing urine  Side effects that usually do not require medical attention (report these to your doctor or health care professional if they continue or are bothersome):  · diarrhea  What may interact with this medicine?  Do not take this medicine with any of the following medications:  · live virus vaccines  This medicine may also interact with the following medications:  · cyclosporine  · inactivated vaccines  · warfarin  What if I miss a dose?  It is important not to miss your dose. Call your doctor of health care professional if you are unable to keep  an appointment. If you give yourself the medicine and you miss a dose, take it as soon as you can. If it is almost time for your next dose, take only that dose. Do not take double or extra doses.  Where should I keep my medicine?  Keep out of the reach of children.  Store the prefilled syringe or injection pen in a refrigerator between 2 to 8 degrees C (36 to 46 degrees F). Keep the syringe or the pen in the original carton until ready for use. Protect from light. Do not freeze. Do not shake. Prior to use, remove the syringe or pen from the refrigerator and use within 1 hour. Throw away any unused medicine after the expiration date on the label.  What should I tell my health care provider before I take this medicine?  They need to know if you have any of these conditions:  · Crohn's disease, ulcerative colitis, or other inflammatory bowel disease  · infection or history of infection  · other conditions affecting the immune system  · recently received or are scheduled to receive a vaccine  · tuberculosis, a positive skin test for tuberculosis, or have recently been in close contact with someone who has tuberculosis  · an unusual or allergic reaction to secukinumab, other medicines, latex, rubber, foods, dyes, or preservatives  · pregnant or trying to get pregnant  · breast-feeding  What should I watch for while using this medicine?  Tell your doctor or healthcare professional if your symptoms do not start to get better or if they get worse.  You will be tested for tuberculosis (TB) before you start this medicine. If your doctor prescribes any medicine for TB, you should start taking the TB medicine before starting this medicine. Make sure to finish the full course of TB medicine.  Call your doctor or healthcare professional for advice if you get a fever, chills or sore throat, or other symptoms of a cold or flu. Do not treat yourself. This drug decreases your body's ability to fight infections. Try to avoid being  around people who are sick.  This medicine can decrease the response to a vaccine. If you need to get vaccinated, tell your healthcare professional if you have received this medicine within the last 6 months. Extra booster doses may be needed. Talk to your doctor to see if a different vaccination schedule is needed.  NOTE:This sheet is a summary. It may not cover all possible information. If you have questions about this medicine, talk to your doctor, pharmacist, or health care provider. Copyright© 2017 [employer]        Methocarbamol tablets  What is this medicine?  METHOCARBAMOL (meth oh LIAM ba mole) helps to relieve pain and stiffness in muscles caused by strains, sprains, or other injury to your muscles.  How should I use this medicine?  Take this medicine by mouth with a full glass of water. Follow the directions on the prescription label. Take your medicine at regular intervals. Do not take your medicine more often than directed.  Talk to your pediatrician regarding the use of this medicine in children. Special care may be needed.  What side effects may I notice from receiving this medicine?  Side effects that you should report to your doctor or health care professional as soon as possible:  · allergic reactions like skin rash, itching or hives, swelling of the face, lips, or tongue  · breathing problems  · confusion  · seizures  · unusually weak or tired  Side effects that usually do not require medical attention (report to your doctor or health care professional if they continue or are bothersome):  · dizziness  · headache  · metallic taste  · tiredness  · upset stomach  What may interact with this medicine?  Do not take this medication with any of the following medicines:  · narcotic medicines for cough  This medicine may also interact with the following medications:  · alcohol  · antihistamines for allergy, cough and cold  · certain medicines for anxiety or sleep  · certain medicines for depression like  amitriptyline, fluoxetine, sertraline  · certain medicines for seizures like phenobarbital, primidone  · cholinesterase inhibitors like neostigmine, ambenonium, and pyridostigmine bromide  · general anesthetics like halothane, isoflurane, methoxyflurane, propofol  · local anesthetics like lidocaine, pramoxine, tetracaine  · medicines that relax muscles for surgery  · narcotic medicines for pain  · phenothiazines like chlorpromazine, mesoridazine, prochlorperazine, thioridazine  What if I miss a dose?  If you miss a dose, take it as soon as you can. If it is almost time for your next dose, take only the next dose. Do not take double or extra doses.  Where should I keep my medicine?  Keep out of the reach of children.  Store at room temperature between 20 and 25 degrees C (68 and 77 degrees F). Keep container tightly closed. Throw away any unused medicine after the expiration date.  What should I tell my health care provider before I take this medicine?  They need to know if you have any of these conditions:  · kidney disease  · seizures  · an unusual or allergic reaction to methocarbamol, other medicines, foods, dyes, or preservatives  · pregnant or trying to get pregnant  · breast-feeding  What should I watch for while using this medicine?  Tell your doctor or health care professional if your symptoms do not start to get better or if they get worse.  You may get drowsy or dizzy. Do not drive, use machinery, or do anything that needs mental alertness until you know how this medicine affects you. Do not stand or sit up quickly, especially if you are an older patient. This reduces the risk of dizzy or fainting spells. Alcohol may interfere with the effect of this medicine. Avoid alcoholic drinks.  If you are taking another medicine that also causes drowsiness, you may have more side effects. Give your health care provider a list of all medicines you use. Your doctor will tell you how much medicine to take. Do not take  more medicine than directed. Call emergency for help if you have problems breathing or unusual sleepiness.  NOTE:This sheet is a summary. It may not cover all possible information. If you have questions about this medicine, talk to your doctor, pharmacist, or health care provider. Copyright© 2017 [employer]        Back Pain (Acute or Chronic)    Back pain is one of the most common problems. The good news is that most people feel better in 1 to 2 weeks, and most of the rest in 1 to 2 months. Most people can remain active.  People experience and describe pain differently; not everyone is the same.  · The pain can be sharp, stabbing, shooting, aching, cramping or burning.  · Movement, standing, bending, lifting, sitting, or walking may worsen pain.  · It can be localized to one spot or area, or it can be more generalized.  · It can spread or radiate upwards, to the front, or go down your arms or legs (sciatica).  · It can cause muscle spasm.  Most of the time, mechanical problems with the muscles or spine cause the pain. Mechanical problems are usually caused by an injury to the muscles or ligaments. While illness can cause back pain, it is usually not caused by a serious illness. Mechanical problems include:   · Physical activity such as sports, exercise, work, or normal activity  · Overexertion, lifting, pushing, pulling incorrectly or too aggressively  · Sudden twisting, bending, or stretching from an accident, or accidental movement  · Poor posture  · Stretching or moving wrong, without noticing pain at the time  · Poor coordination, lack of regular exercise (check with your doctor about this)  · Spinal disc disease or arthritis  · Stress  Pain can also be related to pregnancy, or illness like appendicitis, bladder or kidney infections, pelvic infections, and many other things.  Acute back pain usually gets better in 1 to 2 weeks. Back pain related to disk disease, arthritis in the spinal joints or spinal  stenosis (narrowing of the spinal canal) can become chronic and last for months or years.  Unless you had a physical injury (for example, a car accident or fall) X-rays are usually not needed for the initial evaluation of back pain. If pain continues and does not respond to medical treatment, X-rays and other tests may be needed.  Home care  Try these home care recommendations:  · When in bed, try to find a position of comfort. A firm mattress is best. Try lying flat on your back with pillows under your knees. You can also try lying on your side with your knees bent up towards your chest and a pillow between your knees.  · At first, do not try to stretch out the sore spots. If there is a strain, it is not like the good soreness you get after exercising without an injury. In this case, stretching may make it worse.  · Avoid prolong sitting, long car rides, or travel. This puts more stress on the lower back than standing or walking.  · During the first 24 to 72 hours after an acute injury or flare up of chronic back pain, apply an ice pack to the painful area for 20 minutes and then remove it for 20 minutes. Do this over a period of 60 to 90 minutes or several times a day. This will reduce swelling and pain. Wrap the ice pack in a thin towel or plastic to protect your skin.  · You can start with ice, then switch to heat. Heat (hot shower, hot bath, or heating pad) reduces pain and works well for muscle spasms. Heat can be applied to the painful area for 20 minutes then remove it for 20 minutes. Do this over a period of 60 to 90 minutes or several times a day. Do not sleep on a heating pad. It can lead to skin burns or tissue damage.  · You can alternate ice and heat therapy. Talk with your doctor about the best treatment for your back pain.  · Therapeutic massage can help relax the back muscles without stretching them.  · Be aware of safe lifting methods and do not lift anything without stretching  first.  Medicines  Talk to your doctor before using medicine, especially if you have other medical problems or are taking other medicines.  · You may use over-the-counter medicine as directed on the bottle to control pain, unless another pain medicine was prescribed. If you have chronic conditions like diabetes, liver or kidney disease, stomach ulcers, or gastrointestinal bleeding, or are taking blood thinners, talk to your doctor before taking any medicine.  · Be careful if you are given a prescription medicines, narcotics, or medicine for muscle spasms. They can cause drowsiness, affect your coordination, reflexes, and judgement. Do not drive or operate heavy machinery.  Follow-up care  Follow up with your healthcare provider, or as advised.   A radiologist will review any X-rays that were taken. Your provide will notify you of any new findings that may affect your care.  Call 911  Call emergency services if any of the following occur:  · Trouble breathing  · Confusion  · Very drowsy or trouble awakening  · Fainting or loss of consciousness  · Rapid or very slow heart rate  · Loss of bowel or bladder control  When to seek medical advice  Call your healthcare provider right away if any of these occur:   · Pain becomes worse or spreads to your legs  · Weakness or numbness in one or both legs  · Numbness in the groin or genital area  Date Last Reviewed: 7/1/2016 © 2000-2017 [employer]. [address] [place] [address], [place] [address]. All rights reserved. This information is not intended as a substitute for professional medical care. Always follow your healthcare professional's instructions.        Self-Care for Low Back Pain    Most people have low back pain now and then. In many cases, it isnt serious and self-care can help. Sometimes low back pain can be a sign of a bigger problem. Call your healthcare provider if your pain returns often or gets worse over time. For the long-term care of your back, get  regular exercise, lose any excess weight and learn good posture.  Take a short rest  Lying down during the day may be beneficial for short periods of time if severe pain increases with sitting or standing. Long-term bed rest could be detrimental.  Reduce pain and swelling  Cold reduces swelling. Both cold and heat can reduce pain. Protect your skin by placing a towel between your body and the ice or heat source.  · For the first few days, apply an ice pack for 15 to 20 minutes .  · After the first few days, try heat for 15 minutes at a time to ease pain. Never sleep on a heating pad.  · Over-the-counter medicine can help control pain and swelling. Try aspirin or ibuprofen.  Exercise  Exercise can help your back heal. It also helps your back get stronger and more flexible, preventing any reinjury. Ask your healthcare provider about specific exercises for your back.  Use good posture to avoid reinjury  · When moving, bend at the hips and knees. Dont bend at the waist or twist around.  · When lifting, keep the object close to your body. Dont try to lift more than you can handle.  · When sitting, keep your lower back supported. Use a rolled-up towel as needed.  Seek immediate medical care if:  · Youre unable to stand or walk.  · You have a temperature over 100.4°F (38.0°C)  · You have frequent, painful, or bloody urination.  · You have severe abdominal pain.  · You have a sharp, stabbing pain.  · Your pain is constant.  · You have pain or numbness in your leg.  · You feel pain in a new area of your back.  · You notice that the pain isnt decreasing after more than a week.   Date Last Reviewed: 9/29/2015  © 0492-3166 [employer] [address] [place] [address], [place] [address]. All rights reserved. This information is not intended as a substitute for professional medical care. Always follow your healthcare professional's instructions.        Relieving Back Pain  Back pain is a common problem. You can strain  back muscles by lifting too much weight or just by moving the wrong way. Back strain can be uncomfortable, even painful. And it can take weeks or months to improve. To help yourself feel better and prevent future back strains, try these tips.  Important Note: Do not give aspirin to children or teens without first discussing it with your healthcare provider.      ? Ice    Ice reduces muscle pain and swelling. It helps most during the first 24 to 48 hours after an injury.  · Wrap an ice pack or a bag of frozen peas in a thin towel. (Never place ice directly on your skin.)  · Place the ice where your back hurts the most.  · Dont ice for more than 20 minutes at a time.  · You can use ice several times a day.  ? Medicines  Over-the-counter pain relievers can include acetaminophen and anti-inflammatory medicines, which includes aspirin or ibuprofen. They can help ease discomfort. Some also reduce swelling.  · Tell your healthcare provider about any medicines you are already taking.  · Take medicines only as directed.  ? Heat  After the first 48 hours, heat can relax sore muscles and improve blood flow.  · Try a warm bath or shower. Or use a heating pad set on low. To prevent a burn, keep a cloth between you and the heating pad.  · Dont use a heating pad for more than 15 minutes at a time. Never sleep on a heating pad.  Date Last Reviewed: 9/1/2015  © 3410-2749 [employer]. [address] [place] [address], [place] [address]. All rights reserved. This information is not intended as a substitute for professional medical care. Always follow your healthcare professional's instructions.

## 2019-08-06 NOTE — PATIENT INSTRUCTIONS
START TRULICITY ONCE WEEKLY. START TODAY AND TAKE EVERY Tuesday.  FROM PHARMACY DOWNSTAIRS    CONTINUE METFORMIN AND JARDIANCE.      IF YOU ARE HAPPY WITH YOUR VISIT TODAY, PLEASE FILL OUT THE SURVEY THAT MAY BE SENT TO YOUR EMAIL ADDRESS OR MAILBOX FOLLOWING THIS VISIT. WE LOVE TO HEAR YOUR COMMENTS! HAVE A WONDERFUL DAY!

## 2019-08-06 NOTE — PROGRESS NOTES
"Subjective:         Patient ID: Trey Stevens is a 64 y.o. male.  Patient's current PCP is Brittanie Kaba MD.       Chief Complaint: Diabetes Mellitus    HPI  Trey Stevens is a 64 y.o. White male presenting for follow up for diabetes. Patient has been diagnosed with diabetes for over three years and has the following complications associated with diabetes: peripheral neuropathy.  Blood glucose testing is performed regularly. In the past 1 week patient reports blood glucose values to have approximately ranged from 150-160s fasting. Condition not at goal. Patient is fully compliant with medications.     He denies any recent hospital admissions, emergency room visits, hypoglycemia.      Height: 5' 8" (172.7 cm)  //  Weight: 124.4 kg (274 lb 4 oz), Body mass index is 41.7 kg/m².    His blood sugar in clinic today is: See Labs      Labs reviewed and are noted below.    His most recent A1C is:   Lab Results   Component Value Date    HGBA1C 7.1 (H) 08/06/2019     No results found for: CPEPTIDE  No results found for: GLUTAMICACID      CURRENT DM MEDICATIONS:   Metformin XR 1000mg BID  Jardiance 25 mg once daily        Health Maintenance   Topic Date Due    Eye Exam  09/05/2019    Hemoglobin A1c  02/06/2020    Foot Exam  03/21/2020    Low Dose Statin  08/06/2020    Lipid Panel  08/06/2020    TETANUS VACCINE  02/24/2026    Colonoscopy  10/24/2026    Hepatitis C Screening  Completed    Pneumococcal Vaccine (Medium Risk)  Completed       STANDARDS OF CARE:  Current Ophthalmologist/Optometrist: JAMES.   Current Podiatrist: JAMES  ACE/ARB: No  Statin: Yes  He  is to be enrolled in diabetes education classes.     LIFESTYLE:  BLOOD GLUCOSE TESTING: Patient reports testing on average a total of 2 times per day.      Review of Systems   Constitutional: Negative for activity change, appetite change and fatigue.   Eyes: Negative for visual disturbance.   Gastrointestinal: Negative for constipation, diarrhea and nausea. "   Endocrine: Negative for polydipsia, polyphagia and polyuria.         Objective:      Physical Exam   Constitutional: He is oriented to person, place, and time. He appears well-developed and well-nourished.   HENT:   Head: Normocephalic and atraumatic.   Cardiovascular: Normal rate.   Pulmonary/Chest: Effort normal.   Neurological: He is alert and oriented to person, place, and time.   Skin: Skin is warm and dry.   Psychiatric: He has a normal mood and affect. His behavior is normal. Judgment and thought content normal.   Nursing note and vitals reviewed.      Assessment:       1. Type 2 diabetes mellitus with nephropathy    2. Hyperlipidemia associated with type 2 diabetes mellitus    3. Hypertension associated with diabetes    4. Severe obesity (BMI 35.0-35.9 with comorbidity)        Plan:   Type 2 diabetes mellitus with nephropathy  -     POCT Glucose, Hand-Held Device  -     dulaglutide (TRULICITY) 0.75 mg/0.5 mL PnIj; Inject 0.5 mLs (0.75 mg total) into the skin once a week.  Dispense: 4 Syringe; Refill: 0    - Condition not at goal. Start Trulicity once weekly. Patient denies contraindications. DM education reviewed. Patient encouraged to carb count and exercise per recommendations. Labs and Referrals as noted. Patient instructed to send in log weekly for review and potential medication adjustments. RV scheduled in 1 month.      Severe obesity (BMI 35.0-35.9 with comorbidity)    - DM diet discussed.     Hyperlipidemia associated with type 2 diabetes mellitus    - Patient recently restarted statin. ReCheck lipid later in the year.     Hypertension associated with diabetes    - Stable      Additional Plan Details:    1.) Patient was instructed to monitor blood glucose 2 - 3 x daily, fasting and ac dinner or at bedtime. Discussed ADA goal for fasting blood sugar, 80 - 130mg/dL; pp blood sugars below 180 mg/dl. Also, discussed prevention of hypoglycemia and the need to adjust goals to higher levels if persistent  hypoglycemia.  Reminded to bring BG records or meter to each visit for review.    2.) The patient was explained the above plan and given opportunity to ask questions.  He understands, chooses and consents to this plan and accepts all the risks, which include but are not limited to the risks mentioned above. He understands the alternative of having no testing, interventions or treatments at this time. He left content and without further questions.     A total of 30 minutes was spent in face to face time, of which over 50% was spent in counseling patient on disease process, complications, treatment, and side effects of medications.        YASMANY GrimmC

## 2019-08-06 NOTE — PROGRESS NOTES
RHEUMATOLOGY OUTPATIENT CLINIC NOTE    8/6/2019    Attending Rheumatologist: Jose Manuel Johansen  Primary Care Provider: Brittanie Kaba MD   Physician Requesting Consultation: No referring provider defined for this encounter.  Chief Complaint/Reason For Consultation:  Follow-up (f/u )    Subjective:       FRANCISCO Stevens is a 64 y.o. White male with psoriatic arthritis comes for follow-up.    Today:  Last seen on the rheumatology clinic July 29th.  DMARD and biologic (Enbrel) were held due to concern of URI.  Ruled out for pneumonia, repeat lab stable.  Was recommended swith Enbrel switched Cosentyx (8/1/2019).  Patient still waiting to receive new biologic.  No acute complaints today.  Main area of concern is worsening chronic lower back pain.  Dull and mostly upon prolonged standing/activity.  Relieved somewhat by rest and tramadol.  Has not yet resumed MTX.  Denies any fever, dyspnea/cough,  or GI complaints.      Review of Systems   Constitutional: Negative for chills, fever and malaise/fatigue.   Eyes: Negative for pain and redness.   Respiratory: Negative for cough and shortness of breath.         + chronic ongoing tobacco smoking.   Cardiovascular: Negative for chest pain and leg swelling.   Genitourinary: Negative for dysuria and urgency.   Musculoskeletal: Positive for back pain (chronic.  Mechanical features, no alarm signs or symptoms.). Negative for falls and joint pain.   Skin: Positive for rash (chronic PsO.  Denies new lesions.).   Neurological: Negative for tingling, focal weakness and weakness.   Psychiatric/Behavioral: Negative for memory loss. The patient does not have insomnia.      Chronic comorbid conditions affecting medical decision making today:  Past Medical History:   Diagnosis Date    Arthritis     Diabetes mellitus     Diabetes mellitus type 2, uncontrolled 7/19/2016    DM (diabetes mellitus) 2015     09/04/2017    Gall stones     Obesity     Psoriasis (a type of skin  inflammation)     Rheumatoid arthritis of foot      Past Surgical History:   Procedure Laterality Date    APPENDECTOMY      CHOLECYSTECTOMY       Family History   Problem Relation Age of Onset    Cancer Mother     Hypertension Mother     Diabetes Mother     Cataracts Mother     Stroke Father     Psoriasis Neg Hx      Social History     Substance and Sexual Activity   Alcohol Use No    Alcohol/week: 0.0 oz     Social History     Tobacco Use   Smoking Status Former Smoker    Packs/day: 1.00    Years: 24.00    Pack years: 24.00    Types: Cigarettes    Start date: 1994   Smokeless Tobacco Never Used   Tobacco Comment    7/30/2018 referral to smoking cessation program     Social History     Substance and Sexual Activity   Drug Use No       Current Outpatient Medications:     amLODIPine (NORVASC) 5 MG tablet, TAKE 1 TABLET BY MOUTH EVERY DAY, Disp: 30 tablet, Rfl: 11    ammonium lactate 12 % Crea, Apply 1 Act topically 2 (two) times daily., Disp: 1 Tube, Rfl: 3    aspirin 81 MG Chew, Take 1 tablet (81 mg total) by mouth once daily., Disp: 100 tablet, Rfl: 0    benzonatate (TESSALON) 200 MG capsule, Take 1 capsule (200 mg total) by mouth 3 (three) times daily as needed for Cough. (Patient taking differently: Take 1 capsule (200 mg total) by mouth 3 (three) times daily as needed for Cough.), Disp: 30 capsule, Rfl: 1    blood sugar diagnostic Strp, Use as directed three times a day, Disp: 100 strip, Rfl: 11    calcipotriene (DOVONOX) 0.005 % ointment, Apply on psoriasis on body twice daily, Disp: 120 g, Rfl: 3    calcipotriene-betamethasone (TACLONEX) ointment, Apply topically once daily., Disp: 100 g, Rfl: 2    clobetasol (OLUX) 0.05 % Foam, AAA of scalp and behind ears twice daily.  Do not use on face, underarms or groin., Disp: 100 g, Rfl: 3    dulaglutide (TRULICITY) 0.75 mg/0.5 mL PnIj, Inject 0.5 mLs (0.75 mg total) into the skin once a week., Disp: 4 Syringe, Rfl: 0    empagliflozin  (JARDIANCE) 25 mg Tab, Take 1 tablet (25 mg) by mouth once daily., Disp: 90 tablet, Rfl: 3    ergocalciferol (ERGOCALCIFEROL) 50,000 unit Cap, Take 50,000 Units by mouth twice a week., Disp: , Rfl:     folic acid (FOLVITE) 1 MG tablet, Take 1 tablet (1 mg total) by mouth once daily., Disp: 90 tablet, Rfl: 3    ketoconazole (NIZORAL) 2 % shampoo, Wash hair with medicated shampoo at least 2x/week - let sit on scalp at least 5 minutes prior to rinsing, Disp: 120 mL, Rfl: 0    lancets 33 gauge Misc, by Misc.(Non-Drug; Combo Route) route 3 (three) times daily., Disp: 100 each, Rfl: 11    losartan (COZAAR) 100 MG tablet, TAKE 1 TABLET (100 MG TOTAL) BY MOUTH ONCE DAILY., Disp: 30 tablet, Rfl: 11    meloxicam (MOBIC) 15 MG tablet, Take 1 tablet (15 mg total) by mouth once daily., Disp: 20 tablet, Rfl: 0    metFORMIN (GLUCOPHAGE-XR) 500 MG 24 hr tablet, Take 2 tablets (1,000 mg total) by mouth 2 (two) times daily with meals., Disp: 360 tablet, Rfl: 3    methotrexate 2.5 MG Tab, TAKE 6 TABLETS (15 MG TOTAL) BY MOUTH EVERY 7 DAYS. (3 IN THE MORNING AND 3 IN THE EVENING). (Patient taking differently: TAKE 6 TABLETS (15 MG TOTAL) BY MOUTH EVERY 7 DAYS. (3 IN THE MORNING AND 3 IN THE EVENING).SATURDAYS), Disp: 24 tablet, Rfl: 5    ONETOUCH ULTRAMINI kit, , Disp: , Rfl:     pravastatin (PRAVACHOL) 20 MG tablet, Take 20 mg by mouth once daily., Disp: , Rfl:     pravastatin (PRAVACHOL) 40 MG tablet, Take 1 tablet (40 mg total) by mouth once daily., Disp: 90 tablet, Rfl: 3    PROAIR HFA 90 mcg/actuation inhaler, Inhale 2 puffs into the lungs every 4 (four) hours as needed for Wheezing. Rescue (Patient taking differently: Inhale 2 puffs into the lungs every 4 (four) hours as needed for Wheezing. Rescue), Disp: 18 g, Rfl: 11    secukinumab (COSENTYX PEN) 150 mg/mL PnIj, Inject 300 mg into the skin once a week. for 5 days, Disp: 10 mL, Rfl: 0    secukinumab (COSENTYX PEN) 150 mg/mL PnIj, Inject 300 mg into the skin every  28 days., Disp: 3 mL, Rfl: 1    traMADol (ULTRAM) 50 mg tablet, Take 1 tablet (50 mg total) by mouth every 6 (six) hours as needed for Pain (up to 3x a day)., Disp: 30 tablet, Rfl: 1    methocarbamol (ROBAXIN) 500 MG Tab, Take 1 tablet (500 mg total) by mouth nightly as needed., Disp: 40 tablet, Rfl: 0     Objective:         Vitals:    08/06/19 1544   BP: 125/77   Pulse: 82   Resp: 16     Physical Exam   Nursing note and vitals reviewed.  Constitutional: He is oriented to person, place, and time and well-developed, well-nourished, and in no distress.   Obese BMI 41.7   HENT:   Head: Normocephalic.   Eyes: Conjunctivae are normal. Pupils are equal, round, and reactive to light.   Absent Episcleritis/scleritis.   Neck: Normal range of motion.   Cardiovascular: Normal rate and intact distal pulses.    Pulmonary/Chest: Effort normal. No respiratory distress.   Abdominal: Soft. He exhibits no distension.   Neurological: He is alert and oriented to person, place, and time.   absent sensory deficits  muscle strength 5/5 through.   Lhermitte's sign - Spurling's test -    Wide-based, antalgic gait.   Skin: Rash noted.     Diffuse vitiligo arms, abdomen, tight  PsO extensor surfaces elbow, forearms, hands.   +nail changes:  Onycholysis, ridging, and oils spots.   Musculoskeletal: He exhibits tenderness (some PIPs, MTPs.) and deformity (bony enlargement some IPs/DIPs.). He exhibits no edema.   :  Good.  Chronic deformities.  Ankylosis some DIPs/IPs  No active synovitis noted.     Devices used by patient: none       Reviewed old and all outside pertinent medical records available.    All lab results personally reviewed and interpreted by me.  Lab Results   Component Value Date    WBC 9.83 07/26/2019    HGB 17.0 07/26/2019    HCT 48.3 07/26/2019    MCV 91 07/26/2019    MCH 32.1 (H) 07/26/2019    MCHC 35.2 07/26/2019    RDW 15.6 (H) 07/26/2019     07/26/2019    MPV 10.8 07/26/2019       Lab Results   Component Value  Date     07/26/2019    K 4.4 07/26/2019     07/26/2019    CO2 21 (L) 07/26/2019     (H) 07/26/2019    BUN 17 07/26/2019    CALCIUM 10.4 07/26/2019    PROT 9.0 (H) 07/26/2019    ALBUMIN 4.1 07/26/2019    BILITOT 0.3 07/26/2019    AST 40 07/26/2019    ALKPHOS 86 07/26/2019    ALT 63 (H) 07/26/2019       No results found for: COLORU, APPEARANCEUA, SPECGRAV, PHUR, PROTEINUA, GLUCOSEU, KETONESU, BLOODU, LEUKOCYTESUR, NITRITE, UROBILINOGEN    Lab Results   Component Value Date    CRP 3.7 07/29/2019       Lab Results   Component Value Date    SEDRATE 31 (H) 07/15/2019       Lab Results   Component Value Date    RF <10.0 02/24/2016    SEDRATE 31 (H) 07/15/2019       No components found for: 25OHVITDTOT, 55GBXKPX5, 80LATSGL1, METHODNOTE    No results found for: URICACID    No components found for: TSPOTTB    Rheum Labs:  SANJANA negative  Rheumatoid factor, CCP negative    Infectious Labs:  Hepatitis profile nonreactive.  HIV nonreactive.  QuantiFERON TB -2018    Procalcitonin WNR 7/2019    Imaging:  All imaging reviewed and independently  interpreted by me.    X-ray lumbar spine April 2016  Degenerative changes noted involving the lumbosacral spine and SI joints.     X-ray hands March 2017  Possible flexion deformity at the right 5th DIPs which appears unchanged from prior. Moderate osteoarthritis noted throughout the bilateral DIP joints with some suspected central erosive changes suggesting probable element of erosive OA.  Degenerative findings at the bilateral 1st CMC's and radiocarpal joints also noted.  Minimal degenerative findings present at multiple bilateral MCPs.  Overall no appreciable change from prior.    X-ray foot March 2017  Lateral deviation of multiple bilateral toes again noted with joint space loss and pencil in cup erosive/productive changes involving multiple bilateral MTPs as well as the right 4th DIPs.  Findings remain most severe the 5th MTPs.  Other IP joints appear ankylosed  bilaterally.   No new erosive osseous changes demonstrated.    CT chest July 2018  No acute infiltrate.  Nonspecific bronchial wall thickening with increasing bibasilar atelectasis most pronounced involving the right lower lobe.  Numerous bilateral noncalcified as well as calcified lung nodules consistent with granulomatous disease, similar and unchanged to the previous CT scan of 11/10/2017.  Calcified mediastinal lymph nodes.    Chest x-ray July 2019  No displaced rib fractures demonstrated.  No definite pneumothorax or pleural effusion visualized.     ASSESSMENT / PLAN:     Trey Stevens is a 64 y.o. White male with:    1. Polyarticular psoriatic arthritis  - DAPSA 20.37-> medium activity  - previously on Enbrel and Humira  - awaiting Cosentyx on the mail replace Enbrel.  - May resume methotrexate 15 mg once per week and daily folic acid supplementation.  - C-reactive protein; Standing  - Sedimentation rate; Standing    2. Chronic lower back pain  - history of present illness and current findings consistent with degenerative disc disease.  - currently with no alarm features or signs and symptoms.  Previous imaging with OA changes.  - Discussed and recommended exercise (akira non wt-bearing/swimming)  - resting affected joint for brief periods (<12 h)  - Acetaminophen prn -> standing up to 3 g per day  - Topicals therapy: Capsaicin / NSAIDs   - short course of Robaxin  - methocarbamol (ROBAXIN) 500 MG Tab; Take 1 tablet (500 mg total) by mouth nightly as needed.  Dispense: 40 tablet; Refill: 0    3. Chronic immunosuppression  - monitor for toxicities.  - CBC auto differential; Standing  - Comprehensive metabolic panel; Standing    4. Other specified counseling  - over 10 minutes spent regarding below topics:  - Weight loss counseling done.  - Nutrition and exercise counseling.  - Limitation of alcohol consumption.  - Regular exercise:  Aerobic and resistance.  - Medication counseling provided.    5. Tobacco use  - at  least 3 minutes spent on this topic  - smoking cessation encouraged.  - consider referral to smoking cessation clinic.    6. Morbid Obesity  - would benefit from decreasing at least 10% of body weight.  - recommended goal of losing 1 lb per week.  - consider nutritionist evaluation.    Follow up in about 2 months (around 10/6/2019).    Method of contact with patient concerns: [facility]    Disclaimer:  This note is prepared using voice recognition software and as such is likely to have errors and has not been proof read. Please contact me for questions.     Time spent: 25 minutes in face to face discussion concerning diagnosis, prognosis, review of lab and test results, benefits of treatment as well as management of disease, counseling of patient and coordination of care between various health care providers.  Greater than half the time spent was used for coordination of care and counseling of patient.    Jose Manuel Johansen M.D.  Rheumatology Department   [facility] [place]

## 2019-08-07 ENCOUNTER — TELEPHONE (OUTPATIENT)
Dept: FAMILY MEDICINE | Facility: CLINIC | Age: 64
End: 2019-08-07

## 2019-08-07 LAB
ALBUMIN SERPL BCP-MCNC: 3.8 G/DL (ref 3.5–5.2)
ALP SERPL-CCNC: 86 U/L (ref 55–135)
ALT SERPL W/O P-5'-P-CCNC: 52 U/L (ref 10–44)
ANION GAP SERPL CALC-SCNC: 17 MMOL/L (ref 8–16)
AST SERPL-CCNC: 40 U/L (ref 10–40)
BILIRUB SERPL-MCNC: 0.6 MG/DL (ref 0.1–1)
BUN SERPL-MCNC: 11 MG/DL (ref 8–23)
CALCIUM SERPL-MCNC: 9.6 MG/DL (ref 8.7–10.5)
CHLORIDE SERPL-SCNC: 102 MMOL/L (ref 95–110)
CHOLEST SERPL-MCNC: 212 MG/DL (ref 120–199)
CHOLEST/HDLC SERPL: 5.2 {RATIO} (ref 2–5)
CO2 SERPL-SCNC: 21 MMOL/L (ref 23–29)
CREAT SERPL-MCNC: 1.2 MG/DL (ref 0.5–1.4)
EST. GFR  (AFRICAN AMERICAN): >60 ML/MIN/1.73 M^2
EST. GFR  (NON AFRICAN AMERICAN): >60 ML/MIN/1.73 M^2
ESTIMATED AVG GLUCOSE: 157 MG/DL (ref 68–131)
GLUCOSE SERPL-MCNC: 120 MG/DL (ref 70–110)
HBA1C MFR BLD HPLC: 7.1 % (ref 4–5.6)
HDLC SERPL-MCNC: 41 MG/DL (ref 40–75)
HDLC SERPL: 19.3 % (ref 20–50)
LDLC SERPL CALC-MCNC: 131.8 MG/DL (ref 63–159)
NONHDLC SERPL-MCNC: 171 MG/DL
POTASSIUM SERPL-SCNC: 4.3 MMOL/L (ref 3.5–5.1)
PROT SERPL-MCNC: 8.3 G/DL (ref 6–8.4)
SODIUM SERPL-SCNC: 140 MMOL/L (ref 136–145)
TRIGL SERPL-MCNC: 196 MG/DL (ref 30–150)

## 2019-08-07 NOTE — TELEPHONE ENCOUNTER
----- Message from Brittanie Chaparro MD sent at 8/7/2019  7:59 AM CDT -----  Please inform pt his labs look good overall.  SM

## 2019-08-07 NOTE — TELEPHONE ENCOUNTER
----- Message from Lynsey Oneill sent at 8/7/2019 10:04 AM CDT -----  Contact: Patient  Type:  Patient Returning Call    Who Called:Patient  Who Left Message for Patient:nurse  Does the patient know what this is regarding?:blood test results  Would the patient rather a call back or a response via [facility]? call  Best Call Back Number:[phone]  Additional Information:

## 2019-08-08 NOTE — TELEPHONE ENCOUNTER
DOCUMENTATION ONLY:  Prior authorization for Cosenty approved from 8/8/2019 to 2/5/2020.  Case ID# [id number]    Co-pay: $0    Patient Assistance IS NOT required.     Forward to clinical pharmacist for consult & shipment.     **Loading Dose approved from 8/8/2019 to 9/8/2019.**

## 2019-08-12 ENCOUNTER — TELEPHONE (OUTPATIENT)
Dept: PHARMACY | Facility: CLINIC | Age: 64
End: 2019-08-12

## 2019-08-12 NOTE — TELEPHONE ENCOUNTER
Initial Cosentyx SensoReady Pens 150mg consult completed on . Cosentyx SensoReady Pens 150mg will be shipped on  to arrive at patient's home on  via [employer]. $ 0 copay. Patient will start Cosentyx SensoReady Pens 150mg on . Address confirmed, CC on file. Confirmed 2 patient identifiers - name and . Therapy Appropriate.    - Cosentyx SensoReady Pens 150mg:  Inject 2 pen (300mg) every week x 5 weeks, then 2 pen (300mg) every 4 weeks.    -Storage: Refrigerate between 36-46 degrees Fahrenheit  Use within ONE HOUR of taking out of fridge.    -Injection technique:  - Wash hands before and after injection.  - Monthly RX will come with gauze, bandaids, and alcohol swabs.  - Patient may inject in either the tops of the thighs, abdomen- but at least 2 inches away from her belly button, or the outer part of her upper arm. Patient was instructed to rotate injections sites.  - Examine device to ensure no particulates, cloudiness, etc.  - Patient is to wipe down the injection site with the alcohol pad, wait to dry.  Gently squeeze the area of the cleaned skin and hold it firmly. Place the pen flat at a 90 degree angle against the raised area of skin that is being squeezed, and then push down firmly on the pen to start the injection. The 1st 'click' indicates the start and the second 'click' indicates that the injection is almost complete. A green indicator will fill the window when completed, and pen can be removed.  - Patient will use sharps container; once full, per [place] law, he may lock the sharps container and place in her trash. He can then contact the Pharmacy and we will replace the sharps at no additional charge.    -Potential Side effects:  Injection site reactions, diarrhea, cold like symptoms.  Patient advised to call if any signs of allergic reaction, infection, or use of live vaccines.    -DDI: Medication list reviewed and potential DDIs addressed.  Patient verbalized understanding. Compliance  stressed. Patient advised to keep a calendar marking dates of injections to ensure better compliance. Patient advised to call myself or provider should any questions arise. Patient plans to start Cosentyx SensoReady Pens on 8/14. Consultation included: indication; goals of treatment; administration; storage and handling; side effects; how to handle side effects; the importance of compliance; how to handle missed doses; the importance of laboratory monitoring; the importance of keeping all follow up appointments. Patient understands to report any medication changes to [facility] and provider. All questions answered and addressed to patients satisfaction. RPh will f/u with pt in 1 week from start, [facility] to contact patient in 3 weeks for refills.

## 2019-09-05 ENCOUNTER — TELEPHONE (OUTPATIENT)
Dept: PHARMACY | Facility: CLINIC | Age: 64
End: 2019-09-05

## 2019-09-10 ENCOUNTER — OFFICE VISIT (OUTPATIENT)
Dept: DIABETES | Facility: CLINIC | Age: 64
End: 2019-09-10
Payer: MEDICAID

## 2019-09-10 VITALS
HEIGHT: 68 IN | DIASTOLIC BLOOD PRESSURE: 70 MMHG | BODY MASS INDEX: 40.5 KG/M2 | SYSTOLIC BLOOD PRESSURE: 110 MMHG | WEIGHT: 267.19 LBS

## 2019-09-10 DIAGNOSIS — E11.21 TYPE 2 DIABETES MELLITUS WITH NEPHROPATHY: Primary | Chronic | ICD-10-CM

## 2019-09-10 DIAGNOSIS — E78.5 HYPERLIPIDEMIA ASSOCIATED WITH TYPE 2 DIABETES MELLITUS: ICD-10-CM

## 2019-09-10 DIAGNOSIS — E66.01 SEVERE OBESITY (BMI 35.0-35.9 WITH COMORBIDITY): ICD-10-CM

## 2019-09-10 DIAGNOSIS — I15.2 HYPERTENSION ASSOCIATED WITH DIABETES: Chronic | ICD-10-CM

## 2019-09-10 DIAGNOSIS — E11.69 HYPERLIPIDEMIA ASSOCIATED WITH TYPE 2 DIABETES MELLITUS: ICD-10-CM

## 2019-09-10 DIAGNOSIS — E11.59 HYPERTENSION ASSOCIATED WITH DIABETES: Chronic | ICD-10-CM

## 2019-09-10 LAB — GLUCOSE SERPL-MCNC: 107 MG/DL (ref 70–110)

## 2019-09-10 PROCEDURE — 99214 PR OFFICE/OUTPT VISIT, EST, LEVL IV, 30-39 MIN: ICD-10-PCS | Mod: S$PBB,,, | Performed by: NURSE PRACTITIONER

## 2019-09-10 PROCEDURE — 99999 PR PBB SHADOW E&M-EST. PATIENT-LVL III: ICD-10-PCS | Mod: PBBFAC,,, | Performed by: NURSE PRACTITIONER

## 2019-09-10 PROCEDURE — 99214 OFFICE O/P EST MOD 30 MIN: CPT | Mod: S$PBB,,, | Performed by: NURSE PRACTITIONER

## 2019-09-10 PROCEDURE — 82962 GLUCOSE BLOOD TEST: CPT | Mod: PBBFAC | Performed by: NURSE PRACTITIONER

## 2019-09-10 PROCEDURE — 99999 PR PBB SHADOW E&M-EST. PATIENT-LVL III: CPT | Mod: PBBFAC,,, | Performed by: NURSE PRACTITIONER

## 2019-09-10 PROCEDURE — 99213 OFFICE O/P EST LOW 20 MIN: CPT | Mod: PBBFAC | Performed by: NURSE PRACTITIONER

## 2019-09-10 RX ORDER — PRAVASTATIN SODIUM 80 MG/1
80 TABLET ORAL DAILY
Qty: 90 TABLET | Refills: 3 | Status: SHIPPED | OUTPATIENT
Start: 2019-09-10 | End: 2020-06-02

## 2019-09-10 NOTE — PROGRESS NOTES
"Subjective:         Patient ID: Trey Stevens is a 64 y.o. male.  Patient's current PCP is Brittanie Kaba MD.       Chief Complaint: Diabetes Mellitus    HPI  Trey Stevens is a 64 y.o. White male presenting for follow up for diabetes. Patient has been diagnosed with diabetes for over three years and has the following complications associated with diabetes: peripheral neuropathy.  At last visit, Trulicity was initiated due to hyperglycemia. Blood glucose testing is performed regularly. In the past 1 week patient reports blood glucose values to have approximately ranged from 140s fasting. Condition not at goal. Patient is fully compliant with medications.     He denies any recent hospital admissions, emergency room visits, hypoglycemia.      Height: 5' 8" (172.7 cm)  //  Weight: 121.2 kg (267 lb 3.2 oz), Body mass index is 40.63 kg/m².    His blood sugar in clinic today is: See Labs      Labs reviewed and are noted below.    His most recent A1C is:   Lab Results   Component Value Date    HGBA1C 7.1 (H) 08/06/2019     No results found for: CPEPTIDE  No results found for: GLUTAMICACID      CURRENT DM MEDICATIONS:   Metformin XR 1000mg BID  Jardiance 25 mg once daily  Trulicity 0.75mg once weekly        Health Maintenance   Topic Date Due    Eye Exam  09/05/2019    Hemoglobin A1c  02/06/2020    Foot Exam  03/21/2020    Lipid Panel  08/06/2020    Low Dose Statin  09/10/2020    TETANUS VACCINE  02/24/2026    Colonoscopy  10/24/2026    Hepatitis C Screening  Completed    Pneumococcal Vaccine (Medium Risk)  Completed       STANDARDS OF CARE:  Current Ophthalmologist/Optometrist: UTD.   Current Podiatrist: JAMES  ACE/ARB: No  Statin: Yes  He  is to be enrolled in diabetes education classes.     LIFESTYLE:  BLOOD GLUCOSE TESTING: Patient reports testing on average a total of 2 times per day.      Review of Systems   Constitutional: Negative for activity change, appetite change and fatigue.   Eyes: Negative for visual " disturbance.   Gastrointestinal: Negative for constipation, diarrhea and nausea.   Endocrine: Negative for polydipsia, polyphagia and polyuria.         Objective:      Physical Exam   Constitutional: He is oriented to person, place, and time. He appears well-developed and well-nourished.   HENT:   Head: Normocephalic and atraumatic.   Cardiovascular: Normal rate.   Pulmonary/Chest: Effort normal.   Neurological: He is alert and oriented to person, place, and time.   Skin: Skin is warm and dry.   Psychiatric: He has a normal mood and affect. His behavior is normal. Judgment and thought content normal.   Nursing note and vitals reviewed.      Assessment:       1. Type 2 diabetes mellitus with nephropathy    2. Severe obesity (BMI 35.0-35.9 with comorbidity)    3. Hyperlipidemia associated with type 2 diabetes mellitus    4. Hypertension associated with diabetes        Plan:   Type 2 diabetes mellitus with nephropathy  -     POCT Glucose, Hand-Held Device  -     dulaglutide (TRULICITY) 1.5 mg/0.5 mL PnIj; Inject 1.5 mg into the skin every 7 days.  Dispense: 4 Syringe; Refill: 6    - Condition not at goal. Increase Trulicity as noted above. Patient denies contraindications. DM education reviewed. Patient encouraged to carb count and exercise per recommendations. Labs and Referrals as noted. Patient instructed to send in log weekly for review and potential medication adjustments. RV scheduled in 1 month.    Severe obesity (BMI 35.0-35.9 with comorbidity)    - DM diet discussed.    Hyperlipidemia associated with type 2 diabetes mellitus  -     pravastatin (PRAVACHOL) 80 MG tablet; Take 1 tablet (80 mg total) by mouth once daily.  Dispense: 90 tablet; Refill: 3    - LDL not at goal of less than 100. Increase pravastatin as noted above.     Hypertension associated with diabetes    - Stable        Additional Plan Details:    1.) Patient was instructed to monitor blood glucose 2 - 3 x daily, fasting and ac dinner or at  bedtime. Discussed ADA goal for fasting blood sugar, 80 - 130mg/dL; pp blood sugars below 180 mg/dl. Also, discussed prevention of hypoglycemia and the need to adjust goals to higher levels if persistent hypoglycemia.  Reminded to bring BG records or meter to each visit for review.    2.) The patient was explained the above plan and given opportunity to ask questions.  He understands, chooses and consents to this plan and accepts all the risks, which include but are not limited to the risks mentioned above. He understands the alternative of having no testing, interventions or treatments at this time. He left content and without further questions.     A total of 30 minutes was spent in face to face time, of which over 50% was spent in counseling patient on disease process, complications, treatment, and side effects of medications.        YASMANY GrimmC

## 2019-09-10 NOTE — PATIENT INSTRUCTIONS
INCREASE PRAVASTATIN TO 80 MG DAILY. MAY TAKE TWO 40 MG TABLETS EVERY DAY UNTIL YOU RUN OUT. NEW PRESCRIPTION IS 80 MG TABLETS, SO BE SURE TO ONLY TAKE ONE DAILY.     CONTINUE METFORMIN, JARDIANCE, AND TRULICITY.       IF YOU ARE HAPPY WITH YOUR VISIT TODAY, PLEASE FILL OUT THE SURVEY THAT MAY BE SENT TO YOUR EMAIL ADDRESS OR MAILBOX FOLLOWING THIS VISIT. WE LOVE TO HEAR YOUR COMMENTS! HAVE A WONDERFUL DAY!

## 2019-09-17 RX ORDER — PEN NEEDLE, DIABETIC 30 GX3/16"
1 NEEDLE, DISPOSABLE MISCELLANEOUS DAILY
Qty: 100 EACH | Refills: 1 | Status: SHIPPED | OUTPATIENT
Start: 2019-09-17 | End: 2019-12-10 | Stop reason: SDUPTHER

## 2019-09-18 ENCOUNTER — TELEPHONE (OUTPATIENT)
Dept: DIABETES | Facility: CLINIC | Age: 64
End: 2019-09-18

## 2019-09-18 NOTE — TELEPHONE ENCOUNTER
----- Message from Jossy Matos NP sent at 9/17/2019  4:54 PM CDT -----  Please let patient know that Trulicity was denied by insurance. Start Victoza once daily

## 2019-09-18 NOTE — TELEPHONE ENCOUNTER
Called pt to let him know that the trulicity was denied by his insurance and to start the victoza pt stated he understood what was told to him

## 2019-09-26 ENCOUNTER — OFFICE VISIT (OUTPATIENT)
Dept: PODIATRY | Facility: CLINIC | Age: 64
End: 2019-09-26
Payer: MEDICAID

## 2019-09-26 VITALS — WEIGHT: 268 LBS | BODY MASS INDEX: 40.62 KG/M2 | HEIGHT: 68 IN

## 2019-09-26 DIAGNOSIS — R23.4 SKIN FISSURES: ICD-10-CM

## 2019-09-26 DIAGNOSIS — E66.01 SEVERE OBESITY (BMI 35.0-35.9 WITH COMORBIDITY): ICD-10-CM

## 2019-09-26 DIAGNOSIS — L84 CORN OR CALLUS: ICD-10-CM

## 2019-09-26 DIAGNOSIS — E11.49 TYPE II DIABETES MELLITUS WITH NEUROLOGICAL MANIFESTATIONS: Primary | ICD-10-CM

## 2019-09-26 DIAGNOSIS — B35.1 DERMATOPHYTOSIS OF NAIL: ICD-10-CM

## 2019-09-26 PROCEDURE — 99999 PR PBB SHADOW E&M-EST. PATIENT-LVL III: CPT | Mod: PBBFAC,,, | Performed by: PODIATRIST

## 2019-09-26 PROCEDURE — 11721 DEBRIDE NAIL 6 OR MORE: CPT | Mod: 59,S$PBB,, | Performed by: PODIATRIST

## 2019-09-26 PROCEDURE — 99499 NO LOS: ICD-10-PCS | Mod: S$PBB,,, | Performed by: PODIATRIST

## 2019-09-26 PROCEDURE — 11721 DEBRIDE NAIL 6 OR MORE: CPT | Mod: Q9,PBBFAC | Performed by: PODIATRIST

## 2019-09-26 PROCEDURE — 99999 PR PBB SHADOW E&M-EST. PATIENT-LVL III: ICD-10-PCS | Mod: PBBFAC,,, | Performed by: PODIATRIST

## 2019-09-26 PROCEDURE — 11056 PARNG/CUTG B9 HYPRKR LES 2-4: CPT | Mod: Q9,PBBFAC | Performed by: PODIATRIST

## 2019-09-26 PROCEDURE — 11721 PR DEBRIDEMENT OF NAILS, 6 OR MORE: ICD-10-PCS | Mod: 59,S$PBB,, | Performed by: PODIATRIST

## 2019-09-26 PROCEDURE — 11056 PARNG/CUTG B9 HYPRKR LES 2-4: CPT | Mod: S$PBB,,, | Performed by: PODIATRIST

## 2019-09-26 PROCEDURE — 11056 PR TRIM BENIGN HYPERKERATOTIC SKIN LESION,2-4: ICD-10-PCS | Mod: S$PBB,,, | Performed by: PODIATRIST

## 2019-09-26 PROCEDURE — 99213 OFFICE O/P EST LOW 20 MIN: CPT | Mod: PBBFAC | Performed by: PODIATRIST

## 2019-09-26 PROCEDURE — 99499 UNLISTED E&M SERVICE: CPT | Mod: S$PBB,,, | Performed by: PODIATRIST

## 2019-09-30 DIAGNOSIS — L40.50 PSORIATIC ARTHRITIS: ICD-10-CM

## 2019-09-30 DIAGNOSIS — Z79.631 METHOTREXATE, LONG TERM, CURRENT USE: ICD-10-CM

## 2019-09-30 DIAGNOSIS — L40.52 PSORIATIC ARTHRITIS, DESTRUCTIVE TYPE: ICD-10-CM

## 2019-09-30 DIAGNOSIS — L40.9 PSORIASIS: ICD-10-CM

## 2019-09-30 RX ORDER — FOLIC ACID 1 MG/1
TABLET ORAL
Qty: 30 TABLET | Refills: 11 | Status: SHIPPED | OUTPATIENT
Start: 2019-09-30 | End: 2019-10-08 | Stop reason: SDUPTHER

## 2019-10-02 DIAGNOSIS — L40.59 POLYARTICULAR PSORIATIC ARTHRITIS: ICD-10-CM

## 2019-10-02 DIAGNOSIS — L40.9 PSORIASIS: ICD-10-CM

## 2019-10-02 DIAGNOSIS — L40.52 PSORIATIC ARTHRITIS, DESTRUCTIVE TYPE: ICD-10-CM

## 2019-10-02 RX ORDER — METHOTREXATE 2.5 MG/1
TABLET ORAL
Qty: 24 TABLET | Refills: 5 | Status: SHIPPED | OUTPATIENT
Start: 2019-10-02 | End: 2019-10-08

## 2019-10-06 NOTE — PROGRESS NOTES
Subjective:     Patient ID: Trey Stevens is a 64 y.o. male.    Chief Complaint: Nail Care (diabetic Pt. wears casual. last seen on 08/06/19 with PCP Dr. Kaba.)    Trey is a 64 y.o. male who presents to the clinic for evaluation and treatment of high risk feet. Trey has a past medical history of Arthritis, Diabetes mellitus, Diabetes mellitus type 2, uncontrolled (7/19/2016), DM (diabetes mellitus) (2015), Gall stones, Obesity, Psoriasis (a type of skin inflammation), and Rheumatoid arthritis of foot. The patient's chief complaint is thick calluses and nails. This patient has documented high risk feet requiring routine maintenance secondary to diabetes mellitis and those secondary complications of diabetes, as mentioned. Patient states his blood sugar this morning was 136mg/dl.     PCP: Brittanie Kaba MD     Date Last Seen by PCP: 08/06/19    Current shoe gear:  Affected Foot: Extra depth shoes     Unaffected Foot: Extra depth shoes    Hemoglobin A1C   Date Value Ref Range Status   08/06/2019 7.1 (H) 4.0 - 5.6 % Final     Comment:     ADA Screening Guidelines:  5.7-6.4%  Consistent with prediabetes  >or=6.5%  Consistent with diabetes  High levels of fetal hemoglobin interfere with the HbA1C  assay. Heterozygous hemoglobin variants (HbS, HgC, etc)do  not significantly interfere with this assay.   However, presence of multiple variants may affect accuracy.     04/18/2019 7.0 (H) 4.0 - 5.6 % Final     Comment:     ADA Screening Guidelines:  5.7-6.4%  Consistent with prediabetes  >or=6.5%  Consistent with diabetes  High levels of fetal hemoglobin interfere with the HbA1C  assay. Heterozygous hemoglobin variants (HbS, HgC, etc)do  not significantly interfere with this assay.   However, presence of multiple variants may affect accuracy.     12/20/2018 8.5 (H) 4.0 - 5.6 % Final     Comment:     ADA Screening Guidelines:  5.7-6.4%  Consistent with prediabetes  >or=6.5%  Consistent with diabetes  High levels of fetal  hemoglobin interfere with the HbA1C  assay. Heterozygous hemoglobin variants (HbS, HgC, etc)do  not significantly interfere with this assay.   However, presence of multiple variants may affect accuracy.             Patient Active Problem List   Diagnosis    Psoriatic arthritis, destructive type    Vitamin D deficiency    Psoriasis    Multiple lung nodules on CT    Immunosuppressed status    Long-term use of immunosuppressant medication    Mixed type COPD (chronic obstructive pulmonary disease)    Type 2 diabetes mellitus with nephropathy    Hypertension associated with diabetes    Vitiligo    SCHUYLER (obstructive sleep apnea)    Severe obesity (BMI 35.0-35.9 with comorbidity)    Hyperlipidemia associated with type 2 diabetes mellitus    Elevated liver enzymes    Cigarette nicotine dependence without complication    Left-sided chest wall pain       Medication List with Changes/Refills   Current Medications    AMLODIPINE (NORVASC) 5 MG TABLET    TAKE 1 TABLET BY MOUTH EVERY DAY    AMMONIUM LACTATE 12 % CREA    Apply 1 Act topically 2 (two) times daily.    ASPIRIN 81 MG CHEW    Take 1 tablet (81 mg total) by mouth once daily.    BENZONATATE (TESSALON) 200 MG CAPSULE    Take 1 capsule (200 mg total) by mouth 3 (three) times daily as needed for Cough.    BLOOD SUGAR DIAGNOSTIC STRP    Use as directed three times a day    CALCIPOTRIENE (DOVONOX) 0.005 % OINTMENT    Apply on psoriasis on body twice daily    CALCIPOTRIENE-BETAMETHASONE (TACLONEX) OINTMENT    Apply topically once daily.    CLOBETASOL (OLUX) 0.05 % FOAM    AAA of scalp and behind ears twice daily.  Do not use on face, underarms or groin.    EMPAGLIFLOZIN (JARDIANCE) 25 MG TAB    Take 1 tablet (25 mg) by mouth once daily.    ERGOCALCIFEROL (ERGOCALCIFEROL) 50,000 UNIT CAP    Take 50,000 Units by mouth twice a week.    KETOCONAZOLE (NIZORAL) 2 % SHAMPOO    Wash hair with medicated shampoo at least 2x/week - let sit on scalp at least 5 minutes prior  "to rinsing    LANCETS 33 GAUGE MISC    by Misc.(Non-Drug; Combo Route) route 3 (three) times daily.    LIRAGLUTIDE 0.6 MG/0.1 ML, 18 MG/3 ML, SUBQ PNIJ (VICTOZA 3-MUKESH) 0.6 MG/0.1 ML (18 MG/3 ML) PNIJ    Inject 1.8 mg into the skin once daily.    LOSARTAN (COZAAR) 100 MG TABLET    TAKE 1 TABLET (100 MG TOTAL) BY MOUTH ONCE DAILY.    MELOXICAM (MOBIC) 15 MG TABLET    Take 1 tablet (15 mg total) by mouth once daily.    METFORMIN (GLUCOPHAGE-XR) 500 MG 24 HR TABLET    Take 2 tablets (1,000 mg total) by mouth 2 (two) times daily with meals.    ONETOUCH ULTRAMINI KIT        PEN NEEDLE, DIABETIC 32 GAUGE X 5/32" NDLE    1 each by Misc.(Non-Drug; Combo Route) route once daily.    PRAVASTATIN (PRAVACHOL) 80 MG TABLET    Take 1 tablet (80 mg total) by mouth once daily.    PROAIR HFA 90 MCG/ACTUATION INHALER    Inhale 2 puffs into the lungs every 4 (four) hours as needed for Wheezing. Rescue    SECUKINUMAB (COSENTYX PEN) 150 MG/ML PNIJ    Inject 2 pens (300 mg) into the skin every 28 days.    TRAMADOL (ULTRAM) 50 MG TABLET    Take 1 tablet (50 mg total) by mouth every 6 (six) hours as needed for Pain (up to 3x a day).   Changed and/or Refilled Medications    Modified Medication Previous Medication    FOLIC ACID (FOLVITE) 1 MG TABLET folic acid (FOLVITE) 1 MG tablet       TAKE 1 TABLET BY MOUTH EVERY DAY    Take 1 tablet (1 mg total) by mouth once daily.    METHOTREXATE 2.5 MG TAB methotrexate 2.5 MG Tab       TAKE 6 TABLETS (15 MG TOTAL) BY MOUTH EVERY 7 DAYS. (3 IN THE MORNING AND 3 IN THE EVENING).    TAKE 6 TABLETS (15 MG TOTAL) BY MOUTH EVERY 7 DAYS. (3 IN THE MORNING AND 3 IN THE EVENING).       Review of patient's allergies indicates:  No Known Allergies    Past Surgical History:   Procedure Laterality Date    APPENDECTOMY      CHOLECYSTECTOMY         Family History   Problem Relation Age of Onset    Cancer Mother     Hypertension Mother     Diabetes Mother     Cataracts Mother     Stroke Father     Psoriasis Neg " "Hx        Social History     Socioeconomic History    Marital status: Single     Spouse name: Not on file    Number of children: Not on file    Years of education: Not on file    Highest education level: Not on file   Occupational History    Not on file   Social Needs    Financial resource strain: Not on file    Food insecurity:     Worry: Not on file     Inability: Not on file    Transportation needs:     Medical: Not on file     Non-medical: Not on file   Tobacco Use    Smoking status: Former Smoker     Packs/day: 1.00     Years: 24.00     Pack years: 24.00     Types: Cigarettes     Start date: 1994    Smokeless tobacco: Never Used    Tobacco comment: 7/30/2018 referral to smoking cessation program   Substance and Sexual Activity    Alcohol use: No     Alcohol/week: 0.0 standard drinks    Drug use: No    Sexual activity: Never   Lifestyle    Physical activity:     Days per week: Not on file     Minutes per session: Not on file    Stress: Not on file   Relationships    Social connections:     Talks on phone: Not on file     Gets together: Not on file     Attends Denominational service: Not on file     Active member of club or organization: Not on file     Attends meetings of clubs or organizations: Not on file     Relationship status: Not on file   Other Topics Concern    Not on file   Social History Narrative    Not on file       Vitals:    09/26/19 1118   Weight: 121.6 kg (268 lb)   Height: 5' 8" (1.727 m)   PainSc: 0-No pain   PainLoc: Foot       Hemoglobin A1C   Date Value Ref Range Status   08/06/2019 7.1 (H) 4.0 - 5.6 % Final     Comment:     ADA Screening Guidelines:  5.7-6.4%  Consistent with prediabetes  >or=6.5%  Consistent with diabetes  High levels of fetal hemoglobin interfere with the HbA1C  assay. Heterozygous hemoglobin variants (HbS, HgC, etc)do  not significantly interfere with this assay.   However, presence of multiple variants may affect accuracy.     04/18/2019 7.0 (H) 4.0 - 5.6 " % Final     Comment:     ADA Screening Guidelines:  5.7-6.4%  Consistent with prediabetes  >or=6.5%  Consistent with diabetes  High levels of fetal hemoglobin interfere with the HbA1C  assay. Heterozygous hemoglobin variants (HbS, HgC, etc)do  not significantly interfere with this assay.   However, presence of multiple variants may affect accuracy.     12/20/2018 8.5 (H) 4.0 - 5.6 % Final     Comment:     ADA Screening Guidelines:  5.7-6.4%  Consistent with prediabetes  >or=6.5%  Consistent with diabetes  High levels of fetal hemoglobin interfere with the HbA1C  assay. Heterozygous hemoglobin variants (HbS, HgC, etc)do  not significantly interfere with this assay.   However, presence of multiple variants may affect accuracy.         Review of Systems   Constitutional: Negative for chills and fever.   Respiratory: Negative for shortness of breath.    Cardiovascular: Positive for leg swelling. Negative for chest pain, palpitations, orthopnea and claudication.   Gastrointestinal: Negative for diarrhea, nausea and vomiting.   Musculoskeletal: Negative for joint pain.   Skin: Negative for rash.   Neurological: Positive for tingling and sensory change. Negative for dizziness, focal weakness and weakness.   Psychiatric/Behavioral: Negative.          Objective:      PHYSICAL EXAM: Apperance: Alert and orient in no distress,well developed, and with good attention to grooming and body habits  Patient presents ambulating in extra depth shoes.   LOWER EXTREMITY EXAM:  VASCULAR: Dorsalis pedis pulses 0/4 bilateral and Posterior Tibial pulses 1/4 bilateral. Capillary fill time <4 seconds bilateral. Mild edema observed bilateral. Varicosities present bilateral. Skin temperature of the lower extremities is warm to warm, proximal to distal. Hair growth absent bilateral.  DERMATOLOGICAL: No skin rashes, subcutaneous nodules, lesions, or ulcers observed bilateral. Nails 1,2,3,4,5 bilateral elongated, thickened, and discolored with  subungual debris. Webspaces 1,2,3,4 clean, dry and without evidence of break in skin integrity bilateral. Moderate dry and hyperkeratotic tissue noted to bilateral heels and medial 1st MPJ. Skin fissures noted to bilateral heels. No erythema, drainage, or increased temp noted to area.   NEUROLOGICAL: Light touch, sharp-dull, proprioception all present and equal bilaterally.  Vibratory sensation absent at bilateral hallux. Protective sensation absent at 6/10 sites as tested with a Buckingham-Rusty 5.07 monofilament.   MUSCULOSKELETAL: Muscle strength is 5/5 for foot inverters, everters, plantarflexors, and dorsiflexors. Muscle tone is normal.         Assessment:       Encounter Diagnoses   Name Primary?    Type II diabetes mellitus with neurological manifestations Yes    Dermatophytosis of nail     Skin fissures     Corn or callus     Severe obesity (BMI 35.0-35.9 with comorbidity)          Plan:   Type II diabetes mellitus with neurological manifestations    Dermatophytosis of nail    Skin fissures    Corn or callus    Severe obesity (BMI 35.0-35.9 with comorbidity)      I counseled the patient on his conditions, their implications and medical management.  Greater than 15 minutes of a 20 minute appointment spent on education about the diabetic foot, neuropathy, and prevention of limb loss.  Shoe inspection. Diabetic Foot Education. Patient reminded of the importance of good nutrition and blood sugar control to help prevent podiatric complications of diabetes. Patient instructed on proper foot hygeine. We discussed wearing proper shoe gear, daily foot inspections, never walking without protective shoe gear, never putting sharp instruments to feet.    With patient's permission, nails 1-5 bilateral were debrided/trimmed in length and thickness aggressively to their soft tissue attachment mechanically and with electric , removing all offending nail and debris. Patient relates relief following the  procedure.  With patient's permission bilateral skin fissure and callus were trimmed in thickness with #15 blade without incident.   Patient  will continue to monitor the areas daily, inspect feet, wear protective shoe gear when ambulatory, moisturizer to maintain skin integrity. Patient reminded of the importance of good nutrition and blood sugar control to help prevent podiatric complications of diabetes.  Patient to return in this office in approximately 3-4 months, or sooner if needed.                     Joy Hadley DPM  [facility]

## 2019-10-08 ENCOUNTER — OFFICE VISIT (OUTPATIENT)
Dept: RHEUMATOLOGY | Facility: CLINIC | Age: 64
End: 2019-10-08
Payer: MEDICAID

## 2019-10-08 ENCOUNTER — LAB VISIT (OUTPATIENT)
Dept: LAB | Facility: HOSPITAL | Age: 64
End: 2019-10-08
Attending: INTERNAL MEDICINE
Payer: MEDICAID

## 2019-10-08 VITALS
BODY MASS INDEX: 41.53 KG/M2 | HEIGHT: 68 IN | DIASTOLIC BLOOD PRESSURE: 72 MMHG | SYSTOLIC BLOOD PRESSURE: 131 MMHG | WEIGHT: 274.06 LBS | HEART RATE: 88 BPM

## 2019-10-08 DIAGNOSIS — L40.52 PSORIATIC ARTHRITIS, DESTRUCTIVE TYPE: ICD-10-CM

## 2019-10-08 DIAGNOSIS — Z79.631 METHOTREXATE, LONG TERM, CURRENT USE: ICD-10-CM

## 2019-10-08 DIAGNOSIS — L40.9 PSORIASIS: ICD-10-CM

## 2019-10-08 DIAGNOSIS — L40.59 POLYARTICULAR PSORIATIC ARTHRITIS: ICD-10-CM

## 2019-10-08 DIAGNOSIS — L40.50 PSORIATIC ARTHRITIS: ICD-10-CM

## 2019-10-08 LAB
ALBUMIN SERPL BCP-MCNC: 3.3 G/DL (ref 3.5–5.2)
ALP SERPL-CCNC: 88 U/L (ref 55–135)
ALT SERPL W/O P-5'-P-CCNC: 51 U/L (ref 10–44)
ANION GAP SERPL CALC-SCNC: 13 MMOL/L (ref 8–16)
AST SERPL-CCNC: 38 U/L (ref 10–40)
BASOPHILS # BLD AUTO: 0.03 K/UL (ref 0–0.2)
BASOPHILS NFR BLD: 0.4 % (ref 0–1.9)
BILIRUB SERPL-MCNC: 0.4 MG/DL (ref 0.1–1)
BUN SERPL-MCNC: 8 MG/DL (ref 8–23)
CALCIUM SERPL-MCNC: 9.4 MG/DL (ref 8.7–10.5)
CHLORIDE SERPL-SCNC: 102 MMOL/L (ref 95–110)
CO2 SERPL-SCNC: 24 MMOL/L (ref 23–29)
CREAT SERPL-MCNC: 1.2 MG/DL (ref 0.5–1.4)
CRP SERPL-MCNC: 7.1 MG/L (ref 0–8.2)
DIFFERENTIAL METHOD: NORMAL
EOSINOPHIL # BLD AUTO: 0.3 K/UL (ref 0–0.5)
EOSINOPHIL NFR BLD: 4.3 % (ref 0–8)
ERYTHROCYTE [DISTWIDTH] IN BLOOD BY AUTOMATED COUNT: 14.4 % (ref 11.5–14.5)
ERYTHROCYTE [SEDIMENTATION RATE] IN BLOOD BY WESTERGREN METHOD: 55 MM/HR (ref 0–23)
EST. GFR  (AFRICAN AMERICAN): >60 ML/MIN/1.73 M^2
EST. GFR  (NON AFRICAN AMERICAN): >60 ML/MIN/1.73 M^2
GLUCOSE SERPL-MCNC: 285 MG/DL (ref 70–110)
HCT VFR BLD AUTO: 44.3 % (ref 40–54)
HGB BLD-MCNC: 14.6 G/DL (ref 14–18)
IMM GRANULOCYTES # BLD AUTO: 0.02 K/UL (ref 0–0.04)
IMM GRANULOCYTES NFR BLD AUTO: 0.3 % (ref 0–0.5)
LYMPHOCYTES # BLD AUTO: 1.7 K/UL (ref 1–4.8)
LYMPHOCYTES NFR BLD: 24.5 % (ref 18–48)
MCH RBC QN AUTO: 29.1 PG (ref 27–31)
MCHC RBC AUTO-ENTMCNC: 33 G/DL (ref 32–36)
MCV RBC AUTO: 88 FL (ref 82–98)
MONOCYTES # BLD AUTO: 0.4 K/UL (ref 0.3–1)
MONOCYTES NFR BLD: 6.2 % (ref 4–15)
NEUTROPHILS # BLD AUTO: 4.5 K/UL (ref 1.8–7.7)
NEUTROPHILS NFR BLD: 64.6 % (ref 38–73)
NRBC BLD-RTO: 0 /100 WBC
PLATELET # BLD AUTO: 270 K/UL (ref 150–350)
PMV BLD AUTO: 10.2 FL (ref 9.2–12.9)
POTASSIUM SERPL-SCNC: 4.1 MMOL/L (ref 3.5–5.1)
PROT SERPL-MCNC: 7.9 G/DL (ref 6–8.4)
RBC # BLD AUTO: 5.02 M/UL (ref 4.6–6.2)
SODIUM SERPL-SCNC: 139 MMOL/L (ref 136–145)
WBC # BLD AUTO: 6.99 K/UL (ref 3.9–12.7)

## 2019-10-08 PROCEDURE — 86140 C-REACTIVE PROTEIN: CPT

## 2019-10-08 PROCEDURE — 99999 PR PBB SHADOW E&M-EST. PATIENT-LVL III: CPT | Mod: PBBFAC,,, | Performed by: INTERNAL MEDICINE

## 2019-10-08 PROCEDURE — 99214 PR OFFICE/OUTPT VISIT, EST, LEVL IV, 30-39 MIN: ICD-10-PCS | Mod: S$PBB,,, | Performed by: INTERNAL MEDICINE

## 2019-10-08 PROCEDURE — 85025 COMPLETE CBC W/AUTO DIFF WBC: CPT

## 2019-10-08 PROCEDURE — 99214 OFFICE O/P EST MOD 30 MIN: CPT | Mod: S$PBB,,, | Performed by: INTERNAL MEDICINE

## 2019-10-08 PROCEDURE — 85652 RBC SED RATE AUTOMATED: CPT

## 2019-10-08 PROCEDURE — 99213 OFFICE O/P EST LOW 20 MIN: CPT | Mod: PBBFAC | Performed by: INTERNAL MEDICINE

## 2019-10-08 PROCEDURE — 80053 COMPREHEN METABOLIC PANEL: CPT

## 2019-10-08 PROCEDURE — 36415 COLL VENOUS BLD VENIPUNCTURE: CPT

## 2019-10-08 PROCEDURE — 99999 PR PBB SHADOW E&M-EST. PATIENT-LVL III: ICD-10-PCS | Mod: PBBFAC,,, | Performed by: INTERNAL MEDICINE

## 2019-10-08 RX ORDER — METHOTREXATE 2.5 MG/1
20 TABLET ORAL
Qty: 50 TABLET | Refills: 2 | Status: SHIPPED | OUTPATIENT
Start: 2019-10-08 | End: 2020-01-27

## 2019-10-08 RX ORDER — FOLIC ACID 1 MG/1
1000 TABLET ORAL DAILY
Qty: 30 TABLET | Refills: 11 | Status: SHIPPED | OUTPATIENT
Start: 2019-10-08 | End: 2022-04-27 | Stop reason: SDUPTHER

## 2019-10-08 RX ORDER — CALCIPOTRIENE 50 UG/G
OINTMENT TOPICAL
Qty: 120 G | Refills: 3 | Status: SHIPPED | OUTPATIENT
Start: 2019-10-08 | End: 2019-11-07

## 2019-10-08 NOTE — PROGRESS NOTES
RHEUMATOLOGY OUTPATIENT CLINIC NOTE    10/8/2019    Attending Rheumatologist: Jose Manuel Johansen  Primary Care Provider: Brittanie Kaba MD   Physician Requesting Consultation: No referring provider defined for this encounter.  Chief Complaint/Reason For Consultation:   Psoriatic arthritis.    Subjective:       HPI  Trey Stevens is a 64 y.o. White male with psoriatic arthritis comes for follow-up.    Today:  Last seen on  August.  Noted with medium activity for PsA, recommended to resume methotrexate 15 mg. Main complaint today is rash on surface extensor areas and forearm bilaterally.  No significant improvement since starting Cosentyx on 8/2019. Joint pain stable, worse on his feet with mixed pattern of pain that interferes with prolonged walking. Denies any new rash or current joint swelling.  Denies any fever, dyspnea/cough,  or GI complaints.      Review of Systems   Constitutional: Negative for chills, fever and malaise/fatigue.   Eyes: Negative for pain and redness.   Respiratory: Negative for cough and shortness of breath.         + chronic ongoing tobacco smoking.   Cardiovascular: Negative for chest pain and leg swelling.   Genitourinary: Negative for dysuria and urgency.   Musculoskeletal: Positive for back pain (chronic.  Mechanical features, no alarm signs or symptoms.). Negative for falls and joint pain.   Skin: Positive for rash (chronic PsO.  Denies new lesions.).   Neurological: Negative for tingling, focal weakness and weakness.   Psychiatric/Behavioral: Negative for memory loss. The patient does not have insomnia.      Chronic comorbid conditions affecting medical decision making today:  Past Medical History:   Diagnosis Date    Arthritis     Diabetes mellitus     Diabetes mellitus type 2, uncontrolled 7/19/2016    DM (diabetes mellitus) 2015     09/04/2017    Gall stones     Obesity     Psoriasis (a type of skin inflammation)     Rheumatoid arthritis of foot      Past Surgical  History:   Procedure Laterality Date    APPENDECTOMY      CHOLECYSTECTOMY       Family History   Problem Relation Age of Onset    Cancer Mother     Hypertension Mother     Diabetes Mother     Cataracts Mother     Stroke Father     Psoriasis Neg Hx      Social History     Substance and Sexual Activity   Alcohol Use No    Alcohol/week: 0.0 standard drinks     Social History     Tobacco Use   Smoking Status Former Smoker    Packs/day: 1.00    Years: 24.00    Pack years: 24.00    Types: Cigarettes    Start date: 1994   Smokeless Tobacco Never Used   Tobacco Comment    7/30/2018 referral to smoking cessation program     Social History     Substance and Sexual Activity   Drug Use No       Current Outpatient Medications:     amLODIPine (NORVASC) 5 MG tablet, TAKE 1 TABLET BY MOUTH EVERY DAY, Disp: 30 tablet, Rfl: 11    ammonium lactate 12 % Crea, Apply 1 Act topically 2 (two) times daily., Disp: 1 Tube, Rfl: 3    aspirin 81 MG Chew, Take 1 tablet (81 mg total) by mouth once daily., Disp: 100 tablet, Rfl: 0    benzonatate (TESSALON) 200 MG capsule, Take 1 capsule (200 mg total) by mouth 3 (three) times daily as needed for Cough. (Patient taking differently: Take 1 capsule (200 mg total) by mouth 3 (three) times daily as needed for Cough.), Disp: 30 capsule, Rfl: 1    blood sugar diagnostic Strp, Use as directed three times a day, Disp: 100 strip, Rfl: 11    calcipotriene-betamethasone (TACLONEX) ointment, Apply topically once daily., Disp: 100 g, Rfl: 2    clobetasol (OLUX) 0.05 % Foam, AAA of scalp and behind ears twice daily.  Do not use on face, underarms or groin., Disp: 100 g, Rfl: 3    empagliflozin (JARDIANCE) 25 mg Tab, Take 1 tablet (25 mg) by mouth once daily., Disp: 90 tablet, Rfl: 3    ergocalciferol (ERGOCALCIFEROL) 50,000 unit Cap, Take 50,000 Units by mouth twice a week., Disp: , Rfl:     folic acid (FOLVITE) 1 MG tablet, Take 1 tablet (1,000 mcg total) by mouth once daily., Disp: 30  "tablet, Rfl: 11    ketoconazole (NIZORAL) 2 % shampoo, Wash hair with medicated shampoo at least 2x/week - let sit on scalp at least 5 minutes prior to rinsing, Disp: 120 mL, Rfl: 0    lancets 33 gauge Misc, by Misc.(Non-Drug; Combo Route) route 3 (three) times daily., Disp: 100 each, Rfl: 11    liraglutide 0.6 mg/0.1 mL, 18 mg/3 mL, subq PNIJ (VICTOZA 3-MUKESH) 0.6 mg/0.1 mL (18 mg/3 mL) PnIj, Inject 1.8 mg into the skin once daily., Disp: 9 mL, Rfl: 2    losartan (COZAAR) 100 MG tablet, TAKE 1 TABLET (100 MG TOTAL) BY MOUTH ONCE DAILY., Disp: 30 tablet, Rfl: 11    meloxicam (MOBIC) 15 MG tablet, Take 1 tablet (15 mg total) by mouth once daily., Disp: 20 tablet, Rfl: 0    metFORMIN (GLUCOPHAGE-XR) 500 MG 24 hr tablet, Take 2 tablets (1,000 mg total) by mouth 2 (two) times daily with meals., Disp: 360 tablet, Rfl: 3    methotrexate 2.5 MG Tab, Take 8 tablets (20 mg total) by mouth every 7 days., Disp: 50 tablet, Rfl: 2    ONETOUCH ULTRAMINI kit, , Disp: , Rfl:     pen needle, diabetic 32 gauge x 5/32" Ndle, 1 each by Misc.(Non-Drug; Combo Route) route once daily., Disp: 100 each, Rfl: 1    pravastatin (PRAVACHOL) 80 MG tablet, Take 1 tablet (80 mg total) by mouth once daily., Disp: 90 tablet, Rfl: 3    PROAIR HFA 90 mcg/actuation inhaler, Inhale 2 puffs into the lungs every 4 (four) hours as needed for Wheezing. Rescue (Patient taking differently: Inhale 2 puffs into the lungs every 4 (four) hours as needed for Wheezing. Rescue), Disp: 18 g, Rfl: 11    secukinumab (COSENTYX PEN) 150 mg/mL PnIj, Inject 2 pens (300 mg) into the skin every 28 days., Disp: 3 mL, Rfl: 1    traMADol (ULTRAM) 50 mg tablet, Take 1 tablet (50 mg total) by mouth every 6 (six) hours as needed for Pain (up to 3x a day)., Disp: 30 tablet, Rfl: 1    calcipotriene (DOVONOX) 0.005 % ointment, Apply on psoriasis on body twice daily, Disp: 120 g, Rfl: 3     Objective:         Vitals:    10/08/19 1343   BP: 131/72   Pulse: 88     Physical " Exam   Nursing note and vitals reviewed.  Constitutional: He is oriented to person, place, and time and well-developed, well-nourished, and in no distress.   Obese BMI 41.6   HENT:   Head: Normocephalic.   Eyes: Conjunctivae are normal. Pupils are equal, round, and reactive to light.   Absent Episcleritis/scleritis.   Neck: Normal range of motion.   Cardiovascular: Normal rate and intact distal pulses.    Pulmonary/Chest: Effort normal. No respiratory distress.   Abdominal: Soft. He exhibits no distension.   Neurological: He is alert and oriented to person, place, and time.   absent sensory deficits  muscle strength 5/5 through.   Lhermitte's sign - Spurling's test -    Wide-based, antalgic gait.   Skin: Rash noted.     Diffuse vitiligo arms, abdomen, tight  PsO extensor surfaces elbow, forearms, hands.   +nail changes:  Onycholysis, ridging, and oils spots.   Musculoskeletal: He exhibits tenderness (some PIPs, MTPs.) and deformity (bony enlargement some IPs/DIPs.). He exhibits no edema.   :  Good.  Chronic deformities.  Ankylosis some DIPs/IPs  No active synovitis noted.     Devices used by patient: none       Reviewed old and all outside pertinent medical records available.    All lab results personally reviewed and interpreted by me.  Lab Results   Component Value Date    WBC 6.99 10/08/2019    HGB 14.6 10/08/2019    HCT 44.3 10/08/2019    MCV 88 10/08/2019    MCH 29.1 10/08/2019    MCHC 33.0 10/08/2019    RDW 14.4 10/08/2019     10/08/2019    MPV 10.2 10/08/2019       Lab Results   Component Value Date     10/08/2019    K 4.1 10/08/2019     10/08/2019    CO2 24 10/08/2019     (H) 10/08/2019    BUN 8 10/08/2019    CALCIUM 9.4 10/08/2019    PROT 7.9 10/08/2019    ALBUMIN 3.3 (L) 10/08/2019    BILITOT 0.4 10/08/2019    AST 38 10/08/2019    ALKPHOS 88 10/08/2019    ALT 51 (H) 10/08/2019       No results found for: COLORU, APPEARANCEUA, SPECGRAV, PHUR, PROTEINUA, GLUCOSEU, KETONESU,  BLOODU, LEUKOCYTESUR, NITRITE, UROBILINOGEN    Lab Results   Component Value Date    CRP 7.1 10/08/2019       Lab Results   Component Value Date    SEDRATE 31 (H) 07/15/2019       Lab Results   Component Value Date    RF <10.0 02/24/2016    SEDRATE 31 (H) 07/15/2019       No components found for: 25OHVITDTOT, 16NACFZO3, 23VXMJRM6, METHODNOTE    No results found for: URICACID    No components found for: TSPOTTB    Rheum Labs:  SANJANA negative  Rheumatoid factor, CCP negative    Infectious Labs:  Hepatitis profile nonreactive.  HIV nonreactive.  QuantiFERON TB -2018    Procalcitonin WNR 7/2019    Imaging:  All imaging reviewed and independently  interpreted by me.    X-ray lumbar spine April 2016  Degenerative changes noted involving the lumbosacral spine and SI joints.     X-ray hands March 2017  Possible flexion deformity at the right 5th DIPs which appears unchanged from prior. Moderate osteoarthritis noted throughout the bilateral DIP joints with some suspected central erosive changes suggesting probable element of erosive OA.  Degenerative findings at the bilateral 1st CMC's and radiocarpal joints also noted.  Minimal degenerative findings present at multiple bilateral MCPs.  Overall no appreciable change from prior.    X-ray foot March 2017  Lateral deviation of multiple bilateral toes again noted with joint space loss and pencil in cup erosive/productive changes involving multiple bilateral MTPs as well as the right 4th DIPs.  Findings remain most severe the 5th MTPs.  Other IP joints appear ankylosed bilaterally.   No new erosive osseous changes demonstrated.    CT chest July 2018  No acute infiltrate.  Nonspecific bronchial wall thickening with increasing bibasilar atelectasis most pronounced involving the right lower lobe.  Numerous bilateral noncalcified as well as calcified lung nodules consistent with granulomatous disease, similar and unchanged to the previous CT scan of 11/10/2017.  Calcified mediastinal  lymph nodes.    Chest x-ray July 2019  No displaced rib fractures demonstrated.  No definite pneumothorax or pleural effusion visualized.     ASSESSMENT / PLAN:     Trey Stevens is a 64 y.o. White male with:    1. Polyarticular psoriatic arthritis  - DAPSA 7.71-> low disease activity. PsO rash unchanged since last visit.    - Pain mostly with mechanical features.  No active synovitis or significant squeeze tenderness on exam.  - Currently on Cosentyx 300mg SC [id number] and MTX 15mg split dose  - will increase MTX to 20mg po split dose same day. C/w daily FA.  - Consider for Simponi Aria for next visit if no improvement to rash and joint pain.  - refilled topical Dovonox for PsO  - C-reactive protein; Standing  - Sedimentation rate; Standing    2. Chronic lower back pain - stable  - Discussed and recommended exercise (akira non wt-bearing/swimming)  - Acetaminophen prn -> standing up to 3 g per day  - Topicals therapy: Capsaicin / NSAIDs     3. Chronic immunosuppression  - monitor for toxicities.  - CBC auto differential; Standing  - Comprehensive metabolic panel; Standing    4. Other specified counseling  - over 10 minutes spent regarding below topics:  - Weight loss counseling done.  - Nutrition and exercise counseling.  - Limitation of alcohol consumption.  - Regular exercise:  Aerobic and resistance.  - Medication counseling provided.    5. Tobacco use  - at least 3 minutes spent on this topic  - smoking cessation encouraged.  - consider referral to smoking cessation clinic.    6. Morbid Obesity  - would benefit from decreasing at least 10% of body weight.  - recommended goal of losing 1 lb per week.  - consider nutritionist evaluation.    Follow up in about 2 months (around 12/8/2019).    Method of contact with patient concerns: [facility]    Disclaimer:  This note is prepared using voice recognition software and as such is likely to have errors and has not been proof read. Please contact me for  questions.     Time spent: 25 minutes in face to face discussion concerning diagnosis, prognosis, review of lab and test results, benefits of treatment as well as management of disease, counseling of patient and coordination of care between various health care providers.  Greater than half the time spent was used for coordination of care and counseling of patient.    Jose Manuel Johansen M.D.  Rheumatology Department   [facility]

## 2019-10-10 ENCOUNTER — TELEPHONE (OUTPATIENT)
Dept: RHEUMATOLOGY | Facility: CLINIC | Age: 64
End: 2019-10-10

## 2019-10-10 NOTE — TELEPHONE ENCOUNTER
Returned pt's call he thinks he needs a PA for his mediation. Advised pt will check fax to see if one was sent and will take care of it. Pt verbalized understanding.

## 2019-10-10 NOTE — TELEPHONE ENCOUNTER
----- Message from Jordan Cantrell sent at 10/10/2019  9:45 AM CDT -----  Contact: pt  Type:  Needs Medical Advice    Who Called:SYDNEY PETERSON   Symptoms (please be specific):   How long has patient had these symptoms:    Pharmacy name and phone #:    Would the patient rather a call back or a response via [facility]? call  Best Call Back Number: [phone] (home)   Additional Information: caller is requesting a call back from the nurse in regards to his meds

## 2019-10-11 ENCOUNTER — TELEPHONE (OUTPATIENT)
Dept: PHARMACY | Facility: CLINIC | Age: 64
End: 2019-10-11

## 2019-10-11 NOTE — TELEPHONE ENCOUNTER
Refill call regarding Cosentyx at [facility]. Will prepare for shipment on 10/14 to arrive 10/15 with pt consent. Copay 0.00. Patient has not started any new medications, no missed doses/ side effects. Patient did not have any concerns or questions for the pharmacist at this time.  & address verified.  ~10/16 next injection

## 2019-11-06 ENCOUNTER — TELEPHONE (OUTPATIENT)
Dept: PHARMACY | Facility: CLINIC | Age: 64
End: 2019-11-06

## 2019-11-06 NOTE — TELEPHONE ENCOUNTER
Patient returned refill call for Cosentyx. Verified to ship on Monday 11/11 for delivery on Tuesday 11/12. $0.00 copay at 004.     Abhi Haro, PharmD  Clinical Pharmacist  [facility]  P: [phone]

## 2019-11-07 ENCOUNTER — LAB VISIT (OUTPATIENT)
Dept: LAB | Facility: HOSPITAL | Age: 64
End: 2019-11-07
Attending: PHYSICIAN ASSISTANT
Payer: MEDICAID

## 2019-11-07 ENCOUNTER — OFFICE VISIT (OUTPATIENT)
Dept: RHEUMATOLOGY | Facility: CLINIC | Age: 64
End: 2019-11-07
Payer: MEDICAID

## 2019-11-07 VITALS
BODY MASS INDEX: 40.53 KG/M2 | DIASTOLIC BLOOD PRESSURE: 72 MMHG | HEIGHT: 68 IN | SYSTOLIC BLOOD PRESSURE: 118 MMHG | HEART RATE: 99 BPM | WEIGHT: 267.44 LBS

## 2019-11-07 DIAGNOSIS — L40.9 PSORIASIS: ICD-10-CM

## 2019-11-07 DIAGNOSIS — L40.0 PLAQUE PSORIASIS: ICD-10-CM

## 2019-11-07 DIAGNOSIS — D84.9 IMMUNOSUPPRESSED STATUS: ICD-10-CM

## 2019-11-07 DIAGNOSIS — Z79.60 LONG-TERM USE OF IMMUNOSUPPRESSANT MEDICATION: ICD-10-CM

## 2019-11-07 DIAGNOSIS — L40.52 PSORIATIC ARTHRITIS, DESTRUCTIVE TYPE: Primary | ICD-10-CM

## 2019-11-07 DIAGNOSIS — R74.8 ELEVATED LIVER ENZYMES: ICD-10-CM

## 2019-11-07 DIAGNOSIS — L40.52 PSORIATIC ARTHRITIS, DESTRUCTIVE TYPE: ICD-10-CM

## 2019-11-07 DIAGNOSIS — E55.9 VITAMIN D DEFICIENCY: ICD-10-CM

## 2019-11-07 LAB
ALBUMIN SERPL BCP-MCNC: 3.9 G/DL (ref 3.5–5.2)
ALP SERPL-CCNC: 87 U/L (ref 55–135)
ALT SERPL W/O P-5'-P-CCNC: 61 U/L (ref 10–44)
ANION GAP SERPL CALC-SCNC: 11 MMOL/L (ref 8–16)
AST SERPL-CCNC: 36 U/L (ref 10–40)
BASOPHILS # BLD AUTO: 0.06 K/UL (ref 0–0.2)
BASOPHILS NFR BLD: 0.7 % (ref 0–1.9)
BILIRUB SERPL-MCNC: 0.5 MG/DL (ref 0.1–1)
BUN SERPL-MCNC: 15 MG/DL (ref 8–23)
CALCIUM SERPL-MCNC: 10.8 MG/DL (ref 8.7–10.5)
CHLORIDE SERPL-SCNC: 100 MMOL/L (ref 95–110)
CO2 SERPL-SCNC: 29 MMOL/L (ref 23–29)
CREAT SERPL-MCNC: 1.4 MG/DL (ref 0.5–1.4)
CRP SERPL-MCNC: 7.9 MG/L (ref 0–8.2)
DIFFERENTIAL METHOD: ABNORMAL
EOSINOPHIL # BLD AUTO: 0.5 K/UL (ref 0–0.5)
EOSINOPHIL NFR BLD: 5.3 % (ref 0–8)
ERYTHROCYTE [DISTWIDTH] IN BLOOD BY AUTOMATED COUNT: 15.3 % (ref 11.5–14.5)
ERYTHROCYTE [SEDIMENTATION RATE] IN BLOOD BY WESTERGREN METHOD: 38 MM/HR (ref 0–23)
EST. GFR  (AFRICAN AMERICAN): >60 ML/MIN/1.73 M^2
EST. GFR  (NON AFRICAN AMERICAN): 53 ML/MIN/1.73 M^2
GLUCOSE SERPL-MCNC: 141 MG/DL (ref 70–110)
HCT VFR BLD AUTO: 50.9 % (ref 40–54)
HGB BLD-MCNC: 16.2 G/DL (ref 14–18)
IMM GRANULOCYTES # BLD AUTO: 0.03 K/UL (ref 0–0.04)
IMM GRANULOCYTES NFR BLD AUTO: 0.3 % (ref 0–0.5)
LYMPHOCYTES # BLD AUTO: 2.4 K/UL (ref 1–4.8)
LYMPHOCYTES NFR BLD: 26.8 % (ref 18–48)
MCH RBC QN AUTO: 28.5 PG (ref 27–31)
MCHC RBC AUTO-ENTMCNC: 31.8 G/DL (ref 32–36)
MCV RBC AUTO: 90 FL (ref 82–98)
MONOCYTES # BLD AUTO: 0.7 K/UL (ref 0.3–1)
MONOCYTES NFR BLD: 7.4 % (ref 4–15)
NEUTROPHILS # BLD AUTO: 5.4 K/UL (ref 1.8–7.7)
NEUTROPHILS NFR BLD: 59.8 % (ref 38–73)
NRBC BLD-RTO: 0 /100 WBC
PLATELET # BLD AUTO: 281 K/UL (ref 150–350)
PMV BLD AUTO: 10.6 FL (ref 9.2–12.9)
POTASSIUM SERPL-SCNC: 4.9 MMOL/L (ref 3.5–5.1)
PROT SERPL-MCNC: 8.9 G/DL (ref 6–8.4)
RBC # BLD AUTO: 5.68 M/UL (ref 4.6–6.2)
SODIUM SERPL-SCNC: 140 MMOL/L (ref 136–145)
WBC # BLD AUTO: 9.03 K/UL (ref 3.9–12.7)

## 2019-11-07 PROCEDURE — 80053 COMPREHEN METABOLIC PANEL: CPT

## 2019-11-07 PROCEDURE — 99214 OFFICE O/P EST MOD 30 MIN: CPT | Mod: S$PBB,,, | Performed by: PHYSICIAN ASSISTANT

## 2019-11-07 PROCEDURE — 99999 PR PBB SHADOW E&M-EST. PATIENT-LVL V: ICD-10-PCS | Mod: PBBFAC,,, | Performed by: PHYSICIAN ASSISTANT

## 2019-11-07 PROCEDURE — 36415 COLL VENOUS BLD VENIPUNCTURE: CPT

## 2019-11-07 PROCEDURE — 86140 C-REACTIVE PROTEIN: CPT

## 2019-11-07 PROCEDURE — 85025 COMPLETE CBC W/AUTO DIFF WBC: CPT

## 2019-11-07 PROCEDURE — 99999 PR PBB SHADOW E&M-EST. PATIENT-LVL V: CPT | Mod: PBBFAC,,, | Performed by: PHYSICIAN ASSISTANT

## 2019-11-07 PROCEDURE — 99215 OFFICE O/P EST HI 40 MIN: CPT | Mod: PBBFAC,25 | Performed by: PHYSICIAN ASSISTANT

## 2019-11-07 PROCEDURE — 85652 RBC SED RATE AUTOMATED: CPT

## 2019-11-07 PROCEDURE — 90686 IIV4 VACC NO PRSV 0.5 ML IM: CPT | Mod: PBBFAC

## 2019-11-07 PROCEDURE — 99214 PR OFFICE/OUTPT VISIT, EST, LEVL IV, 30-39 MIN: ICD-10-PCS | Mod: S$PBB,,, | Performed by: PHYSICIAN ASSISTANT

## 2019-11-07 RX ORDER — SECUKINUMAB 150 MG/ML
300 INJECTION SUBCUTANEOUS
Qty: 4 ML | Refills: 6 | Status: SHIPPED | OUTPATIENT
Start: 2019-11-07 | End: 2020-06-09 | Stop reason: SDUPTHER

## 2019-11-07 RX ORDER — CALCIPOTRIENE, BETAMETHASONE DIPROPIONATE 50; .643 UG/G; MG/G
OINTMENT TOPICAL DAILY
Qty: 100 G | Refills: 2 | Status: SHIPPED | OUTPATIENT
Start: 2019-11-07 | End: 2019-11-12 | Stop reason: CLARIF

## 2019-11-07 NOTE — TELEPHONE ENCOUNTER
DOCUMENTATION ONLY:  Prior Authorization for Cosentyx dose increase submitted to patient's insurance company.

## 2019-11-07 NOTE — PROGRESS NOTES
Subjective:       Patient ID: Trey Stevens is a 64 y.o. male.    Chief Complaint: Psoriatic Arthritis    Trey is here for routine follow up.     He has chronic psoriatic arthritis and psoriasis.  In the past failed mtx monotherapy, failed topicals and UV   Failed humira and Enbrel, now on Cosentyx 300 mg Q month after completion of loading dose. Initially did ok but rash worse now.   Recently methotrexate dose increased from 15 to 20 mg/wk; taking  folic acid once daily.    Had topical taclonex to use but ran out; this helped some  Severe plaq psoriasis with BSA 12%- complicate by Vitiligo   Working with  dermatology also for psoriasis as well as vitiligo.     He has been compliant with all his meds  Next Cosentyx injection of 300 mg due 11/13    C/w foot pain mostly. He has polyneuropathy  Rates his pain today 9/10.    He has some chronic arthritis changes to his dip joints bilaterally and chronic deformities to his feet with lateral deviation of his toes.  Using otc tylenol  Every day but only takes the tramadol 1-2 X weekly at night when his feet hurt      He has multiple lung nodules which are followed by pulm and stable per his last pulmonary visit feb 2019, his breathing is stable. He does still smoke.      Was on vit D 50K weekly but now on otc Vit D3 5000 daily                  He reports no joint swelling. Pertinent negatives include no dysuria, fatigue, fever, myalgias or headaches.       Psoriasis   Associated symptoms include arthralgias and a rash. Pertinent negatives include no abdominal pain, chest pain, chills, congestion, coughing, fatigue, fever, headaches, joint swelling, myalgias, nausea, vomiting or weakness.     Review of Systems   Constitutional: Negative for chills, fatigue, fever and unexpected weight change.        Poor hygiene      HENT: Negative for congestion, mouth sores and rhinorrhea.    Eyes: Negative for pain, discharge and redness.   Respiratory: Negative for cough, shortness of  "breath and wheezing.    Cardiovascular: Negative for chest pain and leg swelling.   Gastrointestinal: Negative for abdominal pain, diarrhea, nausea and vomiting.   Endocrine: Negative for cold intolerance and heat intolerance.   Genitourinary: Negative for dysuria.   Musculoskeletal: Positive for arthralgias. Negative for joint swelling and myalgias.   Skin: Positive for color change and rash.   Allergic/Immunologic: Negative for immunocompromised state.   Neurological: Negative for weakness and headaches.        Diabetic neuropathy   Psychiatric/Behavioral: The patient is not nervous/anxious.          Objective:   /72   Pulse 99   Ht 5' 8" (1.727 m)   Wt 121.3 kg (267 lb 6.7 oz)   BMI 40.66 kg/m²      Physical Exam   Constitutional: He is oriented to person, place, and time and well-developed, well-nourished, and in no distress.   HENT:   Head: Normocephalic and atraumatic.   Eyes: Pupils are equal, round, and reactive to light. Right eye exhibits no discharge.   Neck: Normal range of motion.   Cardiovascular: Normal rate, regular rhythm and normal heart sounds.  Exam reveals no friction rub.    Pulmonary/Chest: Effort normal and breath sounds normal. No respiratory distress.   Abdominal: Soft. He exhibits no distension. There is no tenderness.   Lymphadenopathy:     He has no cervical adenopathy.   Neurological: He is alert and oriented to person, place, and time.   Skin: Rash noted. No erythema.          Psoriasis rash bilateral elbows and small patch left hip    Vitiligo bilateral forearms       Psychiatric: Mood normal.   Musculoskeletal: Normal range of motion. He exhibits edema and deformity.   Bilateral wrists, mcps no synovitis good rom   nestor dips with chronic damage, bony enlargement     nestor Right 5th dip flexion deformity,   Left thumb no flexion to the IP joint, right thumb ip joint motion decreased     good rom to nestor ankles, no swelling  nestor feet very dirty, 2-5 toes deviate laterally.  Mild " lower ext edema                   Immunization History   Administered Date(s) Administered    Hepatitis A, Adult 12/20/2018    Influenza - High Dose - PF (65 years and older) 10/18/2018    Influenza - Quadrivalent 10/24/2016    Influenza - Quadrivalent - PF (6 months and older) 10/30/2017    PPD Test 08/05/2013    Pneumococcal Conjugate - 13 Valent 02/24/2016    Pneumococcal Polysaccharide - 23 Valent 04/09/2013    Tdap 02/24/2016             Recent Results (from the past 168 hour(s))   CBC auto differential    Collection Time: 11/07/19 10:19 AM   Result Value Ref Range    WBC 9.03 3.90 - 12.70 K/uL    RBC 5.68 4.60 - 6.20 M/uL    Hemoglobin 16.2 14.0 - 18.0 g/dL    Hematocrit 50.9 40.0 - 54.0 %    Mean Corpuscular Volume 90 82 - 98 fL    Mean Corpuscular Hemoglobin 28.5 27.0 - 31.0 pg    Mean Corpuscular Hemoglobin Conc 31.8 (L) 32.0 - 36.0 g/dL    RDW 15.3 (H) 11.5 - 14.5 %    Platelets 281 150 - 350 K/uL    MPV 10.6 9.2 - 12.9 fL    Immature Granulocytes 0.3 0.0 - 0.5 %    Gran # (ANC) 5.4 1.8 - 7.7 K/uL    Immature Grans (Abs) 0.03 0.00 - 0.04 K/uL    Lymph # 2.4 1.0 - 4.8 K/uL    Mono # 0.7 0.3 - 1.0 K/uL    Eos # 0.5 0.0 - 0.5 K/uL    Baso # 0.06 0.00 - 0.20 K/uL    nRBC 0 0 /100 WBC    Gran% 59.8 38.0 - 73.0 %    Lymph% 26.8 18.0 - 48.0 %    Mono% 7.4 4.0 - 15.0 %    Eosinophil% 5.3 0.0 - 8.0 %    Basophil% 0.7 0.0 - 1.9 %    Differential Method Automated    Comprehensive metabolic panel    Collection Time: 11/07/19 10:19 AM   Result Value Ref Range    Sodium 140 136 - 145 mmol/L    Potassium 4.9 3.5 - 5.1 mmol/L    Chloride 100 95 - 110 mmol/L    CO2 29 23 - 29 mmol/L    Glucose 141 (H) 70 - 110 mg/dL    BUN, Bld 15 8 - 23 mg/dL    Creatinine 1.4 0.5 - 1.4 mg/dL    Calcium 10.8 (H) 8.7 - 10.5 mg/dL    Total Protein 8.9 (H) 6.0 - 8.4 g/dL    Albumin 3.9 3.5 - 5.2 g/dL    Total Bilirubin 0.5 0.1 - 1.0 mg/dL    Alkaline Phosphatase 87 55 - 135 U/L    AST 36 10 - 40 U/L    ALT 61 (H) 10 - 44 U/L    Anion  Gap 11 8 - 16 mmol/L    eGFR if African American >60 >60 mL/min/1.73 m^2    eGFR if non African American 53 (A) >60 mL/min/1.73 m^2   C-reactive protein    Collection Time: 19 10:19 AM   Result Value Ref Range    CRP 7.9 0.0 - 8.2 mg/L          Ref. Range 2018 11:52   Vit D, 25-Hydroxy Latest Ref Range: 30 - 96 ng/mL 29 (L)          Ref. Range 2018 11:13 2019 12:13   Hep A IgM Unknown Negative    Hep B C IgM Unknown Negative    Hepatitis B Surface Ag Unknown Negative    Hepatitis C Ab Unknown Negative Negative   Mitogen - Nil Latest Ref Range: See text IU/mL >10.000    NIL Latest Ref Range: See text IU/mL 0.034    TB Antigen Latest Ref Range: See text IU/mL 0.115    TB Antigen - Nil Latest Ref Range: See Text IU/mL 0.081    TB Gold Unknown Negative    HIV 1/2 Ag/Ab Latest Ref Range: Negative   Negative            19 Pulmonary function tests: FEV1: 1.52  (46.1 % predicted), FVC:  2.19 (51.2 % predicted), FEV1/FVC:70, T.36 (64.6 % predicted), RV/TLVC: 50 (131.6 % predicted), DLCO: 16.22 (59.3 % predicted)   Severe mixed obstruction with restriction. Diffusion capacity is moderately reduced but corrects for alveolar volume      18 Chest x-ray     COMPARISON:  2018    FINDINGS:  Cardiac silhouette and mediastinal contours are stable.  Lungs improved aeration of the lung bases with minimal interstitial changes remaining which may be chronic.  Osseous structures are intact.   Impression       Improved basilar aeration.       10/30/17 nestor hand and foot x-ray- damage in both hand and feet consistent with PsA   2016 XR hands reviewed: psoriatic arthritis changes noted to dip joints bilaterally, nestor thumb IP joints        Assessment:       1. Psoriatic arthritis, destructive type    2. Psoriasis    3. Immunosuppressed status    4. Long-term use of immunosuppressant medication    5. Elevated liver enzymes    6. Vitamin D deficiency        1. Chronic refractory Psoriatic arthritis  and skin  Psoriasis:  Now On Cosentyx maintenance dose 300 mg/month after completing loading dose    msk stable; Mild foot/ankle involvement   skin not controlled today BSA 15%    on background mtx 20 mg week split dose-  In the past   Failed mtx monotherapy; failed enbrel and  humira   Failed topicals and UV trt       2. High Risk Medication monitoring: no toxicity from  mtx or Cosentyx      3. Immunocompromised: utd, except zoster, possible verve study?-    4. Vit D deficiency: completed vit D 50,000U/2Xweekly, last level low normal 26 (2/2018) now on otc Vit D3 5000 twice weekly     5. Chronic lung nodules- stable monitored by pulmonology- will see again feb 2020    6. tob use- smoking     7. Mild elevated LFT - will follow       Plan:         D/c dovenox  Refill taclonex- preferred level 2- insurance will cover  This Helped his rash over the summer and has dovenox plus steroid as ingredient     C/w mtx 20 mg Q week, split dose  Folic acid once a day       Increase cosentyx up to 300 mg Q 14 days  See study details  Pt body weight 121 kg, may need higher dose to reach therapeutic drug concentration   Skin not controlled    Https://www.ncbi.nlm.nih.gov/pubmed/89119179    Optimise StudyCONCLUSIONS:  Standard q4w dosing of secukinumab 300 mg is the optimal dosing regimen to achieve and maintain clear or almost clear skin. Patients with body weight ? 90 kg not achieving PASI 90 at week 24 may benefit from the q2w dosing regimen. What's already known about this topic? Individual responses to biologics in patients with psoriasis vary considerably and there may be a need to individualize treatment. Dose optimization strategies of currently available biologic drugs have been investigated mainly in rheumatic disorders. Secukinumab has shown long-term PASI 90/100 responses (percentage improvement in Psoriasis Area and Severity Index) to year 5 in patients with moderate-to-severe plaque psoriasis when used at the dose of  300 mg every 4 weeks. What does this study add? Standard every 4 week (q4w) dosing of secukinumab 300 mg is the optimal regimen to achieve and maintain clear or almost clear skin at week 52; the majority of the patients (85·7%) maintain PASI 90 at week 52. Superiority of intensified (q2w) dosing over the q4w regimen could not be claimed. However, patients with a higher body weight (? 90 kg) not achieving PASI 90 response at week 24 may benefit from q2w dosing.    Always remember to Hold mtx and Cosentyx  for signs of infection or for surgery   Pt verbalized understanding      TB gold and acute hep panel done 6/2018- neg, no need to repeat yet  Do every 1-2 years while on biologics    C/w otc vit D3 to 5K every day, check d level Q year     Return in 8-9 weeks   With labs cbc, cmp, esr and crp      Flu shot today     Repeat  xrays hands and feet in 1 year    Counseled pt on smoking cessation    Please call or send portal message with any questions or concerns

## 2019-11-08 NOTE — TELEPHONE ENCOUNTER
Spoke with patient regarding need for appeal Cosentyx 300mg every 2 weeks. He will continue 300mg every 4 weeks until new dosing approved. Sending [employer] appeal letter with refill 11/11 for patient to sign and return to [facility] to proceed with Cosentyx appeal.

## 2019-11-11 ENCOUNTER — DOCUMENTATION ONLY (OUTPATIENT)
Dept: RHEUMATOLOGY | Facility: CLINIC | Age: 64
End: 2019-11-11

## 2019-11-11 DIAGNOSIS — R07.89 LEFT-SIDED CHEST WALL PAIN: ICD-10-CM

## 2019-11-11 RX ORDER — TRAMADOL HYDROCHLORIDE 50 MG/1
50 TABLET ORAL 3 TIMES DAILY PRN
Qty: 45 TABLET | Refills: 1 | Status: SHIPPED | OUTPATIENT
Start: 2019-11-11 | End: 2020-04-02 | Stop reason: SDUPTHER

## 2019-11-11 NOTE — TELEPHONE ENCOUNTER
----- Message from Gissel Parker sent at 11/11/2019  9:57 AM CST -----  Contact: self/[phone]  Type:  RX Refill Request    Who Called: Trey Stevens    Refill or New Rx:refill  RX Name and Strength:Tramadol  50 mg  How is the patient currently taking it? (ex. 1XDay):as needed/twice a day  Is this a 30 day or 90 day RX:30  Preferred Pharmacy with phone number:  [facility]/pharmacy #[id number] - [place] - [address] AT [facility]  [address] [place] [address]  Phone: [phone] Fax: [phone]            Local or Mail Order:local  Ordering Provider:Zulema Hill  Would the patient rather a call back or a response via [facility]? Call back   Best Call Back Number:[phone]  Additional Information:

## 2019-11-12 DIAGNOSIS — L40.9 PSORIASIS: Primary | ICD-10-CM

## 2019-11-12 RX ORDER — CALCIPOTRIENE 50 UG/G
CREAM TOPICAL 2 TIMES DAILY
Qty: 60 G | Refills: 4 | Status: SHIPPED | OUTPATIENT
Start: 2019-11-12 | End: 2020-08-04

## 2019-11-13 DIAGNOSIS — E11.21 TYPE 2 DIABETES MELLITUS WITH NEPHROPATHY: Chronic | ICD-10-CM

## 2019-11-25 NOTE — TELEPHONE ENCOUNTER
DOCUMENTATION ONLY:    Appeal for Cosentyx 300mg every 14 days submitted with supporting documents via fax [phone].    Daphnie Patel, PharmD  [facility]  [phone]

## 2019-11-28 RX ORDER — LIRAGLUTIDE 6 MG/ML
INJECTION SUBCUTANEOUS
Qty: 9 SYRINGE | Refills: 2 | Status: SHIPPED | OUTPATIENT
Start: 2019-11-28 | End: 2020-05-04 | Stop reason: SDUPTHER

## 2019-12-10 ENCOUNTER — OFFICE VISIT (OUTPATIENT)
Dept: RHEUMATOLOGY | Facility: CLINIC | Age: 64
End: 2019-12-10
Attending: INTERNAL MEDICINE
Payer: MEDICAID

## 2019-12-10 ENCOUNTER — LAB VISIT (OUTPATIENT)
Dept: LAB | Facility: HOSPITAL | Age: 64
End: 2019-12-10
Attending: INTERNAL MEDICINE
Payer: MEDICAID

## 2019-12-10 ENCOUNTER — OFFICE VISIT (OUTPATIENT)
Dept: DIABETES | Facility: CLINIC | Age: 64
End: 2019-12-10
Payer: MEDICAID

## 2019-12-10 VITALS
HEIGHT: 68 IN | HEART RATE: 86 BPM | DIASTOLIC BLOOD PRESSURE: 76 MMHG | SYSTOLIC BLOOD PRESSURE: 123 MMHG | BODY MASS INDEX: 40.66 KG/M2 | BODY MASS INDEX: 41.26 KG/M2 | SYSTOLIC BLOOD PRESSURE: 124 MMHG | WEIGHT: 272.25 LBS | DIASTOLIC BLOOD PRESSURE: 80 MMHG | HEIGHT: 68 IN

## 2019-12-10 DIAGNOSIS — E11.69 HYPERLIPIDEMIA ASSOCIATED WITH TYPE 2 DIABETES MELLITUS: ICD-10-CM

## 2019-12-10 DIAGNOSIS — D84.9 IMMUNOSUPPRESSED STATUS: ICD-10-CM

## 2019-12-10 DIAGNOSIS — L40.50 PSORIATIC ARTHRITIS: ICD-10-CM

## 2019-12-10 DIAGNOSIS — L40.52 PSORIATIC ARTHRITIS, DESTRUCTIVE TYPE: Primary | ICD-10-CM

## 2019-12-10 DIAGNOSIS — E78.5 HYPERLIPIDEMIA ASSOCIATED WITH TYPE 2 DIABETES MELLITUS: ICD-10-CM

## 2019-12-10 DIAGNOSIS — Z71.89 COUNSELING ON HEALTH PROMOTION AND DISEASE PREVENTION: ICD-10-CM

## 2019-12-10 DIAGNOSIS — E66.01 SEVERE OBESITY (BMI 35.0-35.9 WITH COMORBIDITY): ICD-10-CM

## 2019-12-10 DIAGNOSIS — E11.21 TYPE 2 DIABETES MELLITUS WITH NEPHROPATHY: Primary | Chronic | ICD-10-CM

## 2019-12-10 DIAGNOSIS — I15.2 HYPERTENSION ASSOCIATED WITH DIABETES: Chronic | ICD-10-CM

## 2019-12-10 DIAGNOSIS — E11.59 HYPERTENSION ASSOCIATED WITH DIABETES: Chronic | ICD-10-CM

## 2019-12-10 LAB
ALBUMIN SERPL BCP-MCNC: 4 G/DL (ref 3.5–5.2)
ALP SERPL-CCNC: 79 U/L (ref 55–135)
ALT SERPL W/O P-5'-P-CCNC: 59 U/L (ref 10–44)
ANION GAP SERPL CALC-SCNC: 13 MMOL/L (ref 8–16)
AST SERPL-CCNC: 36 U/L (ref 10–40)
BASOPHILS # BLD AUTO: 0.06 K/UL (ref 0–0.2)
BASOPHILS NFR BLD: 0.7 % (ref 0–1.9)
BILIRUB SERPL-MCNC: 0.5 MG/DL (ref 0.1–1)
BUN SERPL-MCNC: 14 MG/DL (ref 8–23)
CALCIUM SERPL-MCNC: 9.8 MG/DL (ref 8.7–10.5)
CHLORIDE SERPL-SCNC: 101 MMOL/L (ref 95–110)
CO2 SERPL-SCNC: 24 MMOL/L (ref 23–29)
CREAT SERPL-MCNC: 1.1 MG/DL (ref 0.5–1.4)
CRP SERPL-MCNC: 2.5 MG/L (ref 0–8.2)
DIFFERENTIAL METHOD: ABNORMAL
EOSINOPHIL # BLD AUTO: 0.3 K/UL (ref 0–0.5)
EOSINOPHIL NFR BLD: 3.7 % (ref 0–8)
ERYTHROCYTE [DISTWIDTH] IN BLOOD BY AUTOMATED COUNT: 15.4 % (ref 11.5–14.5)
ERYTHROCYTE [SEDIMENTATION RATE] IN BLOOD BY WESTERGREN METHOD: 62 MM/HR (ref 0–23)
EST. GFR  (AFRICAN AMERICAN): >60 ML/MIN/1.73 M^2
EST. GFR  (NON AFRICAN AMERICAN): >60 ML/MIN/1.73 M^2
GLUCOSE SERPL-MCNC: 114 MG/DL (ref 70–110)
GLUCOSE SERPL-MCNC: 94 MG/DL (ref 70–110)
HCT VFR BLD AUTO: 49.1 % (ref 40–54)
HGB BLD-MCNC: 16.3 G/DL (ref 14–18)
IMM GRANULOCYTES # BLD AUTO: 0.01 K/UL (ref 0–0.04)
IMM GRANULOCYTES NFR BLD AUTO: 0.1 % (ref 0–0.5)
LYMPHOCYTES # BLD AUTO: 2.9 K/UL (ref 1–4.8)
LYMPHOCYTES NFR BLD: 34.8 % (ref 18–48)
MCH RBC QN AUTO: 28.6 PG (ref 27–31)
MCHC RBC AUTO-ENTMCNC: 33.2 G/DL (ref 32–36)
MCV RBC AUTO: 86 FL (ref 82–98)
MONOCYTES # BLD AUTO: 0.7 K/UL (ref 0.3–1)
MONOCYTES NFR BLD: 8.4 % (ref 4–15)
NEUTROPHILS # BLD AUTO: 4.4 K/UL (ref 1.8–7.7)
NEUTROPHILS NFR BLD: 52.4 % (ref 38–73)
NRBC BLD-RTO: 0 /100 WBC
PLATELET # BLD AUTO: 283 K/UL (ref 150–350)
PMV BLD AUTO: 10 FL (ref 9.2–12.9)
POTASSIUM SERPL-SCNC: 4.1 MMOL/L (ref 3.5–5.1)
PROT SERPL-MCNC: 8.8 G/DL (ref 6–8.4)
RBC # BLD AUTO: 5.7 M/UL (ref 4.6–6.2)
SODIUM SERPL-SCNC: 138 MMOL/L (ref 136–145)
WBC # BLD AUTO: 8.46 K/UL (ref 3.9–12.7)

## 2019-12-10 PROCEDURE — 99214 OFFICE O/P EST MOD 30 MIN: CPT | Mod: S$PBB,,, | Performed by: INTERNAL MEDICINE

## 2019-12-10 PROCEDURE — 99214 PR OFFICE/OUTPT VISIT, EST, LEVL IV, 30-39 MIN: ICD-10-PCS | Mod: S$PBB,,, | Performed by: INTERNAL MEDICINE

## 2019-12-10 PROCEDURE — 99999 PR PBB SHADOW E&M-EST. PATIENT-LVL III: CPT | Mod: PBBFAC,,, | Performed by: NURSE PRACTITIONER

## 2019-12-10 PROCEDURE — 99999 PR PBB SHADOW E&M-EST. PATIENT-LVL III: CPT | Mod: PBBFAC,,, | Performed by: INTERNAL MEDICINE

## 2019-12-10 PROCEDURE — 99214 OFFICE O/P EST MOD 30 MIN: CPT | Mod: S$PBB,,, | Performed by: NURSE PRACTITIONER

## 2019-12-10 PROCEDURE — 99999 PR PBB SHADOW E&M-EST. PATIENT-LVL III: ICD-10-PCS | Mod: PBBFAC,,, | Performed by: INTERNAL MEDICINE

## 2019-12-10 PROCEDURE — 99999 PR PBB SHADOW E&M-EST. PATIENT-LVL III: ICD-10-PCS | Mod: PBBFAC,,, | Performed by: NURSE PRACTITIONER

## 2019-12-10 PROCEDURE — 99213 OFFICE O/P EST LOW 20 MIN: CPT | Mod: PBBFAC,27 | Performed by: INTERNAL MEDICINE

## 2019-12-10 PROCEDURE — 99213 OFFICE O/P EST LOW 20 MIN: CPT | Mod: PBBFAC | Performed by: NURSE PRACTITIONER

## 2019-12-10 PROCEDURE — 99214 PR OFFICE/OUTPT VISIT, EST, LEVL IV, 30-39 MIN: ICD-10-PCS | Mod: S$PBB,,, | Performed by: NURSE PRACTITIONER

## 2019-12-10 PROCEDURE — 82962 GLUCOSE BLOOD TEST: CPT | Mod: PBBFAC | Performed by: NURSE PRACTITIONER

## 2019-12-10 PROCEDURE — 36415 COLL VENOUS BLD VENIPUNCTURE: CPT

## 2019-12-10 PROCEDURE — 85025 COMPLETE CBC W/AUTO DIFF WBC: CPT

## 2019-12-10 PROCEDURE — 80053 COMPREHEN METABOLIC PANEL: CPT

## 2019-12-10 PROCEDURE — 86140 C-REACTIVE PROTEIN: CPT

## 2019-12-10 PROCEDURE — 85652 RBC SED RATE AUTOMATED: CPT

## 2019-12-10 RX ORDER — INSULIN GLARGINE 100 [IU]/ML
10 INJECTION, SOLUTION SUBCUTANEOUS DAILY
Qty: 15 ML | Refills: 0 | Status: SHIPPED | OUTPATIENT
Start: 2019-12-10 | End: 2020-05-04 | Stop reason: SDUPTHER

## 2019-12-10 RX ORDER — PEN NEEDLE, DIABETIC 30 GX3/16"
2 NEEDLE, DISPOSABLE MISCELLANEOUS DAILY
Qty: 100 EACH | Refills: 1 | Status: SHIPPED | OUTPATIENT
Start: 2019-12-10 | End: 2022-01-28 | Stop reason: SDUPTHER

## 2019-12-10 NOTE — PROGRESS NOTES
"Subjective:         Patient ID: Trey Stevens is a 64 y.o. male.  Patient's current PCP is Brittanie Kaba MD.       Chief Complaint: Diabetes Mellitus    HPI  Trey Stevens is a 64 y.o. White male presenting for follow up for diabetes. Patient has been diagnosed with diabetes for over three years and has the following complications associated with diabetes: peripheral neuropathy.   Blood glucose testing is performed regularly. In the past 1 week patient reports blood glucose values to have approximately ranged from 130-160s fasting. Condition not at goal. Patient is fully compliant with medications.     He denies any recent hospital admissions, emergency room visits, hypoglycemia.      Height: 5' 8" (172.7 cm)  //   , Body mass index is 40.66 kg/m².    His blood sugar in clinic today is: See Labs      Labs reviewed and are noted below.    His most recent A1C is:   Lab Results   Component Value Date    HGBA1C 7.1 (H) 08/06/2019     No results found for: CPEPTIDE  No results found for: GLUTAMICACID      CURRENT DM MEDICATIONS:   Metformin XR 1000mg BID  Jardiance 25 mg once daily  Victoza 1.8 mg once daily        Health Maintenance   Topic Date Due    Eye Exam  09/05/2019    Hemoglobin A1c  02/06/2020    Foot Exam  03/21/2020    Lipid Panel  08/06/2020    Low Dose Statin  12/10/2020    TETANUS VACCINE  02/24/2026    Colonoscopy  10/24/2026    Hepatitis C Screening  Completed    Pneumococcal Vaccine (Medium Risk)  Completed       STANDARDS OF CARE:  Current Ophthalmologist/Optometrist: Dr. CHINTAN Germain  Current Podiatrist: JAMES  ACE/ARB: No  Statin: Yes  He  is to be enrolled in diabetes education classes.     LIFESTYLE:  BLOOD GLUCOSE TESTING: Patient reports testing 1-2 times per day.      Review of Systems   Constitutional: Negative for activity change, appetite change and fatigue.   Eyes: Negative for visual disturbance.   Gastrointestinal: Negative for constipation, diarrhea and nausea.   Endocrine: Negative " "for polydipsia, polyphagia and polyuria.         Objective:      Physical Exam   Constitutional: He is oriented to person, place, and time. He appears well-developed and well-nourished.   HENT:   Head: Normocephalic and atraumatic.   Cardiovascular: Normal rate.   Pulmonary/Chest: Effort normal.   Neurological: He is alert and oriented to person, place, and time.   Skin: Skin is warm and dry.   Psychiatric: He has a normal mood and affect. His behavior is normal. Judgment and thought content normal.   Nursing note and vitals reviewed.      Assessment:       1. Type 2 diabetes mellitus with nephropathy    2. Severe obesity (BMI 35.0-35.9 with comorbidity)    3. Hypertension associated with diabetes    4. Hyperlipidemia associated with type 2 diabetes mellitus        Plan:   Type 2 diabetes mellitus with nephropathy  -     POCT Glucose, Hand-Held Device  -     Hemoglobin A1c; Future; Expected date: 12/10/2019  -     insulin (LANTUS SOLOSTAR U-100 INSULIN) glargine 100 units/mL (3mL) SubQ pen; Inject 10 Units into the skin once daily.  Dispense: 15 mL; Refill: 0  -     pen needle, diabetic 32 gauge x 5/32" Ndle; 2 each by Misc.(Non-Drug; Combo Route) route once daily.  Dispense: 100 each; Refill: 1    - Condition not at goal. Start Lantus as noted above. DM education reviewed. Patient encouraged to carb count and exercise per recommendations. Labs and Referrals as noted. Patient instructed to send in log weekly for review and potential medication adjustments. RV scheduled in 1 month.    Severe obesity (BMI 35.0-35.9 with comorbidity)    - DM diet discussed.    Hyperlipidemia associated with type 2 diabetes mellitus    - LDL not at goal of less than 100. Pravastatin was increased at last visit.     Hypertension associated with diabetes    - Stable        Additional Plan Details:    1.) Patient was instructed to monitor blood glucose 2 - 3 x daily, fasting and ac dinner or at bedtime. Discussed ADA goal for fasting blood " sugar, 80 - 130mg/dL; pp blood sugars below 180 mg/dl. Also, discussed prevention of hypoglycemia and the need to adjust goals to higher levels if persistent hypoglycemia.  Reminded to bring BG records or meter to each visit for review.    2.) The patient was explained the above plan and given opportunity to ask questions.  He understands, chooses and consents to this plan and accepts all the risks, which include but are not limited to the risks mentioned above. He understands the alternative of having no testing, interventions or treatments at this time. He left content and without further questions.     A total of 30 minutes was spent in face to face time, of which over 50% was spent in counseling patient on disease process, complications, treatment, and side effects of medications.        YASMANY GrimmC

## 2019-12-10 NOTE — PROGRESS NOTES
RHEUMATOLOGY OUTPATIENT CLINIC NOTE    12/10/2019    Attending Rheumatologist: Jose Manuel Johansen  Primary Care Provider: Brittanie Kaba MD   Physician Requesting Consultation: Jose Manuel Johansen MD  [address] [place] [address] [place] [address]  Chief Complaint/Reason For Consultation:  Psoriatic arthritis.    Subjective:       HPI  Trey Stevens is a 64 y.o. White male with psoriatic arthritis comes for follow-up.    Today:  Last seen rheumatology on November.  Due to suboptimal rash response, was recommended to increase Cosentyx to 300mg [id number].  Patient yet awaiting approval by insurance to follow recommendation.  No acute complaints.  Refers improvement of rash since resuming Dovonox.  Adherence with methotrexate 20 mg split dose and daily folic acid with no side effects.  Denies new joint pain or swelling.  Does not have prolonged morning stiffness.  Episodic arthralgias on ankles with mechanical pattern.  Denies new rash, fever,  or GI complaints.    Review of Systems   Constitutional: Negative for chills, fever and malaise/fatigue.   Eyes: Negative for pain and redness.   Respiratory: Negative for cough and shortness of breath.         + chronic ongoing tobacco smoking.   Cardiovascular: Negative for chest pain and leg swelling.   Genitourinary: Negative for dysuria and urgency.   Musculoskeletal: Negative for back pain, falls and joint pain (Not today, feet with mechanical pattern.).   Skin: Positive for rash (chronic PsO.  Denies new lesions.).   Neurological: Negative for tingling, focal weakness and weakness.   Psychiatric/Behavioral: Negative for memory loss. The patient does not have insomnia.      Chronic comorbid conditions affecting medical decision making today:  Past Medical History:   Diagnosis Date    Arthritis     Diabetes mellitus     Diabetes mellitus type 2, uncontrolled 7/19/2016    DM (diabetes mellitus) 2015     09/04/2017    Gall stones     Obesity     Psoriasis (a type of  skin inflammation)     Rheumatoid arthritis of foot      Past Surgical History:   Procedure Laterality Date    APPENDECTOMY      CHOLECYSTECTOMY       Family History   Problem Relation Age of Onset    Cancer Mother     Hypertension Mother     Diabetes Mother     Cataracts Mother     Stroke Father     Psoriasis Neg Hx      Social History     Substance and Sexual Activity   Alcohol Use No    Alcohol/week: 0.0 standard drinks     Social History     Tobacco Use   Smoking Status Former Smoker    Packs/day: 1.00    Years: 24.00    Pack years: 24.00    Types: Cigarettes    Start date: 1994   Smokeless Tobacco Never Used   Tobacco Comment    7/30/2018 referral to smoking cessation program     Social History     Substance and Sexual Activity   Drug Use No       Current Outpatient Medications:     amLODIPine (NORVASC) 5 MG tablet, TAKE 1 TABLET BY MOUTH EVERY DAY, Disp: 30 tablet, Rfl: 11    ammonium lactate 12 % Crea, Apply 1 Act topically 2 (two) times daily., Disp: 1 Tube, Rfl: 3    aspirin 81 MG Chew, Take 1 tablet (81 mg total) by mouth once daily., Disp: 100 tablet, Rfl: 0    calcipotriene (DOVONOX) 0.005 % cream, Apply topically 2 (two) times daily., Disp: 60 g, Rfl: 4    clobetasol (OLUX) 0.05 % Foam, AAA of scalp and behind ears twice daily.  Do not use on face, underarms or groin., Disp: 100 g, Rfl: 3    COSENTYX PEN, 2 PENS, 150 mg/mL PnIj, Inject 300 mg into the skin every 14 (fourteen) days., Disp: 4 mL, Rfl: 6    empagliflozin (JARDIANCE) 25 mg Tab, Take 1 tablet (25 mg) by mouth once daily., Disp: 90 tablet, Rfl: 3    ergocalciferol (ERGOCALCIFEROL) 50,000 unit Cap, Take 50,000 Units by mouth twice a week., Disp: , Rfl:     folic acid (FOLVITE) 1 MG tablet, Take 1 tablet (1,000 mcg total) by mouth once daily., Disp: 30 tablet, Rfl: 11    insulin (LANTUS SOLOSTAR U-100 INSULIN) glargine 100 units/mL (3mL) SubQ pen, Inject 10 Units into the skin once daily., Disp: 15 mL, Rfl: 0     "ketoconazole (NIZORAL) 2 % shampoo, Wash hair with medicated shampoo at least 2x/week - let sit on scalp at least 5 minutes prior to rinsing, Disp: 120 mL, Rfl: 0    lancets 33 gauge Misc, by Misc.(Non-Drug; Combo Route) route 3 (three) times daily., Disp: 100 each, Rfl: 11    losartan (COZAAR) 100 MG tablet, TAKE 1 TABLET (100 MG TOTAL) BY MOUTH ONCE DAILY., Disp: 30 tablet, Rfl: 11    meloxicam (MOBIC) 15 MG tablet, Take 1 tablet (15 mg total) by mouth once daily., Disp: 20 tablet, Rfl: 0    metFORMIN (GLUCOPHAGE-XR) 500 MG 24 hr tablet, Take 2 tablets (1,000 mg total) by mouth 2 (two) times daily with meals., Disp: 360 tablet, Rfl: 3    methotrexate 2.5 MG Tab, Take 8 tablets (20 mg total) by mouth every 7 days., Disp: 50 tablet, Rfl: 2    ONETOUCH ULTRA BLUE TEST STRIP Strp, USE AS DIRECTED THREE TIMES A DAY, Disp: 50 strip, Rfl: 23    ONETOUCH ULTRAMINI kit, , Disp: , Rfl:     pen needle, diabetic 32 gauge x 5/32" Ndle, 2 each by Misc.(Non-Drug; Combo Route) route once daily., Disp: 100 each, Rfl: 1    pravastatin (PRAVACHOL) 80 MG tablet, Take 1 tablet (80 mg total) by mouth once daily., Disp: 90 tablet, Rfl: 3    PROAIR HFA 90 mcg/actuation inhaler, Inhale 2 puffs into the lungs every 4 (four) hours as needed for Wheezing. Rescue (Patient taking differently: Inhale 2 puffs into the lungs every 4 (four) hours as needed for Wheezing. Rescue), Disp: 18 g, Rfl: 11    secukinumab (COSENTYX PEN) 150 mg/mL PnIj, Inject 2 pens (300 mg) into the skin every 28 days., Disp: 3 mL, Rfl: 1    traMADol (ULTRAM) 50 mg tablet, Take 1 tablet (50 mg total) by mouth 3 (three) times daily as needed for Pain (use for mod pain; use sparingly)., Disp: 45 tablet, Rfl: 1    VICTOZA 3-MUKESH 0.6 mg/0.1 mL (18 mg/3 mL) PnIj, INJECT 1.8 MG UNDER THE SKIN ONCE DAILY, Disp: 9 Syringe, Rfl: 2    benzonatate (TESSALON) 200 MG capsule, Take 1 capsule (200 mg total) by mouth 3 (three) times daily as needed for Cough. (Patient not " taking: Reported on 12/10/2019), Disp: 30 capsule, Rfl: 1     Objective:         Vitals:    12/10/19 1151   BP: 124/76   Pulse: 86     Physical Exam   Nursing note and vitals reviewed.  Constitutional: He is oriented to person, place, and time and well-developed, well-nourished, and in no distress.   Obese BMI 41.4   HENT:   Head: Normocephalic.   Eyes: Conjunctivae are normal. Pupils are equal, round, and reactive to light.   Absent Episcleritis/scleritis.   Neck: Normal range of motion.   Cardiovascular: Normal rate and intact distal pulses.    Pulmonary/Chest: Effort normal. No respiratory distress.   Abdominal: Soft. He exhibits no distension.   Neurological: He is alert and oriented to person, place, and time. Gait normal.   Skin: Rash noted.     Diffuse vitiligo arms, abdomen, tight  PsO extensor surfaces elbow, forearms, hands.   +nail changes:  Onycholysis, ridging, and oils spots.   Musculoskeletal: He exhibits deformity (bony enlargement some IPs/DIPs.). He exhibits no edema or tenderness.   :  Good.  Chronic deformities.  Ankylosis some DIPs/IPs  No active synovitis noted.     Devices used by patient: none       Reviewed old and all outside pertinent medical records available.    All lab results personally reviewed and interpreted by me.  Lab Results   Component Value Date    WBC 8.46 12/10/2019    HGB 16.3 12/10/2019    HCT 49.1 12/10/2019    MCV 86 12/10/2019    MCH 28.6 12/10/2019    MCHC 33.2 12/10/2019    RDW 15.4 (H) 12/10/2019     12/10/2019    MPV 10.0 12/10/2019       Lab Results   Component Value Date     12/10/2019    K 4.1 12/10/2019     12/10/2019    CO2 24 12/10/2019     (H) 12/10/2019    BUN 14 12/10/2019    CALCIUM 9.8 12/10/2019    PROT 8.8 (H) 12/10/2019    ALBUMIN 4.0 12/10/2019    BILITOT 0.5 12/10/2019    AST 36 12/10/2019    ALKPHOS 79 12/10/2019    ALT 59 (H) 12/10/2019       No results found for: COLORU, APPEARANCEUA, SPECGRAV, PHUR, PROTEINUA,  GLUCOSEU, KETONESU, BLOODU, LEUKOCYTESUR, NITRITE, UROBILINOGEN    Lab Results   Component Value Date    CRP 2.5 12/10/2019       Lab Results   Component Value Date    SEDRATE 38 (H) 11/07/2019       Lab Results   Component Value Date    RF <10.0 02/24/2016    SEDRATE 38 (H) 11/07/2019       No components found for: 25OHVITDTOT, 99OKSTEO5, 04BSDTSY8, METHODNOTE    No results found for: URICACID    No components found for: TSPOTTB    Rheum Labs:  SANJANA negative  Rheumatoid factor, CCP negative    Infectious Labs:  Hepatitis profile nonreactive.  HIV nonreactive.  QuantiFERON TB negative 2018    Procalcitonin WNR 7/2019    Imaging:  All imaging reviewed and independently  interpreted by me.    X-ray foot March 2017  Lateral deviation of multiple bilateral toes again noted with joint space loss and pencil in cup erosive/productive changes involving multiple bilateral MTPs as well as the right 4th DIPs.  Findings remain most severe the 5th MTPs.  Other IP joints appear ankylosed bilaterally.   No new erosive osseous changes demonstrated.     ASSESSMENT / PLAN:     Trey Stevens is a 64 y.o. White male with:    1. Polyarticular psoriatic arthritis  - DAPSA -> low disease activity.  - suboptimal response to skin psoriasis.    - prior failure to Humira and Enbrel  - Agree with prior recommendation of trial of increasing Cosentyx 300mg SC [id number] (awaiting insurance approval)  - continue with Dovonox topical  - continue with MTX to 20mg po split dose same day. C/w daily FA.  - ESR, CRP prior next visit.    2. Immunosuppressed status   - Compromised immune system secondary to autoimmune disease and use of immunosuppressive drugs.   - Monitor carefully for infections and toxicities.   - Advised to get immediate medical care if any infection.   - Also advised strict adherence to age appropriate vaccinations and cancer screenings with PCP.  - CBC auto differential  - Comprehensive metabolic panel    3. Other specified  counseling  - over 10 minutes spent regarding below topics:  - Weight loss counseling done.  - Nutrition and exercise counseling.  - Limitation of alcohol consumption.  - Regular exercise:  Aerobic and resistance.  - Medication counseling provided.    4. Tobacco use  - at least 3 minutes spent on this topic  - smoking cessation encouraged.    Follow up in about 6 months (around 6/10/2020).    Method of contact with patient concerns: [facility]    Disclaimer:  This note is prepared using voice recognition software and as such is likely to have errors and has not been proof read. Please contact me for questions.     Time spent: 25 minutes in face to face discussion concerning diagnosis, prognosis, review of lab and test results, benefits of treatment as well as management of disease, counseling of patient and coordination of care between various health care providers.  Greater than half the time spent was used for coordination of care and counseling of patient.    Jose Manuel Johansen M.D.  Rheumatology Department   [facility]

## 2019-12-10 NOTE — PATIENT INSTRUCTIONS
MAKE SURE YOU ARE TAKING JARDIANCE.  FROM DOWNSTAIRS    START LANTUS 10 UNITS ONCE DAILY.      ALL DIABETES MEDICATIONS    VICTOZA  METFORMIN   JARDIANCE  LANTUS (NEW)       IF YOU ARE HAPPY WITH YOUR VISIT TODAY, PLEASE FILL OUT THE SURVEY THAT MAY BE SENT TO YOUR EMAIL ADDRESS OR MAILBOX FOLLOWING THIS VISIT. WE LOVE TO HEAR YOUR COMMENTS! HAVE A WONDERFUL DAY!

## 2019-12-20 ENCOUNTER — TELEPHONE (OUTPATIENT)
Dept: PHARMACY | Facility: CLINIC | Age: 64
End: 2019-12-20

## 2019-12-21 NOTE — TELEPHONE ENCOUNTER
Informed patient we were still awaiting appeal decision for q14 dosing and that he is to continue dosing q28 days in the meantime. Refill readiness for Cosentyx confirmed with patient; name/ confirmed; no missed doses but he was two days late injecting last month. He usually mishra the dates on his calendar but he misplaced the calendar. Patient does not have an adherence issue.; no new medications; no side effects noted; address confirmed for  shipment and  delivery. $0 copay. Patient states he has 0 doses remaining. He is due to inject .     Jen Roberts, Pharm.D.  Pharmacy Resident, PGY-1   [facility]

## 2019-12-23 ENCOUNTER — TELEPHONE (OUTPATIENT)
Dept: FAMILY MEDICINE | Facility: CLINIC | Age: 64
End: 2019-12-23

## 2019-12-23 ENCOUNTER — OFFICE VISIT (OUTPATIENT)
Dept: PODIATRY | Facility: CLINIC | Age: 64
End: 2019-12-23
Payer: MEDICAID

## 2019-12-23 VITALS
BODY MASS INDEX: 41.33 KG/M2 | DIASTOLIC BLOOD PRESSURE: 78 MMHG | SYSTOLIC BLOOD PRESSURE: 124 MMHG | HEIGHT: 68 IN | WEIGHT: 272.69 LBS | HEART RATE: 84 BPM

## 2019-12-23 DIAGNOSIS — B35.1 DERMATOPHYTOSIS OF NAIL: ICD-10-CM

## 2019-12-23 DIAGNOSIS — E66.01 SEVERE OBESITY (BMI 35.0-35.9 WITH COMORBIDITY): ICD-10-CM

## 2019-12-23 DIAGNOSIS — R23.4 SKIN FISSURES: ICD-10-CM

## 2019-12-23 DIAGNOSIS — L84 CORN OR CALLUS: ICD-10-CM

## 2019-12-23 DIAGNOSIS — E11.49 TYPE II DIABETES MELLITUS WITH NEUROLOGICAL MANIFESTATIONS: Primary | ICD-10-CM

## 2019-12-23 PROCEDURE — 11056 PARNG/CUTG B9 HYPRKR LES 2-4: CPT | Mod: Q9,PBBFAC | Performed by: PODIATRIST

## 2019-12-23 PROCEDURE — 99499 NO LOS: ICD-10-PCS | Mod: S$PBB,,, | Performed by: PODIATRIST

## 2019-12-23 PROCEDURE — 11056 PR TRIM BENIGN HYPERKERATOTIC SKIN LESION,2-4: ICD-10-PCS | Mod: S$PBB,,, | Performed by: PODIATRIST

## 2019-12-23 PROCEDURE — 99499 UNLISTED E&M SERVICE: CPT | Mod: S$PBB,,, | Performed by: PODIATRIST

## 2019-12-23 PROCEDURE — 11721 DEBRIDE NAIL 6 OR MORE: CPT | Mod: Q9,PBBFAC | Performed by: PODIATRIST

## 2019-12-23 PROCEDURE — 99999 PR PBB SHADOW E&M-EST. PATIENT-LVL III: CPT | Mod: PBBFAC,,, | Performed by: PODIATRIST

## 2019-12-23 PROCEDURE — 99999 PR PBB SHADOW E&M-EST. PATIENT-LVL III: ICD-10-PCS | Mod: PBBFAC,,, | Performed by: PODIATRIST

## 2019-12-23 PROCEDURE — 11056 PARNG/CUTG B9 HYPRKR LES 2-4: CPT | Mod: S$PBB,,, | Performed by: PODIATRIST

## 2019-12-23 PROCEDURE — 11721 DEBRIDE NAIL 6 OR MORE: CPT | Mod: 59,S$PBB,, | Performed by: PODIATRIST

## 2019-12-23 PROCEDURE — 99213 OFFICE O/P EST LOW 20 MIN: CPT | Mod: PBBFAC | Performed by: PODIATRIST

## 2019-12-23 PROCEDURE — 11721 PR DEBRIDEMENT OF NAILS, 6 OR MORE: ICD-10-PCS | Mod: 59,S$PBB,, | Performed by: PODIATRIST

## 2019-12-23 NOTE — TELEPHONE ENCOUNTER
----- Message from Esther Sr sent at 12/23/2019  4:15 PM CST -----  Contact: Patient  Type:  Sooner Apoointment Request    Caller is requesting a sooner appointment.  Caller declined first available appointment listed below.  Caller will not accept being placed on the waitlist and is requesting a message be sent to doctor.    Name of Caller:  Patient  When is the first available appointment?  Na-medicaid  Symptoms:  Follow up  Best Call Back Number:  [phone] (home)    Additional Information:  na

## 2019-12-23 NOTE — PROGRESS NOTES
Subjective:     Patient ID: Trey Stevens is a 64 y.o. male.    Chief Complaint: Nail Care (3mo nail care, pt c/o bilateral foot and ankle pain, rates pain 7/10, diabetic pt, wears casual shoes, PCP Dr. Kaba 08/06/19)    Trey is a 64 y.o. male who presents to the clinic for evaluation and treatment of high risk feet. Trey has a past medical history of Arthritis, Diabetes mellitus, Diabetes mellitus type 2, uncontrolled (7/19/2016), DM (diabetes mellitus) (2015), Gall stones, Obesity, Psoriasis (a type of skin inflammation), and Rheumatoid arthritis of foot. The patient's chief complaint is thick calluses and nails. This patient has documented high risk feet requiring routine maintenance secondary to diabetes mellitis and those secondary complications of diabetes, as mentioned. Patient states his blood sugar this morning was 127mg/dl.     PCP: Brittanie Kaba MD     Date Last Seen by PCP: 08/06/19    Current shoe gear:  Affected Foot: Extra depth shoes     Unaffected Foot: Extra depth shoes    Hemoglobin A1C   Date Value Ref Range Status   08/06/2019 7.1 (H) 4.0 - 5.6 % Final     Comment:     ADA Screening Guidelines:  5.7-6.4%  Consistent with prediabetes  >or=6.5%  Consistent with diabetes  High levels of fetal hemoglobin interfere with the HbA1C  assay. Heterozygous hemoglobin variants (HbS, HgC, etc)do  not significantly interfere with this assay.   However, presence of multiple variants may affect accuracy.     04/18/2019 7.0 (H) 4.0 - 5.6 % Final     Comment:     ADA Screening Guidelines:  5.7-6.4%  Consistent with prediabetes  >or=6.5%  Consistent with diabetes  High levels of fetal hemoglobin interfere with the HbA1C  assay. Heterozygous hemoglobin variants (HbS, HgC, etc)do  not significantly interfere with this assay.   However, presence of multiple variants may affect accuracy.     12/20/2018 8.5 (H) 4.0 - 5.6 % Final     Comment:     ADA Screening Guidelines:  5.7-6.4%  Consistent with  prediabetes  >or=6.5%  Consistent with diabetes  High levels of fetal hemoglobin interfere with the HbA1C  assay. Heterozygous hemoglobin variants (HbS, HgC, etc)do  not significantly interfere with this assay.   However, presence of multiple variants may affect accuracy.             Patient Active Problem List   Diagnosis    Psoriatic arthritis, destructive type    Vitamin D deficiency    Psoriasis    Multiple lung nodules on CT    Immunosuppressed status    Long-term use of immunosuppressant medication    Mixed type COPD (chronic obstructive pulmonary disease)    Type 2 diabetes mellitus with nephropathy    Hypertension associated with diabetes    Vitiligo    SCHUYLER (obstructive sleep apnea)    Severe obesity (BMI 35.0-35.9 with comorbidity)    Hyperlipidemia associated with type 2 diabetes mellitus    Elevated liver enzymes    Cigarette nicotine dependence without complication    Left-sided chest wall pain       Medication List with Changes/Refills   Current Medications    AMLODIPINE (NORVASC) 5 MG TABLET    TAKE 1 TABLET BY MOUTH EVERY DAY    AMMONIUM LACTATE 12 % CREA    Apply 1 Act topically 2 (two) times daily.    ASPIRIN 81 MG CHEW    Take 1 tablet (81 mg total) by mouth once daily.    BENZONATATE (TESSALON) 200 MG CAPSULE    Take 1 capsule (200 mg total) by mouth 3 (three) times daily as needed for Cough.    CALCIPOTRIENE (DOVONOX) 0.005 % CREAM    Apply topically 2 (two) times daily.    CLOBETASOL (OLUX) 0.05 % FOAM    AAA of scalp and behind ears twice daily.  Do not use on face, underarms or groin.    COSENTYX PEN, 2 PENS, 150 MG/ML PNIJ    Inject 300 mg into the skin every 14 (fourteen) days.    EMPAGLIFLOZIN (JARDIANCE) 25 MG TAB    Take 1 tablet (25 mg) by mouth once daily.    ERGOCALCIFEROL (ERGOCALCIFEROL) 50,000 UNIT CAP    Take 50,000 Units by mouth twice a week.    FOLIC ACID (FOLVITE) 1 MG TABLET    Take 1 tablet (1,000 mcg total) by mouth once daily.    INSULIN (LANTUS SOLOSTAR  "U-100 INSULIN) GLARGINE 100 UNITS/ML (3ML) SUBQ PEN    Inject 10 Units into the skin once daily.    KETOCONAZOLE (NIZORAL) 2 % SHAMPOO    Wash hair with medicated shampoo at least 2x/week - let sit on scalp at least 5 minutes prior to rinsing    LANCETS 33 GAUGE MISC    by Misc.(Non-Drug; Combo Route) route 3 (three) times daily.    LOSARTAN (COZAAR) 100 MG TABLET    TAKE 1 TABLET (100 MG TOTAL) BY MOUTH ONCE DAILY.    MELOXICAM (MOBIC) 15 MG TABLET    Take 1 tablet (15 mg total) by mouth once daily.    METFORMIN (GLUCOPHAGE-XR) 500 MG 24 HR TABLET    Take 2 tablets (1,000 mg total) by mouth 2 (two) times daily with meals.    METHOTREXATE 2.5 MG TAB    Take 8 tablets (20 mg total) by mouth every 7 days.    ONETOUCH ULTRA BLUE TEST STRIP STRP    USE AS DIRECTED THREE TIMES A DAY    ONETOUCH ULTRAMINI KIT        PEN NEEDLE, DIABETIC 32 GAUGE X 5/32" NDLE    2 each by Misc.(Non-Drug; Combo Route) route once daily.    PRAVASTATIN (PRAVACHOL) 80 MG TABLET    Take 1 tablet (80 mg total) by mouth once daily.    PROAIR HFA 90 MCG/ACTUATION INHALER    Inhale 2 puffs into the lungs every 4 (four) hours as needed for Wheezing. Rescue    SECUKINUMAB (COSENTYX PEN) 150 MG/ML PNIJ    Inject 2 pens (300 mg) into the skin every 28 days.    TRAMADOL (ULTRAM) 50 MG TABLET    Take 1 tablet (50 mg total) by mouth 3 (three) times daily as needed for Pain (use for mod pain; use sparingly).    VICTOZA 3-MUKESH 0.6 MG/0.1 ML (18 MG/3 ML) PNIJ    INJECT 1.8 MG UNDER THE SKIN ONCE DAILY       Review of patient's allergies indicates:  No Known Allergies    Past Surgical History:   Procedure Laterality Date    APPENDECTOMY      CHOLECYSTECTOMY         Family History   Problem Relation Age of Onset    Cancer Mother     Hypertension Mother     Diabetes Mother     Cataracts Mother     Stroke Father     Psoriasis Neg Hx        Social History     Socioeconomic History    Marital status: Single     Spouse name: Not on file    Number of " "children: Not on file    Years of education: Not on file    Highest education level: Not on file   Occupational History    Not on file   Social Needs    Financial resource strain: Not on file    Food insecurity:     Worry: Not on file     Inability: Not on file    Transportation needs:     Medical: Not on file     Non-medical: Not on file   Tobacco Use    Smoking status: Former Smoker     Packs/day: 1.00     Years: 24.00     Pack years: 24.00     Types: Cigarettes     Start date: 1994    Smokeless tobacco: Never Used    Tobacco comment: 7/30/2018 referral to smoking cessation program   Substance and Sexual Activity    Alcohol use: No     Alcohol/week: 0.0 standard drinks    Drug use: No    Sexual activity: Never   Lifestyle    Physical activity:     Days per week: Not on file     Minutes per session: Not on file    Stress: Not on file   Relationships    Social connections:     Talks on phone: Not on file     Gets together: Not on file     Attends Confucianist service: Not on file     Active member of club or organization: Not on file     Attends meetings of clubs or organizations: Not on file     Relationship status: Not on file   Other Topics Concern    Not on file   Social History Narrative    Not on file       Vitals:    12/23/19 1138   BP: 124/78   Pulse: 84   Weight: 123.7 kg (272 lb 11.3 oz)   Height: 5' 8" (1.727 m)   PainSc:   7   PainLoc: Foot       Hemoglobin A1C   Date Value Ref Range Status   08/06/2019 7.1 (H) 4.0 - 5.6 % Final     Comment:     ADA Screening Guidelines:  5.7-6.4%  Consistent with prediabetes  >or=6.5%  Consistent with diabetes  High levels of fetal hemoglobin interfere with the HbA1C  assay. Heterozygous hemoglobin variants (HbS, HgC, etc)do  not significantly interfere with this assay.   However, presence of multiple variants may affect accuracy.     04/18/2019 7.0 (H) 4.0 - 5.6 % Final     Comment:     ADA Screening Guidelines:  5.7-6.4%  Consistent with " prediabetes  >or=6.5%  Consistent with diabetes  High levels of fetal hemoglobin interfere with the HbA1C  assay. Heterozygous hemoglobin variants (HbS, HgC, etc)do  not significantly interfere with this assay.   However, presence of multiple variants may affect accuracy.     12/20/2018 8.5 (H) 4.0 - 5.6 % Final     Comment:     ADA Screening Guidelines:  5.7-6.4%  Consistent with prediabetes  >or=6.5%  Consistent with diabetes  High levels of fetal hemoglobin interfere with the HbA1C  assay. Heterozygous hemoglobin variants (HbS, HgC, etc)do  not significantly interfere with this assay.   However, presence of multiple variants may affect accuracy.         Review of Systems   Constitutional: Negative for chills and fever.   Respiratory: Negative for shortness of breath.    Cardiovascular: Positive for leg swelling. Negative for chest pain, palpitations, orthopnea and claudication.   Gastrointestinal: Negative for diarrhea, nausea and vomiting.   Musculoskeletal: Negative for joint pain.   Skin: Negative for rash.   Neurological: Positive for tingling and sensory change. Negative for dizziness, focal weakness and weakness.   Psychiatric/Behavioral: Negative.          Objective:      PHYSICAL EXAM: Apperance: Alert and orient in no distress,well developed, and with good attention to grooming and body habits  Patient presents ambulating in extra depth shoes.   LOWER EXTREMITY EXAM:  VASCULAR: Dorsalis pedis pulses 0/4 bilateral and Posterior Tibial pulses 1/4 bilateral. Capillary fill time <4 seconds bilateral. Mild edema observed bilateral. Varicosities present bilateral. Skin temperature of the lower extremities is warm to warm, proximal to distal. Hair growth absent bilateral.  DERMATOLOGICAL: No skin rashes, subcutaneous nodules, lesions, or ulcers observed bilateral. Nails 1,2,3,4,5 bilateral elongated, thickened, and discolored with subungual debris. Webspaces 1,2,3,4 clean, dry and without evidence of break in  skin integrity bilateral. Moderate dry and hyperkeratotic tissue noted to bilateral heels and medial 1st MPJ. Skin fissures noted to bilateral heels. No erythema, drainage, or increased temp noted to area.   NEUROLOGICAL: Light touch, sharp-dull, proprioception all present and equal bilaterally.  Vibratory sensation absent at bilateral hallux. Protective sensation absent at 6/10 sites as tested with a [place]-Rusty 5.07 monofilament.   MUSCULOSKELETAL: Muscle strength is 5/5 for foot inverters, everters, plantarflexors, and dorsiflexors. Muscle tone is normal.         Assessment:       Encounter Diagnoses   Name Primary?    Type II diabetes mellitus with neurological manifestations Yes    Dermatophytosis of nail     Skin fissures     Corn or callus     Severe obesity (BMI 35.0-35.9 with comorbidity)          Plan:   Type II diabetes mellitus with neurological manifestations    Dermatophytosis of nail    Skin fissures    Corn or callus    Severe obesity (BMI 35.0-35.9 with comorbidity)      I counseled the patient on his conditions, their implications and medical management.  Greater than 15 minutes of a 20 minute appointment spent on education about the diabetic foot, neuropathy, and prevention of limb loss.  Shoe inspection. Diabetic Foot Education. Patient reminded of the importance of good nutrition and blood sugar control to help prevent podiatric complications of diabetes. Patient instructed on proper foot hygeine. We discussed wearing proper shoe gear, daily foot inspections, never walking without protective shoe gear, never putting sharp instruments to feet.    With patient's permission, nails 1-5 bilateral were debrided/trimmed in length and thickness aggressively to their soft tissue attachment mechanically and with electric , removing all offending nail and debris. Patient relates relief following the procedure.  With patient's permission bilateral skin fissure and callus were trimmed in  thickness with #15 blade without incident.   Patient  will continue to monitor the areas daily, inspect feet, wear protective shoe gear when ambulatory, moisturizer to maintain skin integrity. Patient reminded of the importance of good nutrition and blood sugar control to help prevent podiatric complications of diabetes.  Patient to return in this office in approximately 3-4 months, or sooner if needed.                     Joy Hadley DPM  [facility]

## 2020-01-13 ENCOUNTER — LAB VISIT (OUTPATIENT)
Dept: LAB | Facility: HOSPITAL | Age: 65
End: 2020-01-13
Attending: NURSE PRACTITIONER
Payer: MEDICAID

## 2020-01-13 DIAGNOSIS — E11.21 TYPE 2 DIABETES MELLITUS WITH NEPHROPATHY: Chronic | ICD-10-CM

## 2020-01-13 LAB
ESTIMATED AVG GLUCOSE: 169 MG/DL (ref 68–131)
HBA1C MFR BLD HPLC: 7.5 % (ref 4–5.6)

## 2020-01-13 PROCEDURE — 36415 COLL VENOUS BLD VENIPUNCTURE: CPT | Mod: PO

## 2020-01-13 PROCEDURE — 83036 HEMOGLOBIN GLYCOSYLATED A1C: CPT

## 2020-01-13 NOTE — PROGRESS NOTES
"Subjective:      Patient ID: Trey Stevens is a 64 y.o. male.    Chief Complaint: Follow-up (routine )      HPI  Here for follow up of medical problems.  Forgot book of sugars, but says DM nurse says good range.  Feels well.  No f/c/sw/cough.  No cp/sob/palp.  3-4 weeks sinus drainage and head feels full.  Ears ringing and fullness.  Taking otc but not getting better.  No n/v/d.    Updated/ annual due 4/20:  HM: 11/19 fluvax, 12/18 HAV, 2/16 [id number], 4/13 [id number], 2/16 TDaP, Cscope in the past normal and pt refuses more, 4/19 PSA, 2/16 HCV neg, 9/18 Eye Dr. Germain, 11/18 needs chest CT.     Review of Systems   Constitutional: Negative for chills, diaphoresis and fever.   Respiratory: Negative for cough and shortness of breath.    Cardiovascular: Negative for chest pain, palpitations and leg swelling.   Gastrointestinal: Negative for blood in stool, constipation, diarrhea, nausea and vomiting.   Genitourinary: Negative for dysuria, frequency and hematuria.   Psychiatric/Behavioral: The patient is not nervous/anxious.          Objective:   /62 (BP Location: Left arm, Patient Position: Sitting, BP Method: Large (Manual))   Pulse 97   Temp 98.1 °F (36.7 °C) (Oral)   Ht 5' 8" (1.727 m)   Wt 121.6 kg (268 lb 1.3 oz)   SpO2 96%   BMI 40.76 kg/m²     Physical Exam   Constitutional: He is oriented to person, place, and time. He appears well-developed and well-nourished.   HENT:   Mouth/Throat: Oropharynx is clear and moist.   Neck: Normal range of motion. Neck supple.   Cardiovascular: Normal rate, regular rhythm and intact distal pulses. Exam reveals no gallop and no friction rub.   No murmur heard.  Pulmonary/Chest: Effort normal and breath sounds normal. He has no wheezes. He has no rales.   Abdominal: Soft. Bowel sounds are normal. He exhibits no mass. There is no tenderness.   Musculoskeletal: He exhibits no edema.   Lymphadenopathy:     He has no cervical adenopathy.   Neurological: He is alert and " oriented to person, place, and time.   Psychiatric: He has a normal mood and affect.       Results for SYDNEY PETERSON (MRN [id number]) as of 1/27/2020 14:18   Ref. Range 1/13/2020 09:41 1/15/2020 11:58   WBC Latest Ref Range: 3.90 - 12.70 K/uL  7.03   RBC Latest Ref Range: 4.60 - 6.20 M/uL  5.24   Hemoglobin Latest Ref Range: 14.0 - 18.0 g/dL  15.0   Hematocrit Latest Ref Range: 40.0 - 54.0 %  46.9   MCV Latest Ref Range: 82 - 98 fL  90   MCH Latest Ref Range: 27.0 - 31.0 pg  28.6   MCHC Latest Ref Range: 32.0 - 36.0 g/dL  32.0   RDW Latest Ref Range: 11.5 - 14.5 %  15.1 (H)   Platelets Latest Ref Range: 150 - 350 K/uL  273   MPV Latest Ref Range: 9.2 - 12.9 fL  10.4   Gran% Latest Ref Range: 38.0 - 73.0 %  55.5   Gran # (ANC) Latest Ref Range: 1.8 - 7.7 K/uL  3.9   Lymph% Latest Ref Range: 18.0 - 48.0 %  31.0   Lymph # Latest Ref Range: 1.0 - 4.8 K/uL  2.2   Mono% Latest Ref Range: 4.0 - 15.0 %  6.4   Mono # Latest Ref Range: 0.3 - 1.0 K/uL  0.5   Eosinophil% Latest Ref Range: 0.0 - 8.0 %  6.4   Eos # Latest Ref Range: 0.0 - 0.5 K/uL  0.5   Basophil% Latest Ref Range: 0.0 - 1.9 %  0.7   Baso # Latest Ref Range: 0.00 - 0.20 K/uL  0.05   nRBC Latest Ref Range: 0 /100 WBC  0   Differential Method Unknown  Automated   Immature Grans (Abs) Latest Ref Range: 0.00 - 0.04 K/uL  0.02   Immature Granulocytes Latest Ref Range: 0.0 - 0.5 %  0.3   Sed Rate Latest Ref Range: 0 - 23 mm/Hr  48 (H)   Sodium Latest Ref Range: 136 - 145 mmol/L  139   Potassium Latest Ref Range: 3.5 - 5.1 mmol/L  4.2   Chloride Latest Ref Range: 95 - 110 mmol/L  102   CO2 Latest Ref Range: 23 - 29 mmol/L  27   Anion Gap Latest Ref Range: 8 - 16 mmol/L  10   BUN, Bld Latest Ref Range: 8 - 23 mg/dL  10   Creatinine Latest Ref Range: 0.5 - 1.4 mg/dL  1.1   eGFR if non African American Latest Ref Range: >60 mL/min/1.73 m^2  >60   eGFR if  Latest Ref Range: >60 mL/min/1.73 m^2  >60   Glucose Latest Ref Range: 70 - 110 mg/dL  162 (H)   Calcium  Latest Ref Range: 8.7 - 10.5 mg/dL  9.3   Alkaline Phosphatase Latest Ref Range: 55 - 135 U/L  79   PROTEIN TOTAL Latest Ref Range: 6.0 - 8.4 g/dL  7.9   Albumin Latest Ref Range: 3.5 - 5.2 g/dL  3.4 (L)   BILIRUBIN TOTAL Latest Ref Range: 0.1 - 1.0 mg/dL  0.4   AST Latest Ref Range: 10 - 40 U/L  40   ALT Latest Ref Range: 10 - 44 U/L  50 (H)   CRP Latest Ref Range: 0.0 - 8.2 mg/L  5.4   Hemoglobin A1C External Latest Ref Range: 4.0 - 5.6 % 7.5 (H)    Estimated Avg Glucose Latest Ref Range: 68 - 131 mg/dL 169 (H)        Assessment:       1. Type 2 diabetes mellitus with nephropathy    2. Hypertension associated with diabetes    3. Hyperlipidemia associated with type 2 diabetes mellitus    4. Elevated liver enzymes    5. Mixed type COPD (chronic obstructive pulmonary disease)    6. Psoriasis    7. Immunosuppressed status    8. Vitiligo    9. Preventive measure    10. Acute non-recurrent maxillary sinusitis          Plan:     Type 2 diabetes mellitus with nephropathy- adeq control- recheck 4mo.  -     Hemoglobin A1c; Future; Expected date: 01/27/2020  -     Microalbumin/creatinine urine ratio; Future; Expected date: 01/27/2020    Hypertension associated with diabetes- stable, cont rx.    Hyperlipidemia associated with type 2 diabetes mellitus- cont prava, recheck 4mo.    Elevated liver enzymes    Mixed type COPD (chronic obstructive pulmonary disease)- doing pretty well now, no flare.    Psoriasis, Immunosuppressed status- f/w Rheum.    Vitiligo    Preventive measure- due in 4mo.  -     CBC auto differential; Future; Expected date: 01/27/2020  -     Comprehensive metabolic panel; Future; Expected date: 01/27/2020  -     Lipid panel; Future; Expected date: 01/27/2020  -     PSA, Screening; Future; Expected date: 01/27/2020  -     TSH; Future; Expected date: 01/27/2020  -     Vitamin D; Future  -     Hemoglobin A1c; Future; Expected date: 01/27/2020  -     Microalbumin/creatinine urine ratio; Future; Expected date:  01/27/2020    Acute non-recurrent maxillary sinusitis  -     azithromycin (ZITHROMAX) 500 MG tablet; Take 1 tablet (500 mg total) by mouth once daily. for 5 days  Dispense: 5 tablet; Refill: 0  -     predniSONE (DELTASONE) 20 MG tablet; 1 po daily x 2d, then half daily for 4d.  Dispense: 4 tablet; Refill: 0

## 2020-01-14 ENCOUNTER — TELEPHONE (OUTPATIENT)
Dept: PHARMACY | Facility: CLINIC | Age: 65
End: 2020-01-14

## 2020-01-14 ENCOUNTER — TELEPHONE (OUTPATIENT)
Dept: DIABETES | Facility: CLINIC | Age: 65
End: 2020-01-14

## 2020-01-14 NOTE — TELEPHONE ENCOUNTER
----- Message from Jossy Matos NP sent at 1/14/2020  8:14 AM CST -----  Please let patient know A1C has increased. Follow up as scheduled.

## 2020-01-14 NOTE — TELEPHONE ENCOUNTER
Spoke with patient regarding Cosentyx refill. Patient confirmed need for refill, 0 doses on hand and next dose due . Shipment set for  for patient to receive . Address and  verified. $0 copay (004). No changes with other medications, conditions or allergies. No missed doses, patient is still taking 300mg every 4 weeks. Second level appeal still pending for Cosentyx 300mg every 2 weeks, status checked 20. No new sharps container needed at this time. No other questions or concerns today, patient is aware to let [facility] know if anything comes up.

## 2020-01-14 NOTE — TELEPHONE ENCOUNTER
Spoke with pt to let him know that his A1c has increased and to follow up as scheduled pt stated he understood his results

## 2020-01-15 ENCOUNTER — LAB VISIT (OUTPATIENT)
Dept: LAB | Facility: HOSPITAL | Age: 65
End: 2020-01-15
Attending: PHYSICIAN ASSISTANT
Payer: MEDICAID

## 2020-01-15 ENCOUNTER — OFFICE VISIT (OUTPATIENT)
Dept: RHEUMATOLOGY | Facility: CLINIC | Age: 65
End: 2020-01-15
Payer: MEDICAID

## 2020-01-15 ENCOUNTER — DOCUMENTATION ONLY (OUTPATIENT)
Dept: RHEUMATOLOGY | Facility: CLINIC | Age: 65
End: 2020-01-15

## 2020-01-15 VITALS
DIASTOLIC BLOOD PRESSURE: 71 MMHG | BODY MASS INDEX: 41.23 KG/M2 | WEIGHT: 271.19 LBS | HEART RATE: 79 BPM | SYSTOLIC BLOOD PRESSURE: 117 MMHG

## 2020-01-15 DIAGNOSIS — L40.52 PSORIATIC ARTHRITIS, DESTRUCTIVE TYPE: Primary | ICD-10-CM

## 2020-01-15 DIAGNOSIS — L40.52 PSORIATIC ARTHRITIS, DESTRUCTIVE TYPE: ICD-10-CM

## 2020-01-15 DIAGNOSIS — Z79.60 LONG-TERM USE OF IMMUNOSUPPRESSANT MEDICATION: ICD-10-CM

## 2020-01-15 DIAGNOSIS — R74.8 ELEVATED LIVER ENZYMES: ICD-10-CM

## 2020-01-15 DIAGNOSIS — D84.9 IMMUNOSUPPRESSED STATUS: ICD-10-CM

## 2020-01-15 DIAGNOSIS — L40.9 PSORIASIS: ICD-10-CM

## 2020-01-15 LAB
ALBUMIN SERPL BCP-MCNC: 3.4 G/DL (ref 3.5–5.2)
ALP SERPL-CCNC: 79 U/L (ref 55–135)
ALT SERPL W/O P-5'-P-CCNC: 50 U/L (ref 10–44)
ANION GAP SERPL CALC-SCNC: 10 MMOL/L (ref 8–16)
AST SERPL-CCNC: 40 U/L (ref 10–40)
BASOPHILS # BLD AUTO: 0.05 K/UL (ref 0–0.2)
BASOPHILS NFR BLD: 0.7 % (ref 0–1.9)
BILIRUB SERPL-MCNC: 0.4 MG/DL (ref 0.1–1)
BUN SERPL-MCNC: 10 MG/DL (ref 8–23)
CALCIUM SERPL-MCNC: 9.3 MG/DL (ref 8.7–10.5)
CHLORIDE SERPL-SCNC: 102 MMOL/L (ref 95–110)
CO2 SERPL-SCNC: 27 MMOL/L (ref 23–29)
CREAT SERPL-MCNC: 1.1 MG/DL (ref 0.5–1.4)
CRP SERPL-MCNC: 5.4 MG/L (ref 0–8.2)
DIFFERENTIAL METHOD: ABNORMAL
EOSINOPHIL # BLD AUTO: 0.5 K/UL (ref 0–0.5)
EOSINOPHIL NFR BLD: 6.4 % (ref 0–8)
ERYTHROCYTE [DISTWIDTH] IN BLOOD BY AUTOMATED COUNT: 15.1 % (ref 11.5–14.5)
ERYTHROCYTE [SEDIMENTATION RATE] IN BLOOD BY WESTERGREN METHOD: 48 MM/HR (ref 0–23)
EST. GFR  (AFRICAN AMERICAN): >60 ML/MIN/1.73 M^2
EST. GFR  (NON AFRICAN AMERICAN): >60 ML/MIN/1.73 M^2
GLUCOSE SERPL-MCNC: 162 MG/DL (ref 70–110)
HCT VFR BLD AUTO: 46.9 % (ref 40–54)
HGB BLD-MCNC: 15 G/DL (ref 14–18)
IMM GRANULOCYTES # BLD AUTO: 0.02 K/UL (ref 0–0.04)
IMM GRANULOCYTES NFR BLD AUTO: 0.3 % (ref 0–0.5)
LYMPHOCYTES # BLD AUTO: 2.2 K/UL (ref 1–4.8)
LYMPHOCYTES NFR BLD: 31 % (ref 18–48)
MCH RBC QN AUTO: 28.6 PG (ref 27–31)
MCHC RBC AUTO-ENTMCNC: 32 G/DL (ref 32–36)
MCV RBC AUTO: 90 FL (ref 82–98)
MONOCYTES # BLD AUTO: 0.5 K/UL (ref 0.3–1)
MONOCYTES NFR BLD: 6.4 % (ref 4–15)
NEUTROPHILS # BLD AUTO: 3.9 K/UL (ref 1.8–7.7)
NEUTROPHILS NFR BLD: 55.5 % (ref 38–73)
NRBC BLD-RTO: 0 /100 WBC
PLATELET # BLD AUTO: 273 K/UL (ref 150–350)
PMV BLD AUTO: 10.4 FL (ref 9.2–12.9)
POTASSIUM SERPL-SCNC: 4.2 MMOL/L (ref 3.5–5.1)
PROT SERPL-MCNC: 7.9 G/DL (ref 6–8.4)
RBC # BLD AUTO: 5.24 M/UL (ref 4.6–6.2)
SODIUM SERPL-SCNC: 139 MMOL/L (ref 136–145)
WBC # BLD AUTO: 7.03 K/UL (ref 3.9–12.7)

## 2020-01-15 PROCEDURE — 99214 PR OFFICE/OUTPT VISIT, EST, LEVL IV, 30-39 MIN: ICD-10-PCS | Mod: S$PBB,,, | Performed by: PHYSICIAN ASSISTANT

## 2020-01-15 PROCEDURE — 85025 COMPLETE CBC W/AUTO DIFF WBC: CPT

## 2020-01-15 PROCEDURE — 99999 PR PBB SHADOW E&M-EST. PATIENT-LVL V: ICD-10-PCS | Mod: PBBFAC,,, | Performed by: PHYSICIAN ASSISTANT

## 2020-01-15 PROCEDURE — 85652 RBC SED RATE AUTOMATED: CPT

## 2020-01-15 PROCEDURE — 80053 COMPREHEN METABOLIC PANEL: CPT

## 2020-01-15 PROCEDURE — 36415 COLL VENOUS BLD VENIPUNCTURE: CPT

## 2020-01-15 PROCEDURE — 99214 OFFICE O/P EST MOD 30 MIN: CPT | Mod: S$PBB,,, | Performed by: PHYSICIAN ASSISTANT

## 2020-01-15 PROCEDURE — 99215 OFFICE O/P EST HI 40 MIN: CPT | Mod: PBBFAC | Performed by: PHYSICIAN ASSISTANT

## 2020-01-15 PROCEDURE — 86140 C-REACTIVE PROTEIN: CPT

## 2020-01-15 PROCEDURE — 99999 PR PBB SHADOW E&M-EST. PATIENT-LVL V: CPT | Mod: PBBFAC,,, | Performed by: PHYSICIAN ASSISTANT

## 2020-01-15 ASSESSMENT — ROUTINE ASSESSMENT OF PATIENT INDEX DATA (RAPID3): MDHAQ FUNCTION SCORE: .6

## 2020-01-15 NOTE — PATIENT INSTRUCTIONS
cosentyx 300 mg dose due 1/22/20    cosentyx 300 mg dose again 2/5/2020    Cosentyx 300 mg agaim 2/19/2020

## 2020-01-15 NOTE — PROGRESS NOTES
Called [facility] and asked to get an update on patient Rx authorization for Cosentyx.  Representative stated that they are currently waiting on a decision for the second level appeal that has been filed in the middle of December.    Requested that a message be sent to give update when approval/denial from insurance.

## 2020-01-15 NOTE — PROGRESS NOTES
Subjective:       Patient ID: Trey Shelton is a 64 y.o. male.    Chief Complaint: Psoriatic Arthritis (Pain in feet today) and Psoriasis    Trey is here for routine follow up.     He has chronic psoriatic arthritis and psoriasis.  In the past failed mtx monotherapy, failed topicals and UV   Failed humira and Enbrel, now on Cosentyx 300 mg Q month after completion of loading dose. Initially did ok but rash worse now.   Recently methotrexate dose increased from 15 to 20 mg/wk; taking  folic acid once daily.    Had topical taclonex to use but ran out; this helped some  Severe plaq psoriasis with BSA 12%- complicate by Vitiligo   Working with  dermatology also for psoriasis as well as vitiligo.   Using topical faithfully now    He has been compliant with all his meds    We discussed changing his dose up to 300 mg Q 14 days due to info that came out with optimize study which showed some pts w/body wt>90 kg had difficulty achieving therapeutic dose on the 300 mg/mon and may benefit from Q 14 day dosing of 300 mg  I felt mr shelton falls into this patient population   Unfortunately insurance has denied increased dose  Next Cosentyx injection of 300 mg due 1/22/2020    Of course skin is not better than last visit  Thickened indurated plaques on forearms and elbows with diffuse rash      C/w foot pain mostly. He has polyneuropathy  Rates his pain today 6/10.    He has some chronic arthritis changes to his dip joints bilaterally and chronic deformities to his feet with lateral deviation of his toes.  Using otc tylenol  Every day but only takes the tramadol 1-2 X weekly at night when his feet hurt      He has multiple lung nodules which are followed by pulm and stable per his last pulmonary visit feb 2019, his breathing is stable. He does still smoke.      Was on vit D 50K weekly but now on otc Vit D3 5000 daily                  He reports no joint swelling. Pertinent negatives include no dysuria, fatigue, fever, myalgias or  headaches.       Psoriasis   Associated symptoms include arthralgias and a rash. Pertinent negatives include no abdominal pain, chest pain, chills, congestion, coughing, fatigue, fever, headaches, joint swelling, myalgias, nausea, vomiting or weakness.     Review of Systems   Constitutional: Negative for chills, fatigue, fever and unexpected weight change.        Poor hygiene      HENT: Negative for congestion, mouth sores and rhinorrhea.    Eyes: Negative for pain, discharge and redness.   Respiratory: Negative for cough, shortness of breath and wheezing.    Cardiovascular: Negative for chest pain and leg swelling.   Gastrointestinal: Negative for abdominal pain, diarrhea, nausea and vomiting.   Endocrine: Negative for cold intolerance and heat intolerance.   Genitourinary: Negative for dysuria.   Musculoskeletal: Positive for arthralgias. Negative for joint swelling and myalgias.   Skin: Positive for color change and rash.   Allergic/Immunologic: Negative for immunocompromised state.   Neurological: Negative for weakness and headaches.        Diabetic neuropathy   Psychiatric/Behavioral: The patient is not nervous/anxious.          Objective:   /71   Pulse 79   Wt 123 kg (271 lb 2.7 oz)   BMI 41.23 kg/m²      Physical Exam   Constitutional: He is oriented to person, place, and time and well-developed, well-nourished, and in no distress.   HENT:   Head: Normocephalic and atraumatic.   Eyes: Pupils are equal, round, and reactive to light. Right eye exhibits no discharge.   Neck: Normal range of motion.   Cardiovascular: Normal rate, regular rhythm and normal heart sounds.  Exam reveals no friction rub.    Pulmonary/Chest: Effort normal and breath sounds normal. No respiratory distress.   Abdominal: Soft. He exhibits no distension. There is no tenderness.   Lymphadenopathy:     He has no cervical adenopathy.   Neurological: He is alert and oriented to person, place, and time.   Skin: Rash noted. No erythema.           Psoriasis rash bilateral elbows and small patch left hip    Vitiligo bilateral forearms       Psychiatric: Mood normal.   Musculoskeletal: Normal range of motion. He exhibits edema and deformity.   Bilateral wrists, mcps no synovitis good rom   nestor dips with chronic damage, bony enlargement     nestor Right 5th dip flexion deformity,   Left thumb no flexion to the IP joint, right thumb ip joint motion decreased     good rom to nestor ankles, no swelling  nestor feet very dirty, 2-5 toes deviate laterally.  Mild lower ext edema                   Immunization History   Administered Date(s) Administered    Hepatitis A, Adult 12/20/2018    Influenza - High Dose - PF (65 years and older) 10/18/2018    Influenza - Quadrivalent 10/24/2016    Influenza - Quadrivalent - PF (6 months and older) 10/30/2017, 11/07/2019    PPD Test 08/05/2013    Pneumococcal Conjugate - 13 Valent 02/24/2016    Pneumococcal Polysaccharide - 23 Valent 04/09/2013    Tdap 02/24/2016             Recent Results (from the past 168 hour(s))   Hemoglobin A1c    Collection Time: 01/13/20  9:41 AM   Result Value Ref Range    Hemoglobin A1C 7.5 (H) 4.0 - 5.6 %    Estimated Avg Glucose 169 (H) 68 - 131 mg/dL   CBC auto differential    Collection Time: 01/15/20 11:58 AM   Result Value Ref Range    WBC 7.03 3.90 - 12.70 K/uL    RBC 5.24 4.60 - 6.20 M/uL    Hemoglobin 15.0 14.0 - 18.0 g/dL    Hematocrit 46.9 40.0 - 54.0 %    Mean Corpuscular Volume 90 82 - 98 fL    Mean Corpuscular Hemoglobin 28.6 27.0 - 31.0 pg    Mean Corpuscular Hemoglobin Conc 32.0 32.0 - 36.0 g/dL    RDW 15.1 (H) 11.5 - 14.5 %    Platelets 273 150 - 350 K/uL    MPV 10.4 9.2 - 12.9 fL    Immature Granulocytes 0.3 0.0 - 0.5 %    Gran # (ANC) 3.9 1.8 - 7.7 K/uL    Immature Grans (Abs) 0.02 0.00 - 0.04 K/uL    Lymph # 2.2 1.0 - 4.8 K/uL    Mono # 0.5 0.3 - 1.0 K/uL    Eos # 0.5 0.0 - 0.5 K/uL    Baso # 0.05 0.00 - 0.20 K/uL    nRBC 0 0 /100 WBC    Gran% 55.5 38.0 - 73.0 %    Lymph%  31.0 18.0 - 48.0 %    Mono% 6.4 4.0 - 15.0 %    Eosinophil% 6.4 0.0 - 8.0 %    Basophil% 0.7 0.0 - 1.9 %    Differential Method Automated    Comprehensive metabolic panel    Collection Time: 01/15/20 11:58 AM   Result Value Ref Range    Sodium 139 136 - 145 mmol/L    Potassium 4.2 3.5 - 5.1 mmol/L    Chloride 102 95 - 110 mmol/L    CO2 27 23 - 29 mmol/L    Glucose 162 (H) 70 - 110 mg/dL    BUN, Bld 10 8 - 23 mg/dL    Creatinine 1.1 0.5 - 1.4 mg/dL    Calcium 9.3 8.7 - 10.5 mg/dL    Total Protein 7.9 6.0 - 8.4 g/dL    Albumin 3.4 (L) 3.5 - 5.2 g/dL    Total Bilirubin 0.4 0.1 - 1.0 mg/dL    Alkaline Phosphatase 79 55 - 135 U/L    AST 40 10 - 40 U/L    ALT 50 (H) 10 - 44 U/L    Anion Gap 10 8 - 16 mmol/L    eGFR if African American >60 >60 mL/min/1.73 m^2    eGFR if non African American >60 >60 mL/min/1.73 m^2   C-reactive protein    Collection Time: 01/15/20 11:58 AM   Result Value Ref Range    CRP 5.4 0.0 - 8.2 mg/L          Ref. Range 2018 11:52   Vit D, 25-Hydroxy Latest Ref Range: 30 - 96 ng/mL 29 (L)          Ref. Range 2018 11:13 2019 12:13   Hep A IgM Unknown Negative    Hep B C IgM Unknown Negative    Hepatitis B Surface Ag Unknown Negative    Hepatitis C Ab Unknown Negative Negative   Mitogen - Nil Latest Ref Range: See text IU/mL >10.000    NIL Latest Ref Range: See text IU/mL 0.034    TB Antigen Latest Ref Range: See text IU/mL 0.115    TB Antigen - Nil Latest Ref Range: See Text IU/mL 0.081    TB Gold Unknown Negative    HIV 1/2 Ag/Ab Latest Ref Range: Negative   Negative            19 Pulmonary function tests: FEV1: 1.52  (46.1 % predicted), FVC:  2.19 (51.2 % predicted), FEV1/FVC:70, T.36 (64.6 % predicted), RV/TLVC: 50 (131.6 % predicted), DLCO: 16.22 (59.3 % predicted)   Severe mixed obstruction with restriction. Diffusion capacity is moderately reduced but corrects for alveolar volume      18 Chest x-ray     COMPARISON:  2018    FINDINGS:  Cardiac silhouette and  mediastinal contours are stable.  Lungs improved aeration of the lung bases with minimal interstitial changes remaining which may be chronic.  Osseous structures are intact.   Impression       Improved basilar aeration.       10/30/17 nestor hand and foot x-ray- damage in both hand and feet consistent with PsA   4/2016 XR hands reviewed: psoriatic arthritis changes noted to dip joints bilaterally, nestor thumb IP joints        Assessment:       1. Psoriatic arthritis, destructive type    2. Long-term use of immunosuppressant medication    3. Psoriasis    4. Immunosuppressed status    5. Elevated liver enzymes        1. Chronic refractory Psoriatic arthritis and skin  Psoriasis:  Now On Cosentyx maintenance dose 300 mg/month after completing loading dose    msk stable; Mild foot/ankle involvement   skin not controlled today BSA 15%    on background mtx 20 mg week split dose-  In the past   Failed mtx monotherapy; failed enbrel and  humira   Failed topicals and UV trt       2. High Risk Medication monitoring: no toxicity from  mtx or Cosentyx      3. Immunocompromised: utd, except zoster, possible verve study?-    4. Vit D deficiency: completed vit D 50,000U/2Xweekly, last level low normal 26 (2/2018) now on otc Vit D3 5000 twice weekly     5. Chronic lung nodules- stable monitored by pulmonology- will see again feb 2020    6. tob use- smoking     7. Mild elevated LFT - will follow       Plan:         C./w topical bid     C/w mtx 20 mg Q week, split dose  Folic acid once a day    Will try and secure extra dosing for trial of 6 weeks dose of cosentyx 300 mg Q 14 days  Able to get [facility] in [facility] for samples to trial increased dosing     Increase cosentyx up to 300 mg Q 14 days  See study details  Pt body weight 121 kg, may need higher dose to reach therapeutic drug concentration   Skin not controlled    Https://www.ncbi.nlm.nih.gov/pubmed/24172838    Optimise StudyCONCLUSIONS:  Standard q4w dosing of  secukinumab 300 mg is the optimal dosing regimen to achieve and maintain clear or almost clear skin. Patients with body weight ? 90 kg not achieving PASI 90 at week 24 may benefit from the q2w dosing regimen. What's already known about this topic? Individual responses to biologics in patients with psoriasis vary considerably and there may be a need to individualize treatment. Dose optimization strategies of currently available biologic drugs have been investigated mainly in rheumatic disorders. Secukinumab has shown long-term PASI 90/100 responses (percentage improvement in Psoriasis Area and Severity Index) to year 5 in patients with moderate-to-severe plaque psoriasis when used at the dose of 300 mg every 4 weeks. What does this study add? Standard every 4 week (q4w) dosing of secukinumab 300 mg is the optimal regimen to achieve and maintain clear or almost clear skin at week 52; the majority of the patients (85·7%) maintain PASI 90 at week 52. Superiority of intensified (q2w) dosing over the q4w regimen could not be claimed. However, patients with a higher body weight (? 90 kg) not achieving PASI 90 response at week 24 may benefit from q2w dosing.    Always remember to Hold mtx and Cosentyx  for signs of infection or for surgery   Pt verbalized understanding      TB gold and acute hep panel done 6/2018- neg, no need to repeat yet  Do every 1-2 years while on biologics    C/w otc vit D3 to 5K every day, check d level Q year     Return in 7 weeks   With labs cbc, cmp, esr and crp      Repeat  xrays hands and feet in 1 year    Counseled pt on smoking cessation    Please call or send portal message with any questions or concerns

## 2020-01-16 ENCOUNTER — TELEPHONE (OUTPATIENT)
Dept: DIABETES | Facility: CLINIC | Age: 65
End: 2020-01-16

## 2020-01-16 NOTE — TELEPHONE ENCOUNTER
----- Message from Jessica Montes sent at 1/16/2020  9:41 AM CST -----  Contact: [phone]  Pt states he is unable to make today's appt, would like to be worked in for an appt on tomorrow. Please call back at [phone].  Md Maren

## 2020-01-22 ENCOUNTER — TELEPHONE (OUTPATIENT)
Dept: RHEUMATOLOGY | Facility: CLINIC | Age: 65
End: 2020-01-22

## 2020-01-22 NOTE — TELEPHONE ENCOUNTER
Spoke to patient and informed him that we need him to sign the [employer] PAP application ASAP.  Patient stated he would come up to the clinic tomorrow to sign the paper work.

## 2020-01-27 ENCOUNTER — OFFICE VISIT (OUTPATIENT)
Dept: DIABETES | Facility: CLINIC | Age: 65
End: 2020-01-27
Payer: MEDICAID

## 2020-01-27 ENCOUNTER — OFFICE VISIT (OUTPATIENT)
Dept: FAMILY MEDICINE | Facility: CLINIC | Age: 65
End: 2020-01-27
Payer: MEDICAID

## 2020-01-27 VITALS
BODY MASS INDEX: 40.76 KG/M2 | DIASTOLIC BLOOD PRESSURE: 70 MMHG | SYSTOLIC BLOOD PRESSURE: 132 MMHG | WEIGHT: 268.94 LBS | HEIGHT: 68 IN

## 2020-01-27 VITALS
BODY MASS INDEX: 40.63 KG/M2 | OXYGEN SATURATION: 96 % | HEART RATE: 97 BPM | WEIGHT: 268.06 LBS | DIASTOLIC BLOOD PRESSURE: 62 MMHG | TEMPERATURE: 98 F | SYSTOLIC BLOOD PRESSURE: 128 MMHG | HEIGHT: 68 IN

## 2020-01-27 DIAGNOSIS — E11.21 TYPE 2 DIABETES MELLITUS WITH NEPHROPATHY: Primary | Chronic | ICD-10-CM

## 2020-01-27 DIAGNOSIS — J44.9 MIXED TYPE COPD (CHRONIC OBSTRUCTIVE PULMONARY DISEASE): ICD-10-CM

## 2020-01-27 DIAGNOSIS — I15.2 HYPERTENSION ASSOCIATED WITH DIABETES: Chronic | ICD-10-CM

## 2020-01-27 DIAGNOSIS — E11.59 HYPERTENSION ASSOCIATED WITH DIABETES: Chronic | ICD-10-CM

## 2020-01-27 DIAGNOSIS — E78.5 HYPERLIPIDEMIA ASSOCIATED WITH TYPE 2 DIABETES MELLITUS: ICD-10-CM

## 2020-01-27 DIAGNOSIS — E11.69 HYPERLIPIDEMIA ASSOCIATED WITH TYPE 2 DIABETES MELLITUS: ICD-10-CM

## 2020-01-27 DIAGNOSIS — R74.8 ELEVATED LIVER ENZYMES: ICD-10-CM

## 2020-01-27 DIAGNOSIS — E66.01 SEVERE OBESITY (BMI 35.0-35.9 WITH COMORBIDITY): ICD-10-CM

## 2020-01-27 DIAGNOSIS — J01.00 ACUTE NON-RECURRENT MAXILLARY SINUSITIS: ICD-10-CM

## 2020-01-27 DIAGNOSIS — Z29.9 PREVENTIVE MEASURE: ICD-10-CM

## 2020-01-27 DIAGNOSIS — E11.9 TYPE 2 DIABETES MELLITUS WITH HEMOGLOBIN A1C GOAL OF LESS THAN 7.0%: Primary | ICD-10-CM

## 2020-01-27 DIAGNOSIS — L40.9 PSORIASIS: ICD-10-CM

## 2020-01-27 DIAGNOSIS — L80 VITILIGO: ICD-10-CM

## 2020-01-27 DIAGNOSIS — D84.9 IMMUNOSUPPRESSED STATUS: ICD-10-CM

## 2020-01-27 LAB — GLUCOSE SERPL-MCNC: 107 MG/DL (ref 70–110)

## 2020-01-27 PROCEDURE — 99999 PR PBB SHADOW E&M-EST. PATIENT-LVL III: CPT | Mod: PBBFAC,,, | Performed by: INTERNAL MEDICINE

## 2020-01-27 PROCEDURE — 82962 GLUCOSE BLOOD TEST: CPT | Mod: PBBFAC | Performed by: NURSE PRACTITIONER

## 2020-01-27 PROCEDURE — 99999 PR PBB SHADOW E&M-EST. PATIENT-LVL III: ICD-10-PCS | Mod: PBBFAC,,, | Performed by: NURSE PRACTITIONER

## 2020-01-27 PROCEDURE — 95250 CONT GLUC MNTR PHYS/QHP EQP: CPT | Mod: PBBFAC | Performed by: NURSE PRACTITIONER

## 2020-01-27 PROCEDURE — 99214 PR OFFICE/OUTPT VISIT, EST, LEVL IV, 30-39 MIN: ICD-10-PCS | Mod: S$PBB,,, | Performed by: INTERNAL MEDICINE

## 2020-01-27 PROCEDURE — 99999 PR PBB SHADOW E&M-EST. PATIENT-LVL III: ICD-10-PCS | Mod: PBBFAC,,, | Performed by: INTERNAL MEDICINE

## 2020-01-27 PROCEDURE — 99213 OFFICE O/P EST LOW 20 MIN: CPT | Mod: PBBFAC,27,PO | Performed by: INTERNAL MEDICINE

## 2020-01-27 PROCEDURE — 99214 OFFICE O/P EST MOD 30 MIN: CPT | Mod: 25,S$PBB,, | Performed by: NURSE PRACTITIONER

## 2020-01-27 PROCEDURE — 99999 PR PBB SHADOW E&M-EST. PATIENT-LVL III: CPT | Mod: PBBFAC,,, | Performed by: NURSE PRACTITIONER

## 2020-01-27 PROCEDURE — 99214 OFFICE O/P EST MOD 30 MIN: CPT | Mod: S$PBB,,, | Performed by: INTERNAL MEDICINE

## 2020-01-27 PROCEDURE — 99213 OFFICE O/P EST LOW 20 MIN: CPT | Mod: PBBFAC | Performed by: NURSE PRACTITIONER

## 2020-01-27 PROCEDURE — 99214 PR OFFICE/OUTPT VISIT, EST, LEVL IV, 30-39 MIN: ICD-10-PCS | Mod: 25,S$PBB,, | Performed by: NURSE PRACTITIONER

## 2020-01-27 RX ORDER — PREDNISONE 20 MG/1
TABLET ORAL
Qty: 4 TABLET | Refills: 0 | Status: SHIPPED | OUTPATIENT
Start: 2020-01-27 | End: 2020-06-02

## 2020-01-27 RX ORDER — AZITHROMYCIN 500 MG/1
500 TABLET, FILM COATED ORAL DAILY
Qty: 5 TABLET | Refills: 0 | Status: SHIPPED | OUTPATIENT
Start: 2020-01-27 | End: 2020-02-01

## 2020-01-27 RX ORDER — METHOTREXATE 2.5 MG/1
TABLET ORAL
COMMUNITY
Start: 2020-01-12 | End: 2020-03-04 | Stop reason: SDUPTHER

## 2020-01-27 NOTE — PROGRESS NOTES
"Subjective:         Patient ID: Trey Stevens is a 64 y.o. male.  Patient's current PCP is Brittanie Kaba MD.       Chief Complaint: Diabetes Mellitus    HPI  Trey Stevens is a 64 y.o. White male presenting for follow up for diabetes. Patient has been diagnosed with diabetes for over three years and has the following complications associated with diabetes: peripheral neuropathy.   Lantus was initiated at last visit. Blood glucose testing is performed regularly. In the past 1 week patient reports blood glucose values to have approximately ranged from 77-217s fasting. Patient did not bring log to review today. Patient is fully compliant with medications.     He denies any recent hospital admissions, emergency room visits, hypoglycemia.      Height: 5' 8" (172.7 cm)  //  Weight: 122 kg (268 lb 15.4 oz), Body mass index is 40.9 kg/m².    His blood sugar in clinic today is: See Labs      Labs reviewed and are noted below.    His most recent A1C is:   Lab Results   Component Value Date    HGBA1C 7.5 (H) 01/13/2020     No results found for: CPEPTIDE  No results found for: GLUTAMICACID      CURRENT DM MEDICATIONS:   Metformin XR 1000mg BID  Jardiance 25 mg once daily  Victoza 1.8 mg once daily  Lantus 10 units daily      Health Maintenance   Topic Date Due    Eye Exam  09/05/2019    Foot Exam  03/21/2020    Hemoglobin A1c  07/13/2020    Lipid Panel  08/06/2020    Low Dose Statin  01/15/2021    TETANUS VACCINE  02/24/2026    Colonoscopy  10/24/2026    Hepatitis C Screening  Completed    Pneumococcal Vaccine (Medium Risk)  Completed       STANDARDS OF CARE:  Current Ophthalmologist/Optometrist: Dr. CHINTAN Germain  Current Podiatrist: JAMES  ACE/ARB: No  Statin: Yes  He  is to be enrolled in diabetes education classes.     LIFESTYLE:  BLOOD GLUCOSE TESTING: Patient reports testing 1-2 times per day.      Review of Systems   Constitutional: Negative for activity change, appetite change and fatigue.   Eyes: Negative for " visual disturbance.   Gastrointestinal: Negative for constipation, diarrhea and nausea.   Endocrine: Negative for polydipsia, polyphagia and polyuria.         Objective:      Physical Exam   Constitutional: He is oriented to person, place, and time. He appears well-developed and well-nourished.   HENT:   Head: Normocephalic and atraumatic.   Cardiovascular: Normal rate.   Pulmonary/Chest: Effort normal.   Neurological: He is alert and oriented to person, place, and time.   Skin: Skin is warm and dry.   Psychiatric: He has a normal mood and affect. His behavior is normal. Judgment and thought content normal.   Nursing note and vitals reviewed.      Assessment:       1. Type 2 diabetes mellitus with hemoglobin A1c goal of less than 7.0%    2. Severe obesity (BMI 35.0-35.9 with comorbidity)    3. Hyperlipidemia associated with type 2 diabetes mellitus    4. Hypertension associated with diabetes        Plan:   Type 2 diabetes mellitus with hemoglobin A1c goal of less than 7.0%  -     POCT Glucose, Hand-Held Device  -     GLUCOSE MONITORING CONTINUOUS MIN 72 HOURS; Future    - Last A1C increased. With absence of log, I would like to use CGMS for further assessment.  DM education reviewed. Patient encouraged to carb count and exercise per recommendations. Labs and Referrals as noted. Patient instructed to send in log weekly for review and potential medication adjustments. RV scheduled in 1 week.    Severe obesity (BMI 35.0-35.9 with comorbidity)    - DM diet discussed.    Hyperlipidemia associated with type 2 diabetes mellitus    - LDL not at goal of less than 100. Pravastatin was previously increased. New lipid panel necessary to evaluate improvement.     Hypertension associated with diabetes    - Stable        Additional Plan Details:    1.) Patient was instructed to monitor blood glucose 2 - 3 x daily, fasting and ac dinner or at bedtime. Discussed ADA goal for fasting blood sugar, 80 - 130mg/dL; pp blood sugars below  180 mg/dl. Also, discussed prevention of hypoglycemia and the need to adjust goals to higher levels if persistent hypoglycemia.  Reminded to bring BG records or meter to each visit for review.    2.) The patient was explained the above plan and given opportunity to ask questions.  He understands, chooses and consents to this plan and accepts all the risks, which include but are not limited to the risks mentioned above. He understands the alternative of having no testing, interventions or treatments at this time. He left content and without further questions.     A total of 30 minutes was spent in face to face time, of which over 50% was spent in counseling patient on disease process, complications, treatment, and side effects of medications.        YASMANY GrimmC

## 2020-01-28 NOTE — PROGRESS NOTES
Patient is here in clinic today for placement of continuous glucose monitoring sensor CGMS. Site is at the back of his upper right arm. Site was cleaned and sensor was placed and stared. Patient didn't ask any further questions regarding sensor placement. Patient was instructed to continue all daily activities such as shower and also check blood glucose levels are instructed. Patient was also informed to avoid any imaging procedures and acetaminophen during his  procedure. Patient voiced understanding of all instructions given. Patient is schedule to come back in one weeks for removal of sensor.

## 2020-02-06 ENCOUNTER — TELEPHONE (OUTPATIENT)
Dept: DIABETES | Facility: CLINIC | Age: 65
End: 2020-02-06

## 2020-02-06 ENCOUNTER — OFFICE VISIT (OUTPATIENT)
Dept: OPHTHALMOLOGY | Facility: CLINIC | Age: 65
End: 2020-02-06
Payer: MEDICAID

## 2020-02-06 ENCOUNTER — OFFICE VISIT (OUTPATIENT)
Dept: DIABETES | Facility: CLINIC | Age: 65
End: 2020-02-06
Payer: MEDICAID

## 2020-02-06 VITALS
WEIGHT: 263.69 LBS | HEIGHT: 68 IN | SYSTOLIC BLOOD PRESSURE: 121 MMHG | BODY MASS INDEX: 39.96 KG/M2 | DIASTOLIC BLOOD PRESSURE: 76 MMHG

## 2020-02-06 DIAGNOSIS — E11.59 HYPERTENSION ASSOCIATED WITH DIABETES: ICD-10-CM

## 2020-02-06 DIAGNOSIS — H52.4 PRESBYOPIA: ICD-10-CM

## 2020-02-06 DIAGNOSIS — E11.9 TYPE 2 DIABETES MELLITUS WITH HEMOGLOBIN A1C GOAL OF LESS THAN 7.0%: Primary | ICD-10-CM

## 2020-02-06 DIAGNOSIS — E78.5 HYPERLIPIDEMIA ASSOCIATED WITH TYPE 2 DIABETES MELLITUS: ICD-10-CM

## 2020-02-06 DIAGNOSIS — H25.13 CATARACT, NUCLEAR SCLEROTIC SENILE, BILATERAL: ICD-10-CM

## 2020-02-06 DIAGNOSIS — I15.2 HYPERTENSION ASSOCIATED WITH DIABETES: ICD-10-CM

## 2020-02-06 DIAGNOSIS — I15.2 HYPERTENSION ASSOCIATED WITH DIABETES: Chronic | ICD-10-CM

## 2020-02-06 DIAGNOSIS — Z13.5 GLAUCOMA SCREENING: ICD-10-CM

## 2020-02-06 DIAGNOSIS — E66.01 SEVERE OBESITY (BMI 35.0-35.9 WITH COMORBIDITY): ICD-10-CM

## 2020-02-06 DIAGNOSIS — E11.69 HYPERLIPIDEMIA ASSOCIATED WITH TYPE 2 DIABETES MELLITUS: ICD-10-CM

## 2020-02-06 DIAGNOSIS — E11.59 HYPERTENSION ASSOCIATED WITH DIABETES: Chronic | ICD-10-CM

## 2020-02-06 DIAGNOSIS — E11.9 DIABETES MELLITUS TYPE 2 WITHOUT RETINOPATHY: Primary | ICD-10-CM

## 2020-02-06 DIAGNOSIS — H52.13 MYOPIA OF BOTH EYES: ICD-10-CM

## 2020-02-06 LAB — GLUCOSE SERPL-MCNC: 59 MG/DL (ref 70–110)

## 2020-02-06 PROCEDURE — 92014 COMPRE OPH EXAM EST PT 1/>: CPT | Mod: S$PBB,,, | Performed by: OPTOMETRIST

## 2020-02-06 PROCEDURE — 99212 OFFICE O/P EST SF 10 MIN: CPT | Mod: PBBFAC | Performed by: OPTOMETRIST

## 2020-02-06 PROCEDURE — 82962 GLUCOSE BLOOD TEST: CPT | Mod: PBBFAC | Performed by: NURSE PRACTITIONER

## 2020-02-06 PROCEDURE — 99213 OFFICE O/P EST LOW 20 MIN: CPT | Mod: PBBFAC,27,25 | Performed by: NURSE PRACTITIONER

## 2020-02-06 PROCEDURE — 99999 PR PBB SHADOW E&M-EST. PATIENT-LVL III: ICD-10-PCS | Mod: PBBFAC,,, | Performed by: NURSE PRACTITIONER

## 2020-02-06 PROCEDURE — 92014 PR EYE EXAM, EST PATIENT,COMPREHESV: ICD-10-PCS | Mod: S$PBB,,, | Performed by: OPTOMETRIST

## 2020-02-06 PROCEDURE — 99214 PR OFFICE/OUTPT VISIT, EST, LEVL IV, 30-39 MIN: ICD-10-PCS | Mod: S$PBB,,, | Performed by: NURSE PRACTITIONER

## 2020-02-06 PROCEDURE — 92015 DETERMINE REFRACTIVE STATE: CPT | Mod: ,,, | Performed by: OPTOMETRIST

## 2020-02-06 PROCEDURE — 99999 PR PBB SHADOW E&M-EST. PATIENT-LVL II: CPT | Mod: PBBFAC,,, | Performed by: OPTOMETRIST

## 2020-02-06 PROCEDURE — 99214 OFFICE O/P EST MOD 30 MIN: CPT | Mod: S$PBB,,, | Performed by: NURSE PRACTITIONER

## 2020-02-06 PROCEDURE — 99999 PR PBB SHADOW E&M-EST. PATIENT-LVL III: CPT | Mod: PBBFAC,,, | Performed by: NURSE PRACTITIONER

## 2020-02-06 PROCEDURE — 99999 PR PBB SHADOW E&M-EST. PATIENT-LVL II: ICD-10-PCS | Mod: PBBFAC,,, | Performed by: OPTOMETRIST

## 2020-02-06 PROCEDURE — 92015 PR REFRACTION: ICD-10-PCS | Mod: ,,, | Performed by: OPTOMETRIST

## 2020-02-06 NOTE — TELEPHONE ENCOUNTER
Called pt to see what his current blood sugars was pt didn't answer so I left him a message to give me a call back

## 2020-02-06 NOTE — PATIENT INSTRUCTIONS
CONTINUE CURRENT REGIMEN    DROP OFF CONTINUOUS GLUCOSE MONITOR      IF YOU ARE HAPPY WITH YOUR VISIT TODAY, PLEASE FILL OUT THE SURVEY THAT MAY BE SENT TO YOUR EMAIL ADDRESS OR MAILBOX FOLLOWING THIS VISIT. WE LOVE TO HEAR YOUR COMMENTS! HAVE A WONDERFUL DAY!

## 2020-02-06 NOTE — PROGRESS NOTES
HPI     Last [facility] exam 09/05/2018  Diabetic/IDDM  Lab Results       Component                Value               Date                       HGBA1C                   7.5 (H)             01/13/2020     Cataract, nuclear sclerotic senile, bilateral  Screening for glaucoma  RE        Decreased distance vision        Last edited by Joel Moore MA on 2/6/2020 10:06 AM. (History)            Assessment /Plan     For exam results, see Encounter Report.    Diabetes mellitus type 2 without retinopathy    Cataract, nuclear sclerotic senile, bilateral    Glaucoma screening    Myopia of both eyes    Presbyopia    Hypertension associated with diabetes      No diabetic retinopathy OU.  Moderate NS cataracts OU = discussed CE option versus waiting.  He is to decide.  OH OK OU otherwise.  Spec Rx given.  RTC for CE evaluation prn or one year.

## 2020-02-06 NOTE — TELEPHONE ENCOUNTER
----- Message from Whit Vigil sent at 2/6/2020  3:50 PM CST -----  Contact: patient  Type:  Patient Returning Call    Who Called:patient  Who Left Message for Patient:angela  Does the patient know what this is regarding?:yes  Would the patient rather a call back or a response via [facility]? Call back  Best Call Back Number:[phone]  Additional Information: n/a

## 2020-02-06 NOTE — TELEPHONE ENCOUNTER
Called pt to see what his current blood sugars was pt sated his blood sugar was 163 I told pt to make sure he continues to check daily as instructed and take all meds as instructed

## 2020-02-06 NOTE — PROGRESS NOTES
"Subjective:         Patient ID: Trey Stevens is a 64 y.o. male.  Patient's current PCP is Brittanie Kaba MD.       Chief Complaint: Diabetes Mellitus    HPI  Trey Stevens is a 64 y.o. White male presenting for follow up for diabetes. Patient has been diagnosed with diabetes for over three years and has the following complications associated with diabetes: peripheral neuropathy.   Lantus was recently initiated. Continuous glucose monitor was placed at last visit. Patient was to return today with the device. He has forgotten it at home. However, he did bring his log. Blood glucose testing is performed regularly. In the past 1 week patient reports blood glucose values to have approximately ranged from 90-150s fasting, less than 200 PP.  Patient is fully compliant with medications.     He denies any recent hospital admissions, emergency room visits, hypoglycemia.      Height: 5' 8" (172.7 cm)  //  Weight: 119.6 kg (263 lb 10.7 oz), Body mass index is 40.09 kg/m².    His blood sugar in clinic today is:   Lab Results   Component Value Date    POCGLU 59 (A) 02/06/2020   30 G Carbohydrates given  Staff did not follow order to recheck glucose at end of visit, but we will contact patient to confirm normoglycemia.         Labs reviewed and are noted below.    His most recent A1C is:   Lab Results   Component Value Date    HGBA1C 7.5 (H) 01/13/2020     No results found for: CPEPTIDE  No results found for: GLUTAMICACID      CURRENT DM MEDICATIONS:   Metformin XR 1000mg BID  Jardiance 25 mg once daily  Victoza 1.8 mg once daily  Lantus 10 units daily      Health Maintenance   Topic Date Due    Foot Exam  03/21/2020    Hemoglobin A1c  07/13/2020    Lipid Panel  08/06/2020    Low Dose Statin  02/06/2021    Eye Exam  02/06/2021    TETANUS VACCINE  02/24/2026    Colonoscopy  10/24/2026    Hepatitis C Screening  Completed    Pneumococcal Vaccine (Medium Risk)  Completed       STANDARDS OF CARE:  Current " Ophthalmologist/Optometrist: Dr. CHINTAN Germain  Current Podiatrist: Dr. Hadley  ACE/ARB: Yes  Statin: Yes  He  is to be enrolled in diabetes education classes.     LIFESTYLE:  BLOOD GLUCOSE TESTING: Patient reports testing 1-2 times per day.      Review of Systems   Constitutional: Negative for activity change, appetite change and fatigue.   Eyes: Negative for visual disturbance.   Gastrointestinal: Negative for constipation, diarrhea and nausea.   Endocrine: Negative for polydipsia, polyphagia and polyuria.         Objective:      Physical Exam   Constitutional: He is oriented to person, place, and time. He appears well-developed and well-nourished.   HENT:   Head: Normocephalic and atraumatic.   Cardiovascular: Normal rate.   Pulmonary/Chest: Effort normal.   Neurological: He is alert and oriented to person, place, and time.   Skin: Skin is warm and dry.   Psychiatric: He has a normal mood and affect. His behavior is normal. Judgment and thought content normal.   Nursing note and vitals reviewed.      Assessment:       1. Type 2 diabetes mellitus with hemoglobin A1c goal of less than 7.0%    2. Severe obesity (BMI 35.0-35.9 with comorbidity)    3. Hypertension associated with diabetes    4. Hyperlipidemia associated with type 2 diabetes mellitus        Plan:   Type 2 diabetes mellitus with hemoglobin A1c goal of less than 7.0%  -     POCT Glucose, Hand-Held Device  -     POCT Glucose, Hand-Held Device    - Condition appears improved based on log. Patient to drop off CGMS next week for my review. DM education reviewed. Patient encouraged to carb count and exercise per recommendations. Labs and Referrals as noted. Patient instructed to send in log weekly for review and potential medication adjustments. RV scheduled in 1 week.    Severe obesity (BMI 35.0-35.9 with comorbidity)    - DM diet discussed.    Hyperlipidemia associated with type 2 diabetes mellitus    - LDL not at goal of less than 100. Pravastatin was previously  increased. New lipid panel necessary to evaluate improvement.     Hypertension associated with diabetes    - Stable        Additional Plan Details:    1.) Patient was instructed to monitor blood glucose 2 - 3 x daily, fasting and ac dinner or at bedtime. Discussed ADA goal for fasting blood sugar, 80 - 130mg/dL; pp blood sugars below 180 mg/dl. Also, discussed prevention of hypoglycemia and the need to adjust goals to higher levels if persistent hypoglycemia.  Reminded to bring BG records or meter to each visit for review.    2.) The patient was explained the above plan and given opportunity to ask questions.  He understands, chooses and consents to this plan and accepts all the risks, which include but are not limited to the risks mentioned above. He understands the alternative of having no testing, interventions or treatments at this time. He left content and without further questions.     A total of 30 minutes was spent in face to face time, of which over 50% was spent in counseling patient on disease process, complications, treatment, and side effects of medications.        RAIN Grimm

## 2020-02-12 ENCOUNTER — TELEPHONE (OUTPATIENT)
Dept: PHARMACY | Facility: CLINIC | Age: 65
End: 2020-02-12

## 2020-02-13 ENCOUNTER — CLINICAL SUPPORT (OUTPATIENT)
Dept: PULMONOLOGY | Facility: CLINIC | Age: 65
End: 2020-02-13
Payer: MEDICAID

## 2020-02-13 ENCOUNTER — OFFICE VISIT (OUTPATIENT)
Dept: PULMONOLOGY | Facility: CLINIC | Age: 65
End: 2020-02-13
Payer: MEDICAID

## 2020-02-13 ENCOUNTER — HOSPITAL ENCOUNTER (OUTPATIENT)
Dept: RADIOLOGY | Facility: HOSPITAL | Age: 65
Discharge: HOME | End: 2020-02-13
Attending: INTERNAL MEDICINE
Payer: MEDICAID

## 2020-02-13 VITALS
RESPIRATION RATE: 20 BRPM | HEIGHT: 67 IN | DIASTOLIC BLOOD PRESSURE: 88 MMHG | OXYGEN SATURATION: 94 % | SYSTOLIC BLOOD PRESSURE: 150 MMHG | WEIGHT: 268 LBS | HEART RATE: 84 BPM | BODY MASS INDEX: 42.06 KG/M2

## 2020-02-13 DIAGNOSIS — J44.9 MIXED TYPE COPD (CHRONIC OBSTRUCTIVE PULMONARY DISEASE): Chronic | ICD-10-CM

## 2020-02-13 DIAGNOSIS — R91.8 MULTIPLE LUNG NODULES ON CT: ICD-10-CM

## 2020-02-13 DIAGNOSIS — J44.9 MIXED TYPE COPD (CHRONIC OBSTRUCTIVE PULMONARY DISEASE): Primary | Chronic | ICD-10-CM

## 2020-02-13 DIAGNOSIS — R91.8 MULTIPLE LUNG NODULES ON CT: Chronic | ICD-10-CM

## 2020-02-13 DIAGNOSIS — G47.33 OSA (OBSTRUCTIVE SLEEP APNEA): Chronic | ICD-10-CM

## 2020-02-13 DIAGNOSIS — F17.210 CIGARETTE NICOTINE DEPENDENCE WITHOUT COMPLICATION: Chronic | ICD-10-CM

## 2020-02-13 LAB
BRPFT: ABNORMAL
DLCO ADJ PRE: 20.56 ML/(MIN*MMHG) (ref 19.24–33.1)
DLCO SINGLE BREATH LLN: 19.24
DLCO SINGLE BREATH PRE REF: 79.4 %
DLCO SINGLE BREATH REF: 26.17
DLCOC SBVA LLN: 2.74
DLCOC SBVA PRE REF: 158.2 %
DLCOC SBVA REF: 4.02
DLCOC SINGLE BREATH LLN: 19.24
DLCOC SINGLE BREATH PRE REF: 78.5 %
DLCOC SINGLE BREATH REF: 26.17
DLCOVA LLN: 2.74
DLCOVA PRE REF: 159.9 %
DLCOVA PRE: 6.44 ML/(MIN*MMHG*L) (ref 2.74–5.31)
DLCOVA REF: 4.02
DLVAADJ PRE: 6.37 ML/(MIN*MMHG*L) (ref 2.74–5.31)
ERV LLN: 1.09
ERV PRE REF: 19.6 %
ERV REF: 1.09
FEF 25 75 CHG: 59.3 %
FEF 25 75 LLN: 1.24
FEF 25 75 POST REF: 87.5 %
FEF 25 75 PRE REF: 54.9 %
FEF 25 75 REF: 2.62
FET100 CHG: -19.8 %
FEV1 CHG: 7.5 %
FEV1 FVC CHG: 8.6 %
FEV1 FVC LLN: 65
FEV1 FVC POST REF: 108 %
FEV1 FVC PRE REF: 99.5 %
FEV1 FVC REF: 78
FEV1 LLN: 2.35
FEV1 POST REF: 56.7 %
FEV1 PRE REF: 52.8 %
FEV1 REF: 3.15
FRCPLETH LLN: 2.46
FRCPLETH PREREF: 65.7 %
FRCPLETH REF: 3.45
FVC CHG: -1 %
FVC LLN: 3.08
FVC POST REF: 52.5 %
FVC PRE REF: 53 %
FVC REF: 4.06
IVC PRE: 1.55 L (ref 3.08–5.05)
IVC SINGLE BREATH LLN: 3.08
IVC SINGLE BREATH PRE REF: 38.1 %
IVC SINGLE BREATH REF: 4.06
MVV LLN: 103
MVV PRE REF: 33.9 %
MVV REF: 121
PEF CHG: 7.5 %
PEF LLN: 6.28
PEF POST REF: 59.2 %
PEF PRE REF: 55 %
PEF REF: 8.39
POST FEF 25 75: 2.29 L/S (ref 1.24–3.99)
POST FET 100: 9.19 SEC
POST FEV1 FVC: 83.82 % (ref 65.12–90.07)
POST FEV1: 1.79 L (ref 2.35–3.95)
POST FVC: 2.13 L (ref 3.08–5.05)
POST PEF: 4.97 L/S (ref 6.28–10.51)
PRE DLCO: 20.78 ML/(MIN*MMHG) (ref 19.24–33.1)
PRE ERV: 0.21 L (ref 1.08–1.08)
PRE FEF 25 75: 1.44 L/S (ref 1.24–3.99)
PRE FET 100: 11.46 SEC
PRE FEV1 FVC: 77.21 % (ref 65.12–90.07)
PRE FEV1: 1.66 L (ref 2.35–3.95)
PRE FRC PL: 2.26 L
PRE FVC: 2.15 L (ref 3.08–5.05)
PRE MVV: 41 L/MIN (ref 102.75–139.01)
PRE PEF: 4.62 L/S (ref 6.28–10.51)
PRE RV: 1.91 L (ref 1.69–3.04)
PRE TLC: 4.21 L (ref 5.35–7.65)
RAW LLN: 3.06
RAW PRE REF: 102.8 %
RAW PRE: 3.14 CMH2O*S/L (ref 3.06–3.06)
RAW REF: 3.06
RV LLN: 1.69
RV PRE REF: 80.9 %
RV REF: 2.36
RVTLC LLN: 29
RVTLC PRE REF: 118.9 %
RVTLC PRE: 45.34 % (ref 29.16–47.12)
RVTLC REF: 38
TLC LLN: 5.35
TLC PRE REF: 64.8 %
TLC REF: 6.5
VA PRE: 3.23 L (ref 6.35–6.35)
VA SINGLE BREATH LLN: 6.35
VA SINGLE BREATH PRE REF: 50.9 %
VA SINGLE BREATH REF: 6.35
VC LLN: 3.08
VC PRE REF: 56.7 %
VC PRE: 2.3 L (ref 3.08–5.05)
VC REF: 4.06
VTGRAWPRE: 2.65 L

## 2020-02-13 PROCEDURE — 94726 PLETHYSMOGRAPHY LUNG VOLUMES: CPT | Mod: 26,S$PBB,, | Performed by: INTERNAL MEDICINE

## 2020-02-13 PROCEDURE — 71046 X-RAY EXAM CHEST 2 VIEWS: CPT | Mod: 26,,, | Performed by: RADIOLOGY

## 2020-02-13 PROCEDURE — 94729 DIFFUSING CAPACITY: CPT | Mod: 26,S$PBB,, | Performed by: INTERNAL MEDICINE

## 2020-02-13 PROCEDURE — 94729 DIFFUSING CAPACITY: CPT | Mod: PBBFAC

## 2020-02-13 PROCEDURE — 71046 XR CHEST PA AND LATERAL: ICD-10-PCS | Mod: 26,,, | Performed by: RADIOLOGY

## 2020-02-13 PROCEDURE — 99213 OFFICE O/P EST LOW 20 MIN: CPT | Mod: PBBFAC,25 | Performed by: INTERNAL MEDICINE

## 2020-02-13 PROCEDURE — 99215 OFFICE O/P EST HI 40 MIN: CPT | Mod: 25,S$PBB,, | Performed by: INTERNAL MEDICINE

## 2020-02-13 PROCEDURE — 99215 PR OFFICE/OUTPT VISIT, EST, LEVL V, 40-54 MIN: ICD-10-PCS | Mod: 25,S$PBB,, | Performed by: INTERNAL MEDICINE

## 2020-02-13 PROCEDURE — 94729 PR C02/MEMBANE DIFFUSE CAPACITY: ICD-10-PCS | Mod: 26,S$PBB,, | Performed by: INTERNAL MEDICINE

## 2020-02-13 PROCEDURE — 94726 PLETHYSMOGRAPHY LUNG VOLUMES: CPT | Mod: PBBFAC

## 2020-02-13 PROCEDURE — 99999 PR PBB SHADOW E&M-EST. PATIENT-LVL III: ICD-10-PCS | Mod: PBBFAC,,, | Performed by: INTERNAL MEDICINE

## 2020-02-13 PROCEDURE — 94060 EVALUATION OF WHEEZING: CPT | Mod: 26,S$PBB,, | Performed by: INTERNAL MEDICINE

## 2020-02-13 PROCEDURE — 94726 PULM FUNCT TST PLETHYSMOGRAP: ICD-10-PCS | Mod: 26,S$PBB,, | Performed by: INTERNAL MEDICINE

## 2020-02-13 PROCEDURE — 99999 PR PBB SHADOW E&M-EST. PATIENT-LVL III: CPT | Mod: PBBFAC,,, | Performed by: INTERNAL MEDICINE

## 2020-02-13 PROCEDURE — 94060 PR EVAL OF BRONCHOSPASM: ICD-10-PCS | Mod: 26,S$PBB,, | Performed by: INTERNAL MEDICINE

## 2020-02-13 PROCEDURE — 94060 EVALUATION OF WHEEZING: CPT | Mod: PBBFAC

## 2020-02-13 PROCEDURE — 71046 X-RAY EXAM CHEST 2 VIEWS: CPT | Mod: TC

## 2020-02-13 RX ORDER — ALBUTEROL SULFATE 90 UG/1
2 AEROSOL, METERED RESPIRATORY (INHALATION) EVERY 4 HOURS PRN
Qty: 18 G | Refills: 11 | Status: SHIPPED | OUTPATIENT
Start: 2020-02-13 | End: 2021-02-11 | Stop reason: SDUPTHER

## 2020-02-13 NOTE — PROGRESS NOTES
Subjective:      Patient ID: Trey Stevens is a 64 y.o. male.    Patient Active Problem List   Diagnosis    Psoriatic arthritis, destructive type    Vitamin D deficiency    Psoriasis    Multiple lung nodules on CT    Immunosuppressed status    Long-term use of immunosuppressant medication    Mixed type COPD (chronic obstructive pulmonary disease)    Type 2 diabetes mellitus with nephropathy    Hypertension associated with diabetes    Vitiligo    SCHUYLER (obstructive sleep apnea)    Severe obesity (BMI 35.0-35.9 with comorbidity)    Hyperlipidemia associated with type 2 diabetes mellitus    Elevated liver enzymes    Cigarette nicotine dependence without complication    Left-sided chest wall pain    Type 2 diabetes mellitus with hemoglobin A1c goal of less than 7.0%     Problem list has been reviewed.    Group Spirometric Classification Exacerbations / year mMRC CAT   Group B: Low risk; more symptoms  GOLD 1- 2  < = 1 >= 2 >=10      FEV1: 1.66  (52.8 % predicted),       GOLD 1 - mild: FEV1? 80% predicted   GOLD 2 - moderate: 50% ?FEV1 <80% predicted   GOLD 3 - severe: 30% ?FEV1 <50% predicted   GOLD 4 - very severe: FEV1 <30% predicted.    Chief Complaint: COPD (REV PFT/CXR); Pulmonary Nodules; and Sleep Apnea         HPI:     PFT, CXR, CPAP compliance download reviewd with patient who voiced understanding.     Follow up for SCHUYLER and CPAP complaince assessment.  he  is on AUTOPAP  of  4 - 20 CMWP with OXYGEN AT 2 L /MIN BLENDED IN .     He definitely thinks PAP is beneficial to his health and He wants to continue with PAP therapy.    Patient denies snoring, headaches, excessive daytime sleepiness    Compliance Summary  11/6/2019 - 2/3/2020 (90 days)  Days with Device Usage 90 days  Days without Device Usage 0 days  Percent Days with Device Usage 100.0%  Cumulative Usage 27 days 3 hrs. 51 mins. 1 secs.  Maximum Usage (1 Day) 9 hrs. 46 mins. 37 secs.  Average Usage (All Days) 7 hrs. 14 mins. 34 secs.  Average  Usage (Days Used) 7 hrs. 14 mins. 34 secs.  Minimum Usage (1 Day) 3 hrs. 53 mins. 22 secs.  Percent of Days with Usage >= 4 Hours 97.8%  Percent of Days with Usage < 4 Hours 2.2%  Total Blower Time 27 days 8 hrs. 3 mins. 29 secs.  Average AHI 3.5  Auto-CPAP Summary  Auto-CPAP Mean Pressure 12.9 cmH2O  Auto-CPAP Peak Average Pressure 17.5 cmH2O  Average Device Pressure <= 90% of Time 16.0 cmH2O  Average Time in Large Leak Per Day 40 secs    EPWORTH SLEEPINESS SCALE 2/13/2020 2/14/2019 7/30/2018 1/3/2018 11/10/2017 9/28/2017 8/2/2017   Sitting and reading 1 0 0 1 0 1 1   Watching TV 0 0 2 2 2 1 2   Sitting, inactive in a public place (e.g. a theatre or a meeting) 0 3 1 1 0 0 2   As a passenger in a car for an hour without a break 0 0 0 0 0 2 0   Lying down to rest in the afternoon when circumstances permit 1 3 0 3 3 2 3   Sitting and talking to someone 0 0 0 0 0 1 1   Sitting quietly after a lunch without alcohol 1 1 1 2 2 3 2   In a car, while stopped for a few minutes in traffic 0 0 0 0 0 0 0   Total score 3 7 4 9 7 10 11         Patients reports NYHA II dyspnea    The patient does not have currently have symptoms / an exacerbation.      No recent change in breathing.     COPD Questionnaire  How often do you cough?: A little of the time  How often do you have phlegm (mucus) in your chest?: Almost never  How often does your chest feel tight?: A little of the time  When you walk up a hill or one flight of stairs, how often are you breathless?: Some of the time  How often are you limited doing any activities at home?: Almost never  How often are you confident leaving the house despite your lung condition?: All of the time  How often do you sleep soundly?: Never  How often do you have energy?: A little of the time  Total score: 17     His current respiratory therapy regimen is PROAIR which provides relief. He is  adherent with his regimen.      A full  review of systems, past , family  and social histories was performed  except as mentioned in the note above, these are non contributory to the main issues discussed today.     Previous Report Reviewed: office notes     The following portions of the patient's history were reviewed and updated as appropriate: He  has a past medical history of Arthritis, Diabetes mellitus, Diabetes mellitus type 2, uncontrolled (7/19/2016), DM (diabetes mellitus) (2015), Gall stones, Obesity, Psoriasis (a type of skin inflammation), and Rheumatoid arthritis of foot.  He  has a past surgical history that includes Appendectomy and Cholecystectomy.  His family history includes Cancer in his mother; Cataracts in his mother; Diabetes in his mother; Hypertension in his mother; Stroke in his father.  He  reports that he has been smoking cigarettes. He started smoking about 26 years ago. He has a 12.00 pack-year smoking history. He has never used smokeless tobacco. He reports that he does not drink alcohol or use drugs.  He has a current medication list which includes the following prescription(s): amlodipine, ammonium lactate, aspirin, benzonatate, calcipotriene, clobetasol, cosentyx pen (2 pens), empagliflozin, ergocalciferol, folic acid, insulin, ketoconazole, lancets, losartan, meloxicam, metformin, methotrexate, onetouch ultra blue test strip, onetouch ultramini, pen needle, diabetic, pravastatin, prednisone, proair hfa, secukinumab, tramadol, and victoza 3-bib.  He has No Known Allergies..    Review of Systems   Constitutional: Negative for fever, fatigue and night sweats.   HENT: Positive for sore throat. Negative for nosebleeds, postnasal drip, rhinorrhea, congestion and hearing loss.    Eyes: Negative for itching.   Respiratory: Positive for snoring, cough, hemoptysis and wheezing. Negative for sputum production, orthopnea and Paroxysmal Nocturnal Dyspnea.    Cardiovascular: Negative for chest pain and leg swelling.   Genitourinary: Negative for difficulty urinating and hematuria.   Endocrine: Negative  "for cold intolerance and heat intolerance.    Musculoskeletal: Positive for arthralgias and back pain.   Skin: Positive for rash.        Diffuse Psoriasis   Gastrointestinal: Positive for abdominal pain. Negative for vomiting and acid reflux.   Neurological: Negative for weakness.   Hematological: Does not bruise/bleed easily and no excessive bruising.   Psychiatric/Behavioral: The patient is not nervous/anxious.    All other systems reviewed and are negative.       Objective:     Vitals:    20 1355   BP: (!) 150/88   Pulse: 84   Resp: 20   SpO2: (!) 94%   Weight: 121.6 kg (268 lb)   Height: 5' 7" (1.702 m)     body mass index is 41.97 kg/m².     Physical Exam   Constitutional: He is oriented to person, place, and time. He appears well-developed and well-nourished.   Disheveled in appearance   HENT:   Head: Normocephalic and atraumatic.   Mallampati 3    Eyes: Pupils are equal, round, and reactive to light. EOM are normal.   Neck: Normal range of motion. Neck supple.   Neck 48 cm   Cardiovascular: Normal rate and regular rhythm.   Pulmonary/Chest: Effort normal. He has decreased breath sounds in the right upper field, the right middle field, the right lower field, the left upper field and the left middle field. He has no wheezes. He has no rales.   Abdominal: Soft. Bowel sounds are normal.   Musculoskeletal: Normal range of motion.   Neurological: He is alert and oriented to person, place, and time.   Skin: Rash noted.   Psychiatric: He has a normal mood and affect. His behavior is normal.   Nursing note and vitals reviewed.      Personal Diagnostic Review  CPAP complaince download.     CXR: : The lungs are clear and free of infiltrate.  No pleural effusion or pneumothorax. The heart is borderline enlarged.  There is some tortuosity of the descending thoracic aorta    Pulmonary function tests:20:  FEV1: 1.66  (52.8 % predicted), FVC:  2.15 (53.0 % predicted), FEV1/FVC:77, T.21 (64.8 % " predicted), RV/TLVC: 45 (118.9 % predicted), DLCO: 20.78 (79.4 % predicted)   Moderately severe mixed obstruction with restriction. Diffusion capacity is mildly reduced but corrects for alveolar volume      Assessment / plan:     Discussed diagnosis, its evaluation, treatment and usual course. All questions answered.    Problem List Items Addressed This Visit        Pulmonary    Multiple lung nodules on CT    Current Assessment & Plan     No significant interval change in multiple bilateral pulmonary nodules.   Likely drug induced lung nodules - Methotrexate   Contiue to monitor radiologically.   Annual CXR.           Relevant Orders    Complete PFT without bronchodilator    Mixed type COPD (chronic obstructive pulmonary disease) - Primary    Current Assessment & Plan     MIXED COPD ROS: taking medications as instructed, no medication side effects noted, no significant ongoing wheezing or shortness of breath, using bronchodilator MDI less than twice a week.   New concerns: NONE.   Exam: appears well, vitals normal, no respiratory distress, acyanotic, normal RR, chest clear, no wheezing, crepitations, rhonchi, normal symmetric air entry.   Assessment:  MIXED COPD stable.   Plan: PROAIR. Current treatment plan is effective, no change in therapy.  PROAIR REFILLED. PFT in 1 year.          Relevant Medications    PROAIR HFA 90 mcg/actuation inhaler    Other Relevant Orders    X-Ray Chest PA And Lateral       Other    SCHUYLER (obstructive sleep apnea)    Current Assessment & Plan     Continue  AUTOCPAP of 4 - 20 CMWP with oxygen 2 L /min blended in through CPAP ([facility])     Discussed therapeutic goals for positive airway pressure therapy(CPAP or BiPAP): Ideal is usage 100% of nights for 6 - 8 hours per night. Minimum usage is 70% of night for at least 4 hours per night used. Pateint expressed understanding. All Questions answered.    CPAP supplies.    CPAP complaince evaluation in 1 year.          Relevant Orders     CPAP/BIPAP SUPPLIES    Cigarette nicotine dependence without complication    Current Assessment & Plan     Assistance with smoking cessation was offered, including:  []  Medications  [x]  Counseling  []  Printed Information on Smoking Cessation  []  Referral to a Smoking Cessation Program    Patient was counseled regarding smoking for >10 minutes.               TIME SPENT WITH PATIENT: Time spent: 40 minutes in face to face  discussion concerning diagnosis, prognosis, review of lab and test results, benefits of treatment as well as management of disease, counseling of patient and coordination of care between various health  care providers . Greater than half the time spent was used for coordination of care and counseling of patient.     Follow up in about 1 year (around 2/13/2021) for Multiple lung nodules, Mixed obstructive and restrictive lung disease, Tobacco use disorder, SCHUYLER.

## 2020-02-13 NOTE — ASSESSMENT & PLAN NOTE
Continue  AUTOCPAP of 4 - 20 CMWP with oxygen 2 L /min blended in through CPAP [facility]     Discussed therapeutic goals for positive airway pressure therapy(CPAP or BiPAP): Ideal is usage 100% of nights for 6 - 8 hours per night. Minimum usage is 70% of night for at least 4 hours per night used. Pateint expressed understanding. All Questions answered.    CPAP supplies.    CPAP complaince evaluation in 1 year.

## 2020-02-13 NOTE — PATIENT INSTRUCTIONS
Lung Anatomy  Your lungs take air in to give your body oxygen, which the body needs to work. Your lungs, like all the tissues in your body, are made up of billions of tiny specialized cells. Old lung cells die and are replaced by new, identical lung cells. This natural process helps ensure healthy lungs.    Date Last Reviewed: 11/1/2016  © 7521-3456 [employer]. [address] [place] [address], [place] [address]. All rights reserved. This information is not intended as a substitute for professional medical care. Always follow your healthcare professional's instructions.

## 2020-02-19 ENCOUNTER — TELEPHONE (OUTPATIENT)
Dept: RHEUMATOLOGY | Facility: CLINIC | Age: 65
End: 2020-02-19

## 2020-02-19 NOTE — TELEPHONE ENCOUNTER
----- Message from Montse Treviño sent at 2/19/2020 10:17 AM CST -----  Contact: Pt   Pt called in regards to speaking with the staff regarding medication for a hearing trial. Pt can be reached at [phone] (home). secukinumab (COSENTYX PEN) 150 mg/mL PnIj is the name of the medication. Pt is asking for the nurse to call him so he can explain in detail what he was told.

## 2020-02-19 NOTE — TELEPHONE ENCOUNTER
Patient states that Gillian from [facility] called stateing they need information from us regarding his Cosentyx. He is unsure of what they need. Attempt to call Gillian at [phone]. Left message for her to call us.

## 2020-02-19 NOTE — TELEPHONE ENCOUNTER
Left message for Autumn with [employer] to call. Patient states that she needs information from us regarding his medications but does not know what she needs. Left Message for autumn to call so we can get her what is needed.

## 2020-02-20 ENCOUNTER — TELEPHONE (OUTPATIENT)
Dept: RHEUMATOLOGY | Facility: CLINIC | Age: 65
End: 2020-02-20

## 2020-02-20 NOTE — TELEPHONE ENCOUNTER
----- Message from Jo Ann Sandesr sent at 2/20/2020 11:39 AM CST -----  Contact: [facility]  Requesting a call back regarding State Fair Hearing on 03/03/2020 for COSENTYX PEN, 2 PENS, 150 mg/mL PnIj. She states that she is needing to have the request withdrawn because the medication has been approved. She is requesting a call back from Ms. Nolasco at [phone]

## 2020-02-20 NOTE — TELEPHONE ENCOUNTER
Need a letter requesting cancellation of State Fair Hearing for Cosentyx. It has been approved already and does not need the hearing. The letter will have to include  Docket # [id number]. Will need to fax to # [phone]

## 2020-02-20 NOTE — TELEPHONE ENCOUNTER
----- Message from Thierno Espino sent at 2/20/2020  1:17 PM CST -----  Contact: Gillian with [employer] [facility] [place]  Please fax a withdrawal request for the State Fair Hearing to [phone] include docket number: [id number] and/or call to discuss this matter [phone].

## 2020-02-20 NOTE — TELEPHONE ENCOUNTER
----- Message from Arely Westbrook sent at 2/20/2020  1:24 PM CST -----  Contact: [facility] [employer] and [facility]   Caller fax a withdrawal request the state fair hearing in [facility]   to fax: [phone] docket# [id number]  Call back : [phone]

## 2020-02-20 NOTE — TELEPHONE ENCOUNTER
Left message that I did receive her call. If she can take a verbal regarding canceling the State Fair Hearing on patients Cosentyx since medication is already approved , She has our consent to cancel hearing. If something more is needed please let us know.

## 2020-02-28 ENCOUNTER — TELEPHONE (OUTPATIENT)
Dept: RHEUMATOLOGY | Facility: CLINIC | Age: 65
End: 2020-02-28

## 2020-02-28 NOTE — TELEPHONE ENCOUNTER
"----- Message from Mehreen Mcfarland sent at 2020 10:14 AM CST -----  Contact: cindy guillaume  states that a hearing will be held 3/3, docket number is [id number]. must request to be withdrawn stating "I withdraw my state fair hearing request (include docknet number, pt name & ) appeal. call back is [phone]  "

## 2020-03-04 ENCOUNTER — LAB VISIT (OUTPATIENT)
Dept: LAB | Facility: HOSPITAL | Age: 65
End: 2020-03-04
Attending: PHYSICIAN ASSISTANT
Payer: MEDICAID

## 2020-03-04 ENCOUNTER — OFFICE VISIT (OUTPATIENT)
Dept: RHEUMATOLOGY | Facility: CLINIC | Age: 65
End: 2020-03-04
Payer: MEDICAID

## 2020-03-04 VITALS
SYSTOLIC BLOOD PRESSURE: 121 MMHG | HEIGHT: 67 IN | BODY MASS INDEX: 41.91 KG/M2 | WEIGHT: 267 LBS | DIASTOLIC BLOOD PRESSURE: 68 MMHG | HEART RATE: 85 BPM

## 2020-03-04 DIAGNOSIS — L40.9 PSORIASIS: ICD-10-CM

## 2020-03-04 DIAGNOSIS — D84.9 IMMUNOSUPPRESSED STATUS: ICD-10-CM

## 2020-03-04 DIAGNOSIS — L40.52 PSORIATIC ARTHRITIS, DESTRUCTIVE TYPE: ICD-10-CM

## 2020-03-04 DIAGNOSIS — L40.52 PSORIATIC ARTHRITIS, DESTRUCTIVE TYPE: Primary | ICD-10-CM

## 2020-03-04 DIAGNOSIS — Z79.60 LONG-TERM USE OF IMMUNOSUPPRESSANT MEDICATION: ICD-10-CM

## 2020-03-04 LAB
ALBUMIN SERPL BCP-MCNC: 3.6 G/DL (ref 3.5–5.2)
ALP SERPL-CCNC: 87 U/L (ref 55–135)
ALT SERPL W/O P-5'-P-CCNC: 28 U/L (ref 10–44)
ANION GAP SERPL CALC-SCNC: 12 MMOL/L (ref 8–16)
AST SERPL-CCNC: 21 U/L (ref 10–40)
BASOPHILS # BLD AUTO: 0.05 K/UL (ref 0–0.2)
BASOPHILS NFR BLD: 0.7 % (ref 0–1.9)
BILIRUB SERPL-MCNC: 0.4 MG/DL (ref 0.1–1)
BUN SERPL-MCNC: 8 MG/DL (ref 8–23)
CALCIUM SERPL-MCNC: 9.8 MG/DL (ref 8.7–10.5)
CHLORIDE SERPL-SCNC: 103 MMOL/L (ref 95–110)
CO2 SERPL-SCNC: 24 MMOL/L (ref 23–29)
CREAT SERPL-MCNC: 1.1 MG/DL (ref 0.5–1.4)
CRP SERPL-MCNC: 7.3 MG/L (ref 0–8.2)
DIFFERENTIAL METHOD: ABNORMAL
EOSINOPHIL # BLD AUTO: 0.4 K/UL (ref 0–0.5)
EOSINOPHIL NFR BLD: 5.1 % (ref 0–8)
ERYTHROCYTE [DISTWIDTH] IN BLOOD BY AUTOMATED COUNT: 15.7 % (ref 11.5–14.5)
ERYTHROCYTE [SEDIMENTATION RATE] IN BLOOD BY WESTERGREN METHOD: 43 MM/HR (ref 0–23)
EST. GFR  (AFRICAN AMERICAN): >60 ML/MIN/1.73 M^2
EST. GFR  (NON AFRICAN AMERICAN): >60 ML/MIN/1.73 M^2
GLUCOSE SERPL-MCNC: 144 MG/DL (ref 70–110)
HCT VFR BLD AUTO: 47.1 % (ref 40–54)
HGB BLD-MCNC: 15.3 G/DL (ref 14–18)
IMM GRANULOCYTES # BLD AUTO: 0.03 K/UL (ref 0–0.04)
IMM GRANULOCYTES NFR BLD AUTO: 0.4 % (ref 0–0.5)
LYMPHOCYTES # BLD AUTO: 2.3 K/UL (ref 1–4.8)
LYMPHOCYTES NFR BLD: 30.3 % (ref 18–48)
MCH RBC QN AUTO: 28.7 PG (ref 27–31)
MCHC RBC AUTO-ENTMCNC: 32.5 G/DL (ref 32–36)
MCV RBC AUTO: 88 FL (ref 82–98)
MONOCYTES # BLD AUTO: 0.6 K/UL (ref 0.3–1)
MONOCYTES NFR BLD: 7.6 % (ref 4–15)
NEUTROPHILS # BLD AUTO: 4.2 K/UL (ref 1.8–7.7)
NEUTROPHILS NFR BLD: 56.3 % (ref 38–73)
NRBC BLD-RTO: 0 /100 WBC
PLATELET # BLD AUTO: 270 K/UL (ref 150–350)
PMV BLD AUTO: 10.5 FL (ref 9.2–12.9)
POTASSIUM SERPL-SCNC: 4 MMOL/L (ref 3.5–5.1)
PROT SERPL-MCNC: 8.2 G/DL (ref 6–8.4)
RBC # BLD AUTO: 5.34 M/UL (ref 4.6–6.2)
SODIUM SERPL-SCNC: 139 MMOL/L (ref 136–145)
WBC # BLD AUTO: 7.52 K/UL (ref 3.9–12.7)

## 2020-03-04 PROCEDURE — 85652 RBC SED RATE AUTOMATED: CPT

## 2020-03-04 PROCEDURE — 99214 OFFICE O/P EST MOD 30 MIN: CPT | Mod: PBBFAC | Performed by: PHYSICIAN ASSISTANT

## 2020-03-04 PROCEDURE — 99214 OFFICE O/P EST MOD 30 MIN: CPT | Mod: S$PBB,,, | Performed by: PHYSICIAN ASSISTANT

## 2020-03-04 PROCEDURE — 99999 PR PBB SHADOW E&M-EST. PATIENT-LVL IV: CPT | Mod: PBBFAC,,, | Performed by: PHYSICIAN ASSISTANT

## 2020-03-04 PROCEDURE — 85025 COMPLETE CBC W/AUTO DIFF WBC: CPT

## 2020-03-04 PROCEDURE — 80053 COMPREHEN METABOLIC PANEL: CPT

## 2020-03-04 PROCEDURE — 86140 C-REACTIVE PROTEIN: CPT

## 2020-03-04 PROCEDURE — 99999 PR PBB SHADOW E&M-EST. PATIENT-LVL IV: ICD-10-PCS | Mod: PBBFAC,,, | Performed by: PHYSICIAN ASSISTANT

## 2020-03-04 PROCEDURE — 36415 COLL VENOUS BLD VENIPUNCTURE: CPT

## 2020-03-04 PROCEDURE — 99214 PR OFFICE/OUTPT VISIT, EST, LEVL IV, 30-39 MIN: ICD-10-PCS | Mod: S$PBB,,, | Performed by: PHYSICIAN ASSISTANT

## 2020-03-04 RX ORDER — METHOTREXATE 2.5 MG/1
20 TABLET ORAL
Qty: 32 TABLET | Refills: 3 | Status: SHIPPED | OUTPATIENT
Start: 2020-03-04 | End: 2020-03-17 | Stop reason: SDUPTHER

## 2020-03-04 NOTE — PROGRESS NOTES
Subjective:       Patient ID: Trey Stevens is a 64 y.o. male.    Chief Complaint: Psoriatic Arthritis and Psoriasis    Trey is here for routine follow up.     He has chronic psoriatic arthritis and psoriasis.  In the past failed mtx monotherapy, failed topicals and UV   Failed humira and Enbrel, now on Cosentyx 300 mg Q month after completion of loading dose. Initially did ok but rash worse now.     methotrexate dose 20 mg/wk; taking  folic acid once daily.  Persistent severe rash nestor arms and legs; Thickened indurated plaques on forearms and elbows with diffuse rash    Some recent data on Optimize shows pt >90 kg may need 300 mg Q 14 day to reach drug therapeutic level to clear the skin  Severe plaq psoriasis with BSA 12%- complicate by Vitiligo     Late richy we increased dose up to 300 mg Q 14 days  Approved and now getting med at increased dose the past 6 weeks  Using topical taclonex in conjunction as well, still on mtx   Using topical faithfully now  He has been compliant with all his meds    He has noticed some interval improvement of his rash  Left forearm large patch has improved  Left elbow thickened plaq is getting smaller  Right arm improvement   Some pigmentation coming back   Rash still severe but is showing some + improvement w/increased dose     On going neuropathy C/w foot pain mostly. He has polyneuropathy  Rates his pain today 5/10.    He has some chronic arthritis changes to his dip joints bilaterally and chronic deformities to his feet with lateral deviation of his toes.  Using otc tylenol  Every day but only takes the tramadol 1-2 X weekly at night when his feet hurt      He has multiple lung nodules which are followed by pulm and stable per his last pulmonary visit feb 2019, his breathing is stable. He does still smoke.      Was on vit D 50K weekly but now on otc Vit D3 5000 daily          Psoriasis   Associated symptoms include arthralgias, numbness and a rash. Pertinent negatives include no  "abdominal pain, chest pain, chills, congestion, coughing, fatigue, fever, headaches, joint swelling, myalgias, nausea, vomiting or weakness.           He reports no joint swelling. Pertinent negatives include no dysuria, fatigue, fever, myalgias or headaches.         Review of Systems   Constitutional: Negative for chills, fatigue, fever and unexpected weight change.        Poor hygiene      HENT: Negative for congestion, mouth sores and rhinorrhea.    Eyes: Negative for pain, discharge and redness.   Respiratory: Negative for cough, shortness of breath and wheezing.    Cardiovascular: Negative for chest pain and leg swelling.   Gastrointestinal: Negative for abdominal pain, diarrhea, nausea and vomiting.   Endocrine: Negative for cold intolerance and heat intolerance.   Genitourinary: Negative for dysuria.   Musculoskeletal: Positive for arthralgias. Negative for joint swelling and myalgias.   Skin: Positive for color change and rash.   Allergic/Immunologic: Negative for immunocompromised state.   Neurological: Positive for numbness. Negative for weakness and headaches.        Diabetic neuropathy   Psychiatric/Behavioral: The patient is not nervous/anxious.          Objective:   /68   Pulse 85   Ht 5' 7" (1.702 m)   Wt 121.1 kg (266 lb 15.6 oz)   BMI 41.81 kg/m²      Physical Exam   Constitutional: He is oriented to person, place, and time and well-developed, well-nourished, and in no distress.   HENT:   Head: Normocephalic and atraumatic.   Eyes: Pupils are equal, round, and reactive to light. Right eye exhibits no discharge.   Neck: Normal range of motion.   Cardiovascular: Normal rate, regular rhythm and normal heart sounds.  Exam reveals no friction rub.    Pulmonary/Chest: Effort normal and breath sounds normal. No respiratory distress.   Abdominal: Soft. He exhibits no distension. There is no tenderness.   Lymphadenopathy:     He has no cervical adenopathy.   Neurological: He is alert and oriented " to person, place, and time.   Skin: Rash noted. No erythema.          Psoriasis rash bilateral elbows and small patch left hip    Vitiligo bilateral forearms       Psychiatric: Mood normal.   Musculoskeletal: Normal range of motion. He exhibits edema and deformity.   Bilateral wrists, mcps no synovitis good rom   nestor dips with chronic damage, bony enlargement     nestor Right 5th dip flexion deformity,   Left thumb no flexion to the IP joint, right thumb ip joint motion decreased     good rom to nestor ankles, no swelling  nestor feet very dirty, 2-5 toes deviate laterally.  Mild lower ext edema                   Immunization History   Administered Date(s) Administered    Hepatitis A, Adult 12/20/2018    Influenza - High Dose - PF (65 years and older) 10/18/2018    Influenza - Quadrivalent 10/24/2016    Influenza - Quadrivalent - PF (6 months and older) 10/30/2017, 11/07/2019    PPD Test 08/05/2013    Pneumococcal Conjugate - 13 Valent 02/24/2016    Pneumococcal Polysaccharide - 23 Valent 04/09/2013    Tdap 02/24/2016             Recent Results (from the past 168 hour(s))   CBC auto differential    Collection Time: 03/04/20  1:05 PM   Result Value Ref Range    WBC 7.52 3.90 - 12.70 K/uL    RBC 5.34 4.60 - 6.20 M/uL    Hemoglobin 15.3 14.0 - 18.0 g/dL    Hematocrit 47.1 40.0 - 54.0 %    Mean Corpuscular Volume 88 82 - 98 fL    Mean Corpuscular Hemoglobin 28.7 27.0 - 31.0 pg    Mean Corpuscular Hemoglobin Conc 32.5 32.0 - 36.0 g/dL    RDW 15.7 (H) 11.5 - 14.5 %    Platelets 270 150 - 350 K/uL    MPV 10.5 9.2 - 12.9 fL    Immature Granulocytes 0.4 0.0 - 0.5 %    Gran # (ANC) 4.2 1.8 - 7.7 K/uL    Immature Grans (Abs) 0.03 0.00 - 0.04 K/uL    Lymph # 2.3 1.0 - 4.8 K/uL    Mono # 0.6 0.3 - 1.0 K/uL    Eos # 0.4 0.0 - 0.5 K/uL    Baso # 0.05 0.00 - 0.20 K/uL    nRBC 0 0 /100 WBC    Gran% 56.3 38.0 - 73.0 %    Lymph% 30.3 18.0 - 48.0 %    Mono% 7.6 4.0 - 15.0 %    Eosinophil% 5.1 0.0 - 8.0 %    Basophil% 0.7 0.0 - 1.9 %     Differential Method Automated    Comprehensive metabolic panel    Collection Time: 20  1:05 PM   Result Value Ref Range    Sodium 139 136 - 145 mmol/L    Potassium 4.0 3.5 - 5.1 mmol/L    Chloride 103 95 - 110 mmol/L    CO2 24 23 - 29 mmol/L    Glucose 144 (H) 70 - 110 mg/dL    BUN, Bld 8 8 - 23 mg/dL    Creatinine 1.1 0.5 - 1.4 mg/dL    Calcium 9.8 8.7 - 10.5 mg/dL    Total Protein 8.2 6.0 - 8.4 g/dL    Albumin 3.6 3.5 - 5.2 g/dL    Total Bilirubin 0.4 0.1 - 1.0 mg/dL    Alkaline Phosphatase 87 55 - 135 U/L    AST 21 10 - 40 U/L    ALT 28 10 - 44 U/L    Anion Gap 12 8 - 16 mmol/L    eGFR if African American >60 >60 mL/min/1.73 m^2    eGFR if non African American >60 >60 mL/min/1.73 m^2   C-reactive protein    Collection Time: 20  1:05 PM   Result Value Ref Range    CRP 7.3 0.0 - 8.2 mg/L          Ref. Range 2018 11:52   Vit D, 25-Hydroxy Latest Ref Range: 30 - 96 ng/mL 29 (L)          Ref. Range 2018 11:13 2019 12:13   Hep A IgM Unknown Negative    Hep B C IgM Unknown Negative    Hepatitis B Surface Ag Unknown Negative    Hepatitis C Ab Unknown Negative Negative   Mitogen - Nil Latest Ref Range: See text IU/mL >10.000    NIL Latest Ref Range: See text IU/mL 0.034    TB Antigen Latest Ref Range: See text IU/mL 0.115    TB Antigen - Nil Latest Ref Range: See Text IU/mL 0.081    TB Gold Unknown Negative    HIV 1/2 Ag/Ab Latest Ref Range: Negative   Negative          19 Pulmonary function tests: FEV1: 1.52  (46.1 % predicted), FVC:  2.19 (51.2 % predicted), FEV1/FVC:70, T.36 (64.6 % predicted), RV/TLVC: 50 (131.6 % predicted), DLCO: 16.22 (59.3 % predicted)   Severe mixed obstruction with restriction. Diffusion capacity is moderately reduced but corrects for alveolar volume      18 Chest x-ray     COMPARISON:  2018    FINDINGS:  Cardiac silhouette and mediastinal contours are stable.  Lungs improved aeration of the lung bases with minimal interstitial changes remaining  which may be chronic.  Osseous structures are intact.   Impression       Improved basilar aeration.       10/30/17 nestor hand and foot x-ray- damage in both hand and feet consistent with PsA   4/2016 XR hands reviewed: psoriatic arthritis changes noted to dip joints bilaterally, nestor thumb IP joints        Assessment:       1. Psoriatic arthritis, destructive type    2. Psoriasis    3. Immunosuppressed status    4. Long-term use of immunosuppressant medication        1. Chronic refractory Psoriatic arthritis and skin  Psoriasis:  Now On Cosentyx maintenance dose 300 mg every 14 days   suboptimals response to 300 mg/month dose    msk stable; Mild foot/ankle involvement   skin not controlled but improved on higher dose of Cosentyx x 6 weeks   BSA was 12%- now down to 8%    on background mtx 20 mg week split dose-  In the past   Failed mtx monotherapy; failed enbrel and  humira   Failed topicals and UV trt       2. High Risk Medication monitoring: no toxicity from  mtx or Cosentyx      3. Immunocompromised: utd, except zoster, possible verve study?-    4. Vit D deficiency: completed vit D 50,000U/2Xweekly, last level low normal 26 (2/2018) now on otc Vit D3 5000 twice weekly     5. Chronic lung nodules- stable monitored by pulmonology- will see again feb 2020    6. tob use- smoking     7. Mild elevated LFT - will follow - lft normal today       Plan:         C./w topical bid - taclonex    C/w mtx 20 mg Q week, split dose  Folic acid once a day    Having positive response from trial of 6 weeks dose of cosentyx 300 mg Q 14 days  Skin improved from 12% to 8%  Thickened indurated plaq are slowly improving       Https://www.ncbi.nlm.nih.gov/pubmed/60031851    Optimise StudyCONCLUSIONS:  Standard q4w dosing of secukinumab 300 mg is the optimal dosing regimen to achieve and maintain clear or almost clear skin. Patients with body weight ? 90 kg not achieving PASI 90 at week 24 may benefit from the q2w dosing regimen. What's  already known about this topic? Individual responses to biologics in patients with psoriasis vary considerably and there may be a need to individualize treatment. Dose optimization strategies of currently available biologic drugs have been investigated mainly in rheumatic disorders. Secukinumab has shown long-term PASI 90/100 responses (percentage improvement in Psoriasis Area and Severity Index) to year 5 in patients with moderate-to-severe plaque psoriasis when used at the dose of 300 mg every 4 weeks. What does this study add? Standard every 4 week (q4w) dosing of secukinumab 300 mg is the optimal regimen to achieve and maintain clear or almost clear skin at week 52; the majority of the patients (85·7%) maintain PASI 90 at week 52. Superiority of intensified (q2w) dosing over the q4w regimen could not be claimed. However, patients with a higher body weight (? 90 kg) not achieving PASI 90 response at week 24 may benefit from q2w dosing.    Always remember to Hold mtx and Cosentyx  for signs of infection or for surgery   Pt verbalized understanding      TB gold and acute hep panel done 6/2018- neg, no need to repeat yet  Do every 1-2 years while on biologics    C/w otc vit D3 to 5K every day, check d level Q year     Return in 12 weeks   With labs cbc, cmp, esr and crp      Repeat  xrays hands and feet in 1 year    Counseled pt on smoking cessation    Please call or send portal message with any questions or concerns                    none

## 2020-03-17 DIAGNOSIS — L40.9 PSORIASIS: ICD-10-CM

## 2020-03-17 RX ORDER — METHOTREXATE 2.5 MG/1
20 TABLET ORAL
Qty: 32 TABLET | Refills: 3 | Status: SHIPPED | OUTPATIENT
Start: 2020-03-17 | End: 2020-08-12

## 2020-03-23 ENCOUNTER — TELEPHONE (OUTPATIENT)
Dept: FAMILY MEDICINE | Facility: CLINIC | Age: 65
End: 2020-03-23

## 2020-03-23 ENCOUNTER — TELEPHONE (OUTPATIENT)
Dept: PODIATRY | Facility: CLINIC | Age: 65
End: 2020-03-23

## 2020-03-23 NOTE — TELEPHONE ENCOUNTER
----- Message from Anna Quiros sent at 3/23/2020  4:39 PM CDT -----  Contact: self  Requesting call back regarding pt appt. Please call back at [phone].    Thanks,  Anna Quiros

## 2020-03-23 NOTE — TELEPHONE ENCOUNTER
Attempted to contact Pt in regards to appt on 3/30/2020 appt. Explained to Pt in regards to the coronavirus and [facility] pushing to decrease the spread of infection we would be canceling her appt and following up with him  in 2 mos. Will attempt to contact again

## 2020-03-24 ENCOUNTER — DOCUMENTATION ONLY (OUTPATIENT)
Dept: RHEUMATOLOGY | Facility: CLINIC | Age: 65
End: 2020-03-24

## 2020-03-24 NOTE — PROGRESS NOTES
Received a [facility] from [facility] stating that [facility] has reversed its decision to deny coverage of Cosentyx for a period of 6 months.    Will scan document to patient chart

## 2020-03-25 ENCOUNTER — TELEPHONE (OUTPATIENT)
Dept: DIABETES | Facility: CLINIC | Age: 65
End: 2020-03-25

## 2020-03-25 NOTE — TELEPHONE ENCOUNTER
----- Message from Thu De Anda LPN sent at 3/25/2020  3:46 PM CDT -----  Contact: pt       ----- Message -----  From: Corinne Cabrera  Sent: 3/25/2020   3:25 PM CDT  To: Rashid Carey Staff    Pt called to reschedule two of his appointments pt is asking if he can be seen by your office on the same day as his appointment with  on 5/4/2020 1:20pm.     Pt can be reached at [phone] to schedule this appointment

## 2020-04-02 DIAGNOSIS — R07.89 LEFT-SIDED CHEST WALL PAIN: ICD-10-CM

## 2020-04-03 ENCOUNTER — TELEPHONE (OUTPATIENT)
Dept: FAMILY MEDICINE | Facility: CLINIC | Age: 65
End: 2020-04-03

## 2020-04-03 DIAGNOSIS — R07.89 LEFT-SIDED CHEST WALL PAIN: ICD-10-CM

## 2020-04-03 RX ORDER — TRAMADOL HYDROCHLORIDE 50 MG/1
50 TABLET ORAL 3 TIMES DAILY PRN
Qty: 45 TABLET | Refills: 1 | Status: SHIPPED | OUTPATIENT
Start: 2020-04-03 | End: 2020-06-02 | Stop reason: SDUPTHER

## 2020-04-03 RX ORDER — TRAMADOL HYDROCHLORIDE 50 MG/1
50 TABLET ORAL 3 TIMES DAILY PRN
Qty: 45 TABLET | Refills: 1 | Status: SHIPPED | OUTPATIENT
Start: 2020-04-03 | End: 2020-04-03 | Stop reason: SDUPTHER

## 2020-04-07 DIAGNOSIS — I15.2 HYPERTENSION ASSOCIATED WITH DIABETES: Chronic | ICD-10-CM

## 2020-04-07 DIAGNOSIS — E11.59 HYPERTENSION ASSOCIATED WITH DIABETES: Chronic | ICD-10-CM

## 2020-04-07 RX ORDER — AMLODIPINE BESYLATE 5 MG/1
TABLET ORAL
Qty: 30 TABLET | Refills: 11 | Status: SHIPPED | OUTPATIENT
Start: 2020-04-07 | End: 2023-01-25 | Stop reason: SDUPTHER

## 2020-04-17 DIAGNOSIS — E11.21 TYPE 2 DIABETES MELLITUS WITH NEPHROPATHY: Chronic | ICD-10-CM

## 2020-04-17 RX ORDER — METFORMIN HYDROCHLORIDE 500 MG/1
1000 TABLET, EXTENDED RELEASE ORAL 2 TIMES DAILY WITH MEALS
Qty: 360 TABLET | Refills: 1 | Status: SHIPPED | OUTPATIENT
Start: 2020-04-17 | End: 2023-08-11

## 2020-05-04 ENCOUNTER — OFFICE VISIT (OUTPATIENT)
Dept: DIABETES | Facility: CLINIC | Age: 65
End: 2020-05-04
Payer: MEDICAID

## 2020-05-04 ENCOUNTER — LAB VISIT (OUTPATIENT)
Dept: LAB | Facility: HOSPITAL | Age: 65
End: 2020-05-04
Attending: PHYSICIAN ASSISTANT
Payer: MEDICAID

## 2020-05-04 ENCOUNTER — TELEPHONE (OUTPATIENT)
Dept: DIABETES | Facility: CLINIC | Age: 65
End: 2020-05-04

## 2020-05-04 DIAGNOSIS — I15.2 HYPERTENSION ASSOCIATED WITH DIABETES: Chronic | ICD-10-CM

## 2020-05-04 DIAGNOSIS — E11.59 HYPERTENSION ASSOCIATED WITH DIABETES: Chronic | ICD-10-CM

## 2020-05-04 DIAGNOSIS — D84.9 IMMUNOSUPPRESSED STATUS: ICD-10-CM

## 2020-05-04 DIAGNOSIS — E78.5 HYPERLIPIDEMIA ASSOCIATED WITH TYPE 2 DIABETES MELLITUS: ICD-10-CM

## 2020-05-04 DIAGNOSIS — E11.69 HYPERLIPIDEMIA ASSOCIATED WITH TYPE 2 DIABETES MELLITUS: ICD-10-CM

## 2020-05-04 DIAGNOSIS — F17.210 CIGARETTE NICOTINE DEPENDENCE WITHOUT COMPLICATION: ICD-10-CM

## 2020-05-04 DIAGNOSIS — E66.01 SEVERE OBESITY (BMI 35.0-35.9 WITH COMORBIDITY): ICD-10-CM

## 2020-05-04 DIAGNOSIS — E55.9 VITAMIN D DEFICIENCY: ICD-10-CM

## 2020-05-04 PROBLEM — E11.9 TYPE 2 DIABETES MELLITUS WITH HEMOGLOBIN A1C GOAL OF LESS THAN 7.0%: Status: RESOLVED | Noted: 2020-01-27 | Resolved: 2020-05-04

## 2020-05-04 PROBLEM — E11.21 TYPE 2 DIABETES MELLITUS WITH NEPHROPATHY: Chronic | Status: RESOLVED | Noted: 2017-02-28 | Resolved: 2020-05-04

## 2020-05-04 LAB
ESTIMATED AVG GLUCOSE: 160 MG/DL (ref 68–131)
GLUCOSE SERPL-MCNC: 190 MG/DL (ref 70–110)
HBA1C MFR BLD HPLC: 7.2 % (ref 4–5.6)

## 2020-05-04 PROCEDURE — 36415 COLL VENOUS BLD VENIPUNCTURE: CPT

## 2020-05-04 PROCEDURE — 99215 OFFICE O/P EST HI 40 MIN: CPT | Mod: S$PBB,,, | Performed by: PHYSICIAN ASSISTANT

## 2020-05-04 PROCEDURE — 99999 PR PBB SHADOW E&M-EST. PATIENT-LVL IV: ICD-10-PCS | Mod: PBBFAC,,, | Performed by: PHYSICIAN ASSISTANT

## 2020-05-04 PROCEDURE — 99214 OFFICE O/P EST MOD 30 MIN: CPT | Mod: PBBFAC | Performed by: PHYSICIAN ASSISTANT

## 2020-05-04 PROCEDURE — 83036 HEMOGLOBIN GLYCOSYLATED A1C: CPT

## 2020-05-04 PROCEDURE — 99999 PR PBB SHADOW E&M-EST. PATIENT-LVL IV: CPT | Mod: PBBFAC,,, | Performed by: PHYSICIAN ASSISTANT

## 2020-05-04 PROCEDURE — 82962 GLUCOSE BLOOD TEST: CPT | Mod: PBBFAC | Performed by: PHYSICIAN ASSISTANT

## 2020-05-04 PROCEDURE — 99215 PR OFFICE/OUTPT VISIT, EST, LEVL V, 40-54 MIN: ICD-10-PCS | Mod: S$PBB,,, | Performed by: PHYSICIAN ASSISTANT

## 2020-05-04 RX ORDER — INSULIN GLARGINE 100 [IU]/ML
12 INJECTION, SOLUTION SUBCUTANEOUS DAILY
Qty: 10.8 ML | Refills: 3 | Status: SHIPPED | OUTPATIENT
Start: 2020-05-04 | End: 2020-10-04

## 2020-05-04 NOTE — PROGRESS NOTES
PCP: Brittanie Kaba MD    Subjective:     Chief Complaint: Diabetes - Established Patient    HISTORY OF PRESENT ILLNESS: 65 y.o.   male presenting for diabetes management visit.   Patient has previously been established with the Diabetes Management Department and was last seen by Jossy Pittman NP on 02/06/20.   Patient is new to me and is here for establishment of future care .   Patient has had Type II diabetes since 2016.  Pertinent to decision making is the following comorbidities: HTN, HLD, Obesity by BMI, COPD and Vitamin D Deficiency, and Immunocompromised  Patient has the following Diabetes complications: with diabetic nephropathy  He  has attended diabetes education in the past.     Patient's most recent A1c of 7.5% was completed 4 months ago.   Patient states since His last A1c His blood glucose levels have been high  throughout the day .   Patient monitors blood glucose 2 times per day with meter : Fasting and Before Bed.   Patient blood glucose monitoring device will not be uploaded into Media Section today secondary to patient forgetting device. However, patient has BG Logs which will be reviewed today.   Patient's blood sugar log records show fasting BG ranging from 108 - 219 and before bed BG ranging from 144 - 239.   Patient endorses the following diabetes related symptoms: None.   Patient is due today for the following diabetes-related health maintenance standards: Foot Exam  and Urine Microalbumin/creatinine ratio.   He denies recent hospital admissions or emergency room visits.   He denies having hypoglycemia. However, patient had episode of low blood sugar at last visit.   Patient's concerns today include glycemic control. Of note, patient is poor historian.   Patient medication regimen is as below.     CURRENT DM MEDICATIONS:    Jardiance 25 mg   Lantus 10 units in morning   Metformin XR 1000 mg BID   Victoza 1.8 mg daily    Patient has failed the following Diabetes  medications:    Trulicity 1.5 mg weekly      Labs Reviewed.       No results found for: CPEPTIDE  No results found for: GLUTAMICACID       //   , There is no height or weight on file to calculate BMI.  His blood sugar in clinic today is:    Lab Results   Component Value Date    POCGLU 190 (A) 05/04/2020       Review of Systems   Constitutional: Negative for activity change, appetite change, chills and fever.   HENT: Negative for dental problem, mouth sores, nosebleeds, sore throat and trouble swallowing.    Eyes: Negative for pain and discharge.   Respiratory: Negative for shortness of breath, wheezing and stridor.    Cardiovascular: Negative for chest pain, palpitations and leg swelling.   Gastrointestinal: Negative for abdominal pain, diarrhea, nausea and vomiting.   Endocrine: Negative for polydipsia, polyphagia and polyuria.   Genitourinary: Negative for dysuria, frequency and urgency.   Musculoskeletal: Negative for joint swelling and myalgias.   Skin: Negative for rash and wound.   Neurological: Negative for dizziness, syncope, weakness and headaches.   Psychiatric/Behavioral: Negative for behavioral problems and dysphoric mood.         Diabetes Management Status  Statin: Taking  ACE/ARB: Taking    Screening or Prevention Patient's value Goal Complete/Controlled?   HgA1C Testing and Control   Lab Results   Component Value Date    HGBA1C 7.5 (H) 01/13/2020      Annually/Less than 8% Yes   Lipid profile : 08/06/2019 Annually Yes   LDL control Lab Results   Component Value Date    LDLCALC 131.8 08/06/2019    Annually/Less than 100 mg/dl  No   Nephropathy screening Lab Results   Component Value Date    MICALBCREAT 178.6 (H) 04/18/2019     No results found for: PROTEINUA Annually No   Blood pressure BP Readings from Last 1 Encounters:   03/04/20 121/68    Less than 140/90 Yes   Dilated retinal exam : 02/06/2020 Annually Yes    Foot exam   : 03/21/2019 Annually No     Social History     Socioeconomic History     Marital status: Single     Spouse name: Not on file    Number of children: Not on file    Years of education: Not on file    Highest education level: Not on file   Occupational History    Not on file   Social Needs    Financial resource strain: Not on file    Food insecurity:     Worry: Not on file     Inability: Not on file    Transportation needs:     Medical: Not on file     Non-medical: Not on file   Tobacco Use    Smoking status: Current Every Day Smoker     Packs/day: 0.50     Years: 24.00     Pack years: 12.00     Types: Cigarettes     Start date: 1994    Smokeless tobacco: Never Used    Tobacco comment: 7/30/2018 referral to smoking cessation program   Substance and Sexual Activity    Alcohol use: No     Alcohol/week: 0.0 standard drinks    Drug use: No    Sexual activity: Never   Lifestyle    Physical activity:     Days per week: Not on file     Minutes per session: Not on file    Stress: Not on file   Relationships    Social connections:     Talks on phone: Not on file     Gets together: Not on file     Attends [employer] service: Not on file     Active member of club or organization: Not on file     Attends meetings of clubs or organizations: Not on file     Relationship status: Not on file   Other Topics Concern    Not on file   Social History Narrative    Not on file     Past Medical History:   Diagnosis Date    Arthritis     Diabetes mellitus     Diabetes mellitus type 2, uncontrolled 7/19/2016    DM (diabetes mellitus) 2015     09/04/2017    Gall stones     Obesity     Psoriasis (a type of skin inflammation)     Rheumatoid arthritis of foot        Objective:        Physical Exam   Constitutional: He is oriented to person, place, and time. He appears well-developed and well-nourished. No distress.   HENT:   Head: Normocephalic and atraumatic.   Right Ear: External ear normal.   Left Ear: External ear normal.   Nose: Nose normal.   Eyes: Pupils are equal, round, and  reactive to light. EOM are normal. Right eye exhibits no discharge. Left eye exhibits no discharge.   Neck: Normal range of motion. Neck supple.   Cardiovascular: Normal rate, regular rhythm, normal heart sounds and intact distal pulses.   Pulses:       Dorsalis pedis pulses are 2+ on the right side, and 2+ on the left side.        Posterior tibial pulses are 2+ on the right side, and 2+ on the left side.   Pulmonary/Chest: Effort normal and breath sounds normal.   Abdominal: Soft.   Musculoskeletal: Normal range of motion.        Right foot: There is deformity (Toenails - thickened ). There is normal range of motion.        Left foot: There is deformity (Toenails - thickened). There is normal range of motion.   Feet:   Right Foot:   Protective Sensation: 6 sites tested. 1 site sensed.  Skin Integrity: Positive for callus and dry skin. Negative for ulcer, blister, skin breakdown, erythema or warmth.   Left Foot:   Protective Sensation: 6 sites tested. 1 site sensed.  Skin Integrity: Positive for callus and dry skin. Negative for ulcer, blister, skin breakdown, erythema or warmth.   Neurological: He is oriented to person, place, and time.   Skin: Skin is warm and dry. Capillary refill takes less than 2 seconds. He is not diaphoretic.   Psychiatric: He has a normal mood and affect. His behavior is normal. Judgment and thought content normal.         Assessment / Plan:     DM (diabetes mellitus), type 2, uncontrolled with complications  -     Hemoglobin A1C; Standing  -     POCT Glucose, Hand-Held Device  -     empagliflozin (JARDIANCE) 25 mg Tab; Take 1 tablet (25 mg) by mouth once daily.  Dispense: 90 tablet; Refill: 3  -     insulin (LANTUS SOLOSTAR U-100 INSULIN) glargine 100 units/mL (3mL) SubQ pen; Inject 12 Units into the skin once daily.  Dispense: 10.8 mL; Refill: 3  -     liraglutide 0.6 mg/0.1 mL, 18 mg/3 mL, subq PNIJ (VICTOZA 3-MUKESH) 0.6 mg/0.1 mL (18 mg/3 mL) PnIj; INJECT 1.8 MG UNDER THE SKIN ONCE DAILY   Dispense: 9 Syringe; Refill: 2  -     Microalbumin/creatinine urine ratio; Future; Expected date: 05/04/2020    Hyperlipidemia associated with type 2 diabetes mellitus  -     Hemoglobin A1C; Standing    Hypertension associated with diabetes    Immunosuppressed status    Vitamin D deficiency    Severe obesity (BMI 35.0-35.9 with comorbidity)    Cigarette nicotine dependence without complication      Additional Plan Details:    - POCT Glucose  - Encouraged continuation of lifestyle changes including regular exercise and limiting carbohydrates to 30-45 grams per meal threes times daily and 15 grams per snack with a limit of two daily.   - Encouraged continued monitoring of blood glucose with maintenance of 2 times daily at Fasting and Before Bed.   - Current DM Medication Regimen: Change Lantus to 12 units in the morning. Continue Jardiance 25 mg, Victoza 1.8 mg, and Metformin XR 1000 mg BID.    - Follow up with CDE - Rachelle Huertas   - Follow up with Podiatry for Toenail trimming - upcoming appt. Patient to call if urgent appt needed.   - Health Maintenance standards addressed today: Foot Exam - completed today and documented in physical exam with feedback to patient about proper diabetic foot care and findings and Urine Microalbumin / Creatinine Ratio  - Nursing Visit: Patient is below goal range for nursing visit for age group and will not need nursing visit at this time .   - Follow up in 3 months with A1c prior.       Blakeney McKnight, PA-C [facility]     A total of 60 minutes was spent in face to face time, of which over 50 % was spent in counseling patient on disease process, complications, treatment, and side effects of medications.

## 2020-05-04 NOTE — PATIENT INSTRUCTIONS
CURRENT DM MEDICATIONS:    Jardiance 25 mg   Lantus 12 units in morning - CHANGE FROM 10 to 12   Metformin XR 1000 mg twice a day   Victoza 1.8 mg daily

## 2020-05-04 NOTE — LETTER
May 4, 2020      [place]  [address] [place] [address]           [facility]  [address]  [place] [address]  Phone: [phone]  Fax: [phone]          Patient: Trey Stevens   MR Number: [id number]   YOB: 1955   Date of Visit: 5/4/2020       Dear Jossy Matos:    Thank you for referring Trey Stevens to me for evaluation. Attached you will find relevant portions of my assessment and plan of care.    If you have questions, please do not hesitate to call me. I look forward to following Trey Stevens along with you.    Sincerely,    Cherry Mike PA-C    Enclosure  CC:  No Recipients    If you would like to receive this communication electronically, please contact [email or web address] or [phone] to request more information on [facility] access.    For providers and/or their staff who would like to refer a patient to [facility], please contact us through our one-stop-shop provider referral line, [facility], at [phone].    If you feel you have received this communication in error or would no longer like to receive these types of communications, please e-mail [email or web address]

## 2020-05-11 ENCOUNTER — TELEPHONE (OUTPATIENT)
Dept: RHEUMATOLOGY | Facility: CLINIC | Age: 65
End: 2020-05-11

## 2020-05-11 NOTE — TELEPHONE ENCOUNTER
----- Message from Sheila Odom sent at 5/11/2020 11:51 AM CDT -----  Contact: pt   .Type:  Patient Returning Call    Who Called:pt  Who Left Message for Patient:Gina  Does the patient know what this is regarding?:rs appt  Would the patient rather a call back or a response via [facility]? Call back  Best Call Back Number:[phone]  Additional Information:

## 2020-05-13 ENCOUNTER — TELEPHONE (OUTPATIENT)
Dept: DIABETES | Facility: CLINIC | Age: 65
End: 2020-05-13

## 2020-05-26 ENCOUNTER — LAB VISIT (OUTPATIENT)
Dept: LAB | Facility: HOSPITAL | Age: 65
End: 2020-05-26
Attending: INTERNAL MEDICINE
Payer: MEDICAID

## 2020-05-26 DIAGNOSIS — E11.69 HYPERLIPIDEMIA ASSOCIATED WITH TYPE 2 DIABETES MELLITUS: ICD-10-CM

## 2020-05-26 DIAGNOSIS — Z29.9 PREVENTIVE MEASURE: ICD-10-CM

## 2020-05-26 DIAGNOSIS — E78.5 HYPERLIPIDEMIA ASSOCIATED WITH TYPE 2 DIABETES MELLITUS: ICD-10-CM

## 2020-05-26 DIAGNOSIS — E11.21 TYPE 2 DIABETES MELLITUS WITH NEPHROPATHY: Chronic | ICD-10-CM

## 2020-05-26 LAB
ALBUMIN SERPL BCP-MCNC: 3.8 G/DL (ref 3.5–5.2)
ALP SERPL-CCNC: 90 U/L (ref 55–135)
ALT SERPL W/O P-5'-P-CCNC: 33 U/L (ref 10–44)
ANION GAP SERPL CALC-SCNC: 14 MMOL/L (ref 8–16)
AST SERPL-CCNC: 26 U/L (ref 10–40)
BASOPHILS # BLD AUTO: 0.07 K/UL (ref 0–0.2)
BASOPHILS NFR BLD: 0.8 % (ref 0–1.9)
BILIRUB SERPL-MCNC: 0.3 MG/DL (ref 0.1–1)
BUN SERPL-MCNC: 14 MG/DL (ref 8–23)
CALCIUM SERPL-MCNC: 9.6 MG/DL (ref 8.7–10.5)
CHLORIDE SERPL-SCNC: 103 MMOL/L (ref 95–110)
CHOLEST SERPL-MCNC: 210 MG/DL (ref 120–199)
CHOLEST/HDLC SERPL: 4.7 {RATIO} (ref 2–5)
CO2 SERPL-SCNC: 24 MMOL/L (ref 23–29)
CREAT SERPL-MCNC: 1.3 MG/DL (ref 0.5–1.4)
DIFFERENTIAL METHOD: ABNORMAL
EOSINOPHIL # BLD AUTO: 0.4 K/UL (ref 0–0.5)
EOSINOPHIL NFR BLD: 4.8 % (ref 0–8)
ERYTHROCYTE [DISTWIDTH] IN BLOOD BY AUTOMATED COUNT: 16.1 % (ref 11.5–14.5)
EST. GFR  (AFRICAN AMERICAN): >60 ML/MIN/1.73 M^2
EST. GFR  (NON AFRICAN AMERICAN): 58.1 ML/MIN/1.73 M^2
GLUCOSE SERPL-MCNC: 101 MG/DL (ref 70–110)
HCT VFR BLD AUTO: 50.6 % (ref 40–54)
HDLC SERPL-MCNC: 45 MG/DL (ref 40–75)
HDLC SERPL: 21.4 % (ref 20–50)
HGB BLD-MCNC: 15 G/DL (ref 14–18)
IMM GRANULOCYTES # BLD AUTO: 0.04 K/UL (ref 0–0.04)
IMM GRANULOCYTES NFR BLD AUTO: 0.5 % (ref 0–0.5)
LDLC SERPL CALC-MCNC: 141 MG/DL (ref 63–159)
LYMPHOCYTES # BLD AUTO: 2.5 K/UL (ref 1–4.8)
LYMPHOCYTES NFR BLD: 29.1 % (ref 18–48)
MCH RBC QN AUTO: 27.5 PG (ref 27–31)
MCHC RBC AUTO-ENTMCNC: 29.6 G/DL (ref 32–36)
MCV RBC AUTO: 93 FL (ref 82–98)
MONOCYTES # BLD AUTO: 0.6 K/UL (ref 0.3–1)
MONOCYTES NFR BLD: 7 % (ref 4–15)
NEUTROPHILS # BLD AUTO: 5 K/UL (ref 1.8–7.7)
NEUTROPHILS NFR BLD: 57.8 % (ref 38–73)
NONHDLC SERPL-MCNC: 165 MG/DL
NRBC BLD-RTO: 0 /100 WBC
PLATELET # BLD AUTO: 298 K/UL (ref 150–350)
PMV BLD AUTO: 10.9 FL (ref 9.2–12.9)
POTASSIUM SERPL-SCNC: 4.6 MMOL/L (ref 3.5–5.1)
PROT SERPL-MCNC: 8.8 G/DL (ref 6–8.4)
RBC # BLD AUTO: 5.45 M/UL (ref 4.6–6.2)
SODIUM SERPL-SCNC: 141 MMOL/L (ref 136–145)
TRIGL SERPL-MCNC: 120 MG/DL (ref 30–150)
TSH SERPL DL<=0.005 MIU/L-ACNC: 1.84 UIU/ML (ref 0.4–4)
WBC # BLD AUTO: 8.56 K/UL (ref 3.9–12.7)

## 2020-05-26 PROCEDURE — 85025 COMPLETE CBC W/AUTO DIFF WBC: CPT

## 2020-05-26 PROCEDURE — 36415 COLL VENOUS BLD VENIPUNCTURE: CPT | Mod: PO

## 2020-05-26 PROCEDURE — 84443 ASSAY THYROID STIM HORMONE: CPT

## 2020-05-26 PROCEDURE — 82306 VITAMIN D 25 HYDROXY: CPT

## 2020-05-26 PROCEDURE — 83036 HEMOGLOBIN GLYCOSYLATED A1C: CPT

## 2020-05-26 PROCEDURE — 80053 COMPREHEN METABOLIC PANEL: CPT

## 2020-05-26 PROCEDURE — 84153 ASSAY OF PSA TOTAL: CPT

## 2020-05-26 PROCEDURE — 80061 LIPID PANEL: CPT

## 2020-05-27 LAB
25(OH)D3+25(OH)D2 SERPL-MCNC: 27 NG/ML (ref 30–96)
COMPLEXED PSA SERPL-MCNC: 0.29 NG/ML (ref 0–4)
ESTIMATED AVG GLUCOSE: 166 MG/DL (ref 68–131)
HBA1C MFR BLD HPLC: 7.4 % (ref 4–5.6)

## 2020-06-01 NOTE — PROGRESS NOTES
"Subjective:      Patient ID: Trey Stevens is a 65 y.o. male.    Chief Complaint: Annual Exam      HPI  Here for f/u medical problems and preventive exam.  Seeing DM specialist.  AC breakfast [id number].  Has been having some dietary indiscretion.  Energy normal.  No f/c/sw/cough.  No cp/sob/palp.  BMs normal.  Urine normal.  Taking vit D.    HM: 11/19 fluvax, 12/18 HAV, 2/16 [id number], 4/13 [id number], 2/16 TDaP, Cscope in the past normal and pt refuses more, 5/20 PSA, 2/16 HCV neg, 2/20 Eye Dr. Germain, 11/18 chest CT stable.     Review of Systems   Constitutional: Negative for appetite change, chills, diaphoresis, fatigue and fever.   HENT: Negative for congestion, ear pain, rhinorrhea and sinus pressure.    Respiratory: Negative for cough and shortness of breath.    Cardiovascular: Negative for chest pain and palpitations.   Gastrointestinal: Negative for abdominal distention, abdominal pain, blood in stool, constipation, diarrhea, nausea and vomiting.   Genitourinary: Negative for difficulty urinating, dysuria, frequency, hematuria and urgency.   Musculoskeletal: Negative for arthralgias.   Skin: Negative for rash.   Neurological: Negative for dizziness and headaches.   Psychiatric/Behavioral: The patient is not nervous/anxious.          Objective:   /86   Pulse 97   Temp 98.2 °F (36.8 °C) (Oral)   Ht 5' 7" (1.702 m)   Wt 124.9 kg (275 lb 5.7 oz)   SpO2 98%   BMI 43.13 kg/m²     Physical Exam   Constitutional: He is oriented to person, place, and time. He appears well-developed and well-nourished.   HENT:   Right Ear: External ear normal. Tympanic membrane is not injected.   Left Ear: External ear normal. Tympanic membrane is not injected.   Mouth/Throat: Oropharynx is clear and moist.   Eyes: Conjunctivae are normal.   Neck: Normal range of motion. Neck supple. No thyromegaly present.   Cardiovascular: Normal rate, regular rhythm and intact distal pulses. Exam reveals no gallop and no friction rub. "   No murmur heard.  Pulses:       Dorsalis pedis pulses are 2+ on the right side, and 2+ on the left side.        Posterior tibial pulses are 2+ on the right side, and 2+ on the left side.   Pulmonary/Chest: Effort normal and breath sounds normal. He has no wheezes. He has no rales.   Abdominal: Soft. Bowel sounds are normal. He exhibits no mass. There is no tenderness.   Musculoskeletal: He exhibits no edema.   Feet:   Right Foot:   Protective Sensation: 10 sites tested. 10 sites sensed.   Skin Integrity: Negative for ulcer, blister, skin breakdown, erythema, warmth, callus or dry skin.   Left Foot:   Protective Sensation: 10 sites tested. 10 sites sensed.   Skin Integrity: Negative for ulcer, blister, skin breakdown, erythema, warmth, callus or dry skin.   Lymphadenopathy:     He has no cervical adenopathy.   Neurological: He is alert and oriented to person, place, and time.   Psychiatric: He has a normal mood and affect.         Results for SYDNEY PETERSON (MRN [id number]) as of 6/1/2020 12:11   Ref. Range 5/26/2020 09:45 5/26/2020 10:05   WBC Latest Ref Range: 3.90 - 12.70 K/uL  8.56   RBC Latest Ref Range: 4.60 - 6.20 M/uL  5.45   Hemoglobin Latest Ref Range: 14.0 - 18.0 g/dL  15.0   Hematocrit Latest Ref Range: 40.0 - 54.0 %  50.6   MCV Latest Ref Range: 82 - 98 fL  93   MCH Latest Ref Range: 27.0 - 31.0 pg  27.5   MCHC Latest Ref Range: 32.0 - 36.0 g/dL  29.6 (L)   RDW Latest Ref Range: 11.5 - 14.5 %  16.1 (H)   Platelets Latest Ref Range: 150 - 350 K/uL  298   MPV Latest Ref Range: 9.2 - 12.9 fL  10.9   Gran% Latest Ref Range: 38.0 - 73.0 %  57.8   Gran # (ANC) Latest Ref Range: 1.8 - 7.7 K/uL  5.0   Lymph% Latest Ref Range: 18.0 - 48.0 %  29.1   Lymph # Latest Ref Range: 1.0 - 4.8 K/uL  2.5   Mono% Latest Ref Range: 4.0 - 15.0 %  7.0   Mono # Latest Ref Range: 0.3 - 1.0 K/uL  0.6   Eosinophil% Latest Ref Range: 0.0 - 8.0 %  4.8   Eos # Latest Ref Range: 0.0 - 0.5 K/uL  0.4   Basophil% Latest Ref Range: 0.0 -  1.9 %  0.8   Baso # Latest Ref Range: 0.00 - 0.20 K/uL  0.07   nRBC Latest Ref Range: 0 /100 WBC  0   Differential Method Unknown  Automated   Immature Grans (Abs) Latest Ref Range: 0.00 - 0.04 K/uL  0.04   Immature Granulocytes Latest Ref Range: 0.0 - 0.5 %  0.5   Sodium Latest Ref Range: 136 - 145 mmol/L  141   Potassium Latest Ref Range: 3.5 - 5.1 mmol/L  4.6   Chloride Latest Ref Range: 95 - 110 mmol/L  103   CO2 Latest Ref Range: 23 - 29 mmol/L  24   Anion Gap Latest Ref Range: 8 - 16 mmol/L  14   BUN, Bld Latest Ref Range: 8 - 23 mg/dL  14   Creatinine Latest Ref Range: 0.5 - 1.4 mg/dL  1.3   eGFR if non African American Latest Ref Range: >60 mL/min/1.73 m^2  58.1 (A)   eGFR if African American Latest Ref Range: >60 mL/min/1.73 m^2  >60.0   Glucose Latest Ref Range: 70 - 110 mg/dL  101   Calcium Latest Ref Range: 8.7 - 10.5 mg/dL  9.6   Alkaline Phosphatase Latest Ref Range: 55 - 135 U/L  90   PROTEIN TOTAL Latest Ref Range: 6.0 - 8.4 g/dL  8.8 (H)   Albumin Latest Ref Range: 3.5 - 5.2 g/dL  3.8   BILIRUBIN TOTAL Latest Ref Range: 0.1 - 1.0 mg/dL  0.3   AST Latest Ref Range: 10 - 40 U/L  26   ALT Latest Ref Range: 10 - 44 U/L  33   Triglycerides Latest Ref Range: 30 - 150 mg/dL  120   Cholesterol Latest Ref Range: 120 - 199 mg/dL  210 (H)   HDL Latest Ref Range: 40 - 75 mg/dL  45   Hdl/Cholesterol Ratio Latest Ref Range: 20.0 - 50.0 %  21.4   LDL Cholesterol External Latest Ref Range: 63.0 - 159.0 mg/dL  141.0   Non-HDL Cholesterol Latest Units: mg/dL  165   Total Cholesterol/HDL Ratio Latest Ref Range: 2.0 - 5.0   4.7   Vit D, 25-Hydroxy Latest Ref Range: 30 - 96 ng/mL  27 (L)   Hemoglobin A1C External Latest Ref Range: 4.0 - 5.6 %  7.4 (H)   Estimated Avg Glucose Latest Ref Range: 68 - 131 mg/dL  166 (H)   TSH Latest Ref Range: 0.400 - 4.000 uIU/mL  1.845   PSA, SCREEN Latest Ref Range: 0.00 - 4.00 ng/mL  0.29   Microalbum.,U,Random Latest Units: ug/mL 191.0    Creatinine, Random Ur Latest Ref Range: 23.0 -  375.0 mg/dL 98.0    MICROALB/CREAT RATIO Latest Ref Range: 0.0 - 30.0 ug/mg 194.9 (H)      Assessment:       1. Encounter for preventive health examination    2. Vitamin D deficiency    3. Psoriatic arthritis, destructive type    4. SCHUYLER (obstructive sleep apnea)    5. Mixed type COPD (chronic obstructive pulmonary disease)    6. Long-term use of immunosuppressant medication    7. Hypertension associated with diabetes    8. Hyperlipidemia associated with type 2 diabetes mellitus    9. Type 2 diabetes mellitus with nephropathy    10. Left-sided chest wall pain          Plan:     Encounter for preventive health examination- utd.    Vitamin D deficiency- con tsupp.    Psoriatic arthritis, destructive type,Long-term use of immunosuppressant medication - tramadol prn ankle pain.    SCHUYLER (obstructive sleep apnea)- doing well on cpap, cont.    Mixed type COPD (chronic obstructive pulmonary disease)- doing well with prn albuterol only.    Hypertension associated with diabetes- stable, cont rx.    Hyperlipidemia associated with type 2 diabetes mellitus- 10yr vasc risk 35%, stop prava, start atorva.  Recheck 3mo.    Type 2 diabetes mellitus with nephropathy- increase fluids by mouth, recheck 3mo.

## 2020-06-02 ENCOUNTER — OFFICE VISIT (OUTPATIENT)
Dept: FAMILY MEDICINE | Facility: CLINIC | Age: 65
End: 2020-06-02
Payer: MEDICAID

## 2020-06-02 VITALS
WEIGHT: 275.38 LBS | OXYGEN SATURATION: 98 % | SYSTOLIC BLOOD PRESSURE: 128 MMHG | HEART RATE: 97 BPM | HEIGHT: 67 IN | TEMPERATURE: 98 F | BODY MASS INDEX: 43.22 KG/M2 | DIASTOLIC BLOOD PRESSURE: 86 MMHG

## 2020-06-02 DIAGNOSIS — R07.89 LEFT-SIDED CHEST WALL PAIN: ICD-10-CM

## 2020-06-02 DIAGNOSIS — E11.69 HYPERLIPIDEMIA ASSOCIATED WITH TYPE 2 DIABETES MELLITUS: ICD-10-CM

## 2020-06-02 DIAGNOSIS — J44.9 MIXED TYPE COPD (CHRONIC OBSTRUCTIVE PULMONARY DISEASE): ICD-10-CM

## 2020-06-02 DIAGNOSIS — E55.9 VITAMIN D DEFICIENCY: ICD-10-CM

## 2020-06-02 DIAGNOSIS — L40.52 PSORIATIC ARTHRITIS, DESTRUCTIVE TYPE: ICD-10-CM

## 2020-06-02 DIAGNOSIS — G47.33 OSA (OBSTRUCTIVE SLEEP APNEA): ICD-10-CM

## 2020-06-02 DIAGNOSIS — I15.2 HYPERTENSION ASSOCIATED WITH DIABETES: Chronic | ICD-10-CM

## 2020-06-02 DIAGNOSIS — E78.5 HYPERLIPIDEMIA ASSOCIATED WITH TYPE 2 DIABETES MELLITUS: ICD-10-CM

## 2020-06-02 DIAGNOSIS — E11.59 HYPERTENSION ASSOCIATED WITH DIABETES: Chronic | ICD-10-CM

## 2020-06-02 DIAGNOSIS — Z00.00 ENCOUNTER FOR PREVENTIVE HEALTH EXAMINATION: Primary | ICD-10-CM

## 2020-06-02 DIAGNOSIS — E11.21 TYPE 2 DIABETES MELLITUS WITH NEPHROPATHY: ICD-10-CM

## 2020-06-02 DIAGNOSIS — Z79.60 LONG-TERM USE OF IMMUNOSUPPRESSANT MEDICATION: ICD-10-CM

## 2020-06-02 PROCEDURE — 99999 PR PBB SHADOW E&M-EST. PATIENT-LVL III: CPT | Mod: PBBFAC,,, | Performed by: INTERNAL MEDICINE

## 2020-06-02 PROCEDURE — 99213 OFFICE O/P EST LOW 20 MIN: CPT | Mod: PBBFAC,PO | Performed by: INTERNAL MEDICINE

## 2020-06-02 PROCEDURE — 99214 OFFICE O/P EST MOD 30 MIN: CPT | Mod: S$PBB,,, | Performed by: INTERNAL MEDICINE

## 2020-06-02 PROCEDURE — 99214 PR OFFICE/OUTPT VISIT, EST, LEVL IV, 30-39 MIN: ICD-10-PCS | Mod: S$PBB,,, | Performed by: INTERNAL MEDICINE

## 2020-06-02 PROCEDURE — 99999 PR PBB SHADOW E&M-EST. PATIENT-LVL III: ICD-10-PCS | Mod: PBBFAC,,, | Performed by: INTERNAL MEDICINE

## 2020-06-02 RX ORDER — ATORVASTATIN CALCIUM 80 MG/1
80 TABLET, FILM COATED ORAL DAILY
Qty: 90 TABLET | Refills: 3 | Status: SHIPPED | OUTPATIENT
Start: 2020-06-02 | End: 2021-06-03

## 2020-06-02 RX ORDER — TRAMADOL HYDROCHLORIDE 50 MG/1
50 TABLET ORAL 3 TIMES DAILY PRN
Qty: 45 TABLET | Refills: 1 | Status: SHIPPED | OUTPATIENT
Start: 2020-06-02 | End: 2020-09-18 | Stop reason: SDUPTHER

## 2020-06-05 ENCOUNTER — TELEPHONE (OUTPATIENT)
Dept: RHEUMATOLOGY | Facility: CLINIC | Age: 65
End: 2020-06-05

## 2020-06-05 NOTE — TELEPHONE ENCOUNTER
Spoke with Mr. Stevens who states that he would like to keep his appointment with Zulema Hill PA-C on Monday June 8.

## 2020-06-08 ENCOUNTER — TELEPHONE (OUTPATIENT)
Dept: RHEUMATOLOGY | Facility: CLINIC | Age: 65
End: 2020-06-08

## 2020-06-09 ENCOUNTER — OFFICE VISIT (OUTPATIENT)
Dept: RHEUMATOLOGY | Facility: CLINIC | Age: 65
End: 2020-06-09
Payer: MEDICAID

## 2020-06-09 ENCOUNTER — TELEPHONE (OUTPATIENT)
Dept: PHARMACY | Facility: CLINIC | Age: 65
End: 2020-06-09

## 2020-06-09 ENCOUNTER — CLINICAL SUPPORT (OUTPATIENT)
Dept: DIABETES | Facility: CLINIC | Age: 65
End: 2020-06-09
Payer: MEDICAID

## 2020-06-09 ENCOUNTER — LAB VISIT (OUTPATIENT)
Dept: LAB | Facility: HOSPITAL | Age: 65
End: 2020-06-09
Attending: PHYSICIAN ASSISTANT
Payer: MEDICAID

## 2020-06-09 VITALS
WEIGHT: 269.38 LBS | DIASTOLIC BLOOD PRESSURE: 68 MMHG | HEART RATE: 91 BPM | BODY MASS INDEX: 42.19 KG/M2 | SYSTOLIC BLOOD PRESSURE: 119 MMHG

## 2020-06-09 VITALS — BODY MASS INDEX: 42.18 KG/M2 | WEIGHT: 268.75 LBS | HEIGHT: 67 IN

## 2020-06-09 DIAGNOSIS — D84.9 IMMUNOSUPPRESSED STATUS: ICD-10-CM

## 2020-06-09 DIAGNOSIS — Z79.60 LONG-TERM USE OF IMMUNOSUPPRESSANT MEDICATION: ICD-10-CM

## 2020-06-09 DIAGNOSIS — L40.0 PLAQUE PSORIASIS: ICD-10-CM

## 2020-06-09 DIAGNOSIS — L40.52 PSORIATIC ARTHRITIS, DESTRUCTIVE TYPE: ICD-10-CM

## 2020-06-09 DIAGNOSIS — L40.9 PSORIASIS: ICD-10-CM

## 2020-06-09 DIAGNOSIS — L40.52 PSORIATIC ARTHRITIS, DESTRUCTIVE TYPE: Primary | ICD-10-CM

## 2020-06-09 LAB
ALBUMIN SERPL BCP-MCNC: 3.8 G/DL (ref 3.5–5.2)
ALP SERPL-CCNC: 92 U/L (ref 55–135)
ALT SERPL W/O P-5'-P-CCNC: 41 U/L (ref 10–44)
ANION GAP SERPL CALC-SCNC: 12 MMOL/L (ref 8–16)
AST SERPL-CCNC: 32 U/L (ref 10–40)
BASOPHILS # BLD AUTO: 0.06 K/UL (ref 0–0.2)
BASOPHILS NFR BLD: 0.7 % (ref 0–1.9)
BILIRUB SERPL-MCNC: 0.5 MG/DL (ref 0.1–1)
BUN SERPL-MCNC: 10 MG/DL (ref 8–23)
CALCIUM SERPL-MCNC: 9.6 MG/DL (ref 8.7–10.5)
CHLORIDE SERPL-SCNC: 106 MMOL/L (ref 95–110)
CO2 SERPL-SCNC: 24 MMOL/L (ref 23–29)
CREAT SERPL-MCNC: 1 MG/DL (ref 0.5–1.4)
CRP SERPL-MCNC: 9.9 MG/L (ref 0–8.2)
DIFFERENTIAL METHOD: ABNORMAL
EOSINOPHIL # BLD AUTO: 0.4 K/UL (ref 0–0.5)
EOSINOPHIL NFR BLD: 4.1 % (ref 0–8)
ERYTHROCYTE [DISTWIDTH] IN BLOOD BY AUTOMATED COUNT: 16 % (ref 11.5–14.5)
ERYTHROCYTE [SEDIMENTATION RATE] IN BLOOD BY WESTERGREN METHOD: 84 MM/HR (ref 0–23)
EST. GFR  (AFRICAN AMERICAN): >60 ML/MIN/1.73 M^2
EST. GFR  (NON AFRICAN AMERICAN): >60 ML/MIN/1.73 M^2
GLUCOSE SERPL-MCNC: 87 MG/DL (ref 70–110)
HCT VFR BLD AUTO: 49.2 % (ref 40–54)
HGB BLD-MCNC: 15.9 G/DL (ref 14–18)
IMM GRANULOCYTES # BLD AUTO: 0.04 K/UL (ref 0–0.04)
IMM GRANULOCYTES NFR BLD AUTO: 0.4 % (ref 0–0.5)
LYMPHOCYTES # BLD AUTO: 2.4 K/UL (ref 1–4.8)
LYMPHOCYTES NFR BLD: 26.2 % (ref 18–48)
MCH RBC QN AUTO: 28.1 PG (ref 27–31)
MCHC RBC AUTO-ENTMCNC: 32.3 G/DL (ref 32–36)
MCV RBC AUTO: 87 FL (ref 82–98)
MONOCYTES # BLD AUTO: 0.7 K/UL (ref 0.3–1)
MONOCYTES NFR BLD: 7.5 % (ref 4–15)
NEUTROPHILS # BLD AUTO: 5.6 K/UL (ref 1.8–7.7)
NEUTROPHILS NFR BLD: 61.5 % (ref 38–73)
NRBC BLD-RTO: 0 /100 WBC
PLATELET # BLD AUTO: 297 K/UL (ref 150–350)
PMV BLD AUTO: 9.9 FL (ref 9.2–12.9)
POTASSIUM SERPL-SCNC: 4.3 MMOL/L (ref 3.5–5.1)
PROT SERPL-MCNC: 8.7 G/DL (ref 6–8.4)
RBC # BLD AUTO: 5.66 M/UL (ref 4.6–6.2)
SODIUM SERPL-SCNC: 142 MMOL/L (ref 136–145)
WBC # BLD AUTO: 9.05 K/UL (ref 3.9–12.7)

## 2020-06-09 PROCEDURE — 99999 PR PBB SHADOW E&M-EST. PATIENT-LVL II: ICD-10-PCS | Mod: PBBFAC,,, | Performed by: DIETITIAN, REGISTERED

## 2020-06-09 PROCEDURE — 99214 PR OFFICE/OUTPT VISIT, EST, LEVL IV, 30-39 MIN: ICD-10-PCS | Mod: S$PBB,,, | Performed by: PHYSICIAN ASSISTANT

## 2020-06-09 PROCEDURE — 99214 OFFICE O/P EST MOD 30 MIN: CPT | Mod: PBBFAC,27 | Performed by: PHYSICIAN ASSISTANT

## 2020-06-09 PROCEDURE — 99999 PR PBB SHADOW E&M-EST. PATIENT-LVL II: CPT | Mod: PBBFAC,,, | Performed by: DIETITIAN, REGISTERED

## 2020-06-09 PROCEDURE — 86140 C-REACTIVE PROTEIN: CPT

## 2020-06-09 PROCEDURE — G0108 DIAB MANAGE TRN  PER INDIV: HCPCS | Mod: PBBFAC | Performed by: DIETITIAN, REGISTERED

## 2020-06-09 PROCEDURE — 99999 PR PBB SHADOW E&M-EST. PATIENT-LVL IV: ICD-10-PCS | Mod: PBBFAC,,, | Performed by: PHYSICIAN ASSISTANT

## 2020-06-09 PROCEDURE — 99999 PR PBB SHADOW E&M-EST. PATIENT-LVL IV: CPT | Mod: PBBFAC,,, | Performed by: PHYSICIAN ASSISTANT

## 2020-06-09 PROCEDURE — 36415 COLL VENOUS BLD VENIPUNCTURE: CPT

## 2020-06-09 PROCEDURE — 85652 RBC SED RATE AUTOMATED: CPT

## 2020-06-09 PROCEDURE — 99214 OFFICE O/P EST MOD 30 MIN: CPT | Mod: S$PBB,,, | Performed by: PHYSICIAN ASSISTANT

## 2020-06-09 PROCEDURE — 99212 OFFICE O/P EST SF 10 MIN: CPT | Mod: PBBFAC | Performed by: DIETITIAN, REGISTERED

## 2020-06-09 PROCEDURE — 80053 COMPREHEN METABOLIC PANEL: CPT

## 2020-06-09 PROCEDURE — 85025 COMPLETE CBC W/AUTO DIFF WBC: CPT

## 2020-06-09 NOTE — PROGRESS NOTES
"Diabetes Education  Author: Winter Garcia RD, CDE  Date: 6/9/2020    Diabetes Care Management Summary  Diabetes Education Record Assessment/Progress: Initial  Current Diabetes Risk Level: Low     Referring Provider: Cherry Mike, *  65 y.o. male in clinic today for diabetes education. Patient has taken diabetes education courses in the past.     Last Visit with Diabetes Educator: 5/2019- Rachelle Huertas.     Weight: 121.9 kg (268 lb 11.9 oz)   Height: 5' 7" (170.2 cm)   Body mass index is 42.09 kg/m².      Diabetes Type  Diabetes Type : Type II    Diabetes History  Diabetes Diagnosis: 3-5 years  Current Treatment: Insulin, Injectable, Oral Medication( Jardiance, Lantus, Victoza)  Reviewed Problem List with Patient: Yes    Health Maintenance was reviewed today with patient. Discussed with patient importance of routine eye exams, foot exams/foot care, blood work (i.e.: A1c, microalbumin, and lipid), dental visits, yearly flu vaccine, and pneumonia vaccine as indicated by PCP. Patient verbalized understanding.     Health Maintenance Topics with due status: Not Due       Topic Last Completion Date    TETANUS VACCINE 02/24/2016    Colonoscopy 10/24/2016    Eye Exam 02/06/2020    Lipid Panel 05/26/2020    Hemoglobin A1c 05/26/2020    Foot Exam 06/02/2020    Low Dose Statin 06/09/2020     There are no preventive care reminders to display for this patient.    Nutrition  Meal Planning: skipping meals( usually skips breakfast, eats when he is hungry)  What type of sweetener do you use?: none  What type of beverages do you drink?: milk, water, juice( whole milk)  Meal Plan 24 Hour Recall - Breakfast:  nothing  Meal Plan 24 Hour Recall - Lunch:  Bologna sandwich with lettuce and tomato, milk  Meal Plan 24 Hour Recall - Dinner:  Hamburger on bun, 2 handfuls of potato chips, 16 ounces of apple juice   Meal Plan 24 Hour Recall - Snack:  nothing    Monitoring   Self Monitoring :  Checks everyday in AM. FBG usually in " mid 100's- low 200's.   Blood Glucose Logs: Yes  Do you use a personal continuous glucose monitor?: No  In the last month, how often have you had a low blood sugar reaction?: never  What are your symptoms of low blood sugar?:  doesn't know how it feels  How do you treat low blood sugar?:  doesn't know how it feels  Can you tell when your blood sugar is too high?: no  How do you treat high blood sugar?:  Doesn't know.               Exercise   Exercise Type: ( Active around the house- outside- cut grass, plays with dog in the yard)    Current Diabetes Treatment   Current Treatment: Insulin, Injectable, Oral Medication( Jardiance, Lantus, Victoza)    Social History  Preferred Learning Method: Face to Face  Primary Support: Self  Occupation:  Retired  Smoking Status: Current Smoker  Alcohol Use: Never                                Barriers to Change  Barriers to Change: None  Learning Challenges : None    Readiness to Learn   Readiness to Learn : Acceptance    Cultural Influences  Cultural Influences: No    Diabetes Education Assessment/Progress  Diabetes Disease Process (diabetes disease process and treatment options): Individual Session, Discussion  Nutrition (Incorporating nutritional management into one's lifestyle): Individual Session, Discussion, Written Materials Provided  Physical Activity (incorporating physical activity into one's lifestyle): Individual Session, Discussion, Written Materials Provided, Comprehends Key Points  Medications (states correct name, dose, onset, peak, duration, side effects & timing of meds): Individual Session, Discussion  Monitoring (monitoring blood glucose/other parameters & using results): Individual Session, Discussion, Written Materials Provided  Acute Complications (preventing, detecting, and treating acute complications): Individual Session, Discussion, Written Materials Provided  Chronic Complications (preventing, detecting, and treating chronic complications): Individual  Session, Discussion  Clinical (diabetes, other pertinent medical history, and relevant comorbidities reviewed during visit): Individual Session, Discussion  Cognitive (knowledge of self-management skills, functional health literacy): Individual Session, Discussion, Needs Review  Psychosocial (emotional response to diabetes): Individual Session, Discussion  Diabetes Distress and Support Systems: Not Covered/Deferred  Behavioral (readiness for change, lifestyle practices, self-care behaviors): Individual Session, Discussion    Goals  Patient has selected/evaluated goals during today's session: Yes, selected  Healthy Eating: Set( Patient will reduce intake of juice- cut in half and drink more water. )  Start Date: 06/09/20  Target Date: 08/09/20  Reducing Risks: Set( Patient will drink water if his BG is over 200, will pay attention to symptoms of high BG. )  Start Date: 06/09/20  Target Date: 08/09/20         Diabetes Care Plan/Intervention  Education Plan/Intervention: Individual Follow-Up DSMT( 2 month follow-up)    Diabetes Meal Plan  Restrictions: Restricted Carbohydrate  Calories: 1600, 1800  Carbohydrate Per Meal: 45-60g, 30-45g  Carbohydrate Per Snack : 7-15g    Today's Self-Management Care Plan was developed with the patient's input and is based on barriers identified during today's assessment.    The long and short-term goals in the care plan were written with the patient/caregiver's input. The patient has agreed to work toward these goals to improve his overall diabetes control.      The patient received a copy of today's self-management plan and verbalized understanding of the care plan, goals, and all of today's instructions.      The patient was encouraged to communicate with his physician and care team regarding his condition(s) and treatment.  I provided the patient with my contact information today and encouraged him to contact me via phone or patient portal as needed.     Education Units of Time    Time Spent: 60 min

## 2020-06-09 NOTE — PROGRESS NOTES
Subjective:       Patient ID: Trey Stevens is a 65 y.o. male.    Chief Complaint: Psoriatic Arthritis and Psoriasis    Trey is here for routine follow up.     He has chronic psoriatic arthritis and psoriasis.  In the past failed mtx monotherapy, failed topicals and UV;  Failed humira and Enbrel, now on Cosentyx 300 mg Q month after completion of loading dose. Initially did ok but rash worse now.     methotrexate dose 20 mg/wk; taking  folic acid once daily.  Persistent severe rash nestor arms and legs; Thickened indurated plaques on forearms and elbows with diffuse rash    Some recent data on Optimize shows pt >90 kg - lower dose was not effective  We changed him to 300 mg Q 14 day to reach drug therapeutic level to clear the skin  Severe plaq psoriasis with BSA 12%- complicate by Vitiligo   Did 6 week trial and skin was starting to improve  BSA down from 12% to 8%    Initially insurance denied increase dose but in march they sent letter approving 300 mg Q 14 day cosentyx for 6 months  Since last visit he has received 1 dose of cosentyx shipped to him  Out since late march  Skin worsened again  Still on mtx     Using topicals as needed ; Using topical taclonex in conjunction as well, still on mtx   Using topical faithfully now  He has been compliant with all his meds    On going neuropathy C/w foot pain mostly. He has polyneuropathy  Rates his pain today 8/10.   He has some chronic arthritis changes to his dip joints bilaterally and chronic deformities to his feet with lateral deviation of his toes.  Using otc tylenol  Every day but only takes the tramadol 1-2 X weekly at night when his feet hurt    He has multiple lung nodules which are followed by pulm and stable per his last pulmonary visit feb 2019, his breathing is stable. He does still smoke.      Was on vit D 50K weekly but now on otc Vit D3 5000 daily          Psoriasis   Associated symptoms include arthralgias, numbness and a rash. Pertinent negatives include  no abdominal pain, chest pain, chills, congestion, coughing, fatigue, fever, headaches, joint swelling, myalgias, nausea, vomiting or weakness.           He reports no joint swelling. Pertinent negatives include no dysuria, fatigue, fever, myalgias or headaches.         Review of Systems   Constitutional: Negative for chills, fatigue, fever and unexpected weight change.        Poor hygiene      HENT: Negative for congestion, mouth sores and rhinorrhea.    Eyes: Negative for pain, discharge and redness.   Respiratory: Negative for cough, shortness of breath and wheezing.    Cardiovascular: Negative for chest pain and leg swelling.   Gastrointestinal: Negative for abdominal pain, diarrhea, nausea and vomiting.   Endocrine: Negative for cold intolerance and heat intolerance.   Genitourinary: Negative for dysuria.   Musculoskeletal: Positive for arthralgias. Negative for joint swelling and myalgias.   Skin: Positive for color change and rash.   Allergic/Immunologic: Negative for immunocompromised state.   Neurological: Positive for numbness. Negative for weakness and headaches.        Diabetic neuropathy   Psychiatric/Behavioral: The patient is not nervous/anxious.          Objective:   /68   Pulse 91   Wt 122.2 kg (269 lb 6.4 oz)   BMI 42.19 kg/m²      Physical Exam   Constitutional: He is oriented to person, place, and time and well-developed, well-nourished, and in no distress.   HENT:   Head: Normocephalic and atraumatic.   Eyes: Pupils are equal, round, and reactive to light. Right eye exhibits no discharge.   Neck: Normal range of motion.   Cardiovascular: Normal rate, regular rhythm and normal heart sounds.  Exam reveals no friction rub.    Pulmonary/Chest: Effort normal and breath sounds normal. No respiratory distress.   Abdominal: Soft. He exhibits no distension. There is no tenderness.   Lymphadenopathy:     He has no cervical adenopathy.   Neurological: He is alert and oriented to person, place, and  time.   Skin: Rash noted. No erythema.          Psoriasis rash bilateral elbows and small patch left hip    Vitiligo bilateral forearms       Psychiatric: Mood normal.   Musculoskeletal: Normal range of motion. He exhibits edema and deformity.   Bilateral wrists, mcps no synovitis good rom   nestor dips with chronic damage, bony enlargement     nestor Right 5th dip flexion deformity,   Left thumb no flexion to the IP joint, right thumb ip joint motion decreased     good rom to nestor ankles, no swelling  nestor feet very dirty, 2-5 toes deviate laterally.  Mild lower ext edema                   Immunization History   Administered Date(s) Administered    Hepatitis A, Adult 12/20/2018    Influenza - High Dose - PF (65 years and older) 10/18/2018    Influenza - Quadrivalent 10/24/2016    Influenza - Quadrivalent - PF (6 months and older) 10/30/2017, 11/07/2019    PPD Test 08/05/2013    Pneumococcal Conjugate - 13 Valent 02/24/2016    Pneumococcal Polysaccharide - 23 Valent 04/09/2013    Tdap 02/24/2016             Recent Results (from the past 168 hour(s))   CBC auto differential    Collection Time: 06/09/20 12:19 PM   Result Value Ref Range    WBC 9.05 3.90 - 12.70 K/uL    RBC 5.66 4.60 - 6.20 M/uL    Hemoglobin 15.9 14.0 - 18.0 g/dL    Hematocrit 49.2 40.0 - 54.0 %    Mean Corpuscular Volume 87 82 - 98 fL    Mean Corpuscular Hemoglobin 28.1 27.0 - 31.0 pg    Mean Corpuscular Hemoglobin Conc 32.3 32.0 - 36.0 g/dL    RDW 16.0 (H) 11.5 - 14.5 %    Platelets 297 150 - 350 K/uL    MPV 9.9 9.2 - 12.9 fL    Immature Granulocytes 0.4 0.0 - 0.5 %    Gran # (ANC) 5.6 1.8 - 7.7 K/uL    Immature Grans (Abs) 0.04 0.00 - 0.04 K/uL    Lymph # 2.4 1.0 - 4.8 K/uL    Mono # 0.7 0.3 - 1.0 K/uL    Eos # 0.4 0.0 - 0.5 K/uL    Baso # 0.06 0.00 - 0.20 K/uL    nRBC 0 0 /100 WBC    Gran% 61.5 38.0 - 73.0 %    Lymph% 26.2 18.0 - 48.0 %    Mono% 7.5 4.0 - 15.0 %    Eosinophil% 4.1 0.0 - 8.0 %    Basophil% 0.7 0.0 - 1.9 %    Differential Method  Automated    Comprehensive metabolic panel    Collection Time: 20 12:19 PM   Result Value Ref Range    Sodium 142 136 - 145 mmol/L    Potassium 4.3 3.5 - 5.1 mmol/L    Chloride 106 95 - 110 mmol/L    CO2 24 23 - 29 mmol/L    Glucose 87 70 - 110 mg/dL    BUN, Bld 10 8 - 23 mg/dL    Creatinine 1.0 0.5 - 1.4 mg/dL    Calcium 9.6 8.7 - 10.5 mg/dL    Total Protein 8.7 (H) 6.0 - 8.4 g/dL    Albumin 3.8 3.5 - 5.2 g/dL    Total Bilirubin 0.5 0.1 - 1.0 mg/dL    Alkaline Phosphatase 92 55 - 135 U/L    AST 32 10 - 40 U/L    ALT 41 10 - 44 U/L    Anion Gap 12 8 - 16 mmol/L    eGFR if African American >60 >60 mL/min/1.73 m^2    eGFR if non African American >60 >60 mL/min/1.73 m^2   C-reactive protein    Collection Time: 20 12:19 PM   Result Value Ref Range    CRP 9.9 (H) 0.0 - 8.2 mg/L          Ref. Range 2018 11:52   Vit D, 25-Hydroxy Latest Ref Range: 30 - 96 ng/mL 29 (L)          Ref. Range 2018 11:13 2019 12:13   Hep A IgM Unknown Negative    Hep B C IgM Unknown Negative    Hepatitis B Surface Ag Unknown Negative    Hepatitis C Ab Unknown Negative Negative   Mitogen - Nil Latest Ref Range: See text IU/mL >10.000    NIL Latest Ref Range: See text IU/mL 0.034    TB Antigen Latest Ref Range: See text IU/mL 0.115    TB Antigen - Nil Latest Ref Range: See Text IU/mL 0.081    TB Gold Unknown Negative    HIV 1/2 Ag/Ab Latest Ref Range: Negative   Negative          19 Pulmonary function tests: FEV1: 1.52  (46.1 % predicted), FVC:  2.19 (51.2 % predicted), FEV1/FVC:70, T.36 (64.6 % predicted), RV/TLVC: 50 (131.6 % predicted), DLCO: 16.22 (59.3 % predicted)   Severe mixed obstruction with restriction. Diffusion capacity is moderately reduced but corrects for alveolar volume      18 Chest x-ray     COMPARISON:  2018    FINDINGS:  Cardiac silhouette and mediastinal contours are stable.  Lungs improved aeration of the lung bases with minimal interstitial changes remaining which may be  chronic.  Osseous structures are intact.   Impression       Improved basilar aeration.       10/30/17 nestor hand and foot x-ray- damage in both hand and feet consistent with PsA   4/2016 XR hands reviewed: psoriatic arthritis changes noted to dip joints bilaterally, nestor thumb IP joints        Assessment:       1. Psoriatic arthritis, destructive type    2. Psoriasis    3. Long-term use of immunosuppressant medication    4. Immunosuppressed status        1. Chronic refractory Psoriatic arthritis and skin  Psoriasis:  Was improving On Cosentyx maintenance dose 300 mg every 14 days   suboptimals response to 300 mg/month dose    msk -foot/ankle involvement   skin not controlled was improved on higher dose of Cosentyx x 6 weeks   BSA was 12%- now down to 8%  on background mtx 20 mg week split dose-  Out of med  Since late march-now rash flaring again bsa back up to 12-13%      VICENTE-28 (CRP): 1.96 (Remission)  CDAI 8  ROBERT 0.8      In the past   Failed mtx monotherapy; failed enbrel and  humira   Failed topicals and UV trt       2. High Risk Medication monitoring: no toxicity from  mtx or Cosentyx      3. Immunocompromised: utd,     4. Vit D deficiency: completed vit D 50,000U/2Xweekly, last level low normal 26 (2/2018) now on otc Vit D3 5000 twice weekly     5. Chronic lung nodules- stable monitored by pulmonology- seen yearly, next visit  feb 2021    6. tob use- smoking     7. Mild elevated LFT - will follow - lft normal today       Plan:         C./w topical bid - taclonex  Get some sun light     C/w mtx 20 mg Q week, split dose  Folic acid once a day    Need to resume his  cosentyx 300 mg Q 14 days asap  See approval info in media from [employer]    Https://www.ncbi.nlm.nih.gov/pubmed/94109061    Optimise StudyCONCLUSIONS:  Standard q4w dosing of secukinumab 300 mg is the optimal dosing regimen to achieve and maintain clear or almost clear skin. Patients with body weight ? 90 kg not achieving PASI 90 at week 24 may benefit  from the q2w dosing regimen. What's already known about this topic? Individual responses to biologics in patients with psoriasis vary considerably and there may be a need to individualize treatment. Dose optimization strategies of currently available biologic drugs have been investigated mainly in rheumatic disorders. Secukinumab has shown long-term PASI 90/100 responses (percentage improvement in Psoriasis Area and Severity Index) to year 5 in patients with moderate-to-severe plaque psoriasis when used at the dose of 300 mg every 4 weeks. What does this study add? Standard every 4 week (q4w) dosing of secukinumab 300 mg is the optimal regimen to achieve and maintain clear or almost clear skin at week 52; the majority of the patients (85·7%) maintain PASI 90 at week 52. Superiority of intensified (q2w) dosing over the q4w regimen could not be claimed. However, patients with a higher body weight (? 90 kg) not achieving PASI 90 response at week 24 may benefit from q2w dosing.    Always remember to Hold mtx and Cosentyx  for signs of infection or for surgery   Pt verbalized understanding      TB gold and acute hep panel done 6/2018- neg, no need to repeat yet  Do every 1-2 years while on biologics    C/w otc vit D3 to 5K every day, check d level Q year     Return in 16 weeks w/reg 4 labs      Repeat  xrays hands and feet in 1 year    Counseled pt on smoking cessation    Please call or send portal message with any questions or concerns

## 2020-06-10 ENCOUNTER — TELEPHONE (OUTPATIENT)
Dept: RHEUMATOLOGY | Facility: CLINIC | Age: 65
End: 2020-06-10

## 2020-06-10 DIAGNOSIS — Z11.1 SCREENING-PULMONARY TB: Primary | ICD-10-CM

## 2020-06-10 NOTE — TELEPHONE ENCOUNTER
----- Message from Gina Grimes MA sent at 6/10/2020  8:26 AM CDT -----  Regarding: FW: Cosentyx - updated TB      ----- Message -----  From: Nkechi Fleming PharmD  Sent: 6/10/2020   7:26 AM CDT  To: Zulema Hill PA-C, Liz STEPHENSON Staff  Subject: Cosentyx - updated TB                            Good morning,    I see the patient last had a negative TB result on 6/19/18. May the patient have an updated TB ordered? [facility] will proceed with Cosentyx dispense in the meantime since continuation of therapy. Thank you    Best,    Nkechi Fleming, PharmD  Clinical Pharmacist  [facility]  [phone]

## 2020-06-10 NOTE — TELEPHONE ENCOUNTER
----- Message from Zulema Hill PA-C sent at 6/10/2020 10:31 AM CDT -----  Regarding: RE: Cosentyx - updated TB  Please add tb gold to next labs  Orders placed    Zulema  ----- Message -----  From: Gina Grimes MA  Sent: 6/10/2020   8:26 AM CDT  To: Zulema Hill PA-C  Subject: FW: Cosentyx - updated TB                            ----- Message -----  From: Nkechi Fleming PharmD  Sent: 6/10/2020   7:26 AM CDT  To: Zulema Hill PA-C, Liz STEPHENSON Staff  Subject: Cosentyx - updated TB                            Good morning,    I see the patient last had a negative TB result on 6/19/18. May the patient have an updated TB ordered? [facility] will proceed with Cosentyx dispense in the meantime since continuation of therapy. Thank you    Best,    Nkechi Fleming, Angel  Clinical Pharmacist  [facility]  [phone]

## 2020-06-12 ENCOUNTER — TELEPHONE (OUTPATIENT)
Dept: RHEUMATOLOGY | Facility: CLINIC | Age: 65
End: 2020-06-12

## 2020-06-12 NOTE — TELEPHONE ENCOUNTER
----- Message from Jessica Montes sent at 6/12/2020  9:56 AM CDT -----  Contact:   [facility] [phone]  Would like to consult with nurse regarding authorization for Cosentyx.  Please call back at [phone].  Engel Md

## 2020-06-16 NOTE — TELEPHONE ENCOUNTER
DOCUMENTATION ONLY:     Appeal for Cosentyx OVERTURNED, APPROVED from 1/17/20 to 7/17/20.      Case ID# [id number]    Co-Pay: $0.00    Patient Assistance IS NOT required.     Forward to clinical pharmacist for consult & shipment.      ANJU

## 2020-06-17 ENCOUNTER — TELEPHONE (OUTPATIENT)
Dept: PHARMACY | Facility: CLINIC | Age: 65
End: 2020-06-17

## 2020-06-17 NOTE — TELEPHONE ENCOUNTER
Cosentyx SensoReady Pens 150mg consult completed on 20. Cosentyx SensoReady Pens 150mg (4 pens) will be shipped on  to arrive at patient's home on 20 via [employer]. $ 0copay. Patient will start Cosentyx SensoReady Pens 2 pen (300mg) on 20 explained to pt that the dose will be every 2 weeks thereafter (pt marked 7/3/20 for next dose on his calendar). Address confirmed, CC on file. Confirmed 2 patient identifiers - name and . Therapy Appropriate per Optimise study and case studies showing safty/efficacy.    - Cosentyx SensoReady Pens 150mg:  Inject 2 pens (300mg) every other week (every 14 days).    -Storage: Refrigerate between 36-46 degrees Fahrenheit  Use within ONE HOUR of taking out of fridge.    -Injection technique:  - Wash hands before and after injection.  - Monthly RX will come with gauze, bandaids, and alcohol swabs.  - Patient may inject in either the tops of the thighs, abdomen- but at least 2 inches away from his belly button. Patient was instructed to rotate injections sites.    - Patient will use sharps container; once full, per [place] law, he may lock the sharps container and place in his trash. He can then contact the Pharmacy and we will replace the sharps at no additional charge. Pt requests new sharps container which was added to shipment.    -Potential Side effects:  Pt denied any SE/issues while taking Cosentyx previously.     -DDI: Medication list reviewed.  Patient verbalized understanding. Compliance stressed. Patient advised to keep a calendar marking dates of injections to ensure better compliance. Patient advised to call myself or provider should any questions arise. Patient plans to start Cosentyx SensoReady Pens on 20. Consultation included: indication; goals of treatment; administration; storage and handling; how to handle injection site reactions; the importance of compliance; the importance of laboratory monitoring (pt due for TB testing which he states he will be  completing 6/26/20. [facility] will follow up after results); the importance of keeping all follow up appointments. Patient understands to report any medication changes to [facility] and provider. All questions answered and addressed to patients satisfaction. [facility] to contact patient in 3 weeks for refills.

## 2020-06-19 ENCOUNTER — TELEPHONE (OUTPATIENT)
Dept: RHEUMATOLOGY | Facility: CLINIC | Age: 65
End: 2020-06-19

## 2020-06-19 NOTE — TELEPHONE ENCOUNTER
----- Message from Jose Manuel Johansen MD sent at 6/19/2020 12:31 PM CDT -----  Regarding: RE: Pending lab  My bad, thank you.  MRN [id number].  ----- Message -----  From: Constance Alamo MA  Sent: 6/19/2020  11:36 AM CDT  To: Jose Manuel Johansen MD  Subject: RE: Pending lab                                  Who is patient ?   ----- Message -----  From: Jose Manuel Johansen MD  Sent: 6/19/2020  11:24 AM CDT  To: Eleno Richard Staff  Subject: Pending lab                                      This patient is awaiting and updated QuantiFERON TB (order on file) prior approval of new dosing for Cosentyx.  Thank you!

## 2020-06-19 NOTE — TELEPHONE ENCOUNTER
Left message for pateint that lab is needed to get insurance approval for medication. Will draw on 6-26 when he sees. Dr. Hernandez.

## 2020-06-26 ENCOUNTER — OFFICE VISIT (OUTPATIENT)
Dept: PODIATRY | Facility: CLINIC | Age: 65
End: 2020-06-26
Payer: MEDICAID

## 2020-06-26 VITALS
BODY MASS INDEX: 42.06 KG/M2 | HEART RATE: 85 BPM | DIASTOLIC BLOOD PRESSURE: 77 MMHG | WEIGHT: 268 LBS | SYSTOLIC BLOOD PRESSURE: 130 MMHG | HEIGHT: 67 IN

## 2020-06-26 DIAGNOSIS — L84 CORN OR CALLUS: ICD-10-CM

## 2020-06-26 DIAGNOSIS — E11.49 TYPE II DIABETES MELLITUS WITH NEUROLOGICAL MANIFESTATIONS: Primary | ICD-10-CM

## 2020-06-26 DIAGNOSIS — B35.1 DERMATOPHYTOSIS OF NAIL: ICD-10-CM

## 2020-06-26 DIAGNOSIS — R23.4 SKIN FISSURES: ICD-10-CM

## 2020-06-26 DIAGNOSIS — E66.01 SEVERE OBESITY (BMI 35.0-35.9 WITH COMORBIDITY): ICD-10-CM

## 2020-06-26 PROCEDURE — 11721 DEBRIDE NAIL 6 OR MORE: CPT | Mod: Q9,PBBFAC,59 | Performed by: PODIATRIST

## 2020-06-26 PROCEDURE — 99999 PR PBB SHADOW E&M-EST. PATIENT-LVL IV: CPT | Mod: PBBFAC,,, | Performed by: PODIATRIST

## 2020-06-26 PROCEDURE — 11056 PARNG/CUTG B9 HYPRKR LES 2-4: CPT | Mod: Q9,PBBFAC | Performed by: PODIATRIST

## 2020-06-26 PROCEDURE — 11721 DEBRIDE NAIL 6 OR MORE: CPT | Mod: 59,Q9,S$PBB, | Performed by: PODIATRIST

## 2020-06-26 PROCEDURE — 11056 PR TRIM BENIGN HYPERKERATOTIC SKIN LESION,2-4: ICD-10-PCS | Mod: Q9,S$PBB,, | Performed by: PODIATRIST

## 2020-06-26 PROCEDURE — 99214 OFFICE O/P EST MOD 30 MIN: CPT | Mod: 25,S$PBB,, | Performed by: PODIATRIST

## 2020-06-26 PROCEDURE — 11721 PR DEBRIDEMENT OF NAILS, 6 OR MORE: ICD-10-PCS | Mod: 59,Q9,S$PBB, | Performed by: PODIATRIST

## 2020-06-26 PROCEDURE — 99214 OFFICE O/P EST MOD 30 MIN: CPT | Mod: PBBFAC | Performed by: PODIATRIST

## 2020-06-26 PROCEDURE — 99214 PR OFFICE/OUTPT VISIT, EST, LEVL IV, 30-39 MIN: ICD-10-PCS | Mod: 25,S$PBB,, | Performed by: PODIATRIST

## 2020-06-26 PROCEDURE — 99999 PR PBB SHADOW E&M-EST. PATIENT-LVL IV: ICD-10-PCS | Mod: PBBFAC,,, | Performed by: PODIATRIST

## 2020-06-26 PROCEDURE — 11056 PARNG/CUTG B9 HYPRKR LES 2-4: CPT | Mod: Q9,S$PBB,, | Performed by: PODIATRIST

## 2020-06-26 NOTE — PROGRESS NOTES
Subjective:     Patient ID: Trey Stevens is a 65 y.o. male.    Chief Complaint: Nail Care (no c/o pain. wears casual shoe with socks. last seen on 06/02/20 with PCP Dr. Kaba.)    Trey is a 65 y.o. male who presents to the clinic for evaluation and treatment of high risk feet. Trey has a past medical history of Arthritis, Diabetes mellitus, Diabetes mellitus type 2, uncontrolled (7/19/2016), DM (diabetes mellitus) (2015), Gall stones, Obesity, Psoriasis (a type of skin inflammation), and Rheumatoid arthritis of foot. The patient's chief complaint is thick calluses and nails. This patient has documented high risk feet requiring routine maintenance secondary to diabetes mellitis and those secondary complications of diabetes, as mentioned. Patient states his blood sugar this morning was 183mg/dl.     PCP: Brittanie Kaba MD     Date Last Seen by PCP: 06/02/2020    Current shoe gear:  Affected Foot: Extra depth shoes     Unaffected Foot: Extra depth shoes    Hemoglobin A1C   Date Value Ref Range Status   05/26/2020 7.4 (H) 4.0 - 5.6 % Final     Comment:     ADA Screening Guidelines:  5.7-6.4%  Consistent with prediabetes  >or=6.5%  Consistent with diabetes  High levels of fetal hemoglobin interfere with the HbA1C  assay. Heterozygous hemoglobin variants (HbS, HgC, etc)do  not significantly interfere with this assay.   However, presence of multiple variants may affect accuracy.     05/04/2020 7.2 (H) 4.0 - 5.6 % Final     Comment:     ADA Screening Guidelines:  5.7-6.4%  Consistent with prediabetes  >or=6.5%  Consistent with diabetes  High levels of fetal hemoglobin interfere with the HbA1C  assay. Heterozygous hemoglobin variants (HbS, HgC, etc)do  not significantly interfere with this assay.   However, presence of multiple variants may affect accuracy.     01/13/2020 7.5 (H) 4.0 - 5.6 % Final     Comment:     ADA Screening Guidelines:  5.7-6.4%  Consistent with prediabetes  >or=6.5%  Consistent with  diabetes  High levels of fetal hemoglobin interfere with the HbA1C  assay. Heterozygous hemoglobin variants (HbS, HgC, etc)do  not significantly interfere with this assay.   However, presence of multiple variants may affect accuracy.             Patient Active Problem List   Diagnosis    Psoriatic arthritis, destructive type    Vitamin D deficiency    Psoriasis    Multiple lung nodules on CT    Immunosuppressed status    Long-term use of immunosuppressant medication    Mixed type COPD (chronic obstructive pulmonary disease)    Type 2 diabetes mellitus with nephropathy    Hypertension associated with diabetes    Vitiligo    SCHUYLER (obstructive sleep apnea)    Severe obesity (BMI 35.0-35.9 with comorbidity)    Hyperlipidemia associated with type 2 diabetes mellitus    Elevated liver enzymes    Cigarette nicotine dependence without complication    Left-sided chest wall pain       Medication List with Changes/Refills   Current Medications    AMLODIPINE (NORVASC) 5 MG TABLET    TAKE 1 TABLET BY MOUTH EVERY DAY    AMMONIUM LACTATE 12 % CREA    Apply 1 Act topically 2 (two) times daily.    ASPIRIN 81 MG CHEW    Take 1 tablet (81 mg total) by mouth once daily.    ATORVASTATIN (LIPITOR) 80 MG TABLET    Take 1 tablet (80 mg total) by mouth once daily.    BENZONATATE (TESSALON) 200 MG CAPSULE    Take 1 capsule (200 mg total) by mouth 3 (three) times daily as needed for Cough.    CALCIPOTRIENE (DOVONOX) 0.005 % CREAM    Apply topically 2 (two) times daily.    CLOBETASOL (OLUX) 0.05 % FOAM    AAA of scalp and behind ears twice daily.  Do not use on face, underarms or groin.    EMPAGLIFLOZIN (JARDIANCE) 25 MG TAB    Take 1 tablet (25 mg) by mouth once daily.    ERGOCALCIFEROL (ERGOCALCIFEROL) 50,000 UNIT CAP    Take 50,000 Units by mouth twice a week.    FOLIC ACID (FOLVITE) 1 MG TABLET    Take 1 tablet (1,000 mcg total) by mouth once daily.    INSULIN (LANTUS SOLOSTAR U-100 INSULIN) GLARGINE 100 UNITS/ML (3ML) SUBQ  "PEN    Inject 12 Units into the skin once daily.    KETOCONAZOLE (NIZORAL) 2 % SHAMPOO    Wash hair with medicated shampoo at least 2x/week - let sit on scalp at least 5 minutes prior to rinsing    LANCETS 33 GAUGE MISC    by Misc.(Non-Drug; Combo Route) route 3 (three) times daily.    LIRAGLUTIDE 0.6 MG/0.1 ML, 18 MG/3 ML, SUBQ PNIJ (VICTOZA 3-MUKESH) 0.6 MG/0.1 ML (18 MG/3 ML) PNIJ    INJECT 1.8 MG UNDER THE SKIN ONCE DAILY    LOSARTAN (COZAAR) 100 MG TABLET    TAKE 1 TABLET (100 MG TOTAL) BY MOUTH ONCE DAILY.    MELOXICAM (MOBIC) 15 MG TABLET    Take 1 tablet (15 mg total) by mouth once daily.    METFORMIN (GLUCOPHAGE-XR) 500 MG XR 24HR TABLET    Take 2 tablets (1,000 mg total) by mouth 2 (two) times daily with meals.    METHOTREXATE 2.5 MG TAB    Take 8 tablets (20 mg total) by mouth every 7 days.    ONETOUCH ULTRA BLUE TEST STRIP STRP    USE AS DIRECTED THREE TIMES A DAY    PEN NEEDLE, DIABETIC 32 GAUGE X 5/32" NDLE    2 each by Misc.(Non-Drug; Combo Route) route once daily.    PROAIR HFA 90 MCG/ACTUATION INHALER    Inhale 2 puffs into the lungs every 4 (four) hours as needed for Wheezing. Rescue    SECUKINUMAB (COSENTYX PEN) 150 MG/ML PNIJ    Inject 2 pens (300 mg) into the skin every 14 (fourteen) days.    TRAMADOL (ULTRAM) 50 MG TABLET    Take 1 tablet (50 mg total) by mouth 3 (three) times daily as needed for Pain (use for mod pain; use sparingly).       Review of patient's allergies indicates:  No Known Allergies    Past Surgical History:   Procedure Laterality Date    APPENDECTOMY      CHOLECYSTECTOMY         Family History   Problem Relation Age of Onset    Cancer Mother     Hypertension Mother     Diabetes Mother     Cataracts Mother     Stroke Father     Psoriasis Neg Hx        Social History     Socioeconomic History    Marital status: Single     Spouse name: Not on file    Number of children: Not on file    Years of education: Not on file    Highest education level: Not on file " "  Occupational History    Not on file   Social Needs    Financial resource strain: Not on file    Food insecurity     Worry: Not on file     Inability: Not on file    Transportation needs     Medical: Not on file     Non-medical: Not on file   Tobacco Use    Smoking status: Current Every Day Smoker     Packs/day: 0.50     Years: 24.00     Pack years: 12.00     Types: Cigarettes     Start date: 1994    Smokeless tobacco: Never Used    Tobacco comment: 7/30/2018 referral to smoking cessation program   Substance and Sexual Activity    Alcohol use: No     Alcohol/week: 0.0 standard drinks    Drug use: No    Sexual activity: Never   Lifestyle    Physical activity     Days per week: Not on file     Minutes per session: Not on file    Stress: Not on file   Relationships    Social connections     Talks on phone: Not on file     Gets together: Not on file     Attends [employer] service: Not on file     Active member of club or organization: Not on file     Attends meetings of clubs or organizations: Not on file     Relationship status: Not on file   Other Topics Concern    Not on file   Social History Narrative    Not on file       Vitals:    06/26/20 0923   BP: 130/77   Pulse: 85   Weight: 121.6 kg (268 lb)   Height: 5' 7" (1.702 m)   PainSc: 0-No pain       Hemoglobin A1C   Date Value Ref Range Status   05/26/2020 7.4 (H) 4.0 - 5.6 % Final     Comment:     ADA Screening Guidelines:  5.7-6.4%  Consistent with prediabetes  >or=6.5%  Consistent with diabetes  High levels of fetal hemoglobin interfere with the HbA1C  assay. Heterozygous hemoglobin variants (HbS, HgC, etc)do  not significantly interfere with this assay.   However, presence of multiple variants may affect accuracy.     05/04/2020 7.2 (H) 4.0 - 5.6 % Final     Comment:     ADA Screening Guidelines:  5.7-6.4%  Consistent with prediabetes  >or=6.5%  Consistent with diabetes  High levels of fetal hemoglobin interfere with the HbA1C  assay. Heterozygous " hemoglobin variants (HbS, HgC, etc)do  not significantly interfere with this assay.   However, presence of multiple variants may affect accuracy.     01/13/2020 7.5 (H) 4.0 - 5.6 % Final     Comment:     ADA Screening Guidelines:  5.7-6.4%  Consistent with prediabetes  >or=6.5%  Consistent with diabetes  High levels of fetal hemoglobin interfere with the HbA1C  assay. Heterozygous hemoglobin variants (HbS, HgC, etc)do  not significantly interfere with this assay.   However, presence of multiple variants may affect accuracy.         Review of Systems   Constitutional: Negative for chills and fever.   Respiratory: Negative for shortness of breath.    Cardiovascular: Positive for leg swelling. Negative for chest pain, palpitations, orthopnea and claudication.   Gastrointestinal: Negative for diarrhea, nausea and vomiting.   Musculoskeletal: Negative for joint pain.   Skin: Negative for rash.   Neurological: Positive for tingling and sensory change. Negative for dizziness, focal weakness and weakness.   Psychiatric/Behavioral: Negative.          Objective:      PHYSICAL EXAM: Apperance: Alert and orient in no distress,well developed, and with good attention to grooming and body habits  Patient presents ambulating in extra depth shoes.   LOWER EXTREMITY EXAM:  VASCULAR: Dorsalis pedis pulses 0/4 bilateral and Posterior Tibial pulses 1/4 bilateral. Capillary fill time <4 seconds bilateral. Mild edema observed bilateral. Varicosities present bilateral. Skin temperature of the lower extremities is warm to warm, proximal to distal. Hair growth absent bilateral.  DERMATOLOGICAL: No skin rashes, subcutaneous nodules, lesions, or ulcers observed bilateral. Nails 1,2,3,4,5 bilateral elongated, thickened, and discolored with subungual debris. Webspaces 1,2,3,4 clean, dry and without evidence of break in skin integrity bilateral. Moderate dry and hyperkeratotic tissue noted to bilateral heels and medial 1st MPJ. Skin fissures  noted to bilateral heels. No erythema, drainage, or increased temp noted to area.   NEUROLOGICAL: Light touch, sharp-dull, proprioception all present and equal bilaterally.  Vibratory sensation absent at bilateral hallux. Protective sensation absent at 6/10 sites as tested with a [place]-Rusty 5.07 monofilament.   MUSCULOSKELETAL: Muscle strength is 5/5 for foot inverters, everters, plantarflexors, and dorsiflexors. Muscle tone is normal.         Assessment:       Encounter Diagnoses   Name Primary?    Type II diabetes mellitus with neurological manifestations Yes    Dermatophytosis of nail     Skin fissures     Corn or callus     Severe obesity (BMI 35.0-35.9 with comorbidity)          Plan:   Type II diabetes mellitus with neurological manifestations    Dermatophytosis of nail    Skin fissures    Corn or callus    Severe obesity (BMI 35.0-35.9 with comorbidity)      I counseled the patient on his conditions, their implications and medical management.  Greater than 15 minutes of a 20 minute appointment spent on education about the diabetic foot, neuropathy, and prevention of limb loss.  Shoe inspection. Diabetic Foot Education. Patient reminded of the importance of good nutrition and blood sugar control to help prevent podiatric complications of diabetes. Patient instructed on proper foot hygeine. We discussed wearing proper shoe gear, daily foot inspections, never walking without protective shoe gear, never putting sharp instruments to feet.    With patient's permission, nails 1-5 bilateral were debrided/trimmed in length and thickness aggressively to their soft tissue attachment mechanically and with electric , removing all offending nail and debris. Patient relates relief following the procedure.  With patient's permission bilateral skin fissure and callus were trimmed in thickness with #15 blade without incident.   Patient  will continue to monitor the areas daily, inspect feet, wear protective  shoe gear when ambulatory, moisturizer to maintain skin integrity. Patient reminded of the importance of good nutrition and blood sugar control to help prevent podiatric complications of diabetes.  Patient to return in this office in approximately 3-4 months, or sooner if needed.                     Joy Hadley DPM  [facility]

## 2020-07-10 ENCOUNTER — TELEPHONE (OUTPATIENT)
Dept: PHARMACY | Facility: CLINIC | Age: 65
End: 2020-07-10

## 2020-07-17 ENCOUNTER — TELEPHONE (OUTPATIENT)
Dept: PHARMACY | Facility: CLINIC | Age: 65
End: 2020-07-17

## 2020-08-04 ENCOUNTER — OFFICE VISIT (OUTPATIENT)
Dept: DIABETES | Facility: CLINIC | Age: 65
End: 2020-08-04
Payer: MEDICAID

## 2020-08-04 ENCOUNTER — CLINICAL SUPPORT (OUTPATIENT)
Dept: DIABETES | Facility: CLINIC | Age: 65
End: 2020-08-04
Payer: MEDICAID

## 2020-08-04 VITALS
DIASTOLIC BLOOD PRESSURE: 74 MMHG | HEART RATE: 96 BPM | HEIGHT: 67 IN | WEIGHT: 270.75 LBS | SYSTOLIC BLOOD PRESSURE: 116 MMHG | BODY MASS INDEX: 42.49 KG/M2

## 2020-08-04 VITALS — WEIGHT: 270.75 LBS | BODY MASS INDEX: 42.49 KG/M2 | HEIGHT: 67 IN

## 2020-08-04 DIAGNOSIS — E55.9 VITAMIN D DEFICIENCY: ICD-10-CM

## 2020-08-04 DIAGNOSIS — I15.2 HYPERTENSION ASSOCIATED WITH DIABETES: ICD-10-CM

## 2020-08-04 DIAGNOSIS — D84.9 IMMUNOSUPPRESSED STATUS: ICD-10-CM

## 2020-08-04 DIAGNOSIS — F17.210 CIGARETTE NICOTINE DEPENDENCE WITHOUT COMPLICATION: ICD-10-CM

## 2020-08-04 DIAGNOSIS — E11.69 HYPERLIPIDEMIA ASSOCIATED WITH TYPE 2 DIABETES MELLITUS: ICD-10-CM

## 2020-08-04 DIAGNOSIS — E11.59 HYPERTENSION ASSOCIATED WITH DIABETES: ICD-10-CM

## 2020-08-04 DIAGNOSIS — E78.5 HYPERLIPIDEMIA ASSOCIATED WITH TYPE 2 DIABETES MELLITUS: ICD-10-CM

## 2020-08-04 DIAGNOSIS — E66.01 SEVERE OBESITY (BMI 35.0-35.9 WITH COMORBIDITY): ICD-10-CM

## 2020-08-04 LAB — GLUCOSE SERPL-MCNC: 176 MG/DL (ref 70–110)

## 2020-08-04 PROCEDURE — 99999 PR PBB SHADOW E&M-EST. PATIENT-LVL I: ICD-10-PCS | Mod: PBBFAC,,, | Performed by: DIETITIAN, REGISTERED

## 2020-08-04 PROCEDURE — 99211 OFF/OP EST MAY X REQ PHY/QHP: CPT | Mod: PBBFAC,27 | Performed by: DIETITIAN, REGISTERED

## 2020-08-04 PROCEDURE — 99999 PR PBB SHADOW E&M-EST. PATIENT-LVL I: CPT | Mod: PBBFAC,,, | Performed by: DIETITIAN, REGISTERED

## 2020-08-04 PROCEDURE — 99999 PR PBB SHADOW E&M-EST. PATIENT-LVL IV: CPT | Mod: PBBFAC,,, | Performed by: PHYSICIAN ASSISTANT

## 2020-08-04 PROCEDURE — G0108 DIAB MANAGE TRN  PER INDIV: HCPCS | Mod: PBBFAC | Performed by: DIETITIAN, REGISTERED

## 2020-08-04 PROCEDURE — 82962 GLUCOSE BLOOD TEST: CPT | Mod: PBBFAC | Performed by: PHYSICIAN ASSISTANT

## 2020-08-04 PROCEDURE — 99214 OFFICE O/P EST MOD 30 MIN: CPT | Mod: S$PBB,,, | Performed by: PHYSICIAN ASSISTANT

## 2020-08-04 PROCEDURE — 99999 PR PBB SHADOW E&M-EST. PATIENT-LVL IV: ICD-10-PCS | Mod: PBBFAC,,, | Performed by: PHYSICIAN ASSISTANT

## 2020-08-04 PROCEDURE — 99214 PR OFFICE/OUTPT VISIT, EST, LEVL IV, 30-39 MIN: ICD-10-PCS | Mod: S$PBB,,, | Performed by: PHYSICIAN ASSISTANT

## 2020-08-04 PROCEDURE — 99214 OFFICE O/P EST MOD 30 MIN: CPT | Mod: PBBFAC | Performed by: PHYSICIAN ASSISTANT

## 2020-08-04 NOTE — PROGRESS NOTES
"PCP: Brittanie Kaba MD    Subjective:     Chief Complaint: Diabetes - Established Patient    HISTORY OF PRESENT ILLNESS: 65 y.o.   male presenting for diabetes management visit.   Patient has previously been established with the Diabetes Management Department and was last seen by myself on 05/04/20.  Patient has had Type II diabetes since 2016.  Pertinent to decision making is the following comorbidities: HTN, HLD, Obesity by BMI, COPD and Vitamin D Deficiency, and Immunocompromised  Patient has the following Diabetes complications: with diabetic nephropathy  He  has attended diabetes education in the past.     Patient's most recent A1c of 7.4% was completed 2 months ago.   Patient states since His last A1c His blood glucose levels have been high  in the morning / fasting.   Patient monitors blood glucose 2 times per day with meter : Fasting and Before Bed.   Patient blood glucose monitoring device will not be uploaded into [employer] [facility] today secondary to patient forgetting device.  Per patient recall, fasting BG ranging from 124 - 146 and before bed BG ranging from 145 - 160. Of note, does have higher BG to 200s if eating chips/cookies snacks.   Patient endorses the following diabetes related symptoms: None.   Patient is due today for the following diabetes-related health maintenance standards: None - up to date.   He denies recent hospital admissions or emergency room visits.   He denies having hypoglycemia.  Patient's concerns today include glycemic control.   Patient medication regimen is as below.     CURRENT DM MEDICATIONS:    Jardiance 25 mg   Lantus 12 units in morning   Metformin XR 1000 mg BID   Victoza 1.8 mg daily    Patient has failed the following Diabetes medications:    Trulicity 1.5 mg weekly      Labs Reviewed.       No results found for: CPEPTIDE  No results found for: GLUTAMICACID    Height: 5' 7" (170.2 cm)  //  Weight: 122.8 kg (270 lb 11.6 oz), Body mass index is 42.4 " kg/m².  His blood sugar in clinic today is:    Lab Results   Component Value Date    POCGLU 176 (A) 08/04/2020       Review of Systems   Constitutional: Negative for activity change, appetite change, chills and fever.   HENT: Negative for dental problem, mouth sores, nosebleeds, sore throat and trouble swallowing.    Eyes: Negative for pain and discharge.   Respiratory: Negative for shortness of breath, wheezing and stridor.    Cardiovascular: Negative for chest pain, palpitations and leg swelling.   Gastrointestinal: Negative for abdominal pain, diarrhea, nausea and vomiting.   Endocrine: Negative for polydipsia, polyphagia and polyuria.   Genitourinary: Negative for dysuria, frequency and urgency.   Musculoskeletal: Negative for joint swelling and myalgias.   Skin: Negative for rash and wound.   Neurological: Negative for dizziness, syncope, weakness and headaches.   Psychiatric/Behavioral: Negative for behavioral problems and dysphoric mood.         Diabetes Management Status  Statin: Taking  ACE/ARB: Taking    Screening or Prevention Patient's value Goal Complete/Controlled?   HgA1C Testing and Control   Lab Results   Component Value Date    HGBA1C 7.4 (H) 05/26/2020      Annually/Less than 8% Yes   Lipid profile : 05/26/2020 Annually Yes   LDL control Lab Results   Component Value Date    LDLCALC 141.0 05/26/2020    Annually/Less than 100 mg/dl  No   Nephropathy screening Lab Results   Component Value Date    MICALBCREAT 194.9 (H) 05/26/2020     No results found for: PROTEINUA Annually No   Blood pressure BP Readings from Last 1 Encounters:   08/04/20 116/74    Less than 140/90 Yes   Dilated retinal exam : 02/06/2020 Annually Yes    Foot exam   : 06/26/2020 Annually No     Social History     Socioeconomic History    Marital status: Single     Spouse name: Not on file    Number of children: Not on file    Years of education: Not on file    Highest education level: Not on file   Occupational History    Not  on file   Social Needs    Financial resource strain: Not on file    Food insecurity     Worry: Not on file     Inability: Not on file    Transportation needs     Medical: Not on file     Non-medical: Not on file   Tobacco Use    Smoking status: Current Every Day Smoker     Packs/day: 0.50     Years: 24.00     Pack years: 12.00     Types: Cigarettes     Start date: 1994    Smokeless tobacco: Never Used    Tobacco comment: 7/30/2018 referral to smoking cessation program   Substance and Sexual Activity    Alcohol use: No     Alcohol/week: 0.0 standard drinks    Drug use: No    Sexual activity: Never   Lifestyle    Physical activity     Days per week: Not on file     Minutes per session: Not on file    Stress: Not on file   Relationships    Social connections     Talks on phone: Not on file     Gets together: Not on file     Attends Bahai service: Not on file     Active member of club or organization: Not on file     Attends meetings of clubs or organizations: Not on file     Relationship status: Not on file   Other Topics Concern    Not on file   Social History Narrative    Not on file     Past Medical History:   Diagnosis Date    Arthritis     Diabetes mellitus     Diabetes mellitus type 2, uncontrolled 7/19/2016    DM (diabetes mellitus) 2015     09/04/2017    Gall stones     Obesity     Psoriasis (a type of skin inflammation)     Rheumatoid arthritis of foot        Objective:        Physical Exam  Constitutional:       General: He is not in acute distress.     Appearance: He is well-developed. He is not diaphoretic.   HENT:      Head: Normocephalic and atraumatic.      Right Ear: External ear normal.      Left Ear: External ear normal.      Nose: Nose normal.   Eyes:      General:         Right eye: No discharge.         Left eye: No discharge.      Pupils: Pupils are equal, round, and reactive to light.   Neck:      Musculoskeletal: Normal range of motion and neck supple.    Cardiovascular:      Rate and Rhythm: Normal rate and regular rhythm.      Heart sounds: Normal heart sounds.   Pulmonary:      Effort: Pulmonary effort is normal.      Breath sounds: Normal breath sounds.   Abdominal:      Palpations: Abdomen is soft.   Musculoskeletal: Normal range of motion.   Skin:     General: Skin is warm and dry.      Capillary Refill: Capillary refill takes less than 2 seconds.   Neurological:      Mental Status: He is alert and oriented to person, place, and time.      Motor: No abnormal muscle tone.      Coordination: Coordination normal.   Psychiatric:         Behavior: Behavior normal.         Thought Content: Thought content normal.         Judgment: Judgment normal.           Assessment / Plan:     DM (diabetes mellitus), type 2, uncontrolled with complications  -     POCT Glucose, Hand-Held Device    Hyperlipidemia associated with type 2 diabetes mellitus    Hypertension associated with diabetes    Immunosuppressed status    Vitamin D deficiency    Cigarette nicotine dependence without complication    Severe obesity (BMI 35.0-35.9 with comorbidity)      Additional Plan Details:    - POCT Glucose  - Encouraged continuation of lifestyle changes including regular exercise and limiting carbohydrates to 30-45 grams per meal threes times daily and 15 grams per snack with a limit of two daily. Diabetic protein shake instead of skipping meals or high carb snack.   - Encouraged continued monitoring of blood glucose with maintenance of 2 times daily at Fasting and Before Bed.   - Current DM Medication Regimen: Change Lantus to 15 units in the morning. Continue Jardiance 25 mg, Victoza 1.8 mg, and Metformin XR 1000 mg BID.    - Health Maintenance standards addressed today: None - up to date  - Nursing Visit: Patient is below goal range for nursing visit for age group and will not need nursing visit at this time .   - Follow up in 3 months with A1c prior.       Blakeney McKnight, PA-C [facility]     A total of 30 minutes was spent in face to face time, of which over 50 % was spent in counseling patient on disease process, complications, treatment, and side effects of medications.

## 2020-08-04 NOTE — PATIENT INSTRUCTIONS
Change Lantus 15 units in morning  Continue Jardiance 25 mg  Continue Metformin XR 1000 mg twice a day  Continue Victoza 1.8 mg daily      Diabetic protein shake - Glucerna or Boost Glucose Control

## 2020-08-14 ENCOUNTER — TELEPHONE (OUTPATIENT)
Dept: PHARMACY | Facility: CLINIC | Age: 65
End: 2020-08-14

## 2020-09-16 NOTE — PROGRESS NOTES
"Subjective:      Patient ID: Trey Stevens is a 65 y.o. male.    Chief Complaint: Follow-up      HPI  Here for follow up of medical problems.  Didn't get labs done.  N/v x 2 days, no diarrhea.  No f/c.  Can't remember any sugars, forgot list.  No n/v today so far.  No cp/sob/palp.    Updated/ annual due 6/21:  HM:  9/20 today fluvax, 12/18 HAV, 2/16 [id number], 4/13 [id number], 2/16 TDaP, Cscope in the past normal and pt refuses more, 5/20 PSA, 2/16 HCV neg, 2/20 Eye Dr. Germain, 11/18 chest CT stable.     Review of Systems   Constitutional: Negative for chills, diaphoresis and fever.   Respiratory: Negative for cough and shortness of breath.    Cardiovascular: Negative for chest pain, palpitations and leg swelling.   Gastrointestinal: Negative for blood in stool, constipation, diarrhea, nausea and vomiting.   Genitourinary: Negative for dysuria, frequency and hematuria.   Psychiatric/Behavioral: The patient is not nervous/anxious.          Objective:   BP (!) 140/82   Pulse 100   Temp 98.1 °F (36.7 °C) (Temporal)   Ht 5' 7" (1.702 m)   Wt 122.9 kg (270 lb 15.1 oz)   SpO2 95%   BMI 42.44 kg/m²     Physical Exam  Constitutional:       Appearance: He is well-developed.   Neck:      Musculoskeletal: Normal range of motion and neck supple.   Cardiovascular:      Rate and Rhythm: Normal rate and regular rhythm.      Heart sounds: No murmur. No friction rub. No gallop.    Pulmonary:      Effort: Pulmonary effort is normal.      Breath sounds: Normal breath sounds. No wheezing or rales.   Abdominal:      General: Bowel sounds are normal. There is distension.      Palpations: Abdomen is soft. There is no mass.      Tenderness: There is no abdominal tenderness.   Lymphadenopathy:      Cervical: No cervical adenopathy.   Neurological:      Mental Status: He is alert and oriented to person, place, and time.             Assessment:       1. Type 2 diabetes mellitus with nephropathy    2. Hypertension associated with diabetes  "   3. Psoriatic arthritis, destructive type    4. Immunosuppressed status    5. Non-intractable vomiting with nausea, unspecified vomiting type    6. Hyperlipidemia associated with type 2 diabetes mellitus          Plan:     Type 2 diabetes mellitus with nephropathy- check lab, drop off BS list this week.  -     Hemoglobin A1C; Future; Expected date: 09/22/2020    Hypertension associated with diabetes- adeq control for now.    Psoriatic arthritis, destructive type, Immunosuppressed status- on MTX and Cosentyx.    Non-intractable vomiting with nausea, unspecified vomiting type x 2d-  -     CBC auto differential; Future; Expected date: 09/22/2020  -     Comprehensive metabolic panel; Future; Expected date: 09/22/2020    Hyperlipidemia associated with type 2 diabetes mellitus on atorva x 3mo, check lab now.  -     Lipid Panel; Future; Expected date: 09/22/2020

## 2020-09-18 ENCOUNTER — TELEPHONE (OUTPATIENT)
Dept: PHARMACY | Facility: CLINIC | Age: 65
End: 2020-09-18

## 2020-09-18 DIAGNOSIS — R07.89 LEFT-SIDED CHEST WALL PAIN: ICD-10-CM

## 2020-09-18 RX ORDER — TRAMADOL HYDROCHLORIDE 50 MG/1
50 TABLET ORAL 3 TIMES DAILY PRN
Qty: 45 TABLET | Refills: 1 | Status: SHIPPED | OUTPATIENT
Start: 2020-09-18 | End: 2020-11-30 | Stop reason: SDUPTHER

## 2020-09-18 NOTE — TELEPHONE ENCOUNTER
----- Message from Shaylee Aguirre sent at 9/18/2020  2:53 PM CDT -----  Contact: Trey  Type:  RX Refill Request    Who Called: Trey   Refill or New Rx: Refill   RX Name and Strength: traMADoL (ULTRAM) 50 mg   How is the patient currently taking it? (ex. 1XDay): 2XDay   Is this a 30 day or 90 day RX: 30 day   Preferred Pharmacy with phone number:   [facility]/pharm[facility] #[id number] - [place] - [address] [address] [address] [place] [address]  Phone: [phone] Fax: [phone]  Local or Mail Order: local   Ordering Provider: Dr. Kaba   Would the patient rather a call back or a response via [facility]? Call back   Best Call Back Number: [phone] (home)   Additional Information:  pt would like a confirmation call back     Thanks,  Pb

## 2020-09-22 ENCOUNTER — OFFICE VISIT (OUTPATIENT)
Dept: FAMILY MEDICINE | Facility: CLINIC | Age: 65
End: 2020-09-22
Payer: MEDICAID

## 2020-09-22 ENCOUNTER — LAB VISIT (OUTPATIENT)
Dept: LAB | Facility: HOSPITAL | Age: 65
End: 2020-09-22
Attending: INTERNAL MEDICINE
Payer: MEDICAID

## 2020-09-22 VITALS
TEMPERATURE: 98 F | SYSTOLIC BLOOD PRESSURE: 140 MMHG | BODY MASS INDEX: 42.53 KG/M2 | OXYGEN SATURATION: 95 % | HEIGHT: 67 IN | DIASTOLIC BLOOD PRESSURE: 82 MMHG | WEIGHT: 270.94 LBS | HEART RATE: 100 BPM

## 2020-09-22 DIAGNOSIS — L40.52 PSORIATIC ARTHRITIS, DESTRUCTIVE TYPE: ICD-10-CM

## 2020-09-22 DIAGNOSIS — E11.21 TYPE 2 DIABETES MELLITUS WITH NEPHROPATHY: Chronic | ICD-10-CM

## 2020-09-22 DIAGNOSIS — E11.69 HYPERLIPIDEMIA ASSOCIATED WITH TYPE 2 DIABETES MELLITUS: ICD-10-CM

## 2020-09-22 DIAGNOSIS — E78.5 HYPERLIPIDEMIA ASSOCIATED WITH TYPE 2 DIABETES MELLITUS: ICD-10-CM

## 2020-09-22 DIAGNOSIS — D84.9 IMMUNOSUPPRESSED STATUS: ICD-10-CM

## 2020-09-22 DIAGNOSIS — R11.2 NON-INTRACTABLE VOMITING WITH NAUSEA, UNSPECIFIED VOMITING TYPE: ICD-10-CM

## 2020-09-22 DIAGNOSIS — E11.21 TYPE 2 DIABETES MELLITUS WITH NEPHROPATHY: Primary | Chronic | ICD-10-CM

## 2020-09-22 DIAGNOSIS — E11.59 HYPERTENSION ASSOCIATED WITH DIABETES: Chronic | ICD-10-CM

## 2020-09-22 DIAGNOSIS — I15.2 HYPERTENSION ASSOCIATED WITH DIABETES: Chronic | ICD-10-CM

## 2020-09-22 LAB
ALBUMIN SERPL BCP-MCNC: 3.7 G/DL (ref 3.5–5.2)
ALP SERPL-CCNC: 92 U/L (ref 55–135)
ALT SERPL W/O P-5'-P-CCNC: 55 U/L (ref 10–44)
ANION GAP SERPL CALC-SCNC: 14 MMOL/L (ref 8–16)
AST SERPL-CCNC: 55 U/L (ref 10–40)
BASOPHILS # BLD AUTO: 0.03 K/UL (ref 0–0.2)
BASOPHILS NFR BLD: 0.4 % (ref 0–1.9)
BILIRUB SERPL-MCNC: 0.4 MG/DL (ref 0.1–1)
BUN SERPL-MCNC: 11 MG/DL (ref 8–23)
CALCIUM SERPL-MCNC: 9.5 MG/DL (ref 8.7–10.5)
CHLORIDE SERPL-SCNC: 99 MMOL/L (ref 95–110)
CHOLEST SERPL-MCNC: 188 MG/DL (ref 120–199)
CHOLEST/HDLC SERPL: 4.6 {RATIO} (ref 2–5)
CO2 SERPL-SCNC: 23 MMOL/L (ref 23–29)
CREAT SERPL-MCNC: 1.2 MG/DL (ref 0.5–1.4)
DIFFERENTIAL METHOD: ABNORMAL
EOSINOPHIL # BLD AUTO: 0.4 K/UL (ref 0–0.5)
EOSINOPHIL NFR BLD: 5.4 % (ref 0–8)
ERYTHROCYTE [DISTWIDTH] IN BLOOD BY AUTOMATED COUNT: 16.7 % (ref 11.5–14.5)
EST. GFR  (AFRICAN AMERICAN): >60 ML/MIN/1.73 M^2
EST. GFR  (NON AFRICAN AMERICAN): >60 ML/MIN/1.73 M^2
GLUCOSE SERPL-MCNC: 181 MG/DL (ref 70–110)
HCT VFR BLD AUTO: 47.9 % (ref 40–54)
HDLC SERPL-MCNC: 41 MG/DL (ref 40–75)
HDLC SERPL: 21.8 % (ref 20–50)
HGB BLD-MCNC: 15.3 G/DL (ref 14–18)
IMM GRANULOCYTES # BLD AUTO: 0.04 K/UL (ref 0–0.04)
IMM GRANULOCYTES NFR BLD AUTO: 0.6 % (ref 0–0.5)
LDLC SERPL CALC-MCNC: 117 MG/DL (ref 63–159)
LYMPHOCYTES # BLD AUTO: 1.7 K/UL (ref 1–4.8)
LYMPHOCYTES NFR BLD: 23.7 % (ref 18–48)
MCH RBC QN AUTO: 27.3 PG (ref 27–31)
MCHC RBC AUTO-ENTMCNC: 31.9 G/DL (ref 32–36)
MCV RBC AUTO: 86 FL (ref 82–98)
MONOCYTES # BLD AUTO: 0.6 K/UL (ref 0.3–1)
MONOCYTES NFR BLD: 8 % (ref 4–15)
NEUTROPHILS # BLD AUTO: 4.4 K/UL (ref 1.8–7.7)
NEUTROPHILS NFR BLD: 61.9 % (ref 38–73)
NONHDLC SERPL-MCNC: 147 MG/DL
NRBC BLD-RTO: 0 /100 WBC
PLATELET # BLD AUTO: 271 K/UL (ref 150–350)
PMV BLD AUTO: 10.3 FL (ref 9.2–12.9)
POTASSIUM SERPL-SCNC: 4 MMOL/L (ref 3.5–5.1)
PROT SERPL-MCNC: 8.5 G/DL (ref 6–8.4)
RBC # BLD AUTO: 5.6 M/UL (ref 4.6–6.2)
SODIUM SERPL-SCNC: 136 MMOL/L (ref 136–145)
TRIGL SERPL-MCNC: 150 MG/DL (ref 30–150)
WBC # BLD AUTO: 7.04 K/UL (ref 3.9–12.7)

## 2020-09-22 PROCEDURE — 99999 PR PBB SHADOW E&M-EST. PATIENT-LVL III: CPT | Mod: PBBFAC,,, | Performed by: INTERNAL MEDICINE

## 2020-09-22 PROCEDURE — 99214 PR OFFICE/OUTPT VISIT, EST, LEVL IV, 30-39 MIN: ICD-10-PCS | Mod: S$PBB,,, | Performed by: INTERNAL MEDICINE

## 2020-09-22 PROCEDURE — 99214 OFFICE O/P EST MOD 30 MIN: CPT | Mod: S$PBB,,, | Performed by: INTERNAL MEDICINE

## 2020-09-22 PROCEDURE — 85025 COMPLETE CBC W/AUTO DIFF WBC: CPT

## 2020-09-22 PROCEDURE — 36415 COLL VENOUS BLD VENIPUNCTURE: CPT | Mod: PO

## 2020-09-22 PROCEDURE — 80053 COMPREHEN METABOLIC PANEL: CPT

## 2020-09-22 PROCEDURE — 80061 LIPID PANEL: CPT

## 2020-09-22 PROCEDURE — 99213 OFFICE O/P EST LOW 20 MIN: CPT | Mod: PBBFAC,PO | Performed by: INTERNAL MEDICINE

## 2020-09-22 PROCEDURE — 83036 HEMOGLOBIN GLYCOSYLATED A1C: CPT

## 2020-09-22 PROCEDURE — 99999 PR PBB SHADOW E&M-EST. PATIENT-LVL III: ICD-10-PCS | Mod: PBBFAC,,, | Performed by: INTERNAL MEDICINE

## 2020-09-24 LAB
ESTIMATED AVG GLUCOSE: 194 MG/DL (ref 68–131)
HBA1C MFR BLD HPLC: 8.4 % (ref 4–5.6)

## 2020-09-30 ENCOUNTER — PATIENT OUTREACH (OUTPATIENT)
Dept: ADMINISTRATIVE | Facility: OTHER | Age: 65
End: 2020-09-30

## 2020-09-30 ENCOUNTER — PATIENT OUTREACH (OUTPATIENT)
Dept: ADMINISTRATIVE | Facility: HOSPITAL | Age: 65
End: 2020-09-30

## 2020-10-01 ENCOUNTER — OFFICE VISIT (OUTPATIENT)
Dept: PODIATRY | Facility: CLINIC | Age: 65
End: 2020-10-01
Payer: MEDICAID

## 2020-10-01 VITALS
HEART RATE: 87 BPM | BODY MASS INDEX: 42.6 KG/M2 | TEMPERATURE: 98 F | OXYGEN SATURATION: 96 % | HEIGHT: 67 IN | SYSTOLIC BLOOD PRESSURE: 142 MMHG | WEIGHT: 271.38 LBS | DIASTOLIC BLOOD PRESSURE: 85 MMHG

## 2020-10-01 DIAGNOSIS — L84 CORN OR CALLUS: ICD-10-CM

## 2020-10-01 DIAGNOSIS — B35.1 DERMATOPHYTOSIS OF NAIL: ICD-10-CM

## 2020-10-01 DIAGNOSIS — R23.4 SKIN FISSURES: ICD-10-CM

## 2020-10-01 DIAGNOSIS — E66.01 SEVERE OBESITY (BMI 35.0-35.9 WITH COMORBIDITY): ICD-10-CM

## 2020-10-01 DIAGNOSIS — E11.49 TYPE II DIABETES MELLITUS WITH NEUROLOGICAL MANIFESTATIONS: Primary | ICD-10-CM

## 2020-10-01 PROCEDURE — 11056 PARNG/CUTG B9 HYPRKR LES 2-4: CPT | Mod: Q9,PBBFAC | Performed by: PODIATRIST

## 2020-10-01 PROCEDURE — 99499 UNLISTED E&M SERVICE: CPT | Mod: S$PBB,,, | Performed by: PODIATRIST

## 2020-10-01 PROCEDURE — 99999 PR PBB SHADOW E&M-EST. PATIENT-LVL V: CPT | Mod: PBBFAC,,, | Performed by: PODIATRIST

## 2020-10-01 PROCEDURE — 11056 PR TRIM BENIGN HYPERKERATOTIC SKIN LESION,2-4: ICD-10-PCS | Mod: Q9,S$PBB,, | Performed by: PODIATRIST

## 2020-10-01 PROCEDURE — 11721 DEBRIDE NAIL 6 OR MORE: CPT | Mod: 59,Q9,S$PBB, | Performed by: PODIATRIST

## 2020-10-01 PROCEDURE — 99499 NO LOS: ICD-10-PCS | Mod: S$PBB,,, | Performed by: PODIATRIST

## 2020-10-01 PROCEDURE — 99999 PR PBB SHADOW E&M-EST. PATIENT-LVL V: ICD-10-PCS | Mod: PBBFAC,,, | Performed by: PODIATRIST

## 2020-10-01 PROCEDURE — 11721 DEBRIDE NAIL 6 OR MORE: CPT | Mod: Q9,PBBFAC | Performed by: PODIATRIST

## 2020-10-01 PROCEDURE — 11721 PR DEBRIDEMENT OF NAILS, 6 OR MORE: ICD-10-PCS | Mod: 59,Q9,S$PBB, | Performed by: PODIATRIST

## 2020-10-01 PROCEDURE — 11056 PARNG/CUTG B9 HYPRKR LES 2-4: CPT | Mod: Q9,S$PBB,, | Performed by: PODIATRIST

## 2020-10-01 PROCEDURE — 99215 OFFICE O/P EST HI 40 MIN: CPT | Mod: PBBFAC | Performed by: PODIATRIST

## 2020-10-08 ENCOUNTER — TELEPHONE (OUTPATIENT)
Dept: RHEUMATOLOGY | Facility: CLINIC | Age: 65
End: 2020-10-08

## 2020-10-09 ENCOUNTER — TELEPHONE (OUTPATIENT)
Dept: RHEUMATOLOGY | Facility: CLINIC | Age: 65
End: 2020-10-09

## 2020-10-09 NOTE — TELEPHONE ENCOUNTER
----- Message from Jo Ann Sanders sent at 10/9/2020  9:11 AM CDT -----  Contact: pt  Type:  Patient Returning Call    Who Called:Trey  Who Left Message for Patient:  Does the patient know what this is regarding?:yes, reschedule  Would the patient rather a call back or a response via [facility]? call  Best Call Back Number:[phone]  Additional Information:

## 2020-10-19 NOTE — PROGRESS NOTES
Subjective:     Patient ID: Trey Stevens is a 65 y.o. male.    Chief Complaint: Nail Care (3mo)    Trey is a 65 y.o. male who presents to the clinic for evaluation and treatment of high risk feet. Trey has a past medical history of Arthritis, Diabetes mellitus, Diabetes mellitus type 2, uncontrolled (7/19/2016), DM (diabetes mellitus) (2015), Gall stones, Obesity, Psoriasis (a type of skin inflammation), and Rheumatoid arthritis of foot. The patient's chief complaint is thick calluses and nails. This patient has documented high risk feet requiring routine maintenance secondary to diabetes mellitis and those secondary complications of diabetes, as mentioned. Patient states his blood sugar this morning was 201mg/dl.     PCP: Brittanie Kaba MD     Date Last Seen by PCP: 06/02/2020    Current shoe gear:  Affected Foot: Extra depth shoes     Unaffected Foot: Extra depth shoes    Hemoglobin A1C   Date Value Ref Range Status   09/22/2020 8.4 (H) 4.0 - 5.6 % Final     Comment:     ADA Screening Guidelines:  5.7-6.4%  Consistent with prediabetes  >or=6.5%  Consistent with diabetes  High levels of fetal hemoglobin interfere with the HbA1C  assay. Heterozygous hemoglobin variants (HbS, HgC, etc)do  not significantly interfere with this assay.   However, presence of multiple variants may affect accuracy.     05/26/2020 7.4 (H) 4.0 - 5.6 % Final     Comment:     ADA Screening Guidelines:  5.7-6.4%  Consistent with prediabetes  >or=6.5%  Consistent with diabetes  High levels of fetal hemoglobin interfere with the HbA1C  assay. Heterozygous hemoglobin variants (HbS, HgC, etc)do  not significantly interfere with this assay.   However, presence of multiple variants may affect accuracy.     05/04/2020 7.2 (H) 4.0 - 5.6 % Final     Comment:     ADA Screening Guidelines:  5.7-6.4%  Consistent with prediabetes  >or=6.5%  Consistent with diabetes  High levels of fetal hemoglobin interfere with the HbA1C  assay. Heterozygous  hemoglobin variants (HbS, HgC, etc)do  not significantly interfere with this assay.   However, presence of multiple variants may affect accuracy.             Patient Active Problem List   Diagnosis    Psoriatic arthritis, destructive type    Vitamin D deficiency    Psoriasis    Multiple lung nodules on CT    Immunosuppressed status    Long-term use of immunosuppressant medication    Mixed type COPD (chronic obstructive pulmonary disease)    Type 2 diabetes mellitus with nephropathy    Hypertension associated with diabetes    Vitiligo    SCHUYLER (obstructive sleep apnea)    Severe obesity (BMI 35.0-35.9 with comorbidity)    Hyperlipidemia associated with type 2 diabetes mellitus    Elevated liver enzymes    Cigarette nicotine dependence without complication    Left-sided chest wall pain       Medication List with Changes/Refills   Current Medications    AMLODIPINE (NORVASC) 5 MG TABLET    TAKE 1 TABLET BY MOUTH EVERY DAY    AMMONIUM LACTATE 12 % CREA    Apply 1 Act topically 2 (two) times daily.    ASPIRIN 81 MG CHEW    Take 1 tablet (81 mg total) by mouth once daily.    ATORVASTATIN (LIPITOR) 80 MG TABLET    Take 1 tablet (80 mg total) by mouth once daily.    BENZONATATE (TESSALON) 200 MG CAPSULE    Take 1 capsule (200 mg total) by mouth 3 (three) times daily as needed for Cough.    CALCIPOTRIENE (DOVONOX) 0.005 % CREAM    APPLY TO AFFECTED AREA TWICE A DAY    CLOBETASOL (OLUX) 0.05 % FOAM    AAA of scalp and behind ears twice daily.  Do not use on face, underarms or groin.    EMPAGLIFLOZIN (JARDIANCE) 25 MG TAB    Take 1 tablet (25 mg) by mouth once daily.    ERGOCALCIFEROL (ERGOCALCIFEROL) 50,000 UNIT CAP    Take 50,000 Units by mouth twice a week.    FOLIC ACID (FOLVITE) 1 MG TABLET    Take 1 tablet (1,000 mcg total) by mouth once daily.    KETOCONAZOLE (NIZORAL) 2 % SHAMPOO    Wash hair with medicated shampoo at least 2x/week - let sit on scalp at least 5 minutes prior to rinsing    LANCETS 33 GAUGE  "MISC    by Misc.(Non-Drug; Combo Route) route 3 (three) times daily.    LIRAGLUTIDE 0.6 MG/0.1 ML, 18 MG/3 ML, SUBQ PNIJ (VICTOZA 3-MUKESH) 0.6 MG/0.1 ML (18 MG/3 ML) PNIJ    INJECT 1.8 MG UNDER THE SKIN ONCE DAILY    LOSARTAN (COZAAR) 100 MG TABLET    TAKE 1 TABLET (100 MG TOTAL) BY MOUTH ONCE DAILY.    MELOXICAM (MOBIC) 15 MG TABLET    Take 1 tablet (15 mg total) by mouth once daily.    METFORMIN (GLUCOPHAGE-XR) 500 MG XR 24HR TABLET    Take 2 tablets (1,000 mg total) by mouth 2 (two) times daily with meals.    METHOTREXATE 2.5 MG TAB    TAKE 8 TABLETS (20 MG TOTAL) BY MOUTH EVERY 7 DAYS.    ONETOUCH ULTRA BLUE TEST STRIP STRP    USE AS DIRECTED THREE TIMES A DAY    PEN NEEDLE, DIABETIC 32 GAUGE X 5/32" NDLE    2 each by Misc.(Non-Drug; Combo Route) route once daily.    PROAIR HFA 90 MCG/ACTUATION INHALER    Inhale 2 puffs into the lungs every 4 (four) hours as needed for Wheezing. Rescue    SECUKINUMAB (COSENTYX PEN) 150 MG/ML PNIJ    Inject 2 pens (300 mg) into the skin every 14 (fourteen) days.    TRAMADOL (ULTRAM) 50 MG TABLET    Take 1 tablet (50 mg total) by mouth 3 (three) times daily as needed for Pain (use for mod pain; use sparingly).   Changed and/or Refilled Medications    Modified Medication Previous Medication    LANTUS SOLOSTAR U-100 INSULIN GLARGINE 100 UNITS/ML (3ML) SUBQ PEN insulin (LANTUS SOLOSTAR U-100 INSULIN) glargine 100 units/mL (3mL) SubQ pen       INJECT 12 UNITS INTO THE SKIN ONCE DAILY.    Inject 12 Units into the skin once daily.       Review of patient's allergies indicates:  No Known Allergies    Past Surgical History:   Procedure Laterality Date    APPENDECTOMY      CHOLECYSTECTOMY         Family History   Problem Relation Age of Onset    Cancer Mother     Hypertension Mother     Diabetes Mother     Cataracts Mother     Stroke Father     Psoriasis Neg Hx        Social History     Socioeconomic History    Marital status: Single     Spouse name: Not on file    Number of " "children: Not on file    Years of education: Not on file    Highest education level: Not on file   Occupational History    Not on file   Social Needs    Financial resource strain: Not on file    Food insecurity     Worry: Not on file     Inability: Not on file    Transportation needs     Medical: Not on file     Non-medical: Not on file   Tobacco Use    Smoking status: Current Every Day Smoker     Packs/day: 0.50     Years: 24.00     Pack years: 12.00     Types: Cigarettes     Start date: 1994    Smokeless tobacco: Never Used    Tobacco comment: 7/30/2018 referral to smoking cessation program   Substance and Sexual Activity    Alcohol use: No     Alcohol/week: 0.0 standard drinks    Drug use: No    Sexual activity: Never   Lifestyle    Physical activity     Days per week: Not on file     Minutes per session: Not on file    Stress: Not on file   Relationships    Social connections     Talks on phone: Not on file     Gets together: Not on file     Attends Latter day service: Not on file     Active member of club or organization: Not on file     Attends meetings of clubs or organizations: Not on file     Relationship status: Not on file   Other Topics Concern    Not on file   Social History Narrative    Not on file       Vitals:    10/01/20 1343   BP: (!) 142/85   Pulse: 87   Temp: 98.2 °F (36.8 °C)   SpO2: 96%   Weight: 123.1 kg (271 lb 6.2 oz)   Height: 5' 7" (1.702 m)   PainSc:   6   PainLoc: Foot       Hemoglobin A1C   Date Value Ref Range Status   09/22/2020 8.4 (H) 4.0 - 5.6 % Final     Comment:     ADA Screening Guidelines:  5.7-6.4%  Consistent with prediabetes  >or=6.5%  Consistent with diabetes  High levels of fetal hemoglobin interfere with the HbA1C  assay. Heterozygous hemoglobin variants (HbS, HgC, etc)do  not significantly interfere with this assay.   However, presence of multiple variants may affect accuracy.     05/26/2020 7.4 (H) 4.0 - 5.6 % Final     Comment:     ADA Screening " Guidelines:  5.7-6.4%  Consistent with prediabetes  >or=6.5%  Consistent with diabetes  High levels of fetal hemoglobin interfere with the HbA1C  assay. Heterozygous hemoglobin variants (HbS, HgC, etc)do  not significantly interfere with this assay.   However, presence of multiple variants may affect accuracy.     05/04/2020 7.2 (H) 4.0 - 5.6 % Final     Comment:     ADA Screening Guidelines:  5.7-6.4%  Consistent with prediabetes  >or=6.5%  Consistent with diabetes  High levels of fetal hemoglobin interfere with the HbA1C  assay. Heterozygous hemoglobin variants (HbS, HgC, etc)do  not significantly interfere with this assay.   However, presence of multiple variants may affect accuracy.         Review of Systems   Constitutional: Negative for chills and fever.   Respiratory: Negative for shortness of breath.    Cardiovascular: Positive for leg swelling. Negative for chest pain, palpitations, orthopnea and claudication.   Gastrointestinal: Negative for diarrhea, nausea and vomiting.   Musculoskeletal: Negative for joint pain.   Skin: Negative for rash.   Neurological: Positive for tingling and sensory change. Negative for dizziness, focal weakness and weakness.   Psychiatric/Behavioral: Negative.          Objective:      PHYSICAL EXAM: Apperance: Alert and orient in no distress,well developed, and with good attention to grooming and body habits  Patient presents ambulating in extra depth shoes.   LOWER EXTREMITY EXAM:  VASCULAR: Dorsalis pedis pulses 0/4 bilateral and Posterior Tibial pulses 1/4 bilateral. Capillary fill time <4 seconds bilateral. Mild edema observed bilateral. Varicosities present bilateral. Skin temperature of the lower extremities is warm to warm, proximal to distal. Hair growth absent bilateral.  DERMATOLOGICAL: No skin rashes, subcutaneous nodules, lesions, or ulcers observed bilateral. Nails 1,2,3,4,5 bilateral elongated, thickened, and discolored with subungual debris. Webspaces 1,2,3,4 clean,  dry and without evidence of break in skin integrity bilateral. Moderate dry and hyperkeratotic tissue noted to bilateral heels and medial 1st MPJ. Skin fissures noted to bilateral heels. No erythema, drainage, or increased temp noted to area.   NEUROLOGICAL: Light touch, sharp-dull, proprioception all present and equal bilaterally.  Vibratory sensation absent at bilateral hallux. Protective sensation absent at 6/10 sites as tested with a Premont-Rusty 5.07 monofilament.   MUSCULOSKELETAL: Muscle strength is 5/5 for foot inverters, everters, plantarflexors, and dorsiflexors. Muscle tone is normal.         Assessment:       Encounter Diagnoses   Name Primary?    Type II diabetes mellitus with neurological manifestations Yes    Dermatophytosis of nail     Skin fissures     Corn or callus     Severe obesity (BMI 35.0-35.9 with comorbidity)          Plan:   Type II diabetes mellitus with neurological manifestations    Dermatophytosis of nail    Skin fissures    Corn or callus    Severe obesity (BMI 35.0-35.9 with comorbidity)      I counseled the patient on his conditions, their implications and medical management.  Greater than 15 minutes of a 20 minute appointment spent on education about the diabetic foot, neuropathy, and prevention of limb loss.  Shoe inspection. Diabetic Foot Education. Patient reminded of the importance of good nutrition and blood sugar control to help prevent podiatric complications of diabetes. Patient instructed on proper foot hygeine. We discussed wearing proper shoe gear, daily foot inspections, never walking without protective shoe gear, never putting sharp instruments to feet.    With patient's permission, nails 1-5 bilateral were debrided/trimmed in length and thickness aggressively to their soft tissue attachment mechanically and with electric , removing all offending nail and debris. Patient relates relief following the procedure.  With patient's permission bilateral skin  fissure and callus were trimmed in thickness with #15 blade without incident.   Patient  will continue to monitor the areas daily, inspect feet, wear protective shoe gear when ambulatory, moisturizer to maintain skin integrity. Patient reminded of the importance of good nutrition and blood sugar control to help prevent podiatric complications of diabetes.  Patient to return in this office in approximately 3-4 months, or sooner if needed.                     Joy Hadley DPM  [facility]

## 2020-10-26 ENCOUNTER — LAB VISIT (OUTPATIENT)
Dept: LAB | Facility: HOSPITAL | Age: 65
End: 2020-10-26
Attending: PHYSICIAN ASSISTANT
Payer: MEDICAID

## 2020-10-26 DIAGNOSIS — E78.5 HYPERLIPIDEMIA ASSOCIATED WITH TYPE 2 DIABETES MELLITUS: ICD-10-CM

## 2020-10-26 DIAGNOSIS — E11.69 HYPERLIPIDEMIA ASSOCIATED WITH TYPE 2 DIABETES MELLITUS: ICD-10-CM

## 2020-10-26 LAB
ESTIMATED AVG GLUCOSE: 232 MG/DL (ref 68–131)
HBA1C MFR BLD HPLC: 9.7 % (ref 4–5.6)

## 2020-10-26 PROCEDURE — 36415 COLL VENOUS BLD VENIPUNCTURE: CPT

## 2020-10-26 PROCEDURE — 83036 HEMOGLOBIN GLYCOSYLATED A1C: CPT

## 2020-10-27 ENCOUNTER — TELEPHONE (OUTPATIENT)
Dept: PHARMACY | Facility: CLINIC | Age: 65
End: 2020-10-27

## 2020-10-27 ENCOUNTER — SPECIALTY PHARMACY (OUTPATIENT)
Dept: PHARMACY | Facility: CLINIC | Age: 65
End: 2020-10-27

## 2020-10-27 NOTE — TELEPHONE ENCOUNTER
Patient transferred from refill technician to [facility] for reporting 1 missed dose. Patient reports his Cosetnyx was due 10/23 but he went vacation and forgot to bring pen. Patient denies flaring due to missed doses and reports no barriers to adherence, he simply forgot. Patient uses a calender to remember his injection dates and will administer his next doses on 10/30 then every other Friday for subsequent doses. All questions and concerns addressed to patient's satisfaction.

## 2020-10-27 NOTE — TELEPHONE ENCOUNTER
Emergency plan activation for Cosentyx refill.  and Address verified. Pt has 1 doses on hand and he has missed an injection, forwarding to [facility]. Next dose is due on 2020. Pt has not started any new medications. Pt is not experiencing any side effects at this time. Will prepare for shipment with consent of patient on  to arrive . Copay $0.00.

## 2020-10-28 ENCOUNTER — TELEPHONE (OUTPATIENT)
Dept: RHEUMATOLOGY | Facility: CLINIC | Age: 65
End: 2020-10-28

## 2020-10-28 NOTE — TELEPHONE ENCOUNTER
----- Message from Sheila Odom sent at 10/28/2020 10:40 AM CDT -----  Would like to consult with nurse regarding him having to rs appt to 1/4/21 first available, can not rs labs due to expiration date. Please give a call back at [phone].

## 2020-11-02 ENCOUNTER — PATIENT OUTREACH (OUTPATIENT)
Dept: ADMINISTRATIVE | Facility: OTHER | Age: 65
End: 2020-11-02

## 2020-11-04 ENCOUNTER — IMMUNIZATION (OUTPATIENT)
Dept: INTERNAL MEDICINE | Facility: CLINIC | Age: 65
End: 2020-11-04
Payer: MEDICAID

## 2020-11-04 ENCOUNTER — OFFICE VISIT (OUTPATIENT)
Dept: DIABETES | Facility: CLINIC | Age: 65
End: 2020-11-04
Payer: MEDICAID

## 2020-11-04 VITALS
DIASTOLIC BLOOD PRESSURE: 75 MMHG | BODY MASS INDEX: 43.13 KG/M2 | WEIGHT: 275.38 LBS | SYSTOLIC BLOOD PRESSURE: 118 MMHG | HEART RATE: 85 BPM

## 2020-11-04 DIAGNOSIS — E11.59 HYPERTENSION ASSOCIATED WITH DIABETES: ICD-10-CM

## 2020-11-04 DIAGNOSIS — I15.2 HYPERTENSION ASSOCIATED WITH DIABETES: ICD-10-CM

## 2020-11-04 DIAGNOSIS — F17.210 CIGARETTE NICOTINE DEPENDENCE WITHOUT COMPLICATION: ICD-10-CM

## 2020-11-04 DIAGNOSIS — E11.69 HYPERLIPIDEMIA ASSOCIATED WITH TYPE 2 DIABETES MELLITUS: ICD-10-CM

## 2020-11-04 DIAGNOSIS — D84.9 IMMUNOSUPPRESSED STATUS: ICD-10-CM

## 2020-11-04 DIAGNOSIS — E66.01 SEVERE OBESITY (BMI 35.0-35.9 WITH COMORBIDITY): ICD-10-CM

## 2020-11-04 DIAGNOSIS — E55.9 VITAMIN D DEFICIENCY: ICD-10-CM

## 2020-11-04 DIAGNOSIS — E78.5 HYPERLIPIDEMIA ASSOCIATED WITH TYPE 2 DIABETES MELLITUS: ICD-10-CM

## 2020-11-04 PROCEDURE — 90686 IIV4 VACC NO PRSV 0.5 ML IM: CPT | Mod: PBBFAC

## 2020-11-04 PROCEDURE — 99999 PR PBB SHADOW E&M-EST. PATIENT-LVL IV: CPT | Mod: PBBFAC,,, | Performed by: PHYSICIAN ASSISTANT

## 2020-11-04 PROCEDURE — 99214 OFFICE O/P EST MOD 30 MIN: CPT | Mod: S$PBB,,, | Performed by: PHYSICIAN ASSISTANT

## 2020-11-04 PROCEDURE — 99214 PR OFFICE/OUTPT VISIT, EST, LEVL IV, 30-39 MIN: ICD-10-PCS | Mod: S$PBB,,, | Performed by: PHYSICIAN ASSISTANT

## 2020-11-04 PROCEDURE — 99999 PR PBB SHADOW E&M-EST. PATIENT-LVL IV: ICD-10-PCS | Mod: PBBFAC,,, | Performed by: PHYSICIAN ASSISTANT

## 2020-11-04 PROCEDURE — 99214 OFFICE O/P EST MOD 30 MIN: CPT | Mod: PBBFAC,25 | Performed by: PHYSICIAN ASSISTANT

## 2020-11-04 NOTE — PATIENT INSTRUCTIONS
CURRENT DM MEDICATIONS:    Jardiance 25 mg   Lantus 12 units in morning   Metformin XR 1000 mg BID   Victoza 1.8 mg daily

## 2020-11-04 NOTE — PROGRESS NOTES
PCP: Brittanie Kaba MD    Subjective:     Chief Complaint: Diabetes - Established Patient    HISTORY OF PRESENT ILLNESS: 65 y.o.   male presenting for diabetes management visit.   Patient has previously been established with the Diabetes Management Department and was last seen by myself on 08/04/20.  Patient has had Type II diabetes since 2016.  Pertinent to decision making is the following comorbidities: HTN, HLD, Obesity by BMI, COPD and Vitamin D Deficiency, and Immunocompromised  Patient has the following Diabetes complications: with diabetic nephropathy  He  has attended diabetes education in the past.     Patient's most recent A1c of 9.7% was completed 2 weeks ago.   Patient states since His last A1c His blood glucose levels have been high  in the morning / fasting.   Patient monitors blood glucose 2 times per day with meter : Fasting and Before Bed.   Patient blood glucose monitoring device will not be uploaded into [employer] [facility] today secondary to patient forgetting device.  Per patient recall, After breakfast BG ranging from 128 - 150 and before bed BG ranging from 130s.   Patient endorses the following diabetes related symptoms: None.   Patient is due today for the following diabetes-related health maintenance standards: Flu shot.   He denies recent hospital admissions or emergency room visits.   He voices having hypoglycemia at times.   Patient's concerns today include glycemic control. Of note, patient believes his A1c regression is related to being without all diabetes medication while on trip x 2 weeks in October.   Patient medication regimen is as below.     CURRENT DM MEDICATIONS:    Jardiance 25 mg in morning   Lantus 12 units in morning   Metformin XR 1000 mg BID   Victoza 1.8 mg daily    Patient has failed the following Diabetes medications:    Trulicity 1.5 mg weekly      Labs Reviewed.       No results found for: CPEPTIDE  No results found for: GLUTAMICACID       //  Weight:  124.9 kg (275 lb 5.7 oz), Body mass index is 43.13 kg/m².  His blood sugar in clinic today is:    Lab Results   Component Value Date    POCGLU 176 (A) 08/04/2020       Review of Systems   Constitutional: Negative for activity change, appetite change, chills and fever.   HENT: Negative for dental problem, mouth sores, nosebleeds, sore throat and trouble swallowing.    Eyes: Negative for pain and discharge.   Respiratory: Negative for shortness of breath, wheezing and stridor.    Cardiovascular: Negative for chest pain, palpitations and leg swelling.   Gastrointestinal: Negative for abdominal pain, diarrhea, nausea and vomiting.   Endocrine: Negative for polydipsia, polyphagia and polyuria.   Genitourinary: Negative for dysuria, frequency and urgency.   Musculoskeletal: Negative for joint swelling and myalgias.   Skin: Negative for rash and wound.   Neurological: Negative for dizziness, syncope, weakness and headaches.   Psychiatric/Behavioral: Negative for behavioral problems and dysphoric mood.         Diabetes Management Status  Statin: Taking  ACE/ARB: Taking    Screening or Prevention Patient's value Goal Complete/Controlled?   HgA1C Testing and Control   Lab Results   Component Value Date    HGBA1C 9.7 (H) 10/26/2020      Annually/Less than 8% Yes   Lipid profile : 09/22/2020 Annually Yes   LDL control Lab Results   Component Value Date    LDLCALC 117.0 09/22/2020    Annually/Less than 100 mg/dl  No   Nephropathy screening Lab Results   Component Value Date    MICALBCREAT 194.9 (H) 05/26/2020     No results found for: PROTEINUA Annually No   Blood pressure BP Readings from Last 1 Encounters:   10/01/20 (!) 142/85    Less than 140/90 Yes   Dilated retinal exam : 02/06/2020 Annually Yes    Foot exam   : 06/02/2020 Annually No     Social History     Socioeconomic History    Marital status: Single     Spouse name: Not on file    Number of children: Not on file    Years of education: Not on file    Highest  education level: Not on file   Occupational History    Not on file   Social Needs    Financial resource strain: Not on file    Food insecurity     Worry: Not on file     Inability: Not on file    Transportation needs     Medical: Not on file     Non-medical: Not on file   Tobacco Use    Smoking status: Current Every Day Smoker     Packs/day: 0.50     Years: 24.00     Pack years: 12.00     Types: Cigarettes     Start date: 1994    Smokeless tobacco: Never Used    Tobacco comment: 7/30/2018 referral to smoking cessation program   Substance and Sexual Activity    Alcohol use: No     Alcohol/week: 0.0 standard drinks    Drug use: No    Sexual activity: Never   Lifestyle    Physical activity     Days per week: Not on file     Minutes per session: Not on file    Stress: Not on file   Relationships    Social connections     Talks on phone: Not on file     Gets together: Not on file     Attends Islam service: Not on file     Active member of club or organization: Not on file     Attends meetings of clubs or organizations: Not on file     Relationship status: Not on file   Other Topics Concern    Not on file   Social History Narrative    Not on file     Past Medical History:   Diagnosis Date    Arthritis     Diabetes mellitus     Diabetes mellitus type 2, uncontrolled 7/19/2016    DM (diabetes mellitus) 2015     09/04/2017    Gall stones     Obesity     Psoriasis (a type of skin inflammation)     Rheumatoid arthritis of foot        Objective:        Physical Exam  Constitutional:       General: He is not in acute distress.     Appearance: He is well-developed. He is not diaphoretic.   HENT:      Head: Normocephalic and atraumatic.      Right Ear: External ear normal.      Left Ear: External ear normal.      Nose: Nose normal.   Eyes:      General:         Right eye: No discharge.         Left eye: No discharge.      Pupils: Pupils are equal, round, and reactive to light.   Neck:       Musculoskeletal: Normal range of motion and neck supple.   Cardiovascular:      Rate and Rhythm: Normal rate and regular rhythm.      Heart sounds: Normal heart sounds.   Pulmonary:      Effort: Pulmonary effort is normal.      Breath sounds: Normal breath sounds.   Abdominal:      Palpations: Abdomen is soft.   Musculoskeletal: Normal range of motion.   Skin:     General: Skin is warm and dry.      Capillary Refill: Capillary refill takes less than 2 seconds.   Neurological:      Mental Status: He is alert and oriented to person, place, and time.      Motor: No abnormal muscle tone.      Coordination: Coordination normal.   Psychiatric:         Behavior: Behavior normal.         Thought Content: Thought content normal.         Judgment: Judgment normal.           Assessment / Plan:     DM (diabetes mellitus), type 2, uncontrolled with complications  -     POCT Glucose, Hand-Held Device  -     GLUCOSE MONITORING CONTINUOUS MIN 72 HOURS; Future    Hyperlipidemia associated with type 2 diabetes mellitus    Hypertension associated with diabetes    Immunosuppressed status    Vitamin D deficiency    Cigarette nicotine dependence without complication    Severe obesity (BMI 35.0-35.9 with comorbidity)      Additional Plan Details:    - POCT Glucose  - Encouraged continuation of lifestyle changes including regular exercise and limiting carbohydrates to 30-45 grams per meal threes times daily and 15 grams per snack with a limit of two daily. Diabetic protein shake instead of skipping meals or high carb snack.   - Encouraged continued monitoring of blood glucose with maintenance of 2 times daily at Fasting and Before Bed. Professional CGM placement today.   - Current DM Medication Regimen: Continue current regimen - will update follow Pro CGM review.   - Health Maintenance standards addressed today: Flu Shot today  - Nursing Visit: Patient is deemed uncontrolled due to hypoglycemia/Pro CGM placement and will need CGM off  visit in 2 weeks.   - Follow up in 3 months with A1c prior.       Blakeney McKnight, PA-C [facility]     A total of 30 minutes was spent in face to face time, of which over 50 % was spent in counseling patient on disease process, complications, treatment, and side effects of medications.

## 2020-11-17 ENCOUNTER — TELEPHONE (OUTPATIENT)
Dept: DIABETES | Facility: CLINIC | Age: 65
End: 2020-11-17

## 2020-11-17 NOTE — TELEPHONE ENCOUNTER
----- Message from Elvia Ballesteros sent at 11/17/2020 10:59 AM CST -----  Contact: PATIENT  CALLING TO ASK IF HE CAN DROP OFF DEVICE BEFORE APPT TIME 1:pm WHILE HE IS IN [place]. PLEASE CALL PATIENT @ [phone]. THANKS

## 2020-11-23 ENCOUNTER — TELEPHONE (OUTPATIENT)
Dept: DIABETES | Facility: CLINIC | Age: 65
End: 2020-11-23

## 2020-11-23 ENCOUNTER — CLINICAL SUPPORT (OUTPATIENT)
Dept: DIABETES | Facility: CLINIC | Age: 65
End: 2020-11-23
Payer: MEDICAID

## 2020-11-23 PROCEDURE — 99499 UNLISTED E&M SERVICE: CPT | Mod: S$PBB,,, | Performed by: PEDIATRICS

## 2020-11-23 PROCEDURE — 99499 NO LOS: ICD-10-PCS | Mod: S$PBB,,, | Performed by: PEDIATRICS

## 2020-11-23 PROCEDURE — 95251 CONT GLUC MNTR ANALYSIS I&R: CPT | Mod: ,,, | Performed by: PHYSICIAN ASSISTANT

## 2020-11-23 PROCEDURE — 95250 CONT GLUC MNTR PHYS/QHP EQP: CPT | Mod: PBBFAC | Performed by: PHYSICIAN ASSISTANT

## 2020-11-23 PROCEDURE — 95251 PR GLUCOSE MONITOR, 72 HOUR, PHYS INTERP: ICD-10-PCS | Mod: ,,, | Performed by: PHYSICIAN ASSISTANT

## 2020-11-23 NOTE — Clinical Note
Changes to Regimen:   -  Will confirm patient is taking both Jardiance and Metformin XR 1000 mg BID. Let me know if taking. If not, needs to start. If so, may need to start Glipizide  -           Change Lantus to 16 units daily   -           Ask patient to bring meter to next follow up as he reports BG different than report.       Winter, can you move up his follow up to 2-4weeks?

## 2020-11-23 NOTE — PROGRESS NOTES
Nursing Visit for Provider Cherry Mike PA-C in Diabetes - 11/23/2020    Date of Last Visit: 11/04/2020    Medication Regimen (Listed in HPI):   · Jardiance 25 mg  · Lantus 12 units in morning  · Metformin XR 1000 mg BID  · Victoza 1.8 mg daily    Medications reviewed with Patient who states taking as advised in Regimen Change:   yes    Nursing Visit Reason:   Download of Blood Glucose Meter/CGM/Pump    The following was uploaded to Media Section:   CGM - Professional     Interpretation of CGM as follows:   Dates Reviewed: 11/04/20 - 11/06/20   CGM Time in Use: 100%   Average Blood Glucose: 243 mg/dL   Variability Below Median:  21.3%   Estimated A1C: N/A%   Time in Glucose Target Range: 0%   Time Above Glucose Target Range: 100% (Very High - 41%)   Time Below Glucose Target Range: 0%     Trends:   -  Hyperglycemia throughout the day due to inadequate basal coverage, but large spikes postprandially     Patient Comments/Discussion:   -  Patient endorsed Lantus 12 units daily and Victoza 1.8 mg to staff, but did not mention Jardiance 25 mg or Metformin XR 1000 mg BID     Changes to Regimen:   -  Will confirm patient is taking both Jardiance and Metformin XR 1000 mg BID   -           Change Lantus to 16 units daily   -           Consider starting Glipzide 5 mg before each meal three times daily. Will wait until patient confirms other medication.   -           Ask patient to bring meter to next follow up as he reports BG different than report.   -           Recommend sooner follow up with TINO Chu for co- management     Interpretation included trends and recommended changes to regimen reviewed with patient.     Cherry Mike PA-C   Diabetes Management

## 2020-11-23 NOTE — TELEPHONE ENCOUNTER
Called to reschedule diabetes education follow-up visit from February to December. No answer, left recorded message with call back information.

## 2020-11-25 ENCOUNTER — TELEPHONE (OUTPATIENT)
Dept: DIABETES | Facility: CLINIC | Age: 65
End: 2020-11-25

## 2020-11-27 ENCOUNTER — SPECIALTY PHARMACY (OUTPATIENT)
Dept: PHARMACY | Facility: CLINIC | Age: 65
End: 2020-11-27

## 2020-11-27 NOTE — TELEPHONE ENCOUNTER
Spoke with pt regarding cosentyx refill, pt is also aware cosentyx is needing a PA. Last injection was 11/27 next injection due 12/11. Pt states he took one of his doses late but is back on track.

## 2020-11-28 NOTE — TELEPHONE ENCOUNTER
CMM PA - Demographic data has been sent to the plan successfully.  Waiting on clinical questions.

## 2020-11-30 ENCOUNTER — SPECIALTY PHARMACY (OUTPATIENT)
Dept: PHARMACY | Facility: CLINIC | Age: 65
End: 2020-11-30

## 2020-11-30 DIAGNOSIS — R07.89 LEFT-SIDED CHEST WALL PAIN: ICD-10-CM

## 2020-11-30 DIAGNOSIS — L40.52 PSORIATIC ARTHRITIS, DESTRUCTIVE TYPE: Primary | ICD-10-CM

## 2020-11-30 RX ORDER — TRAMADOL HYDROCHLORIDE 50 MG/1
50 TABLET ORAL 3 TIMES DAILY PRN
Qty: 45 TABLET | Refills: 1 | Status: SHIPPED | OUTPATIENT
Start: 2020-11-30 | End: 2021-02-18 | Stop reason: SDUPTHER

## 2020-11-30 NOTE — TELEPHONE ENCOUNTER
Specialty Pharmacy - Clinical Reassessment    Specialty Medication Orders Linked to Encounter      Most Recent Value   Medication #1  secukinumab (COSENTYX PEN) 150 mg/mL PnIj (Order#[id number], Rx#[id number])        Subjective    Trey Stevens is a 65 y.o. male, who is followed by the specialty pharmacy service for management and education.    Recent Encounters     Date Type Provider Description    11/30/2020 Specialty Pharmacy Maggy Flood PharmD Follow-up Clinical Reassessment    11/30/2020 Specialty Pharmacy Maggy Flood PharmD Referral Authorization    11/27/2020 Specialty Pharmacy Nasra Desiree Refill Coordination    10/27/2020 Specialty Pharmacy Isatu Patrick PharmD Refill Coordination        Clinical call attempts since last clinical assessment   No call attempts found.     Today he received follow up education for his specialty medication(s).    Current Outpatient Medications   Medication Sig    amLODIPine (NORVASC) 5 MG tablet TAKE 1 TABLET BY MOUTH EVERY DAY    ammonium lactate 12 % Crea Apply 1 Act topically 2 (two) times daily.    aspirin 81 MG Chew Take 1 tablet (81 mg total) by mouth once daily.    atorvastatin (LIPITOR) 80 MG tablet Take 1 tablet (80 mg total) by mouth once daily.    benzonatate (TESSALON) 200 MG capsule Take 1 capsule (200 mg total) by mouth 3 (three) times daily as needed for Cough.    calcipotriene (DOVONOX) 0.005 % cream APPLY TO AFFECTED AREA TWICE A DAY    clobetasol (OLUX) 0.05 % Foam AAA of scalp and behind ears twice daily.  Do not use on face, underarms or groin.    empagliflozin (JARDIANCE) 25 mg Tab Take 1 tablet (25 mg) by mouth once daily.    ergocalciferol (ERGOCALCIFEROL) 50,000 unit Cap Take 50,000 Units by mouth twice a week.    folic acid (FOLVITE) 1 MG tablet Take 1 tablet (1,000 mcg total) by mouth once daily.    ketoconazole (NIZORAL) 2 % shampoo Wash hair with medicated shampoo at least 2x/week - let sit on scalp at least 5 minutes prior to  "rinsing    lancets 33 gauge Misc by Misc.(Non-Drug; Combo Route) route 3 (three) times daily.    LANTUS SOLOSTAR U-100 INSULIN glargine 100 units/mL (3mL) SubQ pen INJECT 12 UNITS INTO THE SKIN ONCE DAILY.    liraglutide 0.6 mg/0.1 mL, 18 mg/3 mL, subq PNIJ (VICTOZA 3-MUKESH) 0.6 mg/0.1 mL (18 mg/3 mL) PnIj INJECT 1.8 MG UNDER THE SKIN ONCE DAILY    losartan (COZAAR) 100 MG tablet TAKE 1 TABLET (100 MG TOTAL) BY MOUTH ONCE DAILY.    meloxicam (MOBIC) 15 MG tablet Take 1 tablet (15 mg total) by mouth once daily.    metFORMIN (GLUCOPHAGE-XR) 500 MG XR 24hr tablet Take 2 tablets (1,000 mg total) by mouth 2 (two) times daily with meals.    methotrexate 2.5 MG Tab TAKE 8 TABLETS (20 MG TOTAL) BY MOUTH EVERY 7 DAYS.    ONETOUCH ULTRA BLUE TEST STRIP Strp USE AS DIRECTED THREE TIMES A DAY    pen needle, diabetic 32 gauge x 5/32" Ndle 2 each by Misc.(Non-Drug; Combo Route) route once daily.    PROAIR HFA 90 mcg/actuation inhaler Inhale 2 puffs into the lungs every 4 (four) hours as needed for Wheezing. Rescue    secukinumab (COSENTYX PEN) 150 mg/mL PnIj Inject 2 pens (300 mg) into the skin every 14 (fourteen) days.    traMADoL (ULTRAM) 50 mg tablet Take 1 tablet (50 mg total) by mouth 3 (three) times daily as needed for Pain (use for mod pain; use sparingly).   Last reviewed on 11/30/2020  5:36 PM by Maggy Flood, Angel    Review of patient's allergies indicates:  No Known AllergiesLast reviewed on  11/30/2020 5:36 PM by Maggy Flood    Drug Interactions    Drug interactions evaluated: yes  Clinically relevant drug interactions identified: yes   Interactions list: DDI- BG lowering agents- Jardiance Lantus, Victoza   Drug management plan: Patient aware to monitor for signs/symptoms of low BG. He has a BG testing kit at home.  He is aware of the Rule of 15.   Provided the patient with educational material regarding drug interactions: yes   Educational material comments: Counseling as above        Medication " "Adherence    Patient reported X missed doses in the last month: 1  Any gaps in refill history greater than 2 weeks in the last 3 months: no  Demonstrates understanding of importance of adherence: yes  Informant: patient  Reliability of informant: fairly reliable  Provider-estimated medication adherence level: 76-89%   Other non-adherence reason: traveling   Adherence tools used: calendar  Support network for adherence: family member  Confirmed plan for next specialty medication refill: delivery by pharmacy  Refills needed for supportive medications: not needed       Adverse Effects    Arthralgias: Pos  *All other systems reviewed and are negative       Assessment Questions - Documented Responses      Most Recent Value   Assessment   Medication Reconciliation completed for patient  Yes   During the past 4 weeks, has patient missed any activities due to condition or medication?  No   During the past 4 weeks, did patient have any of the following urgent care visits?  None   Goals of Therapy Status  Achieving   Welcome packet contents reviewed and discussed with patient?  No   Assesment completed?  Yes   Plan  Therapy continued   Do you need to open a clinical intervention (i-vent)?  No   Do you want to schedule first shipment?  No   Medication #1 Assessment Info   Patient status  Existing medication   Is this medication appropriate for the patient?  Yes   Is this medication effective?  Yes          Objective    He has a past medical history of Arthritis, Diabetes mellitus, Diabetes mellitus type 2, uncontrolled (7/19/2016), DM (diabetes mellitus) (2015), Gall stones, Obesity, Psoriasis (a type of skin inflammation), and Rheumatoid arthritis of foot.    Tried/failed medications: Humira, Enbrel, MTX, topicals, UV, Cosentyx 300 mg every month    BP Readings from Last 4 Encounters:   11/04/20 118/75   10/01/20 (!) 142/85   09/22/20 (!) 140/82   08/04/20 116/74     Ht Readings from Last 4 Encounters:   10/01/20 5' 7" (1.702 " "m)   09/22/20 5' 7" (1.702 m)   08/04/20 5' 7" (1.702 m)   08/04/20 5' 7" (1.702 m)     Wt Readings from Last 4 Encounters:   11/04/20 124.9 kg (275 lb 5.7 oz)   10/01/20 123.1 kg (271 lb 6.2 oz)   09/22/20 122.9 kg (270 lb 15.1 oz)   08/04/20 122.8 kg (270 lb 11.6 oz)     Recent Labs   Lab Result Units 09/22/20  1356   Creatinine mg/dL 1.2   ALT U/L 55 H   AST U/L 55 H     The goals of prescribed drug therapy management include:  · Supporting patient to meet the prescriber's medical treatment objectives  · Improving or maintaining quality of life  · Maintaining optimal therapy adherence  · Minimizing and managing side effects      Goals of Therapy Status: Achieving    Assessment/Plan  Patient plans to continue therapy without changes.  He reports no side effects with Cosentyx.  Patient reports improvement on Cosentyx 300 mg every 14 days. He missed a dose a few weeks ago due to being out of town.  Advised patient to call [facility] if he needs to travel as we can assist him with medication stability questions relevant to traveling.  Reviewed updated Cosentyx stability data (below) with patient.  He does still have joint pain which is worse in his feet and ankles.  The cold weather makes the joint pain worse.  In the AM, his joint pain can be up to 8-10/10 but on average during the day it is 4-5/10.  He is able to complete all activities of daily living, but he stops to rest as needed.  He lives next door to his mother and they help each other keep track of their medications.      In terms of other health conditions, patient records his BG and BP daily in a book.  He was not around his book to report the typical #s.  His recent A1c is high (9.7 on 10/26/20).  He saw a PA for DM management on 11/23.  Patient may be started on Glipizide.  His BP was normal at last office visit.        Indication, dosage, appropriateness, effectiveness, safety and convenience of his specialty medication(s) were reviewed today.     Patient " "Counseling    Counseled the patient on the following: doses and administration discussed, safe handling, storage, and disposal discussed, possible adverse effects and management discussed, possible drug and prescription drug interactions discussed, possible drug and OTC drug and food interactions discussed, lab monitoring and follow-up discussed, therapeutic rationale discussed, cost of medications and cost implications discussed, adherence and missed doses discussed, pharmacy contact information discussed       Doses/ administration-  Patient confirmed dosage of 300 mg every 14 days.  He declined detailed review of injection steps as he feels comfortable with self-injection.  Patient stated that he rotates injection sites.    Storage- Patient stores Cosentyx in the fridge when not in use.  Counseled patient on new Cosentyx stability data "If necessary, the pre-filled syringe and the SensoReady pen may be stored unrefrigerated for a single period of up to 4 days at room temperature, not above 30°C. Discard the pre-filled syringe or SensoReady pen after 4 days if left unrefrigerated."  Advised patient to call [facility] if he needs to travel as we can assist him with medication stability questions relevant to traveling.    Disposal- Patient uses sharps container    Missed doses- Patient counseled that if a dose is late or missed, inject 300 mg as soon as he can and then resume q14 day dosing.    Cost- Patient is aware that [facility] submitted an insurance re-authorization today.  We will be in touch when Rx is approved.     Warnings-  Infection- Patient advised to hold dose if ill and seek medical advice,    IBD- Patient advised to report any new onset GI symptoms    Immunizations-  Avoid live vaccines.    Common side effects-   Injection site reaction: redness, soreness, itching, bruising, which should resolve within 3-5 days. Can use ice if sore, topical Benadryl if itching.     Tasks added this encounter   2/21/2021 - Clinical " - Follow Up Assesement (90 day)   Tasks due within next 3 months   11/27/2020 - Refill Call  11/30/2020 - Referral Authorization     Maggy Flood, PharmD  [facility]  [address] [facility] [address]  Phone: [phone]  Fax: [phone]

## 2020-12-08 ENCOUNTER — CLINICAL SUPPORT (OUTPATIENT)
Dept: DIABETES | Facility: CLINIC | Age: 65
End: 2020-12-08
Payer: MEDICAID

## 2020-12-08 VITALS — HEIGHT: 67 IN | BODY MASS INDEX: 42.21 KG/M2 | WEIGHT: 268.94 LBS

## 2020-12-08 PROCEDURE — 99211 OFF/OP EST MAY X REQ PHY/QHP: CPT | Mod: PBBFAC | Performed by: DIETITIAN, REGISTERED

## 2020-12-08 PROCEDURE — 99999 PR PBB SHADOW E&M-EST. PATIENT-LVL I: CPT | Mod: PBBFAC,,, | Performed by: DIETITIAN, REGISTERED

## 2020-12-08 PROCEDURE — 99999 PR PBB SHADOW E&M-EST. PATIENT-LVL I: ICD-10-PCS | Mod: PBBFAC,,, | Performed by: DIETITIAN, REGISTERED

## 2020-12-08 PROCEDURE — G0108 DIAB MANAGE TRN  PER INDIV: HCPCS | Mod: PBBFAC | Performed by: DIETITIAN, REGISTERED

## 2020-12-08 NOTE — PROGRESS NOTES
Diabetes Care Specialist Progress Note  Author: Winter Garcia RD, CDE  Date: 2020    Program Intake  Reason for Diabetes Program Visit:: Initial Diabetes Assessment  Current diabetes risk level:: moderate  In the last 12 months, have you:: none  Permission to speak with others about care:: no  Have met with patient in the past, today was 3 month follow-up visit.       Clinical    Patient Health Rating  Compared to other people your age, how would you rate your health?: Fair    Problem Review  Reviewed Problem List with Patient: yes  Active comorbidities affecting diabetes self-care.: yes  Comorbidities: Other (comment), Neuropathy, Hypertension(joint pain)  Reviewed health maintenance: yes    Clinical Assessment  Current Diabetes Treatment: Oral Medication, Injectable, Insulin(Lantus 16u in PM // Jardiance 25mg QD // Victoza 1.8mg QD // Metformin 1000 mg BID)  Have you ever experienced hypoglycemia (low blood sugar)?: no  Have you ever experienced hyperglycemia (high blood sugar)?: yes(lowest he has seen recently is 125-130)  Are you able to tell when your blood sugar is high?: once every other week(got up to 212 mg/dl after eating sweets)  What are your symptoms?: thirst  Have you ever been hospitalized because your blood sugar was high?: no    Medication Information  How do you obtain your medications?: Patient drives  How many days a week do you miss your medications?: Never(He missed for one week while he was out of town for uncle's , forgot medications at home.)  Do you use a pill box or medication chart to help you manage your medications?: Pill box(organizes AM and PM)  Do you sometimes have difficulty refilling your medications?: No  Medication adherence impacting ability to self-manage diabetes?: Yes(maybe- not confident patient is taking all of diabetes medications.)    Labs  Do you have regular lab work to monitor your medications?: Yes  Type of Regular Lab Work: A1c, Cholesterol  Where do  you get your labs drawn?: [facility]  Lab Compliance Barriers: No    Nutritional Status  Diet: Diabetic diet  Change in appetite?: No  Dentation:: Intact  Recent Changes in Weight: No Recent Weight Change  Current nutritional status an area of need that is impacting patient's ability to self-manage diabetes?: No    Additional Social History    Support  Does anyone support you with your diabetes care?: no  Comment: : patient lives alone  Who takes you to your medical appointments?: patient  Does the current primary caregiver meet the patient's needs?: No(no caregiver)  Is Caregiver an area of support impacting ability to self-manage diabetes?: No    Access to Mass Media & Technology  Does the patient have access to any of the following devices or technologies?: Smart phone  Media or technology needs impacting ability to self-manage diabetes?: No    Cognitive/Behavioral Health  Alert and Oriented: Yes  Difficulty Thinking: Yes(thinks hard before he answers, and seems to have difficult time expressing himself.)  Requires Prompting: No  Requires assistance for routine expression?: No  Cognitive or behavioral barriers impacting ability to self-manage diabetes?: Yes(needs simplified instructions)    Culture/Baptist  Culture or Buddhist beliefs that may impact ability to access healthcare: No    Communication  Language preference: English  Hearing Problems: No  Vision Problems: No  Communication needs impacting ability to self-manage diabetes?: No    Health Literacy  Preferred Learning Method: Face to Face, Demonstration  How often do you need to have someone help you read instructions, pamphlets, or written material from your doctor or pharmacy?: Never(asks pharmacist to explain if he isn't sure how to take medication.)  Health literacy needs impacting ability to self-manage diabetes?: No      Diabetes Self-Management Skills Assessment    Diabetes Disease Process/Treatment Options  Patient/caregiver able to state what  happens when someone has diabetes.: yes  Patient/caregiver knows what type of diabetes they have.: yes  Diabetes Type : Type II  Patient/caregiver able to identify at least three signs and symptoms of diabetes.: no  Identified signs and symptoms:: increased thirst  Patient able to identify at least three risk factors for diabetes.: no  Diabetes Disease Process/Treatment Options: Skills Assessment Completed: Yes  Assessment indicates:: Knowledge deficit, Instruction Needed  Area of need?: Yes(Reviewed risk factors and complications.)    Nutrition/Healthy Eating  Challenges to healthy eating:: portion control, snacking between meals and at night, other (see comments)(Admits to drinking large amounts of fruit juice recently // admits to eating large portions of rice at night)  Method of carbohydrate measurement:: no method  Patient can identify foods that impact blood sugar.: yes  Patient-identified foods:: sweets, starches (bread, pasta, rice, cereal), starchy vegetables (corn, peas, beans)  Nutrition/Healthy Eating Skills Assessment Completed:: Yes  Assessment indicates:: Knowledge deficit  Area of need?: Yes    Physical Activity/Exercise  Patient's daily activity level:: sedentary  Patient formally exercises outside of work.: no  Reasons for not exercising:: other (see comments)(Active around the house, yard work)  Patient can identify forms of physical activity.: yes  Stated forms of physical activity:: any movement performed by muscles that uses energy  Patient can identify reasons why exercise/physical activity is important in diabetes management.: yes  Identified reasons:: lowers blood glucose, blood pressure, and cholesterol  Physical Activity/Exercise Skills Assessment Completed: : Yes  Assessment indicates:: Adequate understanding  Area of need?: No    Medications  Patient is able to describe current diabetes management routine.: no(Confused a little bit on names of medications, described size of pill or type  of insulin pen)  Patient is able to identify current diabetes medications, dosages, and appropriate timing of medications.: yes(with assistance from DCS.)  Patient understands the purpose of the medications taken for diabetes.: yes  Patient reports problems or concerns with current medication regimen.: no  Medication Skills Assessment Completed:: Yes  Assessment indicates:: Instruction Needed  Area of need?: Yes(Reviewed each medication name and proper dose.)    Home Blood Glucose Monitoring  Patient states that blood sugar is checked at home daily.: yes  Monitoring Method:: home glucometer  Home glucometer meter type:: One touch  How often do you check your blood sugar?: Twice a day(checks more often if he gets high readings.)  When do you check your blood sugar?: Before breakfast, Before bedtime(3 hours after dinner, before bedtime)  When you check what is your typical blood sugar range? : 150-250  Blood glucose logs:: no(Keeps log of BG and BP in notebook but notebook was damaged and didn't bring with him today.)  Blood glucose logs reviewed today?: no  Home Blood Glucose Monitoring Skills Assessment Completed: : Yes  Assessment indicates:: Adequate understanding, Instruction Needed(instructed patient to bring BG log to clinic visits.)  Area of need?: No    Acute Complications  Patient is able to identify types of acute complications: Yes  Patient Identified:: Hyperglycemia  Patient is able to state the basic meaning of hypoglycemia?: No  Able to state the blood sugar range for hypoglycemia?: no (see comments)  Patient can identify general symptoms of hypoglycemia: no (see comments)(doesn't get low often.)  Able to state proper treatment of hypoglycemia?: yes  Patient identified:: other (see comments)(eat something sweet)  Patient is able to state the basic meaning of hyperglycemia?: No  Able to state the blood sugar range for hyperglycemia?: no (see comments)  Patient able to state proper treatment of  hyperglycemia?: yes  Patient identified:: take medication as recommended  Patient able to verbalize sick day plan?: no(did not review with patient.)  Acute Complications Skills Assessment Completed: : Yes  Assessment indicates:: Instruction Needed  Area of need?: Yes(Reviewed signs and symptoms of high and low BG and how to treat.)    Chronic Complications  Patient can identify major chronic complications of diabetes.: yes  Stated chronic complications:: heart disease/heart attack, neuropathy/nerve damage  Patient can identify ways to prevent or delay diabetes complications.: no  Patient is aware that having diabetes increases risk of heart disease?: No  Patient is aware that heart disease is the leading cause of death and disability in people with diabetes?: No  Patient able to state risk factors for heart disease?: No  Patient is taking statin?: Yes  Chronic Complications Skills Assessment Completed: : Yes  Assessment indicates:: Knowledge deficit  Area of need?: No(Reviewed complications and importance or routine exams.)    Psychosocial/Coping  Psychosocial/Coping Skills Assessment Completed: : No  Deffered due to:: Time           Assessment Summary and Plan  Based on today's diabetes care assessment, the following areas of need were identified:      Social 12/8/2020   Support No   Access to Mass Media/Tech No   Cognitive/Behavioral Health Yes   Culture/Buddhism No   Communication No   Health Literacy No        Clinical 12/8/2020   Medication Adherence Yes   Lab Compliance No   Nutritional Status No        Diabetes Self-Management Skills 12/8/2020   Diabetes Disease Process/Treatment Options Yes   Nutrition/Healthy Eating Yes   Physical Activity/Exercise No   Medication Yes   Home Blood Glucose Monitoring No   Acute Complications Yes   Chronic Complications No          Diabetes Self-Management Care Plan:    Today's Self-Management Care Plan was developed with Trey's input. Trey   has agreed to work toward these  goals to improve his/her overall diabetes control.      Care Plan: Diabetes Management   Updates made since 12/8/2020 12:00 AM      Problem: Medications       Goal: Patient Agrees to take Diabetes Medication(s) as prescribed.    Start Date: 12/8/2020   Expected End Date: 3/8/2021   This Visit's Progress: On track   Priority: High   Barriers: Literacy Concerns      Task: Reviewed with patient all diabetes medications on current medication list and provided basic review of the purpose, dosage, frequency, side effects and storage of both oral and injectable diabetes medications. Completed 12/8/2020      Task: Reviewed with patient his/her daily regimen for taking diabetes medication(s). Completed 12/8/2020      Task: Instructed patient on how to self-administer injectable medications: victoza and Lantus. Completed 12/8/2020      Problem: Healthy Eating       Goal: Patient agrees to utilize plate method to control carbohydrate to 2-3 servings per meal.    Start Date: 12/8/2020   Expected End Date: 3/8/2021   This Visit's Progress: On track   Priority: Medium   Barriers: Lack of Motivation to Change      Task: Provided visual examples using dry measuring cups, food models, and other familiar objects such as computer mouse, deck or cards, tennis ball etc. to help with visualization of portions Completed 12/8/2020      Task: Recommended replacing beverages containing high sugar content with noncaloric/sugar free options and/or water. Completed 12/8/2020                    Today's self-management plan was reviewed, and Trey verbalized understanding of the care plan, goals, and all of today's instructions.       The patient was encouraged to communicate with his physician and care team regarding@ condition(s) and treatment.  I provided the patient with my contact information today and encouraged to contact me via phone or patient portal as needed.     Follow Up  Follow up in about 3 months (around 3/8/2021) for  E11.8.    Time Tracking  Patient Contact:60      Total Time: 60

## 2020-12-09 NOTE — PROGRESS NOTES
"Subjective:      Patient ID: Trey Peterson is a 65 y.o. male.    Chief Complaint: No chief complaint on file.      HPI  Here for follow up of medical problems. No list, but thinks AC breakfast sugars 140-180, HS 170s.  Taking Lantus 12u daily, victoza, metformin.  Tramadol working pretty well for pain, with other meds.  No f/c/sw/cough.  No cp/sob/palp.  Still smoking.  BMs normal.    Updated/ annual due 6/21:  HM:  9/20 fluvax, 12/18 HAV, 2/16 [id number], 4/13 [id number], 2/16 TDaP, Cscope in the past normal and pt refuses more, 5/20 PSA, 2/16 HCV neg, 2/20 Eye Dr. Germain, 11/18 chest CT stable.     Review of Systems   Constitutional: Negative for chills, diaphoresis, fatigue and fever.   Respiratory: Negative for cough, chest tightness and shortness of breath.    Cardiovascular: Negative for chest pain, palpitations and leg swelling.   Gastrointestinal: Negative for blood in stool, constipation, diarrhea, nausea and vomiting.   Genitourinary: Negative for difficulty urinating and frequency.   Musculoskeletal: Negative for arthralgias.         Objective:   /74   Pulse 98   Temp 97.6 °F (36.4 °C) (Temporal)   Ht 5' 7" (1.702 m)   Wt 122.4 kg (269 lb 13.5 oz)   SpO2 95%   BMI 42.26 kg/m²     Physical Exam  Constitutional:       Appearance: He is well-developed.   Neck:      Musculoskeletal: Normal range of motion and neck supple.   Cardiovascular:      Rate and Rhythm: Normal rate and regular rhythm.      Heart sounds: No murmur. No friction rub. No gallop.    Pulmonary:      Effort: Pulmonary effort is normal.      Breath sounds: Normal breath sounds. No wheezing or rales.   Abdominal:      General: Bowel sounds are normal.      Palpations: Abdomen is soft. There is no mass.      Tenderness: There is no abdominal tenderness.   Lymphadenopathy:      Cervical: No cervical adenopathy.   Neurological:      Mental Status: He is alert and oriented to person, place, and time.         Results for TREY PETERSON (MRN " [id number]) as of 12/16/2020 14:40   Ref. Range 9/22/2020 13:56 10/26/2020 09:12   Hemoglobin A1C External Latest Ref Range: 4.0 - 5.6 % 8.4 (H) 9.7 (H)   Estimated Avg Glucose Latest Ref Range: 68 - 131 mg/dL 194 (H) 232 (H)       Assessment:       1. Type 2 diabetes mellitus with nephropathy    2. Psoriatic arthritis, destructive type    3. Immunosuppressed status    4. SCHUYLER (obstructive sleep apnea)    5. Hypertension associated with diabetes    6. Hyperlipidemia associated with type 2 diabetes mellitus    7. DM (diabetes mellitus), type 2, uncontrolled with complications          Plan:     Type 2 diabetes mellitus with nephropathy- increase lantus to 14u, next time bring list of sugar readings or bring to DM nurse.  Recheck lab in 3mo.  -     insulin (LANTUS SOLOSTAR U-100 INSULIN) glargine 100 units/mL (3mL) SubQ pen; Inject 14 Units into the skin once daily.  Dispense: 15 Syringe; Refill: 3  -     Hemoglobin A1C; Future; Expected date: 12/16/2020    Psoriatic arthritis, destructive type, Immunosuppressed status    CSHUYLER (obstructive sleep apnea)- doing well on cpap.    Hypertension associated with diabetes- now good control, cont rx.    Hyperlipidemia associated with type 2 diabetes mellitus- cont atorva 80mg.

## 2020-12-09 NOTE — TELEPHONE ENCOUNTER
Specialty Pharmacy - Refill Coordination    Specialty Medication Orders Linked to Encounter      Most Recent Value   Medication #1  secukinumab (COSENTYX PEN) 150 mg/mL PnIj (Order#[id number], Rx#[id number])          Refill Questions - Documented Responses      Most Recent Value   Relationship to patient of person spoken to?  Self   HIPAA/medical authority confirmed?  Yes   Any changes in contact preferences or allowed representatives?  No   Has the patient had any insurance changes?  No   Has the patient had any changes to specialty medication, dose, or instructions?  No   Has the patient started taking any new medications, herbals, or supplements?  No   Has the patient been diagnosed with any new medical conditions?  No   Does the patient have any new allergies to medications or foods?  No   Does the patient have any concerns about side effects?  No   Can the patient store medication/sharps container properly (at the correct temperature, away from children/pets, etc.)?  Yes   Can the patient call emergency services (911) in the event of an emergency?  Yes   Does the patient have any concerns or questions about taking or administering this medication as prescribed?  No   How many doses did the patient miss in the past 4 weeks or since the last fill?  0   How many doses does the patient have on hand?  0   How many days does the patient report on hand quantity will last?  0   Does the number of doses/days supply remaining match pharmacy expected amounts?  Yes   Does the patient feel that this medication is effective?  Yes   During the past 4 weeks, has patient missed any activities due to condition or medication?  No   During the past 4 weeks, did patient have any of the following urgent care visits?  None   How will the patient receive the medication?  Mail   When does the patient need to receive the medication?  12/11/20   Shipping Address  Home   Address in [facility] confirmed and updated if neccessary?   Yes   Expected Copay ($)  0   Is the patient able to afford the medication copay?  Yes   Payment Method  zero copay   Days supply of Refill  28   Would patient like to speak to a pharmacist?  No   Do you want to trigger an intervention?  No   Do you want to trigger an additional referral task?  No   Refill activity completed?  Yes   Refill activity plan  Refill scheduled   Shipment/Pickup Date:  12/09/20          Current Outpatient Medications   Medication Sig    amLODIPine (NORVASC) 5 MG tablet TAKE 1 TABLET BY MOUTH EVERY DAY    ammonium lactate 12 % Crea Apply 1 Act topically 2 (two) times daily.    aspirin 81 MG Chew Take 1 tablet (81 mg total) by mouth once daily.    atorvastatin (LIPITOR) 80 MG tablet Take 1 tablet (80 mg total) by mouth once daily.    benzonatate (TESSALON) 200 MG capsule Take 1 capsule (200 mg total) by mouth 3 (three) times daily as needed for Cough.    calcipotriene (DOVONOX) 0.005 % cream APPLY TO AFFECTED AREA TWICE A DAY    clobetasol (OLUX) 0.05 % Foam AAA of scalp and behind ears twice daily.  Do not use on face, underarms or groin.    empagliflozin (JARDIANCE) 25 mg Tab Take 1 tablet (25 mg) by mouth once daily.    ergocalciferol (ERGOCALCIFEROL) 50,000 unit Cap Take 50,000 Units by mouth twice a week.    folic acid (FOLVITE) 1 MG tablet Take 1 tablet (1,000 mcg total) by mouth once daily.    ketoconazole (NIZORAL) 2 % shampoo Wash hair with medicated shampoo at least 2x/week - let sit on scalp at least 5 minutes prior to rinsing    lancets 33 gauge Misc by Misc.(Non-Drug; Combo Route) route 3 (three) times daily.    LANTUS SOLOSTAR U-100 INSULIN glargine 100 units/mL (3mL) SubQ pen INJECT 12 UNITS INTO THE SKIN ONCE DAILY.    liraglutide 0.6 mg/0.1 mL, 18 mg/3 mL, subq PNIJ (VICTOZA 3-MUKESH) 0.6 mg/0.1 mL (18 mg/3 mL) PnIj INJECT 1.8 MG UNDER THE SKIN ONCE DAILY    losartan (COZAAR) 100 MG tablet TAKE 1 TABLET (100 MG TOTAL) BY MOUTH ONCE DAILY.    meloxicam (MOBIC) 15  "MG tablet Take 1 tablet (15 mg total) by mouth once daily.    metFORMIN (GLUCOPHAGE-XR) 500 MG XR 24hr tablet Take 2 tablets (1,000 mg total) by mouth 2 (two) times daily with meals.    methotrexate 2.5 MG Tab TAKE 8 TABLETS (20 MG TOTAL) BY MOUTH EVERY 7 DAYS.    ONETOUCH ULTRA BLUE TEST STRIP Strp USE AS DIRECTED THREE TIMES A DAY    pen needle, diabetic 32 gauge x 5/32" Ndle 2 each by Misc.(Non-Drug; Combo Route) route once daily.    PROAIR HFA 90 mcg/actuation inhaler Inhale 2 puffs into the lungs every 4 (four) hours as needed for Wheezing. Rescue    secukinumab (COSENTYX PEN) 150 mg/mL PnIj Inject 2 pens (300 mg) into the skin every 14 (fourteen) days.    traMADoL (ULTRAM) 50 mg tablet Take 1 tablet (50 mg total) by mouth 3 (three) times daily as needed for Pain (use for mod pain; use sparingly).   Last reviewed on 11/30/2020  5:36 PM by Maggy Flood, PharmD    Review of patient's allergies indicates:  No Known Allergies Last reviewed on  11/30/2020 5:36 PM by Maggy Flood      Tasks added this encounter   1/1/2021 - Refill Call (Auto Added)   Tasks due within next 3 months   2/21/2021 - Clinical - Follow Up Assesement (90 day)     [facility]  [address] [place] [id number]  Phone: [phone]  Fax: [phone]      "

## 2020-12-16 ENCOUNTER — OFFICE VISIT (OUTPATIENT)
Dept: FAMILY MEDICINE | Facility: CLINIC | Age: 65
End: 2020-12-16
Payer: MEDICAID

## 2020-12-16 VITALS
DIASTOLIC BLOOD PRESSURE: 74 MMHG | HEIGHT: 67 IN | BODY MASS INDEX: 42.35 KG/M2 | HEART RATE: 98 BPM | SYSTOLIC BLOOD PRESSURE: 122 MMHG | OXYGEN SATURATION: 95 % | TEMPERATURE: 98 F | WEIGHT: 269.81 LBS

## 2020-12-16 DIAGNOSIS — L40.52 PSORIATIC ARTHRITIS, DESTRUCTIVE TYPE: ICD-10-CM

## 2020-12-16 DIAGNOSIS — E11.21 TYPE 2 DIABETES MELLITUS WITH NEPHROPATHY: Primary | Chronic | ICD-10-CM

## 2020-12-16 DIAGNOSIS — E11.59 HYPERTENSION ASSOCIATED WITH DIABETES: Chronic | ICD-10-CM

## 2020-12-16 DIAGNOSIS — G47.33 OSA (OBSTRUCTIVE SLEEP APNEA): ICD-10-CM

## 2020-12-16 DIAGNOSIS — E78.5 HYPERLIPIDEMIA ASSOCIATED WITH TYPE 2 DIABETES MELLITUS: ICD-10-CM

## 2020-12-16 DIAGNOSIS — I15.2 HYPERTENSION ASSOCIATED WITH DIABETES: Chronic | ICD-10-CM

## 2020-12-16 DIAGNOSIS — E11.69 HYPERLIPIDEMIA ASSOCIATED WITH TYPE 2 DIABETES MELLITUS: ICD-10-CM

## 2020-12-16 DIAGNOSIS — D84.9 IMMUNOSUPPRESSED STATUS: ICD-10-CM

## 2020-12-16 PROCEDURE — 99214 OFFICE O/P EST MOD 30 MIN: CPT | Mod: S$PBB,,, | Performed by: INTERNAL MEDICINE

## 2020-12-16 PROCEDURE — 99214 OFFICE O/P EST MOD 30 MIN: CPT | Mod: PBBFAC,PO | Performed by: INTERNAL MEDICINE

## 2020-12-16 PROCEDURE — 99999 PR PBB SHADOW E&M-EST. PATIENT-LVL IV: CPT | Mod: PBBFAC,,, | Performed by: INTERNAL MEDICINE

## 2020-12-16 PROCEDURE — 99999 PR PBB SHADOW E&M-EST. PATIENT-LVL IV: ICD-10-PCS | Mod: PBBFAC,,, | Performed by: INTERNAL MEDICINE

## 2020-12-16 PROCEDURE — 99214 PR OFFICE/OUTPT VISIT, EST, LEVL IV, 30-39 MIN: ICD-10-PCS | Mod: S$PBB,,, | Performed by: INTERNAL MEDICINE

## 2020-12-16 RX ORDER — INSULIN GLARGINE 100 [IU]/ML
14 INJECTION, SOLUTION SUBCUTANEOUS DAILY
Qty: 15 SYRINGE | Refills: 3 | Status: SHIPPED | OUTPATIENT
Start: 2020-12-16 | End: 2022-01-28 | Stop reason: SDUPTHER

## 2020-12-30 ENCOUNTER — PATIENT OUTREACH (OUTPATIENT)
Dept: ADMINISTRATIVE | Facility: OTHER | Age: 65
End: 2020-12-30

## 2021-01-02 ENCOUNTER — SPECIALTY PHARMACY (OUTPATIENT)
Dept: PHARMACY | Facility: CLINIC | Age: 66
End: 2021-01-02

## 2021-01-04 ENCOUNTER — OFFICE VISIT (OUTPATIENT)
Dept: PODIATRY | Facility: CLINIC | Age: 66
End: 2021-01-04
Payer: MEDICAID

## 2021-01-04 VITALS
SYSTOLIC BLOOD PRESSURE: 130 MMHG | HEIGHT: 67 IN | DIASTOLIC BLOOD PRESSURE: 74 MMHG | HEART RATE: 89 BPM | WEIGHT: 269 LBS | BODY MASS INDEX: 42.22 KG/M2

## 2021-01-04 DIAGNOSIS — L84 CORN OR CALLUS: ICD-10-CM

## 2021-01-04 DIAGNOSIS — B35.1 DERMATOPHYTOSIS OF NAIL: ICD-10-CM

## 2021-01-04 DIAGNOSIS — E66.01 SEVERE OBESITY (BMI 35.0-35.9 WITH COMORBIDITY): ICD-10-CM

## 2021-01-04 DIAGNOSIS — E11.49 TYPE II DIABETES MELLITUS WITH NEUROLOGICAL MANIFESTATIONS: Primary | ICD-10-CM

## 2021-01-04 DIAGNOSIS — R23.4 SKIN FISSURES: ICD-10-CM

## 2021-01-04 PROCEDURE — 11721 DEBRIDE NAIL 6 OR MORE: CPT | Mod: 59,S$PBB,, | Performed by: PODIATRIST

## 2021-01-04 PROCEDURE — 11056 PR TRIM BENIGN HYPERKERATOTIC SKIN LESION,2-4: ICD-10-PCS | Mod: S$PBB,,, | Performed by: PODIATRIST

## 2021-01-04 PROCEDURE — 11056 PARNG/CUTG B9 HYPRKR LES 2-4: CPT | Mod: Q9,PBBFAC | Performed by: PODIATRIST

## 2021-01-04 PROCEDURE — 99499 UNLISTED E&M SERVICE: CPT | Mod: S$PBB,,, | Performed by: PODIATRIST

## 2021-01-04 PROCEDURE — 11721 DEBRIDE NAIL 6 OR MORE: CPT | Mod: Q9,PBBFAC | Performed by: PODIATRIST

## 2021-01-04 PROCEDURE — 99499 NO LOS: ICD-10-PCS | Mod: S$PBB,,, | Performed by: PODIATRIST

## 2021-01-04 PROCEDURE — 99999 PR PBB SHADOW E&M-EST. PATIENT-LVL V: ICD-10-PCS | Mod: PBBFAC,,, | Performed by: PODIATRIST

## 2021-01-04 PROCEDURE — 99999 PR PBB SHADOW E&M-EST. PATIENT-LVL V: CPT | Mod: PBBFAC,,, | Performed by: PODIATRIST

## 2021-01-04 PROCEDURE — 99215 OFFICE O/P EST HI 40 MIN: CPT | Mod: PBBFAC,25 | Performed by: PODIATRIST

## 2021-01-04 PROCEDURE — 11056 PARNG/CUTG B9 HYPRKR LES 2-4: CPT | Mod: S$PBB,,, | Performed by: PODIATRIST

## 2021-01-04 PROCEDURE — 11721 PR DEBRIDEMENT OF NAILS, 6 OR MORE: ICD-10-PCS | Mod: 59,S$PBB,, | Performed by: PODIATRIST

## 2021-01-07 DIAGNOSIS — R91.8 MULTIPLE LUNG NODULES ON CT: Primary | ICD-10-CM

## 2021-01-25 ENCOUNTER — TELEPHONE (OUTPATIENT)
Dept: PULMONOLOGY | Facility: CLINIC | Age: 66
End: 2021-01-25

## 2021-02-10 ENCOUNTER — TELEPHONE (OUTPATIENT)
Dept: PULMONOLOGY | Facility: CLINIC | Age: 66
End: 2021-02-10

## 2021-02-11 ENCOUNTER — TELEPHONE (OUTPATIENT)
Dept: OPHTHALMOLOGY | Facility: CLINIC | Age: 66
End: 2021-02-11

## 2021-02-11 ENCOUNTER — OFFICE VISIT (OUTPATIENT)
Dept: DIABETES | Facility: CLINIC | Age: 66
End: 2021-02-11
Payer: MEDICAID

## 2021-02-11 ENCOUNTER — HOSPITAL ENCOUNTER (OUTPATIENT)
Dept: RADIOLOGY | Facility: HOSPITAL | Age: 66
Discharge: HOME | End: 2021-02-11
Attending: INTERNAL MEDICINE
Payer: MEDICAID

## 2021-02-11 ENCOUNTER — SPECIALTY PHARMACY (OUTPATIENT)
Dept: PHARMACY | Facility: CLINIC | Age: 66
End: 2021-02-11

## 2021-02-11 ENCOUNTER — OFFICE VISIT (OUTPATIENT)
Dept: PULMONOLOGY | Facility: CLINIC | Age: 66
End: 2021-02-11
Payer: MEDICAID

## 2021-02-11 VITALS
BODY MASS INDEX: 40.34 KG/M2 | DIASTOLIC BLOOD PRESSURE: 80 MMHG | HEIGHT: 68 IN | HEART RATE: 93 BPM | SYSTOLIC BLOOD PRESSURE: 124 MMHG | WEIGHT: 266.19 LBS | OXYGEN SATURATION: 95 % | RESPIRATION RATE: 20 BRPM

## 2021-02-11 VITALS
BODY MASS INDEX: 42.02 KG/M2 | HEART RATE: 96 BPM | WEIGHT: 268.31 LBS | DIASTOLIC BLOOD PRESSURE: 73 MMHG | SYSTOLIC BLOOD PRESSURE: 122 MMHG

## 2021-02-11 DIAGNOSIS — E55.9 VITAMIN D DEFICIENCY: ICD-10-CM

## 2021-02-11 DIAGNOSIS — I15.2 HYPERTENSION ASSOCIATED WITH DIABETES: ICD-10-CM

## 2021-02-11 DIAGNOSIS — F17.210 CIGARETTE NICOTINE DEPENDENCE WITHOUT COMPLICATION: ICD-10-CM

## 2021-02-11 DIAGNOSIS — J44.9 MIXED TYPE COPD (CHRONIC OBSTRUCTIVE PULMONARY DISEASE): ICD-10-CM

## 2021-02-11 DIAGNOSIS — E11.59 HYPERTENSION ASSOCIATED WITH DIABETES: ICD-10-CM

## 2021-02-11 DIAGNOSIS — R91.8 MULTIPLE LUNG NODULES ON CT: Chronic | ICD-10-CM

## 2021-02-11 DIAGNOSIS — F17.210 CIGARETTE NICOTINE DEPENDENCE WITHOUT COMPLICATION: Chronic | ICD-10-CM

## 2021-02-11 DIAGNOSIS — E66.01 SEVERE OBESITY (BMI 35.0-35.9 WITH COMORBIDITY): ICD-10-CM

## 2021-02-11 DIAGNOSIS — L40.9 PSORIASIS: ICD-10-CM

## 2021-02-11 DIAGNOSIS — D84.9 IMMUNOSUPPRESSED STATUS: ICD-10-CM

## 2021-02-11 DIAGNOSIS — J44.9 MIXED TYPE COPD (CHRONIC OBSTRUCTIVE PULMONARY DISEASE): Chronic | ICD-10-CM

## 2021-02-11 DIAGNOSIS — L40.0 PLAQUE PSORIASIS: ICD-10-CM

## 2021-02-11 DIAGNOSIS — G47.33 OSA (OBSTRUCTIVE SLEEP APNEA): Chronic | ICD-10-CM

## 2021-02-11 DIAGNOSIS — E78.5 HYPERLIPIDEMIA ASSOCIATED WITH TYPE 2 DIABETES MELLITUS: ICD-10-CM

## 2021-02-11 DIAGNOSIS — E66.01 CLASS 3 SEVERE OBESITY DUE TO EXCESS CALORIES WITH SERIOUS COMORBIDITY AND BODY MASS INDEX (BMI) OF 40.0 TO 44.9 IN ADULT: Chronic | ICD-10-CM

## 2021-02-11 DIAGNOSIS — J44.9 MIXED TYPE COPD (CHRONIC OBSTRUCTIVE PULMONARY DISEASE): Primary | Chronic | ICD-10-CM

## 2021-02-11 DIAGNOSIS — E11.69 HYPERLIPIDEMIA ASSOCIATED WITH TYPE 2 DIABETES MELLITUS: ICD-10-CM

## 2021-02-11 PROBLEM — E66.813 CLASS 3 SEVERE OBESITY DUE TO EXCESS CALORIES WITH SERIOUS COMORBIDITY AND BODY MASS INDEX (BMI) OF 40.0 TO 44.9 IN ADULT: Chronic | Status: ACTIVE | Noted: 2018-04-08

## 2021-02-11 LAB — GLUCOSE SERPL-MCNC: 239 MG/DL (ref 70–110)

## 2021-02-11 PROCEDURE — 99999 PR PBB SHADOW E&M-EST. PATIENT-LVL III: CPT | Mod: PBBFAC,,, | Performed by: PHYSICIAN ASSISTANT

## 2021-02-11 PROCEDURE — 71046 X-RAY EXAM CHEST 2 VIEWS: CPT | Mod: 26,,, | Performed by: RADIOLOGY

## 2021-02-11 PROCEDURE — 99213 PR OFFICE/OUTPT VISIT, EST, LEVL III, 20-29 MIN: ICD-10-PCS | Mod: S$PBB,,, | Performed by: INTERNAL MEDICINE

## 2021-02-11 PROCEDURE — 99999 PR PBB SHADOW E&M-EST. PATIENT-LVL V: ICD-10-PCS | Mod: PBBFAC,,, | Performed by: INTERNAL MEDICINE

## 2021-02-11 PROCEDURE — 99999 PR PBB SHADOW E&M-EST. PATIENT-LVL III: ICD-10-PCS | Mod: PBBFAC,,, | Performed by: PHYSICIAN ASSISTANT

## 2021-02-11 PROCEDURE — 99214 OFFICE O/P EST MOD 30 MIN: CPT | Mod: S$PBB,,, | Performed by: PHYSICIAN ASSISTANT

## 2021-02-11 PROCEDURE — 99215 OFFICE O/P EST HI 40 MIN: CPT | Mod: PBBFAC,25,27 | Performed by: INTERNAL MEDICINE

## 2021-02-11 PROCEDURE — 99213 OFFICE O/P EST LOW 20 MIN: CPT | Mod: S$PBB,,, | Performed by: INTERNAL MEDICINE

## 2021-02-11 PROCEDURE — 99214 PR OFFICE/OUTPT VISIT, EST, LEVL IV, 30-39 MIN: ICD-10-PCS | Mod: S$PBB,,, | Performed by: PHYSICIAN ASSISTANT

## 2021-02-11 PROCEDURE — 71046 X-RAY EXAM CHEST 2 VIEWS: CPT | Mod: TC

## 2021-02-11 PROCEDURE — 99999 PR PBB SHADOW E&M-EST. PATIENT-LVL V: CPT | Mod: PBBFAC,,, | Performed by: INTERNAL MEDICINE

## 2021-02-11 PROCEDURE — 71046 XR CHEST PA AND LATERAL: ICD-10-PCS | Mod: 26,,, | Performed by: RADIOLOGY

## 2021-02-11 PROCEDURE — 82962 GLUCOSE BLOOD TEST: CPT | Mod: PBBFAC | Performed by: PHYSICIAN ASSISTANT

## 2021-02-11 PROCEDURE — 99213 OFFICE O/P EST LOW 20 MIN: CPT | Mod: PBBFAC | Performed by: PHYSICIAN ASSISTANT

## 2021-02-11 RX ORDER — ALBUTEROL SULFATE 90 UG/1
2 AEROSOL, METERED RESPIRATORY (INHALATION) EVERY 4 HOURS PRN
Qty: 18 G | Refills: 11 | Status: SHIPPED | OUTPATIENT
Start: 2021-02-11 | End: 2022-04-11 | Stop reason: SDUPTHER

## 2021-02-11 RX ORDER — SECUKINUMAB 150 MG/ML
300 INJECTION SUBCUTANEOUS
Qty: 4 ML | Refills: 6 | Status: CANCELLED | OUTPATIENT
Start: 2021-02-11

## 2021-02-18 DIAGNOSIS — R07.89 LEFT-SIDED CHEST WALL PAIN: ICD-10-CM

## 2021-02-18 RX ORDER — TRAMADOL HYDROCHLORIDE 50 MG/1
50 TABLET ORAL 3 TIMES DAILY PRN
Qty: 45 TABLET | Refills: 1 | Status: SHIPPED | OUTPATIENT
Start: 2021-02-18 | End: 2021-06-23 | Stop reason: SDUPTHER

## 2021-02-22 ENCOUNTER — OFFICE VISIT (OUTPATIENT)
Dept: OPHTHALMOLOGY | Facility: CLINIC | Age: 66
End: 2021-02-22
Payer: MEDICAID

## 2021-02-22 DIAGNOSIS — E11.9 TYPE 2 DIABETES MELLITUS WITHOUT COMPLICATION, WITH LONG-TERM CURRENT USE OF INSULIN: Primary | ICD-10-CM

## 2021-02-22 DIAGNOSIS — Z79.4 TYPE 2 DIABETES MELLITUS WITHOUT COMPLICATION, WITH LONG-TERM CURRENT USE OF INSULIN: Primary | ICD-10-CM

## 2021-02-22 DIAGNOSIS — E11.36 DIABETIC CATARACT OF LEFT EYE: ICD-10-CM

## 2021-02-22 DIAGNOSIS — E11.36 DIABETIC CATARACT OF RIGHT EYE: ICD-10-CM

## 2021-02-22 PROCEDURE — 92004 COMPRE OPH EXAM NEW PT 1/>: CPT | Mod: S$PBB,,, | Performed by: STUDENT IN AN ORGANIZED HEALTH CARE EDUCATION/TRAINING PROGRAM

## 2021-02-22 PROCEDURE — 92004 PR EYE EXAM, NEW PATIENT,COMPREHESV: ICD-10-PCS | Mod: S$PBB,,, | Performed by: STUDENT IN AN ORGANIZED HEALTH CARE EDUCATION/TRAINING PROGRAM

## 2021-02-22 PROCEDURE — 99999 PR PBB SHADOW E&M-EST. PATIENT-LVL III: ICD-10-PCS | Mod: PBBFAC,,, | Performed by: STUDENT IN AN ORGANIZED HEALTH CARE EDUCATION/TRAINING PROGRAM

## 2021-02-22 PROCEDURE — 99999 PR PBB SHADOW E&M-EST. PATIENT-LVL III: CPT | Mod: PBBFAC,,, | Performed by: STUDENT IN AN ORGANIZED HEALTH CARE EDUCATION/TRAINING PROGRAM

## 2021-02-22 PROCEDURE — 99213 OFFICE O/P EST LOW 20 MIN: CPT | Mod: PBBFAC,PO | Performed by: STUDENT IN AN ORGANIZED HEALTH CARE EDUCATION/TRAINING PROGRAM

## 2021-02-26 ENCOUNTER — CLINICAL SUPPORT (OUTPATIENT)
Dept: DIABETES | Facility: CLINIC | Age: 66
End: 2021-02-26
Payer: MEDICAID

## 2021-03-10 ENCOUNTER — SPECIALTY PHARMACY (OUTPATIENT)
Dept: PHARMACY | Facility: CLINIC | Age: 66
End: 2021-03-10

## 2021-03-15 ENCOUNTER — SPECIALTY PHARMACY (OUTPATIENT)
Dept: PHARMACY | Facility: CLINIC | Age: 66
End: 2021-03-15

## 2021-03-15 ENCOUNTER — DOCUMENTATION ONLY (OUTPATIENT)
Dept: RHEUMATOLOGY | Facility: CLINIC | Age: 66
End: 2021-03-15

## 2021-03-22 ENCOUNTER — CLINICAL SUPPORT (OUTPATIENT)
Dept: DIABETES | Facility: CLINIC | Age: 66
End: 2021-03-22
Payer: MEDICAID

## 2021-03-22 VITALS — HEIGHT: 68 IN | BODY MASS INDEX: 40.43 KG/M2 | WEIGHT: 266.75 LBS

## 2021-03-22 PROCEDURE — 99999 PR PBB SHADOW E&M-EST. PATIENT-LVL I: ICD-10-PCS | Mod: PBBFAC,,, | Performed by: DIETITIAN, REGISTERED

## 2021-03-22 PROCEDURE — 99211 OFF/OP EST MAY X REQ PHY/QHP: CPT | Mod: PBBFAC | Performed by: DIETITIAN, REGISTERED

## 2021-03-22 PROCEDURE — 99999 PR PBB SHADOW E&M-EST. PATIENT-LVL I: CPT | Mod: PBBFAC,,, | Performed by: DIETITIAN, REGISTERED

## 2021-03-22 PROCEDURE — G0108 DIAB MANAGE TRN  PER INDIV: HCPCS | Mod: PBBFAC | Performed by: DIETITIAN, REGISTERED

## 2021-03-26 ENCOUNTER — PATIENT OUTREACH (OUTPATIENT)
Dept: ADMINISTRATIVE | Facility: OTHER | Age: 66
End: 2021-03-26

## 2021-04-05 ENCOUNTER — OFFICE VISIT (OUTPATIENT)
Dept: PODIATRY | Facility: CLINIC | Age: 66
End: 2021-04-05
Payer: MEDICAID

## 2021-04-05 VITALS
WEIGHT: 266.75 LBS | SYSTOLIC BLOOD PRESSURE: 148 MMHG | DIASTOLIC BLOOD PRESSURE: 92 MMHG | HEART RATE: 82 BPM | BODY MASS INDEX: 40.43 KG/M2 | HEIGHT: 68 IN

## 2021-04-05 DIAGNOSIS — B35.1 DERMATOPHYTOSIS OF NAIL: ICD-10-CM

## 2021-04-05 DIAGNOSIS — E66.01 SEVERE OBESITY (BMI 35.0-35.9 WITH COMORBIDITY): ICD-10-CM

## 2021-04-05 DIAGNOSIS — L84 CORN OR CALLUS: ICD-10-CM

## 2021-04-05 DIAGNOSIS — E11.49 TYPE II DIABETES MELLITUS WITH NEUROLOGICAL MANIFESTATIONS: Primary | ICD-10-CM

## 2021-04-05 DIAGNOSIS — R23.4 SKIN FISSURES: ICD-10-CM

## 2021-04-05 PROCEDURE — 11056 PARNG/CUTG B9 HYPRKR LES 2-4: CPT | Mod: Q9,PBBFAC | Performed by: PODIATRIST

## 2021-04-05 PROCEDURE — 99999 PR PBB SHADOW E&M-EST. PATIENT-LVL IV: CPT | Mod: PBBFAC,,, | Performed by: PODIATRIST

## 2021-04-05 PROCEDURE — 99213 OFFICE O/P EST LOW 20 MIN: CPT | Mod: 25,S$PBB,, | Performed by: PODIATRIST

## 2021-04-05 PROCEDURE — 11721 DEBRIDE NAIL 6 OR MORE: CPT | Mod: Q9,PBBFAC | Performed by: PODIATRIST

## 2021-04-05 PROCEDURE — 11056 PR TRIM BENIGN HYPERKERATOTIC SKIN LESION,2-4: ICD-10-PCS | Mod: S$PBB,,, | Performed by: PODIATRIST

## 2021-04-05 PROCEDURE — 99213 PR OFFICE/OUTPT VISIT, EST, LEVL III, 20-29 MIN: ICD-10-PCS | Mod: 25,S$PBB,, | Performed by: PODIATRIST

## 2021-04-05 PROCEDURE — 99214 OFFICE O/P EST MOD 30 MIN: CPT | Mod: PBBFAC | Performed by: PODIATRIST

## 2021-04-05 PROCEDURE — 11721 PR DEBRIDEMENT OF NAILS, 6 OR MORE: ICD-10-PCS | Mod: 59,S$PBB,, | Performed by: PODIATRIST

## 2021-04-05 PROCEDURE — 11721 DEBRIDE NAIL 6 OR MORE: CPT | Mod: 59,S$PBB,, | Performed by: PODIATRIST

## 2021-04-05 PROCEDURE — 11056 PARNG/CUTG B9 HYPRKR LES 2-4: CPT | Mod: S$PBB,,, | Performed by: PODIATRIST

## 2021-04-05 PROCEDURE — 99999 PR PBB SHADOW E&M-EST. PATIENT-LVL IV: ICD-10-PCS | Mod: PBBFAC,,, | Performed by: PODIATRIST

## 2021-04-06 ENCOUNTER — SPECIALTY PHARMACY (OUTPATIENT)
Dept: PHARMACY | Facility: CLINIC | Age: 66
End: 2021-04-06

## 2021-05-20 ENCOUNTER — TELEPHONE (OUTPATIENT)
Dept: RHEUMATOLOGY | Facility: CLINIC | Age: 66
End: 2021-05-20

## 2021-05-20 DIAGNOSIS — D84.9 IMMUNOSUPPRESSED STATUS: ICD-10-CM

## 2021-05-20 DIAGNOSIS — L40.52 PSORIATIC ARTHRITIS, DESTRUCTIVE TYPE: Primary | ICD-10-CM

## 2021-05-21 ENCOUNTER — PATIENT OUTREACH (OUTPATIENT)
Dept: ADMINISTRATIVE | Facility: OTHER | Age: 66
End: 2021-05-21

## 2021-05-21 ENCOUNTER — OFFICE VISIT (OUTPATIENT)
Dept: DIABETES | Facility: CLINIC | Age: 66
End: 2021-05-21
Payer: MEDICAID

## 2021-05-21 ENCOUNTER — SPECIALTY PHARMACY (OUTPATIENT)
Dept: PHARMACY | Facility: CLINIC | Age: 66
End: 2021-05-21

## 2021-05-21 VITALS
HEART RATE: 82 BPM | BODY MASS INDEX: 41.06 KG/M2 | WEIGHT: 270.06 LBS | DIASTOLIC BLOOD PRESSURE: 69 MMHG | SYSTOLIC BLOOD PRESSURE: 140 MMHG

## 2021-05-21 DIAGNOSIS — E78.5 HYPERLIPIDEMIA ASSOCIATED WITH TYPE 2 DIABETES MELLITUS: ICD-10-CM

## 2021-05-21 DIAGNOSIS — E66.01 MORBID OBESITY: ICD-10-CM

## 2021-05-21 DIAGNOSIS — E11.59 HYPERTENSION ASSOCIATED WITH DIABETES: ICD-10-CM

## 2021-05-21 DIAGNOSIS — D84.9 IMMUNOSUPPRESSED STATUS: ICD-10-CM

## 2021-05-21 DIAGNOSIS — F17.210 CIGARETTE NICOTINE DEPENDENCE WITHOUT COMPLICATION: ICD-10-CM

## 2021-05-21 DIAGNOSIS — E11.69 HYPERLIPIDEMIA ASSOCIATED WITH TYPE 2 DIABETES MELLITUS: ICD-10-CM

## 2021-05-21 DIAGNOSIS — I15.2 HYPERTENSION ASSOCIATED WITH DIABETES: ICD-10-CM

## 2021-05-21 DIAGNOSIS — E55.9 VITAMIN D DEFICIENCY: ICD-10-CM

## 2021-05-21 PROCEDURE — 99215 OFFICE O/P EST HI 40 MIN: CPT | Mod: PBBFAC,PO | Performed by: PHYSICIAN ASSISTANT

## 2021-05-21 PROCEDURE — 99214 OFFICE O/P EST MOD 30 MIN: CPT | Mod: S$PBB,,, | Performed by: PHYSICIAN ASSISTANT

## 2021-05-21 PROCEDURE — 99999 PR PBB SHADOW E&M-EST. PATIENT-LVL V: CPT | Mod: PBBFAC,,, | Performed by: PHYSICIAN ASSISTANT

## 2021-05-21 PROCEDURE — 99999 PR PBB SHADOW E&M-EST. PATIENT-LVL V: ICD-10-PCS | Mod: PBBFAC,,, | Performed by: PHYSICIAN ASSISTANT

## 2021-05-21 PROCEDURE — 99214 PR OFFICE/OUTPT VISIT, EST, LEVL IV, 30-39 MIN: ICD-10-PCS | Mod: S$PBB,,, | Performed by: PHYSICIAN ASSISTANT

## 2021-05-21 RX ORDER — DULAGLUTIDE 3 MG/.5ML
3 INJECTION, SOLUTION SUBCUTANEOUS WEEKLY
Qty: 12 PEN | Refills: 3 | Status: SHIPPED | OUTPATIENT
Start: 2021-05-21 | End: 2021-10-05

## 2021-06-21 ENCOUNTER — TELEPHONE (OUTPATIENT)
Dept: RHEUMATOLOGY | Facility: CLINIC | Age: 66
End: 2021-06-21

## 2021-06-22 ENCOUNTER — LAB VISIT (OUTPATIENT)
Dept: LAB | Facility: HOSPITAL | Age: 66
End: 2021-06-22
Attending: PHYSICIAN ASSISTANT
Payer: MEDICAID

## 2021-06-22 ENCOUNTER — OFFICE VISIT (OUTPATIENT)
Dept: FAMILY MEDICINE | Facility: CLINIC | Age: 66
End: 2021-06-22
Payer: MEDICAID

## 2021-06-22 ENCOUNTER — OFFICE VISIT (OUTPATIENT)
Dept: RHEUMATOLOGY | Facility: CLINIC | Age: 66
End: 2021-06-22
Payer: MEDICAID

## 2021-06-22 VITALS
WEIGHT: 265.88 LBS | HEIGHT: 68 IN | OXYGEN SATURATION: 93 % | HEART RATE: 99 BPM | BODY MASS INDEX: 40.3 KG/M2 | TEMPERATURE: 98 F | DIASTOLIC BLOOD PRESSURE: 82 MMHG | SYSTOLIC BLOOD PRESSURE: 120 MMHG

## 2021-06-22 VITALS
HEART RATE: 89 BPM | SYSTOLIC BLOOD PRESSURE: 136 MMHG | BODY MASS INDEX: 40.69 KG/M2 | HEIGHT: 68 IN | WEIGHT: 268.5 LBS | DIASTOLIC BLOOD PRESSURE: 80 MMHG

## 2021-06-22 DIAGNOSIS — E11.69 HYPERLIPIDEMIA ASSOCIATED WITH TYPE 2 DIABETES MELLITUS: ICD-10-CM

## 2021-06-22 DIAGNOSIS — Z79.60 LONG-TERM USE OF IMMUNOSUPPRESSANT MEDICATION: ICD-10-CM

## 2021-06-22 DIAGNOSIS — J44.9 MIXED TYPE COPD (CHRONIC OBSTRUCTIVE PULMONARY DISEASE): ICD-10-CM

## 2021-06-22 DIAGNOSIS — L40.0 PLAQUE PSORIASIS: ICD-10-CM

## 2021-06-22 DIAGNOSIS — D84.9 IMMUNOSUPPRESSED STATUS: ICD-10-CM

## 2021-06-22 DIAGNOSIS — R74.8 ELEVATED LIVER ENZYMES: ICD-10-CM

## 2021-06-22 DIAGNOSIS — Z11.1 SCREENING-PULMONARY TB: ICD-10-CM

## 2021-06-22 DIAGNOSIS — L40.52 PSORIATIC ARTHRITIS, DESTRUCTIVE TYPE: ICD-10-CM

## 2021-06-22 DIAGNOSIS — E55.9 VITAMIN D DEFICIENCY: ICD-10-CM

## 2021-06-22 DIAGNOSIS — F17.210 CIGARETTE NICOTINE DEPENDENCE WITHOUT COMPLICATION: ICD-10-CM

## 2021-06-22 DIAGNOSIS — E78.5 HYPERLIPIDEMIA ASSOCIATED WITH TYPE 2 DIABETES MELLITUS: ICD-10-CM

## 2021-06-22 DIAGNOSIS — Z79.631 METHOTREXATE, LONG TERM, CURRENT USE: ICD-10-CM

## 2021-06-22 DIAGNOSIS — M15.9 PRIMARY OSTEOARTHRITIS INVOLVING MULTIPLE JOINTS: ICD-10-CM

## 2021-06-22 DIAGNOSIS — Z99.81 DEPENDENCE ON NOCTURNAL OXYGEN THERAPY: ICD-10-CM

## 2021-06-22 DIAGNOSIS — I15.2 HYPERTENSION ASSOCIATED WITH DIABETES: Chronic | ICD-10-CM

## 2021-06-22 DIAGNOSIS — L40.52 PSORIATIC ARTHRITIS, DESTRUCTIVE TYPE: Primary | ICD-10-CM

## 2021-06-22 DIAGNOSIS — G47.33 OSA (OBSTRUCTIVE SLEEP APNEA): ICD-10-CM

## 2021-06-22 DIAGNOSIS — E11.21 TYPE 2 DIABETES MELLITUS WITH NEPHROPATHY: Chronic | ICD-10-CM

## 2021-06-22 DIAGNOSIS — E11.59 HYPERTENSION ASSOCIATED WITH DIABETES: Chronic | ICD-10-CM

## 2021-06-22 DIAGNOSIS — Z00.00 ENCOUNTER FOR PREVENTIVE HEALTH EXAMINATION: Primary | ICD-10-CM

## 2021-06-22 LAB
ALBUMIN SERPL BCP-MCNC: 3.6 G/DL (ref 3.5–5.2)
ALBUMIN/CREAT UR: 629.1 UG/MG (ref 0–30)
ALP SERPL-CCNC: 116 U/L (ref 55–135)
ALT SERPL W/O P-5'-P-CCNC: 61 U/L (ref 10–44)
ANION GAP SERPL CALC-SCNC: 12 MMOL/L (ref 8–16)
AST SERPL-CCNC: 46 U/L (ref 10–40)
BASOPHILS # BLD AUTO: 0.06 K/UL (ref 0–0.2)
BASOPHILS NFR BLD: 0.6 % (ref 0–1.9)
BILIRUB SERPL-MCNC: 0.6 MG/DL (ref 0.1–1)
BUN SERPL-MCNC: 10 MG/DL (ref 8–23)
CALCIUM SERPL-MCNC: 9.9 MG/DL (ref 8.7–10.5)
CHLORIDE SERPL-SCNC: 99 MMOL/L (ref 95–110)
CO2 SERPL-SCNC: 25 MMOL/L (ref 23–29)
CREAT SERPL-MCNC: 1.3 MG/DL (ref 0.5–1.4)
CREAT UR-MCNC: 86 MG/DL (ref 23–375)
CRP SERPL-MCNC: 11.7 MG/L (ref 0–8.2)
DIFFERENTIAL METHOD: ABNORMAL
EOSINOPHIL # BLD AUTO: 0.4 K/UL (ref 0–0.5)
EOSINOPHIL NFR BLD: 4.3 % (ref 0–8)
ERYTHROCYTE [DISTWIDTH] IN BLOOD BY AUTOMATED COUNT: 16.6 % (ref 11.5–14.5)
EST. GFR  (AFRICAN AMERICAN): >60 ML/MIN/1.73 M^2
EST. GFR  (NON AFRICAN AMERICAN): 58 ML/MIN/1.73 M^2
ESTIMATED AVG GLUCOSE: 312 MG/DL (ref 68–131)
GLUCOSE SERPL-MCNC: 337 MG/DL (ref 70–110)
HBA1C MFR BLD: 12.5 % (ref 4–5.6)
HCT VFR BLD AUTO: 48.1 % (ref 40–54)
HGB BLD-MCNC: 15.8 G/DL (ref 14–18)
IMM GRANULOCYTES # BLD AUTO: 0.04 K/UL (ref 0–0.04)
IMM GRANULOCYTES NFR BLD AUTO: 0.4 % (ref 0–0.5)
LYMPHOCYTES # BLD AUTO: 2.2 K/UL (ref 1–4.8)
LYMPHOCYTES NFR BLD: 22.2 % (ref 18–48)
MCH RBC QN AUTO: 28 PG (ref 27–31)
MCHC RBC AUTO-ENTMCNC: 32.8 G/DL (ref 32–36)
MCV RBC AUTO: 85 FL (ref 82–98)
MICROALBUMIN UR DL<=1MG/L-MCNC: 541 UG/ML
MONOCYTES # BLD AUTO: 0.7 K/UL (ref 0.3–1)
MONOCYTES NFR BLD: 7.5 % (ref 4–15)
NEUTROPHILS # BLD AUTO: 6.4 K/UL (ref 1.8–7.7)
NEUTROPHILS NFR BLD: 65 % (ref 38–73)
NRBC BLD-RTO: 0 /100 WBC
PLATELET # BLD AUTO: 271 K/UL (ref 150–450)
PMV BLD AUTO: 10.4 FL (ref 9.2–12.9)
POTASSIUM SERPL-SCNC: 4.7 MMOL/L (ref 3.5–5.1)
PROT SERPL-MCNC: 8.6 G/DL (ref 6–8.4)
RBC # BLD AUTO: 5.65 M/UL (ref 4.6–6.2)
SODIUM SERPL-SCNC: 136 MMOL/L (ref 136–145)
WBC # BLD AUTO: 9.91 K/UL (ref 3.9–12.7)

## 2021-06-22 PROCEDURE — 99396 PREV VISIT EST AGE 40-64: CPT | Mod: S$PBB,,, | Performed by: INTERNAL MEDICINE

## 2021-06-22 PROCEDURE — 82043 UR ALBUMIN QUANTITATIVE: CPT | Performed by: PHYSICIAN ASSISTANT

## 2021-06-22 PROCEDURE — 85025 COMPLETE CBC W/AUTO DIFF WBC: CPT | Performed by: PHYSICIAN ASSISTANT

## 2021-06-22 PROCEDURE — 36415 COLL VENOUS BLD VENIPUNCTURE: CPT | Performed by: PHYSICIAN ASSISTANT

## 2021-06-22 PROCEDURE — 99999 PR PBB SHADOW E&M-EST. PATIENT-LVL V: ICD-10-PCS | Mod: PBBFAC,,, | Performed by: PHYSICIAN ASSISTANT

## 2021-06-22 PROCEDURE — 83036 HEMOGLOBIN GLYCOSYLATED A1C: CPT | Performed by: PHYSICIAN ASSISTANT

## 2021-06-22 PROCEDURE — 99999 PR PBB SHADOW E&M-EST. PATIENT-LVL V: CPT | Mod: PBBFAC,,, | Performed by: PHYSICIAN ASSISTANT

## 2021-06-22 PROCEDURE — 99214 OFFICE O/P EST MOD 30 MIN: CPT | Mod: PBBFAC,27,PO | Performed by: INTERNAL MEDICINE

## 2021-06-22 PROCEDURE — 85651 RBC SED RATE NONAUTOMATED: CPT | Performed by: PHYSICIAN ASSISTANT

## 2021-06-22 PROCEDURE — 99999 PR PBB SHADOW E&M-EST. PATIENT-LVL IV: CPT | Mod: PBBFAC,,, | Performed by: INTERNAL MEDICINE

## 2021-06-22 PROCEDURE — 82570 ASSAY OF URINE CREATININE: CPT | Performed by: PHYSICIAN ASSISTANT

## 2021-06-22 PROCEDURE — 99214 OFFICE O/P EST MOD 30 MIN: CPT | Mod: S$PBB,,, | Performed by: PHYSICIAN ASSISTANT

## 2021-06-22 PROCEDURE — 99396 PR PREVENTIVE VISIT,EST,40-64: ICD-10-PCS | Mod: S$PBB,,, | Performed by: INTERNAL MEDICINE

## 2021-06-22 PROCEDURE — 80053 COMPREHEN METABOLIC PANEL: CPT | Performed by: PHYSICIAN ASSISTANT

## 2021-06-22 PROCEDURE — 86140 C-REACTIVE PROTEIN: CPT | Performed by: PHYSICIAN ASSISTANT

## 2021-06-22 PROCEDURE — 99214 PR OFFICE/OUTPT VISIT, EST, LEVL IV, 30-39 MIN: ICD-10-PCS | Mod: S$PBB,,, | Performed by: PHYSICIAN ASSISTANT

## 2021-06-22 PROCEDURE — 99215 OFFICE O/P EST HI 40 MIN: CPT | Mod: PBBFAC | Performed by: PHYSICIAN ASSISTANT

## 2021-06-22 PROCEDURE — 99999 PR PBB SHADOW E&M-EST. PATIENT-LVL IV: ICD-10-PCS | Mod: PBBFAC,,, | Performed by: INTERNAL MEDICINE

## 2021-06-23 ENCOUNTER — TELEPHONE (OUTPATIENT)
Dept: RHEUMATOLOGY | Facility: CLINIC | Age: 66
End: 2021-06-23

## 2021-06-23 DIAGNOSIS — R07.89 LEFT-SIDED CHEST WALL PAIN: ICD-10-CM

## 2021-06-23 RX ORDER — TRAMADOL HYDROCHLORIDE 50 MG/1
50 TABLET ORAL 3 TIMES DAILY PRN
Qty: 45 TABLET | Refills: 1 | Status: SHIPPED | OUTPATIENT
Start: 2021-06-23 | End: 2022-02-15 | Stop reason: SDUPTHER

## 2021-07-01 ENCOUNTER — SPECIALTY PHARMACY (OUTPATIENT)
Dept: PHARMACY | Facility: CLINIC | Age: 66
End: 2021-07-01

## 2021-07-01 DIAGNOSIS — E11.21 TYPE 2 DIABETES MELLITUS WITH NEPHROPATHY: Primary | ICD-10-CM

## 2021-07-06 DIAGNOSIS — J44.9 CHRONIC OBSTRUCTIVE PULMONARY DISEASE, UNSPECIFIED COPD TYPE: Primary | ICD-10-CM

## 2021-07-21 ENCOUNTER — CLINICAL SUPPORT (OUTPATIENT)
Dept: DIABETES | Facility: CLINIC | Age: 66
End: 2021-07-21
Payer: MEDICAID

## 2021-07-21 VITALS — WEIGHT: 264.56 LBS | HEIGHT: 68 IN | BODY MASS INDEX: 40.09 KG/M2

## 2021-07-21 DIAGNOSIS — E11.21 TYPE 2 DIABETES MELLITUS WITH NEPHROPATHY: Primary | ICD-10-CM

## 2021-07-21 PROCEDURE — 99211 OFF/OP EST MAY X REQ PHY/QHP: CPT | Mod: PBBFAC | Performed by: DIETITIAN, REGISTERED

## 2021-07-21 PROCEDURE — 99999 PR PBB SHADOW E&M-EST. PATIENT-LVL I: CPT | Mod: PBBFAC,,, | Performed by: DIETITIAN, REGISTERED

## 2021-07-21 PROCEDURE — 99999 PR PBB SHADOW E&M-EST. PATIENT-LVL I: ICD-10-PCS | Mod: PBBFAC,,, | Performed by: DIETITIAN, REGISTERED

## 2021-07-21 PROCEDURE — G0108 DIAB MANAGE TRN  PER INDIV: HCPCS | Mod: PBBFAC | Performed by: DIETITIAN, REGISTERED

## 2021-07-27 ENCOUNTER — SPECIALTY PHARMACY (OUTPATIENT)
Dept: PHARMACY | Facility: CLINIC | Age: 66
End: 2021-07-27

## 2021-08-06 ENCOUNTER — PATIENT OUTREACH (OUTPATIENT)
Dept: ADMINISTRATIVE | Facility: OTHER | Age: 66
End: 2021-08-06

## 2021-08-09 ENCOUNTER — OFFICE VISIT (OUTPATIENT)
Dept: PODIATRY | Facility: CLINIC | Age: 66
End: 2021-08-09
Payer: MEDICAID

## 2021-08-09 VITALS — HEIGHT: 68 IN | BODY MASS INDEX: 40.01 KG/M2 | WEIGHT: 264 LBS

## 2021-08-09 DIAGNOSIS — B35.1 DERMATOPHYTOSIS OF NAIL: ICD-10-CM

## 2021-08-09 DIAGNOSIS — E66.01 SEVERE OBESITY (BMI 35.0-35.9 WITH COMORBIDITY): ICD-10-CM

## 2021-08-09 DIAGNOSIS — R23.4 SKIN FISSURES: ICD-10-CM

## 2021-08-09 DIAGNOSIS — E11.49 TYPE II DIABETES MELLITUS WITH NEUROLOGICAL MANIFESTATIONS: Primary | ICD-10-CM

## 2021-08-09 DIAGNOSIS — L84 CORN OR CALLUS: ICD-10-CM

## 2021-08-09 PROCEDURE — 99214 OFFICE O/P EST MOD 30 MIN: CPT | Mod: PBBFAC | Performed by: PODIATRIST

## 2021-08-09 PROCEDURE — 11056 PR TRIM BENIGN HYPERKERATOTIC SKIN LESION,2-4: ICD-10-PCS | Mod: S$PBB,,, | Performed by: PODIATRIST

## 2021-08-09 PROCEDURE — 99999 PR PBB SHADOW E&M-EST. PATIENT-LVL IV: CPT | Mod: PBBFAC,,, | Performed by: PODIATRIST

## 2021-08-09 PROCEDURE — 99499 UNLISTED E&M SERVICE: CPT | Mod: S$PBB,,, | Performed by: PODIATRIST

## 2021-08-09 PROCEDURE — 99499 NO LOS: ICD-10-PCS | Mod: S$PBB,,, | Performed by: PODIATRIST

## 2021-08-09 PROCEDURE — 99999 PR PBB SHADOW E&M-EST. PATIENT-LVL IV: ICD-10-PCS | Mod: PBBFAC,,, | Performed by: PODIATRIST

## 2021-08-09 PROCEDURE — 11056 PARNG/CUTG B9 HYPRKR LES 2-4: CPT | Mod: S$PBB,,, | Performed by: PODIATRIST

## 2021-08-09 PROCEDURE — 11721 PR DEBRIDEMENT OF NAILS, 6 OR MORE: ICD-10-PCS | Mod: 59,S$PBB,, | Performed by: PODIATRIST

## 2021-08-09 PROCEDURE — 11721 DEBRIDE NAIL 6 OR MORE: CPT | Mod: 59,PBBFAC | Performed by: PODIATRIST

## 2021-08-09 PROCEDURE — 11721 DEBRIDE NAIL 6 OR MORE: CPT | Mod: 59,S$PBB,, | Performed by: PODIATRIST

## 2021-08-09 PROCEDURE — 11056 PARNG/CUTG B9 HYPRKR LES 2-4: CPT | Mod: PBBFAC | Performed by: PODIATRIST

## 2021-08-12 ENCOUNTER — NURSE TRIAGE (OUTPATIENT)
Dept: ADMINISTRATIVE | Facility: CLINIC | Age: 66
End: 2021-08-12

## 2021-08-13 ENCOUNTER — HOSPITAL ENCOUNTER (INPATIENT)
Facility: HOSPITAL | Age: 66
LOS: 8 days | Discharge: HOME | DRG: 193 | End: 2021-08-21
Attending: EMERGENCY MEDICINE | Admitting: FAMILY MEDICINE
Payer: MEDICAID

## 2021-08-13 DIAGNOSIS — R06.02 SHORTNESS OF BREATH: ICD-10-CM

## 2021-08-13 DIAGNOSIS — Z79.4 TYPE 2 DIABETES MELLITUS WITH HYPERGLYCEMIA, WITH LONG-TERM CURRENT USE OF INSULIN: ICD-10-CM

## 2021-08-13 DIAGNOSIS — J44.9 MIXED TYPE COPD (CHRONIC OBSTRUCTIVE PULMONARY DISEASE): Chronic | ICD-10-CM

## 2021-08-13 DIAGNOSIS — Z91.148 NONCOMPLIANCE WITH DIET AND MEDICATION REGIMEN: ICD-10-CM

## 2021-08-13 DIAGNOSIS — R07.9 CHEST PAIN: ICD-10-CM

## 2021-08-13 DIAGNOSIS — Z91.199 NONCOMPLIANCE WITH DIET AND MEDICATION REGIMEN: ICD-10-CM

## 2021-08-13 DIAGNOSIS — J96.21 ACUTE ON CHRONIC RESPIRATORY FAILURE WITH HYPOXIA: ICD-10-CM

## 2021-08-13 DIAGNOSIS — E11.65 TYPE 2 DIABETES MELLITUS WITH HYPERGLYCEMIA, WITH LONG-TERM CURRENT USE OF INSULIN: ICD-10-CM

## 2021-08-13 DIAGNOSIS — Z72.0 TOBACCO ABUSE DISORDER: ICD-10-CM

## 2021-08-13 DIAGNOSIS — I10 HYPERTENSION, UNSPECIFIED TYPE: Chronic | ICD-10-CM

## 2021-08-13 DIAGNOSIS — J18.9 PNEUMONIA OF RIGHT MIDDLE LOBE DUE TO INFECTIOUS ORGANISM: Primary | ICD-10-CM

## 2021-08-13 DIAGNOSIS — R53.1 WEAKNESS: ICD-10-CM

## 2021-08-13 DIAGNOSIS — I26.99 PULMONARY EMBOLISM: ICD-10-CM

## 2021-08-13 DIAGNOSIS — E87.1 HYPONATREMIA: ICD-10-CM

## 2021-08-13 PROBLEM — R33.9 URINARY RETENTION: Status: ACTIVE | Noted: 2021-08-13

## 2021-08-13 PROBLEM — F17.200 TOBACCO DEPENDENCE: Status: ACTIVE | Noted: 2018-07-30

## 2021-08-13 PROBLEM — N17.9 AKI (ACUTE KIDNEY INJURY): Status: ACTIVE | Noted: 2021-08-13

## 2021-08-13 LAB
ALBUMIN SERPL BCP-MCNC: 2.6 G/DL (ref 3.5–5.2)
ALLENS TEST: ABNORMAL
ALLENS TEST: ABNORMAL
ALP SERPL-CCNC: 78 U/L (ref 55–135)
ALT SERPL W/O P-5'-P-CCNC: 40 U/L (ref 10–44)
ANION GAP SERPL CALC-SCNC: 12 MMOL/L (ref 8–16)
AST SERPL-CCNC: 55 U/L (ref 10–40)
B-OH-BUTYR BLD STRIP-SCNC: 0.1 MMOL/L (ref 0–0.5)
BACTERIA #/AREA URNS HPF: ABNORMAL /HPF
BASOPHILS # BLD AUTO: 0.04 K/UL (ref 0–0.2)
BASOPHILS NFR BLD: 0.5 % (ref 0–1.9)
BILIRUB SERPL-MCNC: 0.5 MG/DL (ref 0.1–1)
BILIRUB UR QL STRIP: NEGATIVE
BNP SERPL-MCNC: 17 PG/ML (ref 0–99)
BUN SERPL-MCNC: 18 MG/DL (ref 8–23)
CALCIUM SERPL-MCNC: 8.6 MG/DL (ref 8.7–10.5)
CHLORIDE SERPL-SCNC: 92 MMOL/L (ref 95–110)
CLARITY UR: CLEAR
CO2 SERPL-SCNC: 24 MMOL/L (ref 23–29)
COLOR UR: YELLOW
CREAT SERPL-MCNC: 1.5 MG/DL (ref 0.5–1.4)
CTP QC/QA: YES
D DIMER PPP IA.FEU-MCNC: 0.78 MG/L FEU
DELSYS: ABNORMAL
DELSYS: ABNORMAL
DIFFERENTIAL METHOD: ABNORMAL
EOSINOPHIL # BLD AUTO: 0 K/UL (ref 0–0.5)
EOSINOPHIL NFR BLD: 0.5 % (ref 0–8)
ERYTHROCYTE [DISTWIDTH] IN BLOOD BY AUTOMATED COUNT: 15.3 % (ref 11.5–14.5)
EST. GFR  (AFRICAN AMERICAN): 56 ML/MIN/1.73 M^2
EST. GFR  (NON AFRICAN AMERICAN): 49 ML/MIN/1.73 M^2
GLUCOSE SERPL-MCNC: 285 MG/DL (ref 70–110)
GLUCOSE SERPL-MCNC: 291 MG/DL (ref 70–110)
GLUCOSE UR QL STRIP: ABNORMAL
GRAN CASTS #/AREA URNS LPF: 1 /LPF
HCO3 UR-SCNC: 24.4 MMOL/L (ref 24–28)
HCO3 UR-SCNC: 26.6 MMOL/L (ref 24–28)
HCT VFR BLD AUTO: 45.7 % (ref 40–54)
HCT VFR BLD CALC: 43 %PCV (ref 36–54)
HGB BLD-MCNC: 15.2 G/DL (ref 14–18)
HGB UR QL STRIP: ABNORMAL
HYALINE CASTS #/AREA URNS LPF: 2 /LPF
IMM GRANULOCYTES # BLD AUTO: 0.07 K/UL (ref 0–0.04)
IMM GRANULOCYTES NFR BLD AUTO: 0.9 % (ref 0–0.5)
KETONES UR QL STRIP: NEGATIVE
LACTATE SERPL-SCNC: 2.3 MMOL/L (ref 0.5–2.2)
LEUKOCYTE ESTERASE UR QL STRIP: NEGATIVE
LIPASE SERPL-CCNC: 28 U/L (ref 4–60)
LYMPHOCYTES # BLD AUTO: 0.8 K/UL (ref 1–4.8)
LYMPHOCYTES NFR BLD: 10.1 % (ref 18–48)
MAGNESIUM SERPL-MCNC: 2.2 MG/DL (ref 1.6–2.6)
MCH RBC QN AUTO: 28 PG (ref 27–31)
MCHC RBC AUTO-ENTMCNC: 33.3 G/DL (ref 32–36)
MCV RBC AUTO: 84 FL (ref 82–98)
MICROSCOPIC COMMENT: ABNORMAL
MODE: ABNORMAL
MONOCYTES # BLD AUTO: 0.6 K/UL (ref 0.3–1)
MONOCYTES NFR BLD: 7.9 % (ref 4–15)
NEUTROPHILS # BLD AUTO: 6.4 K/UL (ref 1.8–7.7)
NEUTROPHILS NFR BLD: 80.1 % (ref 38–73)
NITRITE UR QL STRIP: NEGATIVE
NRBC BLD-RTO: 0 /100 WBC
PCO2 BLDA: 35.9 MMHG (ref 35–45)
PCO2 BLDA: 36.1 MMHG (ref 35–45)
PH SMN: 7.44 [PH] (ref 7.35–7.45)
PH SMN: 7.48 [PH] (ref 7.35–7.45)
PH UR STRIP: 6 [PH] (ref 5–8)
PLATELET # BLD AUTO: 187 K/UL (ref 150–450)
PMV BLD AUTO: 10.7 FL (ref 9.2–12.9)
PO2 BLDA: 52 MMHG (ref 80–100)
PO2 BLDA: 55 MMHG (ref 80–100)
POC BE: 0 MMOL/L
POC BE: 3 MMOL/L
POC IONIZED CALCIUM: 1.07 MMOL/L (ref 1.06–1.42)
POC SATURATED O2: 89 % (ref 95–100)
POC SATURATED O2: 90 % (ref 95–100)
POCT GLUCOSE: 227 MG/DL (ref 70–110)
POCT GLUCOSE: 287 MG/DL (ref 70–110)
POCT GLUCOSE: 307 MG/DL (ref 70–110)
POTASSIUM BLD-SCNC: 4.5 MMOL/L (ref 3.5–5.1)
POTASSIUM SERPL-SCNC: 5.3 MMOL/L (ref 3.5–5.1)
PROCALCITONIN SERPL IA-MCNC: 0.38 NG/ML
PROT SERPL-MCNC: 8.5 G/DL (ref 6–8.4)
PROT UR QL STRIP: ABNORMAL
RBC # BLD AUTO: 5.43 M/UL (ref 4.6–6.2)
RBC #/AREA URNS HPF: 0 /HPF (ref 0–4)
SAMPLE: ABNORMAL
SAMPLE: ABNORMAL
SARS-COV-2 RDRP RESP QL NAA+PROBE: NEGATIVE
SITE: ABNORMAL
SITE: ABNORMAL
SODIUM BLD-SCNC: 130 MMOL/L (ref 136–145)
SODIUM SERPL-SCNC: 128 MMOL/L (ref 136–145)
SP GR UR STRIP: 1.02 (ref 1–1.03)
TROPONIN I SERPL DL<=0.01 NG/ML-MCNC: 0.03 NG/ML (ref 0–0.03)
URN SPEC COLLECT METH UR: ABNORMAL
UROBILINOGEN UR STRIP-ACNC: NEGATIVE EU/DL
WBC # BLD AUTO: 8 K/UL (ref 3.9–12.7)
WBC #/AREA URNS HPF: 0 /HPF (ref 0–5)
YEAST URNS QL MICRO: ABNORMAL

## 2021-08-13 PROCEDURE — 36600 WITHDRAWAL OF ARTERIAL BLOOD: CPT

## 2021-08-13 PROCEDURE — 63600175 PHARM REV CODE 636 W HCPCS: Performed by: FAMILY MEDICINE

## 2021-08-13 PROCEDURE — 82962 GLUCOSE BLOOD TEST: CPT

## 2021-08-13 PROCEDURE — 94640 AIRWAY INHALATION TREATMENT: CPT

## 2021-08-13 PROCEDURE — 93010 EKG 12-LEAD: ICD-10-PCS | Mod: ,,, | Performed by: INTERNAL MEDICINE

## 2021-08-13 PROCEDURE — 93010 ELECTROCARDIOGRAM REPORT: CPT | Mod: ,,, | Performed by: INTERNAL MEDICINE

## 2021-08-13 PROCEDURE — 82010 KETONE BODYS QUAN: CPT | Performed by: EMERGENCY MEDICINE

## 2021-08-13 PROCEDURE — 81000 URINALYSIS NONAUTO W/SCOPE: CPT | Performed by: EMERGENCY MEDICINE

## 2021-08-13 PROCEDURE — 25000003 PHARM REV CODE 250: Performed by: EMERGENCY MEDICINE

## 2021-08-13 PROCEDURE — C9399 UNCLASSIFIED DRUGS OR BIOLOG: HCPCS | Performed by: FAMILY MEDICINE

## 2021-08-13 PROCEDURE — 51798 US URINE CAPACITY MEASURE: CPT

## 2021-08-13 PROCEDURE — 87040 BLOOD CULTURE FOR BACTERIA: CPT | Mod: 59 | Performed by: EMERGENCY MEDICINE

## 2021-08-13 PROCEDURE — 85025 COMPLETE CBC W/AUTO DIFF WBC: CPT | Performed by: EMERGENCY MEDICINE

## 2021-08-13 PROCEDURE — 82803 BLOOD GASES ANY COMBINATION: CPT

## 2021-08-13 PROCEDURE — 25000242 PHARM REV CODE 250 ALT 637 W/ HCPCS: Performed by: FAMILY MEDICINE

## 2021-08-13 PROCEDURE — 83880 ASSAY OF NATRIURETIC PEPTIDE: CPT | Performed by: FAMILY MEDICINE

## 2021-08-13 PROCEDURE — 83690 ASSAY OF LIPASE: CPT | Performed by: EMERGENCY MEDICINE

## 2021-08-13 PROCEDURE — 83735 ASSAY OF MAGNESIUM: CPT | Performed by: EMERGENCY MEDICINE

## 2021-08-13 PROCEDURE — 84153 ASSAY OF PSA TOTAL: CPT | Performed by: NURSE PRACTITIONER

## 2021-08-13 PROCEDURE — 25000003 PHARM REV CODE 250: Performed by: FAMILY MEDICINE

## 2021-08-13 PROCEDURE — 96365 THER/PROPH/DIAG IV INF INIT: CPT

## 2021-08-13 PROCEDURE — 99285 EMERGENCY DEPT VISIT HI MDM: CPT | Mod: 25

## 2021-08-13 PROCEDURE — 84145 PROCALCITONIN (PCT): CPT | Performed by: EMERGENCY MEDICINE

## 2021-08-13 PROCEDURE — 27000221 HC OXYGEN, UP TO 24 HOURS

## 2021-08-13 PROCEDURE — 99223 PR INITIAL HOSPITAL CARE,LEVL III: ICD-10-PCS | Mod: ,,, | Performed by: FAMILY MEDICINE

## 2021-08-13 PROCEDURE — 84295 ASSAY OF SERUM SODIUM: CPT

## 2021-08-13 PROCEDURE — 84484 ASSAY OF TROPONIN QUANT: CPT | Performed by: FAMILY MEDICINE

## 2021-08-13 PROCEDURE — 96361 HYDRATE IV INFUSION ADD-ON: CPT

## 2021-08-13 PROCEDURE — 11000001 HC ACUTE MED/SURG PRIVATE ROOM

## 2021-08-13 PROCEDURE — 21400001 HC TELEMETRY ROOM

## 2021-08-13 PROCEDURE — 99900035 HC TECH TIME PER 15 MIN (STAT)

## 2021-08-13 PROCEDURE — 82330 ASSAY OF CALCIUM: CPT

## 2021-08-13 PROCEDURE — 36415 COLL VENOUS BLD VENIPUNCTURE: CPT | Performed by: FAMILY MEDICINE

## 2021-08-13 PROCEDURE — 63600175 PHARM REV CODE 636 W HCPCS: Performed by: EMERGENCY MEDICINE

## 2021-08-13 PROCEDURE — 85014 HEMATOCRIT: CPT

## 2021-08-13 PROCEDURE — 99223 1ST HOSP IP/OBS HIGH 75: CPT | Mod: ,,, | Performed by: FAMILY MEDICINE

## 2021-08-13 PROCEDURE — 83605 ASSAY OF LACTIC ACID: CPT | Performed by: EMERGENCY MEDICINE

## 2021-08-13 PROCEDURE — U0002 COVID-19 LAB TEST NON-CDC: HCPCS | Performed by: EMERGENCY MEDICINE

## 2021-08-13 PROCEDURE — 27000207 HC ISOLATION

## 2021-08-13 PROCEDURE — 85379 FIBRIN DEGRADATION QUANT: CPT | Performed by: FAMILY MEDICINE

## 2021-08-13 PROCEDURE — 93005 ELECTROCARDIOGRAM TRACING: CPT

## 2021-08-13 PROCEDURE — 80053 COMPREHEN METABOLIC PANEL: CPT | Performed by: EMERGENCY MEDICINE

## 2021-08-13 PROCEDURE — 96375 TX/PRO/DX INJ NEW DRUG ADDON: CPT

## 2021-08-13 PROCEDURE — 84132 ASSAY OF SERUM POTASSIUM: CPT

## 2021-08-13 RX ORDER — IPRATROPIUM BROMIDE AND ALBUTEROL SULFATE 2.5; .5 MG/3ML; MG/3ML
3 SOLUTION RESPIRATORY (INHALATION) EVERY 4 HOURS PRN
Status: DISCONTINUED | OUTPATIENT
Start: 2021-08-13 | End: 2021-08-14

## 2021-08-13 RX ORDER — ACETAMINOPHEN 325 MG/1
650 TABLET ORAL EVERY 8 HOURS PRN
Status: DISCONTINUED | OUTPATIENT
Start: 2021-08-13 | End: 2021-08-21 | Stop reason: HOSPADM

## 2021-08-13 RX ORDER — INSULIN ASPART 100 [IU]/ML
1-10 INJECTION, SOLUTION INTRAVENOUS; SUBCUTANEOUS
Status: DISCONTINUED | OUTPATIENT
Start: 2021-08-13 | End: 2021-08-21 | Stop reason: HOSPADM

## 2021-08-13 RX ORDER — BENZONATATE 100 MG/1
200 CAPSULE ORAL 3 TIMES DAILY PRN
Status: DISCONTINUED | OUTPATIENT
Start: 2021-08-13 | End: 2021-08-21 | Stop reason: HOSPADM

## 2021-08-13 RX ORDER — ATORVASTATIN CALCIUM 40 MG/1
80 TABLET, FILM COATED ORAL DAILY
Status: DISCONTINUED | OUTPATIENT
Start: 2021-08-14 | End: 2021-08-21 | Stop reason: HOSPADM

## 2021-08-13 RX ORDER — SODIUM CHLORIDE 0.9 % (FLUSH) 0.9 %
10 SYRINGE (ML) INJECTION
Status: DISCONTINUED | OUTPATIENT
Start: 2021-08-13 | End: 2021-08-21 | Stop reason: HOSPADM

## 2021-08-13 RX ORDER — IBUPROFEN 200 MG
16 TABLET ORAL
Status: DISCONTINUED | OUTPATIENT
Start: 2021-08-13 | End: 2021-08-21 | Stop reason: HOSPADM

## 2021-08-13 RX ORDER — NAPROXEN SODIUM 220 MG/1
81 TABLET, FILM COATED ORAL DAILY
Status: DISCONTINUED | OUTPATIENT
Start: 2021-08-14 | End: 2021-08-14

## 2021-08-13 RX ORDER — GLUCAGON 1 MG
1 KIT INJECTION
Status: DISCONTINUED | OUTPATIENT
Start: 2021-08-13 | End: 2021-08-21 | Stop reason: HOSPADM

## 2021-08-13 RX ORDER — IBUPROFEN 200 MG
24 TABLET ORAL
Status: DISCONTINUED | OUTPATIENT
Start: 2021-08-13 | End: 2021-08-21 | Stop reason: HOSPADM

## 2021-08-13 RX ORDER — ACETAMINOPHEN 325 MG/1
650 TABLET ORAL EVERY 4 HOURS PRN
Status: DISCONTINUED | OUTPATIENT
Start: 2021-08-13 | End: 2021-08-21 | Stop reason: HOSPADM

## 2021-08-13 RX ORDER — SODIUM CHLORIDE 9 MG/ML
INJECTION, SOLUTION INTRAVENOUS CONTINUOUS
Status: DISCONTINUED | OUTPATIENT
Start: 2021-08-13 | End: 2021-08-15

## 2021-08-13 RX ORDER — AMLODIPINE BESYLATE 5 MG/1
5 TABLET ORAL DAILY
Status: DISCONTINUED | OUTPATIENT
Start: 2021-08-14 | End: 2021-08-21 | Stop reason: HOSPADM

## 2021-08-13 RX ADMIN — SODIUM CHLORIDE: 0.9 INJECTION, SOLUTION INTRAVENOUS at 05:08

## 2021-08-13 RX ADMIN — AZITHROMYCIN MONOHYDRATE 500 MG: 500 INJECTION, POWDER, LYOPHILIZED, FOR SOLUTION INTRAVENOUS at 03:08

## 2021-08-13 RX ADMIN — ACETAMINOPHEN 650 MG: 325 TABLET ORAL at 06:08

## 2021-08-13 RX ADMIN — INSULIN ASPART 3 UNITS: 100 INJECTION, SOLUTION INTRAVENOUS; SUBCUTANEOUS at 09:08

## 2021-08-13 RX ADMIN — SODIUM CHLORIDE 1000 ML: 0.9 INJECTION, SOLUTION INTRAVENOUS at 02:08

## 2021-08-13 RX ADMIN — IPRATROPIUM BROMIDE AND ALBUTEROL SULFATE 3 ML: .5; 3 SOLUTION RESPIRATORY (INHALATION) at 06:08

## 2021-08-13 RX ADMIN — CEFTRIAXONE 1 G: 1 INJECTION, SOLUTION INTRAVENOUS at 03:08

## 2021-08-13 RX ADMIN — INSULIN ASPART 4 UNITS: 100 INJECTION, SOLUTION INTRAVENOUS; SUBCUTANEOUS at 05:08

## 2021-08-13 RX ADMIN — INSULIN DETEMIR 10 UNITS: 100 INJECTION, SOLUTION SUBCUTANEOUS at 09:08

## 2021-08-14 PROBLEM — E78.5 HYPERLIPIDEMIA: Status: ACTIVE | Noted: 2021-08-14

## 2021-08-14 PROBLEM — R07.9: Status: ACTIVE | Noted: 2019-07-29

## 2021-08-14 PROBLEM — I10 HTN (HYPERTENSION): Status: ACTIVE | Noted: 2017-03-14

## 2021-08-14 LAB
ALBUMIN SERPL BCP-MCNC: 2.3 G/DL (ref 3.5–5.2)
ANION GAP SERPL CALC-SCNC: 12 MMOL/L (ref 8–16)
APTT BLDCRRT: 33.9 SEC (ref 21–32)
APTT BLDCRRT: 44.1 SEC (ref 21–32)
ASCENDING AORTA: 3.17 CM
AV INDEX (PROSTH): 0.57
AV MEAN GRADIENT: 12 MMHG
AV PEAK GRADIENT: 22 MMHG
AV VALVE AREA: 1.45 CM2
AV VELOCITY RATIO: 0.58
BASOPHILS # BLD AUTO: 0.02 K/UL (ref 0–0.2)
BASOPHILS NFR BLD: 0.2 % (ref 0–1.9)
BSA FOR ECHO PROCEDURE: 2.29 M2
BUN SERPL-MCNC: 15 MG/DL (ref 8–23)
CALCIUM SERPL-MCNC: 8.2 MG/DL (ref 8.7–10.5)
CHLORIDE SERPL-SCNC: 95 MMOL/L (ref 95–110)
CO2 SERPL-SCNC: 20 MMOL/L (ref 23–29)
COMPLEXED PSA SERPL-MCNC: 0.19 NG/ML (ref 0–4)
CREAT SERPL-MCNC: 1.2 MG/DL (ref 0.5–1.4)
CV ECHO LV RWT: 0.53 CM
DIFFERENTIAL METHOD: ABNORMAL
DOP CALC AO PEAK VEL: 2.32 M/S
DOP CALC AO VTI: 41.99 CM
DOP CALC LVOT AREA: 2.6 CM2
DOP CALC LVOT DIAMETER: 1.81 CM
DOP CALC LVOT PEAK VEL: 1.35 M/S
DOP CALC LVOT STROKE VOLUME: 61.08 CM3
DOP CALCLVOT PEAK VEL VTI: 23.75 CM
E WAVE DECELERATION TIME: 195.28 MSEC
E/A RATIO: 1.23
E/E' RATIO: 11.29 M/S
ECHO LV POSTERIOR WALL: 1.03 CM (ref 0.6–1.1)
EJECTION FRACTION: 60 %
EOSINOPHIL # BLD AUTO: 0 K/UL (ref 0–0.5)
EOSINOPHIL NFR BLD: 0 % (ref 0–8)
ERYTHROCYTE [DISTWIDTH] IN BLOOD BY AUTOMATED COUNT: 15 % (ref 11.5–14.5)
EST. GFR  (AFRICAN AMERICAN): >60 ML/MIN/1.73 M^2
EST. GFR  (NON AFRICAN AMERICAN): >60 ML/MIN/1.73 M^2
FRACTIONAL SHORTENING: 37 % (ref 28–44)
GLUCOSE SERPL-MCNC: 267 MG/DL (ref 70–110)
HCT VFR BLD AUTO: 45.3 % (ref 40–54)
HGB BLD-MCNC: 14.7 G/DL (ref 14–18)
IMM GRANULOCYTES # BLD AUTO: 0.06 K/UL (ref 0–0.04)
IMM GRANULOCYTES NFR BLD AUTO: 0.7 % (ref 0–0.5)
INR PPP: 1 (ref 0.8–1.2)
INTERVENTRICULAR SEPTUM: 1.42 CM (ref 0.6–1.1)
LA MAJOR: 5.59 CM
LA MINOR: 5.63 CM
LA WIDTH: 4.75 CM
LACTATE SERPL-SCNC: 1.5 MMOL/L (ref 0.5–2.2)
LEFT ATRIUM SIZE: 4.33 CM
LEFT ATRIUM VOLUME INDEX: 44.4 ML/M2
LEFT ATRIUM VOLUME: 98.07 CM3
LEFT INTERNAL DIMENSION IN SYSTOLE: 2.47 CM (ref 2.1–4)
LEFT VENTRICLE DIASTOLIC VOLUME INDEX: 29.68 ML/M2
LEFT VENTRICLE DIASTOLIC VOLUME: 65.59 ML
LEFT VENTRICLE MASS INDEX: 74 G/M2
LEFT VENTRICLE SYSTOLIC VOLUME INDEX: 9.8 ML/M2
LEFT VENTRICLE SYSTOLIC VOLUME: 21.65 ML
LEFT VENTRICULAR INTERNAL DIMENSION IN DIASTOLE: 3.89 CM (ref 3.5–6)
LEFT VENTRICULAR MASS: 163.65 G
LV LATERAL E/E' RATIO: 9.6 M/S
LV SEPTAL E/E' RATIO: 13.71 M/S
LYMPHOCYTES # BLD AUTO: 0.3 K/UL (ref 1–4.8)
LYMPHOCYTES NFR BLD: 3.6 % (ref 18–48)
MCH RBC QN AUTO: 27.4 PG (ref 27–31)
MCHC RBC AUTO-ENTMCNC: 32.5 G/DL (ref 32–36)
MCV RBC AUTO: 84 FL (ref 82–98)
MONOCYTES # BLD AUTO: 0.1 K/UL (ref 0.3–1)
MONOCYTES NFR BLD: 1.4 % (ref 4–15)
MV PEAK A VEL: 0.78 M/S
MV PEAK E VEL: 0.96 M/S
MV STENOSIS PRESSURE HALF TIME: 56.63 MS
MV VALVE AREA P 1/2 METHOD: 3.88 CM2
NEUTROPHILS # BLD AUTO: 7.6 K/UL (ref 1.8–7.7)
NEUTROPHILS NFR BLD: 94.1 % (ref 38–73)
NRBC BLD-RTO: 0 /100 WBC
PHOSPHATE SERPL-MCNC: 3 MG/DL (ref 2.7–4.5)
PISA TR MAX VEL: 1.39 M/S
PLATELET # BLD AUTO: 207 K/UL (ref 150–450)
PMV BLD AUTO: 11.1 FL (ref 9.2–12.9)
POCT GLUCOSE: 214 MG/DL (ref 70–110)
POCT GLUCOSE: 276 MG/DL (ref 70–110)
POCT GLUCOSE: 356 MG/DL (ref 70–110)
POCT GLUCOSE: 426 MG/DL (ref 70–110)
POTASSIUM SERPL-SCNC: 4.7 MMOL/L (ref 3.5–5.1)
PROCALCITONIN SERPL IA-MCNC: 1.02 NG/ML
PROTHROMBIN TIME: 11.5 SEC (ref 9–12.5)
RA MAJOR: 5.23 CM
RA WIDTH: 4.51 CM
RBC # BLD AUTO: 5.37 M/UL (ref 4.6–6.2)
RIGHT VENTRICULAR END-DIASTOLIC DIMENSION: 3.18 CM
SINUS: 3.06 CM
SODIUM SERPL-SCNC: 127 MMOL/L (ref 136–145)
TDI LATERAL: 0.1 M/S
TDI SEPTAL: 0.07 M/S
TDI: 0.09 M/S
TR MAX PG: 8 MMHG
TROPONIN I SERPL DL<=0.01 NG/ML-MCNC: 0.05 NG/ML (ref 0–0.03)
TROPONIN I SERPL DL<=0.01 NG/ML-MCNC: 0.07 NG/ML (ref 0–0.03)
WBC # BLD AUTO: 8.12 K/UL (ref 3.9–12.7)

## 2021-08-14 PROCEDURE — 25000003 PHARM REV CODE 250: Performed by: NURSE PRACTITIONER

## 2021-08-14 PROCEDURE — 25000242 PHARM REV CODE 250 ALT 637 W/ HCPCS: Performed by: FAMILY MEDICINE

## 2021-08-14 PROCEDURE — 27000221 HC OXYGEN, UP TO 24 HOURS

## 2021-08-14 PROCEDURE — C9399 UNCLASSIFIED DRUGS OR BIOLOG: HCPCS | Performed by: FAMILY MEDICINE

## 2021-08-14 PROCEDURE — 36415 COLL VENOUS BLD VENIPUNCTURE: CPT | Performed by: FAMILY MEDICINE

## 2021-08-14 PROCEDURE — 85730 THROMBOPLASTIN TIME PARTIAL: CPT | Mod: 91 | Performed by: FAMILY MEDICINE

## 2021-08-14 PROCEDURE — 96372 THER/PROPH/DIAG INJ SC/IM: CPT

## 2021-08-14 PROCEDURE — 80069 RENAL FUNCTION PANEL: CPT | Performed by: FAMILY MEDICINE

## 2021-08-14 PROCEDURE — 25000003 PHARM REV CODE 250: Performed by: FAMILY MEDICINE

## 2021-08-14 PROCEDURE — 21400001 HC TELEMETRY ROOM

## 2021-08-14 PROCEDURE — 83605 ASSAY OF LACTIC ACID: CPT | Performed by: NURSE PRACTITIONER

## 2021-08-14 PROCEDURE — 63600175 PHARM REV CODE 636 W HCPCS: Performed by: FAMILY MEDICINE

## 2021-08-14 PROCEDURE — 85730 THROMBOPLASTIN TIME PARTIAL: CPT | Performed by: NURSE PRACTITIONER

## 2021-08-14 PROCEDURE — 63600175 PHARM REV CODE 636 W HCPCS: Performed by: NURSE PRACTITIONER

## 2021-08-14 PROCEDURE — 85610 PROTHROMBIN TIME: CPT | Performed by: NURSE PRACTITIONER

## 2021-08-14 PROCEDURE — 84145 PROCALCITONIN (PCT): CPT | Performed by: NURSE PRACTITIONER

## 2021-08-14 PROCEDURE — 11000001 HC ACUTE MED/SURG PRIVATE ROOM

## 2021-08-14 PROCEDURE — 84484 ASSAY OF TROPONIN QUANT: CPT | Mod: 91 | Performed by: FAMILY MEDICINE

## 2021-08-14 PROCEDURE — 94761 N-INVAS EAR/PLS OXIMETRY MLT: CPT

## 2021-08-14 PROCEDURE — 25000003 PHARM REV CODE 250: Performed by: EMERGENCY MEDICINE

## 2021-08-14 PROCEDURE — 27000207 HC ISOLATION

## 2021-08-14 PROCEDURE — 63600175 PHARM REV CODE 636 W HCPCS: Performed by: EMERGENCY MEDICINE

## 2021-08-14 PROCEDURE — 94640 AIRWAY INHALATION TREATMENT: CPT

## 2021-08-14 PROCEDURE — 85025 COMPLETE CBC W/AUTO DIFF WBC: CPT | Performed by: NURSE PRACTITIONER

## 2021-08-14 RX ORDER — HEPARIN SODIUM,PORCINE/D5W 25000/250
0-40 INTRAVENOUS SOLUTION INTRAVENOUS CONTINUOUS
Status: DISCONTINUED | OUTPATIENT
Start: 2021-08-14 | End: 2021-08-15

## 2021-08-14 RX ORDER — IPRATROPIUM BROMIDE AND ALBUTEROL SULFATE 2.5; .5 MG/3ML; MG/3ML
3 SOLUTION RESPIRATORY (INHALATION) EVERY 4 HOURS
Status: DISCONTINUED | OUTPATIENT
Start: 2021-08-14 | End: 2021-08-18

## 2021-08-14 RX ORDER — HYDRALAZINE HYDROCHLORIDE 20 MG/ML
10 INJECTION INTRAMUSCULAR; INTRAVENOUS EVERY 6 HOURS PRN
Status: DISCONTINUED | OUTPATIENT
Start: 2021-08-14 | End: 2021-08-21 | Stop reason: HOSPADM

## 2021-08-14 RX ORDER — GUAIFENESIN/DEXTROMETHORPHAN 100-10MG/5
5 SYRUP ORAL EVERY 4 HOURS PRN
Status: DISCONTINUED | OUTPATIENT
Start: 2021-08-14 | End: 2021-08-21 | Stop reason: HOSPADM

## 2021-08-14 RX ORDER — MUPIROCIN 20 MG/G
OINTMENT TOPICAL 2 TIMES DAILY
Status: DISPENSED | OUTPATIENT
Start: 2021-08-14 | End: 2021-08-19

## 2021-08-14 RX ORDER — AMOXICILLIN 250 MG
1 CAPSULE ORAL DAILY PRN
Status: DISCONTINUED | OUTPATIENT
Start: 2021-08-14 | End: 2021-08-21 | Stop reason: HOSPADM

## 2021-08-14 RX ADMIN — MUPIROCIN: 20 OINTMENT TOPICAL at 09:08

## 2021-08-14 RX ADMIN — INSULIN DETEMIR 10 UNITS: 100 INJECTION, SOLUTION SUBCUTANEOUS at 09:08

## 2021-08-14 RX ADMIN — AMLODIPINE BESYLATE 5 MG: 5 TABLET ORAL at 08:08

## 2021-08-14 RX ADMIN — ASPIRIN 81 MG CHEWABLE TABLET 81 MG: 81 TABLET CHEWABLE at 08:08

## 2021-08-14 RX ADMIN — AZITHROMYCIN MONOHYDRATE 500 MG: 500 INJECTION, POWDER, LYOPHILIZED, FOR SOLUTION INTRAVENOUS at 04:08

## 2021-08-14 RX ADMIN — ATORVASTATIN CALCIUM 80 MG: 40 TABLET, FILM COATED ORAL at 08:08

## 2021-08-14 RX ADMIN — MUPIROCIN: 20 OINTMENT TOPICAL at 08:08

## 2021-08-14 RX ADMIN — INSULIN ASPART 10 UNITS: 100 INJECTION, SOLUTION INTRAVENOUS; SUBCUTANEOUS at 04:08

## 2021-08-14 RX ADMIN — SENNOSIDES AND DOCUSATE SODIUM 1 TABLET: 8.6; 5 TABLET ORAL at 08:08

## 2021-08-14 RX ADMIN — HEPARIN SODIUM 18 UNITS/KG/HR: 10000 INJECTION, SOLUTION INTRAVENOUS at 02:08

## 2021-08-14 RX ADMIN — IPRATROPIUM BROMIDE AND ALBUTEROL SULFATE 3 ML: .5; 3 SOLUTION RESPIRATORY (INHALATION) at 12:08

## 2021-08-14 RX ADMIN — ACETAMINOPHEN 650 MG: 325 TABLET ORAL at 05:08

## 2021-08-14 RX ADMIN — GUAIFENESIN AND DEXTROMETHORPHAN 5 ML: 100; 10 SYRUP ORAL at 09:08

## 2021-08-14 RX ADMIN — METHYLPREDNISOLONE SODIUM SUCCINATE 40 MG: 40 INJECTION, POWDER, FOR SOLUTION INTRAMUSCULAR; INTRAVENOUS at 09:08

## 2021-08-14 RX ADMIN — CEFTRIAXONE 1 G: 1 INJECTION, SOLUTION INTRAVENOUS at 02:08

## 2021-08-14 RX ADMIN — PREDNISONE 60 MG: 50 TABLET ORAL at 08:08

## 2021-08-14 RX ADMIN — INSULIN ASPART 4 UNITS: 100 INJECTION, SOLUTION INTRAVENOUS; SUBCUTANEOUS at 05:08

## 2021-08-14 RX ADMIN — IPRATROPIUM BROMIDE AND ALBUTEROL SULFATE 3 ML: .5; 3 SOLUTION RESPIRATORY (INHALATION) at 08:08

## 2021-08-14 RX ADMIN — INSULIN ASPART 5 UNITS: 100 INJECTION, SOLUTION INTRAVENOUS; SUBCUTANEOUS at 09:08

## 2021-08-14 RX ADMIN — INSULIN ASPART 6 UNITS: 100 INJECTION, SOLUTION INTRAVENOUS; SUBCUTANEOUS at 11:08

## 2021-08-14 RX ADMIN — IPRATROPIUM BROMIDE AND ALBUTEROL SULFATE 3 ML: .5; 3 SOLUTION RESPIRATORY (INHALATION) at 01:08

## 2021-08-15 PROBLEM — J44.9 MIXED TYPE COPD (CHRONIC OBSTRUCTIVE PULMONARY DISEASE): Chronic | Status: ACTIVE | Noted: 2017-02-02

## 2021-08-15 PROBLEM — E78.5 HYPERLIPIDEMIA: Chronic | Status: ACTIVE | Noted: 2021-08-14

## 2021-08-15 PROBLEM — F17.200 TOBACCO DEPENDENCE: Chronic | Status: ACTIVE | Noted: 2018-07-30

## 2021-08-15 PROBLEM — G47.33 OSA (OBSTRUCTIVE SLEEP APNEA): Chronic | Status: ACTIVE | Noted: 2017-08-02

## 2021-08-15 PROBLEM — E66.9 OBESITY (BMI 30-39.9): Status: ACTIVE | Noted: 2018-04-08

## 2021-08-15 PROBLEM — I10 HTN (HYPERTENSION): Chronic | Status: ACTIVE | Noted: 2017-03-14

## 2021-08-15 LAB
ANION GAP SERPL CALC-SCNC: 12 MMOL/L (ref 8–16)
APTT BLDCRRT: 34.2 SEC (ref 21–32)
BASOPHILS # BLD AUTO: 0.03 K/UL (ref 0–0.2)
BASOPHILS NFR BLD: 0.4 % (ref 0–1.9)
BUN SERPL-MCNC: 20 MG/DL (ref 8–23)
CALCIUM SERPL-MCNC: 8.6 MG/DL (ref 8.7–10.5)
CHLORIDE SERPL-SCNC: 95 MMOL/L (ref 95–110)
CO2 SERPL-SCNC: 22 MMOL/L (ref 23–29)
CREAT SERPL-MCNC: 1.2 MG/DL (ref 0.5–1.4)
DIFFERENTIAL METHOD: ABNORMAL
EOSINOPHIL # BLD AUTO: 0 K/UL (ref 0–0.5)
EOSINOPHIL NFR BLD: 0 % (ref 0–8)
ERYTHROCYTE [DISTWIDTH] IN BLOOD BY AUTOMATED COUNT: 14.9 % (ref 11.5–14.5)
EST. GFR  (AFRICAN AMERICAN): >60 ML/MIN/1.73 M^2
EST. GFR  (NON AFRICAN AMERICAN): >60 ML/MIN/1.73 M^2
GLUCOSE SERPL-MCNC: 464 MG/DL (ref 70–110)
HCT VFR BLD AUTO: 48.4 % (ref 40–54)
HGB BLD-MCNC: 16 G/DL (ref 14–18)
IMM GRANULOCYTES # BLD AUTO: 0.08 K/UL (ref 0–0.04)
IMM GRANULOCYTES NFR BLD AUTO: 1.1 % (ref 0–0.5)
LYMPHOCYTES # BLD AUTO: 0.4 K/UL (ref 1–4.8)
LYMPHOCYTES NFR BLD: 5.8 % (ref 18–48)
MCH RBC QN AUTO: 27.7 PG (ref 27–31)
MCHC RBC AUTO-ENTMCNC: 33.1 G/DL (ref 32–36)
MCV RBC AUTO: 84 FL (ref 82–98)
MONOCYTES # BLD AUTO: 0.4 K/UL (ref 0.3–1)
MONOCYTES NFR BLD: 4.7 % (ref 4–15)
NEUTROPHILS # BLD AUTO: 6.7 K/UL (ref 1.8–7.7)
NEUTROPHILS NFR BLD: 88 % (ref 38–73)
NRBC BLD-RTO: 0 /100 WBC
PLATELET # BLD AUTO: 238 K/UL (ref 150–450)
PMV BLD AUTO: 11 FL (ref 9.2–12.9)
POCT GLUCOSE: 365 MG/DL (ref 70–110)
POCT GLUCOSE: 378 MG/DL (ref 70–110)
POCT GLUCOSE: 432 MG/DL (ref 70–110)
POCT GLUCOSE: 450 MG/DL (ref 70–110)
POTASSIUM SERPL-SCNC: 5.2 MMOL/L (ref 3.5–5.1)
PROCALCITONIN SERPL IA-MCNC: 0.63 NG/ML
RBC # BLD AUTO: 5.78 M/UL (ref 4.6–6.2)
SODIUM SERPL-SCNC: 129 MMOL/L (ref 136–145)
WBC # BLD AUTO: 7.59 K/UL (ref 3.9–12.7)

## 2021-08-15 PROCEDURE — 27000207 HC ISOLATION

## 2021-08-15 PROCEDURE — 85025 COMPLETE CBC W/AUTO DIFF WBC: CPT | Performed by: FAMILY MEDICINE

## 2021-08-15 PROCEDURE — 84145 PROCALCITONIN (PCT): CPT | Performed by: NURSE PRACTITIONER

## 2021-08-15 PROCEDURE — 94761 N-INVAS EAR/PLS OXIMETRY MLT: CPT

## 2021-08-15 PROCEDURE — 63600175 PHARM REV CODE 636 W HCPCS: Performed by: FAMILY MEDICINE

## 2021-08-15 PROCEDURE — 63600175 PHARM REV CODE 636 W HCPCS: Performed by: EMERGENCY MEDICINE

## 2021-08-15 PROCEDURE — 85730 THROMBOPLASTIN TIME PARTIAL: CPT | Performed by: FAMILY MEDICINE

## 2021-08-15 PROCEDURE — 21400001 HC TELEMETRY ROOM

## 2021-08-15 PROCEDURE — 25000242 PHARM REV CODE 250 ALT 637 W/ HCPCS: Performed by: FAMILY MEDICINE

## 2021-08-15 PROCEDURE — 25000003 PHARM REV CODE 250: Performed by: NURSE PRACTITIONER

## 2021-08-15 PROCEDURE — 80048 BASIC METABOLIC PNL TOTAL CA: CPT | Performed by: PHYSICIAN ASSISTANT

## 2021-08-15 PROCEDURE — 25000003 PHARM REV CODE 250: Performed by: FAMILY MEDICINE

## 2021-08-15 PROCEDURE — 25000003 PHARM REV CODE 250: Performed by: EMERGENCY MEDICINE

## 2021-08-15 PROCEDURE — 99900035 HC TECH TIME PER 15 MIN (STAT)

## 2021-08-15 PROCEDURE — 27100171 HC OXYGEN HIGH FLOW UP TO 24 HOURS

## 2021-08-15 PROCEDURE — 11000001 HC ACUTE MED/SURG PRIVATE ROOM

## 2021-08-15 PROCEDURE — 63600175 PHARM REV CODE 636 W HCPCS: Performed by: PHYSICIAN ASSISTANT

## 2021-08-15 PROCEDURE — 94640 AIRWAY INHALATION TREATMENT: CPT

## 2021-08-15 PROCEDURE — 27000221 HC OXYGEN, UP TO 24 HOURS

## 2021-08-15 RX ORDER — INSULIN ASPART 100 [IU]/ML
5 INJECTION, SOLUTION INTRAVENOUS; SUBCUTANEOUS
Status: DISCONTINUED | OUTPATIENT
Start: 2021-08-15 | End: 2021-08-16

## 2021-08-15 RX ADMIN — HEPARIN SODIUM 18 UNITS/KG/HR: 10000 INJECTION, SOLUTION INTRAVENOUS at 03:08

## 2021-08-15 RX ADMIN — IPRATROPIUM BROMIDE AND ALBUTEROL SULFATE 3 ML: .5; 3 SOLUTION RESPIRATORY (INHALATION) at 04:08

## 2021-08-15 RX ADMIN — GUAIFENESIN AND DEXTROMETHORPHAN 5 ML: 100; 10 SYRUP ORAL at 08:08

## 2021-08-15 RX ADMIN — VANCOMYCIN HYDROCHLORIDE 2000 MG: 100 INJECTION, POWDER, LYOPHILIZED, FOR SOLUTION INTRAVENOUS at 05:08

## 2021-08-15 RX ADMIN — AZITHROMYCIN MONOHYDRATE 500 MG: 500 INJECTION, POWDER, LYOPHILIZED, FOR SOLUTION INTRAVENOUS at 04:08

## 2021-08-15 RX ADMIN — INSULIN ASPART 10 UNITS: 100 INJECTION, SOLUTION INTRAVENOUS; SUBCUTANEOUS at 04:08

## 2021-08-15 RX ADMIN — IPRATROPIUM BROMIDE AND ALBUTEROL SULFATE 3 ML: .5; 3 SOLUTION RESPIRATORY (INHALATION) at 12:08

## 2021-08-15 RX ADMIN — INSULIN ASPART 10 UNITS: 100 INJECTION, SOLUTION INTRAVENOUS; SUBCUTANEOUS at 12:08

## 2021-08-15 RX ADMIN — INSULIN ASPART 5 UNITS: 100 INJECTION, SOLUTION INTRAVENOUS; SUBCUTANEOUS at 04:08

## 2021-08-15 RX ADMIN — IPRATROPIUM BROMIDE AND ALBUTEROL SULFATE 3 ML: .5; 3 SOLUTION RESPIRATORY (INHALATION) at 07:08

## 2021-08-15 RX ADMIN — ATORVASTATIN CALCIUM 80 MG: 40 TABLET, FILM COATED ORAL at 08:08

## 2021-08-15 RX ADMIN — PREDNISONE 60 MG: 50 TABLET ORAL at 08:08

## 2021-08-15 RX ADMIN — AMLODIPINE BESYLATE 5 MG: 5 TABLET ORAL at 08:08

## 2021-08-15 RX ADMIN — MUPIROCIN: 20 OINTMENT TOPICAL at 08:08

## 2021-08-15 RX ADMIN — CEFTRIAXONE 1 G: 1 INJECTION, SOLUTION INTRAVENOUS at 03:08

## 2021-08-15 RX ADMIN — APIXABAN 10 MG: 2.5 TABLET, FILM COATED ORAL at 08:08

## 2021-08-15 RX ADMIN — IPRATROPIUM BROMIDE AND ALBUTEROL SULFATE 3 ML: .5; 3 SOLUTION RESPIRATORY (INHALATION) at 11:08

## 2021-08-15 RX ADMIN — INSULIN ASPART 5 UNITS: 100 INJECTION, SOLUTION INTRAVENOUS; SUBCUTANEOUS at 09:08

## 2021-08-15 RX ADMIN — IPRATROPIUM BROMIDE AND ALBUTEROL SULFATE 3 ML: .5; 3 SOLUTION RESPIRATORY (INHALATION) at 08:08

## 2021-08-15 RX ADMIN — INSULIN ASPART 10 UNITS: 100 INJECTION, SOLUTION INTRAVENOUS; SUBCUTANEOUS at 05:08

## 2021-08-15 RX ADMIN — MUPIROCIN: 20 OINTMENT TOPICAL at 09:08

## 2021-08-16 LAB
ANION GAP SERPL CALC-SCNC: 13 MMOL/L (ref 8–16)
BASOPHILS # BLD AUTO: 0.07 K/UL (ref 0–0.2)
BASOPHILS NFR BLD: 0.7 % (ref 0–1.9)
BUN SERPL-MCNC: 24 MG/DL (ref 8–23)
CALCIUM SERPL-MCNC: 8.2 MG/DL (ref 8.7–10.5)
CHLORIDE SERPL-SCNC: 95 MMOL/L (ref 95–110)
CO2 SERPL-SCNC: 23 MMOL/L (ref 23–29)
CREAT SERPL-MCNC: 1.4 MG/DL (ref 0.5–1.4)
DIFFERENTIAL METHOD: ABNORMAL
EOSINOPHIL # BLD AUTO: 0 K/UL (ref 0–0.5)
EOSINOPHIL NFR BLD: 0.1 % (ref 0–8)
ERYTHROCYTE [DISTWIDTH] IN BLOOD BY AUTOMATED COUNT: 15.3 % (ref 11.5–14.5)
EST. GFR  (AFRICAN AMERICAN): >60 ML/MIN/1.73 M^2
EST. GFR  (NON AFRICAN AMERICAN): 53 ML/MIN/1.73 M^2
GLUCOSE SERPL-MCNC: 277 MG/DL (ref 70–110)
HCT VFR BLD AUTO: 42.5 % (ref 40–54)
HGB BLD-MCNC: 14.1 G/DL (ref 14–18)
IMM GRANULOCYTES # BLD AUTO: 0.13 K/UL (ref 0–0.04)
IMM GRANULOCYTES NFR BLD AUTO: 1.3 % (ref 0–0.5)
LYMPHOCYTES # BLD AUTO: 0.6 K/UL (ref 1–4.8)
LYMPHOCYTES NFR BLD: 6.3 % (ref 18–48)
MCH RBC QN AUTO: 27.6 PG (ref 27–31)
MCHC RBC AUTO-ENTMCNC: 33.2 G/DL (ref 32–36)
MCV RBC AUTO: 83 FL (ref 82–98)
MONOCYTES # BLD AUTO: 1 K/UL (ref 0.3–1)
MONOCYTES NFR BLD: 10.2 % (ref 4–15)
NEUTROPHILS # BLD AUTO: 8 K/UL (ref 1.8–7.7)
NEUTROPHILS NFR BLD: 81.4 % (ref 38–73)
NRBC BLD-RTO: 0 /100 WBC
PLATELET # BLD AUTO: 374 K/UL (ref 150–450)
PLATELET BLD QL SMEAR: ABNORMAL
PMV BLD AUTO: 11.4 FL (ref 9.2–12.9)
POCT GLUCOSE: 226 MG/DL (ref 70–110)
POCT GLUCOSE: 328 MG/DL (ref 70–110)
POCT GLUCOSE: 338 MG/DL (ref 70–110)
POCT GLUCOSE: 380 MG/DL (ref 70–110)
POCT GLUCOSE: 390 MG/DL (ref 70–110)
POTASSIUM SERPL-SCNC: 4.7 MMOL/L (ref 3.5–5.1)
RBC # BLD AUTO: 5.11 M/UL (ref 4.6–6.2)
SARS-COV-2 RDRP RESP QL NAA+PROBE: NEGATIVE
SODIUM SERPL-SCNC: 131 MMOL/L (ref 136–145)
WBC # BLD AUTO: 9.81 K/UL (ref 3.9–12.7)

## 2021-08-16 PROCEDURE — 99233 PR SUBSEQUENT HOSPITAL CARE,LEVL III: ICD-10-PCS | Mod: 95,,, | Performed by: INTERNAL MEDICINE

## 2021-08-16 PROCEDURE — 63600175 PHARM REV CODE 636 W HCPCS: Performed by: EMERGENCY MEDICINE

## 2021-08-16 PROCEDURE — 36415 COLL VENOUS BLD VENIPUNCTURE: CPT | Performed by: NURSE PRACTITIONER

## 2021-08-16 PROCEDURE — 80048 BASIC METABOLIC PNL TOTAL CA: CPT | Performed by: PHYSICIAN ASSISTANT

## 2021-08-16 PROCEDURE — C1751 CATH, INF, PER/CENT/MIDLINE: HCPCS

## 2021-08-16 PROCEDURE — 25000003 PHARM REV CODE 250: Performed by: FAMILY MEDICINE

## 2021-08-16 PROCEDURE — 25500020 PHARM REV CODE 255: Performed by: FAMILY MEDICINE

## 2021-08-16 PROCEDURE — 25000242 PHARM REV CODE 250 ALT 637 W/ HCPCS: Performed by: NURSE PRACTITIONER

## 2021-08-16 PROCEDURE — 25000003 PHARM REV CODE 250: Performed by: EMERGENCY MEDICINE

## 2021-08-16 PROCEDURE — 36573 INSJ PICC RS&I 5 YR+: CPT

## 2021-08-16 PROCEDURE — 85025 COMPLETE CBC W/AUTO DIFF WBC: CPT | Performed by: NURSE PRACTITIONER

## 2021-08-16 PROCEDURE — 27000207 HC ISOLATION

## 2021-08-16 PROCEDURE — 94761 N-INVAS EAR/PLS OXIMETRY MLT: CPT

## 2021-08-16 PROCEDURE — 94640 AIRWAY INHALATION TREATMENT: CPT

## 2021-08-16 PROCEDURE — 25000242 PHARM REV CODE 250 ALT 637 W/ HCPCS: Performed by: FAMILY MEDICINE

## 2021-08-16 PROCEDURE — 63600175 PHARM REV CODE 636 W HCPCS: Performed by: FAMILY MEDICINE

## 2021-08-16 PROCEDURE — 27100171 HC OXYGEN HIGH FLOW UP TO 24 HOURS

## 2021-08-16 PROCEDURE — 63600175 PHARM REV CODE 636 W HCPCS: Performed by: NURSE PRACTITIONER

## 2021-08-16 PROCEDURE — 21400001 HC TELEMETRY ROOM

## 2021-08-16 PROCEDURE — 96372 THER/PROPH/DIAG INJ SC/IM: CPT | Mod: 59

## 2021-08-16 PROCEDURE — 63600175 PHARM REV CODE 636 W HCPCS: Performed by: INTERNAL MEDICINE

## 2021-08-16 PROCEDURE — 87449 NOS EACH ORGANISM AG IA: CPT | Performed by: NURSE PRACTITIONER

## 2021-08-16 PROCEDURE — 87040 BLOOD CULTURE FOR BACTERIA: CPT | Mod: 59 | Performed by: NURSE PRACTITIONER

## 2021-08-16 PROCEDURE — U0002 COVID-19 LAB TEST NON-CDC: HCPCS | Performed by: NURSE PRACTITIONER

## 2021-08-16 PROCEDURE — 36415 COLL VENOUS BLD VENIPUNCTURE: CPT | Performed by: PHYSICIAN ASSISTANT

## 2021-08-16 PROCEDURE — 99233 SBSQ HOSP IP/OBS HIGH 50: CPT | Mod: 95,,, | Performed by: INTERNAL MEDICINE

## 2021-08-16 RX ORDER — INSULIN ASPART 100 [IU]/ML
8 INJECTION, SOLUTION INTRAVENOUS; SUBCUTANEOUS
Status: DISCONTINUED | OUTPATIENT
Start: 2021-08-16 | End: 2021-08-19

## 2021-08-16 RX ORDER — BUDESONIDE 0.5 MG/2ML
0.5 INHALANT ORAL EVERY 12 HOURS
Status: DISCONTINUED | OUTPATIENT
Start: 2021-08-16 | End: 2021-08-21 | Stop reason: HOSPADM

## 2021-08-16 RX ADMIN — AZITHROMYCIN MONOHYDRATE 500 MG: 500 INJECTION, POWDER, LYOPHILIZED, FOR SOLUTION INTRAVENOUS at 05:08

## 2021-08-16 RX ADMIN — INSULIN ASPART 8 UNITS: 100 INJECTION, SOLUTION INTRAVENOUS; SUBCUTANEOUS at 11:08

## 2021-08-16 RX ADMIN — IOHEXOL 100 ML: 350 INJECTION, SOLUTION INTRAVENOUS at 11:08

## 2021-08-16 RX ADMIN — BUDESONIDE 0.5 MG: 0.5 SUSPENSION RESPIRATORY (INHALATION) at 01:08

## 2021-08-16 RX ADMIN — IPRATROPIUM BROMIDE AND ALBUTEROL SULFATE 3 ML: .5; 3 SOLUTION RESPIRATORY (INHALATION) at 12:08

## 2021-08-16 RX ADMIN — INSULIN ASPART 4 UNITS: 100 INJECTION, SOLUTION INTRAVENOUS; SUBCUTANEOUS at 06:08

## 2021-08-16 RX ADMIN — IPRATROPIUM BROMIDE AND ALBUTEROL SULFATE 3 ML: .5; 3 SOLUTION RESPIRATORY (INHALATION) at 01:08

## 2021-08-16 RX ADMIN — INSULIN DETEMIR 20 UNITS: 100 INJECTION, SOLUTION SUBCUTANEOUS at 09:08

## 2021-08-16 RX ADMIN — INSULIN ASPART 10 UNITS: 100 INJECTION, SOLUTION INTRAVENOUS; SUBCUTANEOUS at 05:08

## 2021-08-16 RX ADMIN — METHYLPREDNISOLONE SODIUM SUCCINATE 80 MG: 40 INJECTION, POWDER, FOR SOLUTION INTRAMUSCULAR; INTRAVENOUS at 10:08

## 2021-08-16 RX ADMIN — INSULIN ASPART 5 UNITS: 100 INJECTION, SOLUTION INTRAVENOUS; SUBCUTANEOUS at 09:08

## 2021-08-16 RX ADMIN — IPRATROPIUM BROMIDE AND ALBUTEROL SULFATE 3 ML: .5; 3 SOLUTION RESPIRATORY (INHALATION) at 04:08

## 2021-08-16 RX ADMIN — MUPIROCIN: 20 OINTMENT TOPICAL at 10:08

## 2021-08-16 RX ADMIN — PREDNISONE 60 MG: 50 TABLET ORAL at 09:08

## 2021-08-16 RX ADMIN — ATORVASTATIN CALCIUM 80 MG: 40 TABLET, FILM COATED ORAL at 09:08

## 2021-08-16 RX ADMIN — ACETAMINOPHEN 650 MG: 325 TABLET ORAL at 08:08

## 2021-08-16 RX ADMIN — APIXABAN 10 MG: 2.5 TABLET, FILM COATED ORAL at 09:08

## 2021-08-16 RX ADMIN — IPRATROPIUM BROMIDE AND ALBUTEROL SULFATE 3 ML: .5; 3 SOLUTION RESPIRATORY (INHALATION) at 09:08

## 2021-08-16 RX ADMIN — VANCOMYCIN HYDROCHLORIDE 1250 MG: 1.25 INJECTION, POWDER, LYOPHILIZED, FOR SOLUTION INTRAVENOUS at 06:08

## 2021-08-16 RX ADMIN — METHYLPREDNISOLONE SODIUM SUCCINATE 80 MG: 40 INJECTION, POWDER, FOR SOLUTION INTRAMUSCULAR; INTRAVENOUS at 01:08

## 2021-08-16 RX ADMIN — LORAZEPAM 1 MG: 2 INJECTION INTRAMUSCULAR; INTRAVENOUS at 05:08

## 2021-08-16 RX ADMIN — IPRATROPIUM BROMIDE AND ALBUTEROL SULFATE 3 ML: .5; 3 SOLUTION RESPIRATORY (INHALATION) at 03:08

## 2021-08-16 RX ADMIN — CEFTRIAXONE 1 G: 1 INJECTION, SOLUTION INTRAVENOUS at 03:08

## 2021-08-16 RX ADMIN — INSULIN ASPART 8 UNITS: 100 INJECTION, SOLUTION INTRAVENOUS; SUBCUTANEOUS at 05:08

## 2021-08-16 RX ADMIN — VANCOMYCIN HYDROCHLORIDE 1250 MG: 1.25 INJECTION, POWDER, LYOPHILIZED, FOR SOLUTION INTRAVENOUS at 05:08

## 2021-08-16 RX ADMIN — INSULIN ASPART 5 UNITS: 100 INJECTION, SOLUTION INTRAVENOUS; SUBCUTANEOUS at 12:08

## 2021-08-16 RX ADMIN — AMLODIPINE BESYLATE 5 MG: 5 TABLET ORAL at 09:08

## 2021-08-17 LAB
ANION GAP SERPL CALC-SCNC: 14 MMOL/L (ref 8–16)
BACTERIA BLD CULT: ABNORMAL
BASOPHILS # BLD AUTO: 0.03 K/UL (ref 0–0.2)
BASOPHILS NFR BLD: 0.3 % (ref 0–1.9)
BUN SERPL-MCNC: 23 MG/DL (ref 8–23)
CALCIUM SERPL-MCNC: 8.3 MG/DL (ref 8.7–10.5)
CHLORIDE SERPL-SCNC: 93 MMOL/L (ref 95–110)
CO2 SERPL-SCNC: 24 MMOL/L (ref 23–29)
CREAT SERPL-MCNC: 1.2 MG/DL (ref 0.5–1.4)
DIFFERENTIAL METHOD: ABNORMAL
EOSINOPHIL # BLD AUTO: 0 K/UL (ref 0–0.5)
EOSINOPHIL NFR BLD: 0 % (ref 0–8)
ERYTHROCYTE [DISTWIDTH] IN BLOOD BY AUTOMATED COUNT: 15.4 % (ref 11.5–14.5)
EST. GFR  (AFRICAN AMERICAN): >60 ML/MIN/1.73 M^2
EST. GFR  (NON AFRICAN AMERICAN): >60 ML/MIN/1.73 M^2
GLUCOSE SERPL-MCNC: 334 MG/DL (ref 70–110)
HCT VFR BLD AUTO: 44 % (ref 40–54)
HGB BLD-MCNC: 14.3 G/DL (ref 14–18)
IMM GRANULOCYTES # BLD AUTO: 0.1 K/UL (ref 0–0.04)
IMM GRANULOCYTES NFR BLD AUTO: 0.9 % (ref 0–0.5)
LACTATE SERPL-SCNC: 2 MMOL/L (ref 0.5–2.2)
LYMPHOCYTES # BLD AUTO: 0.3 K/UL (ref 1–4.8)
LYMPHOCYTES NFR BLD: 2.9 % (ref 18–48)
MCH RBC QN AUTO: 27.6 PG (ref 27–31)
MCHC RBC AUTO-ENTMCNC: 32.5 G/DL (ref 32–36)
MCV RBC AUTO: 85 FL (ref 82–98)
MONOCYTES # BLD AUTO: 0.3 K/UL (ref 0.3–1)
MONOCYTES NFR BLD: 3.1 % (ref 4–15)
NEUTROPHILS # BLD AUTO: 10.2 K/UL (ref 1.8–7.7)
NEUTROPHILS NFR BLD: 92.8 % (ref 38–73)
NRBC BLD-RTO: 0 /100 WBC
PLATELET # BLD AUTO: 298 K/UL (ref 150–450)
PMV BLD AUTO: 11.9 FL (ref 9.2–12.9)
POCT GLUCOSE: 298 MG/DL (ref 70–110)
POCT GLUCOSE: 310 MG/DL (ref 70–110)
POCT GLUCOSE: 385 MG/DL (ref 70–110)
POCT GLUCOSE: 406 MG/DL (ref 70–110)
POTASSIUM SERPL-SCNC: 4.8 MMOL/L (ref 3.5–5.1)
PROCALCITONIN SERPL IA-MCNC: 0.34 NG/ML
RBC # BLD AUTO: 5.19 M/UL (ref 4.6–6.2)
SODIUM SERPL-SCNC: 131 MMOL/L (ref 136–145)
WBC # BLD AUTO: 11 K/UL (ref 3.9–12.7)

## 2021-08-17 PROCEDURE — 99900035 HC TECH TIME PER 15 MIN (STAT)

## 2021-08-17 PROCEDURE — 25000003 PHARM REV CODE 250: Performed by: FAMILY MEDICINE

## 2021-08-17 PROCEDURE — 25000003 PHARM REV CODE 250: Performed by: EMERGENCY MEDICINE

## 2021-08-17 PROCEDURE — 27100171 HC OXYGEN HIGH FLOW UP TO 24 HOURS

## 2021-08-17 PROCEDURE — 94761 N-INVAS EAR/PLS OXIMETRY MLT: CPT

## 2021-08-17 PROCEDURE — 25000242 PHARM REV CODE 250 ALT 637 W/ HCPCS: Performed by: FAMILY MEDICINE

## 2021-08-17 PROCEDURE — 63600175 PHARM REV CODE 636 W HCPCS: Performed by: EMERGENCY MEDICINE

## 2021-08-17 PROCEDURE — 25000242 PHARM REV CODE 250 ALT 637 W/ HCPCS: Performed by: NURSE PRACTITIONER

## 2021-08-17 PROCEDURE — 83605 ASSAY OF LACTIC ACID: CPT | Performed by: NURSE PRACTITIONER

## 2021-08-17 PROCEDURE — 63600175 PHARM REV CODE 636 W HCPCS: Performed by: NURSE PRACTITIONER

## 2021-08-17 PROCEDURE — 84145 PROCALCITONIN (PCT): CPT | Performed by: NURSE PRACTITIONER

## 2021-08-17 PROCEDURE — 94640 AIRWAY INHALATION TREATMENT: CPT

## 2021-08-17 PROCEDURE — 21400001 HC TELEMETRY ROOM

## 2021-08-17 PROCEDURE — 80048 BASIC METABOLIC PNL TOTAL CA: CPT | Performed by: FAMILY MEDICINE

## 2021-08-17 PROCEDURE — 96372 THER/PROPH/DIAG INJ SC/IM: CPT

## 2021-08-17 PROCEDURE — 85025 COMPLETE CBC W/AUTO DIFF WBC: CPT | Performed by: NURSE PRACTITIONER

## 2021-08-17 PROCEDURE — 25000003 PHARM REV CODE 250: Performed by: NURSE PRACTITIONER

## 2021-08-17 PROCEDURE — 36415 COLL VENOUS BLD VENIPUNCTURE: CPT | Performed by: NURSE PRACTITIONER

## 2021-08-17 RX ORDER — SODIUM CHLORIDE 9 MG/ML
INJECTION, SOLUTION INTRAVENOUS CONTINUOUS
Status: ACTIVE | OUTPATIENT
Start: 2021-08-17 | End: 2021-08-18

## 2021-08-17 RX ADMIN — INSULIN ASPART 8 UNITS: 100 INJECTION, SOLUTION INTRAVENOUS; SUBCUTANEOUS at 11:08

## 2021-08-17 RX ADMIN — METHYLPREDNISOLONE SODIUM SUCCINATE 80 MG: 40 INJECTION, POWDER, FOR SOLUTION INTRAMUSCULAR; INTRAVENOUS at 02:08

## 2021-08-17 RX ADMIN — MUPIROCIN: 20 OINTMENT TOPICAL at 09:08

## 2021-08-17 RX ADMIN — INSULIN DETEMIR 20 UNITS: 100 INJECTION, SOLUTION SUBCUTANEOUS at 09:08

## 2021-08-17 RX ADMIN — INSULIN ASPART 5 UNITS: 100 INJECTION, SOLUTION INTRAVENOUS; SUBCUTANEOUS at 09:08

## 2021-08-17 RX ADMIN — IPRATROPIUM BROMIDE AND ALBUTEROL SULFATE 3 ML: .5; 3 SOLUTION RESPIRATORY (INHALATION) at 07:08

## 2021-08-17 RX ADMIN — AMLODIPINE BESYLATE 5 MG: 5 TABLET ORAL at 08:08

## 2021-08-17 RX ADMIN — IPRATROPIUM BROMIDE AND ALBUTEROL SULFATE 3 ML: .5; 3 SOLUTION RESPIRATORY (INHALATION) at 11:08

## 2021-08-17 RX ADMIN — IPRATROPIUM BROMIDE AND ALBUTEROL SULFATE 3 ML: .5; 3 SOLUTION RESPIRATORY (INHALATION) at 08:08

## 2021-08-17 RX ADMIN — IPRATROPIUM BROMIDE AND ALBUTEROL SULFATE 3 ML: .5; 3 SOLUTION RESPIRATORY (INHALATION) at 04:08

## 2021-08-17 RX ADMIN — INSULIN ASPART 8 UNITS: 100 INJECTION, SOLUTION INTRAVENOUS; SUBCUTANEOUS at 04:08

## 2021-08-17 RX ADMIN — SODIUM CHLORIDE: 0.9 INJECTION, SOLUTION INTRAVENOUS at 09:08

## 2021-08-17 RX ADMIN — ATORVASTATIN CALCIUM 80 MG: 40 TABLET, FILM COATED ORAL at 08:08

## 2021-08-17 RX ADMIN — BUDESONIDE 0.5 MG: 0.5 SUSPENSION RESPIRATORY (INHALATION) at 08:08

## 2021-08-17 RX ADMIN — SODIUM CHLORIDE: 0.9 INJECTION, SOLUTION INTRAVENOUS at 06:08

## 2021-08-17 RX ADMIN — IPRATROPIUM BROMIDE AND ALBUTEROL SULFATE 3 ML: .5; 3 SOLUTION RESPIRATORY (INHALATION) at 12:08

## 2021-08-17 RX ADMIN — METHYLPREDNISOLONE SODIUM SUCCINATE 80 MG: 40 INJECTION, POWDER, FOR SOLUTION INTRAMUSCULAR; INTRAVENOUS at 09:08

## 2021-08-17 RX ADMIN — METHYLPREDNISOLONE SODIUM SUCCINATE 80 MG: 40 INJECTION, POWDER, FOR SOLUTION INTRAMUSCULAR; INTRAVENOUS at 05:08

## 2021-08-17 RX ADMIN — BUDESONIDE 0.5 MG: 0.5 SUSPENSION RESPIRATORY (INHALATION) at 07:08

## 2021-08-17 RX ADMIN — APIXABAN 10 MG: 2.5 TABLET, FILM COATED ORAL at 08:08

## 2021-08-17 RX ADMIN — AZITHROMYCIN MONOHYDRATE 500 MG: 500 INJECTION, POWDER, LYOPHILIZED, FOR SOLUTION INTRAVENOUS at 04:08

## 2021-08-17 RX ADMIN — CEFTRIAXONE 1 G: 1 INJECTION, SOLUTION INTRAVENOUS at 04:08

## 2021-08-17 RX ADMIN — INSULIN ASPART 8 UNITS: 100 INJECTION, SOLUTION INTRAVENOUS; SUBCUTANEOUS at 06:08

## 2021-08-17 RX ADMIN — APIXABAN 10 MG: 2.5 TABLET, FILM COATED ORAL at 09:08

## 2021-08-18 PROBLEM — E66.813 CLASS 3 SEVERE OBESITY DUE TO EXCESS CALORIES WITH SERIOUS COMORBIDITY AND BODY MASS INDEX (BMI) OF 40.0 TO 44.9 IN ADULT: Status: ACTIVE | Noted: 2018-04-08

## 2021-08-18 LAB
1,3 BETA GLUCAN SER-MCNC: <31 PG/ML
ALLENS TEST: ABNORMAL
ANION GAP SERPL CALC-SCNC: 11 MMOL/L (ref 8–16)
BASOPHILS # BLD AUTO: 0.03 K/UL (ref 0–0.2)
BASOPHILS NFR BLD: 0.3 % (ref 0–1.9)
BUN SERPL-MCNC: 24 MG/DL (ref 8–23)
CALCIUM SERPL-MCNC: 7.4 MG/DL (ref 8.7–10.5)
CHLORIDE SERPL-SCNC: 96 MMOL/L (ref 95–110)
CO2 SERPL-SCNC: 24 MMOL/L (ref 23–29)
CREAT SERPL-MCNC: 1.1 MG/DL (ref 0.5–1.4)
DELSYS: ABNORMAL
DIFFERENTIAL METHOD: ABNORMAL
EOSINOPHIL # BLD AUTO: 0 K/UL (ref 0–0.5)
EOSINOPHIL NFR BLD: 0 % (ref 0–8)
ERYTHROCYTE [DISTWIDTH] IN BLOOD BY AUTOMATED COUNT: 15.4 % (ref 11.5–14.5)
EST. GFR  (AFRICAN AMERICAN): >60 ML/MIN/1.73 M^2
EST. GFR  (NON AFRICAN AMERICAN): >60 ML/MIN/1.73 M^2
FIO2: 40
FUNGITELL COMMENTS: NEGATIVE
GLUCOSE SERPL-MCNC: 270 MG/DL (ref 70–110)
HCO3 UR-SCNC: 27.8 MMOL/L (ref 24–28)
HCT VFR BLD AUTO: 39.4 % (ref 40–54)
HGB BLD-MCNC: 12.7 G/DL (ref 14–18)
IMM GRANULOCYTES # BLD AUTO: 0.21 K/UL (ref 0–0.04)
IMM GRANULOCYTES NFR BLD AUTO: 2 % (ref 0–0.5)
LACTATE SERPL-SCNC: 1 MMOL/L (ref 0.5–2.2)
LYMPHOCYTES # BLD AUTO: 0.4 K/UL (ref 1–4.8)
LYMPHOCYTES NFR BLD: 3.8 % (ref 18–48)
MCH RBC QN AUTO: 27 PG (ref 27–31)
MCHC RBC AUTO-ENTMCNC: 32.2 G/DL (ref 32–36)
MCV RBC AUTO: 84 FL (ref 82–98)
MODE: ABNORMAL
MONOCYTES # BLD AUTO: 0.5 K/UL (ref 0.3–1)
MONOCYTES NFR BLD: 4.5 % (ref 4–15)
NEUTROPHILS # BLD AUTO: 9.2 K/UL (ref 1.8–7.7)
NEUTROPHILS NFR BLD: 89.4 % (ref 38–73)
NRBC BLD-RTO: 0 /100 WBC
PCO2 BLDA: 40.6 MMHG (ref 35–45)
PEEP: 10
PH SMN: 7.44 [PH] (ref 7.35–7.45)
PLATELET # BLD AUTO: 388 K/UL (ref 150–450)
PMV BLD AUTO: 10 FL (ref 9.2–12.9)
PO2 BLDA: 70 MMHG (ref 80–100)
POC BE: 4 MMOL/L
POC SATURATED O2: 95 % (ref 95–100)
POCT GLUCOSE: 249 MG/DL (ref 70–110)
POCT GLUCOSE: 326 MG/DL (ref 70–110)
POCT GLUCOSE: 334 MG/DL (ref 70–110)
POCT GLUCOSE: 352 MG/DL (ref 70–110)
POCT GLUCOSE: 370 MG/DL (ref 70–110)
POCT GLUCOSE: 455 MG/DL (ref 70–110)
POTASSIUM SERPL-SCNC: 4.5 MMOL/L (ref 3.5–5.1)
PROCALCITONIN SERPL IA-MCNC: 0.47 NG/ML
RBC # BLD AUTO: 4.71 M/UL (ref 4.6–6.2)
SAMPLE: ABNORMAL
SITE: ABNORMAL
SODIUM SERPL-SCNC: 131 MMOL/L (ref 136–145)
SPONT RATE: 20
WBC # BLD AUTO: 10.34 K/UL (ref 3.9–12.7)

## 2021-08-18 PROCEDURE — 94660 CPAP INITIATION&MGMT: CPT

## 2021-08-18 PROCEDURE — 25000003 PHARM REV CODE 250: Performed by: NURSE PRACTITIONER

## 2021-08-18 PROCEDURE — 99900035 HC TECH TIME PER 15 MIN (STAT)

## 2021-08-18 PROCEDURE — 97162 PT EVAL MOD COMPLEX 30 MIN: CPT | Performed by: PHYSICAL THERAPIST

## 2021-08-18 PROCEDURE — 27100171 HC OXYGEN HIGH FLOW UP TO 24 HOURS

## 2021-08-18 PROCEDURE — 25000242 PHARM REV CODE 250 ALT 637 W/ HCPCS: Performed by: FAMILY MEDICINE

## 2021-08-18 PROCEDURE — 83605 ASSAY OF LACTIC ACID: CPT | Performed by: NURSE PRACTITIONER

## 2021-08-18 PROCEDURE — 27000190 HC CPAP FULL FACE MASK W/VALVE

## 2021-08-18 PROCEDURE — 25000003 PHARM REV CODE 250: Performed by: FAMILY MEDICINE

## 2021-08-18 PROCEDURE — 27000646 HC AEROBIKA DEVICE

## 2021-08-18 PROCEDURE — 84145 PROCALCITONIN (PCT): CPT | Performed by: NURSE PRACTITIONER

## 2021-08-18 PROCEDURE — 63600175 PHARM REV CODE 636 W HCPCS: Performed by: NURSE PRACTITIONER

## 2021-08-18 PROCEDURE — 36600 WITHDRAWAL OF ARTERIAL BLOOD: CPT

## 2021-08-18 PROCEDURE — 25000242 PHARM REV CODE 250 ALT 637 W/ HCPCS: Performed by: NURSE PRACTITIONER

## 2021-08-18 PROCEDURE — 36415 COLL VENOUS BLD VENIPUNCTURE: CPT | Performed by: NURSE PRACTITIONER

## 2021-08-18 PROCEDURE — 80048 BASIC METABOLIC PNL TOTAL CA: CPT | Performed by: FAMILY MEDICINE

## 2021-08-18 PROCEDURE — 21400001 HC TELEMETRY ROOM

## 2021-08-18 PROCEDURE — 85025 COMPLETE CBC W/AUTO DIFF WBC: CPT | Performed by: NURSE PRACTITIONER

## 2021-08-18 PROCEDURE — 82803 BLOOD GASES ANY COMBINATION: CPT

## 2021-08-18 PROCEDURE — 94664 DEMO&/EVAL PT USE INHALER: CPT

## 2021-08-18 PROCEDURE — 96372 THER/PROPH/DIAG INJ SC/IM: CPT

## 2021-08-18 PROCEDURE — 97166 OT EVAL MOD COMPLEX 45 MIN: CPT | Performed by: PHYSICAL THERAPIST

## 2021-08-18 PROCEDURE — 94640 AIRWAY INHALATION TREATMENT: CPT

## 2021-08-18 PROCEDURE — 94761 N-INVAS EAR/PLS OXIMETRY MLT: CPT

## 2021-08-18 PROCEDURE — 25000003 PHARM REV CODE 250: Performed by: EMERGENCY MEDICINE

## 2021-08-18 PROCEDURE — 63600175 PHARM REV CODE 636 W HCPCS: Performed by: EMERGENCY MEDICINE

## 2021-08-18 RX ORDER — IPRATROPIUM BROMIDE AND ALBUTEROL SULFATE 2.5; .5 MG/3ML; MG/3ML
3 SOLUTION RESPIRATORY (INHALATION) EVERY 6 HOURS
Status: DISCONTINUED | OUTPATIENT
Start: 2021-08-18 | End: 2021-08-21 | Stop reason: HOSPADM

## 2021-08-18 RX ORDER — SPIRONOLACTONE 25 MG/1
25 TABLET ORAL DAILY
Status: DISCONTINUED | OUTPATIENT
Start: 2021-08-18 | End: 2021-08-21 | Stop reason: HOSPADM

## 2021-08-18 RX ADMIN — INSULIN DETEMIR 20 UNITS: 100 INJECTION, SOLUTION SUBCUTANEOUS at 09:08

## 2021-08-18 RX ADMIN — INSULIN ASPART 8 UNITS: 100 INJECTION, SOLUTION INTRAVENOUS; SUBCUTANEOUS at 08:08

## 2021-08-18 RX ADMIN — SPIRONOLACTONE 25 MG: 25 TABLET, FILM COATED ORAL at 03:08

## 2021-08-18 RX ADMIN — APIXABAN 10 MG: 2.5 TABLET, FILM COATED ORAL at 08:08

## 2021-08-18 RX ADMIN — AMLODIPINE BESYLATE 5 MG: 5 TABLET ORAL at 09:08

## 2021-08-18 RX ADMIN — ATORVASTATIN CALCIUM 80 MG: 40 TABLET, FILM COATED ORAL at 09:08

## 2021-08-18 RX ADMIN — INSULIN ASPART 8 UNITS: 100 INJECTION, SOLUTION INTRAVENOUS; SUBCUTANEOUS at 11:08

## 2021-08-18 RX ADMIN — INSULIN DETEMIR 20 UNITS: 100 INJECTION, SOLUTION SUBCUTANEOUS at 08:08

## 2021-08-18 RX ADMIN — INSULIN ASPART 4 UNITS: 100 INJECTION, SOLUTION INTRAVENOUS; SUBCUTANEOUS at 06:08

## 2021-08-18 RX ADMIN — APIXABAN 10 MG: 2.5 TABLET, FILM COATED ORAL at 09:08

## 2021-08-18 RX ADMIN — CEFTRIAXONE 1 G: 1 INJECTION, SOLUTION INTRAVENOUS at 03:08

## 2021-08-18 RX ADMIN — IPRATROPIUM BROMIDE AND ALBUTEROL SULFATE 3 ML: .5; 3 SOLUTION RESPIRATORY (INHALATION) at 07:08

## 2021-08-18 RX ADMIN — AZITHROMYCIN MONOHYDRATE 500 MG: 500 INJECTION, POWDER, LYOPHILIZED, FOR SOLUTION INTRAVENOUS at 04:08

## 2021-08-18 RX ADMIN — IPRATROPIUM BROMIDE AND ALBUTEROL SULFATE 3 ML: .5; 3 SOLUTION RESPIRATORY (INHALATION) at 04:08

## 2021-08-18 RX ADMIN — IPRATROPIUM BROMIDE AND ALBUTEROL SULFATE 3 ML: .5; 3 SOLUTION RESPIRATORY (INHALATION) at 01:08

## 2021-08-18 RX ADMIN — METHYLPREDNISOLONE SODIUM SUCCINATE 80 MG: 40 INJECTION, POWDER, FOR SOLUTION INTRAMUSCULAR; INTRAVENOUS at 02:08

## 2021-08-18 RX ADMIN — INSULIN ASPART 5 UNITS: 100 INJECTION, SOLUTION INTRAVENOUS; SUBCUTANEOUS at 08:08

## 2021-08-18 RX ADMIN — BUDESONIDE 0.5 MG: 0.5 SUSPENSION RESPIRATORY (INHALATION) at 08:08

## 2021-08-18 RX ADMIN — INSULIN ASPART 8 UNITS: 100 INJECTION, SOLUTION INTRAVENOUS; SUBCUTANEOUS at 04:08

## 2021-08-18 RX ADMIN — METHYLPREDNISOLONE SODIUM SUCCINATE 80 MG: 40 INJECTION, POWDER, FOR SOLUTION INTRAMUSCULAR; INTRAVENOUS at 10:08

## 2021-08-18 RX ADMIN — IPRATROPIUM BROMIDE AND ALBUTEROL SULFATE 3 ML: .5; 3 SOLUTION RESPIRATORY (INHALATION) at 12:08

## 2021-08-18 RX ADMIN — IPRATROPIUM BROMIDE AND ALBUTEROL SULFATE 3 ML: .5; 3 SOLUTION RESPIRATORY (INHALATION) at 08:08

## 2021-08-18 RX ADMIN — BUDESONIDE 0.5 MG: 0.5 SUSPENSION RESPIRATORY (INHALATION) at 07:08

## 2021-08-18 RX ADMIN — MUPIROCIN: 20 OINTMENT TOPICAL at 09:08

## 2021-08-18 RX ADMIN — METHYLPREDNISOLONE SODIUM SUCCINATE 80 MG: 40 INJECTION, POWDER, FOR SOLUTION INTRAMUSCULAR; INTRAVENOUS at 06:08

## 2021-08-18 RX ADMIN — MUPIROCIN: 20 OINTMENT TOPICAL at 08:08

## 2021-08-19 LAB
ALBUMIN SERPL BCP-MCNC: 1.9 G/DL (ref 3.5–5.2)
ALP SERPL-CCNC: 78 U/L (ref 55–135)
ALT SERPL W/O P-5'-P-CCNC: 46 U/L (ref 10–44)
ANION GAP SERPL CALC-SCNC: 9 MMOL/L (ref 8–16)
AST SERPL-CCNC: 44 U/L (ref 10–40)
BACTERIA BLD CULT: NORMAL
BASOPHILS # BLD AUTO: 0.03 K/UL (ref 0–0.2)
BASOPHILS NFR BLD: 0.4 % (ref 0–1.9)
BILIRUB SERPL-MCNC: 0.4 MG/DL (ref 0.1–1)
BUN SERPL-MCNC: 27 MG/DL (ref 8–23)
CALCIUM SERPL-MCNC: 8.1 MG/DL (ref 8.7–10.5)
CHLORIDE SERPL-SCNC: 96 MMOL/L (ref 95–110)
CO2 SERPL-SCNC: 28 MMOL/L (ref 23–29)
CREAT SERPL-MCNC: 1 MG/DL (ref 0.5–1.4)
CRP SERPL-MCNC: 31.3 MG/L (ref 0–8.2)
DIFFERENTIAL METHOD: ABNORMAL
EOSINOPHIL # BLD AUTO: 0 K/UL (ref 0–0.5)
EOSINOPHIL NFR BLD: 0 % (ref 0–8)
ERYTHROCYTE [DISTWIDTH] IN BLOOD BY AUTOMATED COUNT: 15.1 % (ref 11.5–14.5)
EST. GFR  (AFRICAN AMERICAN): >60 ML/MIN/1.73 M^2
EST. GFR  (NON AFRICAN AMERICAN): >60 ML/MIN/1.73 M^2
GIANT PLATELETS BLD QL SMEAR: PRESENT
GLUCOSE SERPL-MCNC: 290 MG/DL (ref 70–110)
HCT VFR BLD AUTO: 38.8 % (ref 40–54)
HGB BLD-MCNC: 12.5 G/DL (ref 14–18)
IMM GRANULOCYTES # BLD AUTO: 0.25 K/UL (ref 0–0.04)
IMM GRANULOCYTES NFR BLD AUTO: 3.4 % (ref 0–0.5)
LACTATE SERPL-SCNC: 1.2 MMOL/L (ref 0.5–2.2)
LYMPHOCYTES # BLD AUTO: 0.5 K/UL (ref 1–4.8)
LYMPHOCYTES NFR BLD: 7.3 % (ref 18–48)
MCH RBC QN AUTO: 27.1 PG (ref 27–31)
MCHC RBC AUTO-ENTMCNC: 32.2 G/DL (ref 32–36)
MCV RBC AUTO: 84 FL (ref 82–98)
MONOCYTES # BLD AUTO: 0.2 K/UL (ref 0.3–1)
MONOCYTES NFR BLD: 3.3 % (ref 4–15)
NEUTROPHILS # BLD AUTO: 6.3 K/UL (ref 1.8–7.7)
NEUTROPHILS NFR BLD: 85.6 % (ref 38–73)
NRBC BLD-RTO: 0 /100 WBC
PLATELET # BLD AUTO: 391 K/UL (ref 150–450)
PLATELET BLD QL SMEAR: ABNORMAL
PMV BLD AUTO: 10.5 FL (ref 9.2–12.9)
POCT GLUCOSE: 260 MG/DL (ref 70–110)
POCT GLUCOSE: 276 MG/DL (ref 70–110)
POCT GLUCOSE: 278 MG/DL (ref 70–110)
POCT GLUCOSE: 319 MG/DL (ref 70–110)
POCT GLUCOSE: 321 MG/DL (ref 70–110)
POTASSIUM SERPL-SCNC: 4.7 MMOL/L (ref 3.5–5.1)
PROCALCITONIN SERPL IA-MCNC: 0.2 NG/ML
PROCALCITONIN SERPL IA-MCNC: 0.2 NG/ML
PROT SERPL-MCNC: 6.8 G/DL (ref 6–8.4)
RBC # BLD AUTO: 4.61 M/UL (ref 4.6–6.2)
SODIUM SERPL-SCNC: 133 MMOL/L (ref 136–145)
WBC # BLD AUTO: 7.35 K/UL (ref 3.9–12.7)

## 2021-08-19 PROCEDURE — C9399 UNCLASSIFIED DRUGS OR BIOLOG: HCPCS | Performed by: INTERNAL MEDICINE

## 2021-08-19 PROCEDURE — 86606 ASPERGILLUS ANTIBODY: CPT | Performed by: INTERNAL MEDICINE

## 2021-08-19 PROCEDURE — 25000003 PHARM REV CODE 250: Performed by: FAMILY MEDICINE

## 2021-08-19 PROCEDURE — 97110 THERAPEUTIC EXERCISES: CPT

## 2021-08-19 PROCEDURE — 63600175 PHARM REV CODE 636 W HCPCS: Performed by: NURSE PRACTITIONER

## 2021-08-19 PROCEDURE — 27000646 HC AEROBIKA DEVICE

## 2021-08-19 PROCEDURE — 94664 DEMO&/EVAL PT USE INHALER: CPT

## 2021-08-19 PROCEDURE — 85025 COMPLETE CBC W/AUTO DIFF WBC: CPT | Performed by: NURSE PRACTITIONER

## 2021-08-19 PROCEDURE — 99900035 HC TECH TIME PER 15 MIN (STAT)

## 2021-08-19 PROCEDURE — 21400001 HC TELEMETRY ROOM

## 2021-08-19 PROCEDURE — 84145 PROCALCITONIN (PCT): CPT | Performed by: NURSE PRACTITIONER

## 2021-08-19 PROCEDURE — 36569 INSJ PICC 5 YR+ W/O IMAGING: CPT

## 2021-08-19 PROCEDURE — 25000242 PHARM REV CODE 250 ALT 637 W/ HCPCS: Performed by: FAMILY MEDICINE

## 2021-08-19 PROCEDURE — C9399 UNCLASSIFIED DRUGS OR BIOLOG: HCPCS | Performed by: NURSE PRACTITIONER

## 2021-08-19 PROCEDURE — 97530 THERAPEUTIC ACTIVITIES: CPT

## 2021-08-19 PROCEDURE — 25000242 PHARM REV CODE 250 ALT 637 W/ HCPCS: Performed by: NURSE PRACTITIONER

## 2021-08-19 PROCEDURE — 80053 COMPREHEN METABOLIC PANEL: CPT | Performed by: NURSE PRACTITIONER

## 2021-08-19 PROCEDURE — 99291 PR CRITICAL CARE, E/M 30-74 MINUTES: ICD-10-PCS | Mod: ,,, | Performed by: INTERNAL MEDICINE

## 2021-08-19 PROCEDURE — 63600175 PHARM REV CODE 636 W HCPCS: Performed by: INTERNAL MEDICINE

## 2021-08-19 PROCEDURE — 25000003 PHARM REV CODE 250: Performed by: NURSE PRACTITIONER

## 2021-08-19 PROCEDURE — 99291 CRITICAL CARE FIRST HOUR: CPT | Mod: ,,, | Performed by: INTERNAL MEDICINE

## 2021-08-19 PROCEDURE — 36415 COLL VENOUS BLD VENIPUNCTURE: CPT | Performed by: INTERNAL MEDICINE

## 2021-08-19 PROCEDURE — 94640 AIRWAY INHALATION TREATMENT: CPT

## 2021-08-19 PROCEDURE — 25000003 PHARM REV CODE 250: Performed by: INTERNAL MEDICINE

## 2021-08-19 PROCEDURE — 27100171 HC OXYGEN HIGH FLOW UP TO 24 HOURS

## 2021-08-19 PROCEDURE — 94660 CPAP INITIATION&MGMT: CPT

## 2021-08-19 PROCEDURE — 83605 ASSAY OF LACTIC ACID: CPT | Performed by: NURSE PRACTITIONER

## 2021-08-19 PROCEDURE — 87081 CULTURE SCREEN ONLY: CPT | Performed by: INTERNAL MEDICINE

## 2021-08-19 PROCEDURE — 86140 C-REACTIVE PROTEIN: CPT | Performed by: INTERNAL MEDICINE

## 2021-08-19 PROCEDURE — 97535 SELF CARE MNGMENT TRAINING: CPT

## 2021-08-19 PROCEDURE — 94761 N-INVAS EAR/PLS OXIMETRY MLT: CPT

## 2021-08-19 PROCEDURE — 97116 GAIT TRAINING THERAPY: CPT

## 2021-08-19 PROCEDURE — C1751 CATH, INF, PER/CENT/MIDLINE: HCPCS

## 2021-08-19 RX ORDER — ENOXAPARIN SODIUM 100 MG/ML
40 INJECTION SUBCUTANEOUS EVERY 24 HOURS
Status: DISCONTINUED | OUTPATIENT
Start: 2021-08-19 | End: 2021-08-21 | Stop reason: HOSPADM

## 2021-08-19 RX ORDER — INSULIN ASPART 100 [IU]/ML
10 INJECTION, SOLUTION INTRAVENOUS; SUBCUTANEOUS
Status: DISCONTINUED | OUTPATIENT
Start: 2021-08-19 | End: 2021-08-21 | Stop reason: HOSPADM

## 2021-08-19 RX ORDER — INSULIN ASPART 100 [IU]/ML
4 INJECTION, SOLUTION INTRAVENOUS; SUBCUTANEOUS ONCE
Status: COMPLETED | OUTPATIENT
Start: 2021-08-19 | End: 2021-08-19

## 2021-08-19 RX ADMIN — IPRATROPIUM BROMIDE AND ALBUTEROL SULFATE 3 ML: .5; 3 SOLUTION RESPIRATORY (INHALATION) at 08:08

## 2021-08-19 RX ADMIN — ATORVASTATIN CALCIUM 80 MG: 40 TABLET, FILM COATED ORAL at 08:08

## 2021-08-19 RX ADMIN — INSULIN DETEMIR 20 UNITS: 100 INJECTION, SOLUTION SUBCUTANEOUS at 09:08

## 2021-08-19 RX ADMIN — INSULIN ASPART 2 UNITS: 100 INJECTION, SOLUTION INTRAVENOUS; SUBCUTANEOUS at 08:08

## 2021-08-19 RX ADMIN — METHYLPREDNISOLONE SODIUM SUCCINATE 80 MG: 40 INJECTION, POWDER, FOR SOLUTION INTRAMUSCULAR; INTRAVENOUS at 06:08

## 2021-08-19 RX ADMIN — INSULIN DETEMIR 25 UNITS: 100 INJECTION, SOLUTION SUBCUTANEOUS at 09:08

## 2021-08-19 RX ADMIN — IPRATROPIUM BROMIDE AND ALBUTEROL SULFATE 3 ML: .5; 3 SOLUTION RESPIRATORY (INHALATION) at 01:08

## 2021-08-19 RX ADMIN — INSULIN ASPART 8 UNITS: 100 INJECTION, SOLUTION INTRAVENOUS; SUBCUTANEOUS at 11:08

## 2021-08-19 RX ADMIN — INSULIN ASPART 6 UNITS: 100 INJECTION, SOLUTION INTRAVENOUS; SUBCUTANEOUS at 06:08

## 2021-08-19 RX ADMIN — INSULIN ASPART 6 UNITS: 100 INJECTION, SOLUTION INTRAVENOUS; SUBCUTANEOUS at 04:08

## 2021-08-19 RX ADMIN — INSULIN ASPART 4 UNITS: 100 INJECTION, SOLUTION INTRAVENOUS; SUBCUTANEOUS at 10:08

## 2021-08-19 RX ADMIN — METHYLPREDNISOLONE SODIUM SUCCINATE 80 MG: 40 INJECTION, POWDER, FOR SOLUTION INTRAMUSCULAR; INTRAVENOUS at 11:08

## 2021-08-19 RX ADMIN — APIXABAN 10 MG: 2.5 TABLET, FILM COATED ORAL at 08:08

## 2021-08-19 RX ADMIN — BUDESONIDE 0.5 MG: 0.5 SUSPENSION RESPIRATORY (INHALATION) at 08:08

## 2021-08-19 RX ADMIN — SPIRONOLACTONE 25 MG: 25 TABLET, FILM COATED ORAL at 08:08

## 2021-08-19 RX ADMIN — IPRATROPIUM BROMIDE AND ALBUTEROL SULFATE 3 ML: .5; 3 SOLUTION RESPIRATORY (INHALATION) at 12:08

## 2021-08-19 RX ADMIN — INSULIN ASPART 10 UNITS: 100 INJECTION, SOLUTION INTRAVENOUS; SUBCUTANEOUS at 04:08

## 2021-08-19 RX ADMIN — ENOXAPARIN SODIUM 40 MG: 40 INJECTION SUBCUTANEOUS at 04:08

## 2021-08-19 RX ADMIN — AMLODIPINE BESYLATE 5 MG: 5 TABLET ORAL at 08:08

## 2021-08-19 RX ADMIN — METHYLPREDNISOLONE SODIUM SUCCINATE 80 MG: 40 INJECTION, POWDER, FOR SOLUTION INTRAMUSCULAR; INTRAVENOUS at 02:08

## 2021-08-20 VITALS
HEART RATE: 82 BPM | DIASTOLIC BLOOD PRESSURE: 65 MMHG | HEIGHT: 68 IN | RESPIRATION RATE: 18 BRPM | WEIGHT: 260.81 LBS | SYSTOLIC BLOOD PRESSURE: 129 MMHG | BODY MASS INDEX: 39.53 KG/M2 | OXYGEN SATURATION: 93 % | TEMPERATURE: 98 F

## 2021-08-20 LAB
ALBUMIN SERPL BCP-MCNC: 2 G/DL (ref 3.5–5.2)
ALP SERPL-CCNC: 79 U/L (ref 55–135)
ALT SERPL W/O P-5'-P-CCNC: 59 U/L (ref 10–44)
ANION GAP SERPL CALC-SCNC: 11 MMOL/L (ref 8–16)
AST SERPL-CCNC: 66 U/L (ref 10–40)
BASOPHILS # BLD AUTO: 0.03 K/UL (ref 0–0.2)
BASOPHILS NFR BLD: 0.4 % (ref 0–1.9)
BILIRUB SERPL-MCNC: 0.4 MG/DL (ref 0.1–1)
BUN SERPL-MCNC: 32 MG/DL (ref 8–23)
CALCIUM SERPL-MCNC: 8.3 MG/DL (ref 8.7–10.5)
CHLORIDE SERPL-SCNC: 98 MMOL/L (ref 95–110)
CO2 SERPL-SCNC: 26 MMOL/L (ref 23–29)
CREAT SERPL-MCNC: 0.9 MG/DL (ref 0.5–1.4)
DIFFERENTIAL METHOD: ABNORMAL
EOSINOPHIL # BLD AUTO: 0 K/UL (ref 0–0.5)
EOSINOPHIL NFR BLD: 0 % (ref 0–8)
ERYTHROCYTE [DISTWIDTH] IN BLOOD BY AUTOMATED COUNT: 15 % (ref 11.5–14.5)
EST. GFR  (AFRICAN AMERICAN): >60 ML/MIN/1.73 M^2
EST. GFR  (NON AFRICAN AMERICAN): >60 ML/MIN/1.73 M^2
GLUCOSE SERPL-MCNC: 239 MG/DL (ref 70–110)
HCT VFR BLD AUTO: 40 % (ref 40–54)
HGB BLD-MCNC: 13.1 G/DL (ref 14–18)
IMM GRANULOCYTES # BLD AUTO: 0.4 K/UL (ref 0–0.04)
IMM GRANULOCYTES NFR BLD AUTO: 4.8 % (ref 0–0.5)
LACTATE SERPL-SCNC: 1.2 MMOL/L (ref 0.5–2.2)
LYMPHOCYTES # BLD AUTO: 0.7 K/UL (ref 1–4.8)
LYMPHOCYTES NFR BLD: 8.3 % (ref 18–48)
MCH RBC QN AUTO: 27.3 PG (ref 27–31)
MCHC RBC AUTO-ENTMCNC: 32.8 G/DL (ref 32–36)
MCV RBC AUTO: 84 FL (ref 82–98)
MONOCYTES # BLD AUTO: 0.4 K/UL (ref 0.3–1)
MONOCYTES NFR BLD: 4.7 % (ref 4–15)
NEUTROPHILS # BLD AUTO: 6.9 K/UL (ref 1.8–7.7)
NEUTROPHILS NFR BLD: 81.8 % (ref 38–73)
NRBC BLD-RTO: 0 /100 WBC
PLATELET # BLD AUTO: 413 K/UL (ref 150–450)
PMV BLD AUTO: 10.3 FL (ref 9.2–12.9)
POCT GLUCOSE: 296 MG/DL (ref 70–110)
POCT GLUCOSE: 329 MG/DL (ref 70–110)
POCT GLUCOSE: 397 MG/DL (ref 70–110)
POTASSIUM SERPL-SCNC: 4.8 MMOL/L (ref 3.5–5.1)
PROCALCITONIN SERPL IA-MCNC: 0.3 NG/ML
PROCALCITONIN SERPL IA-MCNC: 0.3 NG/ML
PROT SERPL-MCNC: 7 G/DL (ref 6–8.4)
RBC # BLD AUTO: 4.79 M/UL (ref 4.6–6.2)
SODIUM SERPL-SCNC: 135 MMOL/L (ref 136–145)
WBC # BLD AUTO: 8.36 K/UL (ref 3.9–12.7)

## 2021-08-20 PROCEDURE — 97110 THERAPEUTIC EXERCISES: CPT

## 2021-08-20 PROCEDURE — 94640 AIRWAY INHALATION TREATMENT: CPT

## 2021-08-20 PROCEDURE — 94660 CPAP INITIATION&MGMT: CPT

## 2021-08-20 PROCEDURE — 99233 SBSQ HOSP IP/OBS HIGH 50: CPT | Mod: ,,, | Performed by: INTERNAL MEDICINE

## 2021-08-20 PROCEDURE — 97530 THERAPEUTIC ACTIVITIES: CPT

## 2021-08-20 PROCEDURE — 99900035 HC TECH TIME PER 15 MIN (STAT)

## 2021-08-20 PROCEDURE — 80053 COMPREHEN METABOLIC PANEL: CPT | Performed by: NURSE PRACTITIONER

## 2021-08-20 PROCEDURE — 63600175 PHARM REV CODE 636 W HCPCS: Performed by: NURSE PRACTITIONER

## 2021-08-20 PROCEDURE — 85025 COMPLETE CBC W/AUTO DIFF WBC: CPT | Performed by: NURSE PRACTITIONER

## 2021-08-20 PROCEDURE — 63600175 PHARM REV CODE 636 W HCPCS: Performed by: INTERNAL MEDICINE

## 2021-08-20 PROCEDURE — 25000242 PHARM REV CODE 250 ALT 637 W/ HCPCS: Performed by: NURSE PRACTITIONER

## 2021-08-20 PROCEDURE — 25000242 PHARM REV CODE 250 ALT 637 W/ HCPCS: Performed by: FAMILY MEDICINE

## 2021-08-20 PROCEDURE — 63600175 PHARM REV CODE 636 W HCPCS: Performed by: EMERGENCY MEDICINE

## 2021-08-20 PROCEDURE — 83605 ASSAY OF LACTIC ACID: CPT | Performed by: NURSE PRACTITIONER

## 2021-08-20 PROCEDURE — 25000003 PHARM REV CODE 250: Performed by: FAMILY MEDICINE

## 2021-08-20 PROCEDURE — 84145 PROCALCITONIN (PCT): CPT | Performed by: NURSE PRACTITIONER

## 2021-08-20 PROCEDURE — 25000003 PHARM REV CODE 250: Performed by: EMERGENCY MEDICINE

## 2021-08-20 PROCEDURE — 21400001 HC TELEMETRY ROOM

## 2021-08-20 PROCEDURE — 97116 GAIT TRAINING THERAPY: CPT

## 2021-08-20 PROCEDURE — 27000221 HC OXYGEN, UP TO 24 HOURS

## 2021-08-20 PROCEDURE — 25000003 PHARM REV CODE 250: Performed by: NURSE PRACTITIONER

## 2021-08-20 PROCEDURE — 99233 PR SUBSEQUENT HOSPITAL CARE,LEVL III: ICD-10-PCS | Mod: ,,, | Performed by: INTERNAL MEDICINE

## 2021-08-20 PROCEDURE — 94664 DEMO&/EVAL PT USE INHALER: CPT

## 2021-08-20 RX ORDER — SPIRONOLACTONE 25 MG/1
25 TABLET ORAL DAILY
Qty: 30 TABLET | Refills: 11 | Status: SHIPPED | OUTPATIENT
Start: 2021-08-21 | End: 2023-07-12

## 2021-08-20 RX ORDER — PREDNISONE 10 MG/1
TABLET ORAL
Qty: 40 TABLET | Refills: 0 | Status: SHIPPED | OUTPATIENT
Start: 2021-08-20 | End: 2021-10-05

## 2021-08-20 RX ADMIN — INSULIN ASPART 10 UNITS: 100 INJECTION, SOLUTION INTRAVENOUS; SUBCUTANEOUS at 11:08

## 2021-08-20 RX ADMIN — METHYLPREDNISOLONE SODIUM SUCCINATE 80 MG: 40 INJECTION, POWDER, FOR SOLUTION INTRAMUSCULAR; INTRAVENOUS at 05:08

## 2021-08-20 RX ADMIN — CEFTRIAXONE 1 G: 1 INJECTION, SOLUTION INTRAVENOUS at 02:08

## 2021-08-20 RX ADMIN — ATORVASTATIN CALCIUM 80 MG: 40 TABLET, FILM COATED ORAL at 09:08

## 2021-08-20 RX ADMIN — SPIRONOLACTONE 25 MG: 25 TABLET, FILM COATED ORAL at 09:08

## 2021-08-20 RX ADMIN — IPRATROPIUM BROMIDE AND ALBUTEROL SULFATE 3 ML: .5; 3 SOLUTION RESPIRATORY (INHALATION) at 02:08

## 2021-08-20 RX ADMIN — ENOXAPARIN SODIUM 40 MG: 40 INJECTION SUBCUTANEOUS at 04:08

## 2021-08-20 RX ADMIN — AZITHROMYCIN MONOHYDRATE 500 MG: 500 INJECTION, POWDER, LYOPHILIZED, FOR SOLUTION INTRAVENOUS at 04:08

## 2021-08-20 RX ADMIN — IPRATROPIUM BROMIDE AND ALBUTEROL SULFATE 3 ML: .5; 3 SOLUTION RESPIRATORY (INHALATION) at 08:08

## 2021-08-20 RX ADMIN — INSULIN ASPART 10 UNITS: 100 INJECTION, SOLUTION INTRAVENOUS; SUBCUTANEOUS at 04:08

## 2021-08-20 RX ADMIN — IPRATROPIUM BROMIDE AND ALBUTEROL SULFATE 3 ML: .5; 3 SOLUTION RESPIRATORY (INHALATION) at 12:08

## 2021-08-20 RX ADMIN — METHYLPREDNISOLONE SODIUM SUCCINATE 80 MG: 40 INJECTION, POWDER, FOR SOLUTION INTRAMUSCULAR; INTRAVENOUS at 02:08

## 2021-08-20 RX ADMIN — BUDESONIDE 0.5 MG: 0.5 SUSPENSION RESPIRATORY (INHALATION) at 07:08

## 2021-08-20 RX ADMIN — IPRATROPIUM BROMIDE AND ALBUTEROL SULFATE 3 ML: .5; 3 SOLUTION RESPIRATORY (INHALATION) at 07:08

## 2021-08-20 RX ADMIN — AMLODIPINE BESYLATE 5 MG: 5 TABLET ORAL at 09:08

## 2021-08-20 RX ADMIN — INSULIN DETEMIR 25 UNITS: 100 INJECTION, SOLUTION SUBCUTANEOUS at 08:08

## 2021-08-20 RX ADMIN — INSULIN DETEMIR 25 UNITS: 100 INJECTION, SOLUTION SUBCUTANEOUS at 09:08

## 2021-08-20 RX ADMIN — INSULIN ASPART 8 UNITS: 100 INJECTION, SOLUTION INTRAVENOUS; SUBCUTANEOUS at 11:08

## 2021-08-20 RX ADMIN — METHYLPREDNISOLONE SODIUM SUCCINATE 80 MG: 40 INJECTION, POWDER, FOR SOLUTION INTRAMUSCULAR; INTRAVENOUS at 10:08

## 2021-08-20 RX ADMIN — BUDESONIDE 0.5 MG: 0.5 SUSPENSION RESPIRATORY (INHALATION) at 08:08

## 2021-08-20 RX ADMIN — INSULIN ASPART 10 UNITS: 100 INJECTION, SOLUTION INTRAVENOUS; SUBCUTANEOUS at 07:08

## 2021-08-21 LAB
BACTERIA BLD CULT: NORMAL
BACTERIA BLD CULT: NORMAL

## 2021-08-22 LAB — MRSA SPEC QL CULT: NORMAL

## 2021-08-23 ENCOUNTER — PATIENT OUTREACH (OUTPATIENT)
Dept: ADMINISTRATIVE | Facility: CLINIC | Age: 66
End: 2021-08-23

## 2021-08-25 LAB
ASPERGILLUS AB SER QL ID: NORMAL
B DERMAT AB SER QL ID: NORMAL
C IMMITIS AB SER QL ID: NORMAL
H CAPSUL AB SER QL ID: NORMAL

## 2021-09-15 ENCOUNTER — SPECIALTY PHARMACY (OUTPATIENT)
Dept: PHARMACY | Facility: CLINIC | Age: 66
End: 2021-09-15

## 2021-09-15 DIAGNOSIS — E11.21 TYPE 2 DIABETES MELLITUS WITH NEPHROPATHY: Primary | ICD-10-CM

## 2021-09-21 ENCOUNTER — CLINICAL SUPPORT (OUTPATIENT)
Dept: DIABETES | Facility: CLINIC | Age: 66
End: 2021-09-21
Payer: MEDICAID

## 2021-09-21 ENCOUNTER — TELEPHONE (OUTPATIENT)
Dept: DIABETES | Facility: CLINIC | Age: 66
End: 2021-09-21

## 2021-09-21 VITALS — HEIGHT: 68 IN | BODY MASS INDEX: 37.43 KG/M2 | WEIGHT: 246.94 LBS

## 2021-09-21 DIAGNOSIS — E11.21 TYPE 2 DIABETES MELLITUS WITH NEPHROPATHY: Primary | ICD-10-CM

## 2021-09-21 PROCEDURE — 99212 OFFICE O/P EST SF 10 MIN: CPT | Mod: PBBFAC | Performed by: DIETITIAN, REGISTERED

## 2021-09-21 PROCEDURE — 99999 PR PBB SHADOW E&M-EST. PATIENT-LVL II: ICD-10-PCS | Mod: PBBFAC,,, | Performed by: DIETITIAN, REGISTERED

## 2021-09-21 PROCEDURE — 99999 PR PBB SHADOW E&M-EST. PATIENT-LVL II: CPT | Mod: PBBFAC,,, | Performed by: DIETITIAN, REGISTERED

## 2021-09-27 ENCOUNTER — TELEPHONE (OUTPATIENT)
Dept: FAMILY MEDICINE | Facility: CLINIC | Age: 66
End: 2021-09-27

## 2021-10-05 ENCOUNTER — TELEPHONE (OUTPATIENT)
Dept: FAMILY MEDICINE | Facility: CLINIC | Age: 66
End: 2021-10-05

## 2021-10-05 ENCOUNTER — OFFICE VISIT (OUTPATIENT)
Dept: FAMILY MEDICINE | Facility: CLINIC | Age: 66
End: 2021-10-05
Payer: MEDICAID

## 2021-10-05 DIAGNOSIS — J18.9 MULTIFOCAL PNEUMONIA: ICD-10-CM

## 2021-10-05 DIAGNOSIS — Z09 HOSPITAL DISCHARGE FOLLOW-UP: Primary | ICD-10-CM

## 2021-10-05 PROBLEM — R07.9: Status: RESOLVED | Noted: 2019-07-29 | Resolved: 2021-10-05

## 2021-10-05 PROBLEM — N17.9 AKI (ACUTE KIDNEY INJURY): Status: RESOLVED | Noted: 2021-08-13 | Resolved: 2021-10-05

## 2021-10-05 PROCEDURE — 99213 OFFICE O/P EST LOW 20 MIN: CPT | Mod: 95,,, | Performed by: REGISTERED NURSE

## 2021-10-05 PROCEDURE — 99213 PR OFFICE/OUTPT VISIT, EST, LEVL III, 20-29 MIN: ICD-10-PCS | Mod: 95,,, | Performed by: REGISTERED NURSE

## 2021-10-06 ENCOUNTER — HOSPITAL ENCOUNTER (OUTPATIENT)
Dept: RADIOLOGY | Facility: HOSPITAL | Age: 66
Discharge: HOME | End: 2021-10-06
Attending: REGISTERED NURSE
Payer: MEDICAID

## 2021-10-06 DIAGNOSIS — J18.9 MULTIFOCAL PNEUMONIA: ICD-10-CM

## 2021-10-06 DIAGNOSIS — Z09 HOSPITAL DISCHARGE FOLLOW-UP: ICD-10-CM

## 2021-10-06 PROCEDURE — 71046 X-RAY EXAM CHEST 2 VIEWS: CPT | Mod: 26,,, | Performed by: RADIOLOGY

## 2021-10-06 PROCEDURE — 71046 X-RAY EXAM CHEST 2 VIEWS: CPT | Mod: TC,FY,PO

## 2021-10-06 PROCEDURE — 71046 XR CHEST PA AND LATERAL: ICD-10-PCS | Mod: 26,,, | Performed by: RADIOLOGY

## 2021-10-12 ENCOUNTER — TELEPHONE (OUTPATIENT)
Dept: GASTROENTEROLOGY | Facility: CLINIC | Age: 66
End: 2021-10-12

## 2021-10-13 DIAGNOSIS — L40.0 PLAQUE PSORIASIS: ICD-10-CM

## 2021-10-13 DIAGNOSIS — L40.9 PSORIASIS: ICD-10-CM

## 2021-10-13 RX ORDER — SECUKINUMAB 150 MG/ML
300 INJECTION SUBCUTANEOUS
Qty: 4 ML | Refills: 3 | Status: SHIPPED | OUTPATIENT
Start: 2021-10-13 | End: 2022-02-05 | Stop reason: SDUPTHER

## 2021-10-18 ENCOUNTER — SPECIALTY PHARMACY (OUTPATIENT)
Dept: PHARMACY | Facility: CLINIC | Age: 66
End: 2021-10-18

## 2021-10-18 ENCOUNTER — LAB VISIT (OUTPATIENT)
Dept: LAB | Facility: HOSPITAL | Age: 66
End: 2021-10-18
Attending: INTERNAL MEDICINE
Payer: MEDICAID

## 2021-10-18 DIAGNOSIS — E11.21 TYPE 2 DIABETES MELLITUS WITH NEPHROPATHY: Chronic | ICD-10-CM

## 2021-10-18 DIAGNOSIS — Z79.60 LONG-TERM USE OF IMMUNOSUPPRESSANT MEDICATION: ICD-10-CM

## 2021-10-18 DIAGNOSIS — Z11.1 SCREENING-PULMONARY TB: ICD-10-CM

## 2021-10-18 DIAGNOSIS — Z79.631 METHOTREXATE, LONG TERM, CURRENT USE: ICD-10-CM

## 2021-10-18 DIAGNOSIS — L40.52 PSORIATIC ARTHRITIS, DESTRUCTIVE TYPE: ICD-10-CM

## 2021-10-18 DIAGNOSIS — D84.9 IMMUNOSUPPRESSED STATUS: ICD-10-CM

## 2021-10-18 LAB
ALBUMIN SERPL BCP-MCNC: 3.7 G/DL (ref 3.5–5.2)
ALP SERPL-CCNC: 107 U/L (ref 55–135)
ALT SERPL W/O P-5'-P-CCNC: 73 U/L (ref 10–44)
ANION GAP SERPL CALC-SCNC: 11 MMOL/L (ref 8–16)
AST SERPL-CCNC: 67 U/L (ref 10–40)
BASOPHILS # BLD AUTO: 0.06 K/UL (ref 0–0.2)
BASOPHILS NFR BLD: 0.8 % (ref 0–1.9)
BILIRUB SERPL-MCNC: 0.4 MG/DL (ref 0.1–1)
BUN SERPL-MCNC: 11 MG/DL (ref 8–23)
CALCIUM SERPL-MCNC: 9.5 MG/DL (ref 8.7–10.5)
CHLORIDE SERPL-SCNC: 103 MMOL/L (ref 95–110)
CO2 SERPL-SCNC: 24 MMOL/L (ref 23–29)
CREAT SERPL-MCNC: 1.1 MG/DL (ref 0.5–1.4)
CRP SERPL-MCNC: 3.7 MG/L (ref 0–8.2)
DIFFERENTIAL METHOD: ABNORMAL
EOSINOPHIL # BLD AUTO: 0.3 K/UL (ref 0–0.5)
EOSINOPHIL NFR BLD: 4.4 % (ref 0–8)
ERYTHROCYTE [DISTWIDTH] IN BLOOD BY AUTOMATED COUNT: 18.1 % (ref 11.5–14.5)
ERYTHROCYTE [SEDIMENTATION RATE] IN BLOOD BY WESTERGREN METHOD: 38 MM/HR (ref 0–10)
EST. GFR  (AFRICAN AMERICAN): >60 ML/MIN/1.73 M^2
EST. GFR  (NON AFRICAN AMERICAN): >60 ML/MIN/1.73 M^2
ESTIMATED AVG GLUCOSE: 249 MG/DL (ref 68–131)
GLUCOSE SERPL-MCNC: 211 MG/DL (ref 70–110)
HBA1C MFR BLD: 10.3 % (ref 4–5.6)
HCT VFR BLD AUTO: 43.3 % (ref 40–54)
HGB BLD-MCNC: 13.9 G/DL (ref 14–18)
IMM GRANULOCYTES # BLD AUTO: 0.06 K/UL (ref 0–0.04)
IMM GRANULOCYTES NFR BLD AUTO: 0.8 % (ref 0–0.5)
LYMPHOCYTES # BLD AUTO: 2.2 K/UL (ref 1–4.8)
LYMPHOCYTES NFR BLD: 29.3 % (ref 18–48)
MCH RBC QN AUTO: 28.4 PG (ref 27–31)
MCHC RBC AUTO-ENTMCNC: 32.1 G/DL (ref 32–36)
MCV RBC AUTO: 89 FL (ref 82–98)
MONOCYTES # BLD AUTO: 0.6 K/UL (ref 0.3–1)
MONOCYTES NFR BLD: 7.9 % (ref 4–15)
NEUTROPHILS # BLD AUTO: 4.3 K/UL (ref 1.8–7.7)
NEUTROPHILS NFR BLD: 56.8 % (ref 38–73)
NRBC BLD-RTO: 0 /100 WBC
PLATELET # BLD AUTO: 390 K/UL (ref 150–450)
PMV BLD AUTO: 10.2 FL (ref 9.2–12.9)
POTASSIUM SERPL-SCNC: 4.4 MMOL/L (ref 3.5–5.1)
PROT SERPL-MCNC: 8.2 G/DL (ref 6–8.4)
RBC # BLD AUTO: 4.89 M/UL (ref 4.6–6.2)
SODIUM SERPL-SCNC: 138 MMOL/L (ref 136–145)
WBC # BLD AUTO: 7.5 K/UL (ref 3.9–12.7)

## 2021-10-18 PROCEDURE — 86480 TB TEST CELL IMMUN MEASURE: CPT | Performed by: PHYSICIAN ASSISTANT

## 2021-10-18 PROCEDURE — 85025 COMPLETE CBC W/AUTO DIFF WBC: CPT | Performed by: PHYSICIAN ASSISTANT

## 2021-10-18 PROCEDURE — 36415 COLL VENOUS BLD VENIPUNCTURE: CPT | Mod: PO | Performed by: PHYSICIAN ASSISTANT

## 2021-10-18 PROCEDURE — 86140 C-REACTIVE PROTEIN: CPT | Performed by: PHYSICIAN ASSISTANT

## 2021-10-18 PROCEDURE — 85651 RBC SED RATE NONAUTOMATED: CPT | Performed by: PHYSICIAN ASSISTANT

## 2021-10-18 PROCEDURE — 83036 HEMOGLOBIN GLYCOSYLATED A1C: CPT | Performed by: INTERNAL MEDICINE

## 2021-10-18 PROCEDURE — 80053 COMPREHEN METABOLIC PANEL: CPT | Performed by: PHYSICIAN ASSISTANT

## 2021-10-20 LAB
GAMMA INTERFERON BACKGROUND BLD IA-ACNC: 0.02 IU/ML
M TB IFN-G CD4+ BCKGRND COR BLD-ACNC: 0 IU/ML
MITOGEN IGNF BCKGRD COR BLD-ACNC: >10 IU/ML
TB GOLD PLUS: NEGATIVE
TB2 - NIL: 0 IU/ML

## 2021-11-11 ENCOUNTER — LAB VISIT (OUTPATIENT)
Dept: LAB | Facility: HOSPITAL | Age: 66
End: 2021-11-11
Attending: INTERNAL MEDICINE
Payer: MEDICAID

## 2021-11-11 ENCOUNTER — OFFICE VISIT (OUTPATIENT)
Dept: PULMONOLOGY | Facility: CLINIC | Age: 66
End: 2021-11-11
Payer: MEDICAID

## 2021-11-11 VITALS
HEIGHT: 68 IN | HEART RATE: 96 BPM | DIASTOLIC BLOOD PRESSURE: 80 MMHG | RESPIRATION RATE: 16 BRPM | BODY MASS INDEX: 35.09 KG/M2 | SYSTOLIC BLOOD PRESSURE: 118 MMHG | OXYGEN SATURATION: 98 % | WEIGHT: 231.5 LBS

## 2021-11-11 DIAGNOSIS — G47.33 SEVERE OBSTRUCTIVE SLEEP APNEA: ICD-10-CM

## 2021-11-11 DIAGNOSIS — Z87.891 FORMER SMOKER: ICD-10-CM

## 2021-11-11 DIAGNOSIS — Z79.631 LONG TERM METHOTREXATE USER: ICD-10-CM

## 2021-11-11 DIAGNOSIS — J44.9 MIXED TYPE COPD (CHRONIC OBSTRUCTIVE PULMONARY DISEASE): ICD-10-CM

## 2021-11-11 DIAGNOSIS — J18.9 PNEUMONIA OF RIGHT MIDDLE LOBE DUE TO INFECTIOUS ORGANISM: ICD-10-CM

## 2021-11-11 DIAGNOSIS — Z87.01 HISTORY OF PNEUMONIA: ICD-10-CM

## 2021-11-11 DIAGNOSIS — G47.34 NOCTURNAL HYPOXEMIA: ICD-10-CM

## 2021-11-11 DIAGNOSIS — J44.9 CHRONIC OBSTRUCTIVE PULMONARY DISEASE, UNSPECIFIED COPD TYPE: ICD-10-CM

## 2021-11-11 DIAGNOSIS — J44.9 MIXED TYPE COPD (CHRONIC OBSTRUCTIVE PULMONARY DISEASE): Primary | ICD-10-CM

## 2021-11-11 LAB — IGE SERPL-ACNC: <35 IU/ML (ref 0–100)

## 2021-11-11 PROCEDURE — 99215 PR OFFICE/OUTPT VISIT, EST, LEVL V, 40-54 MIN: ICD-10-PCS | Mod: S$PBB,,, | Performed by: INTERNAL MEDICINE

## 2021-11-11 PROCEDURE — 82785 ASSAY OF IGE: CPT | Performed by: INTERNAL MEDICINE

## 2021-11-11 PROCEDURE — 99215 OFFICE O/P EST HI 40 MIN: CPT | Mod: S$PBB,,, | Performed by: INTERNAL MEDICINE

## 2021-11-11 PROCEDURE — 36415 COLL VENOUS BLD VENIPUNCTURE: CPT | Performed by: INTERNAL MEDICINE

## 2021-11-11 PROCEDURE — 99999 PR PBB SHADOW E&M-EST. PATIENT-LVL IV: CPT | Mod: PBBFAC,,, | Performed by: INTERNAL MEDICINE

## 2021-11-11 PROCEDURE — 99214 OFFICE O/P EST MOD 30 MIN: CPT | Mod: PBBFAC | Performed by: INTERNAL MEDICINE

## 2021-11-11 PROCEDURE — 99999 PR PBB SHADOW E&M-EST. PATIENT-LVL IV: ICD-10-PCS | Mod: PBBFAC,,, | Performed by: INTERNAL MEDICINE

## 2021-11-13 ENCOUNTER — SPECIALTY PHARMACY (OUTPATIENT)
Dept: PHARMACY | Facility: CLINIC | Age: 66
End: 2021-11-13
Payer: MEDICAID

## 2021-11-14 ENCOUNTER — PATIENT OUTREACH (OUTPATIENT)
Dept: ADMINISTRATIVE | Facility: OTHER | Age: 66
End: 2021-11-14
Payer: MEDICAID

## 2021-11-15 ENCOUNTER — OFFICE VISIT (OUTPATIENT)
Dept: PODIATRY | Facility: CLINIC | Age: 66
End: 2021-11-15
Payer: MEDICAID

## 2021-11-15 VITALS
HEART RATE: 93 BPM | HEIGHT: 68 IN | BODY MASS INDEX: 35.09 KG/M2 | WEIGHT: 231.5 LBS | DIASTOLIC BLOOD PRESSURE: 70 MMHG | SYSTOLIC BLOOD PRESSURE: 118 MMHG

## 2021-11-15 DIAGNOSIS — E66.01 SEVERE OBESITY (BMI 35.0-35.9 WITH COMORBIDITY): ICD-10-CM

## 2021-11-15 DIAGNOSIS — R23.4 SKIN FISSURES: ICD-10-CM

## 2021-11-15 DIAGNOSIS — E11.49 TYPE II DIABETES MELLITUS WITH NEUROLOGICAL MANIFESTATIONS: Primary | ICD-10-CM

## 2021-11-15 DIAGNOSIS — B35.1 DERMATOPHYTOSIS OF NAIL: ICD-10-CM

## 2021-11-15 DIAGNOSIS — L84 CORN OR CALLUS: ICD-10-CM

## 2021-11-15 PROCEDURE — 11056 PARNG/CUTG B9 HYPRKR LES 2-4: CPT | Mod: S$PBB,,, | Performed by: PODIATRIST

## 2021-11-15 PROCEDURE — 99999 PR PBB SHADOW E&M-EST. PATIENT-LVL IV: ICD-10-PCS | Mod: PBBFAC,,, | Performed by: PODIATRIST

## 2021-11-15 PROCEDURE — 11721 DEBRIDE NAIL 6 OR MORE: CPT | Mod: Q9,PBBFAC | Performed by: PODIATRIST

## 2021-11-15 PROCEDURE — 99499 NO LOS: ICD-10-PCS | Mod: S$PBB,,, | Performed by: PODIATRIST

## 2021-11-15 PROCEDURE — 11056 PR TRIM BENIGN HYPERKERATOTIC SKIN LESION,2-4: ICD-10-PCS | Mod: S$PBB,,, | Performed by: PODIATRIST

## 2021-11-15 PROCEDURE — 99214 OFFICE O/P EST MOD 30 MIN: CPT | Mod: PBBFAC | Performed by: PODIATRIST

## 2021-11-15 PROCEDURE — 11721 DEBRIDE NAIL 6 OR MORE: CPT | Mod: 59,S$PBB,, | Performed by: PODIATRIST

## 2021-11-15 PROCEDURE — 11056 PARNG/CUTG B9 HYPRKR LES 2-4: CPT | Mod: Q9,PBBFAC | Performed by: PODIATRIST

## 2021-11-15 PROCEDURE — 11721 PR DEBRIDEMENT OF NAILS, 6 OR MORE: ICD-10-PCS | Mod: 59,S$PBB,, | Performed by: PODIATRIST

## 2021-11-15 PROCEDURE — 99999 PR PBB SHADOW E&M-EST. PATIENT-LVL IV: CPT | Mod: PBBFAC,,, | Performed by: PODIATRIST

## 2021-11-15 PROCEDURE — 99499 UNLISTED E&M SERVICE: CPT | Mod: S$PBB,,, | Performed by: PODIATRIST

## 2021-11-24 ENCOUNTER — TELEPHONE (OUTPATIENT)
Dept: RHEUMATOLOGY | Facility: CLINIC | Age: 66
End: 2021-11-24
Payer: MEDICAID

## 2021-11-29 ENCOUNTER — TELEPHONE (OUTPATIENT)
Dept: RHEUMATOLOGY | Facility: CLINIC | Age: 66
End: 2021-11-29
Payer: MEDICAID

## 2021-11-29 DIAGNOSIS — D84.9 IMMUNOSUPPRESSED STATUS: ICD-10-CM

## 2021-11-29 DIAGNOSIS — L40.52 PSORIATIC ARTHRITIS, DESTRUCTIVE TYPE: Primary | ICD-10-CM

## 2021-12-10 ENCOUNTER — OFFICE VISIT (OUTPATIENT)
Dept: DIABETES | Facility: CLINIC | Age: 66
End: 2021-12-10
Payer: MEDICAID

## 2021-12-10 ENCOUNTER — SPECIALTY PHARMACY (OUTPATIENT)
Dept: PHARMACY | Facility: CLINIC | Age: 66
End: 2021-12-10
Payer: MEDICAID

## 2021-12-10 VITALS
WEIGHT: 257.5 LBS | DIASTOLIC BLOOD PRESSURE: 88 MMHG | SYSTOLIC BLOOD PRESSURE: 140 MMHG | HEART RATE: 82 BPM | BODY MASS INDEX: 39.15 KG/M2

## 2021-12-10 DIAGNOSIS — D84.9 IMMUNOSUPPRESSED STATUS: ICD-10-CM

## 2021-12-10 DIAGNOSIS — E66.01 MORBID OBESITY: ICD-10-CM

## 2021-12-10 DIAGNOSIS — E11.69 HYPERLIPIDEMIA ASSOCIATED WITH TYPE 2 DIABETES MELLITUS: ICD-10-CM

## 2021-12-10 DIAGNOSIS — F17.210 CIGARETTE NICOTINE DEPENDENCE WITHOUT COMPLICATION: ICD-10-CM

## 2021-12-10 DIAGNOSIS — E55.9 VITAMIN D DEFICIENCY: ICD-10-CM

## 2021-12-10 DIAGNOSIS — E11.21 TYPE 2 DIABETES MELLITUS WITH NEPHROPATHY: Primary | ICD-10-CM

## 2021-12-10 DIAGNOSIS — E78.5 HYPERLIPIDEMIA ASSOCIATED WITH TYPE 2 DIABETES MELLITUS: ICD-10-CM

## 2021-12-10 DIAGNOSIS — E11.59 HYPERTENSION ASSOCIATED WITH DIABETES: ICD-10-CM

## 2021-12-10 DIAGNOSIS — I15.2 HYPERTENSION ASSOCIATED WITH DIABETES: ICD-10-CM

## 2021-12-10 LAB — GLUCOSE SERPL-MCNC: 223 MG/DL (ref 70–110)

## 2021-12-10 PROCEDURE — 3062F POS MACROALBUMINURIA REV: CPT | Mod: CPTII,,, | Performed by: PHYSICIAN ASSISTANT

## 2021-12-10 PROCEDURE — 99214 OFFICE O/P EST MOD 30 MIN: CPT | Mod: PBBFAC,PO | Performed by: PHYSICIAN ASSISTANT

## 2021-12-10 PROCEDURE — 99214 OFFICE O/P EST MOD 30 MIN: CPT | Mod: S$PBB,,, | Performed by: PHYSICIAN ASSISTANT

## 2021-12-10 PROCEDURE — 82962 GLUCOSE BLOOD TEST: CPT | Mod: PBBFAC,PO | Performed by: PHYSICIAN ASSISTANT

## 2021-12-10 PROCEDURE — 99999 PR PBB SHADOW E&M-EST. PATIENT-LVL IV: ICD-10-PCS | Mod: PBBFAC,,, | Performed by: PHYSICIAN ASSISTANT

## 2021-12-10 PROCEDURE — 99999 PR PBB SHADOW E&M-EST. PATIENT-LVL IV: CPT | Mod: PBBFAC,,, | Performed by: PHYSICIAN ASSISTANT

## 2021-12-10 PROCEDURE — 3072F PR LOW RISK FOR RETINOPATHY: ICD-10-PCS | Mod: CPTII,,, | Performed by: PHYSICIAN ASSISTANT

## 2021-12-10 PROCEDURE — 99214 PR OFFICE/OUTPT VISIT, EST, LEVL IV, 30-39 MIN: ICD-10-PCS | Mod: S$PBB,,, | Performed by: PHYSICIAN ASSISTANT

## 2021-12-10 PROCEDURE — 3072F LOW RISK FOR RETINOPATHY: CPT | Mod: CPTII,,, | Performed by: PHYSICIAN ASSISTANT

## 2021-12-10 PROCEDURE — 3066F NEPHROPATHY DOC TX: CPT | Mod: CPTII,,, | Performed by: PHYSICIAN ASSISTANT

## 2021-12-10 PROCEDURE — 3062F PR POS MACROALBUMINURIA RESULT DOCUMENTED/REVIEW: ICD-10-PCS | Mod: CPTII,,, | Performed by: PHYSICIAN ASSISTANT

## 2021-12-10 PROCEDURE — 3066F PR DOCUMENTATION OF TREATMENT FOR NEPHROPATHY: ICD-10-PCS | Mod: CPTII,,, | Performed by: PHYSICIAN ASSISTANT

## 2021-12-10 RX ORDER — DULAGLUTIDE 3 MG/.5ML
3 INJECTION, SOLUTION SUBCUTANEOUS WEEKLY
Qty: 4 PEN | Refills: 11 | Status: SHIPPED | OUTPATIENT
Start: 2021-12-10 | End: 2022-04-22

## 2021-12-10 RX ORDER — GLIPIZIDE 5 MG/1
5 TABLET ORAL 2 TIMES DAILY WITH MEALS
Qty: 60 TABLET | Refills: 11 | Status: SHIPPED | OUTPATIENT
Start: 2021-12-10 | End: 2022-01-28 | Stop reason: ALTCHOICE

## 2021-12-10 RX ORDER — DULAGLUTIDE 3 MG/.5ML
3 INJECTION, SOLUTION SUBCUTANEOUS WEEKLY
Qty: 4 PEN | Refills: 11 | Status: SHIPPED | OUTPATIENT
Start: 2021-12-10 | End: 2021-12-10 | Stop reason: ALTCHOICE

## 2021-12-29 ENCOUNTER — TELEPHONE (OUTPATIENT)
Dept: FAMILY MEDICINE | Facility: CLINIC | Age: 66
End: 2021-12-29
Payer: MEDICAID

## 2022-01-03 ENCOUNTER — PATIENT OUTREACH (OUTPATIENT)
Dept: ADMINISTRATIVE | Facility: OTHER | Age: 67
End: 2022-01-03
Payer: MEDICAID

## 2022-01-04 ENCOUNTER — TELEPHONE (OUTPATIENT)
Dept: RHEUMATOLOGY | Facility: CLINIC | Age: 67
End: 2022-01-04
Payer: MEDICAID

## 2022-01-04 NOTE — PROGRESS NOTES
Health Maintenance Due   Topic Date Due    Shingles Vaccine (1 of 2) Never done    Lipid Panel  09/22/2021     Updates were requested from care everywhere.  Chart was reviewed for overdue Proactive [facility] Encounters ([facility]AM[facility]) topics (CRS, Breast Cancer Screening, Eye exam)  Health Maintenance has been updated.  LINKS immunization registry triggered.  Immunizations were reconciled.

## 2022-01-05 ENCOUNTER — OFFICE VISIT (OUTPATIENT)
Dept: RHEUMATOLOGY | Facility: CLINIC | Age: 67
End: 2022-01-05
Payer: MEDICAID

## 2022-01-05 ENCOUNTER — LAB VISIT (OUTPATIENT)
Dept: LAB | Facility: HOSPITAL | Age: 67
End: 2022-01-05
Payer: MEDICAID

## 2022-01-05 VITALS — WEIGHT: 249.88 LBS | BODY MASS INDEX: 37.87 KG/M2 | HEIGHT: 68 IN

## 2022-01-05 DIAGNOSIS — L40.0 PLAQUE PSORIASIS: ICD-10-CM

## 2022-01-05 DIAGNOSIS — D84.9 IMMUNOSUPPRESSED STATUS: ICD-10-CM

## 2022-01-05 DIAGNOSIS — L40.52 PSORIATIC ARTHRITIS, DESTRUCTIVE TYPE: Primary | ICD-10-CM

## 2022-01-05 DIAGNOSIS — L40.50 PSORIATIC ARTHRITIS: ICD-10-CM

## 2022-01-05 DIAGNOSIS — Z79.631 METHOTREXATE, LONG TERM, CURRENT USE: ICD-10-CM

## 2022-01-05 DIAGNOSIS — E55.9 VITAMIN D DEFICIENCY: ICD-10-CM

## 2022-01-05 DIAGNOSIS — Z79.60 LONG-TERM USE OF IMMUNOSUPPRESSANT MEDICATION: ICD-10-CM

## 2022-01-05 DIAGNOSIS — L40.52 PSORIATIC ARTHRITIS, DESTRUCTIVE TYPE: ICD-10-CM

## 2022-01-05 DIAGNOSIS — L40.9 PSORIASIS: ICD-10-CM

## 2022-01-05 LAB
ALBUMIN SERPL BCP-MCNC: 3.6 G/DL (ref 3.5–5.2)
ALP SERPL-CCNC: 107 U/L (ref 55–135)
ALT SERPL W/O P-5'-P-CCNC: 48 U/L (ref 10–44)
ANION GAP SERPL CALC-SCNC: 13 MMOL/L (ref 8–16)
AST SERPL-CCNC: 36 U/L (ref 10–40)
BASOPHILS # BLD AUTO: 0.06 K/UL (ref 0–0.2)
BASOPHILS NFR BLD: 0.8 % (ref 0–1.9)
BILIRUB SERPL-MCNC: 0.4 MG/DL (ref 0.1–1)
BUN SERPL-MCNC: 12 MG/DL (ref 8–23)
CALCIUM SERPL-MCNC: 9.5 MG/DL (ref 8.7–10.5)
CHLORIDE SERPL-SCNC: 100 MMOL/L (ref 95–110)
CO2 SERPL-SCNC: 23 MMOL/L (ref 23–29)
CREAT SERPL-MCNC: 1.4 MG/DL (ref 0.5–1.4)
CRP SERPL-MCNC: 5.6 MG/L (ref 0–8.2)
DIFFERENTIAL METHOD: NORMAL
EOSINOPHIL # BLD AUTO: 0.4 K/UL (ref 0–0.5)
EOSINOPHIL NFR BLD: 5.5 % (ref 0–8)
ERYTHROCYTE [DISTWIDTH] IN BLOOD BY AUTOMATED COUNT: 14.5 % (ref 11.5–14.5)
ERYTHROCYTE [SEDIMENTATION RATE] IN BLOOD BY WESTERGREN METHOD: 23 MM/HR (ref 0–10)
EST. GFR  (AFRICAN AMERICAN): >60 ML/MIN/1.73 M^2
EST. GFR  (NON AFRICAN AMERICAN): 53 ML/MIN/1.73 M^2
GLUCOSE SERPL-MCNC: 393 MG/DL (ref 70–110)
HCT VFR BLD AUTO: 47.9 % (ref 40–54)
HGB BLD-MCNC: 15.4 G/DL (ref 14–18)
IMM GRANULOCYTES # BLD AUTO: 0.03 K/UL (ref 0–0.04)
IMM GRANULOCYTES NFR BLD AUTO: 0.4 % (ref 0–0.5)
LYMPHOCYTES # BLD AUTO: 2.1 K/UL (ref 1–4.8)
LYMPHOCYTES NFR BLD: 27.3 % (ref 18–48)
MCH RBC QN AUTO: 28.9 PG (ref 27–31)
MCHC RBC AUTO-ENTMCNC: 32.2 G/DL (ref 32–36)
MCV RBC AUTO: 90 FL (ref 82–98)
MONOCYTES # BLD AUTO: 0.5 K/UL (ref 0.3–1)
MONOCYTES NFR BLD: 6.8 % (ref 4–15)
NEUTROPHILS # BLD AUTO: 4.5 K/UL (ref 1.8–7.7)
NEUTROPHILS NFR BLD: 59.2 % (ref 38–73)
NRBC BLD-RTO: 0 /100 WBC
PLATELET # BLD AUTO: 262 K/UL (ref 150–450)
PMV BLD AUTO: 10.9 FL (ref 9.2–12.9)
POTASSIUM SERPL-SCNC: 4.4 MMOL/L (ref 3.5–5.1)
PROT SERPL-MCNC: 8 G/DL (ref 6–8.4)
RBC # BLD AUTO: 5.33 M/UL (ref 4.6–6.2)
SODIUM SERPL-SCNC: 136 MMOL/L (ref 136–145)
WBC # BLD AUTO: 7.63 K/UL (ref 3.9–12.7)

## 2022-01-05 PROCEDURE — 86140 C-REACTIVE PROTEIN: CPT

## 2022-01-05 PROCEDURE — 1159F PR MEDICATION LIST DOCUMENTED IN MEDICAL RECORD: ICD-10-PCS | Mod: CPTII,,,

## 2022-01-05 PROCEDURE — 99215 OFFICE O/P EST HI 40 MIN: CPT | Mod: PBBFAC

## 2022-01-05 PROCEDURE — 1160F PR REVIEW ALL MEDS BY PRESCRIBER/CLIN PHARMACIST DOCUMENTED: ICD-10-PCS | Mod: CPTII,,,

## 2022-01-05 PROCEDURE — 3008F BODY MASS INDEX DOCD: CPT | Mod: CPTII,,,

## 2022-01-05 PROCEDURE — 85651 RBC SED RATE NONAUTOMATED: CPT

## 2022-01-05 PROCEDURE — 99999 PR PBB SHADOW E&M-EST. PATIENT-LVL V: CPT | Mod: PBBFAC,,,

## 2022-01-05 PROCEDURE — 99999 PR PBB SHADOW E&M-EST. PATIENT-LVL V: ICD-10-PCS | Mod: PBBFAC,,,

## 2022-01-05 PROCEDURE — 3008F PR BODY MASS INDEX (BMI) DOCUMENTED: ICD-10-PCS | Mod: CPTII,,,

## 2022-01-05 PROCEDURE — 3072F PR LOW RISK FOR RETINOPATHY: ICD-10-PCS | Mod: CPTII,,,

## 2022-01-05 PROCEDURE — 85025 COMPLETE CBC W/AUTO DIFF WBC: CPT

## 2022-01-05 PROCEDURE — 99215 OFFICE O/P EST HI 40 MIN: CPT | Mod: S$PBB,,,

## 2022-01-05 PROCEDURE — 1160F RVW MEDS BY RX/DR IN RCRD: CPT | Mod: CPTII,,,

## 2022-01-05 PROCEDURE — 99215 PR OFFICE/OUTPT VISIT, EST, LEVL V, 40-54 MIN: ICD-10-PCS | Mod: S$PBB,,,

## 2022-01-05 PROCEDURE — 3072F LOW RISK FOR RETINOPATHY: CPT | Mod: CPTII,,,

## 2022-01-05 PROCEDURE — 80053 COMPREHEN METABOLIC PANEL: CPT

## 2022-01-05 PROCEDURE — 1159F MED LIST DOCD IN RCRD: CPT | Mod: CPTII,,,

## 2022-01-05 RX ORDER — CALCIPOTRIENE 50 UG/G
CREAM TOPICAL
Qty: 60 G | Refills: 4 | Status: SHIPPED | OUTPATIENT
Start: 2022-01-05 | End: 2022-08-08

## 2022-01-05 NOTE — PROGRESS NOTES
Subjective:       Patient ID: Trey Stevens is a 66 y.o. male.    Chief Complaint: Psoriatic Arthritis and Follow-up      Trey is here for routine follow up.     He has chronic psoriatic arthritis and sever plaque psoriasis.   In the past failed mtx monotherapy, failed topicals and UV;  Failed humira and Enbrel    Moved to  Cosentyx 300 mg Q month; load then maintenance in conjunction w/methotrexate dose 20 mg/wk; taking  folic acid once daily.  Persistent severe rash nestor arms and legs; Thickened indurated plaques on forearms and elbows with diffuse rash    Recent data on Optimize shows pt >90 kg - lower dose was not effective  We changed him to 300 mg Q 14 day to reach drug therapeutic level to clear the skin  Severe plaq psoriasis with BSA 12%- complicate by Vitiligo   Did 6 week trial and skin was starting to improve  BSA down from 12% to 8%  Have not been able to clear his skin w/any agents tried     Last seen June 2021  Labs today pending    He is still getting and taking his cosentyx 300 mg Q 14 day   Still on mtx   Using topical taclonex in conjunction as well  Using topical faithfully now  He has been compliant with all his meds.  However patient notes that he has been taking 4 pills of methotrexate on a daily basis as opposed to 8 pills once a week    On going neuropathy C/w foot pain mostly. He has polyneuropathy    No pain today 0/10.   No big flare ups recently    He has some chronic arthritis changes to his dip joints bilaterally and chronic deformities to his feet with lateral deviation of his toes.      Using otc tylenol every day but only takes the tramadol 1-2 X weekly at night when his feet hurt    He has multiple lung nodules which are followed by pulm and stable per his last pulmonary visit feb 2019, his breathing is stable. He does still smoke.      Was on vit D 50K weekly but now on otc Vit D3 5000 daily          Psoriasis  Associated symptoms include arthralgias, numbness and a rash. Pertinent  "negatives include no abdominal pain, chest pain, chills, congestion, coughing, fatigue, fever, headaches, joint swelling, myalgias, nausea, vomiting or weakness.           He reports no joint swelling. Pertinent negatives include no dysuria, fatigue, fever, myalgias or headaches.         Review of Systems   Constitutional: Negative for chills, fatigue, fever and unexpected weight change.        Poor hygiene      HENT: Negative for congestion, mouth sores and rhinorrhea.    Eyes: Negative for pain, discharge and redness.   Respiratory: Negative for cough, shortness of breath and wheezing.    Cardiovascular: Negative for chest pain and leg swelling.   Gastrointestinal: Negative for abdominal pain, diarrhea, nausea and vomiting.   Endocrine: Negative for cold intolerance and heat intolerance.   Genitourinary: Negative for dysuria.   Musculoskeletal: Positive for arthralgias. Negative for joint swelling and myalgias.   Skin: Positive for color change and rash.   Allergic/Immunologic: Negative for immunocompromised state.   Neurological: Positive for numbness. Negative for weakness and headaches.        Diabetic neuropathy   Psychiatric/Behavioral: The patient is not nervous/anxious.          Objective:   Ht 5' 8" (1.727 m)   Wt 113.3 kg (249 lb 14.3 oz)   BMI 38.00 kg/m²      Physical Exam   Constitutional: He is oriented to person, place, and time.   HENT:   Head: Normocephalic and atraumatic.   Eyes: Pupils are equal, round, and reactive to light. Right eye exhibits no discharge.   Cardiovascular: Normal rate, regular rhythm and normal heart sounds. Exam reveals no friction rub.   Pulmonary/Chest: Effort normal and breath sounds normal. No respiratory distress.   Abdominal: Soft. He exhibits no distension. There is no abdominal tenderness.   Musculoskeletal:         General: Deformity present. Normal range of motion.      Cervical back: Normal range of motion.      Comments: Bilateral wrists, mcps no synovitis good " rom   nestor dips with chronic damage, bony enlargement     nestor Right 5th dip flexion deformity,   Left thumb no flexion to the IP joint, right thumb ip joint motion decreased     good rom to nestor ankles, no swelling  nestor feet very dirty, 2-5 toes deviate laterally.  Mild lower ext edema   Lymphadenopathy:     He has no cervical adenopathy.   Neurological: He is alert and oriented to person, place, and time.   Skin: Rash noted. No erythema.        Psoriasis rash bilateral elbows and small patch left hip    Vitiligo bilateral forearms  See photos from today     Psychiatric: Mood normal.         Photos from January 2022                Photos from June 2021                      Immunization History   Administered Date(s) Administered    COVID-19, MRNA, LN-S, PF (Pfizer) 09/16/2021, 10/07/2021    Hepatitis A, Adult 12/20/2018    Influenza - High Dose - PF (65 years and older) 10/18/2018    Influenza - Quadrivalent 10/24/2016    Influenza - Quadrivalent - PF *Preferred* (6 months and older) 10/30/2017, 11/07/2019, 11/04/2020    PPD Test 08/05/2013    Pneumococcal Conjugate - 13 Valent 02/24/2016    Pneumococcal Polysaccharide - 23 Valent 04/09/2013    Tdap 02/24/2016             Recent Results (from the past 168 hour(s))   CBC Auto Differential    Collection Time: 01/05/22 11:40 AM   Result Value Ref Range    WBC 7.63 3.90 - 12.70 K/uL    RBC 5.33 4.60 - 6.20 M/uL    Hemoglobin 15.4 14.0 - 18.0 g/dL    Hematocrit 47.9 40.0 - 54.0 %    MCV 90 82 - 98 fL    MCH 28.9 27.0 - 31.0 pg    MCHC 32.2 32.0 - 36.0 g/dL    RDW 14.5 11.5 - 14.5 %    Platelets 262 150 - 450 K/uL    MPV 10.9 9.2 - 12.9 fL    Immature Granulocytes 0.4 0.0 - 0.5 %    Gran # (ANC) 4.5 1.8 - 7.7 K/uL    Immature Grans (Abs) 0.03 0.00 - 0.04 K/uL    Lymph # 2.1 1.0 - 4.8 K/uL    Mono # 0.5 0.3 - 1.0 K/uL    Eos # 0.4 0.0 - 0.5 K/uL    Baso # 0.06 0.00 - 0.20 K/uL    nRBC 0 0 /100 WBC    Gran % 59.2 38.0 - 73.0 %    Lymph % 27.3 18.0 - 48.0 %    Mono %  6.8 4.0 - 15.0 %    Eosinophil % 5.5 0.0 - 8.0 %    Basophil % 0.8 0.0 - 1.9 %    Differential Method Automated    Sedimentation rate    Collection Time: 01/05/22 11:40 AM   Result Value Ref Range    Sed Rate 23 (H) 0 - 10 mm/Hr     Lab Results   Component Value Date    HEPAIGM Negative 06/19/2018    HEPBIGM Negative 06/19/2018    HEPBCAB Negative 04/04/2016    HEPCAB Negative 07/22/2019     Lab Results   Component Value Date    TBGOLDPLUS Negative 10/18/2021          Ref. Range 2/19/2018 11:52   Vit D, 25-Hydroxy Latest Ref Range: 30 - 96 ng/mL 29 (L)             X-ray bilateral foot 10/2017  Findings: No acute fractures or dislocations visualized.  Lateral deviation of multiple bilateral toes again noted with joint space loss and pencil in cup erosive/productive changes involving multiple bilateral MTPs as well as the right 4th DIPs.  Findings remain most severe the 5th MTPs and are suggestive of psoriatic arthritis.  Other IP joints appear ankylosed bilaterally.   No new erosive osseous changes demonstrated.    X-ray bilateral hand 10/2017  Findings: No acute fractures or dislocations visualized.  Possible flexion deformity at the right 5th DIPs which appears unchanged from prior. Moderate osteoarthritis noted throughout the bilateral DIP joints with some suspected central erosive changes suggesting probable element of erosive OA.  Degenerative findings at the bilateral 1st CMC's and radiocarpal joints also noted.  Minimal degenerative findings present at multiple bilateral MCPs.  Overall no appreciable change from prior.    Assessment:       1. Psoriatic arthritis, destructive type    2. Immunosuppressed status    3. Long-term use of immunosuppressant medication    4. Plaque psoriasis    5. Vitamin D deficiency    6. Methotrexate, long term, current use    7. Psoriatic arthritis    8. Psoriasis          Chronic refractory Psoriatic arthritis and skin  Psoriasis:  Improved but still skin rash present  On   Cosentyx maintenance dose 300 mg every 14 days   suboptimals response to 300 mg/month dose    msk -foot/ankle involvement   skin not controlled was improved on higher dose of Cosentyx x 6 weeks   BSA was 12%- worsened today  on background mtx 20 mg week split dose-  Out of med  Since late march-now rash flaring again bsa back up to 12-13%      VICENTE-28 (CRP): --Unable to calculate due to no current labs.   CDAI Score: 11 / 76         In the past   Failed mtx monotherapy; failed enbrel and  humira   Failed topicals and UV trt       High Risk Medication monitoring: Need updated labs to determine if there is toxicity from  mtx or Cosentyx.  Labs pending today      Immunocompromised: utd,      Vit D deficiency: completed vit D 50,000U/2Xweekly, last level (May 2020) low 27 now on otc Vit D3 5000 twice weekly ,  Unable to get updated vitamin-D level due to blood tube shortage.     Chronic lung nodules- stable monitored by pulmonology- seen yearly, next visit  feb 2021    tob use-patient reports recent cessation of smoking.      Mild elevated LFT - will follow -labs pending today    Plan:         C./w topical bid - taclonex  Instructed patient to look at bottles of topical lotions and chamfer to when he gets home.  He can call us and tell to LEs which medications he is on specifically topically.  Get some sun light   May need to get Dermatology on board for future psoriasis management.    C/w mtx 20 mg Q week, split dose  Stressed the importance of not taking methotrexate on a daily basis pick.  Patient was taking for pills of methotrexate (10 mg) daily.  He takes Cosentyx on Saturdays, so discussed with him to take methotrexate every single Saturday and no other days of the week.  Folic acid once a day    C/w  cosentyx 300 mg Q 14 days   See approval info in media from [employer]    Https://www.ncbi.nlm.nih.gov/pubmed/63900856    Optimise StudyCONCLUSIONS:  Standard q4w dosing of secukinumab 300 mg is the optimal dosing regimen  to achieve and maintain clear or almost clear skin. Patients with body weight ? 90 kg not achieving PASI 90 at week 24 may benefit from the q2w dosing regimen. What's already known about this topic? Individual responses to biologics in patients with psoriasis vary considerably and there may be a need to individualize treatment. Dose optimization strategies of currently available biologic drugs have been investigated mainly in rheumatic disorders. Secukinumab has shown long-term PASI 90/100 responses (percentage improvement in Psoriasis Area and Severity Index) to year 5 in patients with moderate-to-severe plaque psoriasis when used at the dose of 300 mg every 4 weeks. What does this study add? Standard every 4 week (q4w) dosing of secukinumab 300 mg is the optimal regimen to achieve and maintain clear or almost clear skin at week 52; the majority of the patients (85·7%) maintain PASI 90 at week 52. Superiority of intensified (q2w) dosing over the q4w regimen could not be claimed. However, patients with a higher body weight (? 90 kg) not achieving PASI 90 response at week 24 may benefit from q2w dosing.    Always remember to Hold mtx and Cosentyx  for signs of infection or for surgery   Pt verbalized understanding    TB gold and acute hep panel done 6/2020- neg, no need to repeat yet  Do every 1-2 years while on biologics  Due June of this year 2022      C/w otc vit D3 to 5K every day, check d level Q year.  Unable to get updated level at this time due to blood tube shortage.    Return in 16 weeks w/reg 4 labs      Repeat  xrays hands and feet June 2022    Congratulated patient on smoking cessation.  Encouraged continued cessation.    Please call or send portal message with any questions or concerns       Charlette Wilcox PA-C  [facility]  Rheumatology       60 minutes of total time spent on the encounter, which includes face to face time and non-face to face time preparing to see the  patient (eg, review of tests), Obtaining and/or reviewing separately obtained history, Documenting clinical information in the electronic or other health record, Independently interpreting results (not separately reported) and communicating results to the patient/family/caregiver, or Care coordination (not separately reported).    Disclaimer: This note was prepared using voice recognition system and is likely to have sound alike errors and is not proof read.  Please call me with any questions

## 2022-01-05 NOTE — PATIENT INSTRUCTIONS
Methotrexate   Take 8 pills ONE DAY A WEEK (Every Saturday)  Do NOT take daily.  Split 8 pills - take 4 in morning and 4 in evening.

## 2022-01-05 NOTE — PROGRESS NOTES
"Subjective:      Patient ID: Trey Stevens is a 66 y.o. male.    Chief Complaint: No chief complaint on file.      HPI  Here for follow up of medical problems.  Seeing DM nurse.  Last month glipizide added, and changed to trulicity.  Eating better diet lately.  Still walking for exercise daily.  Energy good.  No cp/sob/palp.  Sleeping ok with cpap.  BMs normal.    Updated/ annual due 6/22:  HM:  1/22 today fluvax, 10/21 covid vaccines, 12/18 HAV, 2/16 [id number], 4/13 [id number], 2/16 TDaP, Cscope in the past normal and pt refuses more, 6/21 PSA, 2/16 HCV neg, 2/21 Eye Dr. Cole, 11/18 chest CT stable.     Review of Systems   Constitutional: Negative for chills, diaphoresis, fatigue and fever.   Respiratory: Negative for cough, chest tightness and shortness of breath.    Cardiovascular: Negative for chest pain, palpitations and leg swelling.   Gastrointestinal: Negative for blood in stool, constipation, diarrhea, nausea and vomiting.   Genitourinary: Negative for difficulty urinating and frequency.   Musculoskeletal: Negative for arthralgias.         Objective:   /74 (BP Location: Left arm, Patient Position: Sitting)   Pulse 84   Temp 98 °F (36.7 °C) (Temporal)   Resp 18   Ht 5' 8" (1.727 m)   Wt 119 kg (262 lb 5.6 oz)   SpO2 95%   BMI 39.89 kg/m²     Physical Exam  Constitutional:       Appearance: He is well-developed and well-nourished.   HENT:      Mouth/Throat:      Mouth: Oropharynx is clear and moist.   Cardiovascular:      Rate and Rhythm: Normal rate and regular rhythm.      Pulses: Intact distal pulses.      Heart sounds: No murmur heard.  No friction rub. No gallop.    Pulmonary:      Effort: Pulmonary effort is normal.      Breath sounds: Normal breath sounds. No wheezing or rales.   Abdominal:      General: Bowel sounds are normal.      Palpations: Abdomen is soft. There is no mass.      Tenderness: There is no abdominal tenderness.   Musculoskeletal:         General: No edema.      Cervical " back: Normal range of motion and neck supple.   Lymphadenopathy:      Cervical: No cervical adenopathy.   Neurological:      Mental Status: He is alert and oriented to person, place, and time.   Psychiatric:         Mood and Affect: Mood and affect normal.             Assessment:       1. Hypertension associated with diabetes    2. Type 2 diabetes mellitus with nephropathy    3. Hyperlipidemia associated with type 2 diabetes mellitus    4. Mixed type COPD (chronic obstructive pulmonary disease)    5. Psoriatic arthritis, destructive type    6. Immunosuppressed status    7. SCHUYLER (obstructive sleep apnea)    8. Preventive measure          Plan:     Hypertension associated with diabetes- stable, cont rx.    Type 2 diabetes mellitus with nephropathy- on changes in past month, recheck lab 3mo.  Didn't bring record of sugars.  -     Hemoglobin A1C; Future; Expected date: 01/18/2022  -     Microalbumin/Creatinine Ratio, Urine; Future; Expected date: 01/18/2022  -     Basic Metabolic Panel; Future; Expected date: 01/18/2022    Hyperlipidemia associated with type 2 diabetes mellitus- cont statin, recheck 3mo.    Mixed type COPD (chronic obstructive pulmonary disease)- doing well with albuterol prn, not daily.    Psoriatic arthritis, destructive type, Immunosuppressed status    SCHUYLER (obstructive sleep apnea)- on cpap.    Preventive measure- will do in 3mo.  Fluvax.  -     Lipid Panel; Future; Expected date: 01/18/2022  -     PSA, Screening; Future; Expected date: 01/18/2022  -     TSH; Future; Expected date: 01/18/2022  -     Hemoglobin A1C; Future; Expected date: 01/18/2022  -     Microalbumin/Creatinine Ratio, Urine; Future; Expected date: 01/18/2022  -     Basic Metabolic Panel; Future; Expected date: 01/18/2022

## 2022-01-12 ENCOUNTER — SPECIALTY PHARMACY (OUTPATIENT)
Dept: PHARMACY | Facility: CLINIC | Age: 67
End: 2022-01-12
Payer: MEDICAID

## 2022-01-12 NOTE — TELEPHONE ENCOUNTER
Specialty Pharmacy - Refill Coordination    Specialty Medication Orders Linked to Encounter    Flowsheet Row Most Recent Value   Medication #1 secukinumab (COSENTYX PEN) 150 mg/mL PnIj (Order#[id number], Rx#[id number])          Refill Questions - Documented Responses    Flowsheet Row Most Recent Value   Patient Availability and HIPAA Verification    Does patient want to proceed with activity? Yes   HIPAA/medical authority confirmed? Yes   Relationship to patient of person spoken to? Self   Refill Screening Questions    Changes to allergies? No   Changes to medications? No   New conditions since last clinic visit? No   Unplanned office visit, urgent care, ED, or hospital admission in the last 4 weeks? No   How does patient/caregiver feel medication is working? Good   Financial problems or insurance changes? No   How many doses of your specialty medications were missed in the last 4 weeks? 0   Would patient like to speak to a pharmacist? No   When does the patient need to receive the medication? 01/15/22   Refill Delivery Questions    How will the patient receive the medication? Delivery [facility]   When does the patient need to receive the medication? 01/15/22   Shipping Address Home   Address in [facility] confirmed and updated if neccessary? Yes   Expected Copay ($) 0   Is the patient able to afford the medication copay? Yes   Payment Method zero copay   Days supply of Refill 28   Supplies needed? No supplies needed   Refill activity completed? Yes   Refill activity plan Refill scheduled   Shipment/Pickup Date: 01/14/22          Current Outpatient Medications   Medication Sig    amLODIPine (NORVASC) 5 MG tablet TAKE 1 TABLET BY MOUTH EVERY DAY    aspirin 81 MG Chew Take 1 tablet (81 mg total) by mouth once daily.    atorvastatin (LIPITOR) 80 MG tablet TAKE 1 TABLET BY MOUTH EVERY DAY    blood sugar diagnostic Strp TAD    calcipotriene (DOVONOX) 0.005 % cream APPLY TO AFFECTED AREA TWICE A DAY    clobetasol  "(OLUX) 0.05 % Foam AAA of scalp and behind ears twice daily.  Do not use on face, underarms or groin.    dulaglutide (TRULICITY) 3 mg/0.5 mL pen injector Inject 3 mg into the skin once a week.    ergocalciferol (ERGOCALCIFEROL) 50,000 unit Cap Take 50,000 Units by mouth twice a week.    folic acid (FOLVITE) 1 MG tablet Take 1 tablet (1,000 mcg total) by mouth once daily.    glipiZIDE (GLUCOTROL) 5 MG tablet Take 1 tablet (5 mg total) by mouth 2 (two) times daily with meals.    insulin (LANTUS SOLOSTAR U-100 INSULIN) glargine 100 units/mL (3mL) SubQ pen Inject 14 Units into the skin once daily.    JARDIANCE 25 mg tablet TAKE 1 TABLET BY MOUTH EVERY DAY    ketoconazole (NIZORAL) 2 % shampoo Wash hair with medicated shampoo at least 2x/week - let sit on scalp at least 5 minutes prior to rinsing    lancets 33 gauge Misc by Misc.(Non-Drug; Combo Route) route 3 (three) times daily.    metFORMIN (GLUCOPHAGE-XR) 500 MG XR 24hr tablet Take 2 tablets (1,000 mg total) by mouth 2 (two) times daily with meals.    methotrexate 2.5 MG Tab TAKE 8 TABLETS (20 MG TOTAL) BY MOUTH EVERY 7 DAYS.    ONETOUCH ULTRA BLUE TEST STRIP Strp USE AS DIRECTED THREE TIMES A DAY    pen needle, diabetic 32 gauge x 5/32" Ndle 2 each by Misc.(Non-Drug; Combo Route) route once daily.    PROAIR HFA 90 mcg/actuation inhaler Inhale 2 puffs into the lungs every 4 (four) hours as needed for Wheezing. Rescue    secukinumab (COSENTYX PEN) 150 mg/mL PnIj Inject 2 pens (300 mg) into the skin every 14 (fourteen) days.    spironolactone (ALDACTONE) 25 MG tablet Take 1 tablet (25 mg total) by mouth once daily.    traMADoL (ULTRAM) 50 mg tablet Take 1 tablet (50 mg total) by mouth 3 (three) times daily as needed for Pain (use for mod pain; use sparingly).   Last reviewed on 1/5/2022 12:16 PM by Charlette Wilcox PA-C    Review of patient's allergies indicates:  No Known Allergies Last reviewed on  1/5/2022 12:16 PM by Charlette" Brenden      Tasks added this encounter   2/5/2022 - Refill Call (Auto Added)   Tasks due within next 3 months   No tasks due.     [facility]  [address]  [facility] [address]  Phone: [phone]  Fax: [phone]

## 2022-01-18 ENCOUNTER — OFFICE VISIT (OUTPATIENT)
Dept: FAMILY MEDICINE | Facility: CLINIC | Age: 67
End: 2022-01-18
Payer: MEDICAID

## 2022-01-18 VITALS
HEIGHT: 68 IN | TEMPERATURE: 98 F | SYSTOLIC BLOOD PRESSURE: 132 MMHG | DIASTOLIC BLOOD PRESSURE: 74 MMHG | WEIGHT: 262.38 LBS | BODY MASS INDEX: 39.76 KG/M2 | RESPIRATION RATE: 18 BRPM | HEART RATE: 84 BPM | OXYGEN SATURATION: 95 %

## 2022-01-18 DIAGNOSIS — E11.69 HYPERLIPIDEMIA ASSOCIATED WITH TYPE 2 DIABETES MELLITUS: ICD-10-CM

## 2022-01-18 DIAGNOSIS — D84.9 IMMUNOSUPPRESSED STATUS: Chronic | ICD-10-CM

## 2022-01-18 DIAGNOSIS — Z29.9 PREVENTIVE MEASURE: ICD-10-CM

## 2022-01-18 DIAGNOSIS — I15.2 HYPERTENSION ASSOCIATED WITH DIABETES: Primary | ICD-10-CM

## 2022-01-18 DIAGNOSIS — L40.52 PSORIATIC ARTHRITIS, DESTRUCTIVE TYPE: ICD-10-CM

## 2022-01-18 DIAGNOSIS — E11.21 TYPE 2 DIABETES MELLITUS WITH NEPHROPATHY: Chronic | ICD-10-CM

## 2022-01-18 DIAGNOSIS — E78.5 HYPERLIPIDEMIA ASSOCIATED WITH TYPE 2 DIABETES MELLITUS: ICD-10-CM

## 2022-01-18 DIAGNOSIS — E11.59 HYPERTENSION ASSOCIATED WITH DIABETES: Primary | ICD-10-CM

## 2022-01-18 DIAGNOSIS — J44.9 MIXED TYPE COPD (CHRONIC OBSTRUCTIVE PULMONARY DISEASE): Chronic | ICD-10-CM

## 2022-01-18 DIAGNOSIS — G47.33 OSA (OBSTRUCTIVE SLEEP APNEA): Chronic | ICD-10-CM

## 2022-01-18 PROCEDURE — 90686 IIV4 VACC NO PRSV 0.5 ML IM: CPT | Mod: PBBFAC,PO

## 2022-01-18 PROCEDURE — 99999 PR PBB SHADOW E&M-EST. PATIENT-LVL IV: ICD-10-PCS | Mod: PBBFAC,,, | Performed by: INTERNAL MEDICINE

## 2022-01-18 PROCEDURE — 3075F PR MOST RECENT SYSTOLIC BLOOD PRESS GE 130-139MM HG: ICD-10-PCS | Mod: CPTII,,, | Performed by: INTERNAL MEDICINE

## 2022-01-18 PROCEDURE — 3008F PR BODY MASS INDEX (BMI) DOCUMENTED: ICD-10-PCS | Mod: CPTII,,, | Performed by: INTERNAL MEDICINE

## 2022-01-18 PROCEDURE — 3078F PR MOST RECENT DIASTOLIC BLOOD PRESSURE < 80 MM HG: ICD-10-PCS | Mod: CPTII,,, | Performed by: INTERNAL MEDICINE

## 2022-01-18 PROCEDURE — 3075F SYST BP GE 130 - 139MM HG: CPT | Mod: CPTII,,, | Performed by: INTERNAL MEDICINE

## 2022-01-18 PROCEDURE — 99214 OFFICE O/P EST MOD 30 MIN: CPT | Mod: PBBFAC,PO | Performed by: INTERNAL MEDICINE

## 2022-01-18 PROCEDURE — 3078F DIAST BP <80 MM HG: CPT | Mod: CPTII,,, | Performed by: INTERNAL MEDICINE

## 2022-01-18 PROCEDURE — 99214 OFFICE O/P EST MOD 30 MIN: CPT | Mod: S$PBB,,, | Performed by: INTERNAL MEDICINE

## 2022-01-18 PROCEDURE — 1159F PR MEDICATION LIST DOCUMENTED IN MEDICAL RECORD: ICD-10-PCS | Mod: CPTII,,, | Performed by: INTERNAL MEDICINE

## 2022-01-18 PROCEDURE — 1159F MED LIST DOCD IN RCRD: CPT | Mod: CPTII,,, | Performed by: INTERNAL MEDICINE

## 2022-01-18 PROCEDURE — 99999 PR PBB SHADOW E&M-EST. PATIENT-LVL IV: CPT | Mod: PBBFAC,,, | Performed by: INTERNAL MEDICINE

## 2022-01-18 PROCEDURE — 3072F PR LOW RISK FOR RETINOPATHY: ICD-10-PCS | Mod: CPTII,,, | Performed by: INTERNAL MEDICINE

## 2022-01-18 PROCEDURE — 99214 PR OFFICE/OUTPT VISIT, EST, LEVL IV, 30-39 MIN: ICD-10-PCS | Mod: S$PBB,,, | Performed by: INTERNAL MEDICINE

## 2022-01-18 PROCEDURE — 3008F BODY MASS INDEX DOCD: CPT | Mod: CPTII,,, | Performed by: INTERNAL MEDICINE

## 2022-01-18 PROCEDURE — 3072F LOW RISK FOR RETINOPATHY: CPT | Mod: CPTII,,, | Performed by: INTERNAL MEDICINE

## 2022-01-20 ENCOUNTER — LAB VISIT (OUTPATIENT)
Dept: LAB | Facility: HOSPITAL | Age: 67
End: 2022-01-20
Attending: INTERNAL MEDICINE
Payer: MEDICAID

## 2022-01-20 DIAGNOSIS — E11.21 TYPE 2 DIABETES MELLITUS WITH NEPHROPATHY: ICD-10-CM

## 2022-01-20 LAB
CHOLEST SERPL-MCNC: 115 MG/DL (ref 120–199)
CHOLEST/HDLC SERPL: 2.7 {RATIO} (ref 2–5)
ESTIMATED AVG GLUCOSE: 295 MG/DL (ref 68–131)
HBA1C MFR BLD: 11.9 % (ref 4–5.6)
HDLC SERPL-MCNC: 43 MG/DL (ref 40–75)
HDLC SERPL: 37.4 % (ref 20–50)
LDLC SERPL CALC-MCNC: 41.6 MG/DL (ref 63–159)
NONHDLC SERPL-MCNC: 72 MG/DL
TRIGL SERPL-MCNC: 152 MG/DL (ref 30–150)

## 2022-01-20 PROCEDURE — 36415 COLL VENOUS BLD VENIPUNCTURE: CPT | Mod: PO | Performed by: PHYSICIAN ASSISTANT

## 2022-01-20 PROCEDURE — 80061 LIPID PANEL: CPT | Performed by: PHYSICIAN ASSISTANT

## 2022-01-20 PROCEDURE — 83036 HEMOGLOBIN GLYCOSYLATED A1C: CPT | Performed by: PHYSICIAN ASSISTANT

## 2022-01-28 ENCOUNTER — OFFICE VISIT (OUTPATIENT)
Dept: DIABETES | Facility: CLINIC | Age: 67
End: 2022-01-28
Payer: MEDICAID

## 2022-01-28 VITALS
WEIGHT: 261.69 LBS | HEIGHT: 68 IN | BODY MASS INDEX: 39.66 KG/M2 | SYSTOLIC BLOOD PRESSURE: 122 MMHG | HEART RATE: 84 BPM | DIASTOLIC BLOOD PRESSURE: 71 MMHG

## 2022-01-28 DIAGNOSIS — E78.5 HYPERLIPIDEMIA ASSOCIATED WITH TYPE 2 DIABETES MELLITUS: ICD-10-CM

## 2022-01-28 DIAGNOSIS — E11.59 HYPERTENSION ASSOCIATED WITH DIABETES: ICD-10-CM

## 2022-01-28 DIAGNOSIS — E11.69 HYPERLIPIDEMIA ASSOCIATED WITH TYPE 2 DIABETES MELLITUS: ICD-10-CM

## 2022-01-28 DIAGNOSIS — E11.65 UNCONTROLLED TYPE 2 DIABETES MELLITUS WITH HYPERGLYCEMIA, WITH LONG-TERM CURRENT USE OF INSULIN: Primary | ICD-10-CM

## 2022-01-28 DIAGNOSIS — Z79.4 UNCONTROLLED TYPE 2 DIABETES MELLITUS WITH HYPERGLYCEMIA, WITH LONG-TERM CURRENT USE OF INSULIN: Primary | ICD-10-CM

## 2022-01-28 DIAGNOSIS — L40.52 PSORIATIC ARTHRITIS, DESTRUCTIVE TYPE: ICD-10-CM

## 2022-01-28 DIAGNOSIS — E66.01 SEVERE OBESITY (BMI 35.0-35.9 WITH COMORBIDITY): ICD-10-CM

## 2022-01-28 DIAGNOSIS — I15.2 HYPERTENSION ASSOCIATED WITH DIABETES: ICD-10-CM

## 2022-01-28 LAB — GLUCOSE SERPL-MCNC: 155 MG/DL (ref 70–110)

## 2022-01-28 PROCEDURE — 1159F PR MEDICATION LIST DOCUMENTED IN MEDICAL RECORD: ICD-10-PCS | Mod: CPTII,,, | Performed by: NURSE PRACTITIONER

## 2022-01-28 PROCEDURE — 3078F PR MOST RECENT DIASTOLIC BLOOD PRESSURE < 80 MM HG: ICD-10-PCS | Mod: CPTII,,, | Performed by: NURSE PRACTITIONER

## 2022-01-28 PROCEDURE — 3008F BODY MASS INDEX DOCD: CPT | Mod: CPTII,,, | Performed by: NURSE PRACTITIONER

## 2022-01-28 PROCEDURE — 99999 PR PBB SHADOW E&M-EST. PATIENT-LVL IV: CPT | Mod: PBBFAC,,, | Performed by: NURSE PRACTITIONER

## 2022-01-28 PROCEDURE — 1160F PR REVIEW ALL MEDS BY PRESCRIBER/CLIN PHARMACIST DOCUMENTED: ICD-10-PCS | Mod: CPTII,,, | Performed by: NURSE PRACTITIONER

## 2022-01-28 PROCEDURE — 3074F PR MOST RECENT SYSTOLIC BLOOD PRESSURE < 130 MM HG: ICD-10-PCS | Mod: CPTII,,, | Performed by: NURSE PRACTITIONER

## 2022-01-28 PROCEDURE — 3072F PR LOW RISK FOR RETINOPATHY: ICD-10-PCS | Mod: CPTII,,, | Performed by: NURSE PRACTITIONER

## 2022-01-28 PROCEDURE — 1159F MED LIST DOCD IN RCRD: CPT | Mod: CPTII,,, | Performed by: NURSE PRACTITIONER

## 2022-01-28 PROCEDURE — 3008F PR BODY MASS INDEX (BMI) DOCUMENTED: ICD-10-PCS | Mod: CPTII,,, | Performed by: NURSE PRACTITIONER

## 2022-01-28 PROCEDURE — 3072F LOW RISK FOR RETINOPATHY: CPT | Mod: CPTII,,, | Performed by: NURSE PRACTITIONER

## 2022-01-28 PROCEDURE — 99214 OFFICE O/P EST MOD 30 MIN: CPT | Mod: PBBFAC,PO | Performed by: NURSE PRACTITIONER

## 2022-01-28 PROCEDURE — 99214 PR OFFICE/OUTPT VISIT, EST, LEVL IV, 30-39 MIN: ICD-10-PCS | Mod: S$PBB,,, | Performed by: NURSE PRACTITIONER

## 2022-01-28 PROCEDURE — 1160F RVW MEDS BY RX/DR IN RCRD: CPT | Mod: CPTII,,, | Performed by: NURSE PRACTITIONER

## 2022-01-28 PROCEDURE — 3046F PR MOST RECENT HEMOGLOBIN A1C LEVEL > 9.0%: ICD-10-PCS | Mod: CPTII,,, | Performed by: NURSE PRACTITIONER

## 2022-01-28 PROCEDURE — 3074F SYST BP LT 130 MM HG: CPT | Mod: CPTII,,, | Performed by: NURSE PRACTITIONER

## 2022-01-28 PROCEDURE — 3078F DIAST BP <80 MM HG: CPT | Mod: CPTII,,, | Performed by: NURSE PRACTITIONER

## 2022-01-28 PROCEDURE — 99999 PR PBB SHADOW E&M-EST. PATIENT-LVL IV: ICD-10-PCS | Mod: PBBFAC,,, | Performed by: NURSE PRACTITIONER

## 2022-01-28 PROCEDURE — 99214 OFFICE O/P EST MOD 30 MIN: CPT | Mod: S$PBB,,, | Performed by: NURSE PRACTITIONER

## 2022-01-28 PROCEDURE — 82962 GLUCOSE BLOOD TEST: CPT | Mod: PBBFAC,PO | Performed by: NURSE PRACTITIONER

## 2022-01-28 PROCEDURE — 3046F HEMOGLOBIN A1C LEVEL >9.0%: CPT | Mod: CPTII,,, | Performed by: NURSE PRACTITIONER

## 2022-01-28 RX ORDER — LANCETS
EACH MISCELLANEOUS
Qty: 100 EACH | Refills: 11 | Status: SHIPPED | OUTPATIENT
Start: 2022-01-28 | End: 2022-03-21 | Stop reason: CLARIF

## 2022-01-28 RX ORDER — INSULIN ASPART 100 [IU]/ML
6 INJECTION, SOLUTION INTRAVENOUS; SUBCUTANEOUS
Qty: 15 ML | Refills: 5 | Status: SHIPPED | OUTPATIENT
Start: 2022-01-28 | End: 2022-05-03 | Stop reason: ALTCHOICE

## 2022-01-28 RX ORDER — PEN NEEDLE, DIABETIC 30 GX3/16"
NEEDLE, DISPOSABLE MISCELLANEOUS
Qty: 200 EACH | Refills: 5 | Status: SHIPPED | OUTPATIENT
Start: 2022-01-28 | End: 2023-10-06 | Stop reason: SDUPTHER

## 2022-01-28 RX ORDER — INSULIN GLARGINE 100 [IU]/ML
20 INJECTION, SOLUTION SUBCUTANEOUS DAILY
Qty: 15 ML | Refills: 5 | Status: SHIPPED | OUTPATIENT
Start: 2022-01-28 | End: 2023-03-07

## 2022-01-28 NOTE — PATIENT INSTRUCTIONS
-- Medications adjusted for today's visit include:    Increase Lantus to 20 units once daily.    START Novolog 6 units three times a day before each main meal.     STOP Glipizide.    Continue Metformin 2 tablets twice a day, Jardiance once a day, and Trulicity 3 mg weekly.    -- Start checking blood sugar 3 times daily: Fasting blood sugar and vary your next 2 readings: Before lunch, before supper, 2 hours after any meal, or bedtime.   -Blood sugar goals should be a fasting blood sugar below 130, and no blood sugars throughout the day over 180 is good.    --Carry glucose tablets/soft peppermints/regular juice or Coke product with you at all times to treat a low blood sugar episode (less than 70). If your blood sugar is between 50-70, Chew 4 tablets or drink 1/2 cup of juice or regular Coke product. If your blood sugar is below 50, double the treatment. Re-check blood sugar in 15 minutes. If still low, repeat this. Always call the clinic to give an update for any low blood sugar episodes.    --Follow-up for your next visit in 6 weeks with logs.    --Please either drop off, fax, or MyChart your readings to me in a couple of weeks if you can.

## 2022-01-28 NOTE — PROGRESS NOTES
Subjective:         Patient ID: Trey Stevens is a 66 y.o. male.  Patient's current PCP is Brittanie Kaba MD.     Chief Complaint: Diabetes Mellitus    HPI  Trey Stevens is a 66 y.o. White male presenting for a new consult with me, previously seen by DARREL Mendieta for diabetes. Patient has been diagnosed with type 2 diabetes since 2016 .    CURRENT DM MEDICATIONS:   Diabetes Medications             dulaglutide (TRULICITY) 3 mg/0.5 mL pen injector Inject 3 mg into the skin once a week.    glipiZIDE (GLUCOTROL) 5 MG tablet Take 1 tablet (5 mg total) by mouth 2 (two) times daily with meals.    insulin (LANTUS SOLOSTAR U-100 INSULIN) glargine 100 units/mL (3mL) SubQ pen Inject 14 Units into the skin once daily. 18 units once a day    JARDIANCE 25 mg tablet TAKE 1 TABLET BY MOUTH EVERY DAY    metFORMIN (GLUCOPHAGE-XR) 500 MG XR 24hr tablet Take 2 tablets (1,000 mg total) by mouth 2 (two) times daily with meals.         Past failed treatment include: Victoza- failure to control diabetes    Blood glucose testing is performed regularly. Patient is testing 1-2 times per day.  Meter:  One Touch Ultra  Preferred lab: [facility]    Any episodes of hypoglycemia? Denies    Complications related to diabetes: nephropathy and peripheral neuropathy    His blood sugar in the clinic today was:   Lab Results   Component Value Date    POCGLU 155 (A) 01/28/2022       Trey Stevens presents today for follow up visit to discuss diabetes management. At last visit, Victoza was switched to Trulicity 3 mg weekly and Glipizide added BID. He did not bring meter/logs today. He reports the following: Keeps logs of his BGs at home but forgot to bring. Checking most consistently fasting- Reports BGs 130s-145 mg/dL. Checks again in the evening between 6-8pm- Before and sometimes after supper. These BGs are the highest.  We reviewed importance of monitoring TID and his current A1c - even higher than previous despite changing Victoza  to high strength Trulicity and adding Glipizide. Discussed need to start mealtime insulin and he is agreeable.     Hyperlipidemia- on Lipitor.    Psoriatic arthritis- Followed by rheumatology.     Sleep apnea- on CPAP.     Current diet: No specific diet  Activity Level: Walking    Lab Results   Component Value Date    HGBA1C 11.9 (H) 01/20/2022    HGBA1C 10.3 (H) 10/18/2021    HGBA1C 12.5 (H) 06/22/2021     STANDARDS OF CARE  Diabetes Management Status    Statin: Taking  ACE/ARB: Not taking    Screening or Prevention Patient's value Goal Complete/Controlled?   HgA1C Testing and Control   Lab Results   Component Value Date    HGBA1C 11.9 (H) 01/20/2022      Annually/Less than 8% No   Lipid profile : 01/20/2022 Annually Yes   LDL control Lab Results   Component Value Date    LDLCALC 41.6 (L) 01/20/2022    Annually/Less than 100 mg/dl  Yes   Nephropathy screening Lab Results   Component Value Date    LABMICR 541.0 06/22/2021     Lab Results   Component Value Date    PROTEINUA 2+ (A) 08/13/2021     No results found for: UTPCR   Annually Yes   Blood pressure BP Readings from Last 1 Encounters:   01/28/22 122/71    Less than 140/90 Yes   Dilated retinal exam : 02/22/2021 Annually Yes   Foot exam   : 04/05/2021 Annually Yes     Labs reviewed and are noted below.    Lab Results   Component Value Date    WBC 7.63 01/05/2022    HGB 15.4 01/05/2022    HCT 47.9 01/05/2022     01/05/2022    CHOL 115 (L) 01/20/2022    TRIG 152 (H) 01/20/2022    HDL 43 01/20/2022    LDLCALC 41.6 (L) 01/20/2022    ALT 48 (H) 01/05/2022    AST 36 01/05/2022     01/05/2022    K 4.4 01/05/2022     01/05/2022    ANIONGAP 13 01/05/2022    CREATININE 1.4 01/05/2022    ESTGFRAFRICA >60 01/05/2022    EGFRNONAA 53 (A) 01/05/2022    BUN 12 01/05/2022    CO2 23 01/05/2022    TSH 1.845 05/26/2020    PSA 0.19 08/13/2021    INR 1.0 08/14/2021     (H) 01/05/2022     Lab Results   Component Value Date    TSH 1.845 05/26/2020    CALCIUM  9.5 01/05/2022    PHOS 3.0 08/14/2021     No results found for: CPEPTIDE  No results found for: GLUTAMICACID  Glucose   Date Value Ref Range Status   01/05/2022 393 (H) 70 - 110 mg/dL Final     Anion Gap   Date Value Ref Range Status   01/05/2022 13 8 - 16 mmol/L Final     eGFR if    Date Value Ref Range Status   01/05/2022 >60 >60 mL/min/1.73 m^2 Final     eGFR if non    Date Value Ref Range Status   01/05/2022 53 (A) >60 mL/min/1.73 m^2 Final     Comment:     Calculation used to obtain the estimated glomerular filtration  rate (eGFR) is the CKD-EPI equation.          The following portions of the patient's history were reviewed and updated as appropriate: allergies, current medications, past family history, past medical history, past social history, past surgical history and problem list.    Review of patient's allergies indicates:  No Known Allergies  Social History     Socioeconomic History    Marital status: Single   Tobacco Use    Smoking status: Current Every Day Smoker     Packs/day: 0.50     Years: 24.00     Pack years: 12.00     Types: Cigarettes     Start date: 1994    Smokeless tobacco: Never Used    Tobacco comment: 7/30/2018 referral to smoking cessation program   Substance and Sexual Activity    Alcohol use: No     Alcohol/week: 0.0 standard drinks    Drug use: No    Sexual activity: Never     Past Medical History:   Diagnosis Date    Arthritis     Diabetes mellitus     Diabetes mellitus type 2, uncontrolled 7/19/2016    DM (diabetes mellitus) 2015     09/04/2017    Gall stones     Obesity     Pneumonia of right middle lobe due to infectious organism 8/13/2021    Psoriasis (a type of skin inflammation)     Rheumatoid arthritis of foot        REVIEW OF SYSTEMS:  Eyes No history of DR.  Cardiovascular: History of hyperlipidemia.  GI: Tolerating Trulicity well from a GI standpoint.  : History of nephropathy.  SKIN:Positive for psoriasis  Neuro:  "Positive neuropathy.  PSYCH: Current tobacco use.  ENDO: See HPI.        Objective:      Vitals:    01/28/22 1303   BP: 122/71   Pulse: 84     RESPIRATORY: No respiratory distress  NEUROLOGIC: Cranial nerves II-XII grossly intact.   PSYCHIATRIC: Alert & oriented x3. Normal mood and affect.  FOOT EXAMINATION:  UTD  Assessment:       1. Uncontrolled type 2 diabetes mellitus with hyperglycemia, with long-term current use of insulin    2. Hyperlipidemia associated with type 2 diabetes mellitus    3. Hypertension associated with diabetes    4. Severe obesity (BMI 35.0-35.9 with comorbidity)    5. Psoriatic arthritis, destructive type        Plan:   Uncontrolled type 2 diabetes mellitus with hyperglycemia, with long-term current use of insulin-Chronic,Worsening per A1c  -     POCT Glucose, Hand-Held Device  -     insulin aspart U-100 (NOVOLOG FLEXPEN U-100 INSULIN) 100 unit/mL (3 mL) InPn pen; Inject 6 Units into the skin 3 (three) times daily with meals.  Dispense: 15 mL; Refill: 5  -     insulin (LANTUS SOLOSTAR U-100 INSULIN) glargine 100 units/mL (3mL) SubQ pen; Inject 20 Units into the skin once daily.  Dispense: 15 mL; Refill: 5  -     pen needle, diabetic 32 gauge x 5/32" Ndle; Use with Lantus once daily and Humalog 3 times daily.  Dispense: 200 each; Refill: 5  -     blood sugar diagnostic (ONETOUCH ULTRA TEST) Strp; Check blood sugar 3 times daily.  Dispense: 100 each; Refill: 11  -     lancets (ONETOUCH ULTRASOFT LANCETS) Misc; Check blood sugar 3 times daily.  Dispense: 100 each; Refill: 11    -Diabetes control worse despite patient's report of taking Glipizide BID as added at last visit, as well as switch from Victoza 1.8/day to Trulicity 3 mg/week. He unfortunately did not bring blood sugar logs to this visit- advised to bring to every appt. We discussed necessary to initiate mealtime insulin and he is agreeable.    -- Medications adjusted for today's visit include:    Increase Lantus to 20 units once " "daily.    START Novolog 6 units three times a day before each main meal.     STOP Glipizide.    Continue Metformin 2 tablets twice a day, Jardiance once a day, and Trulicity 3 mg weekly.    Hyperlipidemia associated with type 2 diabetes mellitus-Chronic,stable    -Suspect improvement in triglyceride level when diabetes control is better. Continue current.    Hypertension associated with diabetes-Chronic,stable    -Blood pressure is well controlled. Continue current.    Severe obesity (BMI 35.0-35.9 with comorbidity)    -Emphasized importance of low carb, heart healthy diet and exercise as tolerated.     Psoriatic arthritis, destructive type-Chronic    -Continue to follow up with rhematology as advised.    - Follow up: 6 weeks    I spent a total of 30 minutes on the day of the visit.This includes face to face time and non-face to face time preparing to see the patient (eg, review of tests), documenting clinical information in the electronic record, independently interpreting results and communicating results to the patient.    Portions of this note may have been created with voice recognition software. Occasional "wrong-word" or "sound-a-like" substitutions may have occurred due to the inherent limitations of voice recognition software. Please, read the note carefully and recognize, using context, where substitutions have occurred.       "

## 2022-02-07 DIAGNOSIS — L40.0 PLAQUE PSORIASIS: ICD-10-CM

## 2022-02-07 DIAGNOSIS — L40.9 PSORIASIS: ICD-10-CM

## 2022-02-08 ENCOUNTER — SPECIALTY PHARMACY (OUTPATIENT)
Dept: PHARMACY | Facility: CLINIC | Age: 67
End: 2022-02-08
Payer: MEDICAID

## 2022-02-08 RX ORDER — SECUKINUMAB 150 MG/ML
300 INJECTION SUBCUTANEOUS
Qty: 4 ML | Refills: 3 | Status: SHIPPED | OUTPATIENT
Start: 2022-02-08 | End: 2022-06-03 | Stop reason: SDUPTHER

## 2022-02-08 NOTE — TELEPHONE ENCOUNTER
New Cosentyx refills received. Prior auth on file until further notice. Medicaid insurance. Copay $0 at 004. BI complete. Proceed with refill.

## 2022-02-10 ENCOUNTER — SPECIALTY PHARMACY (OUTPATIENT)
Dept: PHARMACY | Facility: CLINIC | Age: 67
End: 2022-02-10
Payer: MEDICAID

## 2022-02-10 NOTE — TELEPHONE ENCOUNTER
Specialty Pharmacy - Refill Coordination    Specialty Medication Orders Linked to Encounter    Flowsheet Row Most Recent Value   Medication #1 secukinumab (COSENTYX PEN) 150 mg/mL PnIj (Order#[id number], Rx#[id number])          Refill Questions - Documented Responses    Flowsheet Row Most Recent Value   Patient Availability and HIPAA Verification    Does patient want to proceed with activity? Yes   HIPAA/medical authority confirmed? Yes   Relationship to patient of person spoken to? Self   Refill Screening Questions    Changes to allergies? No   Changes to medications? No   New conditions since last clinic visit? No   Unplanned office visit, urgent care, ED, or hospital admission in the last 4 weeks? No   How does patient/caregiver feel medication is working? Excellent   Financial problems or insurance changes? No   How many doses of your specialty medications were missed in the last 4 weeks? 0   Would patient like to speak to a pharmacist? No   When does the patient need to receive the medication? 02/18/22   Refill Delivery Questions    How will the patient receive the medication? Delivery [facility]   When does the patient need to receive the medication? 02/18/22   Shipping Address Home   Address in [facility] confirmed and updated if neccessary? Yes   Expected Copay ($) 0   Is the patient able to afford the medication copay? Yes   Payment Method zero copay   Days supply of Refill 28   Supplies needed? No supplies needed   Refill activity completed? Yes   Refill activity plan Refill scheduled   Shipment/Pickup Date: 02/16/22          Current Outpatient Medications   Medication Sig    amLODIPine (NORVASC) 5 MG tablet TAKE 1 TABLET BY MOUTH EVERY DAY    aspirin 81 MG Chew Take 1 tablet (81 mg total) by mouth once daily.    atorvastatin (LIPITOR) 80 MG tablet TAKE 1 TABLET BY MOUTH EVERY DAY    blood sugar diagnostic (ONETOUCH ULTRA TEST) Strp Check blood sugar 3 times daily.    calcipotriene (DOVONOX) 0.005 %  "cream APPLY TO AFFECTED AREA TWICE A DAY    clobetasol (OLUX) 0.05 % Foam AAA of scalp and behind ears twice daily.  Do not use on face, underarms or groin.    dulaglutide (TRULICITY) 3 mg/0.5 mL pen injector Inject 3 mg into the skin once a week.    ergocalciferol (ERGOCALCIFEROL) 50,000 unit Cap Take 50,000 Units by mouth twice a week.    folic acid (FOLVITE) 1 MG tablet Take 1 tablet (1,000 mcg total) by mouth once daily.    insulin (LANTUS SOLOSTAR U-100 INSULIN) glargine 100 units/mL (3mL) SubQ pen Inject 20 Units into the skin once daily.    insulin aspart U-100 (NOVOLOG FLEXPEN U-100 INSULIN) 100 unit/mL (3 mL) InPn pen Inject 6 Units into the skin 3 (three) times daily with meals.    JARDIANCE 25 mg tablet TAKE 1 TABLET BY MOUTH EVERY DAY    ketoconazole (NIZORAL) 2 % shampoo Wash hair with medicated shampoo at least 2x/week - let sit on scalp at least 5 minutes prior to rinsing    lancets (ONETOUCH ULTRASOFT LANCETS) Misc Check blood sugar 3 times daily.    metFORMIN (GLUCOPHAGE-XR) 500 MG XR 24hr tablet Take 2 tablets (1,000 mg total) by mouth 2 (two) times daily with meals.    methotrexate 2.5 MG Tab TAKE 8 TABLETS (20 MG TOTAL) BY MOUTH EVERY 7 DAYS.    pen needle, diabetic 32 gauge x 5/32" Ndle Use with Lantus once daily and Humalog 3 times daily.    PROAIR HFA 90 mcg/actuation inhaler Inhale 2 puffs into the lungs every 4 (four) hours as needed for Wheezing. Rescue    secukinumab (COSENTYX PEN) 150 mg/mL PnIj Inject 2 pens (300 mg) into the skin every 14 (fourteen) days.    spironolactone (ALDACTONE) 25 MG tablet Take 1 tablet (25 mg total) by mouth once daily.    traMADoL (ULTRAM) 50 mg tablet Take 1 tablet (50 mg total) by mouth 3 (three) times daily as needed for Pain (use for mod pain; use sparingly).   Last reviewed on 1/28/2022  1:10 PM by Bee Stinson NP    Review of patient's allergies indicates:  No Known Allergies Last reviewed on  1/28/2022 1:10 PM by Bee" Shantell      Tasks added this encounter   3/11/2022 - Refill Call (Auto Added)   Tasks due within next 3 months   No tasks due.     Jordi Zimmer, PharmD  [facility]  [address]  [facility] [address]  Phone: [phone]  Fax: [phone]

## 2022-02-15 DIAGNOSIS — R07.89 LEFT-SIDED CHEST WALL PAIN: ICD-10-CM

## 2022-02-15 RX ORDER — TRAMADOL HYDROCHLORIDE 50 MG/1
50 TABLET ORAL 3 TIMES DAILY PRN
Qty: 45 TABLET | Refills: 1 | Status: SHIPPED | OUTPATIENT
Start: 2022-02-15 | End: 2022-09-12

## 2022-02-15 NOTE — TELEPHONE ENCOUNTER
No new care gaps identified.  Powered by [employer]. Reference number: [id number].   2/15/2022 2:48:03 PM CST

## 2022-02-16 ENCOUNTER — PATIENT OUTREACH (OUTPATIENT)
Dept: ADMINISTRATIVE | Facility: OTHER | Age: 67
End: 2022-02-16
Payer: MEDICAID

## 2022-02-17 ENCOUNTER — OFFICE VISIT (OUTPATIENT)
Dept: PODIATRY | Facility: CLINIC | Age: 67
End: 2022-02-17
Payer: MEDICAID

## 2022-02-17 VITALS
HEART RATE: 88 BPM | DIASTOLIC BLOOD PRESSURE: 74 MMHG | SYSTOLIC BLOOD PRESSURE: 112 MMHG | BODY MASS INDEX: 39.66 KG/M2 | HEIGHT: 68 IN | WEIGHT: 261.69 LBS

## 2022-02-17 DIAGNOSIS — R23.4 SKIN FISSURES: ICD-10-CM

## 2022-02-17 DIAGNOSIS — E66.01 SEVERE OBESITY (BMI 35.0-35.9 WITH COMORBIDITY): ICD-10-CM

## 2022-02-17 DIAGNOSIS — E11.49 TYPE II DIABETES MELLITUS WITH NEUROLOGICAL MANIFESTATIONS: Primary | ICD-10-CM

## 2022-02-17 DIAGNOSIS — L84 CORN OR CALLUS: ICD-10-CM

## 2022-02-17 DIAGNOSIS — B35.1 DERMATOPHYTOSIS OF NAIL: ICD-10-CM

## 2022-02-17 PROCEDURE — 11056 PARNG/CUTG B9 HYPRKR LES 2-4: CPT | Mod: S$PBB,,, | Performed by: PODIATRIST

## 2022-02-17 PROCEDURE — 3072F PR LOW RISK FOR RETINOPATHY: ICD-10-PCS | Mod: CPTII,,, | Performed by: PODIATRIST

## 2022-02-17 PROCEDURE — 1160F RVW MEDS BY RX/DR IN RCRD: CPT | Mod: CPTII,,, | Performed by: PODIATRIST

## 2022-02-17 PROCEDURE — 1159F MED LIST DOCD IN RCRD: CPT | Mod: CPTII,,, | Performed by: PODIATRIST

## 2022-02-17 PROCEDURE — 11721 DEBRIDE NAIL 6 OR MORE: CPT | Mod: 59,S$PBB,, | Performed by: PODIATRIST

## 2022-02-17 PROCEDURE — 11721 PR DEBRIDEMENT OF NAILS, 6 OR MORE: ICD-10-PCS | Mod: 59,S$PBB,, | Performed by: PODIATRIST

## 2022-02-17 PROCEDURE — 3074F PR MOST RECENT SYSTOLIC BLOOD PRESSURE < 130 MM HG: ICD-10-PCS | Mod: CPTII,,, | Performed by: PODIATRIST

## 2022-02-17 PROCEDURE — 99999 PR PBB SHADOW E&M-EST. PATIENT-LVL IV: CPT | Mod: PBBFAC,,, | Performed by: PODIATRIST

## 2022-02-17 PROCEDURE — 3078F PR MOST RECENT DIASTOLIC BLOOD PRESSURE < 80 MM HG: ICD-10-PCS | Mod: CPTII,,, | Performed by: PODIATRIST

## 2022-02-17 PROCEDURE — 3008F BODY MASS INDEX DOCD: CPT | Mod: CPTII,,, | Performed by: PODIATRIST

## 2022-02-17 PROCEDURE — 3078F DIAST BP <80 MM HG: CPT | Mod: CPTII,,, | Performed by: PODIATRIST

## 2022-02-17 PROCEDURE — 11056 PR TRIM BENIGN HYPERKERATOTIC SKIN LESION,2-4: ICD-10-PCS | Mod: S$PBB,,, | Performed by: PODIATRIST

## 2022-02-17 PROCEDURE — 99999 PR PBB SHADOW E&M-EST. PATIENT-LVL IV: ICD-10-PCS | Mod: PBBFAC,,, | Performed by: PODIATRIST

## 2022-02-17 PROCEDURE — 1160F PR REVIEW ALL MEDS BY PRESCRIBER/CLIN PHARMACIST DOCUMENTED: ICD-10-PCS | Mod: CPTII,,, | Performed by: PODIATRIST

## 2022-02-17 PROCEDURE — 3008F PR BODY MASS INDEX (BMI) DOCUMENTED: ICD-10-PCS | Mod: CPTII,,, | Performed by: PODIATRIST

## 2022-02-17 PROCEDURE — 3046F HEMOGLOBIN A1C LEVEL >9.0%: CPT | Mod: CPTII,,, | Performed by: PODIATRIST

## 2022-02-17 PROCEDURE — 99213 OFFICE O/P EST LOW 20 MIN: CPT | Mod: 25,S$PBB,, | Performed by: PODIATRIST

## 2022-02-17 PROCEDURE — 99214 OFFICE O/P EST MOD 30 MIN: CPT | Mod: PBBFAC | Performed by: PODIATRIST

## 2022-02-17 PROCEDURE — 99213 PR OFFICE/OUTPT VISIT, EST, LEVL III, 20-29 MIN: ICD-10-PCS | Mod: 25,S$PBB,, | Performed by: PODIATRIST

## 2022-02-17 PROCEDURE — 3072F LOW RISK FOR RETINOPATHY: CPT | Mod: CPTII,,, | Performed by: PODIATRIST

## 2022-02-17 PROCEDURE — 11056 PARNG/CUTG B9 HYPRKR LES 2-4: CPT | Mod: Q9,PBBFAC | Performed by: PODIATRIST

## 2022-02-17 PROCEDURE — 11721 DEBRIDE NAIL 6 OR MORE: CPT | Mod: Q9,PBBFAC | Performed by: PODIATRIST

## 2022-02-17 PROCEDURE — 3074F SYST BP LT 130 MM HG: CPT | Mod: CPTII,,, | Performed by: PODIATRIST

## 2022-02-17 PROCEDURE — 3046F PR MOST RECENT HEMOGLOBIN A1C LEVEL > 9.0%: ICD-10-PCS | Mod: CPTII,,, | Performed by: PODIATRIST

## 2022-02-17 PROCEDURE — 1159F PR MEDICATION LIST DOCUMENTED IN MEDICAL RECORD: ICD-10-PCS | Mod: CPTII,,, | Performed by: PODIATRIST

## 2022-02-17 RX ORDER — GLIPIZIDE 5 MG/1
5 TABLET ORAL 2 TIMES DAILY
COMMUNITY
Start: 2022-02-14 | End: 2022-08-16 | Stop reason: SDUPTHER

## 2022-02-28 NOTE — PROGRESS NOTES
Subjective:     Patient ID: Trey Stevens is a 66 y.o. male.    Chief Complaint: Nail Care (3mo nail care, diabetic pt wears tennis shoes, PCP Dr. Kaba last seen 1-18-22)    Trey is a 66 y.o. male who presents to the clinic for evaluation and treatment of high risk feet. Trey has a past medical history of Arthritis, Diabetes mellitus, Diabetes mellitus type 2, uncontrolled (7/19/2016), DM (diabetes mellitus) (2015), Gall stones, Obesity, Pneumonia of right middle lobe due to infectious organism (8/13/2021), Psoriasis (a type of skin inflammation), and Rheumatoid arthritis of foot. The patient's chief complaint is thick calluses and nails. This patient has documented high risk feet requiring routine maintenance secondary to diabetes mellitis and those secondary complications of diabetes, as mentioned.     PCP: Brittanie Kaba MD     Date Last Seen by PCP: 01/08/2022    Current shoe gear:  Affected Foot: Extra depth shoes     Unaffected Foot: Extra depth shoes    Hemoglobin A1C   Date Value Ref Range Status   01/20/2022 11.9 (H) 4.0 - 5.6 % Final     Comment:     ADA Screening Guidelines:  5.7-6.4%  Consistent with prediabetes  >or=6.5%  Consistent with diabetes    High levels of fetal hemoglobin interfere with the HbA1C  assay. Heterozygous hemoglobin variants (HbS, HgC, etc)do  not significantly interfere with this assay.   However, presence of multiple variants may affect accuracy.     10/18/2021 10.3 (H) 4.0 - 5.6 % Final     Comment:     ADA Screening Guidelines:  5.7-6.4%  Consistent with prediabetes  >or=6.5%  Consistent with diabetes    High levels of fetal hemoglobin interfere with the HbA1C  assay. Heterozygous hemoglobin variants (HbS, HgC, etc)do  not significantly interfere with this assay.   However, presence of multiple variants may affect accuracy.     06/22/2021 12.5 (H) 4.0 - 5.6 % Final     Comment:     ADA Screening Guidelines:  5.7-6.4%  Consistent with prediabetes  >or=6.5%  Consistent  with diabetes    High levels of fetal hemoglobin interfere with the HbA1C  assay. Heterozygous hemoglobin variants (HbS, HgC, etc)do  not significantly interfere with this assay.   However, presence of multiple variants may affect accuracy.             Patient Active Problem List   Diagnosis    Psoriatic arthritis, destructive type    Vitamin D deficiency    Multiple lung nodules on CT    Immunosuppressed status    Long-term use of immunosuppressant medication    Mixed type COPD (chronic obstructive pulmonary disease)    Type 2 diabetes mellitus with nephropathy    Hypertension associated with diabetes    Vitiligo    SCHUYLER (obstructive sleep apnea)    Class 3 severe obesity due to excess calories with serious comorbidity and body mass index (BMI) of 40.0 to 44.9 in adult    Hyperlipidemia associated with type 2 diabetes mellitus    Elevated liver enzymes    Tobacco dependence    Dependence on nocturnal oxygen therapy    Urinary retention       Medication List with Changes/Refills   Current Medications    AMLODIPINE (NORVASC) 5 MG TABLET    TAKE 1 TABLET BY MOUTH EVERY DAY    ASPIRIN 81 MG CHEW    Take 1 tablet (81 mg total) by mouth once daily.    ATORVASTATIN (LIPITOR) 80 MG TABLET    TAKE 1 TABLET BY MOUTH EVERY DAY    BLOOD SUGAR DIAGNOSTIC (ONETOUCH ULTRA TEST) STRP    Check blood sugar 3 times daily.    CALCIPOTRIENE (DOVONOX) 0.005 % CREAM    APPLY TO AFFECTED AREA TWICE A DAY    CLOBETASOL (OLUX) 0.05 % FOAM    AAA of scalp and behind ears twice daily.  Do not use on face, underarms or groin.    DULAGLUTIDE (TRULICITY) 3 MG/0.5 ML PEN INJECTOR    Inject 3 mg into the skin once a week.    ERGOCALCIFEROL (ERGOCALCIFEROL) 50,000 UNIT CAP    Take 50,000 Units by mouth twice a week.    FOLIC ACID (FOLVITE) 1 MG TABLET    Take 1 tablet (1,000 mcg total) by mouth once daily.    GLIPIZIDE (GLUCOTROL) 5 MG TABLET    Take 5 mg by mouth 2 (two) times daily.    INSULIN (LANTUS SOLOSTAR U-100 INSULIN)  "GLARGINE 100 UNITS/ML (3ML) SUBQ PEN    Inject 20 Units into the skin once daily.    INSULIN ASPART U-100 (NOVOLOG FLEXPEN U-100 INSULIN) 100 UNIT/ML (3 ML) INPN PEN    Inject 6 Units into the skin 3 (three) times daily with meals.    JARDIANCE 25 MG TABLET    TAKE 1 TABLET BY MOUTH EVERY DAY    KETOCONAZOLE (NIZORAL) 2 % SHAMPOO    Wash hair with medicated shampoo at least 2x/week - let sit on scalp at least 5 minutes prior to rinsing    LANCETS (ONETOUCH ULTRASOFT LANCETS) MISC    Check blood sugar 3 times daily.    METFORMIN (GLUCOPHAGE-XR) 500 MG XR 24HR TABLET    Take 2 tablets (1,000 mg total) by mouth 2 (two) times daily with meals.    METHOTREXATE 2.5 MG TAB    TAKE 8 TABLETS (20 MG TOTAL) BY MOUTH EVERY 7 DAYS.    PEN NEEDLE, DIABETIC 32 GAUGE X 5/32" NDLE    Use with Lantus once daily and Humalog 3 times daily.    PROAIR HFA 90 MCG/ACTUATION INHALER    Inhale 2 puffs into the lungs every 4 (four) hours as needed for Wheezing. Rescue    SECUKINUMAB (COSENTYX PEN) 150 MG/ML PNIJ    Inject 2 pens (300 mg) into the skin every 14 (fourteen) days.    SPIRONOLACTONE (ALDACTONE) 25 MG TABLET    Take 1 tablet (25 mg total) by mouth once daily.    TRAMADOL (ULTRAM) 50 MG TABLET    Take 1 tablet (50 mg total) by mouth 3 (three) times daily as needed for Pain (use for mod pain; use sparingly).       Review of patient's allergies indicates:  No Known Allergies    Past Surgical History:   Procedure Laterality Date    APPENDECTOMY      CHOLECYSTECTOMY         Family History   Problem Relation Age of Onset    Cancer Mother     Hypertension Mother     Diabetes Mother     Cataracts Mother     Stroke Father     Psoriasis Neg Hx        Social History     Socioeconomic History    Marital status: Single   Tobacco Use    Smoking status: Current Every Day Smoker     Packs/day: 0.50     Years: 24.00     Pack years: 12.00     Types: Cigarettes     Start date: 1994    Smokeless tobacco: Never Used    Tobacco comment: " "7/30/2018 referral to smoking cessation program   Substance and Sexual Activity    Alcohol use: No     Alcohol/week: 0.0 standard drinks    Drug use: No    Sexual activity: Never       Vitals:    02/17/22 1043   BP: 112/74   Pulse: 88   Weight: 118.7 kg (261 lb 11 oz)   Height: 5' 8" (1.727 m)       Hemoglobin A1C   Date Value Ref Range Status   01/20/2022 11.9 (H) 4.0 - 5.6 % Final     Comment:     ADA Screening Guidelines:  5.7-6.4%  Consistent with prediabetes  >or=6.5%  Consistent with diabetes    High levels of fetal hemoglobin interfere with the HbA1C  assay. Heterozygous hemoglobin variants (HbS, HgC, etc)do  not significantly interfere with this assay.   However, presence of multiple variants may affect accuracy.     10/18/2021 10.3 (H) 4.0 - 5.6 % Final     Comment:     ADA Screening Guidelines:  5.7-6.4%  Consistent with prediabetes  >or=6.5%  Consistent with diabetes    High levels of fetal hemoglobin interfere with the HbA1C  assay. Heterozygous hemoglobin variants (HbS, HgC, etc)do  not significantly interfere with this assay.   However, presence of multiple variants may affect accuracy.     06/22/2021 12.5 (H) 4.0 - 5.6 % Final     Comment:     ADA Screening Guidelines:  5.7-6.4%  Consistent with prediabetes  >or=6.5%  Consistent with diabetes    High levels of fetal hemoglobin interfere with the HbA1C  assay. Heterozygous hemoglobin variants (HbS, HgC, etc)do  not significantly interfere with this assay.   However, presence of multiple variants may affect accuracy.         Review of Systems   Constitutional: Negative for chills and fever.   Respiratory: Negative for shortness of breath.    Cardiovascular: Positive for leg swelling. Negative for chest pain, palpitations, orthopnea and claudication.   Gastrointestinal: Negative for diarrhea, nausea and vomiting.   Musculoskeletal: Negative for joint pain.   Skin: Negative for rash.   Neurological: Positive for tingling and sensory change. Negative " for dizziness, focal weakness and weakness.   Psychiatric/Behavioral: Negative.          Objective:      PHYSICAL EXAM: Apperance: Alert and orient in no distress,well developed, and with good attention to grooming and body habits  Patient presents ambulating in extra depth shoes.   LOWER EXTREMITY EXAM:  VASCULAR: Dorsalis pedis pulses 0/4 bilateral and Posterior Tibial pulses 1/4 bilateral. Capillary fill time <4 seconds bilateral. Mild edema observed bilateral. Varicosities present bilateral. Skin temperature of the lower extremities is warm to warm, proximal to distal. Hair growth absent bilateral.  DERMATOLOGICAL: No skin rashes, subcutaneous nodules, lesions, or ulcers observed bilateral. Nails 1,2,3,4,5 bilateral elongated, thickened, and discolored with subungual debris. Webspaces 1,2,3,4 clean, dry and without evidence of break in skin integrity bilateral. Moderate dry and hyperkeratotic tissue noted to bilateral heels and medial 1st MPJ. Skin fissures noted to bilateral heels. No erythema, drainage, or increased temp noted to area.   NEUROLOGICAL: Light touch, sharp-dull, proprioception all present and equal bilaterally.  Vibratory sensation absent at bilateral hallux. Protective sensation absent at 6/10 sites as tested with a [place] 5.07 monofilament.   MUSCULOSKELETAL: Muscle strength is 5/5 for foot inverters, everters, plantarflexors, and dorsiflexors. Muscle tone is normal.         Assessment:       Encounter Diagnoses   Name Primary?    Type II diabetes mellitus with neurological manifestations Yes    Dermatophytosis of nail     Skin fissures     Corn or callus     Severe obesity (BMI 35.0-35.9 with comorbidity)          Plan:   Type II diabetes mellitus with neurological manifestations    Dermatophytosis of nail    Skin fissures    Corn or callus    Severe obesity (BMI 35.0-35.9 with comorbidity)      I counseled the patient on his conditions, their implications and medical  management.  Greater than 50% of this visit spent on counseling and coordination of care.  Greater than 15 minutes of a 20 minute appointment spent on education about the diabetic foot, neuropathy, and prevention of limb loss.  Shoe inspection. Diabetic Foot Education. Patient reminded of the importance of good nutrition and blood sugar control to help prevent podiatric complications of diabetes. Patient instructed on proper foot hygeine. We discussed wearing proper shoe gear, daily foot inspections, never walking without protective shoe gear, never putting sharp instruments to feet.    With patient's permission, nails 1-5 bilateral were debrided/trimmed in length and thickness aggressively to their soft tissue attachment mechanically and with electric , removing all offending nail and debris. Patient relates relief following the procedure.  With patient's permission bilateral skin fissure and callus were trimmed in thickness with #15 blade without incident.   Patient  will continue to monitor the areas daily, inspect feet, wear protective shoe gear when ambulatory, moisturizer to maintain skin integrity. Patient reminded of the importance of good nutrition and blood sugar control to help prevent podiatric complications of diabetes.  Patient to return in this office in approximately 3-4 months, or sooner if needed.                     Joy Hadley DPM  [facility]

## 2022-03-11 ENCOUNTER — OFFICE VISIT (OUTPATIENT)
Dept: DIABETES | Facility: CLINIC | Age: 67
End: 2022-03-11
Payer: MEDICARE

## 2022-03-11 ENCOUNTER — SPECIALTY PHARMACY (OUTPATIENT)
Dept: PHARMACY | Facility: CLINIC | Age: 67
End: 2022-03-11
Payer: MEDICARE

## 2022-03-11 VITALS
WEIGHT: 259.69 LBS | HEART RATE: 88 BPM | HEIGHT: 68 IN | SYSTOLIC BLOOD PRESSURE: 114 MMHG | BODY MASS INDEX: 39.36 KG/M2 | DIASTOLIC BLOOD PRESSURE: 64 MMHG

## 2022-03-11 DIAGNOSIS — E11.65 UNCONTROLLED TYPE 2 DIABETES MELLITUS WITH HYPERGLYCEMIA, WITH LONG-TERM CURRENT USE OF INSULIN: Primary | ICD-10-CM

## 2022-03-11 DIAGNOSIS — Z79.4 UNCONTROLLED TYPE 2 DIABETES MELLITUS WITH HYPERGLYCEMIA, WITH LONG-TERM CURRENT USE OF INSULIN: Primary | ICD-10-CM

## 2022-03-11 DIAGNOSIS — E11.59 HYPERTENSION ASSOCIATED WITH DIABETES: ICD-10-CM

## 2022-03-11 DIAGNOSIS — E11.69 HYPERLIPIDEMIA ASSOCIATED WITH TYPE 2 DIABETES MELLITUS: ICD-10-CM

## 2022-03-11 DIAGNOSIS — L40.52 PSORIATIC ARTHRITIS, DESTRUCTIVE TYPE: ICD-10-CM

## 2022-03-11 DIAGNOSIS — E78.5 HYPERLIPIDEMIA ASSOCIATED WITH TYPE 2 DIABETES MELLITUS: ICD-10-CM

## 2022-03-11 DIAGNOSIS — I15.2 HYPERTENSION ASSOCIATED WITH DIABETES: ICD-10-CM

## 2022-03-11 LAB — GLUCOSE SERPL-MCNC: 275 MG/DL (ref 70–110)

## 2022-03-11 PROCEDURE — 1159F MED LIST DOCD IN RCRD: CPT | Mod: CPTII,S$GLB,, | Performed by: NURSE PRACTITIONER

## 2022-03-11 PROCEDURE — 1160F PR REVIEW ALL MEDS BY PRESCRIBER/CLIN PHARMACIST DOCUMENTED: ICD-10-PCS | Mod: CPTII,S$GLB,, | Performed by: NURSE PRACTITIONER

## 2022-03-11 PROCEDURE — 3074F PR MOST RECENT SYSTOLIC BLOOD PRESSURE < 130 MM HG: ICD-10-PCS | Mod: CPTII,S$GLB,, | Performed by: NURSE PRACTITIONER

## 2022-03-11 PROCEDURE — 99213 OFFICE O/P EST LOW 20 MIN: CPT | Mod: S$GLB,,, | Performed by: NURSE PRACTITIONER

## 2022-03-11 PROCEDURE — 1159F PR MEDICATION LIST DOCUMENTED IN MEDICAL RECORD: ICD-10-PCS | Mod: CPTII,S$GLB,, | Performed by: NURSE PRACTITIONER

## 2022-03-11 PROCEDURE — 3074F SYST BP LT 130 MM HG: CPT | Mod: CPTII,S$GLB,, | Performed by: NURSE PRACTITIONER

## 2022-03-11 PROCEDURE — 3078F PR MOST RECENT DIASTOLIC BLOOD PRESSURE < 80 MM HG: ICD-10-PCS | Mod: CPTII,S$GLB,, | Performed by: NURSE PRACTITIONER

## 2022-03-11 PROCEDURE — 82962 GLUCOSE BLOOD TEST: CPT | Mod: PBBFAC,PO | Performed by: NURSE PRACTITIONER

## 2022-03-11 PROCEDURE — 3072F PR LOW RISK FOR RETINOPATHY: ICD-10-PCS | Mod: CPTII,S$GLB,, | Performed by: NURSE PRACTITIONER

## 2022-03-11 PROCEDURE — 3008F BODY MASS INDEX DOCD: CPT | Mod: CPTII,S$GLB,, | Performed by: NURSE PRACTITIONER

## 2022-03-11 PROCEDURE — 3008F PR BODY MASS INDEX (BMI) DOCUMENTED: ICD-10-PCS | Mod: CPTII,S$GLB,, | Performed by: NURSE PRACTITIONER

## 2022-03-11 PROCEDURE — 3046F PR MOST RECENT HEMOGLOBIN A1C LEVEL > 9.0%: ICD-10-PCS | Mod: CPTII,S$GLB,, | Performed by: NURSE PRACTITIONER

## 2022-03-11 PROCEDURE — 1160F RVW MEDS BY RX/DR IN RCRD: CPT | Mod: CPTII,S$GLB,, | Performed by: NURSE PRACTITIONER

## 2022-03-11 PROCEDURE — 99999 PR PBB SHADOW E&M-EST. PATIENT-LVL V: ICD-10-PCS | Mod: PBBFAC,,, | Performed by: NURSE PRACTITIONER

## 2022-03-11 PROCEDURE — 3078F DIAST BP <80 MM HG: CPT | Mod: CPTII,S$GLB,, | Performed by: NURSE PRACTITIONER

## 2022-03-11 PROCEDURE — 99215 OFFICE O/P EST HI 40 MIN: CPT | Mod: PBBFAC,PO | Performed by: NURSE PRACTITIONER

## 2022-03-11 PROCEDURE — 99213 PR OFFICE/OUTPT VISIT, EST, LEVL III, 20-29 MIN: ICD-10-PCS | Mod: S$GLB,,, | Performed by: NURSE PRACTITIONER

## 2022-03-11 PROCEDURE — 99999 PR PBB SHADOW E&M-EST. PATIENT-LVL V: CPT | Mod: PBBFAC,,, | Performed by: NURSE PRACTITIONER

## 2022-03-11 PROCEDURE — 3046F HEMOGLOBIN A1C LEVEL >9.0%: CPT | Mod: CPTII,S$GLB,, | Performed by: NURSE PRACTITIONER

## 2022-03-11 PROCEDURE — 3072F LOW RISK FOR RETINOPATHY: CPT | Mod: CPTII,S$GLB,, | Performed by: NURSE PRACTITIONER

## 2022-03-11 NOTE — PATIENT INSTRUCTIONS
-- Medications adjusted for today's visit include:    Continue Lantus to 20 units once daily.    Continue Novolog 6 units three times a day before each main meal.     Continue Metformin 2 tablets twice a day, Jardiance once a day, and Trulicity 3 mg weekly.    -- Start checking blood sugar 3 times daily: Fasting blood sugar and vary your next 2 readings: Before lunch, before supper, 2 hours after any meal, or bedtime.   -Blood sugar goals should be a fasting blood sugar below 130, and no blood sugars throughout the day over 180 is good.    --Carry glucose tablets/soft peppermints/regular juice or Coke product with you at all times to treat a low blood sugar episode (less than 70). If your blood sugar is between 50-70, Chew 4 tablets or drink 1/2 cup of juice or regular Coke product. If your blood sugar is below 50, double the treatment. Re-check blood sugar in 15 minutes. If still low, repeat this. Always call the clinic to give an update for any low blood sugar episodes.    --Follow-up for your next visit in 6 weeks with logs.    --Please either drop off, fax, or MyChart your readings to me in a couple of weeks if you can.

## 2022-03-11 NOTE — PROGRESS NOTES
Subjective:         Patient ID: Trey Stevens is a 66 y.o. male.  Patient's current PCP is Brittanie Kaba MD.     Chief Complaint: Diabetes Mellitus    HPI  Trey Stevens is a 66 y.o. White male presenting for a follow up for diabetes. Patient has been diagnosed with type 2 diabetes since 2016 .    CURRENT DM MEDICATIONS:   Diabetes Medications             dulaglutide (TRULICITY) 3 mg/0.5 mL pen injector Inject 3 mg into the skin once a week.    glipiZIDE (GLUCOTROL) 5 MG tablet Take 5 mg by mouth 2 (two) times daily.    insulin (LANTUS SOLOSTAR U-100 INSULIN) glargine 100 units/mL (3mL) SubQ pen Inject 20 Units into the skin once daily.    insulin aspart U-100 (NOVOLOG FLEXPEN U-100 INSULIN) 100 unit/mL (3 mL) InPn pen Inject 6 Units into the skin 3 (three) times daily with meals.    JARDIANCE 25 mg tablet TAKE 1 TABLET BY MOUTH EVERY DAY    metFORMIN (GLUCOPHAGE-XR) 500 MG XR 24hr tablet Take 2 tablets (1,000 mg total) by mouth 2 (two) times daily with meals.        Past failed treatment include: Victoza- failure to control diabetes    Blood glucose testing is performed regularly. Patient is testing 1-2 times per day.  Meter:  One Touch Ultra  Preferred lab: [facility]    Any episodes of hypoglycemia? Denies    Complications related to diabetes: nephropathy and peripheral neuropathy    His blood sugar in the clinic today was:   Lab Results   Component Value Date    POCGLU 275 (A) 03/11/2022     Trey Stevens presents today for follow up visit to discuss diabetes management. At last visit, Novolog was started. Admits missing injections periodically. States BGs in the AM have been very high- > 200 due to not taking Lantus for a few days. Noted blood sugar of 275 today- he had a bowl of beans before he came. He did not take his Novolog before he ate. Long discussion with patient that he must adjust his regimen to get in the habit of taking his insulins consistently, and to take Novolog before each main  meal. He verbalized understanding -- He was busy out in the yard and forgot. He has had some BGs in the mid-100 range in the middle of the day when taking insulin and reports BGs in the morning in the low-mid 100s when he does remember to take Lantus.    He has lab orders in place to complete in April that are scheduled. He will have a follow up after completing these labs.    Tolerating Trulicity well from a GI standpoint.   Eye doctor appt set up for next week - 3/16/2022.    Hyperlipidemia- on Lipitor.    Psoriatic arthritis- Followed by rheumatology.     Sleep apnea- on CPAP.     Current diet: No specific diet  Activity Level: Walking    Lab Results   Component Value Date    HGBA1C 11.9 (H) 01/20/2022    HGBA1C 10.3 (H) 10/18/2021    HGBA1C 12.5 (H) 06/22/2021     STANDARDS OF CARE  Diabetes Management Status    Statin: Taking  ACE/ARB: Not taking    Screening or Prevention Patient's value Goal Complete/Controlled?   HgA1C Testing and Control   Lab Results   Component Value Date    HGBA1C 11.9 (H) 01/20/2022      Annually/Less than 8% No   Lipid profile : 01/20/2022 Annually Yes   LDL control Lab Results   Component Value Date    LDLCALC 41.6 (L) 01/20/2022    Annually/Less than 100 mg/dl  Yes   Nephropathy screening Lab Results   Component Value Date    LABMICR 541.0 06/22/2021     Lab Results   Component Value Date    PROTEINUA 2+ (A) 08/13/2021     No results found for: UTPCR   Annually Yes   Blood pressure BP Readings from Last 1 Encounters:   03/11/22 114/64    Less than 140/90 Yes   Dilated retinal exam : 02/22/2021 Annually No   Foot exam   : 02/17/2022 Annually Yes     Labs reviewed and are noted below.    Lab Results   Component Value Date    WBC 7.63 01/05/2022    HGB 15.4 01/05/2022    HCT 47.9 01/05/2022     01/05/2022    CHOL 115 (L) 01/20/2022    TRIG 152 (H) 01/20/2022    HDL 43 01/20/2022    LDLCALC 41.6 (L) 01/20/2022    ALT 48 (H) 01/05/2022    AST 36 01/05/2022     01/05/2022    K  4.4 01/05/2022     01/05/2022    ANIONGAP 13 01/05/2022    CREATININE 1.4 01/05/2022    ESTGFRAFRICA >60 01/05/2022    EGFRNONAA 53 (A) 01/05/2022    BUN 12 01/05/2022    CO2 23 01/05/2022    TSH 1.845 05/26/2020    PSA 0.19 08/13/2021    INR 1.0 08/14/2021     (H) 01/05/2022     Lab Results   Component Value Date    TSH 1.845 05/26/2020    CALCIUM 9.5 01/05/2022    PHOS 3.0 08/14/2021     No results found for: CPEPTIDE  No results found for: GLUTAMICACID  Glucose   Date Value Ref Range Status   01/05/2022 393 (H) 70 - 110 mg/dL Final     Anion Gap   Date Value Ref Range Status   01/05/2022 13 8 - 16 mmol/L Final     eGFR if    Date Value Ref Range Status   01/05/2022 >60 >60 mL/min/1.73 m^2 Final     eGFR if non    Date Value Ref Range Status   01/05/2022 53 (A) >60 mL/min/1.73 m^2 Final     Comment:     Calculation used to obtain the estimated glomerular filtration  rate (eGFR) is the CKD-EPI equation.          The following portions of the patient's history were reviewed and updated as appropriate: allergies, current medications, past family history, past medical history, past social history, past surgical history and problem list.    Review of patient's allergies indicates:  No Known Allergies  Social History     Socioeconomic History    Marital status: Single   Tobacco Use    Smoking status: Current Every Day Smoker     Packs/day: 0.50     Years: 24.00     Pack years: 12.00     Types: Cigarettes     Start date: 1994    Smokeless tobacco: Never Used    Tobacco comment: 7/30/2018 referral to smoking cessation program   Substance and Sexual Activity    Alcohol use: No     Alcohol/week: 0.0 standard drinks    Drug use: No    Sexual activity: Never     Past Medical History:   Diagnosis Date    Arthritis     Diabetes mellitus     Diabetes mellitus type 2, uncontrolled 7/19/2016    DM (diabetes mellitus) 2015     09/04/2017    Gall stones     Obesity      Pneumonia of right middle lobe due to infectious organism 8/13/2021    Psoriasis (a type of skin inflammation)     Rheumatoid arthritis of foot        REVIEW OF SYSTEMS:  Eyes No history of DR.  Cardiovascular: History of hyperlipidemia.  GI: Tolerating Trulicity well from a GI standpoint.  : History of nephropathy.  SKIN:Positive for psoriasis  Neuro: Positive neuropathy.  PSYCH: Current tobacco use.  ENDO: See HPI.        Objective:      Vitals:    03/11/22 1313   BP: 114/64   Pulse: 88     RESPIRATORY: No respiratory distress  NEUROLOGIC: Cranial nerves II-XII grossly intact.   PSYCHIATRIC: Alert & oriented x3. Normal mood and affect.  FOOT EXAMINATION:  UTD  Assessment:       1. Uncontrolled type 2 diabetes mellitus with hyperglycemia, with long-term current use of insulin    2. Hyperlipidemia associated with type 2 diabetes mellitus    3. Hypertension associated with diabetes    4. Psoriatic arthritis, destructive type        Plan:   Trey was seen today for diabetes mellitus.    Diagnoses and all orders for this visit:    Uncontrolled type 2 diabetes mellitus with hyperglycemia, with long-term current use of insulin-Chronic  -     POCT Glucose, Hand-Held Device    -- Medications adjusted for today's visit include:    Continue Lantus to 20 units once daily.    Continue Novolog 6 units three times a day before each main meal.     Continue Metformin 2 tablets twice a day, Jardiance once a day, and Trulicity 3 mg weekly.    Hyperlipidemia associated with type 2 diabetes mellitus-Chronic,stable    -Suspect improvement in triglyceride level when diabetes control is better. Continue current.    Hypertension associated with diabetes-Chronic,stable    -Blood pressure is well controlled. Continue current.    Psoriatic arthritis, destructive type-Chronic    -Continue to follow up with rhematology as advised.    - Follow up: 6 weeks    Portions of this note may have been created with voice recognition software.  "Occasional "wrong-word" or "sound-a-like" substitutions may have occurred due to the inherent limitations of voice recognition software. Please, read the note carefully and recognize, using context, where substitutions have occurred.         "

## 2022-03-11 NOTE — TELEPHONE ENCOUNTER
Called patient regarding refill-- next injection due 3/19. Patient has changed insurance to [employer]. PA needed, will route to [facility] to complete.

## 2022-03-14 ENCOUNTER — OFFICE VISIT (OUTPATIENT)
Dept: OPHTHALMOLOGY | Facility: CLINIC | Age: 67
End: 2022-03-14
Payer: MEDICARE

## 2022-03-14 DIAGNOSIS — E11.9 TYPE 2 DIABETES MELLITUS WITHOUT COMPLICATION, WITH LONG-TERM CURRENT USE OF INSULIN: Primary | ICD-10-CM

## 2022-03-14 DIAGNOSIS — Z79.4 TYPE 2 DIABETES MELLITUS WITHOUT COMPLICATION, WITH LONG-TERM CURRENT USE OF INSULIN: Primary | ICD-10-CM

## 2022-03-14 DIAGNOSIS — H25.11 NUCLEAR SCLEROSIS OF RIGHT EYE: ICD-10-CM

## 2022-03-14 DIAGNOSIS — H25.12 NUCLEAR SCLEROSIS OF LEFT EYE: ICD-10-CM

## 2022-03-14 LAB
LEFT EYE DM RETINOPATHY: NEGATIVE
RIGHT EYE DM RETINOPATHY: NEGATIVE

## 2022-03-14 PROCEDURE — 99213 OFFICE O/P EST LOW 20 MIN: CPT | Mod: PBBFAC,PO | Performed by: STUDENT IN AN ORGANIZED HEALTH CARE EDUCATION/TRAINING PROGRAM

## 2022-03-14 PROCEDURE — 99999 PR PBB SHADOW E&M-EST. PATIENT-LVL III: ICD-10-PCS | Mod: PBBFAC,,, | Performed by: STUDENT IN AN ORGANIZED HEALTH CARE EDUCATION/TRAINING PROGRAM

## 2022-03-14 PROCEDURE — 3046F HEMOGLOBIN A1C LEVEL >9.0%: CPT | Mod: CPTII,S$GLB,, | Performed by: STUDENT IN AN ORGANIZED HEALTH CARE EDUCATION/TRAINING PROGRAM

## 2022-03-14 PROCEDURE — 99999 PR PBB SHADOW E&M-EST. PATIENT-LVL III: CPT | Mod: PBBFAC,,, | Performed by: STUDENT IN AN ORGANIZED HEALTH CARE EDUCATION/TRAINING PROGRAM

## 2022-03-14 PROCEDURE — 1160F RVW MEDS BY RX/DR IN RCRD: CPT | Mod: CPTII,S$GLB,, | Performed by: STUDENT IN AN ORGANIZED HEALTH CARE EDUCATION/TRAINING PROGRAM

## 2022-03-14 PROCEDURE — 3046F PR MOST RECENT HEMOGLOBIN A1C LEVEL > 9.0%: ICD-10-PCS | Mod: CPTII,S$GLB,, | Performed by: STUDENT IN AN ORGANIZED HEALTH CARE EDUCATION/TRAINING PROGRAM

## 2022-03-14 PROCEDURE — 1159F PR MEDICATION LIST DOCUMENTED IN MEDICAL RECORD: ICD-10-PCS | Mod: CPTII,S$GLB,, | Performed by: STUDENT IN AN ORGANIZED HEALTH CARE EDUCATION/TRAINING PROGRAM

## 2022-03-14 PROCEDURE — 1160F PR REVIEW ALL MEDS BY PRESCRIBER/CLIN PHARMACIST DOCUMENTED: ICD-10-PCS | Mod: CPTII,S$GLB,, | Performed by: STUDENT IN AN ORGANIZED HEALTH CARE EDUCATION/TRAINING PROGRAM

## 2022-03-14 PROCEDURE — 99213 PR OFFICE/OUTPT VISIT, EST, LEVL III, 20-29 MIN: ICD-10-PCS | Mod: S$GLB,,, | Performed by: STUDENT IN AN ORGANIZED HEALTH CARE EDUCATION/TRAINING PROGRAM

## 2022-03-14 PROCEDURE — 99213 OFFICE O/P EST LOW 20 MIN: CPT | Mod: S$GLB,,, | Performed by: STUDENT IN AN ORGANIZED HEALTH CARE EDUCATION/TRAINING PROGRAM

## 2022-03-14 PROCEDURE — 1159F MED LIST DOCD IN RCRD: CPT | Mod: CPTII,S$GLB,, | Performed by: STUDENT IN AN ORGANIZED HEALTH CARE EDUCATION/TRAINING PROGRAM

## 2022-03-14 NOTE — TELEPHONE ENCOUNTER
Specialty Pharmacy - Refill Coordination  Specialty Pharmacy - Medication/Referral Authorization    Specialty Medication Orders Linked to Encounter    Flowsheet Row Most Recent Value   Medication #1 secukinumab (COSENTYX PEN) 150 mg/mL PnIj (Order#[id number], Rx#[id number])          Refill Questions - Documented Responses    Flowsheet Row Most Recent Value   Patient Availability and HIPAA Verification    Does patient want to proceed with activity? Yes   HIPAA/medical authority confirmed? Yes   Relationship to patient of person spoken to? Self   Refill Screening Questions    Changes to allergies? No   Changes to medications? No   New conditions since last clinic visit? No   Unplanned office visit, urgent care, ED, or hospital admission in the last 4 weeks? No   How does patient/caregiver feel medication is working? Excellent   Financial problems or insurance changes? No   How many doses of your specialty medications were missed in the last 4 weeks? 0   Would patient like to speak to a pharmacist? No   When does the patient need to receive the medication? 03/18/22   Refill Delivery Questions    How will the patient receive the medication? Delivery Hailey   When does the patient need to receive the medication? 03/18/22   Shipping Address Prescription   Address in [facility] confirmed and updated if neccessary? Yes   Expected Copay ($) 9.85   Is the patient able to afford the medication copay? Yes   Payment Method new CC added to file   Days supply of Refill 28   Supplies needed? No supplies needed   Refill activity completed? Yes   Refill activity plan Refill scheduled   Shipment/Pickup Date: 03/16/22          Current Outpatient Medications   Medication Sig    amLODIPine (NORVASC) 5 MG tablet TAKE 1 TABLET BY MOUTH EVERY DAY    aspirin 81 MG Chew Take 1 tablet (81 mg total) by mouth once daily.    atorvastatin (LIPITOR) 80 MG tablet TAKE 1 TABLET BY MOUTH EVERY DAY    blood sugar diagnostic (ONETOUCH ULTRA  "TEST) Strp Check blood sugar 3 times daily.    calcipotriene (DOVONOX) 0.005 % cream APPLY TO AFFECTED AREA TWICE A DAY    clobetasol (OLUX) 0.05 % Foam AAA of scalp and behind ears twice daily.  Do not use on face, underarms or groin.    dulaglutide (TRULICITY) 3 mg/0.5 mL pen injector Inject 3 mg into the skin once a week.    ergocalciferol (ERGOCALCIFEROL) 50,000 unit Cap Take 50,000 Units by mouth twice a week.    folic acid (FOLVITE) 1 MG tablet Take 1 tablet (1,000 mcg total) by mouth once daily.    glipiZIDE (GLUCOTROL) 5 MG tablet Take 5 mg by mouth 2 (two) times daily.    insulin (LANTUS SOLOSTAR U-100 INSULIN) glargine 100 units/mL (3mL) SubQ pen Inject 20 Units into the skin once daily.    insulin aspart U-100 (NOVOLOG FLEXPEN U-100 INSULIN) 100 unit/mL (3 mL) InPn pen Inject 6 Units into the skin 3 (three) times daily with meals.    JARDIANCE 25 mg tablet TAKE 1 TABLET BY MOUTH EVERY DAY    ketoconazole (NIZORAL) 2 % shampoo Wash hair with medicated shampoo at least 2x/week - let sit on scalp at least 5 minutes prior to rinsing    lancets (ONETOUCH ULTRASOFT LANCETS) Misc Check blood sugar 3 times daily.    metFORMIN (GLUCOPHAGE-XR) 500 MG XR 24hr tablet Take 2 tablets (1,000 mg total) by mouth 2 (two) times daily with meals.    methotrexate 2.5 MG Tab TAKE 8 TABLETS (20 MG TOTAL) BY MOUTH EVERY 7 DAYS.    pen needle, diabetic 32 gauge x 5/32" Ndle Use with Lantus once daily and Humalog 3 times daily.    PROAIR HFA 90 mcg/actuation inhaler Inhale 2 puffs into the lungs every 4 (four) hours as needed for Wheezing. Rescue    secukinumab (COSENTYX PEN) 150 mg/mL PnIj Inject 2 pens (300 mg) into the skin every 14 (fourteen) days.    spironolactone (ALDACTONE) 25 MG tablet Take 1 tablet (25 mg total) by mouth once daily.    traMADoL (ULTRAM) 50 mg tablet Take 1 tablet (50 mg total) by mouth 3 (three) times daily as needed for Pain (use for mod pain; use sparingly).   Last reviewed on " 3/11/2022  1:21 PM by Bee Stinson NP    Review of patient's allergies indicates:  No Known Allergies Last reviewed on  3/11/2022 1:21 PM by Bee Stinson      Tasks added this encounter   No tasks added.   Tasks due within next 3 months   3/11/2022 - Refill Call (Auto Added)     [facility]  [address] [place] [id number]  Phone: [phone]  Fax: [phone]

## 2022-03-14 NOTE — Clinical Note
Referring patient for dm control, he is no longer legal to drive due to vision, he needs cataract surgery but we are unable to preform it until his A1C is 10 or below, thank you for your help

## 2022-03-14 NOTE — PROGRESS NOTES
HPI     Pt in today with complaints of blurred vision. Pt states his vision has   gotten worse since his last visit. Pt states he's having trouble seeing at   a distance and seeing small print like the newspaper. Pt denies any ocular   pain or discomfort.   Lab Results       Component                Value               Date                       HGBA1C                   11.9 (H)            01/20/2022                       Last edited by Jossy Birch on 3/14/2022  2:11 PM. (History)            Assessment /Plan     For exam results, see Encounter Report.    Type 2 diabetes mellitus without complication, with long-term current use of insulin- last A1c 11.9   Strict BG control, f/u w/ PCP, and annual DFE  Stressed importance of DM control to preserve vision    Nuclear sclerosis of right eye- once patients A1C is below 10, may return for cataract eval    Nuclear sclerosis of left eye- see above    *Unable to give glasses Rx or cataract eval due to elevated A1C, A1C must be 10 or below, discussed with patient he is not legal to drive at this time due to his vision      Return to clinic in 1 year or PRN

## 2022-03-14 NOTE — TELEPHONE ENCOUNTER
PA Case: [id number], Status: Approved, Coverage Starts on: 1/1/2022 12:00:00 AM, Coverage Ends on: 12/31/2022     Benefits: [employer], $9.85 copay.  LIS level 1.

## 2022-03-15 ENCOUNTER — TELEPHONE (OUTPATIENT)
Dept: DIABETES | Facility: CLINIC | Age: 67
End: 2022-03-15
Payer: MEDICARE

## 2022-03-15 NOTE — TELEPHONE ENCOUNTER
Called patient an patient stated he would like to remain at [facility] and be seen by NP Bee Stinson ----- Message from Cherry Mike PA-C sent at 3/14/2022  4:56 PM CDT -----  Regarding: RE:  Thanks for letting us know Dr Cole! Staff, needs next available appt with me or with Bee if he wants to stay in [facility]  ----- Message -----  From: Tigist Cole MD  Sent: 3/14/2022   2:25 PM CDT  To: Brittanie Chaparro MD, #    Referring patient for dm control, he is no longer legal to drive due to vision, he needs cataract surgery but we are unable to preform it until his A1C is 10 or below, thank you for your help

## 2022-03-21 ENCOUNTER — TELEPHONE (OUTPATIENT)
Dept: DIABETES | Facility: CLINIC | Age: 67
End: 2022-03-21
Payer: MEDICARE

## 2022-03-21 DIAGNOSIS — E11.65 UNCONTROLLED TYPE 2 DIABETES MELLITUS WITH HYPERGLYCEMIA, WITH LONG-TERM CURRENT USE OF INSULIN: ICD-10-CM

## 2022-03-21 DIAGNOSIS — Z79.4 UNCONTROLLED TYPE 2 DIABETES MELLITUS WITH HYPERGLYCEMIA, WITH LONG-TERM CURRENT USE OF INSULIN: ICD-10-CM

## 2022-03-21 RX ORDER — LANCETS
EACH MISCELLANEOUS
Qty: 300 EACH | Refills: 3 | Status: SHIPPED | OUTPATIENT
Start: 2022-03-21

## 2022-03-21 RX ORDER — DEXTROSE 4 G
TABLET,CHEWABLE ORAL
Qty: 1 EACH | Refills: 0 | Status: SHIPPED | OUTPATIENT
Start: 2022-03-21

## 2022-03-21 NOTE — TELEPHONE ENCOUNTER
----- Message from Bee Stinson NP sent at 3/21/2022 10:22 AM CDT -----  Looks like his meter and testing supplies are no longer covered on his insurance. I am switching to [employer] meter with testing supplies. He should let us know if he runs into any issues at the pharmacy.

## 2022-04-08 ENCOUNTER — SPECIALTY PHARMACY (OUTPATIENT)
Dept: PHARMACY | Facility: CLINIC | Age: 67
End: 2022-04-08
Payer: MEDICARE

## 2022-04-08 NOTE — TELEPHONE ENCOUNTER
Specialty Pharmacy - Refill Coordination    Specialty Medication Orders Linked to Encounter    Flowsheet Row Most Recent Value   Medication #1 secukinumab (COSENTYX PEN) 150 mg/mL PnIj (Order#[id number], Rx#[id number])          Refill Questions - Documented Responses    Flowsheet Row Most Recent Value   Patient Availability and HIPAA Verification    Does patient want to proceed with activity? Yes   HIPAA/medical authority confirmed? Yes   Relationship to patient of person spoken to? Self   Refill Screening Questions    Changes to allergies? No   Changes to medications? No   New conditions since last clinic visit? No   Unplanned office visit, urgent care, ED, or hospital admission in the last 4 weeks? No   How does patient/caregiver feel medication is working? Excellent   Financial problems or insurance changes? No   How many doses of your specialty medications were missed in the last 4 weeks? 0   Would patient like to speak to a pharmacist? No   When does the patient need to receive the medication? 04/15/22   Refill Delivery Questions    How will the patient receive the medication? Delivery [facility]   When does the patient need to receive the medication? 04/15/22   Shipping Address Prescription   Address in [facility] confirmed and updated if neccessary? Yes   Expected Copay ($) 0   Is the patient able to afford the medication copay? Yes   Payment Method zero copay   Days supply of Refill 28   Supplies needed? Alcohol Swabs   Refill activity completed? Yes   Refill activity plan Refill scheduled   Shipment/Pickup Date: 04/12/22          Current Outpatient Medications   Medication Sig    amLODIPine (NORVASC) 5 MG tablet TAKE 1 TABLET BY MOUTH EVERY DAY    aspirin 81 MG Chew Take 1 tablet (81 mg total) by mouth once daily.    atorvastatin (LIPITOR) 80 MG tablet TAKE 1 TABLET BY MOUTH EVERY DAY    blood sugar diagnostic (ACCU-CHEK NOELLE PLUS TEST STRP) Strp Check blood sugar 3 times daily.    blood-glucose  "meter (ACCU-CHEK NOELLE PLUS METER) Misc Check blood sugar 3 times daily    calcipotriene (DOVONOX) 0.005 % cream APPLY TO AFFECTED AREA TWICE A DAY    clobetasol (OLUX) 0.05 % Foam AAA of scalp and behind ears twice daily.  Do not use on face, underarms or groin.    dulaglutide (TRULICITY) 3 mg/0.5 mL pen injector Inject 3 mg into the skin once a week.    ergocalciferol (ERGOCALCIFEROL) 50,000 unit Cap Take 50,000 Units by mouth twice a week.    folic acid (FOLVITE) 1 MG tablet Take 1 tablet (1,000 mcg total) by mouth once daily.    glipiZIDE (GLUCOTROL) 5 MG tablet Take 5 mg by mouth 2 (two) times daily.    insulin (LANTUS SOLOSTAR U-100 INSULIN) glargine 100 units/mL (3mL) SubQ pen Inject 20 Units into the skin once daily.    insulin aspart U-100 (NOVOLOG FLEXPEN U-100 INSULIN) 100 unit/mL (3 mL) InPn pen Inject 6 Units into the skin 3 (three) times daily with meals.    JARDIANCE 25 mg tablet TAKE 1 TABLET BY MOUTH EVERY DAY    ketoconazole (NIZORAL) 2 % shampoo Wash hair with medicated shampoo at least 2x/week - let sit on scalp at least 5 minutes prior to rinsing    lancets (ACCU-CHEK SOFTCLIX LANCETS) Misc Check blood sugar 3 times daily    metFORMIN (GLUCOPHAGE-XR) 500 MG XR 24hr tablet Take 2 tablets (1,000 mg total) by mouth 2 (two) times daily with meals.    methotrexate 2.5 MG Tab TAKE 8 TABLETS (20 MG TOTAL) BY MOUTH EVERY 7 DAYS.    pen needle, diabetic 32 gauge x 5/32" Ndle Use with Lantus once daily and Humalog 3 times daily.    PROAIR HFA 90 mcg/actuation inhaler Inhale 2 puffs into the lungs every 4 (four) hours as needed for Wheezing. Rescue    secukinumab (COSENTYX PEN) 150 mg/mL PnIj Inject 2 pens (300 mg) into the skin every 14 (fourteen) days.    spironolactone (ALDACTONE) 25 MG tablet Take 1 tablet (25 mg total) by mouth once daily.    traMADoL (ULTRAM) 50 mg tablet Take 1 tablet (50 mg total) by mouth 3 (three) times daily as needed for Pain (use for mod pain; use sparingly). "   Last reviewed on 3/14/2022  2:20 PM by Tigist Cole MD    Review of patient's allergies indicates:  No Known Allergies Last reviewed on  3/14/2022 2:20 PM by Tigist Cloe      Tasks added this encounter   5/6/2022 - Refill Call (Auto Added)   Tasks due within next 3 months   No tasks due.     Frank Ace, PharmD  [facility]  [address] [place] [id number]  Phone: [phone]  Fax: [phone]

## 2022-04-11 ENCOUNTER — LAB VISIT (OUTPATIENT)
Dept: LAB | Facility: HOSPITAL | Age: 67
End: 2022-04-11
Attending: INTERNAL MEDICINE
Payer: MEDICARE

## 2022-04-11 ENCOUNTER — OFFICE VISIT (OUTPATIENT)
Dept: PULMONOLOGY | Facility: CLINIC | Age: 67
End: 2022-04-11
Payer: MEDICARE

## 2022-04-11 ENCOUNTER — CLINICAL SUPPORT (OUTPATIENT)
Dept: PULMONOLOGY | Facility: CLINIC | Age: 67
End: 2022-04-11
Payer: MEDICARE

## 2022-04-11 VITALS
HEART RATE: 79 BPM | SYSTOLIC BLOOD PRESSURE: 100 MMHG | BODY MASS INDEX: 39.29 KG/M2 | OXYGEN SATURATION: 98 % | WEIGHT: 259.25 LBS | RESPIRATION RATE: 20 BRPM | WEIGHT: 259.25 LBS | BODY MASS INDEX: 39.29 KG/M2 | HEIGHT: 68 IN | DIASTOLIC BLOOD PRESSURE: 60 MMHG | HEIGHT: 68 IN

## 2022-04-11 DIAGNOSIS — G47.33 SEVERE OBSTRUCTIVE SLEEP APNEA: ICD-10-CM

## 2022-04-11 DIAGNOSIS — Z79.631 LONG TERM METHOTREXATE USER: ICD-10-CM

## 2022-04-11 DIAGNOSIS — J44.9 MIXED TYPE COPD (CHRONIC OBSTRUCTIVE PULMONARY DISEASE): ICD-10-CM

## 2022-04-11 DIAGNOSIS — I15.2 HYPERTENSION ASSOCIATED WITH DIABETES: ICD-10-CM

## 2022-04-11 DIAGNOSIS — F17.210 NICOTINE DEPENDENCE, CIGARETTES, UNCOMPLICATED: ICD-10-CM

## 2022-04-11 DIAGNOSIS — Z99.81 DEPENDENCE ON NOCTURNAL OXYGEN THERAPY: ICD-10-CM

## 2022-04-11 DIAGNOSIS — R91.1 SOLITARY PULMONARY NODULE: ICD-10-CM

## 2022-04-11 DIAGNOSIS — E66.01 CLASS 2 SEVERE OBESITY DUE TO EXCESS CALORIES WITH SERIOUS COMORBIDITY AND BODY MASS INDEX (BMI) OF 39.0 TO 39.9 IN ADULT: ICD-10-CM

## 2022-04-11 DIAGNOSIS — R91.8 MULTIPLE LUNG NODULES ON CT: Chronic | ICD-10-CM

## 2022-04-11 DIAGNOSIS — F17.200 TOBACCO DEPENDENCE: Chronic | ICD-10-CM

## 2022-04-11 DIAGNOSIS — J44.9 MIXED TYPE COPD (CHRONIC OBSTRUCTIVE PULMONARY DISEASE): Chronic | ICD-10-CM

## 2022-04-11 DIAGNOSIS — E11.21 TYPE 2 DIABETES MELLITUS WITH NEPHROPATHY: Chronic | ICD-10-CM

## 2022-04-11 DIAGNOSIS — E11.59 HYPERTENSION ASSOCIATED WITH DIABETES: ICD-10-CM

## 2022-04-11 DIAGNOSIS — G47.33 OSA ON CPAP: Primary | ICD-10-CM

## 2022-04-11 DIAGNOSIS — L40.52 PSORIATIC ARTHRITIS, DESTRUCTIVE TYPE: ICD-10-CM

## 2022-04-11 DIAGNOSIS — J44.9 CHRONIC OBSTRUCTIVE PULMONARY DISEASE, UNSPECIFIED COPD TYPE: ICD-10-CM

## 2022-04-11 DIAGNOSIS — G47.34 NOCTURNAL HYPOXEMIA: ICD-10-CM

## 2022-04-11 DIAGNOSIS — Z29.9 PREVENTIVE MEASURE: ICD-10-CM

## 2022-04-11 PROBLEM — E66.812 CLASS 2 SEVERE OBESITY DUE TO EXCESS CALORIES WITH SERIOUS COMORBIDITY AND BODY MASS INDEX (BMI) OF 39.0 TO 39.9 IN ADULT: Status: ACTIVE | Noted: 2018-04-08

## 2022-04-11 LAB
ANION GAP SERPL CALC-SCNC: 15 MMOL/L (ref 8–16)
BUN SERPL-MCNC: 19 MG/DL (ref 8–23)
CALCIUM SERPL-MCNC: 9.7 MG/DL (ref 8.7–10.5)
CHLORIDE SERPL-SCNC: 102 MMOL/L (ref 95–110)
CHOLEST SERPL-MCNC: 135 MG/DL (ref 120–199)
CHOLEST/HDLC SERPL: 3.9 {RATIO} (ref 2–5)
CO2 SERPL-SCNC: 21 MMOL/L (ref 23–29)
COMPLEXED PSA SERPL-MCNC: 0.24 NG/ML (ref 0–4)
CREAT SERPL-MCNC: 1.2 MG/DL (ref 0.5–1.4)
EST. GFR  (AFRICAN AMERICAN): >60 ML/MIN/1.73 M^2
EST. GFR  (NON AFRICAN AMERICAN): >60 ML/MIN/1.73 M^2
ESTIMATED AVG GLUCOSE: 272 MG/DL (ref 68–131)
GLUCOSE SERPL-MCNC: 230 MG/DL (ref 70–110)
HBA1C MFR BLD: 11.1 % (ref 4–5.6)
HDLC SERPL-MCNC: 35 MG/DL (ref 40–75)
HDLC SERPL: 25.9 % (ref 20–50)
LDLC SERPL CALC-MCNC: 69.4 MG/DL (ref 63–159)
NONHDLC SERPL-MCNC: 100 MG/DL
POTASSIUM SERPL-SCNC: 4.6 MMOL/L (ref 3.5–5.1)
SODIUM SERPL-SCNC: 138 MMOL/L (ref 136–145)
TRIGL SERPL-MCNC: 153 MG/DL (ref 30–150)
TSH SERPL DL<=0.005 MIU/L-ACNC: 1.8 UIU/ML (ref 0.4–4)

## 2022-04-11 PROCEDURE — 3046F HEMOGLOBIN A1C LEVEL >9.0%: CPT | Mod: CPTII,S$GLB,, | Performed by: NURSE PRACTITIONER

## 2022-04-11 PROCEDURE — 94729 PR C02/MEMBANE DIFFUSE CAPACITY: ICD-10-PCS | Mod: S$GLB,,, | Performed by: INTERNAL MEDICINE

## 2022-04-11 PROCEDURE — 99999 PR PBB SHADOW E&M-EST. PATIENT-LVL V: ICD-10-PCS | Mod: PBBFAC,,, | Performed by: NURSE PRACTITIONER

## 2022-04-11 PROCEDURE — 1160F RVW MEDS BY RX/DR IN RCRD: CPT | Mod: CPTII,S$GLB,, | Performed by: NURSE PRACTITIONER

## 2022-04-11 PROCEDURE — 3008F PR BODY MASS INDEX (BMI) DOCUMENTED: ICD-10-PCS | Mod: CPTII,S$GLB,, | Performed by: NURSE PRACTITIONER

## 2022-04-11 PROCEDURE — 3008F BODY MASS INDEX DOCD: CPT | Mod: CPTII,S$GLB,, | Performed by: NURSE PRACTITIONER

## 2022-04-11 PROCEDURE — 80061 LIPID PANEL: CPT | Performed by: INTERNAL MEDICINE

## 2022-04-11 PROCEDURE — 94726 PULM FUNCT TST PLETHYSMOGRAP: ICD-10-PCS | Mod: S$GLB,,, | Performed by: INTERNAL MEDICINE

## 2022-04-11 PROCEDURE — 94729 DIFFUSING CAPACITY: CPT | Mod: S$GLB,,, | Performed by: INTERNAL MEDICINE

## 2022-04-11 PROCEDURE — 3046F PR MOST RECENT HEMOGLOBIN A1C LEVEL > 9.0%: ICD-10-PCS | Mod: CPTII,S$GLB,, | Performed by: NURSE PRACTITIONER

## 2022-04-11 PROCEDURE — 99214 OFFICE O/P EST MOD 30 MIN: CPT | Mod: 25,S$GLB,, | Performed by: NURSE PRACTITIONER

## 2022-04-11 PROCEDURE — 3072F PR LOW RISK FOR RETINOPATHY: ICD-10-PCS | Mod: CPTII,S$GLB,, | Performed by: NURSE PRACTITIONER

## 2022-04-11 PROCEDURE — 3074F PR MOST RECENT SYSTOLIC BLOOD PRESSURE < 130 MM HG: ICD-10-PCS | Mod: CPTII,S$GLB,, | Performed by: NURSE PRACTITIONER

## 2022-04-11 PROCEDURE — 94726 PLETHYSMOGRAPHY LUNG VOLUMES: CPT | Mod: S$GLB,,, | Performed by: INTERNAL MEDICINE

## 2022-04-11 PROCEDURE — 99999 PR PBB SHADOW E&M-EST. PATIENT-LVL V: CPT | Mod: PBBFAC,,, | Performed by: NURSE PRACTITIONER

## 2022-04-11 PROCEDURE — 84153 ASSAY OF PSA TOTAL: CPT | Performed by: INTERNAL MEDICINE

## 2022-04-11 PROCEDURE — 3078F PR MOST RECENT DIASTOLIC BLOOD PRESSURE < 80 MM HG: ICD-10-PCS | Mod: CPTII,S$GLB,, | Performed by: NURSE PRACTITIONER

## 2022-04-11 PROCEDURE — 80048 BASIC METABOLIC PNL TOTAL CA: CPT | Performed by: INTERNAL MEDICINE

## 2022-04-11 PROCEDURE — 3072F LOW RISK FOR RETINOPATHY: CPT | Mod: CPTII,S$GLB,, | Performed by: NURSE PRACTITIONER

## 2022-04-11 PROCEDURE — 94618 PULMONARY STRESS TESTING: ICD-10-PCS | Mod: S$GLB,,, | Performed by: INTERNAL MEDICINE

## 2022-04-11 PROCEDURE — 94618 PULMONARY STRESS TESTING: CPT | Mod: S$GLB,,, | Performed by: INTERNAL MEDICINE

## 2022-04-11 PROCEDURE — 3288F FALL RISK ASSESSMENT DOCD: CPT | Mod: CPTII,S$GLB,, | Performed by: NURSE PRACTITIONER

## 2022-04-11 PROCEDURE — 83036 HEMOGLOBIN GLYCOSYLATED A1C: CPT | Performed by: INTERNAL MEDICINE

## 2022-04-11 PROCEDURE — 1101F PT FALLS ASSESS-DOCD LE1/YR: CPT | Mod: CPTII,S$GLB,, | Performed by: NURSE PRACTITIONER

## 2022-04-11 PROCEDURE — 94010 BREATHING CAPACITY TEST: ICD-10-PCS | Mod: S$GLB,,, | Performed by: INTERNAL MEDICINE

## 2022-04-11 PROCEDURE — 84443 ASSAY THYROID STIM HORMONE: CPT | Performed by: INTERNAL MEDICINE

## 2022-04-11 PROCEDURE — 1101F PR PT FALLS ASSESS DOC 0-1 FALLS W/OUT INJ PAST YR: ICD-10-PCS | Mod: CPTII,S$GLB,, | Performed by: NURSE PRACTITIONER

## 2022-04-11 PROCEDURE — 3074F SYST BP LT 130 MM HG: CPT | Mod: CPTII,S$GLB,, | Performed by: NURSE PRACTITIONER

## 2022-04-11 PROCEDURE — 94010 BREATHING CAPACITY TEST: CPT | Mod: S$GLB,,, | Performed by: INTERNAL MEDICINE

## 2022-04-11 PROCEDURE — 99499 RISK ADDL DX/OHS AUDIT: ICD-10-PCS | Mod: S$GLB,,, | Performed by: NURSE PRACTITIONER

## 2022-04-11 PROCEDURE — 1159F MED LIST DOCD IN RCRD: CPT | Mod: CPTII,S$GLB,, | Performed by: NURSE PRACTITIONER

## 2022-04-11 PROCEDURE — 1159F PR MEDICATION LIST DOCUMENTED IN MEDICAL RECORD: ICD-10-PCS | Mod: CPTII,S$GLB,, | Performed by: NURSE PRACTITIONER

## 2022-04-11 PROCEDURE — 99214 PR OFFICE/OUTPT VISIT, EST, LEVL IV, 30-39 MIN: ICD-10-PCS | Mod: 25,S$GLB,, | Performed by: NURSE PRACTITIONER

## 2022-04-11 PROCEDURE — 36415 COLL VENOUS BLD VENIPUNCTURE: CPT | Performed by: INTERNAL MEDICINE

## 2022-04-11 PROCEDURE — 3288F PR FALLS RISK ASSESSMENT DOCUMENTED: ICD-10-PCS | Mod: CPTII,S$GLB,, | Performed by: NURSE PRACTITIONER

## 2022-04-11 PROCEDURE — 1160F PR REVIEW ALL MEDS BY PRESCRIBER/CLIN PHARMACIST DOCUMENTED: ICD-10-PCS | Mod: CPTII,S$GLB,, | Performed by: NURSE PRACTITIONER

## 2022-04-11 PROCEDURE — 99499 UNLISTED E&M SERVICE: CPT | Mod: S$GLB,,, | Performed by: NURSE PRACTITIONER

## 2022-04-11 PROCEDURE — 3078F DIAST BP <80 MM HG: CPT | Mod: CPTII,S$GLB,, | Performed by: NURSE PRACTITIONER

## 2022-04-11 RX ORDER — ALBUTEROL SULFATE 90 UG/1
2 AEROSOL, METERED RESPIRATORY (INHALATION) EVERY 4 HOURS PRN
Qty: 18 G | Refills: 11 | Status: SHIPPED | OUTPATIENT
Start: 2022-04-11 | End: 2023-05-01

## 2022-04-11 NOTE — ASSESSMENT & PLAN NOTE
Stable  Continue Albuterol inhaler   4/11/2022 CPFT Spirometry normal airflow. Restriction based on FVC. Lung volumes restriction. Diffusing capacity moderate reduced, corrects alveolar volume.

## 2022-04-11 NOTE — ASSESSMENT & PLAN NOTE
28 pack year current smoker  Stable compared to 2016  Proceed with CT chest on return 6 months, last CT 8/2021

## 2022-04-11 NOTE — PROGRESS NOTES
Subjective:      Patient ID: Trey Stevens is a 67 y.o. male.    Patient Active Problem List   Diagnosis    Psoriatic arthritis, destructive type    Vitamin D deficiency    Multiple lung nodules on CT    Immunosuppressed status    Long-term use of immunosuppressant medication    Mixed type COPD (chronic obstructive pulmonary disease)    Type 2 diabetes mellitus with nephropathy    Hypertension associated with diabetes    Vitiligo    SCHUYLER on CPAP    Class 2 severe obesity due to excess calories with serious comorbidity and body mass index (BMI) of 39.0 to 39.9 in adult    Hyperlipidemia associated with type 2 diabetes mellitus    Elevated liver enzymes    Nicotine dependence, cigarettes, uncomplicated    Dependence on nocturnal oxygen therapy    Urinary retention     Problem list has been reviewed.    Group Spirometric Classification Exacerbations / year mMRC CAT   Group B: Low risk; more symptoms  GOLD 1- 2  < = 1 >= 2 >=10      FEV1: 1.66  (52.8 % predicted),       GOLD 1 - mild: FEV1? 80% predicted   GOLD 2 - moderate: 50% ?FEV1 <80% predicted   GOLD 3 - severe: 30% ?FEV1 <50% predicted   GOLD 4 - very severe: FEV1 <30% predicted.    Chief Complaint: COPD and Sleep Apnea       HPI:     Trey Stevens is a 67 y.o. male here for follow up COPD/SCHUYLER on CPAP with 2 L blended in.     CPAP compliance download reviewed with patient who voiced understanding.     Follow up for SCHUYLER and CPAP complaince assessment.    He is on AUTOPAP  of  4 - 20 CMWP with OXYGEN AT 2 L /MIN BLENDED IN .     He is adherent with PAP therapy.    He definitely thinks PAP is beneficial to his health and He wants to continue with PAP therapy.    Patient denies snoring, headaches, excessive daytime sleepiness    Compliance Summary  3/8/2022 - 4/6/2022 (30 days)  Days with Device Usage 30 days  Days without Device Usage 0 days  Percent Days with Device Usage 100.0%  Cumulative Usage 8 days 3 hrs. 10 mins. 43 secs.  Maximum Usage (1 Day)  8 hrs. 26 mins. 46 secs.  Average Usage (All Days) 6 hrs. 30 mins. 21 secs.  Average Usage (Days Used) 6 hrs. 30 mins. 21 secs.  Minimum Usage (1 Day) 4 hrs. 48 mins. 54 secs.  Percent of Days with Usage >= 4 Hours 100.0%  Percent of Days with Usage < 4 Hours 0.0%  Date Range  Total Blower Time 8 days 3 hrs. 46 mins. 20 secs.  Average AHI 1.4  Auto-CPAP Summary  Auto-CPAP Mean Pressure 8.7 cmH2O  Auto-CPAP Peak Average Pressure 15.7 cmH2O  Device Pressure <= 90% of Time 12.9 cmH2O  Average Time in Large Leak Per Day 3 mins. 50 secs.    EPWORTH SLEEPINESS SCALE 4/11/2022 11/11/2021 2/11/2021 2/13/2020 2/14/2019 7/30/2018 1/3/2018   Sitting and reading 1 1 0 1 0 0 1   Watching TV 0 2 0 0 0 2 2   Sitting, inactive in a public place (e.g. a theatre or a meeting) 0 0 0 0 3 1 1   As a passenger in a car for an hour without a break 1 0 0 0 0 0 0   Lying down to rest in the afternoon when circumstances permit 3 1 1 1 3 0 3   Sitting and talking to someone 0 0 0 0 0 0 0   Sitting quietly after a lunch without alcohol 2 2 2 1 1 1 2   In a car, while stopped for a few minutes in traffic 0 0 1 0 0 0 0   Total score 7 6 4 3 7 4 9       Patients reports NYHA II dyspnea    The patient does not have currently have symptoms / an exacerbation.      No recent change in breathing.     4/11/2022 CPFT Spirometry normal airflow. Restriction based on FVC. Lung volumes restriction. Diffusing capacity moderate reduced, corrects alveolar volume.     COPD Questionnaire  How often do you cough?: A little of the time  How often do you have phlegm (mucus) in your chest?: A little of the time  How often does your chest feel tight?: Almost never  When you walk up a hill or one flight of stairs, how often are you breathless?: Most of the time  How often are you limited doing any activities at home?: Never  How often are you confident leaving the house despite your lung condition?: All of the time  How often do you sleep soundly?: Never  How often do  you have energy?: Some of the time  Total score: 16     His current respiratory therapy regimen is PROAIR which provides relief. He is  adherent with his regimen.     Some day cigarette smoker. 28 pack years.      A full  review of systems, past , family  and social histories was performed except as mentioned in the note above, these are non contributory to the main issues discussed today.     Previous Report Reviewed: office notes     The following portions of the patient's history were reviewed and updated as appropriate: He  has a past medical history of Arthritis, Diabetes mellitus, Diabetes mellitus type 2, uncontrolled (7/19/2016), DM (diabetes mellitus) (2015), Gall stones, Obesity, Pneumonia of right middle lobe due to infectious organism (8/13/2021), Psoriasis (a type of skin inflammation), and Rheumatoid arthritis of foot.  He  has a past surgical history that includes Appendectomy and Cholecystectomy.  His family history includes Cancer in his mother; Cataracts in his mother; Diabetes in his mother; Hypertension in his mother; Stroke in his father.  He  reports that he has been smoking cigarettes. He started smoking about 28 years ago. He has a 28.00 pack-year smoking history. He has never used smokeless tobacco. He reports that he does not drink alcohol and does not use drugs.  He has a current medication list which includes the following prescription(s): amlodipine, aspirin, atorvastatin, blood sugar diagnostic, blood-glucose meter, calcipotriene, clobetasol, trulicity, ergocalciferol, folic acid, glipizide, lantus solostar u-100 insulin, insulin aspart u-100, jardiance, ketoconazole, lancets, methotrexate, pen needle, diabetic, proair hfa, cosentyx pen, spironolactone, tramadol, and metformin.  He has No Known Allergies..    Review of Systems   Constitutional: Negative for fever, fatigue and night sweats.   HENT: Negative for nosebleeds, postnasal drip, rhinorrhea, sore throat, congestion and hearing  "loss.    Eyes: Negative for itching.   Respiratory: Negative for snoring, cough, hemoptysis, sputum production, wheezing, orthopnea, use of rescue inhaler and Paroxysmal Nocturnal Dyspnea.    Cardiovascular: Negative for chest pain and leg swelling.   Genitourinary: Negative for difficulty urinating and hematuria.   Endocrine: Negative for cold intolerance and heat intolerance.    Musculoskeletal: Positive for arthralgias and back pain.   Skin: Positive for rash (chronic Psoriasis).        Diffuse Psoriasis   Gastrointestinal: Negative for vomiting, abdominal pain and acid reflux.   Neurological: Negative for weakness.   Hematological: Does not bruise/bleed easily and no excessive bruising.   Psychiatric/Behavioral: The patient is not nervous/anxious.    All other systems reviewed and are negative.       Objective:     Vitals:    04/11/22 1338   BP: 100/60   Pulse: 79   Resp: 20   SpO2: 98%   Weight: 117.6 kg (259 lb 4.2 oz)   Height: 5' 8" (1.727 m)     body mass index is 39.42 kg/m².     Physical Exam  Vitals and nursing note reviewed.   Constitutional:       General: He is not in acute distress.     Appearance: He is well-developed. He is not ill-appearing or toxic-appearing.   HENT:      Head: Normocephalic and atraumatic.      Right Ear: External ear normal.      Left Ear: External ear normal.      Nose: Nose normal.      Mouth/Throat:      Pharynx: No oropharyngeal exudate.   Eyes:      Conjunctiva/sclera: Conjunctivae normal.   Cardiovascular:      Rate and Rhythm: Normal rate and regular rhythm.      Heart sounds: Normal heart sounds.   Pulmonary:      Effort: Pulmonary effort is normal.      Breath sounds: Normal breath sounds. No wheezing or rales.   Abdominal:      Palpations: Abdomen is soft.   Musculoskeletal:      Cervical back: Normal range of motion and neck supple.   Skin:     General: Skin is warm and dry.   Neurological:      Mental Status: He is alert and oriented to person, place, and time. "   Psychiatric:         Behavior: Behavior normal. Behavior is cooperative.         Thought Content: Thought content normal.         Judgment: Judgment normal.         Personal Diagnostic Review  CPAP complaince download.     2022 CPFT Spirometry normal airflow. Restriction based on FVC. Lung volumes restriction. Diffusing capacity moderate reduced, corrects alveolar volume.     2022 CPFT Spirometry normal airflow. Restriction based on FVC. Lung volumes restriction. Diffusing capacity moderate reduced, corrects alveolar volume.     CXR: 21: Stable bilateral chronic increased peribronchial markings and bibasilar scarring.  No superimposed focal consolidation.  Cardiopericardial silhouette is mildly enlarged but stable in appearance.  Degenerative changes of thoracic spine with osteopenia.    CXR: 20: The lungs are clear and free of infiltrate.  No pleural effusion or pneumothorax. The heart is borderline enlarged.  There is some tortuosity of the descending thoracic aorta    Pulmonary function tests:20:  FEV1: 1.66  (52.8 % predicted), FVC:  2.15 (53.0 % predicted), FEV1/FVC:77, T.21 (64.8 % predicted), RV/TLVC: 45 (118.9 % predicted), DLCO: 20.78 (79.4 % predicted)   Moderately severe mixed obstruction with restriction. Diffusion capacity is mildly reduced but corrects for alveolar volume     X-Ray Chest PA And Lateral  Narrative: EXAMINATION:  XR CHEST PA AND LATERAL    CLINICAL HISTORY:  Encounter for follow-up examination after completed treatment for conditions other than malignant neoplasm    TECHNIQUE:  PA and lateral views of the chest were performed.    COMPARISON:  Chest x-ray from 2021    FINDINGS:  There has been significant interval improved aeration of the bilateral lung zones with a few faint parenchymal opacities persisting possibly postinflammatory in etiology.  No new focal consolidation.  No effusion.  Cardiomediastinal silhouette is not enlarged.  Degenerative  changes of the spine  Impression: As above    Electronically signed by: Teodoro Petty MD  Date:    10/06/2021  Time:    13:52       Assessment / plan:     Discussed diagnosis, its evaluation, treatment and usual course. All questions answered.  Orders Placed This Encounter   Procedures    CPAP/BIPAP SUPPLIES     Benefits and compliant  90 day supply. 4 refills  HME: [facility]     Order Specific Question:   Length of need (1-99 months):     Answer:   99     Order Specific Question:   Choose ONE mask type and its corresponding cushions and/or pillows:     Answer:    Full Face Mask, 1 per 90 days:  Full Face Cushion, (3 per 90 days)     Order Specific Question:   Choose EITHER Heated or Non-Heated Tubjing     Answer:    Non-Heated Tubing, 1 per 90 days     Order Specific Question:   Number of Days Needed:     Answer:   99     Order Specific Question:   All other supplies as needed as listed below:     Answer:    Headgear, 1 per 180 days     Order Specific Question:   All other supplies as needed as listed below:     Answer:    Chin Strap, 1 per 180 days     Order Specific Question:   All other supplies as needed as listed below:     Answer:    Disposable Filter, 6 per 90 days     Order Specific Question:   All other supplies as needed as listed below:     Answer:    Humidifier Chamber, 1 per 180 days     Order Specific Question:   All other supplies as needed as listed below:     Answer:    Non-Disposable Filter, 1 per 180 days    CT Chest Without Contrast     Standing Status:   Future     Standing Expiration Date:   4/11/2023     Order Specific Question:   May the Radiologist modify the order per protocol to meet the clinical needs of the patient?     Answer:   Yes    Ambulatory referral/consult to Smoking Cessation Program     Standing Status:   Future     Standing Expiration Date:   5/11/2023     Referral Priority:   Routine     Referral Type:   Consultation     Referral  Reason:   Specialty Services Required     Requested Specialty:   CTTS     Number of Visits Requested:   1     Requested Prescriptions     Signed Prescriptions Disp Refills    PROAIR HFA 90 mcg/actuation inhaler 18 g 11     Sig: Inhale 2 puffs into the lungs every 4 (four) hours as needed for Wheezing. Rescue       Problem List Items Addressed This Visit     Multiple lung nodules on CT (Chronic)    Current Assessment & Plan     28 pack year current smoker  Stable compared to 2016  Proceed with CT chest on return 6 months, last CT 8/2021              Mixed type COPD (chronic obstructive pulmonary disease) (Chronic)    Current Assessment & Plan     Stable  Continue Albuterol inhaler   4/11/2022 CPFT Spirometry normal airflow. Restriction based on FVC. Lung volumes restriction. Diffusing capacity moderate reduced, corrects alveolar volume.            Relevant Medications    PROAIR HFA 90 mcg/actuation inhaler    Psoriatic arthritis, destructive type    Current Assessment & Plan     Managed by dermatology            SCHUYLER on CPAP - Primary    Current Assessment & Plan     Benefits and compliant Auto CPAP 4-20 cm with 2 L oxygen blended in.  AHI 1.4  Full face mask  HME: [facility]   Continue APAP 4-20 cm   Updated supply order   Follow up April 2023 for yearly CPAP compliance download and supply order.               Relevant Orders    CPAP/BIPAP SUPPLIES    Nicotine dependence, cigarettes, uncomplicated    Current Assessment & Plan     Some day cigarette smoker. 12 pack years.    Assistance with smoking cessation was offered, including:  []  Medications  [x]  Counseling  []  Printed Information on Smoking Cessation  [x]  Referral to a Smoking Cessation Program    Patient was counseled regarding smoking for 3-10 minutes.               Relevant Orders    CT Chest Without Contrast    Hypertension associated with diabetes    Current Assessment & Plan     Stable and controlled. Continue current treatment plan as previously  prescribed with your PCP.                Dependence on nocturnal oxygen therapy    Overview      Auto CPAP 4-20 cm with 2 L oxygen blended in.             Current Assessment & Plan     Benefits and compliant Auto CPAP 4-20 cm with 2 L oxygen blended in.  AHI 1.4             Class 2 severe obesity due to excess calories with serious comorbidity and body mass index (BMI) of 39.0 to 39.9 in adult    Current Assessment & Plan     Encouraged calorie reduction and 30 minutes of exercise daily. Discussed impact of obesity on general health.  Wt Readings from Last 9 Encounters:   04/11/22 117.6 kg (259 lb 4.2 oz)   04/11/22 117.6 kg (259 lb 4.2 oz)   03/11/22 117.8 kg (259 lb 11.2 oz)   02/17/22 118.7 kg (261 lb 11 oz)   01/28/22 118.7 kg (261 lb 11 oz)   01/18/22 119 kg (262 lb 5.6 oz)   01/05/22 113.3 kg (249 lb 14.3 oz)   12/10/21 116.8 kg (257 lb 8 oz)   11/15/21 105 kg (231 lb 7.7 oz)   Body mass index is 39.42 kg/m².               Other Visit Diagnoses     Solitary pulmonary nodule        Relevant Orders    CT Chest Without Contrast          Follow up in about 6 months (around 10/11/2022) for COPD, Pulmonary nodule w/review CT chest .

## 2022-04-11 NOTE — ASSESSMENT & PLAN NOTE
Some day cigarette smoker. 12 pack years.    Assistance with smoking cessation was offered, including:  []  Medications  [x]  Counseling  []  Printed Information on Smoking Cessation  [x]  Referral to a Smoking Cessation Program    Patient was counseled regarding smoking for 3-10 minutes.

## 2022-04-11 NOTE — ASSESSMENT & PLAN NOTE
Benefits and compliant Auto CPAP 4-20 cm with 2 L oxygen blended in.  AHI 1.4  Full face mask  HME: [facility]   Continue APAP 4-20 cm   Updated supply order   Follow up April 2023 for yearly CPAP compliance download and supply order.

## 2022-04-11 NOTE — PROCEDURES
"[facility] - Pulmonary Function  Six Minute Walk     SUMMARY   Ordering Provider: Summer Shipley MD   Interpreting Provider: Summer Shipley MD  Performing nurse/tech/RT: RT VESTA  Diagnosis: COPD (Mixed type COPD (chronic obstructive pulmonary disease))  Height: 5' 8" (172.7 cm)  Weight: 117.6 kg (259 lb 4.2 oz)  BMI (Calculated): 39.4   Patient Race:    Phase Oxygen Assessment Supplemental O2 Heart   Rate Blood Pressure Christian Dyspnea Scale Rating   Resting 98 % Room Air 79 bpm 100/60 0   Exercise        Minute        1 97 % Room Air 95 bpm     2 97 % Room Air 100 bpm     3 98 % Room Air 106 bpm     4 97 % Room Air 116 bpm     5 97 % Room Air 114 bpm     6  97 % Room Air 126 bpm 161/71 4   Recovery        Minute        1 97 % Room Air 101 bpm     2 98 % Room Air 90 bpm     3 97 % Room Air 86 bpm     4 96 % Room Air 96 bpm 119/62 3     Six Minute Walk Summary  6MWT Status: completed without stopping  Patient Reported: Other (Comment) (sore feet)     Interpretation:  Did the patient stop or pause?: No                                         Total Time Walked (Calculated): 360 seconds  Final Partial Lap Distance (feet): 175 feet  Total Distance Meters (Calculated): 297.18 meters  Predicted Distance Meters (Calculated): 465.06 meters  Percentage of Predicted (Calculated): 63.9  Peak VO2 (Calculated): 12.9  Mets: 3.69  Has The Patient Had a Previous Six Minute Walk Test?: Yes       Previous 6MWT Results  Has The Patient Had a Previous Six Minute Walk Test?: Yes  Date of Previous Test: 11/10/17  Total Time Walked: 360 seconds  Total Distance (meters): 312.42  Predicted Distance (meters): 500.47 meters  Percentage of Predicted: 62.43  Percent Change (Calculated): 0.05    "

## 2022-04-12 LAB
BRPFT: NORMAL
BRPFT: NORMAL
DLCO ADJ PRE: 15.9 ML/(MIN*MMHG)
DLCO SINGLE BREATH LLN: 19.65
DLCO SINGLE BREATH PRE REF: 59.8 %
DLCO SINGLE BREATH REF: 26.58
DLCOC SBVA LLN: 2.71
DLCOC SBVA PRE REF: 124 %
DLCOC SBVA REF: 3.94
DLCOC SINGLE BREATH LLN: 19.65
DLCOC SINGLE BREATH PRE REF: 59.8 %
DLCOC SINGLE BREATH REF: 26.58
DLCOVA LLN: 2.71
DLCOVA PRE REF: 124 %
DLCOVA PRE: 4.89 ML/(MIN*MMHG*L)
DLCOVA REF: 3.94
DLVAADJ PRE: 4.89 ML/(MIN*MMHG*L)
ERV LLN: -16448.92
ERV PRE REF: 48.9 %
ERV REF: 1.08
FEF 25 75 LLN: 1.2
FEF 25 75 PRE REF: 68.2 %
FEF 25 75 REF: 2.58
FEV1 FVC LLN: 64
FEV1 FVC PRE REF: 103.7 %
FEV1 FVC REF: 77
FEV1 LLN: 2.36
FEV1 PRE REF: 55 %
FEV1 REF: 3.2
FRCPLETH LLN: 2.56
FRCPLETH PREREF: 67.8 %
FRCPLETH REF: 3.54
FVC LLN: 3.13
FVC PRE REF: 52.9 %
FVC REF: 4.16
IVC PRE: 1.63 L
IVC SINGLE BREATH LLN: 3.13
IVC SINGLE BREATH PRE REF: 39.3 %
IVC SINGLE BREATH REF: 4.16
MVV LLN: 104
MVV PRE REF: 31.8 %
MVV REF: 123
PEF LLN: 6.21
PEF PRE REF: 47.4 %
PEF REF: 8.4
PRE DLCO: 15.9 ML/(MIN*MMHG)
PRE ERV: 0.53 L
PRE FEF 25 75: 1.76 L/S
PRE FET 100: 8.74 SEC
PRE FEV1 FVC: 79.88 %
PRE FEV1: 1.76 L
PRE FRC PL: 2.4 L
PRE FVC: 2.2 L
PRE MVV: 39 L/MIN
PRE PEF: 3.98 L/S
PRE RV: 1.75 L
PRE TLC: 4.15 L
RAW LLN: 3.06
RAW PRE REF: 190.6 %
RAW PRE: 5.83 CMH2O*S/L
RAW REF: 3.06
RV LLN: 1.79
RV PRE REF: 71.1 %
RV REF: 2.47
RVTLC LLN: 30
RVTLC PRE REF: 107.4 %
RVTLC PRE: 42.22 %
RVTLC REF: 39
TLC LLN: 5.59
TLC PRE REF: 61.6 %
TLC REF: 6.74
VA PRE: 3.25 L
VA SINGLE BREATH LLN: 6.59
VA SINGLE BREATH PRE REF: 49.4 %
VA SINGLE BREATH REF: 6.59
VC LLN: 3.13
VC PRE REF: 57.7 %
VC PRE: 2.4 L
VC REF: 4.16
VTGRAWPRE: 2.62 L

## 2022-04-13 DIAGNOSIS — I10 HYPERTENSION, UNSPECIFIED TYPE: Chronic | ICD-10-CM

## 2022-04-13 RX ORDER — EMPAGLIFLOZIN 25 MG/1
25 TABLET, FILM COATED ORAL DAILY
Qty: 30 TABLET | Refills: 11 | OUTPATIENT
Start: 2022-04-13

## 2022-04-13 RX ORDER — GLIPIZIDE 5 MG/1
5 TABLET ORAL 2 TIMES DAILY
OUTPATIENT
Start: 2022-04-13

## 2022-04-13 RX ORDER — SPIRONOLACTONE 25 MG/1
25 TABLET ORAL DAILY
Qty: 30 TABLET | Refills: 11 | OUTPATIENT
Start: 2022-04-13 | End: 2023-04-13

## 2022-04-13 NOTE — TELEPHONE ENCOUNTER
No new care gaps identified.  Powered by [employer] by [employer]. Reference number: [id number].   4/13/2022 11:41:25 AM CDT

## 2022-04-21 ENCOUNTER — PATIENT OUTREACH (OUTPATIENT)
Dept: ADMINISTRATIVE | Facility: OTHER | Age: 67
End: 2022-04-21
Payer: MEDICARE

## 2022-04-21 ENCOUNTER — DOCUMENTATION ONLY (OUTPATIENT)
Dept: RHEUMATOLOGY | Facility: CLINIC | Age: 67
End: 2022-04-21
Payer: MEDICARE

## 2022-04-22 ENCOUNTER — OFFICE VISIT (OUTPATIENT)
Dept: DIABETES | Facility: CLINIC | Age: 67
End: 2022-04-22
Payer: MEDICARE

## 2022-04-22 VITALS
DIASTOLIC BLOOD PRESSURE: 55 MMHG | WEIGHT: 261 LBS | HEART RATE: 85 BPM | SYSTOLIC BLOOD PRESSURE: 114 MMHG | HEIGHT: 68 IN | BODY MASS INDEX: 39.56 KG/M2

## 2022-04-22 DIAGNOSIS — Z79.4 UNCONTROLLED TYPE 2 DIABETES MELLITUS WITH HYPERGLYCEMIA, WITH LONG-TERM CURRENT USE OF INSULIN: Primary | ICD-10-CM

## 2022-04-22 DIAGNOSIS — E78.5 HYPERLIPIDEMIA ASSOCIATED WITH TYPE 2 DIABETES MELLITUS: ICD-10-CM

## 2022-04-22 DIAGNOSIS — I15.2 HYPERTENSION ASSOCIATED WITH DIABETES: ICD-10-CM

## 2022-04-22 DIAGNOSIS — L40.52 PSORIATIC ARTHRITIS, DESTRUCTIVE TYPE: ICD-10-CM

## 2022-04-22 DIAGNOSIS — E11.69 HYPERLIPIDEMIA ASSOCIATED WITH TYPE 2 DIABETES MELLITUS: ICD-10-CM

## 2022-04-22 DIAGNOSIS — E11.65 UNCONTROLLED TYPE 2 DIABETES MELLITUS WITH HYPERGLYCEMIA, WITH LONG-TERM CURRENT USE OF INSULIN: Primary | ICD-10-CM

## 2022-04-22 DIAGNOSIS — E11.59 HYPERTENSION ASSOCIATED WITH DIABETES: ICD-10-CM

## 2022-04-22 LAB — GLUCOSE SERPL-MCNC: 270 MG/DL (ref 70–110)

## 2022-04-22 PROCEDURE — 82962 GLUCOSE BLOOD TEST: CPT | Mod: S$GLB,,, | Performed by: NURSE PRACTITIONER

## 2022-04-22 PROCEDURE — 3078F DIAST BP <80 MM HG: CPT | Mod: CPTII,S$GLB,, | Performed by: NURSE PRACTITIONER

## 2022-04-22 PROCEDURE — 3074F SYST BP LT 130 MM HG: CPT | Mod: CPTII,S$GLB,, | Performed by: NURSE PRACTITIONER

## 2022-04-22 PROCEDURE — 3074F PR MOST RECENT SYSTOLIC BLOOD PRESSURE < 130 MM HG: ICD-10-PCS | Mod: CPTII,S$GLB,, | Performed by: NURSE PRACTITIONER

## 2022-04-22 PROCEDURE — 99999 PR PBB SHADOW E&M-EST. PATIENT-LVL V: CPT | Mod: PBBFAC,,, | Performed by: NURSE PRACTITIONER

## 2022-04-22 PROCEDURE — 3008F BODY MASS INDEX DOCD: CPT | Mod: CPTII,S$GLB,, | Performed by: NURSE PRACTITIONER

## 2022-04-22 PROCEDURE — 3046F HEMOGLOBIN A1C LEVEL >9.0%: CPT | Mod: CPTII,S$GLB,, | Performed by: NURSE PRACTITIONER

## 2022-04-22 PROCEDURE — 3066F PR DOCUMENTATION OF TREATMENT FOR NEPHROPATHY: ICD-10-PCS | Mod: CPTII,S$GLB,, | Performed by: NURSE PRACTITIONER

## 2022-04-22 PROCEDURE — 3288F FALL RISK ASSESSMENT DOCD: CPT | Mod: CPTII,S$GLB,, | Performed by: NURSE PRACTITIONER

## 2022-04-22 PROCEDURE — 1160F PR REVIEW ALL MEDS BY PRESCRIBER/CLIN PHARMACIST DOCUMENTED: ICD-10-PCS | Mod: CPTII,S$GLB,, | Performed by: NURSE PRACTITIONER

## 2022-04-22 PROCEDURE — 99214 PR OFFICE/OUTPT VISIT, EST, LEVL IV, 30-39 MIN: ICD-10-PCS | Mod: S$GLB,,, | Performed by: NURSE PRACTITIONER

## 2022-04-22 PROCEDURE — 3066F NEPHROPATHY DOC TX: CPT | Mod: CPTII,S$GLB,, | Performed by: NURSE PRACTITIONER

## 2022-04-22 PROCEDURE — 1101F PT FALLS ASSESS-DOCD LE1/YR: CPT | Mod: CPTII,S$GLB,, | Performed by: NURSE PRACTITIONER

## 2022-04-22 PROCEDURE — 1101F PR PT FALLS ASSESS DOC 0-1 FALLS W/OUT INJ PAST YR: ICD-10-PCS | Mod: CPTII,S$GLB,, | Performed by: NURSE PRACTITIONER

## 2022-04-22 PROCEDURE — 1159F PR MEDICATION LIST DOCUMENTED IN MEDICAL RECORD: ICD-10-PCS | Mod: CPTII,S$GLB,, | Performed by: NURSE PRACTITIONER

## 2022-04-22 PROCEDURE — 3078F PR MOST RECENT DIASTOLIC BLOOD PRESSURE < 80 MM HG: ICD-10-PCS | Mod: CPTII,S$GLB,, | Performed by: NURSE PRACTITIONER

## 2022-04-22 PROCEDURE — 82962 POCT GLUCOSE, HAND-HELD DEVICE: ICD-10-PCS | Mod: S$GLB,,, | Performed by: NURSE PRACTITIONER

## 2022-04-22 PROCEDURE — 3060F PR POS MICROALBUMINURIA RESULT DOCUMENTED/REVIEW: ICD-10-PCS | Mod: CPTII,S$GLB,, | Performed by: NURSE PRACTITIONER

## 2022-04-22 PROCEDURE — 3046F PR MOST RECENT HEMOGLOBIN A1C LEVEL > 9.0%: ICD-10-PCS | Mod: CPTII,S$GLB,, | Performed by: NURSE PRACTITIONER

## 2022-04-22 PROCEDURE — 1159F MED LIST DOCD IN RCRD: CPT | Mod: CPTII,S$GLB,, | Performed by: NURSE PRACTITIONER

## 2022-04-22 PROCEDURE — 3060F POS MICROALBUMINURIA REV: CPT | Mod: CPTII,S$GLB,, | Performed by: NURSE PRACTITIONER

## 2022-04-22 PROCEDURE — 3072F PR LOW RISK FOR RETINOPATHY: ICD-10-PCS | Mod: CPTII,S$GLB,, | Performed by: NURSE PRACTITIONER

## 2022-04-22 PROCEDURE — 99214 OFFICE O/P EST MOD 30 MIN: CPT | Mod: S$GLB,,, | Performed by: NURSE PRACTITIONER

## 2022-04-22 PROCEDURE — 3008F PR BODY MASS INDEX (BMI) DOCUMENTED: ICD-10-PCS | Mod: CPTII,S$GLB,, | Performed by: NURSE PRACTITIONER

## 2022-04-22 PROCEDURE — 3072F LOW RISK FOR RETINOPATHY: CPT | Mod: CPTII,S$GLB,, | Performed by: NURSE PRACTITIONER

## 2022-04-22 PROCEDURE — 3288F PR FALLS RISK ASSESSMENT DOCUMENTED: ICD-10-PCS | Mod: CPTII,S$GLB,, | Performed by: NURSE PRACTITIONER

## 2022-04-22 PROCEDURE — 99999 PR PBB SHADOW E&M-EST. PATIENT-LVL V: ICD-10-PCS | Mod: PBBFAC,,, | Performed by: NURSE PRACTITIONER

## 2022-04-22 PROCEDURE — 1160F RVW MEDS BY RX/DR IN RCRD: CPT | Mod: CPTII,S$GLB,, | Performed by: NURSE PRACTITIONER

## 2022-04-22 RX ORDER — DULAGLUTIDE 4.5 MG/.5ML
4.5 INJECTION, SOLUTION SUBCUTANEOUS
Qty: 4 PEN | Refills: 5 | Status: SHIPPED | OUTPATIENT
Start: 2022-04-22 | End: 2022-12-20 | Stop reason: SDUPTHER

## 2022-04-22 NOTE — PROGRESS NOTES
Subjective:         Patient ID: Trey Stevens is a 67 y.o. male.  Patient's current PCP is Brittanie Kaba MD.     Chief Complaint: Diabetes Mellitus    HPI  Trey Stevens is a 67 y.o. White male presenting for a follow up for diabetes. Patient has been diagnosed with type 2 diabetes since 2016 .    CURRENT DM MEDICATIONS:   Diabetes Medications             dulaglutide (TRULICITY) 3 mg/0.5 mL pen injector Inject 3 mg into the skin once a week.    glipiZIDE (GLUCOTROL) 5 MG tablet Take 5 mg by mouth 2 (two) times daily.    insulin (LANTUS SOLOSTAR U-100 INSULIN) glargine 100 units/mL (3mL) SubQ pen Inject 20 Units into the skin once daily.    insulin aspart U-100 (NOVOLOG FLEXPEN U-100 INSULIN) 100 unit/mL (3 mL) InPn pen Inject 6 Units into the skin 3 (three) times daily with meals.    JARDIANCE 25 mg tablet TAKE 1 TABLET BY MOUTH EVERY DAY    metFORMIN (GLUCOPHAGE-XR) 500 MG XR 24hr tablet Take 2 tablets (1,000 mg total) by mouth 2 (two) times daily with meals.        Past failed treatment include: Victoza- failure to control diabetes    Blood glucose testing is performed regularly. Patient is testing 1-2 times per day.  Meter:  One Touch Ultra  Preferred lab: [facility]    Any episodes of hypoglycemia? Denies    Complications related to diabetes: nephropathy and peripheral neuropathy    His blood sugar in the clinic today was:   Lab Results   Component Value Date    POCGLU 270 (A) 04/22/2022     Trey Stevens presents today for follow up visit to discuss diabetes management. No meter/logs today.  He is not taking his Novolog in any consistent fashion. He is also sometimes missing Lantus- he does feel he would be more consistent if he could start taking in the morning.  We discussed CeQur. States a [employer] pharmacist told him not to take 4 tablets of Metformin per day -reassured patient that he can take TDD of 2,000 mg. He verbalized understanding. He also reports taking Trulicity 2 injections every 2  weeks- states this is how it was written (though, written once a week on our side.) He is agreeable to start Trulicity once a week. He is willing to try CeQur to help with mealtime insulin compliance. blood sugar of 270 today, non-fasting, and he has not taken either insulins yet today. Needs cataract surgery but must have A1c <10%.     Hyperlipidemia- on Lipitor.    Psoriatic arthritis- Followed by rheumatology.     Sleep apnea- on CPAP.     Current diet: No specific diet  Activity Level: Walking    Lab Results   Component Value Date    HGBA1C 11.1 (H) 04/11/2022    HGBA1C 11.9 (H) 01/20/2022    HGBA1C 10.3 (H) 10/18/2021     STANDARDS OF CARE  Diabetes Management Status    Statin: Taking  ACE/ARB: Not taking    Screening or Prevention Patient's value Goal Complete/Controlled?   HgA1C Testing and Control   Lab Results   Component Value Date    HGBA1C 11.1 (H) 04/11/2022      Annually/Less than 8% No   Lipid profile : 04/11/2022 Annually Yes   LDL control Lab Results   Component Value Date    LDLCALC 69.4 04/11/2022    Annually/Less than 100 mg/dl  Yes   Nephropathy screening Lab Results   Component Value Date    LABMICR 104.0 04/11/2022     Lab Results   Component Value Date    PROTEINUA 2+ (A) 08/13/2021     No results found for: UTPCR   Annually Yes   Blood pressure BP Readings from Last 1 Encounters:   04/22/22 (!) 114/55    Less than 140/90 Yes   Dilated retinal exam : 03/14/2022 Annually Yes   Foot exam   : 02/17/2022 Annually Yes       Labs reviewed and are noted below.    Lab Results   Component Value Date    WBC 7.63 01/05/2022    HGB 15.4 01/05/2022    HCT 47.9 01/05/2022     01/05/2022    CHOL 135 04/11/2022    TRIG 153 (H) 04/11/2022    HDL 35 (L) 04/11/2022    LDLCALC 69.4 04/11/2022    ALT 48 (H) 01/05/2022    AST 36 01/05/2022     04/11/2022    K 4.6 04/11/2022     04/11/2022    ANIONGAP 15 04/11/2022    CREATININE 1.2 04/11/2022    ESTGFRAFRICA >60.0 04/11/2022    EGFRNONAA >60.0  04/11/2022    BUN 19 04/11/2022    CO2 21 (L) 04/11/2022    TSH 1.798 04/11/2022    PSA 0.24 04/11/2022    INR 1.0 08/14/2021     (H) 04/11/2022     Lab Results   Component Value Date    TSH 1.798 04/11/2022    CALCIUM 9.7 04/11/2022    PHOS 3.0 08/14/2021     No results found for: CPEPTIDE  No results found for: GLUTAMICACID  Glucose   Date Value Ref Range Status   04/11/2022 230 (H) 70 - 110 mg/dL Final     Anion Gap   Date Value Ref Range Status   04/11/2022 15 8 - 16 mmol/L Final     eGFR if    Date Value Ref Range Status   04/11/2022 >60.0 >60 mL/min/1.73 m^2 Final     eGFR if non    Date Value Ref Range Status   04/11/2022 >60.0 >60 mL/min/1.73 m^2 Final     Comment:     Calculation used to obtain the estimated glomerular filtration  rate (eGFR) is the CKD-EPI equation.          The following portions of the patient's history were reviewed and updated as appropriate: allergies, current medications, past family history, past medical history, past social history, past surgical history and problem list.    Review of patient's allergies indicates:  No Known Allergies  Social History     Socioeconomic History    Marital status: Single   Tobacco Use    Smoking status: Current Every Day Smoker     Packs/day: 1.00     Years: 28.00     Pack years: 28.00     Types: Cigarettes     Start date: 1994    Smokeless tobacco: Never Used    Tobacco comment: 7/30/2018 referral to smoking cessation program   Substance and Sexual Activity    Alcohol use: No     Alcohol/week: 0.0 standard drinks    Drug use: No    Sexual activity: Never     Past Medical History:   Diagnosis Date    Arthritis     Diabetes mellitus     Diabetes mellitus type 2, uncontrolled 7/19/2016    DM (diabetes mellitus) 2015     09/04/2017    Gall stones     Obesity     Pneumonia of right middle lobe due to infectious organism 8/13/2021    Psoriasis (a type of skin inflammation)     Rheumatoid  arthritis of foot        REVIEW OF SYSTEMS:  Eyes No history of DR. Needs cataract surgery.  Cardiovascular: History of hyperlipidemia.  GI: Tolerating Trulicity well from a GI standpoint.  : History of nephropathy.  SKIN:Positive for psoriasis  Neuro: Positive neuropathy.  PSYCH: Current tobacco use.  ENDO: See HPI.        Objective:      Vitals:    04/22/22 1405   BP: (!) 114/55   Pulse: 85     RESPIRATORY: No respiratory distress  NEUROLOGIC: Cranial nerves II-XII grossly intact.   PSYCHIATRIC: Alert & oriented x3. Normal mood and affect.  FOOT EXAMINATION:  UTD  Assessment:       1. Uncontrolled type 2 diabetes mellitus with hyperglycemia, with long-term current use of insulin    2. Hyperlipidemia associated with type 2 diabetes mellitus    3. Hypertension associated with diabetes    4. Psoriatic arthritis, destructive type        Plan:   Trey was seen today for diabetes mellitus.    Diagnoses and all orders for this visit:    Uncontrolled type 2 diabetes mellitus with hyperglycemia, with long-term current use of insulin  -     POCT Glucose, Hand-Held Device  -     dulaglutide (TRULICITY) 4.5 mg/0.5 mL pen injector; Inject 4.5 mg into the skin every 7 days.  -     Ambulatory referral/consult to Diabetes Education; Future-For Cequr training once received.    -- Medications adjusted for today's visit include:    Continue Lantus to 20 units once daily.    Continue Novolog 6 units three times a day before each main meal UNTIL you receive Cequr. You will need training on this device.  Plan to initiate 3 clicks/meals.    Continue Metformin 2 tablets twice a day, Jardiance once a day, and change Trulicity: 4.5 mg ONCE a week.     Hyperlipidemia associated with type 2 diabetes mellitus-Chronic,stable    -Suspect improvement in triglyceride level when diabetes control is better. Continue current.    Hypertension associated with diabetes-Chronic,stable    -Blood pressure is well controlled. Continue  "current.    Psoriatic arthritis, destructive type-Chronic    -Continue to follow up with rhematology as advised.    - Follow up: 6 weeks     I spent a total of 30 minutes on the day of the visit.This includes face to face time and non-face to face time preparing to see the patient (eg, review of tests), documenting clinical information in the electronic record, independently interpreting results and communicating results to the patient.    Portions of this note may have been created with voice recognition software. Occasional "wrong-word" or "sound-a-like" substitutions may have occurred due to the inherent limitations of voice recognition software. Please, read the note carefully and recognize, using context, where substitutions have occurred.           Sheri Ammons,FNP-C [facility]  "

## 2022-04-22 NOTE — PATIENT INSTRUCTIONS
-- Medications adjusted for today's visit include:    Continue Lantus to 20 units once daily.    Continue Novolog 6 units three times a day before each main meal UNTIL you receive Cequr. You will need training on this device.     Continue Metformin 2 tablets twice a day, Jardiance once a day, and change Trulicity: 4.5 mg ONCE a week.     -- Start checking blood sugar 3 times daily: Fasting blood sugar and vary your next 2 readings: Before lunch, before supper, 2 hours after any meal, or bedtime.   -Blood sugar goals should be a fasting blood sugar below 130, and no blood sugars throughout the day over 180 is good.    --Carry glucose tablets/soft peppermints/regular juice or Coke product with you at all times to treat a low blood sugar episode (less than 70). If your blood sugar is between 50-70, Chew 4 tablets or drink 1/2 cup of juice or regular Coke product. If your blood sugar is below 50, double the treatment. Re-check blood sugar in 15 minutes. If still low, repeat this. Always call the clinic to give an update for any low blood sugar episodes.    --Follow-up for your next visit in 6 weeks with logs.    --Please either drop off, fax, or MyChart your readings to me in a couple of weeks if you can.

## 2022-04-25 ENCOUNTER — PATIENT OUTREACH (OUTPATIENT)
Dept: ADMINISTRATIVE | Facility: HOSPITAL | Age: 67
End: 2022-04-25
Payer: MEDICARE

## 2022-04-27 ENCOUNTER — OFFICE VISIT (OUTPATIENT)
Dept: RHEUMATOLOGY | Facility: CLINIC | Age: 67
End: 2022-04-27
Payer: MEDICARE

## 2022-04-27 ENCOUNTER — LAB VISIT (OUTPATIENT)
Dept: LAB | Facility: HOSPITAL | Age: 67
End: 2022-04-27
Payer: MEDICARE

## 2022-04-27 ENCOUNTER — TELEPHONE (OUTPATIENT)
Dept: RHEUMATOLOGY | Facility: CLINIC | Age: 67
End: 2022-04-27

## 2022-04-27 VITALS
HEIGHT: 68 IN | HEART RATE: 80 BPM | WEIGHT: 257.25 LBS | SYSTOLIC BLOOD PRESSURE: 128 MMHG | BODY MASS INDEX: 38.99 KG/M2 | DIASTOLIC BLOOD PRESSURE: 73 MMHG

## 2022-04-27 DIAGNOSIS — L40.52 PSORIATIC ARTHRITIS, DESTRUCTIVE TYPE: ICD-10-CM

## 2022-04-27 DIAGNOSIS — L40.50 PSORIATIC ARTHRITIS: ICD-10-CM

## 2022-04-27 DIAGNOSIS — Z79.631 METHOTREXATE, LONG TERM, CURRENT USE: ICD-10-CM

## 2022-04-27 DIAGNOSIS — D84.9 IMMUNOSUPPRESSED STATUS: ICD-10-CM

## 2022-04-27 DIAGNOSIS — Z79.899 HIGH RISK MEDICATION USE: Primary | ICD-10-CM

## 2022-04-27 DIAGNOSIS — L40.9 PSORIASIS: ICD-10-CM

## 2022-04-27 LAB
ALBUMIN SERPL BCP-MCNC: 3.8 G/DL (ref 3.5–5.2)
ALP SERPL-CCNC: 104 U/L (ref 55–135)
ALT SERPL W/O P-5'-P-CCNC: 30 U/L (ref 10–44)
ANION GAP SERPL CALC-SCNC: 12 MMOL/L (ref 8–16)
AST SERPL-CCNC: 22 U/L (ref 10–40)
BASOPHILS # BLD AUTO: 0.05 K/UL (ref 0–0.2)
BASOPHILS NFR BLD: 0.6 % (ref 0–1.9)
BILIRUB SERPL-MCNC: 0.7 MG/DL (ref 0.1–1)
BUN SERPL-MCNC: 13 MG/DL (ref 8–23)
CALCIUM SERPL-MCNC: 9.6 MG/DL (ref 8.7–10.5)
CHLORIDE SERPL-SCNC: 101 MMOL/L (ref 95–110)
CO2 SERPL-SCNC: 26 MMOL/L (ref 23–29)
CREAT SERPL-MCNC: 1.1 MG/DL (ref 0.5–1.4)
CRP SERPL-MCNC: 6.8 MG/L (ref 0–8.2)
DIFFERENTIAL METHOD: ABNORMAL
EOSINOPHIL # BLD AUTO: 0.4 K/UL (ref 0–0.5)
EOSINOPHIL NFR BLD: 4.3 % (ref 0–8)
ERYTHROCYTE [DISTWIDTH] IN BLOOD BY AUTOMATED COUNT: 18 % (ref 11.5–14.5)
ERYTHROCYTE [SEDIMENTATION RATE] IN BLOOD BY WESTERGREN METHOD: 33 MM/HR (ref 0–10)
EST. GFR  (AFRICAN AMERICAN): >60 ML/MIN/1.73 M^2
EST. GFR  (NON AFRICAN AMERICAN): >60 ML/MIN/1.73 M^2
GLUCOSE SERPL-MCNC: 136 MG/DL (ref 70–110)
HCT VFR BLD AUTO: 45.2 % (ref 40–54)
HGB BLD-MCNC: 15.1 G/DL (ref 14–18)
IMM GRANULOCYTES # BLD AUTO: 0.03 K/UL (ref 0–0.04)
IMM GRANULOCYTES NFR BLD AUTO: 0.3 % (ref 0–0.5)
LYMPHOCYTES # BLD AUTO: 2 K/UL (ref 1–4.8)
LYMPHOCYTES NFR BLD: 23 % (ref 18–48)
MCH RBC QN AUTO: 29.3 PG (ref 27–31)
MCHC RBC AUTO-ENTMCNC: 33.4 G/DL (ref 32–36)
MCV RBC AUTO: 88 FL (ref 82–98)
MONOCYTES # BLD AUTO: 0.7 K/UL (ref 0.3–1)
MONOCYTES NFR BLD: 8.4 % (ref 4–15)
NEUTROPHILS # BLD AUTO: 5.5 K/UL (ref 1.8–7.7)
NEUTROPHILS NFR BLD: 63.4 % (ref 38–73)
NRBC BLD-RTO: 0 /100 WBC
PLATELET # BLD AUTO: 300 K/UL (ref 150–450)
PMV BLD AUTO: 10 FL (ref 9.2–12.9)
POTASSIUM SERPL-SCNC: 4.7 MMOL/L (ref 3.5–5.1)
PROT SERPL-MCNC: 8.2 G/DL (ref 6–8.4)
RBC # BLD AUTO: 5.15 M/UL (ref 4.6–6.2)
SODIUM SERPL-SCNC: 139 MMOL/L (ref 136–145)
WBC # BLD AUTO: 8.69 K/UL (ref 3.9–12.7)

## 2022-04-27 PROCEDURE — 86140 C-REACTIVE PROTEIN: CPT

## 2022-04-27 PROCEDURE — 1160F RVW MEDS BY RX/DR IN RCRD: CPT | Mod: CPTII,S$GLB,,

## 2022-04-27 PROCEDURE — 3046F HEMOGLOBIN A1C LEVEL >9.0%: CPT | Mod: CPTII,S$GLB,,

## 2022-04-27 PROCEDURE — 99215 PR OFFICE/OUTPT VISIT, EST, LEVL V, 40-54 MIN: ICD-10-PCS | Mod: S$GLB,,,

## 2022-04-27 PROCEDURE — 99999 PR PBB SHADOW E&M-EST. PATIENT-LVL V: CPT | Mod: PBBFAC,,,

## 2022-04-27 PROCEDURE — 3008F PR BODY MASS INDEX (BMI) DOCUMENTED: ICD-10-PCS | Mod: CPTII,S$GLB,,

## 2022-04-27 PROCEDURE — 1159F MED LIST DOCD IN RCRD: CPT | Mod: CPTII,S$GLB,,

## 2022-04-27 PROCEDURE — 99215 OFFICE O/P EST HI 40 MIN: CPT | Mod: S$GLB,,,

## 2022-04-27 PROCEDURE — 3288F PR FALLS RISK ASSESSMENT DOCUMENTED: ICD-10-PCS | Mod: CPTII,S$GLB,,

## 2022-04-27 PROCEDURE — 80053 COMPREHEN METABOLIC PANEL: CPT

## 2022-04-27 PROCEDURE — 36415 COLL VENOUS BLD VENIPUNCTURE: CPT

## 2022-04-27 PROCEDURE — 1101F PT FALLS ASSESS-DOCD LE1/YR: CPT | Mod: CPTII,S$GLB,,

## 2022-04-27 PROCEDURE — 1160F PR REVIEW ALL MEDS BY PRESCRIBER/CLIN PHARMACIST DOCUMENTED: ICD-10-PCS | Mod: CPTII,S$GLB,,

## 2022-04-27 PROCEDURE — 3046F PR MOST RECENT HEMOGLOBIN A1C LEVEL > 9.0%: ICD-10-PCS | Mod: CPTII,S$GLB,,

## 2022-04-27 PROCEDURE — 3060F POS MICROALBUMINURIA REV: CPT | Mod: CPTII,S$GLB,,

## 2022-04-27 PROCEDURE — 3008F BODY MASS INDEX DOCD: CPT | Mod: CPTII,S$GLB,,

## 2022-04-27 PROCEDURE — 85651 RBC SED RATE NONAUTOMATED: CPT

## 2022-04-27 PROCEDURE — 3072F PR LOW RISK FOR RETINOPATHY: ICD-10-PCS | Mod: CPTII,S$GLB,,

## 2022-04-27 PROCEDURE — 99999 PR PBB SHADOW E&M-EST. PATIENT-LVL V: ICD-10-PCS | Mod: PBBFAC,,,

## 2022-04-27 PROCEDURE — 3288F FALL RISK ASSESSMENT DOCD: CPT | Mod: CPTII,S$GLB,,

## 2022-04-27 PROCEDURE — 3060F PR POS MICROALBUMINURIA RESULT DOCUMENTED/REVIEW: ICD-10-PCS | Mod: CPTII,S$GLB,,

## 2022-04-27 PROCEDURE — 3066F PR DOCUMENTATION OF TREATMENT FOR NEPHROPATHY: ICD-10-PCS | Mod: CPTII,S$GLB,,

## 2022-04-27 PROCEDURE — 3066F NEPHROPATHY DOC TX: CPT | Mod: CPTII,S$GLB,,

## 2022-04-27 PROCEDURE — 3072F LOW RISK FOR RETINOPATHY: CPT | Mod: CPTII,S$GLB,,

## 2022-04-27 PROCEDURE — 85025 COMPLETE CBC W/AUTO DIFF WBC: CPT

## 2022-04-27 PROCEDURE — 1101F PR PT FALLS ASSESS DOC 0-1 FALLS W/OUT INJ PAST YR: ICD-10-PCS | Mod: CPTII,S$GLB,,

## 2022-04-27 PROCEDURE — 1159F PR MEDICATION LIST DOCUMENTED IN MEDICAL RECORD: ICD-10-PCS | Mod: CPTII,S$GLB,,

## 2022-04-27 RX ORDER — FOLIC ACID 1 MG/1
1000 TABLET ORAL DAILY
Qty: 30 TABLET | Refills: 11 | Status: SHIPPED | OUTPATIENT
Start: 2022-04-27 | End: 2022-08-11 | Stop reason: SDUPTHER

## 2022-04-27 RX ORDER — METHOTREXATE 2.5 MG/1
20 TABLET ORAL
Qty: 32 TABLET | Refills: 3 | Status: SHIPPED | OUTPATIENT
Start: 2022-04-27 | End: 2022-05-19

## 2022-04-27 NOTE — PROGRESS NOTES
Subjective:       Patient ID: Trey Stevens is a 67 y.o. male.    Chief Complaint: Psoriatic Arthritis      Trey is here for routine follow up.     He has chronic psoriatic arthritis and severe plaque psoriasis.  Currently on Cosentyx 300 mg every 14 days and methotrexate 20 mg per week, daily folic acid.  Also using topical Dovonex Persistent severe rash nestor arms and legs; Thickened indurated plaques on forearms and elbows with diffuse rash.  Pain 5/10 today in his bilateral feet.  Has persistent neuropathy          Prior therapies:  Failed mtx monotherapy, failed topicals and UV;  Failed humira and Enbrel        Recent data on Optimize shows pt >90 kg - lower dose was not effective  We changed him to 300 mg Q 14 day to reach drug therapeutic level to clear the skin  Did 6 week trial and skin was starting to improve  Have not been able to clear his skin w/any agents tried     He has multiple lung nodules which are followed by pulm and stable per his last pulmonary April 2022, his breathing is stable    Was on vit D 50K weekly but now on otc Vit D3 5000 daily          Psoriasis  Associated symptoms include arthralgias, numbness and a rash. Pertinent negatives include no abdominal pain, chest pain, chills, congestion, coughing, fatigue, fever, headaches, joint swelling, myalgias, nausea, vomiting or weakness.           He reports no joint swelling. Pertinent negatives include no dysuria, fatigue, fever, myalgias or headaches.         Review of Systems   Constitutional: Negative for chills, fatigue, fever and unexpected weight change.        Poor hygiene      HENT: Negative for congestion, mouth sores and rhinorrhea.    Eyes: Negative for pain, discharge and redness.   Respiratory: Negative for cough, shortness of breath and wheezing.    Cardiovascular: Negative for chest pain and leg swelling.   Gastrointestinal: Negative for abdominal pain, diarrhea, nausea and vomiting.   Endocrine: Negative for cold intolerance  "and heat intolerance.   Genitourinary: Negative for dysuria.   Musculoskeletal: Positive for arthralgias. Negative for joint swelling and myalgias.   Skin: Positive for color change and rash.   Allergic/Immunologic: Negative for immunocompromised state.   Neurological: Positive for numbness. Negative for weakness and headaches.        Diabetic neuropathy   Psychiatric/Behavioral: The patient is not nervous/anxious.          Objective:   /73   Pulse 80   Ht 5' 8" (1.727 m)   Wt 116.7 kg (257 lb 4.4 oz)   BMI 39.12 kg/m²      Physical Exam   Constitutional: He is oriented to person, place, and time.   HENT:   Head: Normocephalic and atraumatic.   Eyes: Pupils are equal, round, and reactive to light. Right eye exhibits no discharge.   Cardiovascular: Normal rate, regular rhythm and normal heart sounds. Exam reveals no friction rub.   Pulmonary/Chest: Effort normal and breath sounds normal. No respiratory distress.   Abdominal: Soft. He exhibits no distension. There is no abdominal tenderness.   Musculoskeletal:         General: Deformity present. Normal range of motion.      Cervical back: Normal range of motion.      Comments: Bilateral wrists, mcps no synovitis good rom   nestor dips with chronic damage, bony enlargement     nestor Right 5th dip flexion deformity,   Left thumb no flexion to the IP joint, right thumb ip joint motion decreased     good rom to nestor ankles, no swelling  nestor feet very dirty, 2-5 toes deviate laterally.  Mild lower ext edema   Lymphadenopathy:     He has no cervical adenopathy.   Neurological: He is alert and oriented to person, place, and time.   Skin: Rash noted. No erythema.        Psoriasis rash bilateral elbows and small patch left hip    Vitiligo bilateral forearms  See photos from today     Psychiatric: Mood normal.         Photos from January 2022                Photos from June 2021                      Immunization History   Administered Date(s) Administered    COVID-19, " MRNA, LN-S, PF (Pfizer) (Purple Cap) 09/16/2021, 10/07/2021    Hepatitis A, Adult 12/20/2018    Influenza - High Dose - PF (65 years and older) 10/18/2018    Influenza - Quadrivalent 10/24/2016    Influenza - Quadrivalent - PF *Preferred* (6 months and older) 10/30/2017, 11/07/2019, 11/04/2020, 01/18/2022    PPD Test 08/05/2013    Pneumococcal Conjugate - 13 Valent 02/24/2016    Pneumococcal Polysaccharide - 23 Valent 04/09/2013    Tdap 02/24/2016             Recent Results (from the past 168 hour(s))   POCT Glucose, Hand-Held Device    Collection Time: 04/22/22  2:03 PM   Result Value Ref Range    POC Glucose 270 (A) 70 - 110 MG/DL   CBC Auto Differential    Collection Time: 04/27/22 10:36 AM   Result Value Ref Range    WBC 8.69 3.90 - 12.70 K/uL    RBC 5.15 4.60 - 6.20 M/uL    Hemoglobin 15.1 14.0 - 18.0 g/dL    Hematocrit 45.2 40.0 - 54.0 %    MCV 88 82 - 98 fL    MCH 29.3 27.0 - 31.0 pg    MCHC 33.4 32.0 - 36.0 g/dL    RDW 18.0 (H) 11.5 - 14.5 %    Platelets 300 150 - 450 K/uL    MPV 10.0 9.2 - 12.9 fL    Immature Granulocytes 0.3 0.0 - 0.5 %    Gran # (ANC) 5.5 1.8 - 7.7 K/uL    Immature Grans (Abs) 0.03 0.00 - 0.04 K/uL    Lymph # 2.0 1.0 - 4.8 K/uL    Mono # 0.7 0.3 - 1.0 K/uL    Eos # 0.4 0.0 - 0.5 K/uL    Baso # 0.05 0.00 - 0.20 K/uL    nRBC 0 0 /100 WBC    Gran % 63.4 38.0 - 73.0 %    Lymph % 23.0 18.0 - 48.0 %    Mono % 8.4 4.0 - 15.0 %    Eosinophil % 4.3 0.0 - 8.0 %    Basophil % 0.6 0.0 - 1.9 %    Differential Method Automated      Lab Results   Component Value Date    HEPAIGM Negative 06/19/2018    HEPBIGM Negative 06/19/2018    HEPBCAB Negative 04/04/2016    HEPCAB Negative 07/22/2019     Lab Results   Component Value Date    TBGOLDPLUS Negative 10/18/2021          Ref. Range 2/19/2018 11:52   Vit D, 25-Hydroxy Latest Ref Range: 30 - 96 ng/mL 29 (L)             X-ray bilateral foot 10/2017  Findings: No acute fractures or dislocations visualized.  Lateral deviation of multiple bilateral toes  again noted with joint space loss and pencil in cup erosive/productive changes involving multiple bilateral MTPs as well as the right 4th DIPs.  Findings remain most severe the 5th MTPs and are suggestive of psoriatic arthritis.  Other IP joints appear ankylosed bilaterally.   No new erosive osseous changes demonstrated.    X-ray bilateral hand 10/2017  Findings: No acute fractures or dislocations visualized.  Possible flexion deformity at the right 5th DIPs which appears unchanged from prior. Moderate osteoarthritis noted throughout the bilateral DIP joints with some suspected central erosive changes suggesting probable element of erosive OA.  Degenerative findings at the bilateral 1st CMC's and radiocarpal joints also noted.  Minimal degenerative findings present at multiple bilateral MCPs.  Overall no appreciable change from prior.    Assessment:       1. Psoriasis    2. Psoriatic arthritis    3. Psoriatic arthritis, destructive type    4. Methotrexate, long term, current use          Chronic refractory Psoriatic arthritis and skin  Psoriasis:  Improved but still skin rash present  On  Cosentyx maintenance dose 300 mg every 14 days   suboptimals response to 300 mg/month dose    msk -foot/ankle involvement   skin not controlled was improved on higher dose of Cosentyx x 6 weeks   On background mtx 20 mg week split dose-  Out of med  Since late march-now rash flaring again bsa back up to 12-13%      VICENTE-28 (CRP): -- Todays labs pending  CDAI Score: --        In the past   Failed mtx monotherapy; failed enbrel and  humira   Failed topicals and UV trt     Drug therapy requiring intensive monitoring for toxicity  - High Risk Medication Monitoring encounter  -No current medication related issues, no evidence of toxicity  -I ordered lab results, reviewed and interpreted results as well as discussed findings with the patient      Immunosuppressed Status  - Compromised immune system secondary to autoimmune disease and use  of immunosuppressive drugs.   - Monitor carefully for infections and toxicities- Currently denies issues with recurrent infections      - Advised to get immediate medical care if any infection.   - Also advised strict adherence to age appropriate vaccinations and cancer screenings with PCP.  - Recommend keeping Up to date on all recommended Vaccinations akira. ShingRx, Pneumovax, Prevnar, yearly flu vaccine       Vit D deficiency: completed vit D 50,000U/2Xweekly, last level (May 2020) low 27 now on otc Vit D3 5000 twice weekly ,  Unable to get updated vitamin-D level due to blood tube shortage.     Chronic lung nodules- stable monitored by pulmonology- seen yearly, next visit  feb 2021    tob use-patient reports continued cessation of smoking.      Mild elevated LFT - will follow -labs pending today    Plan:         May need to get Dermatology on board for future psoriasis management.    C/w mtx 20 mg Q week, split dose    He takes Cosentyx on Saturdays, so discussed with him to take methotrexate every single Saturday and no other days of the week.  Folic acid once a day    C/w  cosentyx 300 mg Q 14 days   See approval info in media from [employer]    Https://www.ncbi.nlm.nih.gov/pubmed/32771468    Optimise StudyCONCLUSIONS:  Standard q4w dosing of secukinumab 300 mg is the optimal dosing regimen to achieve and maintain clear or almost clear skin. Patients with body weight ? 90 kg not achieving PASI 90 at week 24 may benefit from the q2w dosing regimen. What's already known about this topic? Individual responses to biologics in patients with psoriasis vary considerably and there may be a need to individualize treatment. Dose optimization strategies of currently available biologic drugs have been investigated mainly in rheumatic disorders. Secukinumab has shown long-term PASI 90/100 responses (percentage improvement in Psoriasis Area and Severity Index) to year 5 in patients with moderate-to-severe plaque psoriasis when  used at the dose of 300 mg every 4 weeks. What does this study add? Standard every 4 week (q4w) dosing of secukinumab 300 mg is the optimal regimen to achieve and maintain clear or almost clear skin at week 52; the majority of the patients (85·7%) maintain PASI 90 at week 52. Superiority of intensified (q2w) dosing over the q4w regimen could not be claimed. However, patients with a higher body weight (? 90 kg) not achieving PASI 90 response at week 24 may benefit from q2w dosing.    Always remember to Hold mtx and Cosentyx  for signs of infection or for surgery   Pt verbalized understanding    TB gold and acute hep panel done 6/2020- neg, no need to repeat yet  Do every 1-2 years while on biologics  Due June of this year 2022     C/w otc vit D3 to 5K every day, check d level Q year.  Unable to get updated level at this time due to blood tube shortage.    Return in 16 weeks w/reg 4 labs and TB gold    Repeat  xrays hands and feet June 2022    Congratulated patient on smoking cessation.  Encouraged continued cessation.    Please call or send portal message with any questions or concerns       Charlette Wilcox PA-C  [facility]  Rheumatology       40 minutes of total time spent on the encounter, which includes face to face time and non-face to face time preparing to see the patient (eg, review of tests), Obtaining and/or reviewing separately obtained history, Documenting clinical information in the electronic or other health record, Independently interpreting results (not separately reported) and communicating results to the patient/family/caregiver, or Care coordination (not separately reported).    Disclaimer: This note was prepared using voice recognition system and is likely to have sound alike errors and is not proof read.  Please call me with any questions

## 2022-04-27 NOTE — TELEPHONE ENCOUNTER
----- Message from Camille Moore sent at 4/27/2022  3:41 PM CDT -----  Contact: Meredith/Tayler Harper would like a call back at [phone] and or fax to [phone], Regards to the Cosentyz 150 mg she stated that a new prescription is needed.    Thanks  Td

## 2022-04-28 ENCOUNTER — TELEPHONE (OUTPATIENT)
Dept: SMOKING CESSATION | Facility: CLINIC | Age: 67
End: 2022-04-28
Payer: MEDICARE

## 2022-04-28 NOTE — TELEPHONE ENCOUNTER
Attempted to contact patient in regard to missed clinic intake appointment. Left recorded message with return contact information.

## 2022-04-29 ENCOUNTER — TELEPHONE (OUTPATIENT)
Dept: DIABETES | Facility: CLINIC | Age: 67
End: 2022-04-29
Payer: MEDICARE

## 2022-04-29 DIAGNOSIS — Z79.4 UNCONTROLLED TYPE 2 DIABETES MELLITUS WITH HYPERGLYCEMIA, WITH LONG-TERM CURRENT USE OF INSULIN: Primary | ICD-10-CM

## 2022-04-29 DIAGNOSIS — E11.65 UNCONTROLLED TYPE 2 DIABETES MELLITUS WITH HYPERGLYCEMIA, WITH LONG-TERM CURRENT USE OF INSULIN: Primary | ICD-10-CM

## 2022-04-29 NOTE — TELEPHONE ENCOUNTER
----- Message from Bee Stinson NP sent at 4/29/2022  2:38 PM CDT -----  Patient should receive his Cequr device by Monday. Can we please have him scheduled for training, preferably in [place]? If he is willing to come to another location that is fine too. I placed the referral.

## 2022-04-29 NOTE — TELEPHONE ENCOUNTER
Unable to reach patient. Lvm for patient to return call to schedule training for Cequr device. Patient portal not setup.

## 2022-05-03 ENCOUNTER — TELEPHONE (OUTPATIENT)
Dept: DIABETES | Facility: CLINIC | Age: 67
End: 2022-05-03
Payer: MEDICARE

## 2022-05-03 DIAGNOSIS — Z79.4 UNCONTROLLED TYPE 2 DIABETES MELLITUS WITH HYPERGLYCEMIA, WITH LONG-TERM CURRENT USE OF INSULIN: Primary | ICD-10-CM

## 2022-05-03 DIAGNOSIS — E11.65 UNCONTROLLED TYPE 2 DIABETES MELLITUS WITH HYPERGLYCEMIA, WITH LONG-TERM CURRENT USE OF INSULIN: Primary | ICD-10-CM

## 2022-05-03 RX ORDER — INSULIN ASPART 100 [IU]/ML
INJECTION, SOLUTION INTRAVENOUS; SUBCUTANEOUS
Qty: 20 ML | Refills: 5 | Status: SHIPPED | OUTPATIENT
Start: 2022-05-03 | End: 2022-11-04

## 2022-05-03 NOTE — TELEPHONE ENCOUNTER
----- Message from Leida Melo sent at 5/3/2022  8:54 AM CDT -----  States he needs someone to show him how to use his patches (it injects medicine in before you eat) you put on for three days. States he read the book and he doesn't understand how to dot it. Please call pt [phone]. Thank you

## 2022-05-03 NOTE — TELEPHONE ENCOUNTER
Made appt for cequr . Informed pt to  insulin vial from [facility] later today. Informed pt to bring one of the vials and everything in his cequr shipment to appt.

## 2022-05-05 ENCOUNTER — TELEPHONE (OUTPATIENT)
Dept: DIABETES | Facility: CLINIC | Age: 67
End: 2022-05-05
Payer: MEDICARE

## 2022-05-05 ENCOUNTER — CLINICAL SUPPORT (OUTPATIENT)
Dept: DIABETES | Facility: CLINIC | Age: 67
End: 2022-05-05
Payer: MEDICARE

## 2022-05-05 DIAGNOSIS — E11.65 UNCONTROLLED TYPE 2 DIABETES MELLITUS WITH HYPERGLYCEMIA, WITH LONG-TERM CURRENT USE OF INSULIN: ICD-10-CM

## 2022-05-05 DIAGNOSIS — Z79.4 UNCONTROLLED TYPE 2 DIABETES MELLITUS WITH HYPERGLYCEMIA, WITH LONG-TERM CURRENT USE OF INSULIN: ICD-10-CM

## 2022-05-05 PROCEDURE — G0108 PR DIAB MANAGE TRN  PER INDIV: ICD-10-PCS | Mod: S$GLB,,,

## 2022-05-05 PROCEDURE — 99999 PR PBB SHADOW E&M-EST. PATIENT-LVL I: ICD-10-PCS | Mod: PBBFAC,,,

## 2022-05-05 PROCEDURE — 99999 PR PBB SHADOW E&M-EST. PATIENT-LVL I: CPT | Mod: PBBFAC,,,

## 2022-05-05 PROCEDURE — G0108 DIAB MANAGE TRN  PER INDIV: HCPCS | Mod: S$GLB,,,

## 2022-05-05 NOTE — PROGRESS NOTES
Diabetes Care Specialist Progress Note  Author: Michelle Ndiaye RN  Date: 5/5/2022    Program Intake  Reason for Diabetes Program Visit:: Intervention  Type of Intervention:: Individual  Individual: Device Training  Device Training: Insulin Pump Start  Current diabetes risk level:: high  In the last 12 months, have you:: none  Permission to speak with others about care:: no    Lab Results   Component Value Date    HGBA1C 11.1 (H) 04/11/2022     CURRENT MEDS  · Novolog 6 units TID before meals  · Lantus 20 units daily  · Trulicity 4.5 mg weekly (Saturday)  · Metformin 1,000 mg BID    Clinical    Clinical Assessment  Current Diabetes Treatment: Oral Medication, Injectable, Insulin  Have you ever experienced hypoglycemia (low blood sugar)?: no  Have you ever experienced hyperglycemia (high blood sugar)?: yes  Have you ever been hospitalized because your blood sugar was high?: no    Medication Information  How do you obtain your medications?: Patient drives  How many days a week do you miss your medications?: Never  Do you sometimes have difficulty refilling your medications?: No  Medication adherence impacting ability to self-manage diabetes?: No    Labs  Do you have regular lab work to monitor your medications?: Yes  Type of Regular Lab Work: A1c, Cholesterol, Microalbumin, CBC, BMP  Where do you get your labs drawn?: [facility]  Lab Compliance Barriers: No    Nutritional Status  Recent Changes in Weight: No Recent Weight Change  Current nutritional status an area of need that is impacting patient's ability to self-manage diabetes?: No    Additional Social History    Support  Does anyone support you with your diabetes care?: yes  Who supports you?: self  Who takes you to your medical appointments?: self  Does the current support meet the patient's needs?: Yes    Access to Mass Media & Technology  Does the patient have access to any of the following devices or technologies?: Smart phone  Media or technology needs  impacting ability to self-manage diabetes?: No    Cognitive/Behavioral Health  Alert and Oriented: Yes  Difficulty Thinking: No  Requires Prompting: No  Requires assistance for routine expression?: No  Cognitive or behavioral barriers impacting ability to self-manage diabetes?: No    Culture/Mandaeism  Culture or Religion beliefs that may impact ability to access healthcare: No    Communication  Language preference: English  Hearing Problems: No  Vision Problems: Yes  Vision problem type:: Decreased Vision  Vision Assistance: Glasses  Communication needs impacting ability to self-manage diabetes?: No    Health Literacy  Preferred Learning Method: Face to Face, Hands On, Demonstration, Reading Materials  How often do you need to have someone help you read instructions, pamphlets, or written material from your doctor or pharmacy?: Never  Health literacy needs impacting ability to self-manage diabetes?: No      Diabetes Self-Management Skills Assessment    Medications  Patient is able to describe current diabetes management routine.: yes  Diabetes management routine:: injectable medications, insulin, oral medications  Patient is able to identify current diabetes medications, dosages, and appropriate timing of medications.: yes  Patient understands the purpose of the medications taken for diabetes.: yes  Patient reports problems or concerns with current medication regimen.: no  Medication Skills Assessment Completed:: Yes  Assessment indicates:: Instruction Needed (CeQur training)  Area of need?: Yes    Home Blood Glucose Monitoring  Patient states that blood sugar is checked at home daily.: yes  Monitoring Method:: home glucometer  How often do you check your blood sugar?: 3 times a day  When do you check your blood sugar?: Other (Different times of day)  Blood glucose logs:: yes  Blood glucose logs reviewed today?: no  Home Blood Glucose Monitoring Skills Assessment Completed: : Yes  Assessment indicates:: Knowledge  deficit, Instruction Needed  Area of need?: Yes    Assessment Summary and Plan    Based on today's diabetes care assessment, the following areas of need were identified:      Social 5/5/2022   Support -   Access to Mass Media/Tech No   Cognitive/Behavioral Health No   Culture/Episcopal No   Communication No   Health Literacy No        Clinical 5/5/2022   Medication Adherence No   Lab Compliance No   Nutritional Status No        Diabetes Self-Management Skills 5/5/2022   Medication Yes-See care plan.     Home Blood Glucose Monitoring Yes-Patient is checking 3x/day, but encouraged him to check before each meal so he can dose his Novolog accordingly. Pt states he keeps a log of BGs in a notebook. Provided patient with paper logs, in case he wanted to use them.              Today's interventions were provided through individual discussion, instruction, and written materials were provided.      Patient verbalized understanding of instruction and written materials.  Pt was able to return back demonstration of instructions today. Patient understood key points, needs reinforcement and further instruction.     Diabetes Self-Management Care Plan:    Today's Diabetes Self-Management Care Plan was developed with Trey's input. Trey has agreed to work toward the following goal(s) to improve his/her overall diabetes control.      Care Plan: Diabetes Management   Updates made since 4/5/2022 12:00 AM      Problem: Medications       Goal: Patient agrees to take Novolog using CeQur Simplicity Insulin Patch.    Start Date: 5/5/2022   Expected End Date: 5/5/2023   Priority: High   Barriers: No Barriers Identified   Note:    Trained patient on use of CeQur simplicity 3-day insulin delivery patch with Novolog - 3 clicks before each meal, plus 1 click for BG >200, minus 1 click for BG <100.  Pt will continue 20 units of Lantus daily, 4.5 mg of Trulicity weekly (Saturday), and Metformin 1,000 mg BID    CeQur Simplicity 3-day insulin patch  training  · Described what the CeQur Simplicity patch is used for. Used demo patch to demonstrate how insulin will be delivered by squeezing both buttons. When squeezing only 1 button, no insulin is delivered.   · From patient's current prescription for Novolog, calculated the amount of insulin needed to fill the Patch based on daily dosage and 3-day maximum wear time.   · Calculation: 100 units per fill.     Used demo and written instructions to help patient with the following:   · Explained that Patch must be filled with a minimum of 100 units and a maximum of 200 units.   · FILL:  Attached the capped needle to the Fill syringe, filled the syringe with 100 units of insulin then filled the Patch. Discussed proper disposal of syringe.   · PRIME:  Removed air/bubbles from the Patch before priming, then primed the Patch by squeezing the Tan Buttons 4 more times.   · Placed the CHANGE BY sticker on the Patch  · PREPARED SITE:  Described recommended sites, site rotation and products to avoid and steps to ensure adhesive performance. Prepped site to create a clean and dry site.   · PLACED ON BODY:   is reusable; it has a yellow safety button and green button. Placed the Patch in the . Removed the blue cap and plastic liner, warning not to point the introducer needle at the face or other people.   Practiced placement of the Patch on a practice cushion then pt was able to place and insert his/her filled Patch to his/her abdomen. Removed  with needle and disposed properly.     Discussed dosing:   · Explained that 2 units are delivered each time the two buttons are properly squeezed and a click is heard.   · Demonstrated delivery of example doses by counting by 2s.   · Recommended to keep Patch dry for several hours after application  · Discussed removal and disposal of the Patch.     Safety:   · Squeezing one button will not deliver any insulin  · Described what to do when buttons lock and that the  "last squeeze of the buttons may have delivered less than 2 units.     Provided Mr. Stevens with typed up instructions for mealtime dosing, including checking his blood sugar before each meal and dosing as appropriate based on his blood sugar. Handout also included reminders when prepping CeQur patch such as the units he needs to place in the patch, the removal of "big" bubbles, and to prime with 4 additional clicks. Reminders also included to continue taking his other medications prescribed for his diabetes management including Trulicity, Lantus, and Metformin.     Provided patient with blood sugar log and encouraged him to document his clicks for each meal.     Patient was able to return demonstrate the process of preparing and putting on a patch. Denies any questions or concerns at this time. Patient aware he can call if he has any issues. Will follow-up in about 2 weeks.          Follow Up Plan     Follow up in about 2 weeks (around 5/19/2022).    Today's care plan and follow up schedule was discussed with patient.  Trey verbalized understanding of the care plan, goals, and agrees to follow up plan.        The patient was encouraged to communicate with his/her health care provider/physician and care team regarding his/her condition(s) and treatment.  I provided the patient with my contact information today and encouraged to contact me via phone or [facility] Patient Portal as needed.     Length of Visit   Total Time: 65 Minutes   "

## 2022-05-05 NOTE — TELEPHONE ENCOUNTER
Called to confirm appt at 1300 today 5/05/22. Reminded him to bring insulin, CeQur supplies, and meter. He understood. Call ended well.

## 2022-05-06 ENCOUNTER — SPECIALTY PHARMACY (OUTPATIENT)
Dept: PHARMACY | Facility: CLINIC | Age: 67
End: 2022-05-06
Payer: MEDICARE

## 2022-05-06 NOTE — TELEPHONE ENCOUNTER
Patient returned our call. I informed him that cosentyx requires a new PA. Routing to assigned [facility]     Pt needs his injection by next Friday

## 2022-05-09 ENCOUNTER — TELEPHONE (OUTPATIENT)
Dept: RHEUMATOLOGY | Facility: CLINIC | Age: 67
End: 2022-05-09
Payer: MEDICARE

## 2022-05-09 NOTE — TELEPHONE ENCOUNTER
----- Message from Lou Mcelroy sent at 5/9/2022  1:29 PM CDT -----  Contact: [facility] [phone]  Requesting an RX refill or new RX.  Is this a refill or new RX: new to pharmacy  RX name and strength (copy/paste from chart):  secukinumab (COSENTYX PEN) 150 mg/mL PnIj  Is this a 30 day or 90 day RX: 90  Pharmacy name and phone # (copy/paste from chart):    [facility] - [place], [place] - [address]  [address] [address]  Phone: [phone] Fax: [phone]  The doctors have asked that we provide their patients with the following 2 reminders -- prescription refills can take up to 72 hours, and a friendly reminder that in the future you can use your [facility] account to request refills: n/a

## 2022-05-09 NOTE — TELEPHONE ENCOUNTER
Called [facility] review- Cosentyx PA previously submitted for 4 mL/28 days and approved through 12/31/22 but Rx rejecting for needing PA.  Rep stated this has to do with the dose and a PA is needed.  Submitted PA over the phone with Celena.   She said additional clinical info can be faxed to [phone], include EOC ID [id number].     Additional clinical info including OPTIMISE study and chart notes faxed to [facility].

## 2022-05-10 NOTE — TELEPHONE ENCOUNTER
Specialty Pharmacy - Refill Coordination  Specialty Pharmacy - Medication/Referral Authorization    Specialty Medication Orders Linked to Encounter    Flowsheet Row Most Recent Value   Medication #1 secukinumab (COSENTYX PEN) 150 mg/mL PnIj (Order#[id number], Rx#[id number])          Refill Questions - Documented Responses    Flowsheet Row Most Recent Value   Patient Availability and HIPAA Verification    Does patient want to proceed with activity? Yes   HIPAA/medical authority confirmed? Yes   Relationship to patient of person spoken to? Self   Refill Screening Questions    Changes to allergies? No   Changes to medications? No   New conditions since last clinic visit? No   Unplanned office visit, urgent care, ED, or hospital admission in the last 4 weeks? No   How does patient/caregiver feel medication is working? Excellent   Financial problems or insurance changes? No   How many doses of your specialty medications were missed in the last 4 weeks? 0   Would patient like to speak to a pharmacist? No   When does the patient need to receive the medication? 05/13/22   Refill Delivery Questions    How will the patient receive the medication? Delivery [facility]   When does the patient need to receive the medication? 05/13/22   Shipping Address Home   Address in [facility] confirmed and updated if neccessary? Yes   Expected Copay ($) 0   Is the patient able to afford the medication copay? Yes   Payment Method zero copay   Days supply of Refill 28   Supplies needed? No supplies needed   Refill activity completed? Yes   Refill activity plan Refill scheduled   Shipment/Pickup Date: 05/12/22          Current Outpatient Medications   Medication Sig    amLODIPine (NORVASC) 5 MG tablet TAKE 1 TABLET BY MOUTH EVERY DAY    aspirin 81 MG Chew Take 1 tablet (81 mg total) by mouth once daily.    atorvastatin (LIPITOR) 80 MG tablet TAKE 1 TABLET BY MOUTH EVERY DAY    blood sugar diagnostic (ACCU-CHEK NOELLE PLUS TEST STRP) Strp  "Check blood sugar 3 times daily.    blood-glucose meter (ACCU-CHEK NOELLE PLUS METER) Misc Check blood sugar 3 times daily    calcipotriene (DOVONOX) 0.005 % cream APPLY TO AFFECTED AREA TWICE A DAY    clobetasol (OLUX) 0.05 % Foam AAA of scalp and behind ears twice daily.  Do not use on face, underarms or groin.    dulaglutide (TRULICITY) 4.5 mg/0.5 mL pen injector Inject 4.5 mg into the skin every 7 days.    ergocalciferol (ERGOCALCIFEROL) 50,000 unit Cap Take 50,000 Units by mouth twice a week.    folic acid (FOLVITE) 1 MG tablet Take 1 tablet (1,000 mcg total) by mouth once daily.    glipiZIDE (GLUCOTROL) 5 MG tablet Take 5 mg by mouth 2 (two) times daily.    insulin (LANTUS SOLOSTAR U-100 INSULIN) glargine 100 units/mL (3mL) SubQ pen Inject 20 Units into the skin once daily.    insulin aspart U-100 (NOVOLOG U-100 INSULIN ASPART) 100 unit/mL injection Inject 200 units per Cequr device every 3 days    JARDIANCE 25 mg tablet TAKE 1 TABLET BY MOUTH EVERY DAY    ketoconazole (NIZORAL) 2 % shampoo Wash hair with medicated shampoo at least 2x/week - let sit on scalp at least 5 minutes prior to rinsing    lancets (ACCU-CHEK SOFTCLIX LANCETS) Misc Check blood sugar 3 times daily    metFORMIN (GLUCOPHAGE-XR) 500 MG XR 24hr tablet Take 2 tablets (1,000 mg total) by mouth 2 (two) times daily with meals.    methotrexate 2.5 MG Tab Take 8 tablets (20 mg total) by mouth every 7 days.    pen needle, diabetic 32 gauge x 5/32" Ndle Use with Lantus once daily and Humalog 3 times daily.    PROAIR HFA 90 mcg/actuation inhaler Inhale 2 puffs into the lungs every 4 (four) hours as needed for Wheezing. Rescue    secukinumab (COSENTYX PEN) 150 mg/mL PnIj Inject 2 pens (300 mg) into the skin every 14 (fourteen) days.    spironolactone (ALDACTONE) 25 MG tablet Take 1 tablet (25 mg total) by mouth once daily.    traMADoL (ULTRAM) 50 mg tablet Take 1 tablet (50 mg total) by mouth 3 (three) times daily as needed for Pain " (use for mod pain; use sparingly).   Last reviewed on 4/27/2022 12:12 PM by Charlette Wilcox PA-C    Review of patient's allergies indicates:  No Known Allergies Last reviewed on  5/3/2022 11:06 AM by Bee Stinson      Tasks added this encounter   No tasks added.   Tasks due within next 3 months   5/6/2022 - Refill Call (Auto Added)     [facility]  [address] [place] [id number]  Phone: [phone]  Fax: [phone]

## 2022-05-18 ENCOUNTER — OFFICE VISIT (OUTPATIENT)
Dept: PODIATRY | Facility: CLINIC | Age: 67
End: 2022-05-18
Payer: MEDICARE

## 2022-05-18 VITALS — HEIGHT: 68 IN | BODY MASS INDEX: 38.99 KG/M2 | WEIGHT: 257.25 LBS

## 2022-05-18 DIAGNOSIS — E11.49 TYPE II DIABETES MELLITUS WITH NEUROLOGICAL MANIFESTATIONS: Primary | ICD-10-CM

## 2022-05-18 DIAGNOSIS — L84 CORN OR CALLUS: ICD-10-CM

## 2022-05-18 DIAGNOSIS — B35.1 DERMATOPHYTOSIS OF NAIL: ICD-10-CM

## 2022-05-18 PROCEDURE — 99499 NO LOS: ICD-10-PCS | Mod: S$GLB,,, | Performed by: PODIATRIST

## 2022-05-18 PROCEDURE — 1101F PR PT FALLS ASSESS DOC 0-1 FALLS W/OUT INJ PAST YR: ICD-10-PCS | Mod: CPTII,S$GLB,, | Performed by: PODIATRIST

## 2022-05-18 PROCEDURE — 3046F HEMOGLOBIN A1C LEVEL >9.0%: CPT | Mod: CPTII,S$GLB,, | Performed by: PODIATRIST

## 2022-05-18 PROCEDURE — 3066F PR DOCUMENTATION OF TREATMENT FOR NEPHROPATHY: ICD-10-PCS | Mod: CPTII,S$GLB,, | Performed by: PODIATRIST

## 2022-05-18 PROCEDURE — 3288F FALL RISK ASSESSMENT DOCD: CPT | Mod: CPTII,S$GLB,, | Performed by: PODIATRIST

## 2022-05-18 PROCEDURE — 99999 PR PBB SHADOW E&M-EST. PATIENT-LVL IV: ICD-10-PCS | Mod: PBBFAC,,, | Performed by: PODIATRIST

## 2022-05-18 PROCEDURE — 11721 DEBRIDE NAIL 6 OR MORE: CPT | Mod: 59,Q9,S$GLB, | Performed by: PODIATRIST

## 2022-05-18 PROCEDURE — 3008F BODY MASS INDEX DOCD: CPT | Mod: CPTII,S$GLB,, | Performed by: PODIATRIST

## 2022-05-18 PROCEDURE — 3060F POS MICROALBUMINURIA REV: CPT | Mod: CPTII,S$GLB,, | Performed by: PODIATRIST

## 2022-05-18 PROCEDURE — 3008F PR BODY MASS INDEX (BMI) DOCUMENTED: ICD-10-PCS | Mod: CPTII,S$GLB,, | Performed by: PODIATRIST

## 2022-05-18 PROCEDURE — 1160F RVW MEDS BY RX/DR IN RCRD: CPT | Mod: CPTII,S$GLB,, | Performed by: PODIATRIST

## 2022-05-18 PROCEDURE — 1159F MED LIST DOCD IN RCRD: CPT | Mod: CPTII,S$GLB,, | Performed by: PODIATRIST

## 2022-05-18 PROCEDURE — 99999 PR PBB SHADOW E&M-EST. PATIENT-LVL IV: CPT | Mod: PBBFAC,,, | Performed by: PODIATRIST

## 2022-05-18 PROCEDURE — 3060F PR POS MICROALBUMINURIA RESULT DOCUMENTED/REVIEW: ICD-10-PCS | Mod: CPTII,S$GLB,, | Performed by: PODIATRIST

## 2022-05-18 PROCEDURE — 3288F PR FALLS RISK ASSESSMENT DOCUMENTED: ICD-10-PCS | Mod: CPTII,S$GLB,, | Performed by: PODIATRIST

## 2022-05-18 PROCEDURE — 11056 PR TRIM BENIGN HYPERKERATOTIC SKIN LESION,2-4: ICD-10-PCS | Mod: Q9,S$GLB,, | Performed by: PODIATRIST

## 2022-05-18 PROCEDURE — 1159F PR MEDICATION LIST DOCUMENTED IN MEDICAL RECORD: ICD-10-PCS | Mod: CPTII,S$GLB,, | Performed by: PODIATRIST

## 2022-05-18 PROCEDURE — 11721 PR DEBRIDEMENT OF NAILS, 6 OR MORE: ICD-10-PCS | Mod: 59,Q9,S$GLB, | Performed by: PODIATRIST

## 2022-05-18 PROCEDURE — 3066F NEPHROPATHY DOC TX: CPT | Mod: CPTII,S$GLB,, | Performed by: PODIATRIST

## 2022-05-18 PROCEDURE — 99499 UNLISTED E&M SERVICE: CPT | Mod: S$GLB,,, | Performed by: PODIATRIST

## 2022-05-18 PROCEDURE — 3072F LOW RISK FOR RETINOPATHY: CPT | Mod: CPTII,S$GLB,, | Performed by: PODIATRIST

## 2022-05-18 PROCEDURE — 3046F PR MOST RECENT HEMOGLOBIN A1C LEVEL > 9.0%: ICD-10-PCS | Mod: CPTII,S$GLB,, | Performed by: PODIATRIST

## 2022-05-18 PROCEDURE — 1101F PT FALLS ASSESS-DOCD LE1/YR: CPT | Mod: CPTII,S$GLB,, | Performed by: PODIATRIST

## 2022-05-18 PROCEDURE — 3072F PR LOW RISK FOR RETINOPATHY: ICD-10-PCS | Mod: CPTII,S$GLB,, | Performed by: PODIATRIST

## 2022-05-18 PROCEDURE — 11056 PARNG/CUTG B9 HYPRKR LES 2-4: CPT | Mod: Q9,S$GLB,, | Performed by: PODIATRIST

## 2022-05-18 PROCEDURE — 1160F PR REVIEW ALL MEDS BY PRESCRIBER/CLIN PHARMACIST DOCUMENTED: ICD-10-PCS | Mod: CPTII,S$GLB,, | Performed by: PODIATRIST

## 2022-05-18 NOTE — PROGRESS NOTES
Subjective:     Patient ID: Trey Stevens is a 67 y.o. male.    Chief Complaint: Nail Care (Pt c/o BL foot pain 4/10, Diabetic pt wears shoes w/o socks, PCP Dr. Kaba last seen 1-18-22) and Foot Pain    Trey is a 67 y.o. male who presents to the clinic for evaluation and treatment of high risk feet. Trey has a past medical history of Arthritis, Diabetes mellitus, Diabetes mellitus type 2, uncontrolled (7/19/2016), DM (diabetes mellitus) (2015), Gall stones, Obesity, Pneumonia of right middle lobe due to infectious organism (8/13/2021), Psoriasis (a type of skin inflammation), and Rheumatoid arthritis of foot. The patient's chief complaint is thick calluses and nails. This patient has documented high risk feet requiring routine maintenance secondary to diabetes mellitis and those secondary complications of diabetes, as mentioned.     PCP: Brittanie Kaba MD     Date Last Seen by PCP: 01/08/2022    Current shoe gear:  Affected Foot: Extra depth shoes     Unaffected Foot: Extra depth shoes    Hemoglobin A1C   Date Value Ref Range Status   04/11/2022 11.1 (H) 4.0 - 5.6 % Final     Comment:     ADA Screening Guidelines:  5.7-6.4%  Consistent with prediabetes  >or=6.5%  Consistent with diabetes    High levels of fetal hemoglobin interfere with the HbA1C  assay. Heterozygous hemoglobin variants (HbS, HgC, etc)do  not significantly interfere with this assay.   However, presence of multiple variants may affect accuracy.     01/20/2022 11.9 (H) 4.0 - 5.6 % Final     Comment:     ADA Screening Guidelines:  5.7-6.4%  Consistent with prediabetes  >or=6.5%  Consistent with diabetes    High levels of fetal hemoglobin interfere with the HbA1C  assay. Heterozygous hemoglobin variants (HbS, HgC, etc)do  not significantly interfere with this assay.   However, presence of multiple variants may affect accuracy.     10/18/2021 10.3 (H) 4.0 - 5.6 % Final     Comment:     ADA Screening Guidelines:  5.7-6.4%  Consistent with  prediabetes  >or=6.5%  Consistent with diabetes    High levels of fetal hemoglobin interfere with the HbA1C  assay. Heterozygous hemoglobin variants (HbS, HgC, etc)do  not significantly interfere with this assay.   However, presence of multiple variants may affect accuracy.             Patient Active Problem List   Diagnosis    Psoriatic arthritis, destructive type    Vitamin D deficiency    Multiple lung nodules on CT    Immunosuppressed status    Long-term use of immunosuppressant medication    Mixed type COPD (chronic obstructive pulmonary disease)    Type 2 diabetes mellitus with nephropathy    Hypertension associated with diabetes    Vitiligo    SCHUYLER on CPAP    Class 2 severe obesity due to excess calories with serious comorbidity and body mass index (BMI) of 39.0 to 39.9 in adult    Hyperlipidemia associated with type 2 diabetes mellitus    Elevated liver enzymes    Nicotine dependence, cigarettes, uncomplicated    Dependence on nocturnal oxygen therapy    Urinary retention       Medication List with Changes/Refills   Current Medications    AMLODIPINE (NORVASC) 5 MG TABLET    TAKE 1 TABLET BY MOUTH EVERY DAY    ASPIRIN 81 MG CHEW    Take 1 tablet (81 mg total) by mouth once daily.    ATORVASTATIN (LIPITOR) 80 MG TABLET    TAKE 1 TABLET BY MOUTH EVERY DAY    BLOOD SUGAR DIAGNOSTIC (ACCU-CHEK NOELLE PLUS TEST STRP) STRP    Check blood sugar 3 times daily.    BLOOD-GLUCOSE METER (ACCU-CHEK NOELLE PLUS METER) MISC    Check blood sugar 3 times daily    CALCIPOTRIENE (DOVONOX) 0.005 % CREAM    APPLY TO AFFECTED AREA TWICE A DAY    CLOBETASOL (OLUX) 0.05 % FOAM    AAA of scalp and behind ears twice daily.  Do not use on face, underarms or groin.    DULAGLUTIDE (TRULICITY) 4.5 MG/0.5 ML PEN INJECTOR    Inject 4.5 mg into the skin every 7 days.    ERGOCALCIFEROL (ERGOCALCIFEROL) 50,000 UNIT CAP    Take 50,000 Units by mouth twice a week.    FOLIC ACID (FOLVITE) 1 MG TABLET    Take 1 tablet (1,000 mcg  "total) by mouth once daily.    GLIPIZIDE (GLUCOTROL) 5 MG TABLET    Take 5 mg by mouth 2 (two) times daily.    INSULIN (LANTUS SOLOSTAR U-100 INSULIN) GLARGINE 100 UNITS/ML (3ML) SUBQ PEN    Inject 20 Units into the skin once daily.    INSULIN ASPART U-100 (NOVOLOG U-100 INSULIN ASPART) 100 UNIT/ML INJECTION    Inject 200 units per Cequr device every 3 days    JARDIANCE 25 MG TABLET    TAKE 1 TABLET BY MOUTH EVERY DAY    KETOCONAZOLE (NIZORAL) 2 % SHAMPOO    Wash hair with medicated shampoo at least 2x/week - let sit on scalp at least 5 minutes prior to rinsing    LANCETS (ACCU-CHEK SOFTCLIX LANCETS) MISC    Check blood sugar 3 times daily    METFORMIN (GLUCOPHAGE-XR) 500 MG XR 24HR TABLET    Take 2 tablets (1,000 mg total) by mouth 2 (two) times daily with meals.    METHOTREXATE 2.5 MG TAB    Take 8 tablets (20 mg total) by mouth every 7 days.    PEN NEEDLE, DIABETIC 32 GAUGE X 5/32" NDLE    Use with Lantus once daily and Humalog 3 times daily.    PROAIR HFA 90 MCG/ACTUATION INHALER    Inhale 2 puffs into the lungs every 4 (four) hours as needed for Wheezing. Rescue    SECUKINUMAB (COSENTYX PEN) 150 MG/ML PNIJ    Inject 2 pens (300 mg) into the skin every 14 (fourteen) days.    SPIRONOLACTONE (ALDACTONE) 25 MG TABLET    Take 1 tablet (25 mg total) by mouth once daily.    TRAMADOL (ULTRAM) 50 MG TABLET    Take 1 tablet (50 mg total) by mouth 3 (three) times daily as needed for Pain (use for mod pain; use sparingly).       Review of patient's allergies indicates:  No Known Allergies    Past Surgical History:   Procedure Laterality Date    APPENDECTOMY      CHOLECYSTECTOMY         Family History   Problem Relation Age of Onset    Cancer Mother     Hypertension Mother     Diabetes Mother     Cataracts Mother     Stroke Father     Psoriasis Neg Hx        Social History     Socioeconomic History    Marital status: Single   Tobacco Use    Smoking status: Current Every Day Smoker     Packs/day: 1.00     Years: " "28.00     Pack years: 28.00     Types: Cigarettes     Start date: 1994    Smokeless tobacco: Never Used    Tobacco comment: 7/30/2018 referral to smoking cessation program   Substance and Sexual Activity    Alcohol use: No     Alcohol/week: 0.0 standard drinks    Drug use: No    Sexual activity: Never       Vitals:    05/18/22 1021   Weight: 116.7 kg (257 lb 4.4 oz)   Height: 5' 8" (1.727 m)       Hemoglobin A1C   Date Value Ref Range Status   04/11/2022 11.1 (H) 4.0 - 5.6 % Final     Comment:     ADA Screening Guidelines:  5.7-6.4%  Consistent with prediabetes  >or=6.5%  Consistent with diabetes    High levels of fetal hemoglobin interfere with the HbA1C  assay. Heterozygous hemoglobin variants (HbS, HgC, etc)do  not significantly interfere with this assay.   However, presence of multiple variants may affect accuracy.     01/20/2022 11.9 (H) 4.0 - 5.6 % Final     Comment:     ADA Screening Guidelines:  5.7-6.4%  Consistent with prediabetes  >or=6.5%  Consistent with diabetes    High levels of fetal hemoglobin interfere with the HbA1C  assay. Heterozygous hemoglobin variants (HbS, HgC, etc)do  not significantly interfere with this assay.   However, presence of multiple variants may affect accuracy.     10/18/2021 10.3 (H) 4.0 - 5.6 % Final     Comment:     ADA Screening Guidelines:  5.7-6.4%  Consistent with prediabetes  >or=6.5%  Consistent with diabetes    High levels of fetal hemoglobin interfere with the HbA1C  assay. Heterozygous hemoglobin variants (HbS, HgC, etc)do  not significantly interfere with this assay.   However, presence of multiple variants may affect accuracy.         Review of Systems   Constitutional: Negative for chills and fever.   Respiratory: Negative for shortness of breath.    Cardiovascular: Positive for leg swelling. Negative for chest pain, palpitations, orthopnea and claudication.   Gastrointestinal: Negative for diarrhea, nausea and vomiting.   Musculoskeletal: Negative for joint " pain.   Skin: Negative for rash.   Neurological: Positive for tingling and sensory change. Negative for dizziness, focal weakness and weakness.   Psychiatric/Behavioral: Negative.          Objective:      PHYSICAL EXAM: Apperance: Alert and orient in no distress,well developed, and with good attention to grooming and body habits  Patient presents ambulating in extra depth shoes.   LOWER EXTREMITY EXAM:  VASCULAR: Dorsalis pedis pulses 0/4 bilateral and Posterior Tibial pulses 1/4 bilateral. Capillary fill time <4 seconds bilateral. Mild edema observed bilateral. Varicosities present bilateral. Skin temperature of the lower extremities is warm to warm, proximal to distal. Hair growth absent bilateral.  DERMATOLOGICAL: No skin rashes, subcutaneous nodules, lesions, or ulcers observed bilateral. Nails 1,2,3,4,5 bilateral elongated, thickened, and discolored with subungual debris. Webspaces 1,2,3,4 clean, dry and without evidence of break in skin integrity bilateral. Moderate dry and hyperkeratotic tissue noted to bilateral heels and medial 1st MPJ. Skin fissures noted to bilateral heels. No erythema, drainage, or increased temp noted to area.   NEUROLOGICAL: Light touch, sharp-dull, proprioception all present and equal bilaterally.  Vibratory sensation absent at bilateral hallux. Protective sensation absent at 6/10 sites as tested with a [place] 5.07 monofilament.   MUSCULOSKELETAL: Muscle strength is 5/5 for foot inverters, everters, plantarflexors, and dorsiflexors. Muscle tone is normal.         Assessment:       Encounter Diagnoses   Name Primary?    Type II diabetes mellitus with neurological manifestations Yes    Dermatophytosis of nail     Corn or callus          Plan:   Type II diabetes mellitus with neurological manifestations    Dermatophytosis of nail    Corn or callus      I counseled the patient on his conditions, their implications and medical management.  Greater than 15 minutes of a 20  minute appointment spent on education about the diabetic foot, neuropathy, and prevention of limb loss.  Shoe inspection. Diabetic Foot Education. Patient reminded of the importance of good nutrition and blood sugar control to help prevent podiatric complications of diabetes. Patient instructed on proper foot hygeine. We discussed wearing proper shoe gear, daily foot inspections, never walking without protective shoe gear, never putting sharp instruments to feet.    With patient's permission, nails 1-5 bilateral were debrided/trimmed in length and thickness aggressively to their soft tissue attachment mechanically and with electric , removing all offending nail and debris. Patient relates relief following the procedure.  With patient's permission bilateral callus were trimmed in thickness with #15 blade without incident.   Patient  will continue to monitor the areas daily, inspect feet, wear protective shoe gear when ambulatory, moisturizer to maintain skin integrity. Patient reminded of the importance of good nutrition and blood sugar control to help prevent podiatric complications of diabetes.  Patient to return in this office in approximately 3-4 months, or sooner if needed.                     Joy Hadley DPM  [facility]

## 2022-05-19 DIAGNOSIS — L40.50 PSORIATIC ARTHRITIS: Primary | ICD-10-CM

## 2022-05-19 DIAGNOSIS — L40.9 PSORIASIS: ICD-10-CM

## 2022-05-19 RX ORDER — METHOTREXATE 2.5 MG/1
TABLET ORAL
Qty: 96 TABLET | Refills: 2 | Status: SHIPPED | OUTPATIENT
Start: 2022-05-19 | End: 2023-05-31 | Stop reason: SDUPTHER

## 2022-05-23 ENCOUNTER — CLINICAL SUPPORT (OUTPATIENT)
Dept: DIABETES | Facility: CLINIC | Age: 67
End: 2022-05-23
Payer: MEDICARE

## 2022-05-23 DIAGNOSIS — E11.21 TYPE 2 DIABETES MELLITUS WITH NEPHROPATHY: Chronic | ICD-10-CM

## 2022-05-23 PROCEDURE — 99999 PR PBB SHADOW E&M-EST. PATIENT-LVL I: CPT | Mod: PBBFAC,,,

## 2022-05-23 PROCEDURE — 99999 PR PBB SHADOW E&M-EST. PATIENT-LVL I: ICD-10-PCS | Mod: PBBFAC,,,

## 2022-05-23 PROCEDURE — G0108 PR DIAB MANAGE TRN  PER INDIV: ICD-10-PCS | Mod: S$GLB,,,

## 2022-05-23 PROCEDURE — G0108 DIAB MANAGE TRN  PER INDIV: HCPCS | Mod: S$GLB,,,

## 2022-05-23 NOTE — PATIENT INSTRUCTIONS
Go to drug store and purchase Skin Tac and put on a new patch today.  Clean skin with alcohol wipe and let dry  Wipe skin with Skin Tac and let dry  Apply new patch as usual    Write down all blood sugars and number of clicks    Follow-up with Michelle in 2 weeks. Bring blood sugar & clicks log!

## 2022-05-23 NOTE — PROGRESS NOTES
Diabetes Care Specialist Progress Note  Author: Mcihelle Ndiaye RN  Date: 5/23/2022    Program Intake  Reason for Diabetes Program Visit:: Intervention  Type of Intervention:: Individual  Individual: Education  Education: Self-Management Skill Review  Current diabetes risk level:: high  In the last 12 months, have you:: none  Permission to speak with others about care:: no    Lab Results   Component Value Date    HGBA1C 11.1 (H) 04/11/2022     CURRENT MEDS  · Novolog 6 units TID before meals  · Lantus 20 units daily  · Trulicity 4.5 mg weekly (Saturday)  · Metformin 1,000 mg BID    Diabetes Self-Management Skills Assessment    Medications  Patient is able to describe current diabetes management routine.: yes  Diabetes management routine:: injectable medications, insulin, oral medications, insulin pump  Patient is able to identify current diabetes medications, dosages, and appropriate timing of medications.: yes  Patient understands the purpose of the medications taken for diabetes.: yes  Patient reports problems or concerns with current medication regimen.: yes  Medication regimen problems/concerns:: other (see comments) (CeQur patch not staying on.)  Medication Skills Assessment Completed:: Yes  Assessment indicates:: Instruction Needed  Area of need?: Yes    Home Blood Glucose Monitoring  Patient states that blood sugar is checked at home daily.: yes  Monitoring Method:: home glucometer  How often do you check your blood sugar?: 3 times a day  When do you check your blood sugar?: Before breakfast, Before lunch, Before dinner  When you check what is your typical blood sugar range? : Breakfast clicks: 2-4 (did not write down BGs). Lunch BGs/clicks: 145/3, 199/2, 379/4. Dinner BGs/clicks: 169/2, 246/3  Blood glucose logs:: yes, encouraged to keep logs, encouraged to bring logs to provider visits (Only  logged for 4/14 days.)  Blood glucose logs reviewed today?: yes  Home Blood Glucose Monitoring Skills  Assessment Completed: : Yes  Assessment indicates:: Instruction Needed  Area of need?: Yes    Assessment Summary and Plan    Based on today's diabetes care assessment, the following areas of need were identified:      Social 5/5/2022   Support -   Access to Mass Media/Tech No   Cognitive/Behavioral Health No   Culture/Latter-day No   Communication No   Health Literacy No        Clinical 5/5/2022   Medication Adherence No   Lab Compliance No   Nutritional Status No        Diabetes Self-Management Skills 5/23/2022   Medication Yes-Instructed patient to begin using CeQur again after purchasing SkinTac to help patches adhere better to skin. Pt understood.      Home Blood Glucose Monitoring Yes-Encouraged patient to check blood sugars and log them along with number of clicks for each meal. Instructed him to bring log back at 2 week follow-up.             Today's interventions were provided through individual discussion, instruction, and written materials were provided.      Patient verbalized understanding of instruction and written materials.  Pt was able to return back demonstration of instructions today. Patient understood key points, needs reinforcement and further instruction.     Diabetes Self-Management Care Plan:    Today's Diabetes Self-Management Care Plan was developed with Trey's input. Trey has agreed to work toward the following goal(s) to improve his/her overall diabetes control.      Care Plan: Diabetes Management   Updates made since 4/23/2022 12:00 AM      Problem: Medications       Goal: Patient agrees to take Novolog using CeQur Simplicity Insulin Patch.    Start Date: 5/5/2022   Expected End Date: 5/5/2023   Priority: High   Barriers: No Barriers Identified   Note:    Trained patient on use of CeQur simplicity 3-day insulin delivery patch with Novolog - 3 clicks before each meal, plus 1 click for BG >200, minus 1 click for BG <100.  Pt will continue 20 units of Lantus daily, 4.5 mg of Trulicity  weekly (Saturday), and Metformin 1,000 mg BID    CeQur Simplicity 3-day insulin patch training  · Described what the CeQur Simplicity patch is used for. Used demo patch to demonstrate how insulin will be delivered by squeezing both buttons. When squeezing only 1 button, no insulin is delivered.   · From patient's current prescription for Novolog, calculated the amount of insulin needed to fill the Patch based on daily dosage and 3-day maximum wear time.   · Calculation: 100 units per fill.     Used demo and written instructions to help patient with the following:   · Explained that Patch must be filled with a minimum of 100 units and a maximum of 200 units.   · FILL:  Attached the capped needle to the Fill syringe, filled the syringe with 100 units of insulin then filled the Patch. Discussed proper disposal of syringe.   · PRIME:  Removed air/bubbles from the Patch before priming, then primed the Patch by squeezing the Tan Buttons 4 more times.   · Placed the CHANGE BY sticker on the Patch  · PREPARED SITE:  Described recommended sites, site rotation and products to avoid and steps to ensure adhesive performance. Prepped site to create a clean and dry site.   · PLACED ON BODY:   is reusable; it has a yellow safety button and green button. Placed the Patch in the . Removed the blue cap and plastic liner, warning not to point the introducer needle at the face or other people.   Practiced placement of the Patch on a practice cushion then pt was able to place and insert his/her filled Patch to his/her abdomen. Removed  with needle and disposed properly.     Discussed dosing:   · Explained that 2 units are delivered each time the two buttons are properly squeezed and a click is heard.   · Demonstrated delivery of example doses by counting by 2s.   · Recommended to keep Patch dry for several hours after application  · Discussed removal and disposal of the Patch.     Safety:   · Squeezing one button  "will not deliver any insulin  · Described what to do when buttons lock and that the last squeeze of the buttons may have delivered less than 2 units.     Provided Mr. Stevens with typed up instructions for mealtime dosing, including checking his blood sugar before each meal and dosing as appropriate based on his blood sugar. Handout also included reminders when prepping CeQur patch such as the units he needs to place in the patch, the removal of "big" bubbles, and to prime with 4 additional clicks. Reminders also included to continue taking his other medications prescribed for his diabetes management including Trulicity, Lantus, and Metformin.     Provided patient with blood sugar log and encouraged him to document his clicks for each meal.     Patient was able to return demonstrate the process of preparing and putting on a patch. Denies any questions or concerns at this time. Patient aware he can call if he has any issues. Will follow-up in about 2 weeks.     5/23/22: Pt not consistent with CeQur use because patch not adhering to skin. Will follow-up again in 2 weeks.          Follow Up Plan     Follow up in about 2 weeks (around 6/6/2022).    Today's care plan and follow up schedule was discussed with patient.  Trey verbalized understanding of the care plan, goals, and agrees to follow up plan.        The patient was encouraged to communicate with his/her health care provider/physician and care team regarding his/her condition(s) and treatment.  I provided the patient with my contact information today and encouraged to contact me via phone or [facility] as needed.     Length of Visit   Total Time: 30 Minutes   "

## 2022-06-02 ENCOUNTER — TELEPHONE (OUTPATIENT)
Dept: SMOKING CESSATION | Facility: CLINIC | Age: 67
End: 2022-06-02
Payer: MEDICARE

## 2022-06-02 ENCOUNTER — SPECIALTY PHARMACY (OUTPATIENT)
Dept: PHARMACY | Facility: CLINIC | Age: 67
End: 2022-06-02
Payer: MEDICARE

## 2022-06-02 NOTE — TELEPHONE ENCOUNTER
Spoke to patient over the phone in regard to rescheduling missed clinic intake appointment. Rescheduled for June 22nd at 11 AM.

## 2022-06-02 NOTE — TELEPHONE ENCOUNTER
Specialty Pharmacy - Clinical Reassessment    Specialty Medication Orders Linked to Encounter    Flowsheet Row Most Recent Value   Medication #1 secukinumab (COSENTYX PEN) 150 mg/mL PnIj (Order#[id number], Rx#[id number])        Patient Diagnosis   L40.52 - Psoriatic arthritis, destructive type    Specialty clinical pharmacist review completed for an annual review of reassessment. Reviewed the following areas: current med list, reports of adverse effects, adherence and progress towards therapeutic goals.    Recommendations: none at this time.    Tasks added this encounter   3/2/2023 - Clinical - Follow Up Assesement (Annual)   Tasks due within next 3 months   6/3/2022 - Refill Call (Auto Added)     Butch Bonilla, PharmD  [facility]  [address]  [place] [address]  Phone: [phone]  Fax: [phone]

## 2022-06-03 DIAGNOSIS — L40.0 PLAQUE PSORIASIS: ICD-10-CM

## 2022-06-03 DIAGNOSIS — L40.9 PSORIASIS: ICD-10-CM

## 2022-06-03 RX ORDER — SECUKINUMAB 150 MG/ML
300 INJECTION SUBCUTANEOUS
Qty: 4 ML | Refills: 3 | Status: SHIPPED | OUTPATIENT
Start: 2022-06-03 | End: 2022-10-25 | Stop reason: SDUPTHER

## 2022-06-06 ENCOUNTER — SPECIALTY PHARMACY (OUTPATIENT)
Dept: PHARMACY | Facility: CLINIC | Age: 67
End: 2022-06-06
Payer: MEDICARE

## 2022-06-06 NOTE — TELEPHONE ENCOUNTER
Specialty Pharmacy - Refill Coordination    Specialty Medication Orders Linked to Encounter    Flowsheet Row Most Recent Value   Medication #1 secukinumab (COSENTYX PEN) 150 mg/mL PnIj (Order#[id number], Rx#[id number])          Refill Questions - Documented Responses    Flowsheet Row Most Recent Value   Patient Availability and HIPAA Verification    Does patient want to proceed with activity? Yes   HIPAA/medical authority confirmed? Yes   Relationship to patient of person spoken to? Self   Refill Screening Questions    Changes to allergies? No   Changes to medications? No   New conditions since last clinic visit? No   Unplanned office visit, urgent care, ED, or hospital admission in the last 4 weeks? No   How does patient/caregiver feel medication is working? Good   Financial problems or insurance changes? No   How many doses of your specialty medications were missed in the last 4 weeks? 0   Would patient like to speak to a pharmacist? No   When does the patient need to receive the medication? 06/10/22   Refill Delivery Questions    How will the patient receive the medication? Delivery [facility]   When does the patient need to receive the medication? 06/10/22   Shipping Address Home   Address in [facility] confirmed and updated if neccessary? Yes   Expected Copay ($) 0   Is the patient able to afford the medication copay? Yes   Payment Method zero copay   Days supply of Refill 28   Supplies needed? No supplies needed   Refill activity completed? Yes   Refill activity plan Refill scheduled   Shipment/Pickup Date: 06/08/22          Current Outpatient Medications   Medication Sig    amLODIPine (NORVASC) 5 MG tablet TAKE 1 TABLET BY MOUTH EVERY DAY    aspirin 81 MG Chew Take 1 tablet (81 mg total) by mouth once daily.    atorvastatin (LIPITOR) 80 MG tablet TAKE 1 TABLET BY MOUTH EVERY DAY    blood sugar diagnostic (ACCU-CHEK NOELLE PLUS TEST STRP) Strp Check blood sugar 3 times daily.    blood-glucose meter  "(ACCU-CHEK NOELLE PLUS METER) Misc Check blood sugar 3 times daily    calcipotriene (DOVONOX) 0.005 % cream APPLY TO AFFECTED AREA TWICE A DAY    clobetasol (OLUX) 0.05 % Foam AAA of scalp and behind ears twice daily.  Do not use on face, underarms or groin.    dulaglutide (TRULICITY) 4.5 mg/0.5 mL pen injector Inject 4.5 mg into the skin every 7 days.    ergocalciferol (ERGOCALCIFEROL) 50,000 unit Cap Take 50,000 Units by mouth twice a week.    folic acid (FOLVITE) 1 MG tablet Take 1 tablet (1,000 mcg total) by mouth once daily.    glipiZIDE (GLUCOTROL) 5 MG tablet Take 5 mg by mouth 2 (two) times daily.    insulin (LANTUS SOLOSTAR U-100 INSULIN) glargine 100 units/mL (3mL) SubQ pen Inject 20 Units into the skin once daily.    insulin aspart U-100 (NOVOLOG U-100 INSULIN ASPART) 100 unit/mL injection Inject 200 units per Cequr device every 3 days    JARDIANCE 25 mg tablet TAKE 1 TABLET BY MOUTH EVERY DAY    ketoconazole (NIZORAL) 2 % shampoo Wash hair with medicated shampoo at least 2x/week - let sit on scalp at least 5 minutes prior to rinsing    lancets (ACCU-CHEK SOFTCLIX LANCETS) Misc Check blood sugar 3 times daily    metFORMIN (GLUCOPHAGE-XR) 500 MG XR 24hr tablet Take 2 tablets (1,000 mg total) by mouth 2 (two) times daily with meals.    methotrexate 2.5 MG Tab TAKE 8 TABLETS BY MOUTH EVERY 7 DAYS.    pen needle, diabetic 32 gauge x 5/32" Ndle Use with Lantus once daily and Humalog 3 times daily.    PROAIR HFA 90 mcg/actuation inhaler Inhale 2 puffs into the lungs every 4 (four) hours as needed for Wheezing. Rescue    secukinumab (COSENTYX PEN) 150 mg/mL PnIj Inject 2 pens (300 mg) into the skin every 14 (fourteen) days.    spironolactone (ALDACTONE) 25 MG tablet Take 1 tablet (25 mg total) by mouth once daily.    traMADoL (ULTRAM) 50 mg tablet Take 1 tablet (50 mg total) by mouth 3 (three) times daily as needed for Pain (use for mod pain; use sparingly).   Last reviewed on 5/18/2022 11:47 AM " by Joy Hadley DPM    Review of patient's allergies indicates:  No Known Allergies Last reviewed on  5/18/2022 11:47 AM by Joy Hadley      Tasks added this encounter   7/1/2022 - Refill Call (Auto Added)   Tasks due within next 3 months   No tasks due.     [facility]  [address] [place] [id number]  Phone: [phone]  Fax: [phone]

## 2022-06-22 ENCOUNTER — CLINICAL SUPPORT (OUTPATIENT)
Dept: SMOKING CESSATION | Facility: CLINIC | Age: 67
End: 2022-06-22
Payer: COMMERCIAL

## 2022-06-22 DIAGNOSIS — F17.200 NICOTINE DEPENDENCE: Primary | ICD-10-CM

## 2022-06-22 PROCEDURE — 99999 PR PBB SHADOW E&M-EST. PATIENT-LVL I: CPT | Mod: PBBFAC,,,

## 2022-06-22 PROCEDURE — 99404 PREV MED CNSL INDIV APPRX 60: CPT | Mod: S$GLB,,, | Performed by: GENERAL PRACTICE

## 2022-06-22 PROCEDURE — 99404 PR PREVENT COUNSEL,INDIV,60 MIN: ICD-10-PCS | Mod: S$GLB,,, | Performed by: GENERAL PRACTICE

## 2022-06-22 PROCEDURE — 99999 PR PBB SHADOW E&M-EST. PATIENT-LVL I: ICD-10-PCS | Mod: PBBFAC,,,

## 2022-06-22 RX ORDER — IBUPROFEN 200 MG
1 TABLET ORAL DAILY
Qty: 14 PATCH | Refills: 0 | Status: SHIPPED | OUTPATIENT
Start: 2022-06-22 | End: 2022-07-22 | Stop reason: SDUPTHER

## 2022-06-22 NOTE — Clinical Note
Patient intake for Quit 1 Episode.  Patient will be participating in bi-weekly tobacco cessation meetings and will begin the prescribed tobacco cessation medication regimen of 21 mg nicotine patch QD. FTND score of 3 indicates a moderate dependence on nicotine. SHAISTA-D score of 14 is perceived as no mental distress / depression at this time. Will monitor.

## 2022-07-01 ENCOUNTER — SPECIALTY PHARMACY (OUTPATIENT)
Dept: PHARMACY | Facility: CLINIC | Age: 67
End: 2022-07-01
Payer: MEDICARE

## 2022-07-01 NOTE — TELEPHONE ENCOUNTER
Outgoing call with pt regarding cosentyx refill. Pt states he now injects every week. Confirmed in chart notes from MD on 4/27. Pt reported a dose on hand for this week and next week. Pended call out to 7/8.

## 2022-07-08 NOTE — TELEPHONE ENCOUNTER
Patient is prescribed Cosentyx 300 mg (2 pens) every 14 days. Called patient back to clarify how he is taking Cosentyx and review prescribed dose. No answer; left message. Will continue to follow up.

## 2022-07-11 NOTE — TELEPHONE ENCOUNTER
Specialty Pharmacy - Clinical Intervention  Specialty Pharmacy - Refill Coordination    Specialty Medication Orders Linked to Encounter    Flowsheet Row Most Recent Value   Medication #1 secukinumab (COSENTYX PEN) 150 mg/mL PnIj (Order#[id number], Rx#[id number])          Refill Questions - Documented Responses    Flowsheet Row Most Recent Value   Patient Availability and HIPAA Verification    Does patient want to proceed with activity? Yes   HIPAA/medical authority confirmed? Yes   Relationship to patient of person spoken to? Self   Refill Screening Questions    Changes to allergies? No   Changes to medications? No   Unplanned office visit, urgent care, ED, or hospital admission in the last 4 weeks? No   How does patient/caregiver feel medication is working? Good   Financial problems or insurance changes? No   How many doses of your specialty medications were missed in the last 4 weeks? 0   Would patient like to speak to a pharmacist? No   When does the patient need to receive the medication? 07/15/22   Refill Delivery Questions    How will the patient receive the medication? Delivery [facility]   When does the patient need to receive the medication? 07/15/22   Shipping Address Home   Address in [facility] confirmed and updated if neccessary? Yes   Expected Copay ($) 0   Is the patient able to afford the medication copay? Yes   Payment Method zero copay   Days supply of Refill 28   Supplies needed? No supplies needed   Refill activity completed? Yes   Refill activity plan Refill scheduled   Shipment/Pickup Date: 07/12/22          Current Outpatient Medications   Medication Sig    amLODIPine (NORVASC) 5 MG tablet TAKE 1 TABLET BY MOUTH EVERY DAY    aspirin 81 MG Chew Take 1 tablet (81 mg total) by mouth once daily.    atorvastatin (LIPITOR) 80 MG tablet TAKE 1 TABLET BY MOUTH EVERY DAY    blood sugar diagnostic (ACCU-CHEK NOELLE PLUS TEST STRP) Strp Check blood sugar 3 times daily.    blood-glucose meter  "(ACCU-CHEK NOELLE PLUS METER) Misc Check blood sugar 3 times daily    calcipotriene (DOVONOX) 0.005 % cream APPLY TO AFFECTED AREA TWICE A DAY    clobetasol (OLUX) 0.05 % Foam AAA of scalp and behind ears twice daily.  Do not use on face, underarms or groin.    dulaglutide (TRULICITY) 4.5 mg/0.5 mL pen injector Inject 4.5 mg into the skin every 7 days.    ergocalciferol (ERGOCALCIFEROL) 50,000 unit Cap Take 50,000 Units by mouth twice a week.    folic acid (FOLVITE) 1 MG tablet Take 1 tablet (1,000 mcg total) by mouth once daily.    glipiZIDE (GLUCOTROL) 5 MG tablet Take 5 mg by mouth 2 (two) times daily.    insulin (LANTUS SOLOSTAR U-100 INSULIN) glargine 100 units/mL (3mL) SubQ pen Inject 20 Units into the skin once daily.    insulin aspart U-100 (NOVOLOG U-100 INSULIN ASPART) 100 unit/mL injection Inject 200 units per Cequr device every 3 days    JARDIANCE 25 mg tablet TAKE 1 TABLET BY MOUTH EVERY DAY    ketoconazole (NIZORAL) 2 % shampoo Wash hair with medicated shampoo at least 2x/week - let sit on scalp at least 5 minutes prior to rinsing    lancets (ACCU-CHEK SOFTCLIX LANCETS) Misc Check blood sugar 3 times daily    metFORMIN (GLUCOPHAGE-XR) 500 MG XR 24hr tablet Take 2 tablets (1,000 mg total) by mouth 2 (two) times daily with meals.    methotrexate 2.5 MG Tab TAKE 8 TABLETS BY MOUTH EVERY 7 DAYS.    nicotine (NICODERM CQ) 21 mg/24 hr Place 1 patch onto the skin once daily.    pen needle, diabetic 32 gauge x 5/32" Ndle Use with Lantus once daily and Humalog 3 times daily.    PROAIR HFA 90 mcg/actuation inhaler Inhale 2 puffs into the lungs every 4 (four) hours as needed for Wheezing. Rescue    secukinumab (COSENTYX PEN) 150 mg/mL PnIj Inject 2 pens (300 mg) into the skin every 14 (fourteen) days.    spironolactone (ALDACTONE) 25 MG tablet Take 1 tablet (25 mg total) by mouth once daily.    traMADoL (ULTRAM) 50 mg tablet Take 1 tablet (50 mg total) by mouth 3 (three) times daily as needed " for Pain (use for mod pain; use sparingly).   Last reviewed on 6/22/2022 11:03 AM by Mercedes Cast, RRT    Review of patient's allergies indicates:  No Known Allergies Last reviewed on  6/22/2022 10:59 AM by Mercedes Cast    Interventions added this encounter   Open: [facility] Patient Intervention - Drug therapy adherence: secukinumab (COSENTYX PEN) 150 mg/mL PnIj     Tasks added this encounter   8/5/2022 - Refill Call (Auto Added)   Tasks due within next 3 months   No tasks due.     Zulema Duron, PharmD  [facility]  [address] [facility] [address]  Phone: [phone]  Fax: [phone]

## 2022-07-11 NOTE — TELEPHONE ENCOUNTER
Outgoing call to pt regarding I-vent/refill for Cosentyx. Pt stated the MDO told him to inject 1 pen once a week, not 2 pens every 14 days. Chart notes state 2 pens every 14 days. Sent a staff message to the provider to confirm dosing frequency.Scheduled refill.

## 2022-07-11 NOTE — TELEPHONE ENCOUNTER
MAXIMILIANO Lockhart, PharmD  No, I want cosentyx every 14 days but mtx every 7 days. Perhaps he misunderstood and thought they both should be weekly.     Two pens (300 mg) every 14 days is the correct prescription. Thank you!     Charlette Wilcox PA-C     Outgoing call to pt to inform him of dosing frequency but no answer, LVM.

## 2022-07-19 NOTE — TELEPHONE ENCOUNTER
Outgoing call to pt to let him know to inject the Cosentyx 2 pens every 2 weeks. Pt said he thinks he got it confused with his Trulicity. Reviewed Cosentyx directions and Trulicity directions. Pt verbalized understanding and had no further questions.

## 2022-07-20 DIAGNOSIS — E11.9 TYPE 2 DIABETES MELLITUS WITHOUT COMPLICATION: ICD-10-CM

## 2022-07-20 NOTE — PROGRESS NOTES
Subjective:      Patient ID: Trey Stevens is a 67 y.o. male.    Chief Complaint: No chief complaint on file.      HPI  Here for f/u medical problems and preventive exam.  Writes down sugars, but doesn't recall any.  Seeing DM nurse later this week.  In smoking cessation classes, has patch, has cut down.  No f/c/sw/cough.  No cp/sob/palp.  BMs normal.  Urine is frequent.  Denies depression.  Breathing ok with prn albuterol.    HM:  1/22 today fluvax, 10/21 covid vaccines, 12/18 HAV, 2/16 [id number], 4/13 [id number], 8/22 today [id number], 2/16 TDaP, Cscope in the past normal and pt refuses more, 6/22 PSA, 2/16 HCV neg, 2/22 Eye Dr. Cole, 11/18 chest CT stable, Pod Dr. Hadley.     Review of Systems   Constitutional: Negative for appetite change, chills, diaphoresis, fatigue and fever.   HENT: Negative for congestion, ear pain, rhinorrhea and sinus pressure.    Respiratory: Negative for cough and shortness of breath.    Cardiovascular: Negative for chest pain and palpitations.   Gastrointestinal: Negative for abdominal distention, abdominal pain, blood in stool, constipation, diarrhea, nausea and vomiting.   Genitourinary: Negative for difficulty urinating, dysuria, frequency, hematuria and urgency.   Musculoskeletal: Negative for arthralgias.   Skin: Negative for rash.   Neurological: Negative for dizziness and headaches.   Psychiatric/Behavioral: The patient is not nervous/anxious.          Objective:   /84 (BP Location: Left arm)   Pulse (!) 111   Temp 98.2 °F (36.8 °C) (Tympanic)   Wt 117.5 kg (259 lb 0.7 oz)   SpO2 95%   BMI 39.39 kg/m²     Physical Exam  Constitutional:       Appearance: He is well-developed.   HENT:      Right Ear: External ear normal. Tympanic membrane is not injected.      Left Ear: External ear normal. Tympanic membrane is not injected.   Eyes:      Conjunctiva/sclera: Conjunctivae normal.   Neck:      Thyroid: No thyromegaly.   Cardiovascular:      Rate and Rhythm: Normal rate and  regular rhythm.      Heart sounds: No murmur heard.    No friction rub. No gallop.   Pulmonary:      Effort: Pulmonary effort is normal.      Breath sounds: Normal breath sounds. No wheezing or rales.   Abdominal:      General: Bowel sounds are normal.      Palpations: Abdomen is soft. There is no mass.      Tenderness: There is no abdominal tenderness.   Musculoskeletal:      Cervical back: Normal range of motion and neck supple.   Lymphadenopathy:      Cervical: No cervical adenopathy.   Neurological:      Mental Status: He is alert and oriented to person, place, and time.            Latest Reference Range & Units 04/11/22 11:59 04/11/22 12:29 04/27/22 10:36   WBC 3.90 - 12.70 K/uL   8.69   RBC 4.60 - 6.20 M/uL   5.15   Hemoglobin 14.0 - 18.0 g/dL   15.1   Hematocrit 40.0 - 54.0 %   45.2   MCV 82 - 98 fL   88   MCH 27.0 - 31.0 pg   29.3   MCHC 32.0 - 36.0 g/dL   33.4   RDW 11.5 - 14.5 %   18.0 (H)   Platelets 150 - 450 K/uL   300   MPV 9.2 - 12.9 fL   10.0   Gran % 38.0 - 73.0 %   63.4   Lymph % 18.0 - 48.0 %   23.0   Mono % 4.0 - 15.0 %   8.4   Eosinophil % 0.0 - 8.0 %   4.3   Basophil % 0.0 - 1.9 %   0.6   Immature Granulocytes 0.0 - 0.5 %   0.3   Gran # (ANC) 1.8 - 7.7 K/uL   5.5   Lymph # 1.0 - 4.8 K/uL   2.0   Mono # 0.3 - 1.0 K/uL   0.7   Eos # 0.0 - 0.5 K/uL   0.4   Baso # 0.00 - 0.20 K/uL   0.05   Immature Grans (Abs) 0.00 - 0.04 K/uL   0.03   nRBC 0 /100 WBC   0   Differential Method    Automated   Sed Rate 0 - 10 mm/Hr   33 (H)   Sodium 136 - 145 mmol/L  138 139   Potassium 3.5 - 5.1 mmol/L  4.6 4.7   Chloride 95 - 110 mmol/L  102 101   CO2 23 - 29 mmol/L  21 (L) 26   Anion Gap 8 - 16 mmol/L  15 12   BUN 8 - 23 mg/dL  19 13   Creatinine 0.5 - 1.4 mg/dL  1.2 1.1   eGFR if non African American >60 mL/min/1.73 m^2  >60.0 >60   eGFR if African American >60 mL/min/1.73 m^2  >60.0 >60   Glucose 70 - 110 mg/dL  230 (H) 136 (H)   Calcium 8.7 - 10.5 mg/dL  9.7 9.6   Alkaline Phosphatase 55 - 135 U/L   104    PROTEIN TOTAL 6.0 - 8.4 g/dL   8.2   Albumin 3.5 - 5.2 g/dL   3.8   BILIRUBIN TOTAL 0.1 - 1.0 mg/dL   0.7   AST 10 - 40 U/L   22   ALT 10 - 44 U/L   30   CRP 0.0 - 8.2 mg/L   6.8   Cholesterol 120 - 199 mg/dL  135    HDL 40 - 75 mg/dL  35 (L)    HDL/Cholesterol Ratio 20.0 - 50.0 %  25.9    LDL Cholesterol External 63.0 - 159.0 mg/dL  69.4    Non-HDL Cholesterol mg/dL  100    Total Cholesterol/HDL Ratio 2.0 - 5.0   3.9    Triglycerides 30 - 150 mg/dL  153 (H)    Hemoglobin A1C External 4.0 - 5.6 %  11.1 (H)    Estimated Avg Glucose 68 - 131 mg/dL  272 (H)    TSH 0.400 - 4.000 uIU/mL  1.798    PSA, Screen 0.00 - 4.00 ng/mL  0.24    Creatinine, Urine 23.0 - 375.0 mg/dL 45.0     Microalbumin, Urine ug/mL 104.0     MICROALB/CREAT RATIO 0.0 - 30.0 ug/mg 231.1 (H)       Assessment:       1. Hypertension associated with diabetes    2. Type 2 diabetes mellitus with nephropathy    3. Psoriatic arthritis, destructive type    4. Immunosuppressed status    5. Hyperlipidemia associated with type 2 diabetes mellitus    6. Dependence on nocturnal oxygen therapy    7. Mixed type COPD (chronic obstructive pulmonary disease)    8. SCHUYLER on CPAP    9. Vitamin D deficiency    10. Encounter for preventive health examination    11. Proteinuria due to type 2 diabetes mellitus    12. Nicotine dependence, cigarettes, uncomplicated          Plan:     Hypertension associated with diabetes- adeq control, cont amlodipine.    Type 2 diabetes mellitus with nephropathy- bring sugar record to DM visit.  -     Pneumococcal Conjugate Vaccine (20 Valent) (IM)  -     Hemoglobin A1C; Future; Expected date: 08/01/2022    Psoriatic arthritis, destructive type, Immunosuppressed status    Hyperlipidemia associated with type 2 diabetes mellitus- cont statin.    Mixed type COPD (chronic obstructive pulmonary disease)- cont prn inhalers.    SCHUYLER on CPAP, Dependence on nocturnal oxygen therapy    Vitamin D deficiency- cont weekly supp.    Encounter for preventive  health examination- [id number] now.    Proteinuria due to type 2 diabetes mellitus- add low dose ARB.  -     losartan (COZAAR) 25 MG tablet; Take 1 tablet (25 mg total) by mouth once daily.  Dispense: 90 tablet; Refill: 3    Nicotine dependence, cigarettes, uncomplicated- cont nicoderm, try quit.    RTC  3mo.

## 2022-07-22 ENCOUNTER — CLINICAL SUPPORT (OUTPATIENT)
Dept: SMOKING CESSATION | Facility: CLINIC | Age: 67
End: 2022-07-22
Payer: COMMERCIAL

## 2022-07-22 ENCOUNTER — TELEPHONE (OUTPATIENT)
Dept: SMOKING CESSATION | Facility: CLINIC | Age: 67
End: 2022-07-22
Payer: MEDICARE

## 2022-07-22 DIAGNOSIS — F17.200 NICOTINE DEPENDENCE: ICD-10-CM

## 2022-07-22 DIAGNOSIS — F17.200 NICOTINE DEPENDENCE: Primary | ICD-10-CM

## 2022-07-22 PROCEDURE — 99406 BEHAV CHNG SMOKING 3-10 MIN: CPT | Mod: S$GLB,,, | Performed by: GENERAL PRACTICE

## 2022-07-22 PROCEDURE — 99999 PR PBB SHADOW E&M-EST. PATIENT-LVL I: ICD-10-PCS | Mod: PBBFAC,,,

## 2022-07-22 PROCEDURE — 99404 PREV MED CNSL INDIV APPRX 60: CPT | Mod: S$GLB,,, | Performed by: GENERAL PRACTICE

## 2022-07-22 PROCEDURE — 99999 PR PBB SHADOW E&M-EST. PATIENT-LVL II: ICD-10-PCS | Mod: PBBFAC,,,

## 2022-07-22 PROCEDURE — 99406 PR TOBACCO USE CESSATION INTERMEDIATE 3-10 MINUTES: ICD-10-PCS | Mod: S$GLB,,, | Performed by: GENERAL PRACTICE

## 2022-07-22 PROCEDURE — 99999 PR PBB SHADOW E&M-EST. PATIENT-LVL II: CPT | Mod: PBBFAC,,,

## 2022-07-22 PROCEDURE — 99999 PR PBB SHADOW E&M-EST. PATIENT-LVL I: CPT | Mod: PBBFAC,,,

## 2022-07-22 PROCEDURE — 99404 PR PREVENT COUNSEL,INDIV,60 MIN: ICD-10-PCS | Mod: S$GLB,,, | Performed by: GENERAL PRACTICE

## 2022-07-22 RX ORDER — DM/P-EPHED/ACETAMINOPH/DOXYLAM 30-7.5/3
2 LIQUID (ML) ORAL
Qty: 216 LOZENGE | Refills: 0 | Status: SHIPPED | OUTPATIENT
Start: 2022-07-22

## 2022-07-22 RX ORDER — IBUPROFEN 200 MG
1 TABLET ORAL DAILY
Qty: 14 PATCH | Refills: 0 | Status: SHIPPED | OUTPATIENT
Start: 2022-07-22 | End: 2022-08-04 | Stop reason: SDUPTHER

## 2022-07-22 NOTE — PROGRESS NOTES
Spoke to patient over the phone in regard to his smoking cessation progress. He was smoking 30 cpd, and has reduced smoking to 10 cpd. Commended patient on his progress towards quitting tobacco use. The patient states that he is no longer coughing and feels more relaxed since he has cut back on smoking. The patient remains on the prescribed tobacco cessation medication regimen of 21 mg nicotine patch QD without any negative side effects at this time. The patient denies any abnormal behavioral or mental changes at this time. Patient will come in for a clinic visit today. Will continue to encourage and monitor his progress.

## 2022-07-22 NOTE — PROGRESS NOTES
Individual Follow-Up Form    7/22/2022    Quit Date: TBD    Clinical Status of Patient: Outpatient    Continuing Medication: yes  Patches     Target Symptoms: Withdrawal and medication side effects. The following were  rated moderate (3) to severe (4) on TCRS:  · Moderate (3): none  · Severe (4): none    Comments: Patient came in to the clinic today for a smoking cessation follow up visit. He is smoking 10 cpd, and was smoking 30 cpd. Commended patient on his efforts towards quitting tobacco use. He has been working on distracting himself when he gets the urge to smoke and keeping his cigarettes in his truck instead of carrying them with him. Discussed identifying and managing triggers, strategies to eliminate tobacco, healthy substitutions, nicotine vs. habit, and reviewed instructions for use of nicotine lozenges to use in conjunction with nicotine patch. The patient remains on the prescribed tobacco cessation medication regimen of 21 mg nicotine patch QD without any negative side effects at this time. This week he will work on moving cigarettes to a different location, keeping a log of smoking, and increasing intervals of time between smoking. Challenged patient to attempt 24 hours without smoking. The patient denies any abnormal behavioral or mental changes at this time. Will continue to encourage and monitor his progress.     Diagnosis: F17.200    Next Visit: 2 weeks

## 2022-07-22 NOTE — Clinical Note
Patient came in to the clinic today for a smoking cessation follow up visit. He is smoking 10 cpd, and was smoking 30 cpd. Commended patient on his efforts towards quitting tobacco use. He has been working on distracting himself when he gets the urge to smoke and keeping his cigarettes in his truck instead of carrying them with him. Discussed identifying and managing triggers, strategies to eliminate tobacco, healthy substitutions, nicotine vs. habit, and reviewed instructions for use of nicotine lozenges to use in conjunction with nicotine patch. The patient remains on the prescribed tobacco cessation medication regimen of 21 mg nicotine patch QD without any negative side effects at this time. This week he will work on moving cigarettes to a different location, keeping a log of smoking, and increasing intervals of time between smoking. Challenged patient to attempt 24 hours without smoking. The patient denies any abnormal behavioral or mental changes at this time.

## 2022-07-22 NOTE — TELEPHONE ENCOUNTER
Attempted to contact patient in regard to missed clinic appointment. Voicemail box is full. Unable to leave a message.

## 2022-08-01 ENCOUNTER — LAB VISIT (OUTPATIENT)
Dept: LAB | Facility: HOSPITAL | Age: 67
End: 2022-08-01
Attending: INTERNAL MEDICINE
Payer: MEDICARE

## 2022-08-01 ENCOUNTER — OFFICE VISIT (OUTPATIENT)
Dept: FAMILY MEDICINE | Facility: CLINIC | Age: 67
End: 2022-08-01
Payer: MEDICARE

## 2022-08-01 VITALS
BODY MASS INDEX: 39.39 KG/M2 | HEART RATE: 111 BPM | DIASTOLIC BLOOD PRESSURE: 84 MMHG | OXYGEN SATURATION: 95 % | SYSTOLIC BLOOD PRESSURE: 126 MMHG | WEIGHT: 259.06 LBS | TEMPERATURE: 98 F

## 2022-08-01 DIAGNOSIS — Z99.81 DEPENDENCE ON NOCTURNAL OXYGEN THERAPY: ICD-10-CM

## 2022-08-01 DIAGNOSIS — L40.52 PSORIATIC ARTHRITIS, DESTRUCTIVE TYPE: ICD-10-CM

## 2022-08-01 DIAGNOSIS — F17.210 NICOTINE DEPENDENCE, CIGARETTES, UNCOMPLICATED: ICD-10-CM

## 2022-08-01 DIAGNOSIS — E11.21 TYPE 2 DIABETES MELLITUS WITH NEPHROPATHY: Chronic | ICD-10-CM

## 2022-08-01 DIAGNOSIS — Z00.00 ENCOUNTER FOR PREVENTIVE HEALTH EXAMINATION: ICD-10-CM

## 2022-08-01 DIAGNOSIS — E11.59 HYPERTENSION ASSOCIATED WITH DIABETES: Primary | ICD-10-CM

## 2022-08-01 DIAGNOSIS — R80.9 PROTEINURIA DUE TO TYPE 2 DIABETES MELLITUS: ICD-10-CM

## 2022-08-01 DIAGNOSIS — E78.5 HYPERLIPIDEMIA ASSOCIATED WITH TYPE 2 DIABETES MELLITUS: ICD-10-CM

## 2022-08-01 DIAGNOSIS — E55.9 VITAMIN D DEFICIENCY: ICD-10-CM

## 2022-08-01 DIAGNOSIS — I15.2 HYPERTENSION ASSOCIATED WITH DIABETES: Primary | ICD-10-CM

## 2022-08-01 DIAGNOSIS — J44.9 MIXED TYPE COPD (CHRONIC OBSTRUCTIVE PULMONARY DISEASE): Chronic | ICD-10-CM

## 2022-08-01 DIAGNOSIS — G47.33 OSA ON CPAP: ICD-10-CM

## 2022-08-01 DIAGNOSIS — E11.69 HYPERLIPIDEMIA ASSOCIATED WITH TYPE 2 DIABETES MELLITUS: ICD-10-CM

## 2022-08-01 DIAGNOSIS — E11.29 PROTEINURIA DUE TO TYPE 2 DIABETES MELLITUS: ICD-10-CM

## 2022-08-01 DIAGNOSIS — D84.9 IMMUNOSUPPRESSED STATUS: Chronic | ICD-10-CM

## 2022-08-01 PROCEDURE — G0009 PNEUMOCOCCAL CONJUGATE VACCINE 20-VALENT: ICD-10-PCS | Mod: S$GLB,,, | Performed by: INTERNAL MEDICINE

## 2022-08-01 PROCEDURE — 99999 PR PBB SHADOW E&M-EST. PATIENT-LVL IV: ICD-10-PCS | Mod: PBBFAC,,, | Performed by: INTERNAL MEDICINE

## 2022-08-01 PROCEDURE — 99214 OFFICE O/P EST MOD 30 MIN: CPT | Mod: S$GLB,,, | Performed by: INTERNAL MEDICINE

## 2022-08-01 PROCEDURE — 4010F PR ACE/ARB THEARPY RXD/TAKEN: ICD-10-PCS | Mod: CPTII,S$GLB,, | Performed by: INTERNAL MEDICINE

## 2022-08-01 PROCEDURE — 3046F HEMOGLOBIN A1C LEVEL >9.0%: CPT | Mod: CPTII,S$GLB,, | Performed by: INTERNAL MEDICINE

## 2022-08-01 PROCEDURE — 3060F POS MICROALBUMINURIA REV: CPT | Mod: CPTII,S$GLB,, | Performed by: INTERNAL MEDICINE

## 2022-08-01 PROCEDURE — 3008F PR BODY MASS INDEX (BMI) DOCUMENTED: ICD-10-PCS | Mod: CPTII,S$GLB,, | Performed by: INTERNAL MEDICINE

## 2022-08-01 PROCEDURE — 83036 HEMOGLOBIN GLYCOSYLATED A1C: CPT | Performed by: INTERNAL MEDICINE

## 2022-08-01 PROCEDURE — 99499 UNLISTED E&M SERVICE: CPT | Mod: S$GLB,,, | Performed by: INTERNAL MEDICINE

## 2022-08-01 PROCEDURE — 99214 PR OFFICE/OUTPT VISIT, EST, LEVL IV, 30-39 MIN: ICD-10-PCS | Mod: S$GLB,,, | Performed by: INTERNAL MEDICINE

## 2022-08-01 PROCEDURE — 3288F PR FALLS RISK ASSESSMENT DOCUMENTED: ICD-10-PCS | Mod: CPTII,S$GLB,, | Performed by: INTERNAL MEDICINE

## 2022-08-01 PROCEDURE — 3288F FALL RISK ASSESSMENT DOCD: CPT | Mod: CPTII,S$GLB,, | Performed by: INTERNAL MEDICINE

## 2022-08-01 PROCEDURE — 4010F ACE/ARB THERAPY RXD/TAKEN: CPT | Mod: CPTII,S$GLB,, | Performed by: INTERNAL MEDICINE

## 2022-08-01 PROCEDURE — 90677 PCV20 VACCINE IM: CPT | Mod: S$GLB,,, | Performed by: INTERNAL MEDICINE

## 2022-08-01 PROCEDURE — 3079F DIAST BP 80-89 MM HG: CPT | Mod: CPTII,S$GLB,, | Performed by: INTERNAL MEDICINE

## 2022-08-01 PROCEDURE — 3066F NEPHROPATHY DOC TX: CPT | Mod: CPTII,S$GLB,, | Performed by: INTERNAL MEDICINE

## 2022-08-01 PROCEDURE — 99499 RISK ADDL DX/OHS AUDIT: ICD-10-PCS | Mod: S$GLB,,, | Performed by: INTERNAL MEDICINE

## 2022-08-01 PROCEDURE — 3008F BODY MASS INDEX DOCD: CPT | Mod: CPTII,S$GLB,, | Performed by: INTERNAL MEDICINE

## 2022-08-01 PROCEDURE — 3060F PR POS MICROALBUMINURIA RESULT DOCUMENTED/REVIEW: ICD-10-PCS | Mod: CPTII,S$GLB,, | Performed by: INTERNAL MEDICINE

## 2022-08-01 PROCEDURE — 1101F PT FALLS ASSESS-DOCD LE1/YR: CPT | Mod: CPTII,S$GLB,, | Performed by: INTERNAL MEDICINE

## 2022-08-01 PROCEDURE — 90677 PNEUMOCOCCAL CONJUGATE VACCINE 20-VALENT: ICD-10-PCS | Mod: S$GLB,,, | Performed by: INTERNAL MEDICINE

## 2022-08-01 PROCEDURE — 3066F PR DOCUMENTATION OF TREATMENT FOR NEPHROPATHY: ICD-10-PCS | Mod: CPTII,S$GLB,, | Performed by: INTERNAL MEDICINE

## 2022-08-01 PROCEDURE — 99999 PR PBB SHADOW E&M-EST. PATIENT-LVL IV: CPT | Mod: PBBFAC,,, | Performed by: INTERNAL MEDICINE

## 2022-08-01 PROCEDURE — 3072F LOW RISK FOR RETINOPATHY: CPT | Mod: CPTII,S$GLB,, | Performed by: INTERNAL MEDICINE

## 2022-08-01 PROCEDURE — 3074F PR MOST RECENT SYSTOLIC BLOOD PRESSURE < 130 MM HG: ICD-10-PCS | Mod: CPTII,S$GLB,, | Performed by: INTERNAL MEDICINE

## 2022-08-01 PROCEDURE — G0009 ADMIN PNEUMOCOCCAL VACCINE: HCPCS | Mod: S$GLB,,, | Performed by: INTERNAL MEDICINE

## 2022-08-01 PROCEDURE — 1101F PR PT FALLS ASSESS DOC 0-1 FALLS W/OUT INJ PAST YR: ICD-10-PCS | Mod: CPTII,S$GLB,, | Performed by: INTERNAL MEDICINE

## 2022-08-01 PROCEDURE — 3074F SYST BP LT 130 MM HG: CPT | Mod: CPTII,S$GLB,, | Performed by: INTERNAL MEDICINE

## 2022-08-01 PROCEDURE — 3046F PR MOST RECENT HEMOGLOBIN A1C LEVEL > 9.0%: ICD-10-PCS | Mod: CPTII,S$GLB,, | Performed by: INTERNAL MEDICINE

## 2022-08-01 PROCEDURE — 3072F PR LOW RISK FOR RETINOPATHY: ICD-10-PCS | Mod: CPTII,S$GLB,, | Performed by: INTERNAL MEDICINE

## 2022-08-01 PROCEDURE — 1159F PR MEDICATION LIST DOCUMENTED IN MEDICAL RECORD: ICD-10-PCS | Mod: CPTII,S$GLB,, | Performed by: INTERNAL MEDICINE

## 2022-08-01 PROCEDURE — 36415 COLL VENOUS BLD VENIPUNCTURE: CPT | Mod: PO | Performed by: INTERNAL MEDICINE

## 2022-08-01 PROCEDURE — 3079F PR MOST RECENT DIASTOLIC BLOOD PRESSURE 80-89 MM HG: ICD-10-PCS | Mod: CPTII,S$GLB,, | Performed by: INTERNAL MEDICINE

## 2022-08-01 PROCEDURE — 1159F MED LIST DOCD IN RCRD: CPT | Mod: CPTII,S$GLB,, | Performed by: INTERNAL MEDICINE

## 2022-08-01 RX ORDER — LOSARTAN POTASSIUM 25 MG/1
25 TABLET ORAL DAILY
Qty: 90 TABLET | Refills: 3 | Status: SHIPPED | OUTPATIENT
Start: 2022-08-01 | End: 2023-06-05

## 2022-08-02 ENCOUNTER — TELEPHONE (OUTPATIENT)
Dept: FAMILY MEDICINE | Facility: CLINIC | Age: 67
End: 2022-08-02
Payer: MEDICARE

## 2022-08-02 LAB
ESTIMATED AVG GLUCOSE: ABNORMAL MG/DL (ref 68–131)
HBA1C MFR BLD: >14 % (ref 4–5.6)

## 2022-08-02 NOTE — TELEPHONE ENCOUNTER
Please tell pt sugars very high- Hemoglobin A1c is over 14%, and that is why he is so thirsty.  He told me he was seeing the diabetes specialist later this week, but I don't see appt- does he have an appt with someone outside?  SM

## 2022-08-04 ENCOUNTER — CLINICAL SUPPORT (OUTPATIENT)
Dept: SMOKING CESSATION | Facility: CLINIC | Age: 67
End: 2022-08-04
Payer: COMMERCIAL

## 2022-08-04 DIAGNOSIS — F17.200 NICOTINE DEPENDENCE: ICD-10-CM

## 2022-08-04 PROCEDURE — 99999 PR PBB SHADOW E&M-EST. PATIENT-LVL I: ICD-10-PCS | Mod: PBBFAC,,,

## 2022-08-04 PROCEDURE — 99403 PR PREVENT COUNSEL,INDIV,45 MIN: ICD-10-PCS | Mod: S$GLB,,, | Performed by: GENERAL PRACTICE

## 2022-08-04 PROCEDURE — 99403 PREV MED CNSL INDIV APPRX 45: CPT | Mod: S$GLB,,, | Performed by: GENERAL PRACTICE

## 2022-08-04 PROCEDURE — 99999 PR PBB SHADOW E&M-EST. PATIENT-LVL I: CPT | Mod: PBBFAC,,,

## 2022-08-04 RX ORDER — IBUPROFEN 200 MG
1 TABLET ORAL DAILY
Qty: 28 PATCH | Refills: 0 | Status: SHIPPED | OUTPATIENT
Start: 2022-08-04 | End: 2022-09-08 | Stop reason: SDUPTHER

## 2022-08-04 NOTE — PROGRESS NOTES
Individual Follow-Up Form    8/4/2022    Quit Date: TBD    Clinical Status of Patient: Outpatient    Continuing Medication: yes  Patches    Other Medications: nicotine lozenges     Target Symptoms: Withdrawal and medication side effects. The following were  rated moderate (3) to severe (4) on TCRS:  · Moderate (3): none  · Severe (4): none    Comments: Patient was seen in the clinic today for a smoking cessation follow up visit. He is smoking 10 cpd, decreased from 30 cpd. Commended patient on his progress. He has been using lozenges in the place of cigarettes while driving. Completion of TCRS (Tobacco Cessation Rating Scale) reviewed strategies, cues, and triggers. Introduced the negative impact of tobacco on health, the health advantages of discontinuing the use of tobacco, time line improved health changes after a quit, withdrawal issues to expect from nicotine and habit, and ways to achieve the goal of a quit. The patient remains on the prescribed tobacco cessation medication regimen of 21 mg nicotine patch QD and 2 mg nicotine lozenges as needed without any negative side effects at this time. Challenged patient to attempt 24 hours without smoking and not purchase any more cigarettes. The patient denies any abnormal behavioral or mental changes at this time. Will continue to encourage and monitor his progress.     Diagnosis: F17.200    Next Visit: 3 weeks

## 2022-08-04 NOTE — Clinical Note
Patient was seen in the clinic today for a smoking cessation follow up visit. He is smoking 10 cpd, decreased from 30 cpd. Commended patient on his progress. He has been using lozenges in the place of cigarettes while driving. Completion of TCRS (Tobacco Cessation Rating Scale) reviewed strategies, cues, and triggers. Introduced the negative impact of tobacco on health, the health advantages of discontinuing the use of tobacco, time line improved health changes after a quit, withdrawal issues to expect from nicotine and habit, and ways to achieve the goal of a quit. The patient remains on the prescribed tobacco cessation medication regimen of 21 mg nicotine patch QD and 2 mg nicotine lozenges as needed without any negative side effects at this time. Challenged patient to attempt 24 hours without smoking and not purchase any more cigarettes. The patient denies any abnormal behavioral or mental changes at this time. Will continue to encourage and monitor his progress.

## 2022-08-09 ENCOUNTER — PES CALL (OUTPATIENT)
Dept: ADMINISTRATIVE | Facility: CLINIC | Age: 67
End: 2022-08-09
Payer: MEDICARE

## 2022-08-11 DIAGNOSIS — L40.9 PSORIASIS: ICD-10-CM

## 2022-08-11 DIAGNOSIS — L40.50 PSORIATIC ARTHRITIS: ICD-10-CM

## 2022-08-11 DIAGNOSIS — Z79.631 METHOTREXATE, LONG TERM, CURRENT USE: ICD-10-CM

## 2022-08-11 DIAGNOSIS — L40.52 PSORIATIC ARTHRITIS, DESTRUCTIVE TYPE: ICD-10-CM

## 2022-08-12 RX ORDER — FOLIC ACID 1 MG/1
1000 TABLET ORAL DAILY
Qty: 90 TABLET | Refills: 1 | Status: SHIPPED | OUTPATIENT
Start: 2022-08-12 | End: 2023-01-19

## 2022-08-15 ENCOUNTER — SPECIALTY PHARMACY (OUTPATIENT)
Dept: PHARMACY | Facility: CLINIC | Age: 67
End: 2022-08-15
Payer: MEDICARE

## 2022-08-15 NOTE — TELEPHONE ENCOUNTER
Patient reports he still has one dose left on hand and he missed his dose last Friday. Per [facility]'s previous refill note patient should have been due for a refill on 8/12. Patient appears to have missed two injections. Patient is unsure if he missed another dose.     Recommended taking Cosentyx today and restarting q14 day dosing on every other Monday. Reviewed adherence tools including calendars, cell phone alerts, etc. Patient stated he keeps track in his head. Strongly suggested ulitizing a calendar in the future. Reviewed importance of adherence for treatment goals. Patient verbalized understanding and will take a dose today. Next dose due on 8/29. Pending refill call 1 week.

## 2022-08-16 DIAGNOSIS — E11.65 UNCONTROLLED TYPE 2 DIABETES MELLITUS WITH HYPERGLYCEMIA, WITH LONG-TERM CURRENT USE OF INSULIN: Primary | ICD-10-CM

## 2022-08-16 DIAGNOSIS — Z79.4 UNCONTROLLED TYPE 2 DIABETES MELLITUS WITH HYPERGLYCEMIA, WITH LONG-TERM CURRENT USE OF INSULIN: Primary | ICD-10-CM

## 2022-08-17 RX ORDER — GLIPIZIDE 5 MG/1
5 TABLET ORAL 2 TIMES DAILY
Qty: 180 TABLET | Refills: 1 | Status: SHIPPED | OUTPATIENT
Start: 2022-08-17 | End: 2023-03-07

## 2022-08-17 RX ORDER — EMPAGLIFLOZIN 25 MG/1
25 TABLET, FILM COATED ORAL DAILY
Qty: 90 TABLET | Refills: 1 | Status: SHIPPED | OUTPATIENT
Start: 2022-08-17 | End: 2022-12-20 | Stop reason: SDUPTHER

## 2022-08-22 ENCOUNTER — SPECIALTY PHARMACY (OUTPATIENT)
Dept: PHARMACY | Facility: CLINIC | Age: 67
End: 2022-08-22
Payer: MEDICARE

## 2022-08-22 ENCOUNTER — TELEPHONE (OUTPATIENT)
Dept: ADMINISTRATIVE | Facility: CLINIC | Age: 67
End: 2022-08-22
Payer: MEDICARE

## 2022-08-22 NOTE — TELEPHONE ENCOUNTER
Specialty Pharmacy - Refill Coordination    Specialty Medication Orders Linked to Encounter    Flowsheet Row Most Recent Value   Medication #1 secukinumab (COSENTYX PEN) 150 mg/mL PnIj (Order#[id number], Rx#[id number])          Refill Questions - Documented Responses    Flowsheet Row Most Recent Value   Patient Availability and HIPAA Verification    Does patient want to proceed with activity? Yes   HIPAA/medical authority confirmed? Yes   Relationship to patient of person spoken to? Self   Refill Screening Questions    Changes to allergies? No   Changes to medications? No   New conditions since last clinic visit? No   Unplanned office visit, urgent care, ED, or hospital admission in the last 4 weeks? No   How does patient/caregiver feel medication is working? Good   Financial problems or insurance changes? No   How many doses of your specialty medications were missed in the last 4 weeks? 0   Would patient like to speak to a pharmacist? No   When does the patient need to receive the medication? 08/29/22   Refill Delivery Questions    How will the patient receive the medication? Delivery [facility]   When does the patient need to receive the medication? 08/29/22   Shipping Address Home   Address in [facility] confirmed and updated if neccessary? Yes   Expected Copay ($) 0   Is the patient able to afford the medication copay? Yes   Payment Method zero copay   Days supply of Refill 28   Supplies needed? No supplies needed   Refill activity completed? Yes   Refill activity plan Refill scheduled   Shipment/Pickup Date: 08/25/22          Current Outpatient Medications   Medication Sig    amLODIPine (NORVASC) 5 MG tablet TAKE 1 TABLET BY MOUTH EVERY DAY    aspirin 81 MG Chew Take 1 tablet (81 mg total) by mouth once daily.    atorvastatin (LIPITOR) 80 MG tablet TAKE 1 TABLET BY MOUTH EVERY DAY    blood sugar diagnostic (ACCU-CHEK NOELLE PLUS TEST STRP) Strp Check blood sugar 3 times daily.    blood-glucose meter  "(ACCU-CHEK NOELLE PLUS METER) Misc Check blood sugar 3 times daily    calcipotriene (DOVONOX) 0.005 % cream APPLY TO AFFECTED AREA TWICE A DAY    clobetasol (OLUX) 0.05 % Foam AAA of scalp and behind ears twice daily.  Do not use on face, underarms or groin.    dulaglutide (TRULICITY) 4.5 mg/0.5 mL pen injector Inject 4.5 mg into the skin every 7 days.    empagliflozin (JARDIANCE) 25 mg tablet Take 1 tablet (25 mg total) by mouth once daily.    ergocalciferol (ERGOCALCIFEROL) 50,000 unit Cap Take 50,000 Units by mouth twice a week.    folic acid (FOLVITE) 1 MG tablet Take 1 tablet (1,000 mcg total) by mouth once daily.    glipiZIDE (GLUCOTROL) 5 MG tablet Take 1 tablet (5 mg total) by mouth 2 (two) times daily.    insulin (LANTUS SOLOSTAR U-100 INSULIN) glargine 100 units/mL (3mL) SubQ pen Inject 20 Units into the skin once daily.    insulin aspart U-100 (NOVOLOG U-100 INSULIN ASPART) 100 unit/mL injection Inject 200 units per Cequr device every 3 days    ketoconazole (NIZORAL) 2 % shampoo Wash hair with medicated shampoo at least 2x/week - let sit on scalp at least 5 minutes prior to rinsing    lancets (ACCU-CHEK SOFTCLIX LANCETS) Misc Check blood sugar 3 times daily    losartan (COZAAR) 25 MG tablet Take 1 tablet (25 mg total) by mouth once daily.    metFORMIN (GLUCOPHAGE-XR) 500 MG XR 24hr tablet Take 2 tablets (1,000 mg total) by mouth 2 (two) times daily with meals.    methotrexate 2.5 MG Tab TAKE 8 TABLETS BY MOUTH EVERY 7 DAYS.    nicotine (NICODERM CQ) 21 mg/24 hr Place 1 patch onto the skin once daily.    nicotine polacrilex 2 MG Lozg Take 1 lozenge (2 mg total) by mouth as needed (Use up to 10 lozenges per day in the place of cigarettes).    pen needle, diabetic 32 gauge x 5/32" Ndle Use with Lantus once daily and Humalog 3 times daily.    PROAIR HFA 90 mcg/actuation inhaler Inhale 2 puffs into the lungs every 4 (four) hours as needed for Wheezing. Rescue    secukinumab (COSENTYX PEN) 150 " mg/mL PnIj Inject 2 pens (300 mg) into the skin every 14 (fourteen) days.    spironolactone (ALDACTONE) 25 MG tablet Take 1 tablet (25 mg total) by mouth once daily.    traMADoL (ULTRAM) 50 mg tablet Take 1 tablet (50 mg total) by mouth 3 (three) times daily as needed for Pain (use for mod pain; use sparingly).   Last reviewed on 8/4/2022  3:52 PM by Mercedes Cast, RRT    Review of patient's allergies indicates:  No Known Allergies Last reviewed on  8/11/2022 1:43 PM by Luis Tapia      Tasks added this encounter   9/19/2022 - Refill Call (Auto Added)   Tasks due within next 3 months   No tasks due.     Julita Beaver [facility]  [address] [place] [id number]  Phone: [phone]  Fax: [phone]

## 2022-08-23 ENCOUNTER — TELEPHONE (OUTPATIENT)
Dept: RHEUMATOLOGY | Facility: CLINIC | Age: 67
End: 2022-08-23
Payer: MEDICARE

## 2022-08-23 ENCOUNTER — LAB VISIT (OUTPATIENT)
Dept: LAB | Facility: HOSPITAL | Age: 67
End: 2022-08-23
Payer: MEDICARE

## 2022-08-23 ENCOUNTER — OFFICE VISIT (OUTPATIENT)
Dept: INTERNAL MEDICINE | Facility: CLINIC | Age: 67
End: 2022-08-23
Payer: MEDICARE

## 2022-08-23 ENCOUNTER — TELEPHONE (OUTPATIENT)
Dept: DIABETES | Facility: CLINIC | Age: 67
End: 2022-08-23
Payer: MEDICARE

## 2022-08-23 ENCOUNTER — OFFICE VISIT (OUTPATIENT)
Dept: RHEUMATOLOGY | Facility: CLINIC | Age: 67
End: 2022-08-23
Payer: MEDICARE

## 2022-08-23 VITALS
HEART RATE: 84 BPM | RESPIRATION RATE: 16 BRPM | DIASTOLIC BLOOD PRESSURE: 81 MMHG | SYSTOLIC BLOOD PRESSURE: 130 MMHG | HEIGHT: 68 IN | BODY MASS INDEX: 39.66 KG/M2 | WEIGHT: 261.69 LBS

## 2022-08-23 VITALS
HEIGHT: 68 IN | WEIGHT: 261.94 LBS | BODY MASS INDEX: 39.7 KG/M2 | DIASTOLIC BLOOD PRESSURE: 72 MMHG | OXYGEN SATURATION: 94 % | HEART RATE: 82 BPM | SYSTOLIC BLOOD PRESSURE: 118 MMHG

## 2022-08-23 DIAGNOSIS — J84.10 CALCIFIED GRANULOMA OF LUNG: ICD-10-CM

## 2022-08-23 DIAGNOSIS — E11.69 HYPERLIPIDEMIA ASSOCIATED WITH TYPE 2 DIABETES MELLITUS: ICD-10-CM

## 2022-08-23 DIAGNOSIS — E11.29 DIABETES MELLITUS WITH MICROALBUMINURIA: ICD-10-CM

## 2022-08-23 DIAGNOSIS — I70.0 CALCIFICATION OF AORTA: ICD-10-CM

## 2022-08-23 DIAGNOSIS — E55.9 VITAMIN D DEFICIENCY: ICD-10-CM

## 2022-08-23 DIAGNOSIS — G47.33 OSA (OBSTRUCTIVE SLEEP APNEA): ICD-10-CM

## 2022-08-23 DIAGNOSIS — Z72.0 TOBACCO USE: ICD-10-CM

## 2022-08-23 DIAGNOSIS — L40.52 PSORIATIC ARTHRITIS, DESTRUCTIVE TYPE: ICD-10-CM

## 2022-08-23 DIAGNOSIS — Z79.899 HIGH RISK MEDICATION USE: ICD-10-CM

## 2022-08-23 DIAGNOSIS — I10 HYPERTENSION, UNSPECIFIED TYPE: ICD-10-CM

## 2022-08-23 DIAGNOSIS — E66.01 SEVERE OBESITY (BMI 35.0-39.9) WITH COMORBIDITY: ICD-10-CM

## 2022-08-23 DIAGNOSIS — L40.0 PLAQUE PSORIASIS: ICD-10-CM

## 2022-08-23 DIAGNOSIS — D84.9 IMMUNOSUPPRESSED STATUS: ICD-10-CM

## 2022-08-23 DIAGNOSIS — L40.50 PSORIATIC ARTHRITIS: Primary | ICD-10-CM

## 2022-08-23 DIAGNOSIS — R73.09 HIGH GLUCOSE LEVEL: ICD-10-CM

## 2022-08-23 DIAGNOSIS — E11.49 TYPE II DIABETES MELLITUS WITH NEUROLOGICAL MANIFESTATIONS: ICD-10-CM

## 2022-08-23 DIAGNOSIS — Z79.631 METHOTREXATE, LONG TERM, CURRENT USE: ICD-10-CM

## 2022-08-23 DIAGNOSIS — Z99.81 DEPENDENCE ON SUPPLEMENTAL OXYGEN: ICD-10-CM

## 2022-08-23 DIAGNOSIS — Z00.00 ENCOUNTER FOR PREVENTIVE HEALTH EXAMINATION: Primary | ICD-10-CM

## 2022-08-23 DIAGNOSIS — E78.5 HYPERLIPIDEMIA ASSOCIATED WITH TYPE 2 DIABETES MELLITUS: ICD-10-CM

## 2022-08-23 DIAGNOSIS — H91.90 DECREASED HEARING, UNSPECIFIED LATERALITY: ICD-10-CM

## 2022-08-23 DIAGNOSIS — J44.9 CHRONIC OBSTRUCTIVE PULMONARY DISEASE, UNSPECIFIED COPD TYPE: ICD-10-CM

## 2022-08-23 DIAGNOSIS — D84.9 IMMUNOSUPPRESSED STATUS: Chronic | ICD-10-CM

## 2022-08-23 DIAGNOSIS — R80.9 DIABETES MELLITUS WITH MICROALBUMINURIA: ICD-10-CM

## 2022-08-23 PROBLEM — J98.4 CALCIFIED GRANULOMA OF LUNG: Status: ACTIVE | Noted: 2022-08-23

## 2022-08-23 LAB
ALBUMIN SERPL BCP-MCNC: 3.6 G/DL (ref 3.5–5.2)
ALP SERPL-CCNC: 107 U/L (ref 55–135)
ALT SERPL W/O P-5'-P-CCNC: 51 U/L (ref 10–44)
ANION GAP SERPL CALC-SCNC: 16 MMOL/L (ref 8–16)
AST SERPL-CCNC: 61 U/L (ref 10–40)
BASOPHILS # BLD AUTO: 0.06 K/UL (ref 0–0.2)
BASOPHILS NFR BLD: 0.8 % (ref 0–1.9)
BILIRUB SERPL-MCNC: 0.6 MG/DL (ref 0.1–1)
BUN SERPL-MCNC: 15 MG/DL (ref 8–23)
CALCIUM SERPL-MCNC: 9.2 MG/DL (ref 8.7–10.5)
CHLORIDE SERPL-SCNC: 98 MMOL/L (ref 95–110)
CO2 SERPL-SCNC: 22 MMOL/L (ref 23–29)
CREAT SERPL-MCNC: 1.5 MG/DL (ref 0.5–1.4)
CRP SERPL-MCNC: 8.4 MG/L (ref 0–8.2)
DIFFERENTIAL METHOD: ABNORMAL
EOSINOPHIL # BLD AUTO: 0.4 K/UL (ref 0–0.5)
EOSINOPHIL NFR BLD: 5.8 % (ref 0–8)
ERYTHROCYTE [DISTWIDTH] IN BLOOD BY AUTOMATED COUNT: 15 % (ref 11.5–14.5)
ERYTHROCYTE [SEDIMENTATION RATE] IN BLOOD BY WESTERGREN METHOD: 22 MM/HR (ref 0–10)
EST. GFR  (NO RACE VARIABLE): 51 ML/MIN/1.73 M^2
GLUCOSE SERPL-MCNC: 477 MG/DL (ref 70–110)
HCT VFR BLD AUTO: 47.2 % (ref 40–54)
HGB BLD-MCNC: 15.7 G/DL (ref 14–18)
IMM GRANULOCYTES # BLD AUTO: 0.12 K/UL (ref 0–0.04)
IMM GRANULOCYTES NFR BLD AUTO: 1.6 % (ref 0–0.5)
LYMPHOCYTES # BLD AUTO: 1.6 K/UL (ref 1–4.8)
LYMPHOCYTES NFR BLD: 21.7 % (ref 18–48)
MCH RBC QN AUTO: 28.8 PG (ref 27–31)
MCHC RBC AUTO-ENTMCNC: 33.3 G/DL (ref 32–36)
MCV RBC AUTO: 87 FL (ref 82–98)
MONOCYTES # BLD AUTO: 0.6 K/UL (ref 0.3–1)
MONOCYTES NFR BLD: 7.5 % (ref 4–15)
NEUTROPHILS # BLD AUTO: 4.7 K/UL (ref 1.8–7.7)
NEUTROPHILS NFR BLD: 62.6 % (ref 38–73)
NRBC BLD-RTO: 0 /100 WBC
PLATELET # BLD AUTO: 241 K/UL (ref 150–450)
PMV BLD AUTO: 10.7 FL (ref 9.2–12.9)
POTASSIUM SERPL-SCNC: 4.5 MMOL/L (ref 3.5–5.1)
PROT SERPL-MCNC: 8.1 G/DL (ref 6–8.4)
RBC # BLD AUTO: 5.45 M/UL (ref 4.6–6.2)
SODIUM SERPL-SCNC: 136 MMOL/L (ref 136–145)
WBC # BLD AUTO: 7.46 K/UL (ref 3.9–12.7)

## 2022-08-23 PROCEDURE — 4010F ACE/ARB THERAPY RXD/TAKEN: CPT | Mod: CPTII,S$GLB,, | Performed by: NURSE PRACTITIONER

## 2022-08-23 PROCEDURE — 3078F DIAST BP <80 MM HG: CPT | Mod: CPTII,S$GLB,, | Performed by: NURSE PRACTITIONER

## 2022-08-23 PROCEDURE — 1160F PR REVIEW ALL MEDS BY PRESCRIBER/CLIN PHARMACIST DOCUMENTED: ICD-10-PCS | Mod: CPTII,S$GLB,, | Performed by: NURSE PRACTITIONER

## 2022-08-23 PROCEDURE — 99499 UNLISTED E&M SERVICE: CPT | Mod: S$GLB,,, | Performed by: NURSE PRACTITIONER

## 2022-08-23 PROCEDURE — 99999 PR PBB SHADOW E&M-EST. PATIENT-LVL V: ICD-10-PCS | Mod: PBBFAC,,,

## 2022-08-23 PROCEDURE — 3072F LOW RISK FOR RETINOPATHY: CPT | Mod: CPTII,S$GLB,, | Performed by: NURSE PRACTITIONER

## 2022-08-23 PROCEDURE — 1101F PR PT FALLS ASSESS DOC 0-1 FALLS W/OUT INJ PAST YR: ICD-10-PCS | Mod: CPTII,S$GLB,,

## 2022-08-23 PROCEDURE — 3072F PR LOW RISK FOR RETINOPATHY: ICD-10-PCS | Mod: CPTII,S$GLB,, | Performed by: NURSE PRACTITIONER

## 2022-08-23 PROCEDURE — 1101F PT FALLS ASSESS-DOCD LE1/YR: CPT | Mod: CPTII,S$GLB,, | Performed by: NURSE PRACTITIONER

## 2022-08-23 PROCEDURE — 3288F FALL RISK ASSESSMENT DOCD: CPT | Mod: CPTII,S$GLB,,

## 2022-08-23 PROCEDURE — 4010F ACE/ARB THERAPY RXD/TAKEN: CPT | Mod: CPTII,S$GLB,,

## 2022-08-23 PROCEDURE — 3066F PR DOCUMENTATION OF TREATMENT FOR NEPHROPATHY: ICD-10-PCS | Mod: CPTII,S$GLB,,

## 2022-08-23 PROCEDURE — 3008F BODY MASS INDEX DOCD: CPT | Mod: CPTII,S$GLB,,

## 2022-08-23 PROCEDURE — 99999 PR PBB SHADOW E&M-EST. PATIENT-LVL V: CPT | Mod: PBBFAC,,, | Performed by: NURSE PRACTITIONER

## 2022-08-23 PROCEDURE — 1101F PR PT FALLS ASSESS DOC 0-1 FALLS W/OUT INJ PAST YR: ICD-10-PCS | Mod: CPTII,S$GLB,, | Performed by: NURSE PRACTITIONER

## 2022-08-23 PROCEDURE — 3288F PR FALLS RISK ASSESSMENT DOCUMENTED: ICD-10-PCS | Mod: CPTII,S$GLB,, | Performed by: NURSE PRACTITIONER

## 2022-08-23 PROCEDURE — 3046F HEMOGLOBIN A1C LEVEL >9.0%: CPT | Mod: CPTII,S$GLB,, | Performed by: NURSE PRACTITIONER

## 2022-08-23 PROCEDURE — 3008F BODY MASS INDEX DOCD: CPT | Mod: CPTII,S$GLB,, | Performed by: NURSE PRACTITIONER

## 2022-08-23 PROCEDURE — 3060F PR POS MICROALBUMINURIA RESULT DOCUMENTED/REVIEW: ICD-10-PCS | Mod: CPTII,S$GLB,,

## 2022-08-23 PROCEDURE — 1159F MED LIST DOCD IN RCRD: CPT | Mod: CPTII,S$GLB,, | Performed by: NURSE PRACTITIONER

## 2022-08-23 PROCEDURE — 99499 RISK ADDL DX/OHS AUDIT: ICD-10-PCS | Mod: S$GLB,,, | Performed by: NURSE PRACTITIONER

## 2022-08-23 PROCEDURE — 99215 PR OFFICE/OUTPT VISIT, EST, LEVL V, 40-54 MIN: ICD-10-PCS | Mod: S$GLB,,,

## 2022-08-23 PROCEDURE — 4010F PR ACE/ARB THEARPY RXD/TAKEN: ICD-10-PCS | Mod: CPTII,S$GLB,, | Performed by: NURSE PRACTITIONER

## 2022-08-23 PROCEDURE — 85025 COMPLETE CBC W/AUTO DIFF WBC: CPT

## 2022-08-23 PROCEDURE — 99999 PR PBB SHADOW E&M-EST. PATIENT-LVL V: CPT | Mod: PBBFAC,,,

## 2022-08-23 PROCEDURE — 3079F DIAST BP 80-89 MM HG: CPT | Mod: CPTII,S$GLB,,

## 2022-08-23 PROCEDURE — 3078F PR MOST RECENT DIASTOLIC BLOOD PRESSURE < 80 MM HG: ICD-10-PCS | Mod: CPTII,S$GLB,, | Performed by: NURSE PRACTITIONER

## 2022-08-23 PROCEDURE — 1160F PR REVIEW ALL MEDS BY PRESCRIBER/CLIN PHARMACIST DOCUMENTED: ICD-10-PCS | Mod: CPTII,S$GLB,,

## 2022-08-23 PROCEDURE — G9919 PR SCREENING AND POSITIVE: ICD-10-PCS | Mod: CPTII,S$GLB,, | Performed by: NURSE PRACTITIONER

## 2022-08-23 PROCEDURE — 1159F MED LIST DOCD IN RCRD: CPT | Mod: CPTII,S$GLB,,

## 2022-08-23 PROCEDURE — 3074F PR MOST RECENT SYSTOLIC BLOOD PRESSURE < 130 MM HG: ICD-10-PCS | Mod: CPTII,S$GLB,, | Performed by: NURSE PRACTITIONER

## 2022-08-23 PROCEDURE — 3075F PR MOST RECENT SYSTOLIC BLOOD PRESS GE 130-139MM HG: ICD-10-PCS | Mod: CPTII,S$GLB,,

## 2022-08-23 PROCEDURE — 3046F HEMOGLOBIN A1C LEVEL >9.0%: CPT | Mod: CPTII,S$GLB,,

## 2022-08-23 PROCEDURE — 80053 COMPREHEN METABOLIC PANEL: CPT

## 2022-08-23 PROCEDURE — G0402 PR WELCOME MEDICARE PREVENTIVE VISIT NEW ENROLLEE: ICD-10-PCS | Mod: S$GLB,,, | Performed by: NURSE PRACTITIONER

## 2022-08-23 PROCEDURE — 3288F PR FALLS RISK ASSESSMENT DOCUMENTED: ICD-10-PCS | Mod: CPTII,S$GLB,,

## 2022-08-23 PROCEDURE — 4010F PR ACE/ARB THEARPY RXD/TAKEN: ICD-10-PCS | Mod: CPTII,S$GLB,,

## 2022-08-23 PROCEDURE — 3288F FALL RISK ASSESSMENT DOCD: CPT | Mod: CPTII,S$GLB,, | Performed by: NURSE PRACTITIONER

## 2022-08-23 PROCEDURE — 3060F POS MICROALBUMINURIA REV: CPT | Mod: CPTII,S$GLB,,

## 2022-08-23 PROCEDURE — 1159F PR MEDICATION LIST DOCUMENTED IN MEDICAL RECORD: ICD-10-PCS | Mod: CPTII,S$GLB,,

## 2022-08-23 PROCEDURE — 3060F POS MICROALBUMINURIA REV: CPT | Mod: CPTII,S$GLB,, | Performed by: NURSE PRACTITIONER

## 2022-08-23 PROCEDURE — 86140 C-REACTIVE PROTEIN: CPT

## 2022-08-23 PROCEDURE — 99999 PR PBB SHADOW E&M-EST. PATIENT-LVL V: ICD-10-PCS | Mod: PBBFAC,,, | Performed by: NURSE PRACTITIONER

## 2022-08-23 PROCEDURE — 3066F NEPHROPATHY DOC TX: CPT | Mod: CPTII,S$GLB,, | Performed by: NURSE PRACTITIONER

## 2022-08-23 PROCEDURE — 3046F PR MOST RECENT HEMOGLOBIN A1C LEVEL > 9.0%: ICD-10-PCS | Mod: CPTII,S$GLB,,

## 2022-08-23 PROCEDURE — 3075F SYST BP GE 130 - 139MM HG: CPT | Mod: CPTII,S$GLB,,

## 2022-08-23 PROCEDURE — 1160F RVW MEDS BY RX/DR IN RCRD: CPT | Mod: CPTII,S$GLB,,

## 2022-08-23 PROCEDURE — 99215 OFFICE O/P EST HI 40 MIN: CPT | Mod: S$GLB,,,

## 2022-08-23 PROCEDURE — 3008F PR BODY MASS INDEX (BMI) DOCUMENTED: ICD-10-PCS | Mod: CPTII,S$GLB,, | Performed by: NURSE PRACTITIONER

## 2022-08-23 PROCEDURE — 85651 RBC SED RATE NONAUTOMATED: CPT

## 2022-08-23 PROCEDURE — 3046F PR MOST RECENT HEMOGLOBIN A1C LEVEL > 9.0%: ICD-10-PCS | Mod: CPTII,S$GLB,, | Performed by: NURSE PRACTITIONER

## 2022-08-23 PROCEDURE — 3066F NEPHROPATHY DOC TX: CPT | Mod: CPTII,S$GLB,,

## 2022-08-23 PROCEDURE — 3008F PR BODY MASS INDEX (BMI) DOCUMENTED: ICD-10-PCS | Mod: CPTII,S$GLB,,

## 2022-08-23 PROCEDURE — 1159F PR MEDICATION LIST DOCUMENTED IN MEDICAL RECORD: ICD-10-PCS | Mod: CPTII,S$GLB,, | Performed by: NURSE PRACTITIONER

## 2022-08-23 PROCEDURE — G0402 INITIAL PREVENTIVE EXAM: HCPCS | Mod: S$GLB,,, | Performed by: NURSE PRACTITIONER

## 2022-08-23 PROCEDURE — 86480 TB TEST CELL IMMUN MEASURE: CPT

## 2022-08-23 PROCEDURE — 1160F RVW MEDS BY RX/DR IN RCRD: CPT | Mod: CPTII,S$GLB,, | Performed by: NURSE PRACTITIONER

## 2022-08-23 PROCEDURE — 3060F PR POS MICROALBUMINURIA RESULT DOCUMENTED/REVIEW: ICD-10-PCS | Mod: CPTII,S$GLB,, | Performed by: NURSE PRACTITIONER

## 2022-08-23 PROCEDURE — 3079F PR MOST RECENT DIASTOLIC BLOOD PRESSURE 80-89 MM HG: ICD-10-PCS | Mod: CPTII,S$GLB,,

## 2022-08-23 PROCEDURE — G9919 SCRN ND POS ND PROV OF REC: HCPCS | Mod: CPTII,S$GLB,, | Performed by: NURSE PRACTITIONER

## 2022-08-23 PROCEDURE — 3074F SYST BP LT 130 MM HG: CPT | Mod: CPTII,S$GLB,, | Performed by: NURSE PRACTITIONER

## 2022-08-23 PROCEDURE — 3066F PR DOCUMENTATION OF TREATMENT FOR NEPHROPATHY: ICD-10-PCS | Mod: CPTII,S$GLB,, | Performed by: NURSE PRACTITIONER

## 2022-08-23 PROCEDURE — 3072F LOW RISK FOR RETINOPATHY: CPT | Mod: CPTII,S$GLB,,

## 2022-08-23 PROCEDURE — 1101F PT FALLS ASSESS-DOCD LE1/YR: CPT | Mod: CPTII,S$GLB,,

## 2022-08-23 PROCEDURE — 3072F PR LOW RISK FOR RETINOPATHY: ICD-10-PCS | Mod: CPTII,S$GLB,,

## 2022-08-23 NOTE — TELEPHONE ENCOUNTER
Pt states he been wearing the patch but dont have it on him right now. He is at the dr steven alejandro and would have to go home and check his log peters for bgs

## 2022-08-23 NOTE — TELEPHONE ENCOUNTER
----- Message from Bee Stinson NP sent at 8/23/2022  2:43 PM CDT -----  Received update from the rheumatology office that his glucose came back at 477 and he had not taken any of his diabetes medications. He also missed his last appt with me. Can we get an update with how his blood sugars-- Is he using the CeQur patch that he was trained on?  Needs a follow up visit.

## 2022-08-23 NOTE — PATIENT INSTRUCTIONS
Go home and take your diabetes medication ASAP.   Then recheck your blood sugar in one hour.   If at any time you develop abdominal pain, confusion, nausea, vomiting, headache, chest pain, or any new symptom, go to the emergency room immediately.     Take blood sugar medication regularly.

## 2022-08-23 NOTE — PROGRESS NOTES
"  Trey Stevens presented for a  Medicare AWV and comprehensive Health Risk Assessment today. The following components were reviewed and updated:    · Medical history  · Family History  · Social history  · Allergies and Current Medications  · Health Risk Assessment  · Health Maintenance  · Care Team     Patient screened moderate and/or high risk for one or more social determinants of health (SDOH). Patient connected to community resources through the ED Navigator.      ** See Completed Assessments for Annual Wellness Visit within the encounter summary.**         The following assessments were completed:  · Living Situation  · CAGE  · Depression Screening  · Timed Get Up and Go  · Whisper Test  · Cognitive Function Screening  · Nutrition Screening  · ADL Screening  · PAQ Screening        Vitals:    08/23/22 0923   BP: 118/72   BP Location: Left arm   Patient Position: Sitting   Pulse: 82   SpO2: (!) 94%   Weight: 118.8 kg (261 lb 14.5 oz)   Height: 5' 8" (1.727 m)     Body mass index is 39.82 kg/m².  Physical Exam  Vitals and nursing note reviewed.   Constitutional:       Appearance: He is well-developed.   HENT:      Head: Normocephalic.      Right Ear: There is impacted cerumen.   Cardiovascular:      Rate and Rhythm: Normal rate and regular rhythm.      Heart sounds: Normal heart sounds.   Pulmonary:      Effort: Pulmonary effort is normal. No respiratory distress.      Breath sounds: Decreased breath sounds present.   Abdominal:      Palpations: Abdomen is soft. There is no mass.      Tenderness: There is no abdominal tenderness.   Musculoskeletal:         General: Normal range of motion.   Skin:     General: Skin is warm and dry.   Neurological:      Mental Status: He is alert and oriented to person, place, and time.      Motor: No abnormal muscle tone.   Psychiatric:         Speech: Speech normal.         Behavior: Behavior normal.               Diagnoses and health risks identified today and associated " recommendations/orders:    1. Encounter for preventive health examination  He will discuss COVID booster and Shingrix with rheumatology  PSA monitored by PCP  Reports last colonoscopy was in either 2013/2014. Denies history of colon polyps. Denies family history of colon cancer    MA to schedule  ENT    2. Psoriatic arthritis, destructive type  Continue current treatment plan as previously prescribed with your  Rheumatologist.     3. Immunosuppressed status  See #2    4. Calcified granuloma of lung  Ct 8/21  Stable. Continue current treatment plan as previously prescribed with your  Pulmonologist.     5. Chronic obstructive pulmonary disease, unspecified COPD type  Reports he is at his respiratory baseline  Wears O2 mainly at night  Stable. Continue current treatment plan as previously prescribed with your  Pulmonologist.     6. Calcification of aorta  Ct 8/21  Discussed diagnosis and risk reduction.   Advised to follow up with PCP for further recommendations. Patient expressed understanding.       7. Diabetes mellitus with microalbuminuria  a1c >14  Continue current treatment plan as previously prescribed with your  pcp and DM management.     8. Severe obesity (BMI 35.0-39.9) with comorbidity  Encouraged healthy diet and exercise as tolerated to help bring BMI into normal range.   Continue current treatment plan as previously prescribed with your  pcp     9. Hyperlipidemia associated with type 2 diabetes mellitus  Continue current treatment plan as previously prescribed with your  pcp     10. Type II diabetes mellitus with neurological manifestations  Continue current treatment plan as previously prescribed with your  Podiatrist.     11. Hypertension, unspecified type  Continue current treatment plan as previously prescribed with your  pcp     12. SCHUYLER (obstructive sleep apnea)  Continue current treatment plan as previously prescribed with your  Pulmonologist.     13. Tobacco use  Discussed the importance of smoking  cessation and advised to quit smoking. Patient expressed understanding.   Enrolled in smoking cessation program     14. Decreased hearing, unspecified laterality  Abnormal whisper test.   Advised to follow up with PCP/ENT (see prior to audio) for further evaluation and recommendations. Patient expressed understanding.     - Ambulatory referral/consult to Audiology; Future    15. Dependence on supplemental oxygen  See #5      Provided Trey with a 5-10 year written screening schedule and personal prevention plan. Recommendations were developed using the USPSTF age appropriate recommendations. Education, counseling, and referrals were provided as needed. After Visit Summary printed and given to patient which includes a list of additional screenings\tests needed.    Follow up in about 1 year (around 8/23/2023) for awv.    Nae Shelton, NP  I offered to discuss advanced care planning, including how to pick a person who would make decisions for you if you were unable to make them for yourself, called a health care power of , and what kind of decisions you might make such as use of life sustaining treatments such as ventilators and tube feeding when faced with a life limiting illness recorded on a living will that they will need to know. (How you want to be cared for as you near the end of your natural life)     X Patient is interested in learning more about how to make advanced directives.  I provided them paperwork and offered to discuss this with them.

## 2022-08-23 NOTE — Clinical Note
Good afternoon, I saw this patient this morning. His glucose came back at 477. He states he did not take his insulin and other diabetes medication last night or this morning. Currently asymptomatic so I advised him to go home, take his medication ASAP and recheck his sugar in one hour. I wanted to let you know in case you had any other recommendations.

## 2022-08-23 NOTE — PROGRESS NOTES
RHEUMATOLOGY OUTPATIENT CLINIC NOTE    08/23/2022    Subjective:       Patient ID: Trey Stevens is a 67 y.o. male.    Chief Complaint: Psoriatic Arthritis      HPI   Trey Stevens is a 67 y.o. pleasant male here for rheumatology follow up for  Trey is here for routine follow up.      He has chronic psoriatic arthritis and severe plaque psoriasis.  Currently on Cosentyx 300 mg every 14 days and methotrexate 20 mg per week, daily folic acid.  Also using topical Dovonex Persistent severe rash nestor arms and legs; Thickened indurated plaques on forearms and elbows with diffuse rash.  Pain 0/10 today. From a joint standpoint he reports doing well. Occ stiffness and aches in his nestor hands lasting <60 minutes in the am.     *He did have critically high glucose today (477). Denies abdominal pain, no N/V, no HA, no confusion, no CP, no visual disturbances. He did not take his diabetes medication this morning or last night. Has not eaten today. Yesterday had pulled pork, red beans and rice, gravy and rice, and a honeybun.        Prior therapies:  Failed mtx monotherapy, failed topicals and UV;  Failed humira and Enbrel       Recent data on Optimize shows pt >90 kg - lower dose was not effective  We changed him to 300 mg Q 14 day to reach drug therapeutic level to clear the skin  Did 6 week trial and skin was starting to improve  Have not been able to clear his skin w/any agents tried      He has multiple lung nodules which are followed by pulm and stable per his last pulmonary April 2022, his breathing is stable     Was on vit D 50K weekly but now on otc Vit D3 5000 daily        Rheumatologic review of systems negative otherwise.     Physical exam with thickened indurated plaques on forearms, nestor knees. Active PsO on nestor ears.     Past Medical History:   Diagnosis Date    Arthritis     Diabetes mellitus     Diabetes mellitus type 2, uncontrolled 7/19/2016    DM (diabetes mellitus) 2015     09/04/2017    Gall  stones     Obesity     Pneumonia of right middle lobe due to infectious organism 8/13/2021    Psoriasis (a type of skin inflammation)     Rheumatoid arthritis of foot      Past Surgical History:   Procedure Laterality Date    APPENDECTOMY      CHOLECYSTECTOMY       Family History   Problem Relation Age of Onset    Cancer Mother     Hypertension Mother     Diabetes Mother     Cataracts Mother     Stroke Father     Psoriasis Neg Hx      Social History     Socioeconomic History    Marital status:    Tobacco Use    Smoking status: Current Every Day Smoker     Packs/day: 0.50     Years: 28.00     Pack years: 14.00     Types: Cigarettes     Start date: 1994    Smokeless tobacco: Never Used   Substance and Sexual Activity    Alcohol use: No     Alcohol/week: 0.0 standard drinks    Drug use: No    Sexual activity: Never     Social Determinants of Health     Financial Resource Strain: Low Risk     Difficulty of Paying Living Expenses: Not hard at all   Food Insecurity: No Food Insecurity    Worried About Running Out of Food in the Last Year: Never true    Ran Out of Food in the Last Year: Never true   Transportation Needs: No Transportation Needs    Lack of Transportation (Medical): No    Lack of Transportation (Non-Medical): No   Physical Activity: Inactive    Days of Exercise per Week: 0 days    Minutes of Exercise per Session: 0 min   Stress: No Stress Concern Present    Feeling of Stress : Not at all   Social Connections: Moderately Isolated    Frequency of Communication with Friends and Family: Twice a week    Frequency of Social Gatherings with Friends and Family: More than three times a week    Attends Yazidi Services: More than 4 times per year    Active Member of Clubs or Organizations: No    Attends Club or Organization Meetings: Never    Marital Status:    Housing Stability: Low Risk     Unable to Pay for Housing in the Last Year: No    Number of Places Lived in  "the Last Year: 1    Unstable Housing in the Last Year: No     Review of patient's allergies indicates:  No Known Allergies        Objective:   /81   Pulse 84   Resp 16   Ht 5' 8" (1.727 m)   Wt 118.7 kg (261 lb 11 oz)   BMI 39.79 kg/m²   Immunization History   Administered Date(s) Administered    COVID-19, MRNA, LN-S, PF (Pfizer) (Purple Cap) 09/16/2021, 10/07/2021    Hepatitis A, Adult 12/20/2018    Influenza - High Dose - PF (65 years and older) 10/18/2018    Influenza - Quadrivalent 10/24/2016    Influenza - Quadrivalent - PF *Preferred* (6 months and older) 10/30/2017, 11/07/2019, 11/04/2020, 01/18/2022    PPD Test 08/05/2013    Pneumococcal Conjugate - 13 Valent 02/24/2016    Pneumococcal Conjugate - 20 Valent 08/01/2022    Pneumococcal Polysaccharide - 23 Valent 04/09/2013    Tdap 02/24/2016     Photos from 8/23/2022                      Photos from January 2022                    Photos from June 2021                Recent Results (from the past 672 hour(s))   Hemoglobin A1C    Collection Time: 08/01/22 12:28 PM   Result Value Ref Range    Hemoglobin A1C >14.0 (H) 4.0 - 5.6 %    Estimated Avg Glucose Unable to calculate 68 - 131 mg/dL   CBC Auto Differential    Collection Time: 08/23/22  9:01 AM   Result Value Ref Range    WBC 7.46 3.90 - 12.70 K/uL    RBC 5.45 4.60 - 6.20 M/uL    Hemoglobin 15.7 14.0 - 18.0 g/dL    Hematocrit 47.2 40.0 - 54.0 %    MCV 87 82 - 98 fL    MCH 28.8 27.0 - 31.0 pg    MCHC 33.3 32.0 - 36.0 g/dL    RDW 15.0 (H) 11.5 - 14.5 %    Platelets 241 150 - 450 K/uL    MPV 10.7 9.2 - 12.9 fL    Immature Granulocytes 1.6 (H) 0.0 - 0.5 %    Gran # (ANC) 4.7 1.8 - 7.7 K/uL    Immature Grans (Abs) 0.12 (H) 0.00 - 0.04 K/uL    Lymph # 1.6 1.0 - 4.8 K/uL    Mono # 0.6 0.3 - 1.0 K/uL    Eos # 0.4 0.0 - 0.5 K/uL    Baso # 0.06 0.00 - 0.20 K/uL    nRBC 0 0 /100 WBC    Gran % 62.6 38.0 - 73.0 %    Lymph % 21.7 18.0 - 48.0 %    Mono % 7.5 4.0 - 15.0 %    Eosinophil % 5.8 0.0 - 8.0 " %    Basophil % 0.8 0.0 - 1.9 %    Differential Method Automated    Comprehensive Metabolic Panel    Collection Time: 08/23/22  9:01 AM   Result Value Ref Range    Sodium 136 136 - 145 mmol/L    Potassium 4.5 3.5 - 5.1 mmol/L    Chloride 98 95 - 110 mmol/L    CO2 22 (L) 23 - 29 mmol/L    Glucose 477 (HH) 70 - 110 mg/dL    BUN 15 8 - 23 mg/dL    Creatinine 1.5 (H) 0.5 - 1.4 mg/dL    Calcium 9.2 8.7 - 10.5 mg/dL    Total Protein 8.1 6.0 - 8.4 g/dL    Albumin 3.6 3.5 - 5.2 g/dL    Total Bilirubin 0.6 0.1 - 1.0 mg/dL    Alkaline Phosphatase 107 55 - 135 U/L    AST 61 (H) 10 - 40 U/L    ALT 51 (H) 10 - 44 U/L    Anion Gap 16 8 - 16 mmol/L    eGFR 51 (A) >60 mL/min/1.73 m^2   C-Reactive Protein    Collection Time: 08/23/22  9:01 AM   Result Value Ref Range    CRP 8.4 (H) 0.0 - 8.2 mg/L   Sedimentation rate    Collection Time: 08/23/22  9:01 AM   Result Value Ref Range    Sed Rate 22 (H) 0 - 10 mm/Hr          Lab Results   Component Value Date    TBGOLDPLUS Negative 10/18/2021      Lab Results   Component Value Date    HEPAIGM Negative 06/19/2018    HEPBIGM Negative 06/19/2018    HEPBCAB Negative 04/04/2016    HEPCAB Negative 07/22/2019        Assessment:       1. Psoriatic arthritis    2. Plaque psoriasis    3. Methotrexate, long term, current use    4. High risk medication use    5. Immunosuppressed status    6. Vitamin D deficiency    7. High glucose level          Impression:     Chronic refractory Psoriatic arthritis and skin  Psoriasis:  Improved but still skin rash present  On  Cosentyx maintenance dose 300 mg every 14 days   Suboptimal response to 300 mg/month dose     Msk - Doing okay from a joint standpoint currently  skin not controlled was improved on higher dose of Cosentyx x 6 weeks but appears worse today than most recent visit.   On background mtx 20 mg week split dose-       In the past   Failed mtx monotherapy; failed enbrel and  humira   Failed topicals and UV trt      Drug therapy requiring intensive  monitoring for toxicity  - High Risk Medication Monitoring encounter  -No current medication related issues, no evidence of toxicity  -I ordered lab results, reviewed and interpreted results as well as discussed findings with the patient       Immunosuppressed Status  - Compromised immune system secondary to autoimmune disease and use of immunosuppressive drugs.   - Monitor carefully for infections and toxicities- Currently denies issues with recurrent infections      - Advised to get immediate medical care if any infection.   - Also advised strict adherence to age appropriate vaccinations and cancer screenings with PCP.  - Recommend keeping Up to date on all recommended Vaccinations akira. ShingRx, Pneumovax, Prevnar, yearly flu vaccine        Vit D deficiency: completed vit D 50,000U/2Xweekly, last level (May 2020) low 27 now on otc Vit D3 5000 twice weekly ,        Chronic lung nodules- stable monitored by pulmonology- seen yearly, next visit  feb 2021     Mildly elevated LFTs    Crit high glucose today. Patient is diabetic on multiple medications. He did not take his medication last night or this morning. Denies any symptoms of DKA or end organ involvement at this time.    Plan:          May need to get Dermatology on board for future psoriasis management.     C/w mtx 20 mg Q week, split dose     He takes Cosentyx on Saturdays, so discussed with him to take methotrexate every single Saturday and no other days of the week.  Folic acid once a day     C/w  cosentyx 300 mg Q 14 days   See approval info in media from [employer]     Https://www.ncbi.nlm.nih.gov/pubmed/16562489     Optimise StudyCONCLUSIONS:  Standard q4w dosing of secukinumab 300 mg is the optimal dosing regimen to achieve and maintain clear or almost clear skin. Patients with body weight ? 90 kg not achieving PASI 90 at week 24 may benefit from the q2w dosing regimen. What's already known about this topic? Individual responses to biologics in patients with  psoriasis vary considerably and there may be a need to individualize treatment. Dose optimization strategies of currently available biologic drugs have been investigated mainly in rheumatic disorders. Secukinumab has shown long-term PASI 90/100 responses (percentage improvement in Psoriasis Area and Severity Index) to year 5 in patients with moderate-to-severe plaque psoriasis when used at the dose of 300 mg every 4 weeks. What does this study add? Standard every 4 week (q4w) dosing of secukinumab 300 mg is the optimal regimen to achieve and maintain clear or almost clear skin at week 52; the majority of the patients (85·7%) maintain PASI 90 at week 52. Superiority of intensified (q2w) dosing over the q4w regimen could not be claimed. However, patients with a higher body weight (? 90 kg) not achieving PASI 90 response at week 24 may benefit from q2w dosing.     Always remember to Hold mtx and Cosentyx  for signs of infection or for surgery   Pt verbalized understanding    Consider alternate treatment Skyrizi as a different MOA for active PsO. His joints are well controlled on current regimen but has significant skin involvement. He reports compliance with medication and taking appropriately.     Will discuss case with Dr. Johansen.      TB gold and acute hep panel done 6/2020- negative. Today 8/2022 pending.   Do every 1-2 years while on biologics    C/w otc vit D3 to 5K every day, check d level Q year.     Discussed importance of taking DM medications appropriately and risks of severly high glucose including death. Advised patient to take his medication as soon as he gets home and to recheck his glucose level after. If he experiences any symptoms, advised he seek medical care immediately.     CC note to Bee Stinson NP who manages the patient's diabetes.     * called patient at 4:00 p.m..  He had not gotten home yet and had not taken his medication or checked his blood sugar.  He went to lunch with his sister after  our appointment.  Again discussed the importance of medication compliance and hospital precautions should symptoms arise.    Continue working with smoking cessation.      Please call or send portal message with any questions or concerns     Return in 16 weeks w/reg 4 labs, vit D      Charlette Wilcox PA-C  [facility]       65 minutes of total time spent on the encounter, which includes face to face time and non-face to face time preparing to see the patient (eg, review of tests), Obtaining and/or reviewing separately obtained history, Documenting clinical information in the electronic or other health record, Independently interpreting results (not separately reported) and communicating results to the patient/family/caregiver, or Care coordination (not separately reported).

## 2022-08-23 NOTE — PATIENT INSTRUCTIONS
Counseling and Referral of Other Preventative  (Italic type indicates deductible and co-insurance are waived)    Patient Name: Trey Stevens  Today's Date: 8/23/2022    Health Maintenance       Date Due Completion Date    Shingles Vaccine (1 of 2) Never done ---    Colorectal Cancer Screening 10/24/2017 10/24/2016 (Declined)    Override on 10/24/2016: Declined (Pt declined, past colonoscopies normal per Dr. Carney Note this date)    COVID-19 Vaccine (3 - Booster for Pfizer series) 03/07/2022 10/7/2021    Influenza Vaccine (1) 09/01/2022 1/18/2022    Hemoglobin A1c 11/01/2022 8/1/2022    Foot Exam 02/17/2023 2/17/2022    Override on 4/5/2021: Done    Override on 3/21/2019: Done    Override on 3/15/2018: Done    Eye Exam 03/14/2023 3/14/2022    Override on 8/1/2016: Done    Diabetes Urine Screening 04/11/2023 4/11/2022    Lipid Panel 04/11/2023 4/11/2022    High Dose Statin 08/23/2023 8/23/2022    TETANUS VACCINE 02/24/2026 2/24/2016        Orders Placed This Encounter   Procedures    Ambulatory referral/consult to Audiology     The following information is provided to all patients.  This information is to help you find resources for any of the problems found today that may be affecting your health:                Living healthy guide: www.[facility].[place].gov      Understanding Diabetes: www.diabetes.org      Eating healthy: www.cdc.gov/healthyweight      CDC home safety checklist: www.cdc.gov/steadi/patient.html      Agency on Aging: www.goea.[place].[facility]      Alcoholics anonymous (AA): www.aa.org      Physical Activity: www.aaliyah.nih.gov/tq1tfam      Tobacco use: www.quitwithusla.org

## 2022-08-24 LAB
GAMMA INTERFERON BACKGROUND BLD IA-ACNC: 0.01 IU/ML
M TB IFN-G CD4+ BCKGRND COR BLD-ACNC: 0 IU/ML
MITOGEN IGNF BCKGRD COR BLD-ACNC: 9.98 IU/ML
TB GOLD PLUS: NEGATIVE
TB2 - NIL: 0 IU/ML

## 2022-08-25 ENCOUNTER — PATIENT OUTREACH (OUTPATIENT)
Dept: ADMINISTRATIVE | Facility: HOSPITAL | Age: 67
End: 2022-08-25
Payer: MEDICARE

## 2022-08-25 ENCOUNTER — TELEPHONE (OUTPATIENT)
Dept: RHEUMATOLOGY | Facility: CLINIC | Age: 67
End: 2022-08-25
Payer: MEDICARE

## 2022-08-25 DIAGNOSIS — E11.69 HYPERLIPIDEMIA ASSOCIATED WITH TYPE 2 DIABETES MELLITUS: ICD-10-CM

## 2022-08-25 DIAGNOSIS — E78.5 HYPERLIPIDEMIA ASSOCIATED WITH TYPE 2 DIABETES MELLITUS: ICD-10-CM

## 2022-08-25 RX ORDER — ATORVASTATIN CALCIUM 80 MG/1
80 TABLET, FILM COATED ORAL DAILY
Qty: 90 TABLET | Refills: 3 | Status: SHIPPED | OUTPATIENT
Start: 2022-08-25 | End: 2023-10-05

## 2022-08-25 NOTE — PROGRESS NOTES
[facility] STAT[facility] DM REPORT: tried calling patient. Left voicemail asking for return call. Patient according to chart is taking atorvastatin 80 mg daily. However it shows it was sent to [facility] in 2021. [facility] stated that last fill with them was 4/21/21.

## 2022-08-25 NOTE — TELEPHONE ENCOUNTER
Sonia damian/ [facility] pharm tech to verify pt rx.  Medication name, direction, dispense and refill were provided.     Adilson verbalized understanding.

## 2022-08-25 NOTE — TELEPHONE ENCOUNTER
----- Message from Mari Bergman sent at 8/25/2022 12:28 PM CDT -----  Contact: Avery/ Annemarie  Type:  Pharmacy Calling to Clarify an RX    Name of Caller: Avery  Pharmacy Name:   [facility] - [place], [place] - [address]  [address] [address]  Phone: [phone] Fax: [phone]  Prescription Name: Calcipotriene  What do they need to clarify?: Missing information  Best Call Back Number:   Additional Information:

## 2022-08-26 ENCOUNTER — TELEPHONE (OUTPATIENT)
Dept: FAMILY MEDICINE | Facility: CLINIC | Age: 67
End: 2022-08-26
Payer: MEDICARE

## 2022-08-26 NOTE — TELEPHONE ENCOUNTER
----- Message from Violette Vera sent at 8/26/2022 10:04 AM CDT -----  Regarding: Appt  Contact: [phone]  Patient is calling to schedule an appt want to know where the doctor is transferring to due to he received a letter. Please contact pt

## 2022-08-29 ENCOUNTER — TELEPHONE (OUTPATIENT)
Dept: FAMILY MEDICINE | Facility: CLINIC | Age: 67
End: 2022-08-29
Payer: MEDICARE

## 2022-08-29 NOTE — TELEPHONE ENCOUNTER
----- Message from Leidajamie Melo sent at 8/29/2022 11:09 AM CDT -----  States he needs to get a new meter called in to check his blood sugar. States the pharmacy has called three times, they haven't received anything back. They need a prescription. Please call pt    [phone]. Thank you

## 2022-08-29 NOTE — TELEPHONE ENCOUNTER
----- Message from Geena Argueta sent at 8/29/2022  3:59 PM CDT -----  Pt would like the nurse to call him back regarding a new meter and testing strips. Call back number is [phone]. Thx. EL

## 2022-08-31 ENCOUNTER — OFFICE VISIT (OUTPATIENT)
Dept: PODIATRY | Facility: CLINIC | Age: 67
End: 2022-08-31
Payer: MEDICARE

## 2022-08-31 VITALS — WEIGHT: 261.69 LBS | HEIGHT: 68 IN | BODY MASS INDEX: 39.66 KG/M2

## 2022-08-31 DIAGNOSIS — L84 CORN OR CALLUS: ICD-10-CM

## 2022-08-31 DIAGNOSIS — E11.49 TYPE II DIABETES MELLITUS WITH NEUROLOGICAL MANIFESTATIONS: Primary | ICD-10-CM

## 2022-08-31 DIAGNOSIS — B35.1 DERMATOPHYTOSIS OF NAIL: ICD-10-CM

## 2022-08-31 PROCEDURE — 99499 NO LOS: ICD-10-PCS | Mod: S$GLB,,, | Performed by: PODIATRIST

## 2022-08-31 PROCEDURE — 99499 UNLISTED E&M SERVICE: CPT | Mod: S$GLB,,, | Performed by: PODIATRIST

## 2022-08-31 PROCEDURE — 3288F PR FALLS RISK ASSESSMENT DOCUMENTED: ICD-10-PCS | Mod: CPTII,S$GLB,, | Performed by: PODIATRIST

## 2022-08-31 PROCEDURE — 4010F PR ACE/ARB THEARPY RXD/TAKEN: ICD-10-PCS | Mod: CPTII,S$GLB,, | Performed by: PODIATRIST

## 2022-08-31 PROCEDURE — 11056 PR TRIM BENIGN HYPERKERATOTIC SKIN LESION,2-4: ICD-10-PCS | Mod: Q9,S$GLB,, | Performed by: PODIATRIST

## 2022-08-31 PROCEDURE — 3072F LOW RISK FOR RETINOPATHY: CPT | Mod: CPTII,S$GLB,, | Performed by: PODIATRIST

## 2022-08-31 PROCEDURE — 3008F BODY MASS INDEX DOCD: CPT | Mod: CPTII,S$GLB,, | Performed by: PODIATRIST

## 2022-08-31 PROCEDURE — 1101F PR PT FALLS ASSESS DOC 0-1 FALLS W/OUT INJ PAST YR: ICD-10-PCS | Mod: CPTII,S$GLB,, | Performed by: PODIATRIST

## 2022-08-31 PROCEDURE — 3060F POS MICROALBUMINURIA REV: CPT | Mod: CPTII,S$GLB,, | Performed by: PODIATRIST

## 2022-08-31 PROCEDURE — 1159F PR MEDICATION LIST DOCUMENTED IN MEDICAL RECORD: ICD-10-PCS | Mod: CPTII,S$GLB,, | Performed by: PODIATRIST

## 2022-08-31 PROCEDURE — 99999 PR PBB SHADOW E&M-EST. PATIENT-LVL IV: CPT | Mod: PBBFAC,,, | Performed by: PODIATRIST

## 2022-08-31 PROCEDURE — 3072F PR LOW RISK FOR RETINOPATHY: ICD-10-PCS | Mod: CPTII,S$GLB,, | Performed by: PODIATRIST

## 2022-08-31 PROCEDURE — 3060F PR POS MICROALBUMINURIA RESULT DOCUMENTED/REVIEW: ICD-10-PCS | Mod: CPTII,S$GLB,, | Performed by: PODIATRIST

## 2022-08-31 PROCEDURE — 3066F PR DOCUMENTATION OF TREATMENT FOR NEPHROPATHY: ICD-10-PCS | Mod: CPTII,S$GLB,, | Performed by: PODIATRIST

## 2022-08-31 PROCEDURE — 3008F PR BODY MASS INDEX (BMI) DOCUMENTED: ICD-10-PCS | Mod: CPTII,S$GLB,, | Performed by: PODIATRIST

## 2022-08-31 PROCEDURE — 1101F PT FALLS ASSESS-DOCD LE1/YR: CPT | Mod: CPTII,S$GLB,, | Performed by: PODIATRIST

## 2022-08-31 PROCEDURE — 1159F MED LIST DOCD IN RCRD: CPT | Mod: CPTII,S$GLB,, | Performed by: PODIATRIST

## 2022-08-31 PROCEDURE — 99999 PR PBB SHADOW E&M-EST. PATIENT-LVL IV: ICD-10-PCS | Mod: PBBFAC,,, | Performed by: PODIATRIST

## 2022-08-31 PROCEDURE — 3066F NEPHROPATHY DOC TX: CPT | Mod: CPTII,S$GLB,, | Performed by: PODIATRIST

## 2022-08-31 PROCEDURE — 3046F HEMOGLOBIN A1C LEVEL >9.0%: CPT | Mod: CPTII,S$GLB,, | Performed by: PODIATRIST

## 2022-08-31 PROCEDURE — 1160F PR REVIEW ALL MEDS BY PRESCRIBER/CLIN PHARMACIST DOCUMENTED: ICD-10-PCS | Mod: CPTII,S$GLB,, | Performed by: PODIATRIST

## 2022-08-31 PROCEDURE — 3046F PR MOST RECENT HEMOGLOBIN A1C LEVEL > 9.0%: ICD-10-PCS | Mod: CPTII,S$GLB,, | Performed by: PODIATRIST

## 2022-08-31 PROCEDURE — 11721 DEBRIDE NAIL 6 OR MORE: CPT | Mod: 59,Q9,S$GLB, | Performed by: PODIATRIST

## 2022-08-31 PROCEDURE — 3288F FALL RISK ASSESSMENT DOCD: CPT | Mod: CPTII,S$GLB,, | Performed by: PODIATRIST

## 2022-08-31 PROCEDURE — 11721 PR DEBRIDEMENT OF NAILS, 6 OR MORE: ICD-10-PCS | Mod: 59,Q9,S$GLB, | Performed by: PODIATRIST

## 2022-08-31 PROCEDURE — 4010F ACE/ARB THERAPY RXD/TAKEN: CPT | Mod: CPTII,S$GLB,, | Performed by: PODIATRIST

## 2022-08-31 PROCEDURE — 1160F RVW MEDS BY RX/DR IN RCRD: CPT | Mod: CPTII,S$GLB,, | Performed by: PODIATRIST

## 2022-08-31 PROCEDURE — 11056 PARNG/CUTG B9 HYPRKR LES 2-4: CPT | Mod: Q9,S$GLB,, | Performed by: PODIATRIST

## 2022-09-08 ENCOUNTER — CLINICAL SUPPORT (OUTPATIENT)
Dept: SMOKING CESSATION | Facility: CLINIC | Age: 67
End: 2022-09-08
Payer: COMMERCIAL

## 2022-09-08 DIAGNOSIS — F17.200 NICOTINE DEPENDENCE: ICD-10-CM

## 2022-09-08 PROCEDURE — 99999 PR PBB SHADOW E&M-EST. PATIENT-LVL I: ICD-10-PCS | Mod: PBBFAC,,,

## 2022-09-08 PROCEDURE — 99999 PR PBB SHADOW E&M-EST. PATIENT-LVL I: CPT | Mod: PBBFAC,,,

## 2022-09-08 PROCEDURE — 99404 PREV MED CNSL INDIV APPRX 60: CPT | Mod: S$GLB,,, | Performed by: GENERAL PRACTICE

## 2022-09-08 PROCEDURE — 99404 PR PREVENT COUNSEL,INDIV,60 MIN: ICD-10-PCS | Mod: S$GLB,,, | Performed by: GENERAL PRACTICE

## 2022-09-08 RX ORDER — IBUPROFEN 200 MG
1 TABLET ORAL DAILY
Qty: 14 PATCH | Refills: 0 | Status: SHIPPED | OUTPATIENT
Start: 2022-09-08 | End: 2022-09-26 | Stop reason: SDUPTHER

## 2022-09-08 NOTE — Clinical Note
Patient was seen in the clinic today for a smoking cessation follow up visit. He is smoking 5-6 cpd, and was smoking 30 cpd. Commended patient on his efforts towards quitting tobacco use. The patient states that he is now able to wait longer in between smoking than he used to and he is saving money since reducing his smoking. Completion of TCRS (Tobacco Cessation Rating Scale) reviewed strategies, controlling environment, cues, triggers, new goals set. Introduced high risk situations with preparation interventions, caffeine similarities with withdrawal issues of habit and nicotine, Alcohol, Understanding urges, cravings, stress and relaxation. Open discussion with intervention discussion. The patient remains on the prescribed tobacco cessation medication regimen of 21 mg nicotine patch QD and 2 mg nicotine lozenges as needed without any negative side effects at this time.  Discussed lowering nicotine patch dose next visit. Challenged patient to not purchase any more cigarettes and set a quit date.

## 2022-09-08 NOTE — PROGRESS NOTES
Individual Follow-Up Form    9/8/2022    Quit Date: TBD    Clinical Status of Patient: Outpatient    Continuing Medication: yes  Patches    Other Medications: nicotine lozenges     Target Symptoms: Withdrawal and medication side effects. The following were  rated moderate (3) to severe (4) on TCRS:  Moderate (3): none  Severe (4): none    Comments: Patient was seen in the clinic today for a smoking cessation follow up visit. He is smoking 5-6 cpd, and was smoking 30 cpd. Commended patient on his efforts towards quitting tobacco use. The patient states that he is now able to wait longer in between smoking than he used to and he is saving money since reducing his smoking. Completion of TCRS (Tobacco Cessation Rating Scale) reviewed strategies, controlling environment, cues, triggers, new goals set. Introduced high risk situations with preparation interventions, caffeine similarities with withdrawal issues of habit and nicotine, Alcohol, Understanding urges, cravings, stress and relaxation. Open discussion with intervention discussion. The patient remains on the prescribed tobacco cessation medication regimen of 21 mg nicotine patch QD and 2 mg nicotine lozenges as needed without any negative side effects at this time.  Discussed lowering nicotine patch dose next visit. Challenged patient to not purchase any more cigarettes and set a quit date. The patient denies any abnormal behavioral or mental changes at this time.  Will continue to encourage and monitor his progress.     Diagnosis: F17.200    Next Visit: 2 weeks

## 2022-09-12 ENCOUNTER — CLINICAL SUPPORT (OUTPATIENT)
Dept: AUDIOLOGY | Facility: CLINIC | Age: 67
End: 2022-09-12
Payer: MEDICARE

## 2022-09-12 ENCOUNTER — OFFICE VISIT (OUTPATIENT)
Dept: OTOLARYNGOLOGY | Facility: CLINIC | Age: 67
End: 2022-09-12
Payer: MEDICARE

## 2022-09-12 VITALS — BODY MASS INDEX: 40.16 KG/M2 | TEMPERATURE: 99 F | WEIGHT: 264.13 LBS

## 2022-09-12 DIAGNOSIS — H90.3 SENSORINEURAL HEARING LOSS (SNHL), BILATERAL: ICD-10-CM

## 2022-09-12 DIAGNOSIS — H61.23 IMPACTED CERUMEN OF BOTH EARS: Primary | ICD-10-CM

## 2022-09-12 PROCEDURE — 3046F PR MOST RECENT HEMOGLOBIN A1C LEVEL > 9.0%: ICD-10-PCS | Mod: CPTII,S$GLB,, | Performed by: PHYSICIAN ASSISTANT

## 2022-09-12 PROCEDURE — 1101F PT FALLS ASSESS-DOCD LE1/YR: CPT | Mod: CPTII,S$GLB,, | Performed by: PHYSICIAN ASSISTANT

## 2022-09-12 PROCEDURE — 92567 PR TYMPA2METRY: ICD-10-PCS | Mod: S$GLB,,, | Performed by: AUDIOLOGIST-HEARING AID FITTER

## 2022-09-12 PROCEDURE — 3046F HEMOGLOBIN A1C LEVEL >9.0%: CPT | Mod: CPTII,S$GLB,, | Performed by: PHYSICIAN ASSISTANT

## 2022-09-12 PROCEDURE — 4010F PR ACE/ARB THEARPY RXD/TAKEN: ICD-10-PCS | Mod: CPTII,S$GLB,, | Performed by: PHYSICIAN ASSISTANT

## 2022-09-12 PROCEDURE — 3060F PR POS MICROALBUMINURIA RESULT DOCUMENTED/REVIEW: ICD-10-PCS | Mod: CPTII,S$GLB,, | Performed by: PHYSICIAN ASSISTANT

## 2022-09-12 PROCEDURE — 69210 REMOVE IMPACTED EAR WAX UNI: CPT | Mod: S$GLB,,, | Performed by: PHYSICIAN ASSISTANT

## 2022-09-12 PROCEDURE — 3066F PR DOCUMENTATION OF TREATMENT FOR NEPHROPATHY: ICD-10-PCS | Mod: CPTII,S$GLB,, | Performed by: PHYSICIAN ASSISTANT

## 2022-09-12 PROCEDURE — 99999 PR PBB SHADOW E&M-EST. PATIENT-LVL I: CPT | Mod: PBBFAC,,, | Performed by: AUDIOLOGIST-HEARING AID FITTER

## 2022-09-12 PROCEDURE — 99499 NO LOS: ICD-10-PCS | Mod: S$GLB,,, | Performed by: PHYSICIAN ASSISTANT

## 2022-09-12 PROCEDURE — 99499 UNLISTED E&M SERVICE: CPT | Mod: S$GLB,,, | Performed by: PHYSICIAN ASSISTANT

## 2022-09-12 PROCEDURE — 92557 COMPREHENSIVE HEARING TEST: CPT | Mod: S$GLB,,, | Performed by: AUDIOLOGIST-HEARING AID FITTER

## 2022-09-12 PROCEDURE — 99999 PR PBB SHADOW E&M-EST. PATIENT-LVL I: ICD-10-PCS | Mod: PBBFAC,,, | Performed by: AUDIOLOGIST-HEARING AID FITTER

## 2022-09-12 PROCEDURE — 99999 PR PBB SHADOW E&M-EST. PATIENT-LVL III: ICD-10-PCS | Mod: PBBFAC,,, | Performed by: PHYSICIAN ASSISTANT

## 2022-09-12 PROCEDURE — 3072F LOW RISK FOR RETINOPATHY: CPT | Mod: CPTII,S$GLB,, | Performed by: PHYSICIAN ASSISTANT

## 2022-09-12 PROCEDURE — 3066F NEPHROPATHY DOC TX: CPT | Mod: CPTII,S$GLB,, | Performed by: PHYSICIAN ASSISTANT

## 2022-09-12 PROCEDURE — 92557 PR COMPREHENSIVE HEARING TEST: ICD-10-PCS | Mod: S$GLB,,, | Performed by: AUDIOLOGIST-HEARING AID FITTER

## 2022-09-12 PROCEDURE — 92567 TYMPANOMETRY: CPT | Mod: S$GLB,,, | Performed by: AUDIOLOGIST-HEARING AID FITTER

## 2022-09-12 PROCEDURE — 3288F FALL RISK ASSESSMENT DOCD: CPT | Mod: CPTII,S$GLB,, | Performed by: PHYSICIAN ASSISTANT

## 2022-09-12 PROCEDURE — 3008F PR BODY MASS INDEX (BMI) DOCUMENTED: ICD-10-PCS | Mod: CPTII,S$GLB,, | Performed by: PHYSICIAN ASSISTANT

## 2022-09-12 PROCEDURE — 4010F ACE/ARB THERAPY RXD/TAKEN: CPT | Mod: CPTII,S$GLB,, | Performed by: PHYSICIAN ASSISTANT

## 2022-09-12 PROCEDURE — 69210 PR REMOVAL IMPACTED CERUMEN REQUIRING INSTRUMENTATION, UNILATERAL: ICD-10-PCS | Mod: S$GLB,,, | Performed by: PHYSICIAN ASSISTANT

## 2022-09-12 PROCEDURE — 1101F PR PT FALLS ASSESS DOC 0-1 FALLS W/OUT INJ PAST YR: ICD-10-PCS | Mod: CPTII,S$GLB,, | Performed by: PHYSICIAN ASSISTANT

## 2022-09-12 PROCEDURE — 3072F PR LOW RISK FOR RETINOPATHY: ICD-10-PCS | Mod: CPTII,S$GLB,, | Performed by: PHYSICIAN ASSISTANT

## 2022-09-12 PROCEDURE — 1159F MED LIST DOCD IN RCRD: CPT | Mod: CPTII,S$GLB,, | Performed by: PHYSICIAN ASSISTANT

## 2022-09-12 PROCEDURE — 3288F PR FALLS RISK ASSESSMENT DOCUMENTED: ICD-10-PCS | Mod: CPTII,S$GLB,, | Performed by: PHYSICIAN ASSISTANT

## 2022-09-12 PROCEDURE — 99999 PR PBB SHADOW E&M-EST. PATIENT-LVL III: CPT | Mod: PBBFAC,,, | Performed by: PHYSICIAN ASSISTANT

## 2022-09-12 PROCEDURE — 3060F POS MICROALBUMINURIA REV: CPT | Mod: CPTII,S$GLB,, | Performed by: PHYSICIAN ASSISTANT

## 2022-09-12 PROCEDURE — 3008F BODY MASS INDEX DOCD: CPT | Mod: CPTII,S$GLB,, | Performed by: PHYSICIAN ASSISTANT

## 2022-09-12 PROCEDURE — 1159F PR MEDICATION LIST DOCUMENTED IN MEDICAL RECORD: ICD-10-PCS | Mod: CPTII,S$GLB,, | Performed by: PHYSICIAN ASSISTANT

## 2022-09-12 NOTE — PROGRESS NOTES
Trey Stevens was seen 09/12/2022 for an audiological evaluation.  Audiogram completed post cerumen removal by ENT provider.     Results reveal a mild-to-profound sensorineural hearing loss 250-8000 Hz for the right ear, and  mild-to-severe sensorineural hearing loss 250-8000 Hz for the left ear.   Speech Reception Thresholds were  35 dBHL for the right ear and 40 dBHL for the left ear.   Word recognition scores were excellent for the right ear and excellent for the left ear.   Tympanograms were Type A for the right ear and Type A for the left ear.    Patient was counseled on the above findings.     Recommendations:  Annual audiograms  Hearing aid consultation. Patient was given a copy of audiogram and information on [facility] hearing aid program.

## 2022-09-12 NOTE — PROGRESS NOTES
Subjective:   Patient ID: Trey Stevens is a 67 y.o. male.    Chief Complaint: Cerumen Impaction    Patient is here to see me today for evaluation of a possible wax impaction in bilateral ears.  He has complaints of hearing loss in the affected ears, but denies pain or drainage.  This has been an issue in the past.  The patient has not been using any sort of ear drop to soften the wax.     Review of patient's allergies indicates:  No Known Allergies        Review of Systems   HENT:  Positive for hearing loss.        Objective:   Temp 98.6 °F (37 °C) (Temporal)   Wt 119.8 kg (264 lb 1.8 oz)   BMI 40.16 kg/m²     Physical Exam  HENT:      Right Ear: There is impacted cerumen.      Left Ear: There is impacted cerumen.        Procedure Note    CHIEF COMPLAINT:  Cerumen Impaction    Description:  The patient was seated in an exam chair.  An ear speculum was placed in the right EAC and was examined under the microscope.  Suction and/or loop curettes were used to remove a large cerumen impaction.  The tympanic membrane was visualized and was normal in appearance.  The procedure was repeated on the left side in a similar fashion.  The TM was intact and normal on this side as well.  The patient tolerated the procedure well.     Assessment:     1. Impacted cerumen of both ears        Plan:     Impacted cerumen of both ears     Cerumen impaction:  Removed today without difficulty.  I would recommend the use of a wax softening drop, either over the counter Debrox or mineral oil, on a weekly basis.  I also instructed the patient to avoid Qtips.

## 2022-09-18 NOTE — PROGRESS NOTES
Subjective:     Patient ID: Trey Stevens is a 67 y.o. male.    Chief Complaint: Nail Care (Diabetic pt wears shoes w/o socks, PCP Dr. Russell last seen 8-23-22)    Trey is a 67 y.o. male who presents to the clinic for evaluation and treatment of high risk feet. Trey has a past medical history of Arthritis, Diabetes mellitus, Diabetes mellitus type 2, uncontrolled (7/19/2016), DM (diabetes mellitus) (2015), Gall stones, Obesity, Pneumonia of right middle lobe due to infectious organism (8/13/2021), Psoriasis (a type of skin inflammation), and Rheumatoid arthritis of foot. The patient's chief complaint is long, thick toenails. This patient has documented high risk feet requiring routine maintenance secondary to diabetes mellitis and those secondary complications of diabetes, as mentioned..    PCP: Brittanie Kaba MD    Date Last Seen by PCP: 08/23/2022    Current shoe gear:  Affected Foot: Casual shoes     Unaffected Foot: Casual shoes    Hemoglobin A1C   Date Value Ref Range Status   08/01/2022 >14.0 (H) 4.0 - 5.6 % Final     Comment:     ADA Screening Guidelines:  5.7-6.4%  Consistent with prediabetes  >or=6.5%  Consistent with diabetes    High levels of fetal hemoglobin interfere with the HbA1C  assay. Heterozygous hemoglobin variants (HbS, HgC, etc)do  not significantly interfere with this assay.   However, presence of multiple variants may affect accuracy.     04/11/2022 11.1 (H) 4.0 - 5.6 % Final     Comment:     ADA Screening Guidelines:  5.7-6.4%  Consistent with prediabetes  >or=6.5%  Consistent with diabetes    High levels of fetal hemoglobin interfere with the HbA1C  assay. Heterozygous hemoglobin variants (HbS, HgC, etc)do  not significantly interfere with this assay.   However, presence of multiple variants may affect accuracy.     01/20/2022 11.9 (H) 4.0 - 5.6 % Final     Comment:     ADA Screening Guidelines:  5.7-6.4%  Consistent with prediabetes  >or=6.5%  Consistent with diabetes    High levels  of fetal hemoglobin interfere with the HbA1C  assay. Heterozygous hemoglobin variants (HbS, HgC, etc)do  not significantly interfere with this assay.   However, presence of multiple variants may affect accuracy.         Patient Active Problem List   Diagnosis    Psoriatic arthritis, destructive type    Vitamin D deficiency    Multiple lung nodules on CT    Immunosuppressed status    Long-term use of immunosuppressant medication    Mixed type COPD (chronic obstructive pulmonary disease)    Type 2 diabetes mellitus with nephropathy    Hypertension associated with diabetes    Vitiligo    SCHUYLER on CPAP    Class 2 severe obesity due to excess calories with serious comorbidity and body mass index (BMI) of 39.0 to 39.9 in adult    Hyperlipidemia associated with type 2 diabetes mellitus    Elevated liver enzymes    Nicotine dependence, cigarettes, uncomplicated    Dependence on nocturnal oxygen therapy    Urinary retention    Calcified granuloma of lung    Calcification of aorta       Medication List with Changes/Refills   Current Medications    AMLODIPINE (NORVASC) 5 MG TABLET    TAKE 1 TABLET BY MOUTH EVERY DAY    ASPIRIN 81 MG CHEW    Take 1 tablet (81 mg total) by mouth once daily.    ATORVASTATIN (LIPITOR) 80 MG TABLET    Take 1 tablet (80 mg total) by mouth once daily.    BLOOD SUGAR DIAGNOSTIC (ACCU-CHEK NOELLE PLUS TEST STRP) STRP    Check blood sugar 3 times daily.    BLOOD-GLUCOSE METER (ACCU-CHEK NOELLE PLUS METER) MISC    Check blood sugar 3 times daily    CALCIPOTRIENE (DOVONOX) 0.005 % CREAM    APPLY TO AFFECTED AREA TWICE A DAY    CLOBETASOL (OLUX) 0.05 % FOAM    AAA of scalp and behind ears twice daily.  Do not use on face, underarms or groin.    DULAGLUTIDE (TRULICITY) 4.5 MG/0.5 ML PEN INJECTOR    Inject 4.5 mg into the skin every 7 days.    EMPAGLIFLOZIN (JARDIANCE) 25 MG TABLET    Take 1 tablet (25 mg total) by mouth once daily.    ERGOCALCIFEROL (ERGOCALCIFEROL) 50,000 UNIT CAP    Take 50,000 Units by mouth  "twice a week.    FOLIC ACID (FOLVITE) 1 MG TABLET    Take 1 tablet (1,000 mcg total) by mouth once daily.    GLIPIZIDE (GLUCOTROL) 5 MG TABLET    Take 1 tablet (5 mg total) by mouth 2 (two) times daily.    INSULIN (LANTUS SOLOSTAR U-100 INSULIN) GLARGINE 100 UNITS/ML (3ML) SUBQ PEN    Inject 20 Units into the skin once daily.    INSULIN ASPART U-100 (NOVOLOG U-100 INSULIN ASPART) 100 UNIT/ML INJECTION    Inject 200 units per Cequr device every 3 days    KETOCONAZOLE (NIZORAL) 2 % SHAMPOO    Wash hair with medicated shampoo at least 2x/week - let sit on scalp at least 5 minutes prior to rinsing    LANCETS (ACCU-CHEK SOFTCLIX LANCETS) MISC    Check blood sugar 3 times daily    LOSARTAN (COZAAR) 25 MG TABLET    Take 1 tablet (25 mg total) by mouth once daily.    METFORMIN (GLUCOPHAGE-XR) 500 MG XR 24HR TABLET    Take 2 tablets (1,000 mg total) by mouth 2 (two) times daily with meals.    METHOTREXATE 2.5 MG TAB    TAKE 8 TABLETS BY MOUTH EVERY 7 DAYS.    NICOTINE POLACRILEX 2 MG LOZG    Take 1 lozenge (2 mg total) by mouth as needed (Use up to 10 lozenges per day in the place of cigarettes).    PEN NEEDLE, DIABETIC 32 GAUGE X 5/32" NDLE    Use with Lantus once daily and Humalog 3 times daily.    PROAIR HFA 90 MCG/ACTUATION INHALER    Inhale 2 puffs into the lungs every 4 (four) hours as needed for Wheezing. Rescue    SECUKINUMAB (COSENTYX PEN) 150 MG/ML PNIJ    Inject 2 pens (300 mg) into the skin every 14 (fourteen) days.    SPIRONOLACTONE (ALDACTONE) 25 MG TABLET    Take 1 tablet (25 mg total) by mouth once daily.   Changed and/or Refilled Medications    Modified Medication Previous Medication    NICOTINE (NICODERM CQ) 21 MG/24 HR nicotine (NICODERM CQ) 21 mg/24 hr       Place 1 patch onto the skin once daily.    Place 1 patch onto the skin once daily.    TRAMADOL (ULTRAM) 50 MG TABLET traMADoL (ULTRAM) 50 mg tablet       TAKE 1 TABLET BY MOUTH THREE TIMES A DAY AS NEEDED FOR MODERATE PAIN    Take 1 tablet (50 mg " total) by mouth 3 (three) times daily as needed for Pain (use for mod pain; use sparingly).       Review of patient's allergies indicates:  No Known Allergies    Past Surgical History:   Procedure Laterality Date    APPENDECTOMY      CHOLECYSTECTOMY         Family History   Problem Relation Age of Onset    Cancer Mother     Hypertension Mother     Diabetes Mother     Cataracts Mother     Stroke Father     Psoriasis Neg Hx        Social History     Socioeconomic History    Marital status:    Tobacco Use    Smoking status: Every Day     Packs/day: 0.50     Years: 28.00     Pack years: 14.00     Types: Cigarettes     Start date: 1994    Smokeless tobacco: Never   Substance and Sexual Activity    Alcohol use: No     Alcohol/week: 0.0 standard drinks    Drug use: No    Sexual activity: Never     Social Determinants of Health     Financial Resource Strain: Low Risk     Difficulty of Paying Living Expenses: Not hard at all   Food Insecurity: No Food Insecurity    Worried About Running Out of Food in the Last Year: Never true    Ran Out of Food in the Last Year: Never true   Transportation Needs: No Transportation Needs    Lack of Transportation (Medical): No    Lack of Transportation (Non-Medical): No   Physical Activity: Inactive    Days of Exercise per Week: 0 days    Minutes of Exercise per Session: 0 min   Stress: No Stress Concern Present    Feeling of Stress : Not at all   Social Connections: Moderately Isolated    Frequency of Communication with Friends and Family: Twice a week    Frequency of Social Gatherings with Friends and Family: More than three times a week    Attends Buddhism Services: More than 4 times per year    Active Member of Clubs or Organizations: No    Attends Club or Organization Meetings: Never    Marital Status:    Housing Stability: Low Risk     Unable to Pay for Housing in the Last Year: No    Number of Places Lived in the Last Year: 1    Unstable Housing in the Last Year: No  "      Vitals:    08/31/22 1135   Weight: 118.7 kg (261 lb 11 oz)   Height: 5' 8" (1.727 m)       Hemoglobin A1C   Date Value Ref Range Status   08/01/2022 >14.0 (H) 4.0 - 5.6 % Final     Comment:     ADA Screening Guidelines:  5.7-6.4%  Consistent with prediabetes  >or=6.5%  Consistent with diabetes    High levels of fetal hemoglobin interfere with the HbA1C  assay. Heterozygous hemoglobin variants (HbS, HgC, etc)do  not significantly interfere with this assay.   However, presence of multiple variants may affect accuracy.     04/11/2022 11.1 (H) 4.0 - 5.6 % Final     Comment:     ADA Screening Guidelines:  5.7-6.4%  Consistent with prediabetes  >or=6.5%  Consistent with diabetes    High levels of fetal hemoglobin interfere with the HbA1C  assay. Heterozygous hemoglobin variants (HbS, HgC, etc)do  not significantly interfere with this assay.   However, presence of multiple variants may affect accuracy.     01/20/2022 11.9 (H) 4.0 - 5.6 % Final     Comment:     ADA Screening Guidelines:  5.7-6.4%  Consistent with prediabetes  >or=6.5%  Consistent with diabetes    High levels of fetal hemoglobin interfere with the HbA1C  assay. Heterozygous hemoglobin variants (HbS, HgC, etc)do  not significantly interfere with this assay.   However, presence of multiple variants may affect accuracy.         ROS          Objective:      PHYSICAL EXAM: Apperance: Alert and orient in no distress,well developed, and with good attention to grooming and body habits  Patient presents ambulating in extra depth shoes.   LOWER EXTREMITY EXAM:  VASCULAR: Dorsalis pedis pulses 0/4 bilateral and Posterior Tibial pulses 1/4 bilateral. Capillary fill time <4 seconds bilateral. Mild edema observed bilateral. Varicosities present bilateral. Skin temperature of the lower extremities is warm to warm, proximal to distal. Hair growth absent bilateral.  DERMATOLOGICAL: No skin rashes, subcutaneous nodules, lesions, or ulcers observed bilateral. Nails " 1,2,3,4,5 bilateral elongated, thickened, and discolored with subungual debris. Webspaces 1,2,3,4 clean, dry and without evidence of break in skin integrity bilateral. Moderate dry and hyperkeratotic tissue noted to bilateral heels and medial 1st MPJ. Skin fissures noted to bilateral heels. No erythema, drainage, or increased temp noted to area.   NEUROLOGICAL: Light touch, sharp-dull, proprioception all present and equal bilaterally.  Vibratory sensation absent at bilateral hallux. Protective sensation absent at 6/10 sites as tested with a Eden-Rusty 5.07 monofilament.   MUSCULOSKELETAL: Muscle strength is 5/5 for foot inverters, everters, plantarflexors, and dorsiflexors. Muscle tone is normal.       Assessment:       ICD-10-CM ICD-9-CM   1. Type II diabetes mellitus with neurological manifestations  E11.49 250.60   2. Dermatophytosis of nail  B35.1 110.1   3. Corn or callus  L84 700       Plan:   Type II diabetes mellitus with neurological manifestations    Dermatophytosis of nail    Corn or callus    I counseled the patient on his conditions, regarding findings of my examination, my impressions, and usual treatment plan.   Greater than 12 minutes of a 15 minute appointment spent on education about the diabetic foot, neuropathy, and prevention of limb loss.  Shoe inspection. Diabetic Foot Education. Patient reminded of the importance of good nutrition and blood sugar control to help prevent podiatric complications of diabetes. Patient instructed on proper foot hygeine. We discussed wearing proper shoe gear, daily foot inspections, never walking without protective shoe gear, never putting sharp instruments to feet.    With patient's permission, nails 1-5 bilateral were debrided/trimmed in length and thickness aggressively to their soft tissue attachment mechanically and with electric , removing all offending nail and debris. Patient relates relief following the procedure.  With patient's permission,  bilateral callus trimmed in thickness with #15 blade in thickness without incident.   Patient  will continue to monitor the areas daily, inspect feet, wear protective shoe gear when ambulatory, moisturizer to maintain skin integrity. Patient reminded of the importance of good nutrition and blood sugar control to help prevent podiatric complications of diabetes.  Patient to return 3 months or sooner if needed.      Joy Hadley DPM  [facility]

## 2022-09-19 ENCOUNTER — SPECIALTY PHARMACY (OUTPATIENT)
Dept: PHARMACY | Facility: CLINIC | Age: 67
End: 2022-09-19
Payer: MEDICARE

## 2022-09-19 NOTE — TELEPHONE ENCOUNTER
Specialty Pharmacy - Refill Coordination    Specialty Medication Orders Linked to Encounter      Flowsheet Row Most Recent Value   Medication #1 secukinumab (COSENTYX PEN) 150 mg/mL PnIj (Order#[id number], Rx#[id number])            Refill Questions - Documented Responses      Flowsheet Row Most Recent Value   Patient Availability and HIPAA Verification    Does patient want to proceed with activity? Yes   HIPAA/medical authority confirmed? Yes   Relationship to patient of person spoken to? Self   Refill Screening Questions    Changes to allergies? No   Changes to medications? No   New conditions since last clinic visit? No   Unplanned office visit, urgent care, ED, or hospital admission in the last 4 weeks? No   How does patient/caregiver feel medication is working? Good   Financial problems or insurance changes? No   How many doses of your specialty medications were missed in the last 4 weeks? 0   Would patient like to speak to a pharmacist? No   When does the patient need to receive the medication? 09/29/22   Refill Delivery Questions    How will the patient receive the medication? Delivery [facility]   When does the patient need to receive the medication? 09/29/22   Shipping Address Prescription   Address in [facility] confirmed and updated if neccessary? Yes   Expected Copay ($) 0   Is the patient able to afford the medication copay? Yes   Payment Method zero copay   Days supply of Refill 28   Supplies needed? No supplies needed   Refill activity completed? Yes   Refill activity plan Refill scheduled   Shipment/Pickup Date: 09/27/22            Current Outpatient Medications   Medication Sig    amLODIPine (NORVASC) 5 MG tablet TAKE 1 TABLET BY MOUTH EVERY DAY    aspirin 81 MG Chew Take 1 tablet (81 mg total) by mouth once daily.    atorvastatin (LIPITOR) 80 MG tablet Take 1 tablet (80 mg total) by mouth once daily.    blood sugar diagnostic (ACCU-CHEK NOELLE PLUS TEST STRP) Strp Check blood sugar 3 times daily.     "blood-glucose meter (ACCU-CHEK NOELLE PLUS METER) Misc Check blood sugar 3 times daily    calcipotriene (DOVONOX) 0.005 % cream APPLY TO AFFECTED AREA TWICE A DAY    clobetasol (OLUX) 0.05 % Foam AAA of scalp and behind ears twice daily.  Do not use on face, underarms or groin.    dulaglutide (TRULICITY) 4.5 mg/0.5 mL pen injector Inject 4.5 mg into the skin every 7 days.    empagliflozin (JARDIANCE) 25 mg tablet Take 1 tablet (25 mg total) by mouth once daily.    ergocalciferol (ERGOCALCIFEROL) 50,000 unit Cap Take 50,000 Units by mouth twice a week.    folic acid (FOLVITE) 1 MG tablet Take 1 tablet (1,000 mcg total) by mouth once daily.    glipiZIDE (GLUCOTROL) 5 MG tablet Take 1 tablet (5 mg total) by mouth 2 (two) times daily.    insulin (LANTUS SOLOSTAR U-100 INSULIN) glargine 100 units/mL (3mL) SubQ pen Inject 20 Units into the skin once daily.    insulin aspart U-100 (NOVOLOG U-100 INSULIN ASPART) 100 unit/mL injection Inject 200 units per Cequr device every 3 days    ketoconazole (NIZORAL) 2 % shampoo Wash hair with medicated shampoo at least 2x/week - let sit on scalp at least 5 minutes prior to rinsing (Patient not taking: Reported on 9/12/2022)    lancets (ACCU-CHEK SOFTCLIX LANCETS) Misc Check blood sugar 3 times daily    losartan (COZAAR) 25 MG tablet Take 1 tablet (25 mg total) by mouth once daily.    metFORMIN (GLUCOPHAGE-XR) 500 MG XR 24hr tablet Take 2 tablets (1,000 mg total) by mouth 2 (two) times daily with meals.    methotrexate 2.5 MG Tab TAKE 8 TABLETS BY MOUTH EVERY 7 DAYS.    nicotine (NICODERM CQ) 21 mg/24 hr Place 1 patch onto the skin once daily.    nicotine polacrilex 2 MG Lozg Take 1 lozenge (2 mg total) by mouth as needed (Use up to 10 lozenges per day in the place of cigarettes).    pen needle, diabetic 32 gauge x 5/32" Ndle Use with Lantus once daily and Humalog 3 times daily.    PROAIR HFA 90 mcg/actuation inhaler Inhale 2 puffs into the lungs every 4 (four) hours as needed for " Wheezing. Rescue    secukinumab (COSENTYX PEN) 150 mg/mL PnIj Inject 2 pens (300 mg) into the skin every 14 (fourteen) days.    spironolactone (ALDACTONE) 25 MG tablet Take 1 tablet (25 mg total) by mouth once daily.    traMADoL (ULTRAM) 50 mg tablet TAKE 1 TABLET BY MOUTH THREE TIMES A DAY AS NEEDED FOR MODERATE PAIN   Last reviewed on 9/18/2022  9:43 AM by Joy Hadley DPM    Review of patient's allergies indicates:  No Known Allergies Last reviewed on  9/18/2022 9:43 AM by Joy Hadley      Tasks added this encounter   10/20/2022 - Refill Call (Auto Added)   Tasks due within next 3 months   No tasks due.     [facility]  [address] [facility] [place] [address]  Phone: [phone]  Fax: [phone]

## 2022-09-26 ENCOUNTER — CLINICAL SUPPORT (OUTPATIENT)
Dept: SMOKING CESSATION | Facility: CLINIC | Age: 67
End: 2022-09-26
Payer: COMMERCIAL

## 2022-09-26 DIAGNOSIS — F17.200 NICOTINE DEPENDENCE: ICD-10-CM

## 2022-09-26 PROCEDURE — 99404 PREV MED CNSL INDIV APPRX 60: CPT | Mod: S$GLB,,, | Performed by: GENERAL PRACTICE

## 2022-09-26 PROCEDURE — 99999 PR PBB SHADOW E&M-EST. PATIENT-LVL II: CPT | Mod: PBBFAC,,,

## 2022-09-26 PROCEDURE — 99999 PR PBB SHADOW E&M-EST. PATIENT-LVL II: ICD-10-PCS | Mod: PBBFAC,,,

## 2022-09-26 PROCEDURE — 99404 PR PREVENT COUNSEL,INDIV,60 MIN: ICD-10-PCS | Mod: S$GLB,,, | Performed by: GENERAL PRACTICE

## 2022-09-26 RX ORDER — IBUPROFEN 200 MG
1 TABLET ORAL DAILY
Qty: 14 PATCH | Refills: 0 | Status: SHIPPED | OUTPATIENT
Start: 2022-09-26 | End: 2023-01-05

## 2022-09-26 NOTE — PROGRESS NOTES
Individual Follow-Up Form    9/26/2022    Quit Date: TBD    Clinical Status of Patient: Outpatient    Continuing Medication: yes  Patches    Other Medications: nicotine lozenges     Target Symptoms: Withdrawal and medication side effects. The following were  rated moderate (3) to severe (4) on TCRS:  Moderate (3): none  Severe (4): none    Comments: Patient came in to the clinic today for a smoking cessation follow up visit. He is smoking 5-6 cpd, tapered down from 30 cpd. Commended patient on his efforts towards quitting tobacco use. He has been keeping his cigarettes out of sight and not carrying them with him to refrain from smoking and using cinnamon toothpicks and straws have been helping him. Discussed healthy substitutions, setting a quit date, and behavioral modifications to aid patient in his quit attempt. Quit date discussed, but not set. The patient remains on the prescribed tobacco cessation medication regimen of 21 mg nicotine patch QD and 2 mg nicotine lozenges as needed without any negative side effects at this time.  Challenged patient to not purchase any more cigarettes. The patient denies any abnormal behavioral or mental changes at this time.  Will continue to encourage and monitor his progress.     Diagnosis: F17.200    Next Visit: 2 weeks

## 2022-09-26 NOTE — Clinical Note
Patient came in to the clinic today for a smoking cessation follow up visit. He is smoking 5-6 cpd, tapered down from 30 cpd. Commended patient on his efforts towards quitting tobacco use. He has been keeping his cigarettes out of sight and not carrying them with him to refrain from smoking and using cinnamon toothpicks and straws have been helping him. Discussed healthy substitutions, setting a quit date, and behavioral modifications to aid patient in his quit attempt. Quit date discussed, but not set. The patient remains on the prescribed tobacco cessation medication regimen of 21 mg nicotine patch QD and 2 mg nicotine lozenges as needed without any negative side effects at this time.  Challenged patient to not purchase any more cigarettes. The patient denies any abnormal behavioral or mental changes at this time.  Will continue to encourage and monitor his progress.

## 2022-10-10 ENCOUNTER — CLINICAL SUPPORT (OUTPATIENT)
Dept: SMOKING CESSATION | Facility: CLINIC | Age: 67
End: 2022-10-10
Payer: COMMERCIAL

## 2022-10-10 DIAGNOSIS — F17.200 NICOTINE DEPENDENCE: Primary | ICD-10-CM

## 2022-10-10 PROCEDURE — 99404 PREV MED CNSL INDIV APPRX 60: CPT | Mod: S$GLB,,, | Performed by: GENERAL PRACTICE

## 2022-10-10 PROCEDURE — 99404 PR PREVENT COUNSEL,INDIV,60 MIN: ICD-10-PCS | Mod: S$GLB,,, | Performed by: GENERAL PRACTICE

## 2022-10-10 PROCEDURE — 99999 PR PBB SHADOW E&M-EST. PATIENT-LVL II: CPT | Mod: PBBFAC,,,

## 2022-10-10 PROCEDURE — 99999 PR PBB SHADOW E&M-EST. PATIENT-LVL II: ICD-10-PCS | Mod: PBBFAC,,,

## 2022-10-10 RX ORDER — IBUPROFEN 200 MG
1 TABLET ORAL DAILY
Qty: 14 PATCH | Refills: 0 | Status: SHIPPED | OUTPATIENT
Start: 2022-10-10 | End: 2023-01-05

## 2022-10-10 NOTE — PROGRESS NOTES
Individual Follow-Up Form    10/10/2022    Quit Date: TBD    Clinical Status of Patient: Outpatient    Continuing Medication: yes  Patches    Other Medications: nicotine lozengs     Target Symptoms: Withdrawal and medication side effects. The following were  rated moderate (3) to severe (4) on TCRS:  Moderate (3): none  Severe (4): none    Comments: Patient was seen in the clinic today for a smoking cessation follow up visit. He is smoking 1-2 cpd, and was smoking 30 cpd. Commended patient on his efforts towards quitting tobacco use. He was able to refrain from smoking for over 24 hours this week. The patient states that due to a frustrating situation, he went buy a pack of cigarettes. Discussed coping skills and consistent use of nicotine patch. Discussed lowering nicotine patch dose to 14 mg today. The patient remains on the prescribed tobacco cessation medication regimen of 21 mg nicotine patch QD and 2 mg nicotine lozenges as needed without any negative side effects at this time.  Challenged patient to set a quit date and not purchase any more cigarettes. The patient denies any abnormal behavioral or mental changes at this time.  Will continue to encourage and monitor his progress.     Diagnosis: F17.200    Next Visit: 2 weeks

## 2022-10-10 NOTE — Clinical Note
Patient was seen in the clinic today for a smoking cessation follow up visit. He is smoking 1-2 cpd, and was smoking 30 cpd. Commended patient on his efforts towards quitting tobacco use. He was able to refrain from smoking for over 24 hours this week. The patient states that due to a frustrating situation, he went buy a pack of cigarettes. Discussed coping skills and consistent use of nicotine patch. Discussed lowering nicotine patch dose to 14 mg today. The patient remains on the prescribed tobacco cessation medication regimen of 21 mg nicotine patch QD and 2 mg nicotine lozenges as needed without any negative side effects at this time.  Challenged patient to set a quit date and not purchase any more cigarettes. The patient denies any abnormal behavioral or mental changes at this time.  Will continue to encourage and monitor his progress.

## 2022-10-13 DIAGNOSIS — J44.9 MIXED TYPE COPD (CHRONIC OBSTRUCTIVE PULMONARY DISEASE): Primary | Chronic | ICD-10-CM

## 2022-10-25 ENCOUNTER — SPECIALTY PHARMACY (OUTPATIENT)
Dept: PHARMACY | Facility: CLINIC | Age: 67
End: 2022-10-25
Payer: MEDICARE

## 2022-10-25 DIAGNOSIS — L40.0 PLAQUE PSORIASIS: ICD-10-CM

## 2022-10-25 DIAGNOSIS — L40.9 PSORIASIS: ICD-10-CM

## 2022-10-26 RX ORDER — SECUKINUMAB 150 MG/ML
300 INJECTION SUBCUTANEOUS
Qty: 4 ML | Refills: 3 | Status: SHIPPED | OUTPATIENT
Start: 2022-10-26 | End: 2023-03-17 | Stop reason: SDUPTHER

## 2022-11-01 NOTE — TELEPHONE ENCOUNTER
Specialty Pharmacy - Refill Coordination    Specialty Medication Orders Linked to Encounter      Flowsheet Row Most Recent Value   Medication #1 secukinumab (COSENTYX PEN) 150 mg/mL PnIj (Order#[id number], Rx#[id number])            Refill Questions - Documented Responses      Flowsheet Row Most Recent Value   Patient Availability and HIPAA Verification    Does patient want to proceed with activity? Yes   HIPAA/medical authority confirmed? Yes   Relationship to patient of person spoken to? Self   Refill Screening Questions    Changes to allergies? No   Changes to medications? No   New conditions since last clinic visit? No   Unplanned office visit, urgent care, ED, or hospital admission in the last 4 weeks? No   How does patient/caregiver feel medication is working? Good   Financial problems or insurance changes? No   How many doses of your specialty medications were missed in the last 4 weeks? 0   Would patient like to speak to a pharmacist? No   When does the patient need to receive the medication? 11/12/22   Refill Delivery Questions    How will the patient receive the medication? [facility]   When does the patient need to receive the medication? 11/12/22   Shipping Address Home   Address in [facility] confirmed and updated if neccessary? Yes   Expected Copay ($) 0   Is the patient able to afford the medication copay? Yes   Payment Method zero copay   Days supply of Refill 28   Supplies needed? No supplies needed   Refill activity completed? Yes   Refill activity plan Refill scheduled   Shipment/Pickup Date: 11/07/22            Current Outpatient Medications   Medication Sig    amLODIPine (NORVASC) 5 MG tablet TAKE 1 TABLET BY MOUTH EVERY DAY    aspirin 81 MG Chew Take 1 tablet (81 mg total) by mouth once daily.    atorvastatin (LIPITOR) 80 MG tablet Take 1 tablet (80 mg total) by mouth once daily.    blood sugar diagnostic (ACCU-CHEK NOELLE PLUS TEST STRP) Strp Check blood sugar 3 times daily.    blood-glucose  "meter (ACCU-CHEK NOELLE PLUS METER) Misc Check blood sugar 3 times daily    calcipotriene (DOVONOX) 0.005 % cream APPLY TO AFFECTED AREA TWICE A DAY    clobetasol (OLUX) 0.05 % Foam AAA of scalp and behind ears twice daily.  Do not use on face, underarms or groin.    dulaglutide (TRULICITY) 4.5 mg/0.5 mL pen injector Inject 4.5 mg into the skin every 7 days.    empagliflozin (JARDIANCE) 25 mg tablet Take 1 tablet (25 mg total) by mouth once daily.    ergocalciferol (ERGOCALCIFEROL) 50,000 unit Cap Take 50,000 Units by mouth twice a week.    folic acid (FOLVITE) 1 MG tablet Take 1 tablet (1,000 mcg total) by mouth once daily.    glipiZIDE (GLUCOTROL) 5 MG tablet Take 1 tablet (5 mg total) by mouth 2 (two) times daily.    insulin (LANTUS SOLOSTAR U-100 INSULIN) glargine 100 units/mL (3mL) SubQ pen Inject 20 Units into the skin once daily.    insulin aspart U-100 (NOVOLOG U-100 INSULIN ASPART) 100 unit/mL injection Inject 200 units per Cequr device every 3 days    ketoconazole (NIZORAL) 2 % shampoo Wash hair with medicated shampoo at least 2x/week - let sit on scalp at least 5 minutes prior to rinsing (Patient not taking: Reported on 9/12/2022)    lancets (ACCU-CHEK SOFTCLIX LANCETS) Misc Check blood sugar 3 times daily    losartan (COZAAR) 25 MG tablet Take 1 tablet (25 mg total) by mouth once daily.    metFORMIN (GLUCOPHAGE-XR) 500 MG XR 24hr tablet Take 2 tablets (1,000 mg total) by mouth 2 (two) times daily with meals.    methotrexate 2.5 MG Tab TAKE 8 TABLETS BY MOUTH EVERY 7 DAYS.    nicotine (NICODERM CQ) 14 mg/24 hr Place 1 patch onto the skin once daily.    nicotine (NICODERM CQ) 21 mg/24 hr Place 1 patch onto the skin once daily.    nicotine polacrilex 2 MG Lozg Take 1 lozenge (2 mg total) by mouth as needed (Use up to 10 lozenges per day in the place of cigarettes).    pen needle, diabetic 32 gauge x 5/32" Ndle Use with Lantus once daily and Humalog 3 times daily.    PROAIR HFA 90 mcg/actuation inhaler Inhale " 2 puffs into the lungs every 4 (four) hours as needed for Wheezing. Rescue    secukinumab (COSENTYX PEN) 150 mg/mL PnIj Inject 2 pens (300 mg) into the skin every 14 (fourteen) days.    spironolactone (ALDACTONE) 25 MG tablet Take 1 tablet (25 mg total) by mouth once daily.    traMADoL (ULTRAM) 50 mg tablet TAKE 1 TABLET BY MOUTH THREE TIMES A DAY AS NEEDED FOR MODERATE PAIN   Last reviewed on 10/17/2022  7:48 AM by Aminata Martinez NP    Review of patient's allergies indicates:  No Known Allergies Last reviewed on  10/25/2022 4:44 PM by Mara Riddle      Tasks added this encounter   12/2/2022 - Refill Call (Auto Added)   Tasks due within next 3 months   No tasks due.     [facility]  [address] [facility] [address]  Phone: [phone]  Fax: [phone]

## 2022-11-08 ENCOUNTER — HOSPITAL ENCOUNTER (OUTPATIENT)
Dept: RADIOLOGY | Facility: HOSPITAL | Age: 67
Discharge: HOME | End: 2022-11-08
Attending: NURSE PRACTITIONER
Payer: MEDICARE

## 2022-11-08 DIAGNOSIS — J44.9 MIXED TYPE COPD (CHRONIC OBSTRUCTIVE PULMONARY DISEASE): ICD-10-CM

## 2022-11-08 PROCEDURE — 71250 CT THORAX DX C-: CPT | Mod: TC

## 2022-11-23 ENCOUNTER — TELEPHONE (OUTPATIENT)
Dept: PODIATRY | Facility: CLINIC | Age: 67
End: 2022-11-23
Payer: MEDICARE

## 2022-12-01 NOTE — PROGRESS NOTES
Subjective:      Patient ID: Trey Stevens is a 67 y.o. male.    Chief Complaint: Follow-up (Three month follow up. Pt feet are sore.)      HPI  Here for follow up of medical problems.  Missed DM nurse appt in 9/22.  Writes down sugars, but doesn't have book.  Thinks AC breakfast sugars 100-150. No low sugars.   Thinks taking all his DM meds, but out of some meds so he's not sure.  Meant to bring in list, but didn't remember.  Has an insulin patch that he fills up with insulin and it lasts 3 days.    No f/c/sw/cough.  No cp/sob/palp.  BMs normal.    Takes tramadol prn Ps arthritis ankle/foot pain.  No leg swelling lately.  Smoking less with Tob cess help- now 1 pack every 2 weeks, on nicotine patch.            11/22 CT lung:  Impression:     Multiple bilateral pulmonary nodules, the largest of which is stable to 5 years ago and has a calcification consistent with granulomatous disease.  Calcified hilar lymph nodes go along with this diagnosis.  However, some pulmonary nodules may be new but they are less than 6 mm.  Follow-up chest CT in 1 year is recommended.     The CT exam was performed using one or more of the following dose reduction techniques- Automated exposure control, adjustment of the mA and/or kV according to patient size, and/or use of iterative reconstructed technique.        Electronically signed by: Tu Guerra  Date:                                            11/08/2022  Time  ----------------------------------  8/21 ECHO:  Summary    Concentric remodeling and normal systolic function.  Moderate left atrial enlargement.  The estimated ejection fraction is 60%.  Normal left ventricular diastolic function.  With normal right ventricular systolic function.   --------------------------------------------------------------------            Updated/ annual due 8/23:  HM:  12/22 today fluvax, 10/21 covid vaccines, 12/18 HAV, 2/16 [id number], 4/13 [id number], 8/22 [id number], 2/16 TDaP, Cscope in the past normal  "and pt refuses more, 6/22 PSA, 2/16 HCV neg, 2/22 Eye Dr. Cole, 11/18 chest CT stable, Pod Dr. Hadley.     Review of Systems   Constitutional:  Negative for chills, diaphoresis, fatigue and fever.   Respiratory:  Negative for cough, chest tightness and shortness of breath.    Cardiovascular:  Negative for chest pain, palpitations and leg swelling.   Gastrointestinal:  Negative for blood in stool, constipation, diarrhea, nausea and vomiting.   Genitourinary:  Negative for difficulty urinating and frequency.   Musculoskeletal:  Negative for arthralgias.       Objective:   /71 (BP Location: Left arm)   Pulse 82   Temp 97.8 °F (36.6 °C) (Oral)   Ht 5' 7" (1.702 m)   Wt 117.6 kg (259 lb 4.8 oz)   SpO2 97%   BMI 40.61 kg/m²     Physical Exam  Constitutional:       Appearance: He is well-developed.   Cardiovascular:      Rate and Rhythm: Normal rate and regular rhythm.      Heart sounds: No murmur heard.    No friction rub. No gallop.   Pulmonary:      Effort: Pulmonary effort is normal.      Breath sounds: Normal breath sounds. No wheezing or rales.   Abdominal:      General: Bowel sounds are normal.      Palpations: Abdomen is soft. There is no mass.      Tenderness: There is no abdominal tenderness.   Musculoskeletal:      Cervical back: Normal range of motion and neck supple.   Lymphadenopathy:      Cervical: No cervical adenopathy.   Neurological:      Mental Status: He is alert and oriented to person, place, and time.          Latest Reference Range & Units 08/01/22 12:28 08/23/22 09:01   WBC 3.90 - 12.70 K/uL  7.46   RBC 4.60 - 6.20 M/uL  5.45   HEMOGLOBIN 14.0 - 18.0 g/dL  15.7   HEMATOCRIT 40.0 - 54.0 %  47.2   MCV 82 - 98 fL  87   MCH 27.0 - 31.0 pg  28.8   MCHC 32.0 - 36.0 g/dL  33.3   RDW 11.5 - 14.5 %  15.0 (H)   Platelets 150 - 450 K/uL  241   MPV 9.2 - 12.9 fL  10.7   Gran % 38.0 - 73.0 %  62.6   Lymph % 18.0 - 48.0 %  21.7   Mono % 4.0 - 15.0 %  7.5   Eosinophil % 0.0 - 8.0 %  5.8   Basophil % " 0.0 - 1.9 %  0.8   Immature Granulocytes 0.0 - 0.5 %  1.6 (H)   Gran # (ANC) 1.8 - 7.7 K/uL  4.7   Lymph # 1.0 - 4.8 K/uL  1.6   Mono # 0.3 - 1.0 K/uL  0.6   Eos # 0.0 - 0.5 K/uL  0.4   Baso # 0.00 - 0.20 K/uL  0.06   Immature Grans (Abs) 0.00 - 0.04 K/uL  0.12 (H)   nRBC 0 /100 WBC  0   Differential Method   Automated   Sed Rate 0 - 10 mm/Hr  22 (H)   Sodium 136 - 145 mmol/L  136   Potassium 3.5 - 5.1 mmol/L  4.5   Chloride 95 - 110 mmol/L  98   CO2 23 - 29 mmol/L  22 (L)   ANION GAP 8 - 16 mmol/L  16   BUN 8 - 23 mg/dL  15   Creatinine 0.5 - 1.4 mg/dL  1.5 (H)   eGFR >60 mL/min/1.73 m^2  51 !   Glucose 70 - 110 mg/dL  477 (HH)   Calcium 8.7 - 10.5 mg/dL  9.2   Alkaline Phosphatase 55 - 135 U/L  107   PROTEIN TOTAL 6.0 - 8.4 g/dL  8.1   Albumin 3.5 - 5.2 g/dL  3.6   BILIRUBIN TOTAL 0.1 - 1.0 mg/dL  0.6   AST 10 - 40 U/L  61 (H)   ALT 10 - 44 U/L  51 (H)   CRP 0.0 - 8.2 mg/L  8.4 (H)   Hemoglobin A1C External 4.0 - 5.6 % >14.0 (H)    Estimated Avg Glucose 68 - 131 mg/dL Unable to calculate      Assessment:       1. Hypertension associated with diabetes    2. Type 2 diabetes mellitus with nephropathy    3. Hyperlipidemia associated with type 2 diabetes mellitus    4. Mixed type COPD (chronic obstructive pulmonary disease)    5. Nicotine dependence, cigarettes, uncomplicated    6. SCHUYLER on CPAP    7. Psoriatic arthritis, destructive type    8. Immunosuppressed status    9. Vitamin D deficiency    10. Calcified granuloma of lung    11. Stage 3a chronic kidney disease    12. Type 2 diabetes mellitus with microalbuminuria, with long-term current use of insulin    13. Type 2 diabetes mellitus with stage 3a chronic kidney disease, with long-term current use of insulin          Plan:     Hypertension associated with diabetes- doing well, cont rx.  -     Microalbumin/Creatinine Ratio, Urine; Future; Expected date: 12/08/2022    Type 2 diabetes mellitus with nephropathy, CKD 3a, with microalbuminuria- recheck now.  Bring book  of sugar log, and medications in 1wk for f/u.  -     Hemoglobin A1C; Future; Expected date: 12/08/2022  -     Basic Metabolic Panel; Future; Expected date: 12/08/2022  -     Microalbumin/Creatinine Ratio, Urine; Future; Expected date: 12/08/2022    Hyperlipidemia associated with type 2 diabetes mellitus- cont statin.    Mixed type COPD (chronic obstructive pulmonary disease)- cont inhalers.    Nicotine dependence, cigarettes, uncomplicated- cont cessation clinic.    SCHUYLER on CPAP, doing well, cont.    Psoriatic arthritis, destructive type, Immunosuppressed status- cont meds per Rheum.    Vitamin D deficiency- restart twice weekly rx.    Calcified granuloma of lung- stable, f/w Pulm.    Stage 3a chronic kidney disease- recheck now.  -     Basic Metabolic Panel; Future; Expected date: 12/08/2022    Other orders  -     ergocalciferol (ERGOCALCIFEROL) 50,000 unit Cap; Take 1 capsule (50,000 Units total) by mouth twice a week.  Dispense: 24 capsule; Refill: 3    RTC 1wk with medications and sugar log.  Lives alone.  Lives beside 93 year old mother.

## 2022-12-06 ENCOUNTER — OFFICE VISIT (OUTPATIENT)
Dept: PULMONOLOGY | Facility: CLINIC | Age: 67
End: 2022-12-06
Payer: MEDICARE

## 2022-12-06 VITALS
OXYGEN SATURATION: 96 % | SYSTOLIC BLOOD PRESSURE: 122 MMHG | RESPIRATION RATE: 18 BRPM | WEIGHT: 258.69 LBS | HEART RATE: 80 BPM | HEIGHT: 68 IN | DIASTOLIC BLOOD PRESSURE: 70 MMHG | BODY MASS INDEX: 39.21 KG/M2

## 2022-12-06 DIAGNOSIS — G47.33 OSA ON CPAP: ICD-10-CM

## 2022-12-06 DIAGNOSIS — R91.8 MULTIPLE LUNG NODULES ON CT: Primary | Chronic | ICD-10-CM

## 2022-12-06 DIAGNOSIS — E66.01 CLASS 2 SEVERE OBESITY DUE TO EXCESS CALORIES WITH SERIOUS COMORBIDITY AND BODY MASS INDEX (BMI) OF 39.0 TO 39.9 IN ADULT: ICD-10-CM

## 2022-12-06 DIAGNOSIS — I15.2 HYPERTENSION ASSOCIATED WITH DIABETES: ICD-10-CM

## 2022-12-06 DIAGNOSIS — J44.9 MIXED TYPE COPD (CHRONIC OBSTRUCTIVE PULMONARY DISEASE): Chronic | ICD-10-CM

## 2022-12-06 DIAGNOSIS — F17.210 NICOTINE DEPENDENCE, CIGARETTES, UNCOMPLICATED: ICD-10-CM

## 2022-12-06 DIAGNOSIS — E11.59 HYPERTENSION ASSOCIATED WITH DIABETES: ICD-10-CM

## 2022-12-06 DIAGNOSIS — Z99.81 DEPENDENCE ON NOCTURNAL OXYGEN THERAPY: ICD-10-CM

## 2022-12-06 PROCEDURE — 3288F FALL RISK ASSESSMENT DOCD: CPT | Mod: CPTII,S$GLB,, | Performed by: PHYSICIAN ASSISTANT

## 2022-12-06 PROCEDURE — 1159F PR MEDICATION LIST DOCUMENTED IN MEDICAL RECORD: ICD-10-PCS | Mod: CPTII,S$GLB,, | Performed by: PHYSICIAN ASSISTANT

## 2022-12-06 PROCEDURE — 99214 PR OFFICE/OUTPT VISIT, EST, LEVL IV, 30-39 MIN: ICD-10-PCS | Mod: S$GLB,,, | Performed by: PHYSICIAN ASSISTANT

## 2022-12-06 PROCEDURE — 3008F PR BODY MASS INDEX (BMI) DOCUMENTED: ICD-10-PCS | Mod: CPTII,S$GLB,, | Performed by: PHYSICIAN ASSISTANT

## 2022-12-06 PROCEDURE — 1159F MED LIST DOCD IN RCRD: CPT | Mod: CPTII,S$GLB,, | Performed by: PHYSICIAN ASSISTANT

## 2022-12-06 PROCEDURE — 3008F BODY MASS INDEX DOCD: CPT | Mod: CPTII,S$GLB,, | Performed by: PHYSICIAN ASSISTANT

## 2022-12-06 PROCEDURE — 1101F PR PT FALLS ASSESS DOC 0-1 FALLS W/OUT INJ PAST YR: ICD-10-PCS | Mod: CPTII,S$GLB,, | Performed by: PHYSICIAN ASSISTANT

## 2022-12-06 PROCEDURE — 3074F SYST BP LT 130 MM HG: CPT | Mod: CPTII,S$GLB,, | Performed by: PHYSICIAN ASSISTANT

## 2022-12-06 PROCEDURE — 3078F PR MOST RECENT DIASTOLIC BLOOD PRESSURE < 80 MM HG: ICD-10-PCS | Mod: CPTII,S$GLB,, | Performed by: PHYSICIAN ASSISTANT

## 2022-12-06 PROCEDURE — 99499 RISK ADDL DX/OHS AUDIT: ICD-10-PCS | Mod: HCNC,S$GLB,, | Performed by: PHYSICIAN ASSISTANT

## 2022-12-06 PROCEDURE — 99214 OFFICE O/P EST MOD 30 MIN: CPT | Mod: S$GLB,,, | Performed by: PHYSICIAN ASSISTANT

## 2022-12-06 PROCEDURE — 99999 PR PBB SHADOW E&M-EST. PATIENT-LVL III: ICD-10-PCS | Mod: PBBFAC,,, | Performed by: PHYSICIAN ASSISTANT

## 2022-12-06 PROCEDURE — 3046F HEMOGLOBIN A1C LEVEL >9.0%: CPT | Mod: CPTII,S$GLB,, | Performed by: PHYSICIAN ASSISTANT

## 2022-12-06 PROCEDURE — 3288F PR FALLS RISK ASSESSMENT DOCUMENTED: ICD-10-PCS | Mod: CPTII,S$GLB,, | Performed by: PHYSICIAN ASSISTANT

## 2022-12-06 PROCEDURE — 1160F RVW MEDS BY RX/DR IN RCRD: CPT | Mod: CPTII,S$GLB,, | Performed by: PHYSICIAN ASSISTANT

## 2022-12-06 PROCEDURE — 3072F PR LOW RISK FOR RETINOPATHY: ICD-10-PCS | Mod: CPTII,S$GLB,, | Performed by: PHYSICIAN ASSISTANT

## 2022-12-06 PROCEDURE — 3074F PR MOST RECENT SYSTOLIC BLOOD PRESSURE < 130 MM HG: ICD-10-PCS | Mod: CPTII,S$GLB,, | Performed by: PHYSICIAN ASSISTANT

## 2022-12-06 PROCEDURE — 3072F LOW RISK FOR RETINOPATHY: CPT | Mod: CPTII,S$GLB,, | Performed by: PHYSICIAN ASSISTANT

## 2022-12-06 PROCEDURE — 3066F PR DOCUMENTATION OF TREATMENT FOR NEPHROPATHY: ICD-10-PCS | Mod: CPTII,S$GLB,, | Performed by: PHYSICIAN ASSISTANT

## 2022-12-06 PROCEDURE — 3078F DIAST BP <80 MM HG: CPT | Mod: CPTII,S$GLB,, | Performed by: PHYSICIAN ASSISTANT

## 2022-12-06 PROCEDURE — 99999 PR PBB SHADOW E&M-EST. PATIENT-LVL III: CPT | Mod: PBBFAC,,, | Performed by: PHYSICIAN ASSISTANT

## 2022-12-06 PROCEDURE — 3060F PR POS MICROALBUMINURIA RESULT DOCUMENTED/REVIEW: ICD-10-PCS | Mod: CPTII,S$GLB,, | Performed by: PHYSICIAN ASSISTANT

## 2022-12-06 PROCEDURE — 1160F PR REVIEW ALL MEDS BY PRESCRIBER/CLIN PHARMACIST DOCUMENTED: ICD-10-PCS | Mod: CPTII,S$GLB,, | Performed by: PHYSICIAN ASSISTANT

## 2022-12-06 PROCEDURE — 3066F NEPHROPATHY DOC TX: CPT | Mod: CPTII,S$GLB,, | Performed by: PHYSICIAN ASSISTANT

## 2022-12-06 PROCEDURE — 3060F POS MICROALBUMINURIA REV: CPT | Mod: CPTII,S$GLB,, | Performed by: PHYSICIAN ASSISTANT

## 2022-12-06 PROCEDURE — 99499 UNLISTED E&M SERVICE: CPT | Mod: HCNC,S$GLB,, | Performed by: PHYSICIAN ASSISTANT

## 2022-12-06 PROCEDURE — 4010F PR ACE/ARB THEARPY RXD/TAKEN: ICD-10-PCS | Mod: CPTII,S$GLB,, | Performed by: PHYSICIAN ASSISTANT

## 2022-12-06 PROCEDURE — 3046F PR MOST RECENT HEMOGLOBIN A1C LEVEL > 9.0%: ICD-10-PCS | Mod: CPTII,S$GLB,, | Performed by: PHYSICIAN ASSISTANT

## 2022-12-06 PROCEDURE — 1101F PT FALLS ASSESS-DOCD LE1/YR: CPT | Mod: CPTII,S$GLB,, | Performed by: PHYSICIAN ASSISTANT

## 2022-12-06 PROCEDURE — 4010F ACE/ARB THERAPY RXD/TAKEN: CPT | Mod: CPTII,S$GLB,, | Performed by: PHYSICIAN ASSISTANT

## 2022-12-06 RX ORDER — PNEUMOCOCCAL 20-VALENT CONJUGATE VACCINE 2.2; 2.2; 2.2; 2.2; 2.2; 2.2; 2.2; 2.2; 2.2; 2.2; 2.2; 2.2; 2.2; 2.2; 2.2; 2.2; 4.4; 2.2; 2.2; 2.2 UG/.5ML; UG/.5ML; UG/.5ML; UG/.5ML; UG/.5ML; UG/.5ML; UG/.5ML; UG/.5ML; UG/.5ML; UG/.5ML; UG/.5ML; UG/.5ML; UG/.5ML; UG/.5ML; UG/.5ML; UG/.5ML; UG/.5ML; UG/.5ML; UG/.5ML; UG/.5ML
INJECTION, SUSPENSION INTRAMUSCULAR
COMMUNITY
Start: 2022-08-01 | End: 2022-12-08

## 2022-12-06 NOTE — PROGRESS NOTES
Subjective:       Patient ID: Trey Stevens is a 67 y.o. male.    Chief Complaint: lung nodules    68yo male here for follow up of COPD/lung nodules  28 pack year current smoker  Stable COPD controlled on albuterol prn, he does not want to use a daily inhaler  No signs of infection today  No cough, wheezing, or chest pain  Stable SOB with moderate exertion  CAT score 19  Using CPAP nightly and benefits from use    Chest CT 11/8/22  FINDINGS:  There is a partially calcified pulmonary nodule in the right upper lobe measuring up to 9 mm.  Multiple sub 6 mm pulmonary nodules are seen bilaterally which are similar to the exam from 2017.  Calcified hilar lymph nodes are seen on the left.  No mediastinal lymphadenopathy.  Coronary artery calcifications are present.  Aortic atherosclerotic calcifications are seen.  No axillary lymphadenopathy.  Multilevel degenerative changes of the spine.  No acute or suspicious osseous finding.  Impression:  Multiple bilateral pulmonary nodules, the largest of which is stable to 5 years ago and has a calcification consistent with granulomatous disease.  Calcified hilar lymph nodes go along with this diagnosis.  However, some pulmonary nodules may be new but they are less than 6 mm.  Follow-up chest CT in 1 year is recommended.        COPD Questionnaire  How often do you cough?: Some of the time  How often do you have phlegm (mucus) in your chest?: A little of the time  How often does your chest feel tight?: Some of the time  When you walk up a hill or one flight of stairs, how often are you breathless?: Some of the time  How often are you limited doing any activities at home?: Never  How often are you confident leaving the house despite your lung condition?: All of the time  How often do you sleep soundly?: Never  How often do you have energy?: A little of the time  Total score: 19    EPWORTH SLEEPINESS SCALE 12/6/2022   Sitting and reading 0   Watching TV 3   Sitting, inactive in a public  place (e.g. a theatre or a meeting) 0   As a passenger in a car for an hour without a break 1   Lying down to rest in the afternoon when circumstances permit 1   Sitting and talking to someone 0   Sitting quietly after a lunch without alcohol 2   In a car, while stopped for a few minutes in traffic 0   Total score 7       4/2021 Aminata Martinez NP:  HPI:   Trey Stevens is a 67 y.o. male here for follow up COPD/SCHUYLER on CPAP with 2 L blended in.   CPAP compliance download reviewed with patient who voiced understanding.   Follow up for SCHUYLER and CPAP complaince assessment.  He is on AUTOPAP  of  4 - 20 CMWP with OXYGEN AT 2 L /MIN BLENDED IN .   He is adherent with PAP therapy.  He definitely thinks PAP is beneficial to his health and He wants to continue with PAP therapy  Patient denies snoring, headaches, excessive daytime sleepiness     Immunization History   Administered Date(s) Administered    COVID-19, MRNA, LN-S, PF (Pfizer) (Purple Cap) 09/16/2021, 10/07/2021    Hepatitis A, Adult 12/20/2018    Influenza - High Dose - PF (65 years and older) 10/18/2018    Influenza - Quadrivalent 10/24/2016    Influenza - Quadrivalent - PF *Preferred* (6 months and older) 10/30/2017, 11/07/2019, 11/04/2020, 01/18/2022    PPD Test 08/05/2013    Pneumococcal Conjugate - 13 Valent 02/24/2016    Pneumococcal Conjugate - 20 Valent 08/01/2022    Pneumococcal Polysaccharide - 23 Valent 04/09/2013    Tdap 02/24/2016      Tobacco Use: Medium Risk    Smoking Tobacco Use: Former    Smokeless Tobacco Use: Never    Passive Exposure: Not on file      Past Medical History:   Diagnosis Date    Arthritis     Diabetes mellitus     Diabetes mellitus type 2, uncontrolled 7/19/2016    DM (diabetes mellitus) 2015     09/04/2017    Gall stones     Obesity     Pneumonia of right middle lobe due to infectious organism 8/13/2021    Psoriasis (a type of skin inflammation)     Rheumatoid arthritis of foot       Current Outpatient Medications on File  Prior to Visit   Medication Sig Dispense Refill    amLODIPine (NORVASC) 5 MG tablet TAKE 1 TABLET BY MOUTH EVERY DAY 30 tablet 11    aspirin 81 MG Chew Take 1 tablet (81 mg total) by mouth once daily. 100 tablet 0    atorvastatin (LIPITOR) 80 MG tablet Take 1 tablet (80 mg total) by mouth once daily. 90 tablet 3    blood sugar diagnostic (ACCU-CHEK NOELLE PLUS TEST STRP) Strp Check blood sugar 3 times daily. 300 each 3    blood-glucose meter (ACCU-CHEK NOELLE PLUS METER) Misc Check blood sugar 3 times daily 1 each 0    calcipotriene (DOVONOX) 0.005 % cream APPLY TO AFFECTED AREA TWICE A DAY 60 g 4    clobetasol (OLUX) 0.05 % Foam AAA of scalp and behind ears twice daily.  Do not use on face, underarms or groin. 100 g 3    dulaglutide (TRULICITY) 4.5 mg/0.5 mL pen injector Inject 4.5 mg into the skin every 7 days. 4 pen 5    empagliflozin (JARDIANCE) 25 mg tablet Take 1 tablet (25 mg total) by mouth once daily. 90 tablet 1    ergocalciferol (ERGOCALCIFEROL) 50,000 unit Cap Take 50,000 Units by mouth twice a week.      folic acid (FOLVITE) 1 MG tablet Take 1 tablet (1,000 mcg total) by mouth once daily. 90 tablet 1    glipiZIDE (GLUCOTROL) 5 MG tablet Take 1 tablet (5 mg total) by mouth 2 (two) times daily. 180 tablet 1    insulin (LANTUS SOLOSTAR U-100 INSULIN) glargine 100 units/mL (3mL) SubQ pen Inject 20 Units into the skin once daily. 15 mL 5    insulin aspart U-100 (NOVOLOG U-100 INSULIN ASPART) 100 unit/mL injection INJECT 200 UNITS PER CEQUR DEVICE EVERY 3 DAYS 60 mL 1    lancets (ACCU-CHEK SOFTCLIX LANCETS) Misc Check blood sugar 3 times daily 300 each 3    losartan (COZAAR) 25 MG tablet Take 1 tablet (25 mg total) by mouth once daily. 90 tablet 3    methotrexate 2.5 MG Tab TAKE 8 TABLETS BY MOUTH EVERY 7 DAYS. 96 tablet 2    nicotine (NICODERM CQ) 14 mg/24 hr Place 1 patch onto the skin once daily. 14 patch 0    nicotine (NICODERM CQ) 21 mg/24 hr Place 1 patch onto the skin once daily. 14 patch 0    nicotine  "polacrilex 2 MG Lozg Take 1 lozenge (2 mg total) by mouth as needed (Use up to 10 lozenges per day in the place of cigarettes). 216 lozenge 0    pen needle, diabetic 32 gauge x 5/32" Ndle Use with Lantus once daily and Humalog 3 times daily. 200 each 5    PREVNAR 20, PF, 0.5 mL Syrg injection       PROAIR HFA 90 mcg/actuation inhaler Inhale 2 puffs into the lungs every 4 (four) hours as needed for Wheezing. Rescue 18 g 11    secukinumab (COSENTYX PEN, 2 PENS,) 150 mg/mL PnIj Inject 300 mg into the skin every 14 (fourteen) days. 4 mL 3    traMADoL (ULTRAM) 50 mg tablet TAKE 1 TABLET BY MOUTH THREE TIMES A DAY AS NEEDED FOR MODERATE PAIN 21 tablet 0    ketoconazole (NIZORAL) 2 % shampoo Wash hair with medicated shampoo at least 2x/week - let sit on scalp at least 5 minutes prior to rinsing (Patient not taking: Reported on 9/12/2022) 120 mL 0    metFORMIN (GLUCOPHAGE-XR) 500 MG XR 24hr tablet Take 2 tablets (1,000 mg total) by mouth 2 (two) times daily with meals. 360 tablet 1    spironolactone (ALDACTONE) 25 MG tablet Take 1 tablet (25 mg total) by mouth once daily. 30 tablet 11     No current facility-administered medications on file prior to visit.        Review of Systems   Constitutional:  Negative for fever, weight loss, appetite change, fatigue and weakness.   HENT:  Negative for postnasal drip, rhinorrhea, sinus pressure, trouble swallowing and congestion.    Respiratory:  Positive for shortness of breath and dyspnea on extertion. Negative for cough, sputum production, choking, chest tightness and wheezing.    Cardiovascular:  Negative for chest pain and leg swelling.   Musculoskeletal:  Positive for arthralgias. Negative for gait problem and joint swelling.   Gastrointestinal:  Negative for nausea, vomiting and abdominal pain.   Neurological:  Negative for dizziness, weakness and headaches.   All other systems reviewed and are negative.    Objective:       Vitals:    12/06/22 0945   BP: 122/70   Pulse: 80 " "  Resp: 18   SpO2: 96%   Weight: 117.4 kg (258 lb 11.4 oz)   Height: 5' 8" (1.727 m)       Physical Exam   Constitutional: He is oriented to person, place, and time. He appears well-developed and well-nourished. No distress. He is obese.   HENT:   Head: Normocephalic.   Nose: Nose normal.   Mouth/Throat: Oropharynx is clear and moist.   Cardiovascular: Normal rate and regular rhythm.   Pulmonary/Chest: Effort normal. No respiratory distress. He has no wheezes. He has no rhonchi. He has no rales.   Musculoskeletal:         General: No edema.      Cervical back: Normal range of motion and neck supple.   Neurological: He is alert and oriented to person, place, and time. Gait normal.   Skin: Skin is warm and dry.   Psychiatric: He has a normal mood and affect.   Vitals reviewed.  Personal Diagnostic Review    CT Chest Without Contrast  Narrative: EXAMINATION:  CT CHEST WITHOUT CONTRAST    CLINICAL HISTORY:  Chronic obstructive pulmonary disease, unspecifiedLung nodule, > 8mm;    COMPARISON:  CT chest November 10, 2017.    TECHNIQUE:  Axial CT images were obtained. Iterative reconstruction technique was used.    FINDINGS:  There is a partially calcified pulmonary nodule in the right upper lobe measuring up to 9 mm.    Multiple sub 6 mm pulmonary nodules are seen bilaterally which are similar to the exam from 2017.    Calcified hilar lymph nodes are seen on the left.  No mediastinal lymphadenopathy.  Coronary artery calcifications are present.  Aortic atherosclerotic calcifications are seen.  No axillary lymphadenopathy.  Multilevel degenerative changes of the spine.  No acute or suspicious osseous finding.  Impression: Multiple bilateral pulmonary nodules, the largest of which is stable to 5 years ago and has a calcification consistent with granulomatous disease.  Calcified hilar lymph nodes go along with this diagnosis.  However, some pulmonary nodules may be new but they are less than 6 mm.  Follow-up chest CT in 1 " year is recommended.    The CT exam was performed using one or more of the following dose reduction techniques- Automated exposure control, adjustment of the mA and/or kV according to patient size, and/or use of iterative reconstructed technique.    Electronically signed by: Tu Guerra  Date:    11/08/2022  Time:    12:07          No flowsheet data found.      Assessment/Plan:       Problem List Items Addressed This Visit          Pulmonary    Multiple lung nodules on CT - Primary (Chronic)     Stable 12/2022, repeat in 1 year 12/2023  Smoking cessation         Mixed type COPD (chronic obstructive pulmonary disease) (Chronic)     Stable  Continue Albuterol inhaler   4/11/2022 CPFT Spirometry normal airflow. Restriction based on FVC. Lung volumes restriction. Diffusing capacity moderate reduced, corrects alveolar volume.   Recommend daily controller inhaler, patient declines  Smoking cessation  UTD on vaccines         Relevant Orders    Spirometry with/without bronchodilator    Stress test, pulmonary    Dependence on nocturnal oxygen therapy     Benefits and compliant Auto CPAP 4-20 cm with 2 L oxygen blended in.  AHI 1.4              Cardiac/Vascular    Hypertension associated with diabetes     Stable and controlled. Continue current treatment plan as previously prescribed with your PCP.                 Endocrine    Class 2 severe obesity due to excess calories with serious comorbidity and body mass index (BMI) of 39.0 to 39.9 in adult     Weight loss and exercise to improve overall health.              Other    SCHUYLER on CPAP     Benefits and compliant Auto CPAP 4-20 cm with 2 L oxygen blended in.  AHI 1.4  Full face mask  HME: [facility]   Continue APAP 4-20 cm   Follow up April 2023 for yearly CPAP compliance download and supply order.             Nicotine dependence, cigarettes, uncomplicated     Some day cigarette smoker. 12 pack years.    Assistance with smoking cessation was offered, including:  []   Medications  [x]  Counseling  []  Printed Information on Smoking Cessation  [x]  Referral to a Smoking Cessation Program    Patient was counseled regarding smoking for 3-10 minutes.          Follow up in about 4 months (around 4/6/2023) for pft, walk next.    Discussed diagnosis, its evaluation, treatment and usual course. All questions answered.    Patient verbalized understanding of plan and left in no acute distress    Thank you for the courtesy of participating in the care of this patient    MAXIMILIANO EscaleraHu [facility]

## 2022-12-06 NOTE — ASSESSMENT & PLAN NOTE
Benefits and compliant Auto CPAP 4-20 cm with 2 L oxygen blended in.  AHI 1.4  Full face mask  HME: [facility]   Continue APAP 4-20 cm   Follow up April 2023 for yearly CPAP compliance download and supply order.

## 2022-12-06 NOTE — ASSESSMENT & PLAN NOTE
Stable  Continue Albuterol inhaler   4/11/2022 CPFT Spirometry normal airflow. Restriction based on FVC. Lung volumes restriction. Diffusing capacity moderate reduced, corrects alveolar volume.   Recommend daily controller inhaler, patient declines  Smoking cessation  UTD on vaccines

## 2022-12-07 ENCOUNTER — TELEPHONE (OUTPATIENT)
Dept: SMOKING CESSATION | Facility: CLINIC | Age: 67
End: 2022-12-07
Payer: MEDICARE

## 2022-12-07 NOTE — TELEPHONE ENCOUNTER
Attempted to contact patient in regard to missed clinic appt. Voicemail box is full. Unable to leave a message.

## 2022-12-08 ENCOUNTER — OFFICE VISIT (OUTPATIENT)
Dept: PRIMARY CARE CLINIC | Facility: CLINIC | Age: 67
End: 2022-12-08
Payer: MEDICARE

## 2022-12-08 VITALS
SYSTOLIC BLOOD PRESSURE: 128 MMHG | HEIGHT: 67 IN | WEIGHT: 259.31 LBS | TEMPERATURE: 98 F | HEART RATE: 82 BPM | OXYGEN SATURATION: 97 % | DIASTOLIC BLOOD PRESSURE: 71 MMHG | BODY MASS INDEX: 40.7 KG/M2

## 2022-12-08 DIAGNOSIS — J44.9 MIXED TYPE COPD (CHRONIC OBSTRUCTIVE PULMONARY DISEASE): Chronic | ICD-10-CM

## 2022-12-08 DIAGNOSIS — F17.210 NICOTINE DEPENDENCE, CIGARETTES, UNCOMPLICATED: ICD-10-CM

## 2022-12-08 DIAGNOSIS — J84.10 CALCIFIED GRANULOMA OF LUNG: ICD-10-CM

## 2022-12-08 DIAGNOSIS — I15.2 HYPERTENSION ASSOCIATED WITH DIABETES: Primary | ICD-10-CM

## 2022-12-08 DIAGNOSIS — E11.29 TYPE 2 DIABETES MELLITUS WITH MICROALBUMINURIA, WITH LONG-TERM CURRENT USE OF INSULIN: ICD-10-CM

## 2022-12-08 DIAGNOSIS — E78.5 HYPERLIPIDEMIA ASSOCIATED WITH TYPE 2 DIABETES MELLITUS: ICD-10-CM

## 2022-12-08 DIAGNOSIS — Z79.4 TYPE 2 DIABETES MELLITUS WITH MICROALBUMINURIA, WITH LONG-TERM CURRENT USE OF INSULIN: ICD-10-CM

## 2022-12-08 DIAGNOSIS — E11.21 TYPE 2 DIABETES MELLITUS WITH NEPHROPATHY: Chronic | ICD-10-CM

## 2022-12-08 DIAGNOSIS — N18.31 TYPE 2 DIABETES MELLITUS WITH STAGE 3A CHRONIC KIDNEY DISEASE, WITH LONG-TERM CURRENT USE OF INSULIN: ICD-10-CM

## 2022-12-08 DIAGNOSIS — D84.9 IMMUNOSUPPRESSED STATUS: Chronic | ICD-10-CM

## 2022-12-08 DIAGNOSIS — G47.33 OSA ON CPAP: ICD-10-CM

## 2022-12-08 DIAGNOSIS — R80.9 TYPE 2 DIABETES MELLITUS WITH MICROALBUMINURIA, WITH LONG-TERM CURRENT USE OF INSULIN: ICD-10-CM

## 2022-12-08 DIAGNOSIS — L40.52 PSORIATIC ARTHRITIS, DESTRUCTIVE TYPE: ICD-10-CM

## 2022-12-08 DIAGNOSIS — Z79.4 TYPE 2 DIABETES MELLITUS WITH STAGE 3A CHRONIC KIDNEY DISEASE, WITH LONG-TERM CURRENT USE OF INSULIN: ICD-10-CM

## 2022-12-08 DIAGNOSIS — E11.22 TYPE 2 DIABETES MELLITUS WITH STAGE 3A CHRONIC KIDNEY DISEASE, WITH LONG-TERM CURRENT USE OF INSULIN: ICD-10-CM

## 2022-12-08 DIAGNOSIS — E11.59 HYPERTENSION ASSOCIATED WITH DIABETES: Primary | ICD-10-CM

## 2022-12-08 DIAGNOSIS — N18.31 STAGE 3A CHRONIC KIDNEY DISEASE: ICD-10-CM

## 2022-12-08 DIAGNOSIS — E11.69 HYPERLIPIDEMIA ASSOCIATED WITH TYPE 2 DIABETES MELLITUS: ICD-10-CM

## 2022-12-08 DIAGNOSIS — E55.9 VITAMIN D DEFICIENCY: ICD-10-CM

## 2022-12-08 LAB
ALBUMIN/CREAT UR: 816.2 UG/MG (ref 0–30)
CREAT UR-MCNC: 37 MG/DL (ref 23–375)
MICROALBUMIN UR DL<=1MG/L-MCNC: 302 UG/ML

## 2022-12-08 PROCEDURE — 3008F PR BODY MASS INDEX (BMI) DOCUMENTED: ICD-10-PCS | Mod: CPTII,S$GLB,, | Performed by: INTERNAL MEDICINE

## 2022-12-08 PROCEDURE — 82043 UR ALBUMIN QUANTITATIVE: CPT | Performed by: INTERNAL MEDICINE

## 2022-12-08 PROCEDURE — 1159F PR MEDICATION LIST DOCUMENTED IN MEDICAL RECORD: ICD-10-PCS | Mod: CPTII,S$GLB,, | Performed by: INTERNAL MEDICINE

## 2022-12-08 PROCEDURE — 3066F PR DOCUMENTATION OF TREATMENT FOR NEPHROPATHY: ICD-10-PCS | Mod: CPTII,S$GLB,, | Performed by: INTERNAL MEDICINE

## 2022-12-08 PROCEDURE — 3288F FALL RISK ASSESSMENT DOCD: CPT | Mod: CPTII,S$GLB,, | Performed by: INTERNAL MEDICINE

## 2022-12-08 PROCEDURE — 4010F ACE/ARB THERAPY RXD/TAKEN: CPT | Mod: CPTII,S$GLB,, | Performed by: INTERNAL MEDICINE

## 2022-12-08 PROCEDURE — 1159F MED LIST DOCD IN RCRD: CPT | Mod: CPTII,S$GLB,, | Performed by: INTERNAL MEDICINE

## 2022-12-08 PROCEDURE — 3072F LOW RISK FOR RETINOPATHY: CPT | Mod: CPTII,S$GLB,, | Performed by: INTERNAL MEDICINE

## 2022-12-08 PROCEDURE — 90694 VACC AIIV4 NO PRSRV 0.5ML IM: CPT | Mod: S$GLB,,, | Performed by: INTERNAL MEDICINE

## 2022-12-08 PROCEDURE — 99999 PR PBB SHADOW E&M-EST. PATIENT-LVL V: CPT | Mod: PBBFAC,,, | Performed by: INTERNAL MEDICINE

## 2022-12-08 PROCEDURE — G0008 ADMIN INFLUENZA VIRUS VAC: HCPCS | Mod: S$GLB,,, | Performed by: INTERNAL MEDICINE

## 2022-12-08 PROCEDURE — 3046F PR MOST RECENT HEMOGLOBIN A1C LEVEL > 9.0%: ICD-10-PCS | Mod: CPTII,S$GLB,, | Performed by: INTERNAL MEDICINE

## 2022-12-08 PROCEDURE — 3060F POS MICROALBUMINURIA REV: CPT | Mod: CPTII,S$GLB,, | Performed by: INTERNAL MEDICINE

## 2022-12-08 PROCEDURE — 99214 PR OFFICE/OUTPT VISIT, EST, LEVL IV, 30-39 MIN: ICD-10-PCS | Mod: S$GLB,,, | Performed by: INTERNAL MEDICINE

## 2022-12-08 PROCEDURE — 1101F PR PT FALLS ASSESS DOC 0-1 FALLS W/OUT INJ PAST YR: ICD-10-PCS | Mod: CPTII,S$GLB,, | Performed by: INTERNAL MEDICINE

## 2022-12-08 PROCEDURE — 3008F BODY MASS INDEX DOCD: CPT | Mod: CPTII,S$GLB,, | Performed by: INTERNAL MEDICINE

## 2022-12-08 PROCEDURE — 3078F PR MOST RECENT DIASTOLIC BLOOD PRESSURE < 80 MM HG: ICD-10-PCS | Mod: CPTII,S$GLB,, | Performed by: INTERNAL MEDICINE

## 2022-12-08 PROCEDURE — 99214 OFFICE O/P EST MOD 30 MIN: CPT | Mod: S$GLB,,, | Performed by: INTERNAL MEDICINE

## 2022-12-08 PROCEDURE — 90694 FLU VACCINE - QUADRIVALENT - ADJUVANTED: ICD-10-PCS | Mod: S$GLB,,, | Performed by: INTERNAL MEDICINE

## 2022-12-08 PROCEDURE — 4010F PR ACE/ARB THEARPY RXD/TAKEN: ICD-10-PCS | Mod: CPTII,S$GLB,, | Performed by: INTERNAL MEDICINE

## 2022-12-08 PROCEDURE — 99999 PR PBB SHADOW E&M-EST. PATIENT-LVL V: ICD-10-PCS | Mod: PBBFAC,,, | Performed by: INTERNAL MEDICINE

## 2022-12-08 PROCEDURE — 80048 BASIC METABOLIC PNL TOTAL CA: CPT | Performed by: INTERNAL MEDICINE

## 2022-12-08 PROCEDURE — 3072F PR LOW RISK FOR RETINOPATHY: ICD-10-PCS | Mod: CPTII,S$GLB,, | Performed by: INTERNAL MEDICINE

## 2022-12-08 PROCEDURE — 3046F HEMOGLOBIN A1C LEVEL >9.0%: CPT | Mod: CPTII,S$GLB,, | Performed by: INTERNAL MEDICINE

## 2022-12-08 PROCEDURE — G0008 FLU VACCINE - QUADRIVALENT - ADJUVANTED: ICD-10-PCS | Mod: S$GLB,,, | Performed by: INTERNAL MEDICINE

## 2022-12-08 PROCEDURE — 82570 ASSAY OF URINE CREATININE: CPT | Performed by: INTERNAL MEDICINE

## 2022-12-08 PROCEDURE — 3066F NEPHROPATHY DOC TX: CPT | Mod: CPTII,S$GLB,, | Performed by: INTERNAL MEDICINE

## 2022-12-08 PROCEDURE — 1101F PT FALLS ASSESS-DOCD LE1/YR: CPT | Mod: CPTII,S$GLB,, | Performed by: INTERNAL MEDICINE

## 2022-12-08 PROCEDURE — 3288F PR FALLS RISK ASSESSMENT DOCUMENTED: ICD-10-PCS | Mod: CPTII,S$GLB,, | Performed by: INTERNAL MEDICINE

## 2022-12-08 PROCEDURE — 3074F SYST BP LT 130 MM HG: CPT | Mod: CPTII,S$GLB,, | Performed by: INTERNAL MEDICINE

## 2022-12-08 PROCEDURE — 3060F PR POS MICROALBUMINURIA RESULT DOCUMENTED/REVIEW: ICD-10-PCS | Mod: CPTII,S$GLB,, | Performed by: INTERNAL MEDICINE

## 2022-12-08 PROCEDURE — 3074F PR MOST RECENT SYSTOLIC BLOOD PRESSURE < 130 MM HG: ICD-10-PCS | Mod: CPTII,S$GLB,, | Performed by: INTERNAL MEDICINE

## 2022-12-08 PROCEDURE — 3078F DIAST BP <80 MM HG: CPT | Mod: CPTII,S$GLB,, | Performed by: INTERNAL MEDICINE

## 2022-12-08 PROCEDURE — 83036 HEMOGLOBIN GLYCOSYLATED A1C: CPT | Performed by: INTERNAL MEDICINE

## 2022-12-08 RX ORDER — ERGOCALCIFEROL 1.25 MG/1
50000 CAPSULE ORAL
Qty: 24 CAPSULE | Refills: 3 | Status: SHIPPED | OUTPATIENT
Start: 2022-12-08 | End: 2023-01-25

## 2022-12-09 LAB
ANION GAP SERPL CALC-SCNC: 11 MMOL/L (ref 8–16)
BUN SERPL-MCNC: 17 MG/DL (ref 8–23)
CALCIUM SERPL-MCNC: 9.2 MG/DL (ref 8.7–10.5)
CHLORIDE SERPL-SCNC: 99 MMOL/L (ref 95–110)
CO2 SERPL-SCNC: 25 MMOL/L (ref 23–29)
CREAT SERPL-MCNC: 1.2 MG/DL (ref 0.5–1.4)
EST. GFR  (NO RACE VARIABLE): >60 ML/MIN/1.73 M^2
ESTIMATED AVG GLUCOSE: ABNORMAL MG/DL (ref 68–131)
GLUCOSE SERPL-MCNC: 461 MG/DL (ref 70–110)
HBA1C MFR BLD: >14 % (ref 4–5.6)
POTASSIUM SERPL-SCNC: 4.6 MMOL/L (ref 3.5–5.1)
SODIUM SERPL-SCNC: 135 MMOL/L (ref 136–145)

## 2022-12-12 ENCOUNTER — SPECIALTY PHARMACY (OUTPATIENT)
Dept: PHARMACY | Facility: CLINIC | Age: 67
End: 2022-12-12
Payer: MEDICARE

## 2022-12-12 NOTE — TELEPHONE ENCOUNTER
Specialty Pharmacy - Refill Coordination    Specialty Medication Orders Linked to Encounter      Flowsheet Row Most Recent Value   Medication #1 secukinumab (COSENTYX PEN, 2 PENS,) 150 mg/mL PnIj (Order#[id number], Rx#[id number])            Refill Questions - Documented Responses      Flowsheet Row Most Recent Value   Patient Availability and HIPAA Verification    Does patient want to proceed with activity? Yes   HIPAA/medical authority confirmed? Yes   Relationship to patient of person spoken to? Self   Refill Screening Questions    Changes to allergies? No   Changes to medications? No   New conditions since last clinic visit? No   Unplanned office visit, urgent care, ED, or hospital admission in the last 4 weeks? No   How does patient/caregiver feel medication is working? Good   Financial problems or insurance changes? No   How many doses of your specialty medications were missed in the last 4 weeks? 1   Would patient like to speak to a pharmacist? No   When does the patient need to receive the medication? 12/22/22   Refill Delivery Questions    How will the patient receive the medication? [facility]   When does the patient need to receive the medication? 12/22/22   Shipping Address Home   Address in [facility] confirmed and updated if neccessary? Yes   Expected Copay ($) 0   Is the patient able to afford the medication copay? Yes   Payment Method zero copay   Days supply of Refill 28   Supplies needed? No supplies needed   Refill activity completed? Yes   Refill activity plan Refill scheduled   Shipment/Pickup Date: 12/15/22            Current Outpatient Medications   Medication Sig    amLODIPine (NORVASC) 5 MG tablet TAKE 1 TABLET BY MOUTH EVERY DAY    aspirin 81 MG Chew Take 1 tablet (81 mg total) by mouth once daily.    atorvastatin (LIPITOR) 80 MG tablet Take 1 tablet (80 mg total) by mouth once daily.    blood sugar diagnostic (ACCU-CHEK NOELLE PLUS TEST STRP) Strp Check blood sugar 3 times daily.     "blood-glucose meter (ACCU-CHEK NOELLE PLUS METER) Misc Check blood sugar 3 times daily    calcipotriene (DOVONOX) 0.005 % cream APPLY TO AFFECTED AREA TWICE A DAY    clobetasol (OLUX) 0.05 % Foam AAA of scalp and behind ears twice daily.  Do not use on face, underarms or groin.    dulaglutide (TRULICITY) 4.5 mg/0.5 mL pen injector Inject 4.5 mg into the skin every 7 days.    empagliflozin (JARDIANCE) 25 mg tablet Take 1 tablet (25 mg total) by mouth once daily.    ergocalciferol (ERGOCALCIFEROL) 50,000 unit Cap Take 1 capsule (50,000 Units total) by mouth twice a week.    folic acid (FOLVITE) 1 MG tablet Take 1 tablet (1,000 mcg total) by mouth once daily.    glipiZIDE (GLUCOTROL) 5 MG tablet Take 1 tablet (5 mg total) by mouth 2 (two) times daily.    insulin (LANTUS SOLOSTAR U-100 INSULIN) glargine 100 units/mL (3mL) SubQ pen Inject 20 Units into the skin once daily.    insulin aspart U-100 (NOVOLOG U-100 INSULIN ASPART) 100 unit/mL injection INJECT 200 UNITS PER CEQUR DEVICE EVERY 3 DAYS    ketoconazole (NIZORAL) 2 % shampoo Wash hair with medicated shampoo at least 2x/week - let sit on scalp at least 5 minutes prior to rinsing    lancets (ACCU-CHEK SOFTCLIX LANCETS) Misc Check blood sugar 3 times daily    losartan (COZAAR) 25 MG tablet Take 1 tablet (25 mg total) by mouth once daily.    metFORMIN (GLUCOPHAGE-XR) 500 MG XR 24hr tablet Take 2 tablets (1,000 mg total) by mouth 2 (two) times daily with meals.    methotrexate 2.5 MG Tab TAKE 8 TABLETS BY MOUTH EVERY 7 DAYS.    nicotine (NICODERM CQ) 14 mg/24 hr Place 1 patch onto the skin once daily.    nicotine (NICODERM CQ) 21 mg/24 hr Place 1 patch onto the skin once daily.    nicotine polacrilex 2 MG Lozg Take 1 lozenge (2 mg total) by mouth as needed (Use up to 10 lozenges per day in the place of cigarettes).    pen needle, diabetic 32 gauge x 5/32" Ndle Use with Lantus once daily and Humalog 3 times daily.    PROAIR HFA 90 mcg/actuation inhaler Inhale 2 puffs " into the lungs every 4 (four) hours as needed for Wheezing. Rescue    secukinumab (COSENTYX PEN, 2 PENS,) 150 mg/mL PnIj Inject 300 mg into the skin every 14 (fourteen) days.    spironolactone (ALDACTONE) 25 MG tablet Take 1 tablet (25 mg total) by mouth once daily.    traMADoL (ULTRAM) 50 mg tablet TAKE 1 TABLET BY MOUTH THREE TIMES A DAY AS NEEDED FOR MODERATE PAIN   Last reviewed on 12/8/2022  9:06 AM by Leandro Millan MA    Review of patient's allergies indicates:  No Known Allergies Last reviewed on  12/8/2022 9:04 AM by Leandro Millan      Tasks added this encounter   No tasks added.   Tasks due within next 3 months   3/2/2023 - Clinical - Follow Up Assesement (Annual)  12/2/2022 - Refill Call (Auto Added)     Carolee Benitez, PharmD  [facility]  [facility] [address]  [facility] [address]  Phone: [phone]  Fax: [phone]

## 2022-12-13 ENCOUNTER — TELEPHONE (OUTPATIENT)
Dept: PRIMARY CARE CLINIC | Facility: CLINIC | Age: 67
End: 2022-12-13
Payer: MEDICARE

## 2022-12-20 ENCOUNTER — OFFICE VISIT (OUTPATIENT)
Dept: PRIMARY CARE CLINIC | Facility: CLINIC | Age: 67
End: 2022-12-20
Payer: MEDICARE

## 2022-12-20 VITALS
HEIGHT: 67 IN | SYSTOLIC BLOOD PRESSURE: 118 MMHG | DIASTOLIC BLOOD PRESSURE: 63 MMHG | WEIGHT: 258.13 LBS | TEMPERATURE: 98 F | OXYGEN SATURATION: 96 % | HEART RATE: 88 BPM | BODY MASS INDEX: 40.52 KG/M2

## 2022-12-20 DIAGNOSIS — Z79.4 UNCONTROLLED TYPE 2 DIABETES MELLITUS WITH HYPERGLYCEMIA, WITH LONG-TERM CURRENT USE OF INSULIN: Primary | ICD-10-CM

## 2022-12-20 DIAGNOSIS — E11.59 HYPERTENSION ASSOCIATED WITH DIABETES: ICD-10-CM

## 2022-12-20 DIAGNOSIS — R80.9 TYPE 2 DIABETES MELLITUS WITH MICROALBUMINURIA, WITH LONG-TERM CURRENT USE OF INSULIN: ICD-10-CM

## 2022-12-20 DIAGNOSIS — E66.01 CLASS 2 SEVERE OBESITY DUE TO EXCESS CALORIES WITH SERIOUS COMORBIDITY AND BODY MASS INDEX (BMI) OF 39.0 TO 39.9 IN ADULT: ICD-10-CM

## 2022-12-20 DIAGNOSIS — Z79.4 TYPE 2 DIABETES MELLITUS WITH STAGE 3A CHRONIC KIDNEY DISEASE, WITH LONG-TERM CURRENT USE OF INSULIN: ICD-10-CM

## 2022-12-20 DIAGNOSIS — N18.31 TYPE 2 DIABETES MELLITUS WITH STAGE 3A CHRONIC KIDNEY DISEASE, WITH LONG-TERM CURRENT USE OF INSULIN: ICD-10-CM

## 2022-12-20 DIAGNOSIS — E11.22 TYPE 2 DIABETES MELLITUS WITH STAGE 3A CHRONIC KIDNEY DISEASE, WITH LONG-TERM CURRENT USE OF INSULIN: ICD-10-CM

## 2022-12-20 DIAGNOSIS — I15.2 HYPERTENSION ASSOCIATED WITH DIABETES: ICD-10-CM

## 2022-12-20 DIAGNOSIS — E11.29 TYPE 2 DIABETES MELLITUS WITH MICROALBUMINURIA, WITH LONG-TERM CURRENT USE OF INSULIN: ICD-10-CM

## 2022-12-20 DIAGNOSIS — Z79.4 TYPE 2 DIABETES MELLITUS WITH MICROALBUMINURIA, WITH LONG-TERM CURRENT USE OF INSULIN: ICD-10-CM

## 2022-12-20 DIAGNOSIS — E11.65 UNCONTROLLED TYPE 2 DIABETES MELLITUS WITH HYPERGLYCEMIA, WITH LONG-TERM CURRENT USE OF INSULIN: Primary | ICD-10-CM

## 2022-12-20 DIAGNOSIS — E78.5 HYPERLIPIDEMIA ASSOCIATED WITH TYPE 2 DIABETES MELLITUS: ICD-10-CM

## 2022-12-20 DIAGNOSIS — E11.69 HYPERLIPIDEMIA ASSOCIATED WITH TYPE 2 DIABETES MELLITUS: ICD-10-CM

## 2022-12-20 PROCEDURE — 3288F PR FALLS RISK ASSESSMENT DOCUMENTED: ICD-10-PCS | Mod: CPTII,S$GLB,, | Performed by: FAMILY MEDICINE

## 2022-12-20 PROCEDURE — 99999 PR PBB SHADOW E&M-EST. PATIENT-LVL V: ICD-10-PCS | Mod: PBBFAC,,, | Performed by: FAMILY MEDICINE

## 2022-12-20 PROCEDURE — 3008F BODY MASS INDEX DOCD: CPT | Mod: CPTII,S$GLB,, | Performed by: FAMILY MEDICINE

## 2022-12-20 PROCEDURE — 3008F PR BODY MASS INDEX (BMI) DOCUMENTED: ICD-10-PCS | Mod: CPTII,S$GLB,, | Performed by: FAMILY MEDICINE

## 2022-12-20 PROCEDURE — 3066F NEPHROPATHY DOC TX: CPT | Mod: CPTII,S$GLB,, | Performed by: FAMILY MEDICINE

## 2022-12-20 PROCEDURE — 1159F PR MEDICATION LIST DOCUMENTED IN MEDICAL RECORD: ICD-10-PCS | Mod: CPTII,S$GLB,, | Performed by: FAMILY MEDICINE

## 2022-12-20 PROCEDURE — 3046F HEMOGLOBIN A1C LEVEL >9.0%: CPT | Mod: CPTII,S$GLB,, | Performed by: FAMILY MEDICINE

## 2022-12-20 PROCEDURE — 3072F LOW RISK FOR RETINOPATHY: CPT | Mod: CPTII,S$GLB,, | Performed by: FAMILY MEDICINE

## 2022-12-20 PROCEDURE — 4010F PR ACE/ARB THEARPY RXD/TAKEN: ICD-10-PCS | Mod: CPTII,S$GLB,, | Performed by: FAMILY MEDICINE

## 2022-12-20 PROCEDURE — 3046F PR MOST RECENT HEMOGLOBIN A1C LEVEL > 9.0%: ICD-10-PCS | Mod: CPTII,S$GLB,, | Performed by: FAMILY MEDICINE

## 2022-12-20 PROCEDURE — 4010F ACE/ARB THERAPY RXD/TAKEN: CPT | Mod: CPTII,S$GLB,, | Performed by: FAMILY MEDICINE

## 2022-12-20 PROCEDURE — 99214 PR OFFICE/OUTPT VISIT, EST, LEVL IV, 30-39 MIN: ICD-10-PCS | Mod: S$GLB,,, | Performed by: FAMILY MEDICINE

## 2022-12-20 PROCEDURE — 3078F DIAST BP <80 MM HG: CPT | Mod: CPTII,S$GLB,, | Performed by: FAMILY MEDICINE

## 2022-12-20 PROCEDURE — 3066F PR DOCUMENTATION OF TREATMENT FOR NEPHROPATHY: ICD-10-PCS | Mod: CPTII,S$GLB,, | Performed by: FAMILY MEDICINE

## 2022-12-20 PROCEDURE — 3072F PR LOW RISK FOR RETINOPATHY: ICD-10-PCS | Mod: CPTII,S$GLB,, | Performed by: FAMILY MEDICINE

## 2022-12-20 PROCEDURE — 3074F SYST BP LT 130 MM HG: CPT | Mod: CPTII,S$GLB,, | Performed by: FAMILY MEDICINE

## 2022-12-20 PROCEDURE — 3062F PR POS MACROALBUMINURIA RESULT DOCUMENTED/REVIEW: ICD-10-PCS | Mod: CPTII,S$GLB,, | Performed by: FAMILY MEDICINE

## 2022-12-20 PROCEDURE — 1101F PR PT FALLS ASSESS DOC 0-1 FALLS W/OUT INJ PAST YR: ICD-10-PCS | Mod: CPTII,S$GLB,, | Performed by: FAMILY MEDICINE

## 2022-12-20 PROCEDURE — 99214 OFFICE O/P EST MOD 30 MIN: CPT | Mod: S$GLB,,, | Performed by: FAMILY MEDICINE

## 2022-12-20 PROCEDURE — 3062F POS MACROALBUMINURIA REV: CPT | Mod: CPTII,S$GLB,, | Performed by: FAMILY MEDICINE

## 2022-12-20 PROCEDURE — 1159F MED LIST DOCD IN RCRD: CPT | Mod: CPTII,S$GLB,, | Performed by: FAMILY MEDICINE

## 2022-12-20 PROCEDURE — 1101F PT FALLS ASSESS-DOCD LE1/YR: CPT | Mod: CPTII,S$GLB,, | Performed by: FAMILY MEDICINE

## 2022-12-20 PROCEDURE — 3288F FALL RISK ASSESSMENT DOCD: CPT | Mod: CPTII,S$GLB,, | Performed by: FAMILY MEDICINE

## 2022-12-20 PROCEDURE — 99999 PR PBB SHADOW E&M-EST. PATIENT-LVL V: CPT | Mod: PBBFAC,,, | Performed by: FAMILY MEDICINE

## 2022-12-20 PROCEDURE — 3078F PR MOST RECENT DIASTOLIC BLOOD PRESSURE < 80 MM HG: ICD-10-PCS | Mod: CPTII,S$GLB,, | Performed by: FAMILY MEDICINE

## 2022-12-20 PROCEDURE — 3074F PR MOST RECENT SYSTOLIC BLOOD PRESSURE < 130 MM HG: ICD-10-PCS | Mod: CPTII,S$GLB,, | Performed by: FAMILY MEDICINE

## 2022-12-20 RX ORDER — INSULIN ASPART 100 [IU]/ML
6 INJECTION, SOLUTION INTRAVENOUS; SUBCUTANEOUS
COMMUNITY
Start: 2022-11-03 | End: 2023-01-30

## 2022-12-20 RX ORDER — DULAGLUTIDE 4.5 MG/.5ML
4.5 INJECTION, SOLUTION SUBCUTANEOUS
Qty: 4 PEN | Refills: 5 | Status: SHIPPED | OUTPATIENT
Start: 2022-12-20 | End: 2023-01-25 | Stop reason: SDUPTHER

## 2022-12-20 NOTE — ASSESSMENT & PLAN NOTE
Last Hgb A1 C > 14  Noncompliant with medications and diet  Educated regarding diabetic medications, route and when to take  Medication highlighted on AVS  New prescriptions for Januvia and Trulicity sent to designated pharmacy  Nurse to week after [place] regarding medication compliance and to ensure he obtained his medications  Creatinine/BUN normal  Continue Losartan

## 2022-12-20 NOTE — PROGRESS NOTES
Trey Stevens  12/20/2022  [id number]    Brittanie Chaparro MD  Patient Care Team:  Brittanie Chaparro MD as PCP - General (Internal Medicine)  Shira Kelly LPN as Care Coordinator  Rachelle Huertas RD, CDE as Dietitian (Diabetes)  Winter Garcia RD, CDE as Dietitian (Diabetes)  Michelle Rossi, RN as Diabetes Educator (Diabetes)  Bee Stinson NP as Nurse Practitioner (Endocrinology)  Charlette Wilcox PA-C as Physician Assistant (Rheumatology)  Aminata Martinez NP as Nurse Practitioner (Pulmonary Disease)  Tigist Cole MD as Consulting Physician (Ophthalmology)  Joy Hadley DPM as Consulting Physician (Podiatry)      [facility] Note      Chief Complaint:  Chief Complaint   Patient presents with    Follow-up     Pt stays he's having trouble staying awake, not sure if it's the medicine that he's on or maybe something that he's not taking. Stays he'll sleep off and on for about thirty minutes or so. He doesn't sleep well either, two to three hours of sleep then he's back up.       History of Present Illness:  Here for follow up of uncontrolled diabetes.   12/8/22 Hgb A1 C = > 14  Pt states he takes several medicines and was aware he was to take all the diabetic medications daily.  Also, he reports difficulty with pharmacy obtaining his medication but denies financial barriers to obtaining his diabetic meds.   Pt lives near his 94 yo mother and has siblings that he will see approx once a month.  He does not have anyone to assist him with his medication.   Also, eats a high calorie diet. He explained about his citrus trees and what he eats on a daily basis. He adds sugar to his cereal.   Within the last 2 weeks - , 340, 301, 294, 547  Pt describes polydipsia and polyuria.   Last eye exam approx 6 months ago - his glasses prescription is current.     Diabetes related complications include CKD, Severe Obesity, HTN, HPL and microalbuminuria.           Review of Systems    Constitutional:  Negative for chills, diaphoresis, fever, malaise/fatigue and weight loss.   HENT:  Negative for congestion, ear discharge, ear pain and sore throat.    Eyes:  Negative for pain and discharge.   Respiratory:  Positive for shortness of breath (intermittently). Negative for cough.    Cardiovascular:  Negative for chest pain and palpitations.   Gastrointestinal:  Negative for abdominal pain, constipation, diarrhea and vomiting.   Genitourinary:  Negative for dysuria and frequency.   Musculoskeletal:  Positive for joint pain and myalgias.   Skin:  Positive for rash.        Chronic psoriasis   Neurological:  Negative for dizziness, focal weakness, weakness and headaches.   Psychiatric/Behavioral:  The patient does not have insomnia.        The following were reviewed: Active problem list, medication list, allergies, family history, social history, and Health Maintenance.     History:  Past Medical History:   Diagnosis Date    Arthritis     Diabetes mellitus     Diabetes mellitus type 2, uncontrolled 7/19/2016    DM (diabetes mellitus) 2015     09/04/2017    Gall stones     Obesity     Pneumonia of right middle lobe due to infectious organism 8/13/2021    Psoriasis (a type of skin inflammation)     Rheumatoid arthritis of foot      Past Surgical History:   Procedure Laterality Date    APPENDECTOMY      CHOLECYSTECTOMY       Family History   Problem Relation Age of Onset    Cancer Mother     Hypertension Mother     Diabetes Mother     Cataracts Mother     Stroke Father     Psoriasis Neg Hx      Patient Active Problem List   Diagnosis    Psoriatic arthritis, destructive type    Vitamin D deficiency    Multiple lung nodules on CT    Immunosuppressed status    Long-term use of immunosuppressant medication    Mixed type COPD (chronic obstructive pulmonary disease)    Type 2 diabetes mellitus with chronic kidney disease, with long-term current use of insulin    Hypertension associated with diabetes     Vitiligo    SCHUYLER on CPAP    Class 2 severe obesity due to excess calories with serious comorbidity and body mass index (BMI) of 39.0 to 39.9 in adult    Hyperlipidemia associated with type 2 diabetes mellitus    Elevated liver enzymes    Nicotine dependence, cigarettes, uncomplicated    Dependence on nocturnal oxygen therapy    Urinary retention    Calcified granuloma of lung    Calcification of aorta    Type 2 diabetes mellitus with microalbuminuria, with long-term current use of insulin     Review of patient's allergies indicates:  No Known Allergies    Medications:  Current Outpatient Medications on File Prior to Visit   Medication Sig Dispense Refill    amLODIPine (NORVASC) 5 MG tablet TAKE 1 TABLET BY MOUTH EVERY DAY 30 tablet 11    aspirin 81 MG Chew Take 1 tablet (81 mg total) by mouth once daily. 100 tablet 0    atorvastatin (LIPITOR) 80 MG tablet Take 1 tablet (80 mg total) by mouth once daily. 90 tablet 3    blood sugar diagnostic (ACCU-CHEK NOELLE PLUS TEST STRP) Strp Check blood sugar 3 times daily. 300 each 3    blood-glucose meter (ACCU-CHEK NOELLE PLUS METER) Misc Check blood sugar 3 times daily 1 each 0    calcipotriene (DOVONOX) 0.005 % cream APPLY TO AFFECTED AREA TWICE A DAY 60 g 4    clobetasol (OLUX) 0.05 % Foam AAA of scalp and behind ears twice daily.  Do not use on face, underarms or groin. 100 g 3    ergocalciferol (ERGOCALCIFEROL) 50,000 unit Cap Take 1 capsule (50,000 Units total) by mouth twice a week. 24 capsule 3    folic acid (FOLVITE) 1 MG tablet Take 1 tablet (1,000 mcg total) by mouth once daily. 90 tablet 1    glipiZIDE (GLUCOTROL) 5 MG tablet Take 1 tablet (5 mg total) by mouth 2 (two) times daily. 180 tablet 1    insulin (LANTUS SOLOSTAR U-100 INSULIN) glargine 100 units/mL (3mL) SubQ pen Inject 20 Units into the skin once daily. 15 mL 5    insulin aspart U-100 (NOVOLOG U-100 INSULIN ASPART) 100 unit/mL injection INJECT 200 UNITS PER CEQUR DEVICE EVERY 3 DAYS 60 mL 1    ketoconazole  "(NIZORAL) 2 % shampoo Wash hair with medicated shampoo at least 2x/week - let sit on scalp at least 5 minutes prior to rinsing 120 mL 0    lancets (ACCU-CHEK SOFTCLIX LANCETS) Misc Check blood sugar 3 times daily 300 each 3    losartan (COZAAR) 25 MG tablet Take 1 tablet (25 mg total) by mouth once daily. 90 tablet 3    methotrexate 2.5 MG Tab TAKE 8 TABLETS BY MOUTH EVERY 7 DAYS. 96 tablet 2    nicotine (NICODERM CQ) 14 mg/24 hr Place 1 patch onto the skin once daily. 14 patch 0    nicotine (NICODERM CQ) 21 mg/24 hr Place 1 patch onto the skin once daily. 14 patch 0    nicotine polacrilex 2 MG Lozg Take 1 lozenge (2 mg total) by mouth as needed (Use up to 10 lozenges per day in the place of cigarettes). 216 lozenge 0    pen needle, diabetic 32 gauge x 5/32" Ndle Use with Lantus once daily and Humalog 3 times daily. 200 each 5    PROAIR HFA 90 mcg/actuation inhaler Inhale 2 puffs into the lungs every 4 (four) hours as needed for Wheezing. Rescue 18 g 11    secukinumab (COSENTYX PEN, 2 PENS,) 150 mg/mL PnIj Inject 300 mg into the skin every 14 (fourteen) days. 4 mL 3    traMADoL (ULTRAM) 50 mg tablet TAKE 1 TABLET BY MOUTH THREE TIMES A DAY AS NEEDED FOR MODERATE PAIN 21 tablet 0    [DISCONTINUED] dulaglutide (TRULICITY) 4.5 mg/0.5 mL pen injector Inject 4.5 mg into the skin every 7 days. 4 pen 5    [DISCONTINUED] empagliflozin (JARDIANCE) 25 mg tablet Take 1 tablet (25 mg total) by mouth once daily. 90 tablet 1    metFORMIN (GLUCOPHAGE-XR) 500 MG XR 24hr tablet Take 2 tablets (1,000 mg total) by mouth 2 (two) times daily with meals. 360 tablet 1    NOVOLOG FLEXPEN U-100 INSULIN 100 unit/mL (3 mL) InPn pen Inject 6 Units into the skin.      spironolactone (ALDACTONE) 25 MG tablet Take 1 tablet (25 mg total) by mouth once daily. 30 tablet 11     No current facility-administered medications on file prior to visit.       Medications have been reviewed and reconciled with patient at visit today.    Barriers to " medications present (yes )    Fall since last office visit (no )      Exam:  Vitals:    12/20/22 1441   BP: 118/63   Pulse: 88   Temp: 97.8 °F (36.6 °C)     Weight: 117.1 kg (258 lb 1.6 oz)   Body mass index is 40.42 kg/m².      BP Readings from Last 3 Encounters:   12/20/22 118/63   12/08/22 128/71   12/06/22 122/70     Wt Readings from Last 3 Encounters:   12/20/22 1441 117.1 kg (258 lb 1.6 oz)   12/08/22 0904 117.6 kg (259 lb 4.8 oz)   12/06/22 0945 117.4 kg (258 lb 11.4 oz)            Physical Exam  Vitals reviewed.   Constitutional:       Appearance: He is well-developed. He is obese. He is ill-appearing (chronically ill appearing).   HENT:      Head: Normocephalic and atraumatic.      Ears:      Comments: Mustache with yellowing due to nicotine staining     Nose: Nose normal.   Eyes:      General: No scleral icterus.     Conjunctiva/sclera: Conjunctivae normal.      Pupils: Pupils are equal, round, and reactive to light.   Cardiovascular:      Rate and Rhythm: Normal rate and regular rhythm.      Heart sounds: Normal heart sounds. No murmur heard.    No friction rub. No gallop.   Pulmonary:      Effort: Pulmonary effort is normal.      Breath sounds: Normal breath sounds.   Abdominal:      General: Bowel sounds are normal. There is no distension.      Palpations: Abdomen is soft.      Tenderness: There is no abdominal tenderness.   Musculoskeletal:         General: No tenderness. Normal range of motion.      Cervical back: Normal range of motion and neck supple.   Skin:     General: Skin is warm and dry.      Comments: Scattered areas of psoriatic plaques to bilateral arms   Neurological:      General: No focal deficit present.      Mental Status: He is alert and oriented to person, place, and time.   Psychiatric:         Mood and Affect: Mood normal.         Behavior: Behavior normal.         Thought Content: Thought content normal.         Judgment: Judgment normal.       Laboratory Reviewed: (Yes)  Lab  Results   Component Value Date    WBC 7.46 08/23/2022    HGB 15.7 08/23/2022    HCT 47.2 08/23/2022     08/23/2022    CHOL 135 04/11/2022    TRIG 153 (H) 04/11/2022    HDL 35 (L) 04/11/2022    ALT 51 (H) 08/23/2022    AST 61 (H) 08/23/2022     (L) 12/08/2022    K 4.6 12/08/2022    CL 99 12/08/2022    CREATININE 1.2 12/08/2022    BUN 17 12/08/2022    CO2 25 12/08/2022    TSH 1.798 04/11/2022    PSA 0.24 04/11/2022    INR 1.0 08/14/2021    HGBA1C >14.0 (H) 12/08/2022           Health Maintenance  Health Maintenance Topics with due status: Not Due       Topic Last Completion Date    TETANUS VACCINE 02/24/2016    Eye Exam 03/14/2022    Lipid Panel 04/11/2022    Diabetes Urine Screening 12/08/2022    Hemoglobin A1c 12/08/2022    High Dose Statin 12/20/2022     Health Maintenance Due   Topic Date Due    Shingles Vaccine (1 of 2) Never done    Colorectal Cancer Screening  10/24/2017    COVID-19 Vaccine (3 - Booster for Pfizer series) 12/02/2021    Foot Exam  02/17/2023       Assessment and Plan:  1. Uncontrolled type 2 diabetes mellitus with hyperglycemia, with long-term current use of insulin  -     empagliflozin (JARDIANCE) 25 mg tablet; Take 1 tablet (25 mg total) by mouth once daily.  Dispense: 90 tablet; Refill: 1  -     dulaglutide (TRULICITY) 4.5 mg/0.5 mL pen injector; Inject 4.5 mg into the skin every 7 days.  Dispense: 4 pen; Refill: 5    2. Type 2 diabetes mellitus with stage 3a chronic kidney disease, with long-term current use of insulin  Assessment & Plan:  Last Hgb A1 C > 14  Noncompliant with medications and diet  Educated regarding diabetic medications, route and when to take  Medication highlighted on AVS  New prescriptions for Januvia and Trulicity sent to designated pharmacy  Nurse to week after Luna regarding medication compliance and to ensure he obtained his medications  Creatinine/BUN normal  Continue Losartan      3. Hypertension associated with diabetes  Assessment & Plan:  This  visit - B/P controlled  Continue amlodipine, losartan and spironolactone  Uncontrolled diabetes: discussed metformin, glipizide, Jardiance,  Lantus, Novolog and Trulicity      4. Class 2 severe obesity due to excess calories with serious comorbidity and body mass index (BMI) of 39.0 to 39.9 in adult  Assessment & Plan:  Encouraged decreasing sugar intake  And increased activity daily  Continue Trulicity      5. Hyperlipidemia associated with type 2 diabetes mellitus  Assessment & Plan:  Continue STATIN  And improvement with glucose improvement      6. Type 2 diabetes mellitus with microalbuminuria, with long-term current use of insulin  Assessment & Plan:  Currently, creatinine and GFR are stable  Better diabetes management  Last microalbumin 816.2               -Patient's lab results were reviewed and discussed with patient  -Treatment options and alternatives were discussed with the patient. Patient expressed understanding. Patient was given the opportunity to ask questions and be an active participant in their medical care. Patient had no further questions or concerns at this time.   -Patient is an overall moderate risk for health complications from their medical conditions.       Follow up: Follow up in about 1 month (around 1/23/2023) for Dr. Chaparro or Nkechi Rao.      After visit summary printed and given to patient upon discharge.  Patient goals and care plan are included in After visit summary.    Total medical decision making time was 40 min.    The following issues were discussed: The primary encounter diagnosis was Uncontrolled type 2 diabetes mellitus with hyperglycemia, with long-term current use of insulin. Diagnoses of Type 2 diabetes mellitus with stage 3a chronic kidney disease, with long-term current use of insulin, Hypertension associated with diabetes, Class 2 severe obesity due to excess calories with serious comorbidity and body mass index (BMI) of 39.0 to 39.9 in adult, Hyperlipidemia  associated with type 2 diabetes mellitus, and Type 2 diabetes mellitus with microalbuminuria, with long-term current use of insulin were also pertinent to this visit.    Health maintenance needs, recent test results and goals of care discussed with pt and questions answered.      ERENDIRA Murguia, NP-C

## 2022-12-20 NOTE — ASSESSMENT & PLAN NOTE
This visit - B/P controlled  Continue amlodipine, losartan and spironolactone  Uncontrolled diabetes: discussed metformin, glipizide, Jardiance,  Lantus, Novolog and Trulicity

## 2022-12-20 NOTE — PROGRESS NOTES
Subjective:       Patient ID: Trey Stevens is a 67 y.o. male.    Chief Complaint: Follow-up (Pt stays he's having trouble staying awake, not sure if it's the medicine that he's on or maybe something that he's not taking. Stays he'll sleep off and on for about thirty minutes or so. He doesn't sleep well either, two to three hours of sleep then he's back up.)    HPI:   Here for follow up of medical problems.  Missed DM nurse appt in 9/22.  Writes down sugars, but doesn't have book.  Thinks AC breakfast sugars 100-150. No low sugars.   Thinks taking all his DM meds, but out of some meds so he's not sure.  Meant to bring in list, but didn't remember.  Has an insulin patch that he fills up with insulin and it lasts 3 days.    No f/c/sw/cough.  No cp/sob/palp.  BMs normal.    Takes tramadol prn Ps arthritis ankle/foot pain.  No leg swelling lately.  Smoking less with Tob cess help- now 1 pack every 2 weeks, on nicotine patch    Updated/ annual due 8/23:  HM:  12/22 today fluvax, 10/21 covid vaccines, 12/18 HAV, 2/16 [id number], 4/13 [id number], 8/22 [id number], 2/16 TDaP, Cscope in the past normal and pt refuses more, 6/22 PSA, 2/16 HCV neg, 2/22 Eye Dr. Cole, 11/18 chest CT stable, Pod Dr. Hadley.    does not have any pertinent problems on file.  Review of Systems    Objective:     Vitals:    12/20/22 1441   BP: 118/63   Pulse: 88   Temp: 97.8 °F (36.6 °C)        Physical Exam        Assessment:       No diagnosis found.    Plan:           Problem List Items Addressed This Visit    None

## 2022-12-21 ENCOUNTER — PES CALL (OUTPATIENT)
Dept: ADMINISTRATIVE | Facility: CLINIC | Age: 67
End: 2022-12-21
Payer: MEDICARE

## 2022-12-29 ENCOUNTER — OFFICE VISIT (OUTPATIENT)
Dept: PODIATRY | Facility: CLINIC | Age: 67
End: 2022-12-29
Payer: MEDICARE

## 2022-12-29 VITALS — WEIGHT: 258.19 LBS | HEIGHT: 67 IN | BODY MASS INDEX: 40.52 KG/M2

## 2022-12-29 DIAGNOSIS — B35.1 DERMATOPHYTOSIS OF NAIL: ICD-10-CM

## 2022-12-29 DIAGNOSIS — E11.49 TYPE II DIABETES MELLITUS WITH NEUROLOGICAL MANIFESTATIONS: ICD-10-CM

## 2022-12-29 DIAGNOSIS — E11.9 ENCOUNTER FOR COMPREHENSIVE DIABETIC FOOT EXAMINATION, TYPE 2 DIABETES MELLITUS: Primary | ICD-10-CM

## 2022-12-29 DIAGNOSIS — L84 CORN OR CALLUS: ICD-10-CM

## 2022-12-29 DIAGNOSIS — E66.01 SEVERE OBESITY (BMI 35.0-35.9 WITH COMORBIDITY): ICD-10-CM

## 2022-12-29 DIAGNOSIS — R23.4 SKIN FISSURES: ICD-10-CM

## 2022-12-29 PROCEDURE — 99213 OFFICE O/P EST LOW 20 MIN: CPT | Mod: 25,HCNC,S$GLB, | Performed by: PODIATRIST

## 2022-12-29 PROCEDURE — 1159F PR MEDICATION LIST DOCUMENTED IN MEDICAL RECORD: ICD-10-PCS | Mod: HCNC,CPTII,S$GLB, | Performed by: PODIATRIST

## 2022-12-29 PROCEDURE — 1125F PR PAIN SEVERITY QUANTIFIED, PAIN PRESENT: ICD-10-PCS | Mod: HCNC,CPTII,S$GLB, | Performed by: PODIATRIST

## 2022-12-29 PROCEDURE — 99999 PR PBB SHADOW E&M-EST. PATIENT-LVL III: CPT | Mod: PBBFAC,HCNC,, | Performed by: PODIATRIST

## 2022-12-29 PROCEDURE — 3008F BODY MASS INDEX DOCD: CPT | Mod: HCNC,CPTII,S$GLB, | Performed by: PODIATRIST

## 2022-12-29 PROCEDURE — 1101F PR PT FALLS ASSESS DOC 0-1 FALLS W/OUT INJ PAST YR: ICD-10-PCS | Mod: HCNC,CPTII,S$GLB, | Performed by: PODIATRIST

## 2022-12-29 PROCEDURE — 11721 DEBRIDE NAIL 6 OR MORE: CPT | Mod: Q9,59,HCNC,S$GLB | Performed by: PODIATRIST

## 2022-12-29 PROCEDURE — 11056 PARNG/CUTG B9 HYPRKR LES 2-4: CPT | Mod: Q9,HCNC,S$GLB, | Performed by: PODIATRIST

## 2022-12-29 PROCEDURE — 99213 PR OFFICE/OUTPT VISIT, EST, LEVL III, 20-29 MIN: ICD-10-PCS | Mod: 25,HCNC,S$GLB, | Performed by: PODIATRIST

## 2022-12-29 PROCEDURE — 1125F AMNT PAIN NOTED PAIN PRSNT: CPT | Mod: HCNC,CPTII,S$GLB, | Performed by: PODIATRIST

## 2022-12-29 PROCEDURE — 11056 PR TRIM BENIGN HYPERKERATOTIC SKIN LESION,2-4: ICD-10-PCS | Mod: Q9,HCNC,S$GLB, | Performed by: PODIATRIST

## 2022-12-29 PROCEDURE — 1101F PT FALLS ASSESS-DOCD LE1/YR: CPT | Mod: HCNC,CPTII,S$GLB, | Performed by: PODIATRIST

## 2022-12-29 PROCEDURE — 99999 PR PBB SHADOW E&M-EST. PATIENT-LVL III: ICD-10-PCS | Mod: PBBFAC,HCNC,, | Performed by: PODIATRIST

## 2022-12-29 PROCEDURE — 1160F PR REVIEW ALL MEDS BY PRESCRIBER/CLIN PHARMACIST DOCUMENTED: ICD-10-PCS | Mod: HCNC,CPTII,S$GLB, | Performed by: PODIATRIST

## 2022-12-29 PROCEDURE — 3288F PR FALLS RISK ASSESSMENT DOCUMENTED: ICD-10-PCS | Mod: HCNC,CPTII,S$GLB, | Performed by: PODIATRIST

## 2022-12-29 PROCEDURE — 3288F FALL RISK ASSESSMENT DOCD: CPT | Mod: HCNC,CPTII,S$GLB, | Performed by: PODIATRIST

## 2022-12-29 PROCEDURE — 1159F MED LIST DOCD IN RCRD: CPT | Mod: HCNC,CPTII,S$GLB, | Performed by: PODIATRIST

## 2022-12-29 PROCEDURE — 3008F PR BODY MASS INDEX (BMI) DOCUMENTED: ICD-10-PCS | Mod: HCNC,CPTII,S$GLB, | Performed by: PODIATRIST

## 2022-12-29 PROCEDURE — 11721 PR DEBRIDEMENT OF NAILS, 6 OR MORE: ICD-10-PCS | Mod: Q9,59,HCNC,S$GLB | Performed by: PODIATRIST

## 2022-12-29 PROCEDURE — 1160F RVW MEDS BY RX/DR IN RCRD: CPT | Mod: HCNC,CPTII,S$GLB, | Performed by: PODIATRIST

## 2023-01-05 ENCOUNTER — TELEPHONE (OUTPATIENT)
Dept: SMOKING CESSATION | Facility: CLINIC | Age: 68
End: 2023-01-05
Payer: MEDICAID

## 2023-01-05 NOTE — TELEPHONE ENCOUNTER
Spoke to patient over the phone in regard to his smoking cessation progress. He quit smoking in November. Commended patient on his accomplishment. Provided patient with return contact information if needed for support in the future.

## 2023-01-09 DIAGNOSIS — R07.89 LEFT-SIDED CHEST WALL PAIN: ICD-10-CM

## 2023-01-09 RX ORDER — TRAMADOL HYDROCHLORIDE 50 MG/1
TABLET ORAL
Qty: 21 TABLET | Refills: 0 | Status: SHIPPED | OUTPATIENT
Start: 2023-01-09 | End: 2023-06-09 | Stop reason: SDUPTHER

## 2023-01-12 ENCOUNTER — SPECIALTY PHARMACY (OUTPATIENT)
Dept: PHARMACY | Facility: CLINIC | Age: 68
End: 2023-01-12
Payer: MEDICAID

## 2023-01-12 NOTE — TELEPHONE ENCOUNTER
Outgoing call regarding Cosentyx refill, pt stated he injects on 1/14 and has an injection. Pt next injection is 1/28, which he will need an injection. Informed pt will follow up on 1/17 to set up refill.

## 2023-01-16 NOTE — PROGRESS NOTES
Subjective:     Patient ID: Trey Stevens is a 67 y.o. male.    Chief Complaint: Nail Care (Pt c/o BL foot pain 6/10, Diabetic pt wears slippers, PCP Dr. Kaba last seen 12-8-22)      Trey is a 67 y.o. male who presents to the clinic for evaluation and treatment of high risk feet. Trey has a past medical history of Arthritis, Diabetes mellitus, Diabetes mellitus type 2, uncontrolled (7/19/2016), DM (diabetes mellitus) (2015), Gall stones, Obesity, Pneumonia of right middle lobe due to infectious organism (8/13/2021), Psoriasis (a type of skin inflammation), and Rheumatoid arthritis of foot. The patient's chief complaint is long, thick toenails. This patient has documented high risk feet requiring routine maintenance secondary to diabetes mellitis and those secondary complications of diabetes, as mentioned..    PCP: Brittanie Kaba MD    Date Last Seen by PCP: 12/08/2022    Current shoe gear:  Affected Foot: Casual shoes     Unaffected Foot: Casual shoes    Hemoglobin A1C   Date Value Ref Range Status   12/08/2022 >14.0 (H) 4.0 - 5.6 % Final     Comment:     ADA Screening Guidelines:  5.7-6.4%  Consistent with prediabetes  >or=6.5%  Consistent with diabetes    High levels of fetal hemoglobin interfere with the HbA1C  assay. Heterozygous hemoglobin variants (HbS, HgC, etc)do  not significantly interfere with this assay.   However, presence of multiple variants may affect accuracy.     08/01/2022 >14.0 (H) 4.0 - 5.6 % Final     Comment:     ADA Screening Guidelines:  5.7-6.4%  Consistent with prediabetes  >or=6.5%  Consistent with diabetes    High levels of fetal hemoglobin interfere with the HbA1C  assay. Heterozygous hemoglobin variants (HbS, HgC, etc)do  not significantly interfere with this assay.   However, presence of multiple variants may affect accuracy.     04/11/2022 11.1 (H) 4.0 - 5.6 % Final     Comment:     ADA Screening Guidelines:  5.7-6.4%  Consistent with prediabetes  >or=6.5%  Consistent with  diabetes    High levels of fetal hemoglobin interfere with the HbA1C  assay. Heterozygous hemoglobin variants (HbS, HgC, etc)do  not significantly interfere with this assay.   However, presence of multiple variants may affect accuracy.         Patient Active Problem List   Diagnosis    Psoriatic arthritis, destructive type    Vitamin D deficiency    Multiple lung nodules on CT    Immunosuppressed status    Long-term use of immunosuppressant medication    Mixed type COPD (chronic obstructive pulmonary disease)    Type 2 diabetes mellitus with chronic kidney disease, with long-term current use of insulin    Hypertension associated with diabetes    Vitiligo    SCHUYLER on CPAP    Class 2 severe obesity due to excess calories with serious comorbidity and body mass index (BMI) of 39.0 to 39.9 in adult    Hyperlipidemia associated with type 2 diabetes mellitus    Elevated liver enzymes    Nicotine dependence, cigarettes, uncomplicated    Dependence on nocturnal oxygen therapy    Urinary retention    Calcified granuloma of lung    Calcification of aorta    Type 2 diabetes mellitus with microalbuminuria, with long-term current use of insulin       Medication List with Changes/Refills   Current Medications    AMLODIPINE (NORVASC) 5 MG TABLET    TAKE 1 TABLET BY MOUTH EVERY DAY    ASPIRIN 81 MG CHEW    Take 1 tablet (81 mg total) by mouth once daily.    ATORVASTATIN (LIPITOR) 80 MG TABLET    Take 1 tablet (80 mg total) by mouth once daily.    BLOOD SUGAR DIAGNOSTIC (ACCU-CHEK NOELLE PLUS TEST STRP) STRP    Check blood sugar 3 times daily.    BLOOD-GLUCOSE METER (ACCU-CHEK NOELLE PLUS METER) MISC    Check blood sugar 3 times daily    CLOBETASOL (OLUX) 0.05 % FOAM    AAA of scalp and behind ears twice daily.  Do not use on face, underarms or groin.    DULAGLUTIDE (TRULICITY) 4.5 MG/0.5 ML PEN INJECTOR    Inject 4.5 mg into the skin every 7 days.    EMPAGLIFLOZIN (JARDIANCE) 25 MG TABLET    Take 1 tablet (25 mg total) by mouth once  "daily.    ERGOCALCIFEROL (ERGOCALCIFEROL) 50,000 UNIT CAP    Take 1 capsule (50,000 Units total) by mouth twice a week.    FOLIC ACID (FOLVITE) 1 MG TABLET    Take 1 tablet (1,000 mcg total) by mouth once daily.    GLIPIZIDE (GLUCOTROL) 5 MG TABLET    Take 1 tablet (5 mg total) by mouth 2 (two) times daily.    INSULIN (LANTUS SOLOSTAR U-100 INSULIN) GLARGINE 100 UNITS/ML (3ML) SUBQ PEN    Inject 20 Units into the skin once daily.    INSULIN ASPART U-100 (NOVOLOG U-100 INSULIN ASPART) 100 UNIT/ML INJECTION    INJECT 200 UNITS PER CEQUR DEVICE EVERY 3 DAYS    KETOCONAZOLE (NIZORAL) 2 % SHAMPOO    Wash hair with medicated shampoo at least 2x/week - let sit on scalp at least 5 minutes prior to rinsing    LANCETS (ACCU-CHEK SOFTCLIX LANCETS) MISC    Check blood sugar 3 times daily    LOSARTAN (COZAAR) 25 MG TABLET    Take 1 tablet (25 mg total) by mouth once daily.    METFORMIN (GLUCOPHAGE-XR) 500 MG XR 24HR TABLET    Take 2 tablets (1,000 mg total) by mouth 2 (two) times daily with meals.    METHOTREXATE 2.5 MG TAB    TAKE 8 TABLETS BY MOUTH EVERY 7 DAYS.    NICOTINE POLACRILEX 2 MG LOZG    Take 1 lozenge (2 mg total) by mouth as needed (Use up to 10 lozenges per day in the place of cigarettes).    NOVOLOG FLEXPEN U-100 INSULIN 100 UNIT/ML (3 ML) INPN PEN    Inject 6 Units into the skin.    PEN NEEDLE, DIABETIC 32 GAUGE X 5/32" NDLE    Use with Lantus once daily and Humalog 3 times daily.    PROAIR HFA 90 MCG/ACTUATION INHALER    Inhale 2 puffs into the lungs every 4 (four) hours as needed for Wheezing. Rescue    SECUKINUMAB (COSENTYX PEN, 2 PENS,) 150 MG/ML PNIJ    Inject 300 mg into the skin every 14 (fourteen) days.    SPIRONOLACTONE (ALDACTONE) 25 MG TABLET    Take 1 tablet (25 mg total) by mouth once daily.   Changed and/or Refilled Medications    Modified Medication Previous Medication    CALCIPOTRIENE (DOVONOX) 0.005 % CREAM calcipotriene (DOVONOX) 0.005 % cream       APPLY TO AFFECTED AREA(S) TWICE DAILY    " APPLY TO AFFECTED AREA TWICE A DAY    TRAMADOL (ULTRAM) 50 MG TABLET traMADoL (ULTRAM) 50 mg tablet       TAKE 1 TABLET BY MOUTH THREE TIMES A DAY AS NEEDED FOR MODERATE PAIN Strength: 50 mg    TAKE 1 TABLET BY MOUTH THREE TIMES A DAY AS NEEDED FOR MODERATE PAIN   Discontinued Medications    NICOTINE (NICODERM CQ) 14 MG/24 HR    Place 1 patch onto the skin once daily.    NICOTINE (NICODERM CQ) 21 MG/24 HR    Place 1 patch onto the skin once daily.       Review of patient's allergies indicates:  No Known Allergies    Past Surgical History:   Procedure Laterality Date    APPENDECTOMY      CHOLECYSTECTOMY         Family History   Problem Relation Age of Onset    Cancer Mother     Hypertension Mother     Diabetes Mother     Cataracts Mother     Stroke Father     Psoriasis Neg Hx        Social History     Socioeconomic History    Marital status:    Tobacco Use    Smoking status: Former     Packs/day: 0.50     Years: 28.00     Pack years: 14.00     Types: Cigarettes     Start date:      Quit date: 2022     Years since quittin.1    Smokeless tobacco: Never    Tobacco comments:     Quit smoking 1 mo ago (2022)   Substance and Sexual Activity    Alcohol use: No     Alcohol/week: 0.0 standard drinks    Drug use: No    Sexual activity: Never     Social Determinants of Health     Financial Resource Strain: Low Risk     Difficulty of Paying Living Expenses: Not hard at all   Food Insecurity: No Food Insecurity    Worried About Running Out of Food in the Last Year: Never true    Ran Out of Food in the Last Year: Never true   Transportation Needs: No Transportation Needs    Lack of Transportation (Medical): No    Lack of Transportation (Non-Medical): No   Physical Activity: Inactive    Days of Exercise per Week: 0 days    Minutes of Exercise per Session: 0 min   Stress: No Stress Concern Present    Feeling of Stress : Not at all   Social Connections: Moderately Isolated    Frequency of Communication with  "Friends and Family: Twice a week    Frequency of Social Gatherings with Friends and Family: More than three times a week    Attends Zoroastrianism Services: More than 4 times per year    Active Member of Clubs or Organizations: No    Attends Club or Organization Meetings: Never    Marital Status:    Housing Stability: Low Risk     Unable to Pay for Housing in the Last Year: No    Number of Places Lived in the Last Year: 1    Unstable Housing in the Last Year: No       Vitals:    12/29/22 1350   Weight: 117.1 kg (258 lb 2.5 oz)   Height: 5' 7" (1.702 m)   PainSc:   6       Hemoglobin A1C   Date Value Ref Range Status   12/08/2022 >14.0 (H) 4.0 - 5.6 % Final     Comment:     ADA Screening Guidelines:  5.7-6.4%  Consistent with prediabetes  >or=6.5%  Consistent with diabetes    High levels of fetal hemoglobin interfere with the HbA1C  assay. Heterozygous hemoglobin variants (HbS, HgC, etc)do  not significantly interfere with this assay.   However, presence of multiple variants may affect accuracy.     08/01/2022 >14.0 (H) 4.0 - 5.6 % Final     Comment:     ADA Screening Guidelines:  5.7-6.4%  Consistent with prediabetes  >or=6.5%  Consistent with diabetes    High levels of fetal hemoglobin interfere with the HbA1C  assay. Heterozygous hemoglobin variants (HbS, HgC, etc)do  not significantly interfere with this assay.   However, presence of multiple variants may affect accuracy.     04/11/2022 11.1 (H) 4.0 - 5.6 % Final     Comment:     ADA Screening Guidelines:  5.7-6.4%  Consistent with prediabetes  >or=6.5%  Consistent with diabetes    High levels of fetal hemoglobin interfere with the HbA1C  assay. Heterozygous hemoglobin variants (HbS, HgC, etc)do  not significantly interfere with this assay.   However, presence of multiple variants may affect accuracy.         Review of Systems   Constitutional:  Negative for chills and fever.   Respiratory:  Negative for shortness of breath.    Cardiovascular:  Negative for " chest pain, palpitations, orthopnea, claudication and leg swelling.   Gastrointestinal:  Negative for diarrhea, nausea and vomiting.   Musculoskeletal:  Negative for joint pain.   Skin:  Negative for rash.   Neurological:  Positive for tingling and sensory change.   Psychiatric/Behavioral: Negative.             Objective:      PHYSICAL EXAM: Apperance: Alert and orient in no distress,well developed, and with good attention to grooming and body habits  Patient presents ambulating in extra depth shoes.   LOWER EXTREMITY EXAM:  VASCULAR: Dorsalis pedis pulses 0/4 bilateral and Posterior Tibial pulses 1/4 bilateral. Capillary fill time <4 seconds bilateral. Mild edema observed bilateral. Varicosities present bilateral. Skin temperature of the lower extremities is warm to warm, proximal to distal. Hair growth absent bilateral.  DERMATOLOGICAL: No skin rashes, subcutaneous nodules, lesions, or ulcers observed bilateral. Nails 1,2,3,4,5 bilateral elongated, thickened, and discolored with subungual debris. Webspaces 1,2,3,4 clean, dry and without evidence of break in skin integrity bilateral. Moderate dry and hyperkeratotic tissue noted to bilateral heels and medial 1st MPJ. Skin fissures noted to bilateral heels. No erythema, drainage, or increased temp noted to area.   NEUROLOGICAL: Light touch, sharp-dull, proprioception all present and equal bilaterally.  Vibratory sensation absent at bilateral hallux. Protective sensation absent at 6/10 sites as tested with a Sumner-Rusty 5.07 monofilament.   MUSCULOSKELETAL: Muscle strength is 5/5 for foot inverters, everters, plantarflexors, and dorsiflexors. Muscle tone is normal.       Assessment:       ICD-10-CM ICD-9-CM   1. Encounter for comprehensive diabetic foot examination, type 2 diabetes mellitus  E11.9 250.00   2. Type II diabetes mellitus with neurological manifestations  E11.49 250.60   3. Dermatophytosis of nail  B35.1 110.1   4. Corn or callus  L84 700   5. Skin  fissures  R23.4 709.8   6. Severe obesity (BMI 35.0-35.9 with comorbidity)  E66.01 278.01    Z68.35 V85.35         Plan:   Encounter for comprehensive diabetic foot examination, type 2 diabetes mellitus    Type II diabetes mellitus with neurological manifestations    Dermatophytosis of nail    Corn or callus    Skin fissures    Severe obesity (BMI 35.0-35.9 with comorbidity)        I counseled the patient on his conditions, regarding findings of my examination, my impressions, and usual treatment plan.   Greater than 50% of this visit spent on counseling and coordination of care.  Greater than 12 minutes of a 15 minute appointment spent on education about the diabetic foot, neuropathy, and prevention of limb loss.  Shoe inspection. Diabetic Foot Education. Patient reminded of the importance of good nutrition and blood sugar control to help prevent podiatric complications of diabetes. Patient instructed on proper foot hygeine. We discussed wearing proper shoe gear, daily foot inspections, never walking without protective shoe gear, never putting sharp instruments to feet.    With patient's permission, nails 1-5 bilateral were debrided/trimmed in length and thickness aggressively to their soft tissue attachment mechanically and with electric , removing all offending nail and debris. Patient relates relief following the procedure.  With patient's permission, bilateral callus trimmed in thickness with #15 blade in thickness without incident.   Patient  will continue to monitor the areas daily, inspect feet, wear protective shoe gear when ambulatory, moisturizer to maintain skin integrity. Patient reminded of the importance of good nutrition and blood sugar control to help prevent podiatric complications of diabetes.  Patient to return 3 months or sooner if needed.      Joy Hadley DPM  [facility]

## 2023-01-20 ENCOUNTER — TELEPHONE (OUTPATIENT)
Dept: PRIMARY CARE CLINIC | Facility: CLINIC | Age: 68
End: 2023-01-20
Payer: MEDICAID

## 2023-01-21 ENCOUNTER — HOSPITAL ENCOUNTER (EMERGENCY)
Facility: HOSPITAL | Age: 68
Discharge: HOME | End: 2023-01-21
Attending: EMERGENCY MEDICINE
Payer: MEDICARE

## 2023-01-21 VITALS
HEART RATE: 100 BPM | BODY MASS INDEX: 40.5 KG/M2 | SYSTOLIC BLOOD PRESSURE: 144 MMHG | WEIGHT: 258.63 LBS | RESPIRATION RATE: 20 BRPM | DIASTOLIC BLOOD PRESSURE: 81 MMHG | OXYGEN SATURATION: 96 % | TEMPERATURE: 98 F

## 2023-01-21 DIAGNOSIS — T54.3X1A ALKALI BURN: Primary | ICD-10-CM

## 2023-01-21 DIAGNOSIS — M25.552 LEFT HIP PAIN: ICD-10-CM

## 2023-01-21 DIAGNOSIS — T30.4 ALKALI BURN: Primary | ICD-10-CM

## 2023-01-21 PROCEDURE — 25000003 PHARM REV CODE 250: Mod: HCNC | Performed by: EMERGENCY MEDICINE

## 2023-01-21 PROCEDURE — 99284 EMERGENCY DEPT VISIT MOD MDM: CPT | Mod: HCNC

## 2023-01-21 RX ORDER — HYDROCODONE BITARTRATE AND ACETAMINOPHEN 5; 325 MG/1; MG/1
2 TABLET ORAL
Status: COMPLETED | OUTPATIENT
Start: 2023-01-21 | End: 2023-01-21

## 2023-01-21 RX ORDER — OXYCODONE AND ACETAMINOPHEN 5; 325 MG/1; MG/1
1 TABLET ORAL EVERY 6 HOURS PRN
Qty: 12 TABLET | Refills: 0 | Status: SHIPPED | OUTPATIENT
Start: 2023-01-21 | End: 2023-01-28

## 2023-01-21 RX ORDER — MUPIROCIN 20 MG/G
1 OINTMENT TOPICAL
Status: COMPLETED | OUTPATIENT
Start: 2023-01-21 | End: 2023-01-21

## 2023-01-21 RX ORDER — MUPIROCIN 20 MG/G
OINTMENT TOPICAL 2 TIMES DAILY
Qty: 30 G | Refills: 0 | Status: SHIPPED | OUTPATIENT
Start: 2023-01-21 | End: 2023-01-31

## 2023-01-21 RX ADMIN — HYDROCODONE BITARTRATE AND ACETAMINOPHEN 2 TABLET: 5; 325 TABLET ORAL at 12:01

## 2023-01-21 RX ADMIN — MUPIROCIN 22 G: 20 OINTMENT TOPICAL at 12:01
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
## 2023-01-23 ENCOUNTER — TELEPHONE (OUTPATIENT)
Dept: PRIMARY CARE CLINIC | Facility: CLINIC | Age: 68
End: 2023-01-23
Payer: MEDICAID

## 2023-01-24 ENCOUNTER — TELEPHONE (OUTPATIENT)
Dept: PRIMARY CARE CLINIC | Facility: CLINIC | Age: 68
End: 2023-01-24
Payer: MEDICAID
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
## 2023-01-25 ENCOUNTER — OFFICE VISIT (OUTPATIENT)
Dept: PRIMARY CARE CLINIC | Facility: CLINIC | Age: 68
End: 2023-01-25
Payer: MEDICARE

## 2023-01-25 VITALS
SYSTOLIC BLOOD PRESSURE: 132 MMHG | OXYGEN SATURATION: 94 % | BODY MASS INDEX: 39.84 KG/M2 | WEIGHT: 253.81 LBS | DIASTOLIC BLOOD PRESSURE: 69 MMHG | HEIGHT: 67 IN | TEMPERATURE: 98 F | HEART RATE: 98 BPM

## 2023-01-25 DIAGNOSIS — Z79.4 UNCONTROLLED TYPE 2 DIABETES MELLITUS WITH HYPERGLYCEMIA, WITH LONG-TERM CURRENT USE OF INSULIN: ICD-10-CM

## 2023-01-25 DIAGNOSIS — J44.9 MIXED TYPE COPD (CHRONIC OBSTRUCTIVE PULMONARY DISEASE): ICD-10-CM

## 2023-01-25 DIAGNOSIS — N18.31 TYPE 2 DIABETES MELLITUS WITH STAGE 3A CHRONIC KIDNEY DISEASE, WITH LONG-TERM CURRENT USE OF INSULIN: Primary | ICD-10-CM

## 2023-01-25 DIAGNOSIS — D84.9 IMMUNOSUPPRESSED STATUS: ICD-10-CM

## 2023-01-25 DIAGNOSIS — L40.52 PSORIATIC ARTHRITIS, DESTRUCTIVE TYPE: ICD-10-CM

## 2023-01-25 DIAGNOSIS — E11.59 HYPERTENSION ASSOCIATED WITH DIABETES: Chronic | ICD-10-CM

## 2023-01-25 DIAGNOSIS — Z79.4 TYPE 2 DIABETES MELLITUS WITH STAGE 3A CHRONIC KIDNEY DISEASE, WITH LONG-TERM CURRENT USE OF INSULIN: Primary | ICD-10-CM

## 2023-01-25 DIAGNOSIS — I15.2 HYPERTENSION ASSOCIATED WITH DIABETES: Chronic | ICD-10-CM

## 2023-01-25 DIAGNOSIS — E11.22 TYPE 2 DIABETES MELLITUS WITH STAGE 3A CHRONIC KIDNEY DISEASE, WITH LONG-TERM CURRENT USE OF INSULIN: Primary | ICD-10-CM

## 2023-01-25 DIAGNOSIS — E11.65 UNCONTROLLED TYPE 2 DIABETES MELLITUS WITH HYPERGLYCEMIA, WITH LONG-TERM CURRENT USE OF INSULIN: ICD-10-CM

## 2023-01-25 DIAGNOSIS — Z12.11 COLON CANCER SCREENING: ICD-10-CM

## 2023-01-25 DIAGNOSIS — L40.9 GENERALIZED PSORIASIS: ICD-10-CM

## 2023-01-25 DIAGNOSIS — E66.01 SEVERE OBESITY (BMI 35.0-35.9 WITH COMORBIDITY): ICD-10-CM

## 2023-01-25 DIAGNOSIS — I70.0 CALCIFICATION OF AORTA: ICD-10-CM

## 2023-01-25 PROCEDURE — 3075F SYST BP GE 130 - 139MM HG: CPT | Mod: HCNC,CPTII,S$GLB, | Performed by: FAMILY MEDICINE

## 2023-01-25 PROCEDURE — 1160F PR REVIEW ALL MEDS BY PRESCRIBER/CLIN PHARMACIST DOCUMENTED: ICD-10-PCS | Mod: HCNC,CPTII,S$GLB, | Performed by: FAMILY MEDICINE

## 2023-01-25 PROCEDURE — 3288F PR FALLS RISK ASSESSMENT DOCUMENTED: ICD-10-PCS | Mod: HCNC,CPTII,S$GLB, | Performed by: FAMILY MEDICINE

## 2023-01-25 PROCEDURE — 1101F PR PT FALLS ASSESS DOC 0-1 FALLS W/OUT INJ PAST YR: ICD-10-PCS | Mod: HCNC,CPTII,S$GLB, | Performed by: FAMILY MEDICINE

## 2023-01-25 PROCEDURE — 3078F PR MOST RECENT DIASTOLIC BLOOD PRESSURE < 80 MM HG: ICD-10-PCS | Mod: HCNC,CPTII,S$GLB, | Performed by: FAMILY MEDICINE

## 2023-01-25 PROCEDURE — 1125F PR PAIN SEVERITY QUANTIFIED, PAIN PRESENT: ICD-10-PCS | Mod: HCNC,CPTII,S$GLB, | Performed by: FAMILY MEDICINE

## 2023-01-25 PROCEDURE — 1160F RVW MEDS BY RX/DR IN RCRD: CPT | Mod: HCNC,CPTII,S$GLB, | Performed by: FAMILY MEDICINE

## 2023-01-25 PROCEDURE — 3288F FALL RISK ASSESSMENT DOCD: CPT | Mod: HCNC,CPTII,S$GLB, | Performed by: FAMILY MEDICINE

## 2023-01-25 PROCEDURE — 1159F PR MEDICATION LIST DOCUMENTED IN MEDICAL RECORD: ICD-10-PCS | Mod: HCNC,CPTII,S$GLB, | Performed by: FAMILY MEDICINE

## 2023-01-25 PROCEDURE — 1158F ADVNC CARE PLAN TLK DOCD: CPT | Mod: HCNC,CPTII,S$GLB, | Performed by: FAMILY MEDICINE

## 2023-01-25 PROCEDURE — 3008F PR BODY MASS INDEX (BMI) DOCUMENTED: ICD-10-PCS | Mod: HCNC,CPTII,S$GLB, | Performed by: FAMILY MEDICINE

## 2023-01-25 PROCEDURE — 1125F AMNT PAIN NOTED PAIN PRSNT: CPT | Mod: HCNC,CPTII,S$GLB, | Performed by: FAMILY MEDICINE

## 2023-01-25 PROCEDURE — 1101F PT FALLS ASSESS-DOCD LE1/YR: CPT | Mod: HCNC,CPTII,S$GLB, | Performed by: FAMILY MEDICINE

## 2023-01-25 PROCEDURE — 1158F PR ADVANCE CARE PLANNING DISCUSS DOCUMENTED IN MEDICAL RECORD: ICD-10-PCS | Mod: HCNC,CPTII,S$GLB, | Performed by: FAMILY MEDICINE

## 2023-01-25 PROCEDURE — 99214 PR OFFICE/OUTPT VISIT, EST, LEVL IV, 30-39 MIN: ICD-10-PCS | Mod: HCNC,S$GLB,, | Performed by: FAMILY MEDICINE

## 2023-01-25 PROCEDURE — 1159F MED LIST DOCD IN RCRD: CPT | Mod: HCNC,CPTII,S$GLB, | Performed by: FAMILY MEDICINE

## 2023-01-25 PROCEDURE — 3008F BODY MASS INDEX DOCD: CPT | Mod: HCNC,CPTII,S$GLB, | Performed by: FAMILY MEDICINE

## 2023-01-25 PROCEDURE — 99214 OFFICE O/P EST MOD 30 MIN: CPT | Mod: HCNC,S$GLB,, | Performed by: FAMILY MEDICINE

## 2023-01-25 PROCEDURE — 99999 PR PBB SHADOW E&M-EST. PATIENT-LVL V: ICD-10-PCS | Mod: PBBFAC,HCNC,, | Performed by: FAMILY MEDICINE

## 2023-01-25 PROCEDURE — 3078F DIAST BP <80 MM HG: CPT | Mod: HCNC,CPTII,S$GLB, | Performed by: FAMILY MEDICINE

## 2023-01-25 PROCEDURE — 3075F PR MOST RECENT SYSTOLIC BLOOD PRESS GE 130-139MM HG: ICD-10-PCS | Mod: HCNC,CPTII,S$GLB, | Performed by: FAMILY MEDICINE

## 2023-01-25 PROCEDURE — 99999 PR PBB SHADOW E&M-EST. PATIENT-LVL V: CPT | Mod: PBBFAC,HCNC,, | Performed by: FAMILY MEDICINE

## 2023-01-25 RX ORDER — AMLODIPINE BESYLATE 5 MG/1
5 TABLET ORAL DAILY
Qty: 90 TABLET | Refills: 3 | Status: SHIPPED | OUTPATIENT
Start: 2023-01-25 | End: 2024-02-02

## 2023-01-25 RX ORDER — CLOBETASOL PROPIONATE 0.5 MG/G
AEROSOL, FOAM TOPICAL
Qty: 100 G | Refills: 3 | Status: SHIPPED | OUTPATIENT
Start: 2023-01-25 | End: 2023-06-06

## 2023-01-25 RX ORDER — CEPHALEXIN 500 MG/1
500 TABLET ORAL 4 TIMES DAILY
COMMUNITY
Start: 2023-01-16 | End: 2023-03-15

## 2023-01-25 RX ORDER — KETOCONAZOLE 20 MG/ML
SHAMPOO, SUSPENSION TOPICAL
Qty: 120 ML | Refills: 0 | Status: SHIPPED | OUTPATIENT
Start: 2023-01-25 | End: 2023-02-21

## 2023-01-25 RX ORDER — DULAGLUTIDE 4.5 MG/.5ML
4.5 INJECTION, SOLUTION SUBCUTANEOUS
Qty: 4 PEN | Refills: 5 | Status: SHIPPED | OUTPATIENT
Start: 2023-01-25 | End: 2023-04-14 | Stop reason: ALTCHOICE

## 2023-01-25 NOTE — PROGRESS NOTES
Patient, Trey Stevens (MRN #[id number]), presented with a recorded BMI of 40.43 kg/m^2 consistent with the definition of morbid obesity (ICD-10 E66.01). The patient's morbid obesity was monitored, evaluated, addressed and/or treated. This addendum to the medical record is made on 01/25/2023.
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
## 2023-01-30 ENCOUNTER — OUTPATIENT CASE MANAGEMENT (OUTPATIENT)
Dept: ADMINISTRATIVE | Facility: OTHER | Age: 68
End: 2023-01-30
Payer: MEDICAID

## 2023-01-30 NOTE — LETTER
January 31, 2023           Dear Trey,     Welcome to [facility] Complex Care Management Program.  It was a pleasure talking with you today.  My name is oLuise Aguayo. I look forward to working with you as your .  My goal is to help you function at the healthiest and highest level possible.  You can contact me directly at [phone].    As an [facility] patient with [employer], some of the services we may be able to provide include:      Development of an individualized care plan with a Registered Nurse    Connection with a    Connection with available resources and services     Coordinate communication among your care team members    Provide coaching and education    Help you understand your doctors treatment plan   Help you obtain information about your insurance coverage.     All services provided by [facility]s Complex Care Managers and other care team members are coordinated with and communicated to your primary care team.    As part of your enrollment, you will be receiving education materials and more information about these services in your [facility] account, by phone or through the mail.  If you do not wish to participate or receive information, please contact our office at [phone]      Sincerely,        Louise Aguayo, RN  [facility]   Outpatient RN Complex Care Manager

## 2023-01-31 NOTE — PROGRESS NOTES
Outpatient Care Management  Initial Patient Assessment    Patient: Trey Stevens  MRN: [id number]  Date of Service: 01/30/2023  Completed by: Louise Aguayo RN  Referral Date: 01/25/2023  Program: High Risk  Status: Ongoing  Effective Dates: 1/31/2023 - present  Responsible Staff: Louise Aguayo RN        Reason for Visit   Patient presents with    OPCM Chart Review    OPCM Enrollment Call    OPCM RN Second Assessment Attempt    Initial Assessment    Plan Of Care    OPCM Welcome Letter     01/31/23       Brief Summary:  Trey Stevens was referred by Dr. Kaia Spencer for Diabetes type 2 with CKD stage 3, long term insulin use.. Patient qualifies for program based on 69.4%.   Active problem list, medical, surgical and social history reviewed. Active comorbidities include DM, CKD, COPD, hyperlidimia. Areas of need identified by patient include controlling diabetes.   Complex care plan created with patient/caregiver input. By next encounter, patient agrees to think about food choices and how he can improve his diet.   Next steps: plan to call in approximately one week to discuss blood sugars and foods he is eating.   ANA Aguayo RN

## 2023-02-07 ENCOUNTER — OUTPATIENT CASE MANAGEMENT (OUTPATIENT)
Dept: ADMINISTRATIVE | Facility: OTHER | Age: 68
End: 2023-02-07
Payer: MEDICAID

## 2023-02-10 NOTE — PROGRESS NOTES
Outpatient Care Management  Plan of Care Follow Up Visit    Patient: Trey Stevens  MRN: [id number]  Date of Service: 02/07/2023  Completed by: Louise Aguayo RN  Referral Date: 01/25/2023    Reason for Visit   Patient presents with    OPCM Chart Review    OPCM RN Second Follow-Up Attempt    Nursing Assessment    Update Plan Of Care     02/10/23       Brief Summary: OPCM visit completed. Care plan reviewed and updated.   Next Steps: Plan to call and follow up in approximately 2 weeks.   GOVIND Aguayo RN

## 2023-02-20 ENCOUNTER — SPECIALTY PHARMACY (OUTPATIENT)
Dept: PHARMACY | Facility: CLINIC | Age: 68
End: 2023-02-20
Payer: MEDICAID

## 2023-02-20 NOTE — TELEPHONE ENCOUNTER
Specialty Pharmacy - Refill Coordination    Specialty Medication Orders Linked to Encounter      Flowsheet Row Most Recent Value   Medication #1 secukinumab (COSENTYX PEN, 2 PENS,) 150 mg/mL PnIj (Order#[id number], Rx#[id number])            Refill Questions - Documented Responses      Flowsheet Row Most Recent Value   Patient Availability and HIPAA Verification    Does patient want to proceed with activity? Yes   HIPAA/medical authority confirmed? Yes   Relationship to patient of person spoken to? Self   Refill Screening Questions    Changes to allergies? No   Changes to medications? No   New conditions since last clinic visit? No   Unplanned office visit, urgent care, ED, or hospital admission in the last 4 weeks? No   How does patient/caregiver feel medication is working? Good   Financial problems or insurance changes? No   How many doses of your specialty medications were missed in the last 4 weeks? 0   Would patient like to speak to a pharmacist? No   When does the patient need to receive the medication? 02/24/23   Refill Delivery Questions    How will the patient receive the medication? MEDRx   When does the patient need to receive the medication? 02/24/23   Shipping Address Home   Address in [facility] confirmed and updated if neccessary? Yes   Expected Copay ($) 0   Is the patient able to afford the medication copay? Yes   Payment Method zero copay   Days supply of Refill 28   Supplies needed? No supplies needed   Refill activity completed? Yes   Refill activity plan Refill scheduled   Shipment/Pickup Date: 02/22/23            Current Outpatient Medications   Medication Sig    amLODIPine (NORVASC) 5 MG tablet Take 1 tablet (5 mg total) by mouth once daily. For high blood pressure    aspirin 81 MG Chew Take 1 tablet (81 mg total) by mouth once daily.    atorvastatin (LIPITOR) 80 MG tablet Take 1 tablet (80 mg total) by mouth once daily.    blood sugar diagnostic (ACCU-CHEK NOELLE PLUS TEST STRP) Strp  "Check blood sugar 3 times daily.    blood-glucose meter (ACCU-CHEK NOELLE PLUS METER) Misc Check blood sugar 3 times daily    calcipotriene (DOVONOX) 0.005 % cream APPLY TO AFFECTED AREA(S) TWICE DAILY    cephalexin (KEFTAB) 500 mg tablet Take 500 mg by mouth 4 (four) times daily.    clobetasoL (OLUX) 0.05 % Foam AAA of scalp and behind ears twice daily.  Do not use on face, underarms or groin.    dulaglutide (TRULICITY) 4.5 mg/0.5 mL pen injector Inject 4.5 mg into the skin every 7 days.    empagliflozin (JARDIANCE) 25 mg tablet Take 1 tablet (25 mg total) by mouth once daily.    folic acid (FOLVITE) 1 MG tablet TAKE 1 TABLET ONE TIME DAILY    glipiZIDE (GLUCOTROL) 5 MG tablet Take 1 tablet (5 mg total) by mouth 2 (two) times daily.    insulin (LANTUS SOLOSTAR U-100 INSULIN) glargine 100 units/mL (3mL) SubQ pen Inject 20 Units into the skin once daily.    insulin aspart U-100 (NOVOLOG FLEXPEN U-100 INSULIN) 100 unit/mL (3 mL) InPn pen INJECT 6 UNITS INTO THE SKIN 3 (THREE) TIMES DAILY WITH MEALS.    insulin aspart U-100 (NOVOLOG U-100 INSULIN ASPART) 100 unit/mL injection INJECT 200 UNITS PER CEQUR DEVICE EVERY 3 DAYS    ketoconazole (NIZORAL) 2 % shampoo Wash hair with medicated shampoo at least 2x/week - let sit on scalp at least 5 minutes prior to rinsing    lancets (ACCU-CHEK SOFTCLIX LANCETS) Misc Check blood sugar 3 times daily    losartan (COZAAR) 25 MG tablet Take 1 tablet (25 mg total) by mouth once daily.    metFORMIN (GLUCOPHAGE-XR) 500 MG XR 24hr tablet Take 2 tablets (1,000 mg total) by mouth 2 (two) times daily with meals.    methotrexate 2.5 MG Tab TAKE 8 TABLETS BY MOUTH EVERY 7 DAYS.    nicotine polacrilex 2 MG Lozg Take 1 lozenge (2 mg total) by mouth as needed (Use up to 10 lozenges per day in the place of cigarettes).    pen needle, diabetic 32 gauge x 5/32" Ndle Use with Lantus once daily and Humalog 3 times daily.    PROAIR HFA 90 mcg/actuation inhaler Inhale 2 puffs into the lungs every 4 " (four) hours as needed for Wheezing. Rescue    secukinumab (COSENTYX PEN, 2 PENS,) 150 mg/mL PnIj Inject 300 mg into the skin every 14 (fourteen) days.    spironolactone (ALDACTONE) 25 MG tablet Take 1 tablet (25 mg total) by mouth once daily.    traMADoL (ULTRAM) 50 mg tablet TAKE 1 TABLET BY MOUTH THREE TIMES A DAY AS NEEDED FOR MODERATE PAIN Strength: 50 mg   Last reviewed on 1/31/2023 10:45 AM by Louise Aguayo RN    Review of patient's allergies indicates:  No Known Allergies Last reviewed on  1/31/2023 10:46 AM by Louise Aguayo      Tasks added this encounter   3/17/2023 - Refill Call (Auto Added)   Tasks due within next 3 months   3/2/2023 - Clinical - Follow Up Assesement (Annual)     Maggy Perkisn, PharmD  [facility]  [address] [place], [place] [address]  [facility] [address]  Phone: [phone]  Fax: [phone]

## 2023-02-23 ENCOUNTER — OUTPATIENT CASE MANAGEMENT (OUTPATIENT)
Dept: ADMINISTRATIVE | Facility: OTHER | Age: 68
End: 2023-02-23
Payer: MEDICAID

## 2023-02-28 NOTE — PROGRESS NOTES
Outpatient Care Management  Plan of Care Follow Up Visit    Patient: Trey Stevens  MRN: [id number]  Date of Service: 02/23/2023  Completed by: Louise Aguayo RN  Referral Date: 01/25/2023    Reason for Visit   Patient presents with    OPCM Chart Review    OPCM RN Second Follow-Up Attempt    Nursing Assessment    Update Plan Of Care     02/28/23       Brief Summary: OPCM visit completed, care plan reviewed and updated.   Next Steps: Plan to follow up in approximately one week.   GOVIND Aguayo RN

## 2023-03-07 ENCOUNTER — OUTPATIENT CASE MANAGEMENT (OUTPATIENT)
Dept: ADMINISTRATIVE | Facility: OTHER | Age: 68
End: 2023-03-07
Payer: COMMERCIAL

## 2023-03-08 NOTE — PROGRESS NOTES
Outpatient Care Management  Plan of Care Follow Up Visit    Patient: Trey Stevens  MRN: [id number]  Date of Service: 03/07/2023  Completed by: Louise Aguayo RN  Referral Date: 01/25/2023    Reason for Visit   Patient presents with    OPCM Chart Review    OPCM RN Second Follow-Up Attempt    Nursing Assessment    Update Plan Of Care     03/08/23       Brief Summary: OPCM visit completed, care plan reviewed and updated.   Next Steps: Messaged PCP and staff informing of patient's recent blood sugar and foods consumed. Plan to follow up   with patient in approximately one week.  GOVIND Aguayo RN

## 2023-03-09 ENCOUNTER — SPECIALTY PHARMACY (OUTPATIENT)
Dept: PHARMACY | Facility: CLINIC | Age: 68
End: 2023-03-09
Payer: COMMERCIAL

## 2023-03-09 ENCOUNTER — TELEPHONE (OUTPATIENT)
Dept: PRIMARY CARE CLINIC | Facility: CLINIC | Age: 68
End: 2023-03-09
Payer: COMMERCIAL

## 2023-03-09 ENCOUNTER — OFFICE VISIT (OUTPATIENT)
Dept: PRIMARY CARE CLINIC | Facility: CLINIC | Age: 68
End: 2023-03-09
Payer: COMMERCIAL

## 2023-03-09 VITALS
DIASTOLIC BLOOD PRESSURE: 68 MMHG | HEIGHT: 67 IN | OXYGEN SATURATION: 95 % | BODY MASS INDEX: 40.54 KG/M2 | TEMPERATURE: 98 F | HEART RATE: 88 BPM | SYSTOLIC BLOOD PRESSURE: 122 MMHG | WEIGHT: 258.31 LBS

## 2023-03-09 DIAGNOSIS — E11.65 HYPERGLYCEMIA DUE TO DIABETES MELLITUS: Primary | ICD-10-CM

## 2023-03-09 DIAGNOSIS — F54 MALADAPTIVE HEALTH BEHAVIORS AFFECTING MEDICAL CONDITION: ICD-10-CM

## 2023-03-09 LAB — GLUCOSE SERPL-MCNC: 413 MG/DL (ref 70–110)

## 2023-03-09 PROCEDURE — 3288F FALL RISK ASSESSMENT DOCD: CPT | Mod: CPTII,S$GLB,, | Performed by: NURSE PRACTITIONER

## 2023-03-09 PROCEDURE — 82962 POCT GLUCOSE, HAND-HELD DEVICE: ICD-10-PCS | Mod: S$GLB,,, | Performed by: NURSE PRACTITIONER

## 2023-03-09 PROCEDURE — 82962 GLUCOSE BLOOD TEST: CPT | Mod: S$GLB,,, | Performed by: NURSE PRACTITIONER

## 2023-03-09 PROCEDURE — 3288F PR FALLS RISK ASSESSMENT DOCUMENTED: ICD-10-PCS | Mod: CPTII,S$GLB,, | Performed by: NURSE PRACTITIONER

## 2023-03-09 PROCEDURE — 99999 PR PBB SHADOW E&M-EST. PATIENT-LVL V: CPT | Mod: PBBFAC,,, | Performed by: NURSE PRACTITIONER

## 2023-03-09 PROCEDURE — 1101F PR PT FALLS ASSESS DOC 0-1 FALLS W/OUT INJ PAST YR: ICD-10-PCS | Mod: CPTII,S$GLB,, | Performed by: NURSE PRACTITIONER

## 2023-03-09 PROCEDURE — 3008F PR BODY MASS INDEX (BMI) DOCUMENTED: ICD-10-PCS | Mod: CPTII,S$GLB,, | Performed by: NURSE PRACTITIONER

## 2023-03-09 PROCEDURE — 99999 PR PBB SHADOW E&M-EST. PATIENT-LVL V: ICD-10-PCS | Mod: PBBFAC,,, | Performed by: NURSE PRACTITIONER

## 2023-03-09 PROCEDURE — 1126F AMNT PAIN NOTED NONE PRSNT: CPT | Mod: CPTII,S$GLB,, | Performed by: NURSE PRACTITIONER

## 2023-03-09 PROCEDURE — 1157F ADVNC CARE PLAN IN RCRD: CPT | Mod: CPTII,S$GLB,, | Performed by: NURSE PRACTITIONER

## 2023-03-09 PROCEDURE — 4010F PR ACE/ARB THEARPY RXD/TAKEN: ICD-10-PCS | Mod: CPTII,S$GLB,, | Performed by: NURSE PRACTITIONER

## 2023-03-09 PROCEDURE — 99214 OFFICE O/P EST MOD 30 MIN: CPT | Mod: S$GLB,,, | Performed by: NURSE PRACTITIONER

## 2023-03-09 PROCEDURE — 99214 PR OFFICE/OUTPT VISIT, EST, LEVL IV, 30-39 MIN: ICD-10-PCS | Mod: S$GLB,,, | Performed by: NURSE PRACTITIONER

## 2023-03-09 PROCEDURE — 4010F ACE/ARB THERAPY RXD/TAKEN: CPT | Mod: CPTII,S$GLB,, | Performed by: NURSE PRACTITIONER

## 2023-03-09 PROCEDURE — 3078F PR MOST RECENT DIASTOLIC BLOOD PRESSURE < 80 MM HG: ICD-10-PCS | Mod: CPTII,S$GLB,, | Performed by: NURSE PRACTITIONER

## 2023-03-09 PROCEDURE — 1126F PR PAIN SEVERITY QUANTIFIED, NO PAIN PRESENT: ICD-10-PCS | Mod: CPTII,S$GLB,, | Performed by: NURSE PRACTITIONER

## 2023-03-09 PROCEDURE — 3074F PR MOST RECENT SYSTOLIC BLOOD PRESSURE < 130 MM HG: ICD-10-PCS | Mod: CPTII,S$GLB,, | Performed by: NURSE PRACTITIONER

## 2023-03-09 PROCEDURE — 1157F PR ADVANCE CARE PLAN OR EQUIV PRESENT IN MEDICAL RECORD: ICD-10-PCS | Mod: CPTII,S$GLB,, | Performed by: NURSE PRACTITIONER

## 2023-03-09 PROCEDURE — 3008F BODY MASS INDEX DOCD: CPT | Mod: CPTII,S$GLB,, | Performed by: NURSE PRACTITIONER

## 2023-03-09 PROCEDURE — 1101F PT FALLS ASSESS-DOCD LE1/YR: CPT | Mod: CPTII,S$GLB,, | Performed by: NURSE PRACTITIONER

## 2023-03-09 PROCEDURE — 3074F SYST BP LT 130 MM HG: CPT | Mod: CPTII,S$GLB,, | Performed by: NURSE PRACTITIONER

## 2023-03-09 PROCEDURE — 3078F DIAST BP <80 MM HG: CPT | Mod: CPTII,S$GLB,, | Performed by: NURSE PRACTITIONER

## 2023-03-09 NOTE — TELEPHONE ENCOUNTER
Specialty Pharmacy - Clinical Reassessment    Specialty Medication Orders Linked to Encounter      Flowsheet Row Most Recent Value   Medication #1 secukinumab (COSENTYX PEN, 2 PENS,) 150 mg/mL PnIj (Order#[id number], Rx#[id number])          Patient Diagnosis   L40.52 - Psoriatic arthritis, destructive type    Trey Stevens is a 67 y.o. male, who is followed by the specialty pharmacy service for management and education of his PsA.  He has been on therapy with Cosentyx since 2019.  Maintenance dose adjusted to 300 mg every 14 days in 6/2020.     I have reviewed his electronic medical record and current medication list and determined that specialty medication adjustment Is not needed at this time.    Patient has not experienced adverse events.  He Is mostly adherent reporting 1 missed doses since last review.  Adherence has been encouraged with the following mechanism(s): proactive refill calls, pharmacist counseling- recommended calendars, cell phone alerts, etc.  He is on target to meet goals of therapy and will continue treatment.        2/20/2023 1/27/2023 12/12/2022 11/1/2022 9/19/2022 8/22/2022 7/11/2022   Follow Up Review   # of missed doses 0 1 1 0 0 0 0   Reason  Busy with other things        New Medications? No Yes No No No No No   New Conditions? No Yes No No No No    New Allergies? No No No No No No No   Med Effective? Good Good Good Good Good Good Good   Urgent Care? No Yes No No No No No   Requested Pharmacist? No Yes No No No No No            Therapy is appropriate to continue.    Therapy is effective: PsA is well controlled.  PsO remains active.  Provider considering Skyrizi/ alternative MOA as of 8/2022.    On scale of 1 to 10, how does patient rank quality of life? (10 - Best): Unable to Assess  Recommendations:  See how patients PsO is doing -  PsA was well controlled but PsO remains active as of 8/2022.     Adherence tool check- is patient using calendar/ reminder?  Review Method: Chart  Review    Tasks added this encounter   12/9/2023 - Clinical - Follow Up Assesement (Annual)   Tasks due within next 3 months   3/17/2023 - Refill Call (Auto Added)     Maggy Perkins, PharmD  [facility]  [address]  [facility] [address]  Phone: [phone]  Fax: [phone]

## 2023-03-09 NOTE — PATIENT INSTRUCTIONS
Bring your medicines      Check and record your blood sugar before eating      Faithful taking your medication

## 2023-03-09 NOTE — TELEPHONE ENCOUNTER
----- Message from Brittanie Chaparro MD sent at 3/8/2023  4:44 PM CST -----  Regarding: RE: Elevated blood sugar  Can we bring him in to [facility] to recheck him tomorrow please?      ----- Message -----  From: Charleen Mo RN  Sent: 3/8/2023   2:55 PM CST  To: Brittanie Chaparro MD  Subject: FW: Elevated blood sugar                           ----- Message -----  From: Louise Aguayo RN  Sent: 3/8/2023   2:40 PM CST  To: [facility] Staff  Subject: Elevated blood sugar                             Good afternoon. Just wanted to let you know I spoke with patient earlier. He says his blood sugar this morning was 537, after eating 2 donuts and an apple fritter. We discussed when to check blood sugar, reviewed medications, and discussed better food options. He does have an appointment with diabetes education on 4/14/23. Let me know if there is anything else I can do.   Thank you,   Louise Aguayo, RN  Outpatient Care Management
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
## 2023-03-13 ENCOUNTER — DOCUMENTATION ONLY (OUTPATIENT)
Dept: PRIMARY CARE CLINIC | Facility: CLINIC | Age: 68
End: 2023-03-13
Payer: COMMERCIAL

## 2023-03-13 NOTE — PROGRESS NOTES
Referral received from NP, Nkechi Rao to meet with patient.  Called patient and scheduled him to see LCSW on Wednesday, March 15, 2023 before his MD appointment. Will meet with patient at 9:30a.m.  on 3/15/2023.

## 2023-03-15 ENCOUNTER — CLINICAL SUPPORT (OUTPATIENT)
Dept: PRIMARY CARE CLINIC | Facility: CLINIC | Age: 68
End: 2023-03-15
Payer: COMMERCIAL

## 2023-03-15 ENCOUNTER — OFFICE VISIT (OUTPATIENT)
Dept: PRIMARY CARE CLINIC | Facility: CLINIC | Age: 68
End: 2023-03-15
Payer: COMMERCIAL

## 2023-03-15 ENCOUNTER — DOCUMENTATION ONLY (OUTPATIENT)
Dept: PRIMARY CARE CLINIC | Facility: CLINIC | Age: 68
End: 2023-03-15

## 2023-03-15 VITALS
HEART RATE: 89 BPM | HEIGHT: 67 IN | DIASTOLIC BLOOD PRESSURE: 75 MMHG | TEMPERATURE: 98 F | OXYGEN SATURATION: 95 % | WEIGHT: 257.5 LBS | SYSTOLIC BLOOD PRESSURE: 132 MMHG | BODY MASS INDEX: 40.42 KG/M2

## 2023-03-15 DIAGNOSIS — E11.21 TYPE 2 DIABETES MELLITUS WITH NEPHROPATHY: ICD-10-CM

## 2023-03-15 DIAGNOSIS — F17.210 NICOTINE DEPENDENCE, CIGARETTES, UNCOMPLICATED: ICD-10-CM

## 2023-03-15 DIAGNOSIS — Z91.199 NONCOMPLIANCE WITH DIABETES TREATMENT: ICD-10-CM

## 2023-03-15 DIAGNOSIS — F54 MALADAPTIVE HEALTH BEHAVIORS AFFECTING MEDICAL CONDITION: ICD-10-CM

## 2023-03-15 DIAGNOSIS — E11.65 HYPERGLYCEMIA DUE TO DIABETES MELLITUS: Primary | ICD-10-CM

## 2023-03-15 LAB — GLUCOSE SERPL-MCNC: 407 MG/DL (ref 70–110)

## 2023-03-15 PROCEDURE — 82962 POCT GLUCOSE, HAND-HELD DEVICE: ICD-10-PCS | Mod: S$GLB,,, | Performed by: NURSE PRACTITIONER

## 2023-03-15 PROCEDURE — 99999 PR PBB SHADOW E&M-EST. PATIENT-LVL I: CPT | Mod: PBBFAC,,,

## 2023-03-15 PROCEDURE — 3075F PR MOST RECENT SYSTOLIC BLOOD PRESS GE 130-139MM HG: ICD-10-PCS | Mod: CPTII,S$GLB,, | Performed by: NURSE PRACTITIONER

## 2023-03-15 PROCEDURE — 3078F DIAST BP <80 MM HG: CPT | Mod: CPTII,S$GLB,, | Performed by: NURSE PRACTITIONER

## 2023-03-15 PROCEDURE — 99214 OFFICE O/P EST MOD 30 MIN: CPT | Mod: S$GLB,,, | Performed by: NURSE PRACTITIONER

## 2023-03-15 PROCEDURE — 1126F AMNT PAIN NOTED NONE PRSNT: CPT | Mod: CPTII,S$GLB,, | Performed by: NURSE PRACTITIONER

## 2023-03-15 PROCEDURE — 1157F PR ADVANCE CARE PLAN OR EQUIV PRESENT IN MEDICAL RECORD: ICD-10-PCS | Mod: CPTII,S$GLB,, | Performed by: NURSE PRACTITIONER

## 2023-03-15 PROCEDURE — 3008F BODY MASS INDEX DOCD: CPT | Mod: CPTII,S$GLB,, | Performed by: NURSE PRACTITIONER

## 2023-03-15 PROCEDURE — 1159F MED LIST DOCD IN RCRD: CPT | Mod: CPTII,S$GLB,, | Performed by: NURSE PRACTITIONER

## 2023-03-15 PROCEDURE — 82962 GLUCOSE BLOOD TEST: CPT | Mod: S$GLB,,, | Performed by: NURSE PRACTITIONER

## 2023-03-15 PROCEDURE — 3288F FALL RISK ASSESSMENT DOCD: CPT | Mod: CPTII,S$GLB,, | Performed by: NURSE PRACTITIONER

## 2023-03-15 PROCEDURE — 4010F PR ACE/ARB THEARPY RXD/TAKEN: ICD-10-PCS | Mod: CPTII,S$GLB,, | Performed by: NURSE PRACTITIONER

## 2023-03-15 PROCEDURE — 99999 PR PBB SHADOW E&M-EST. PATIENT-LVL V: CPT | Mod: PBBFAC,,, | Performed by: NURSE PRACTITIONER

## 2023-03-15 PROCEDURE — 3078F PR MOST RECENT DIASTOLIC BLOOD PRESSURE < 80 MM HG: ICD-10-PCS | Mod: CPTII,S$GLB,, | Performed by: NURSE PRACTITIONER

## 2023-03-15 PROCEDURE — 3008F PR BODY MASS INDEX (BMI) DOCUMENTED: ICD-10-PCS | Mod: CPTII,S$GLB,, | Performed by: NURSE PRACTITIONER

## 2023-03-15 PROCEDURE — 1157F ADVNC CARE PLAN IN RCRD: CPT | Mod: CPTII,S$GLB,, | Performed by: NURSE PRACTITIONER

## 2023-03-15 PROCEDURE — 1101F PR PT FALLS ASSESS DOC 0-1 FALLS W/OUT INJ PAST YR: ICD-10-PCS | Mod: CPTII,S$GLB,, | Performed by: NURSE PRACTITIONER

## 2023-03-15 PROCEDURE — 3075F SYST BP GE 130 - 139MM HG: CPT | Mod: CPTII,S$GLB,, | Performed by: NURSE PRACTITIONER

## 2023-03-15 PROCEDURE — 3288F PR FALLS RISK ASSESSMENT DOCUMENTED: ICD-10-PCS | Mod: CPTII,S$GLB,, | Performed by: NURSE PRACTITIONER

## 2023-03-15 PROCEDURE — 99999 PR PBB SHADOW E&M-EST. PATIENT-LVL V: ICD-10-PCS | Mod: PBBFAC,,, | Performed by: NURSE PRACTITIONER

## 2023-03-15 PROCEDURE — 4010F ACE/ARB THERAPY RXD/TAKEN: CPT | Mod: CPTII,S$GLB,, | Performed by: NURSE PRACTITIONER

## 2023-03-15 PROCEDURE — 99214 PR OFFICE/OUTPT VISIT, EST, LEVL IV, 30-39 MIN: ICD-10-PCS | Mod: S$GLB,,, | Performed by: NURSE PRACTITIONER

## 2023-03-15 PROCEDURE — 1126F PR PAIN SEVERITY QUANTIFIED, NO PAIN PRESENT: ICD-10-PCS | Mod: CPTII,S$GLB,, | Performed by: NURSE PRACTITIONER

## 2023-03-15 PROCEDURE — 99499 NO LOS: ICD-10-PCS | Mod: S$GLB,,,

## 2023-03-15 PROCEDURE — 99999 PR PBB SHADOW E&M-EST. PATIENT-LVL I: ICD-10-PCS | Mod: PBBFAC,,,

## 2023-03-15 PROCEDURE — 1101F PT FALLS ASSESS-DOCD LE1/YR: CPT | Mod: CPTII,S$GLB,, | Performed by: NURSE PRACTITIONER

## 2023-03-15 PROCEDURE — 99499 UNLISTED E&M SERVICE: CPT | Mod: S$GLB,,,

## 2023-03-15 PROCEDURE — 1159F PR MEDICATION LIST DOCUMENTED IN MEDICAL RECORD: ICD-10-PCS | Mod: CPTII,S$GLB,, | Performed by: NURSE PRACTITIONER

## 2023-03-15 NOTE — PROGRESS NOTES
Trey Stevens  03/15/2023  [id number]    Brittanie Chaparro MD  Patient Care Team:  Brittanie Chaparro MD as PCP - General (Internal Medicine)  Shira Kelly LPN as Care Coordinator  Rachelle Huertas RD, CDE as Dietitian (Diabetes)  Winter Garcia RD, CDE as Dietitian (Diabetes)  Michelle Rossi, RN as Diabetes Educator (Diabetes)  Bee Stinson NP as Nurse Practitioner (Endocrinology)  Charlette Wilcox PA-C as Physician Assistant (Rheumatology)  Aminata Martinez NP as Nurse Practitioner (Pulmonary Disease)  Tigist Cole MD as Consulting Physician (Ophthalmology)  Joy Hadley DPM as Consulting Physician (Podiatry)  Louise Aguayo RN as Outpatient       [facility] Note      Chief Complaint:  Chief Complaint   Patient presents with    Follow-up     Pt is here for diabetes follow up.        History of Present Illness:  Pt returns today for hyperglycemia and uncontrolled DM II  Did not eat breakfast today - did not take medicines this AM  Still having problems with non compliance of diabetic medication.   [facility] diabetes.   Pt brought his diabetic meds to the clinic and he indicated which meds and time of day they are scheduled.  Pt still receiving home health services with nurse visit once a week.   The burn to the left hip area is improving as reported  POCT CBG - 407                      Review of Systems   Constitutional:  Positive for malaise/fatigue. Negative for chills and fever.   HENT:  Negative for congestion and sore throat.    Eyes:  Negative for blurred vision and discharge.   Respiratory:  Negative for cough, sputum production and shortness of breath.    Cardiovascular:  Negative for chest pain and palpitations.   Gastrointestinal:  Negative for abdominal pain, nausea and vomiting.   Genitourinary:  Negative for dysuria and frequency.   Musculoskeletal: Negative.    Skin:  Negative for itching and rash.    Neurological:  Negative for dizziness, speech change, focal weakness and headaches.   Psychiatric/Behavioral:  Positive for depression.        The following were reviewed: Active problem list, medication list, allergies, family history, social history, and Health Maintenance.     History:  Past Medical History:   Diagnosis Date    Arthritis     Diabetes mellitus     Diabetes mellitus type 2, uncontrolled 7/19/2016    DM (diabetes mellitus) 2015     09/04/2017    Gall stones     Obesity     Pneumonia of right middle lobe due to infectious organism 8/13/2021    Psoriasis (a type of skin inflammation)     Rheumatoid arthritis of foot      Past Surgical History:   Procedure Laterality Date    APPENDECTOMY      CHOLECYSTECTOMY       Family History   Problem Relation Age of Onset    Cancer Mother     Hypertension Mother     Diabetes Mother     Cataracts Mother     Stroke Father     Psoriasis Neg Hx      Patient Active Problem List   Diagnosis    Psoriatic arthritis, destructive type    Vitamin D deficiency    Multiple lung nodules on CT    Immunosuppressed status    Long-term use of immunosuppressant medication    Mixed type COPD (chronic obstructive pulmonary disease)    Type 2 diabetes mellitus with chronic kidney disease, with long-term current use of insulin    Hypertension associated with diabetes    Vitiligo    SCHUYLER on CPAP    Class 2 severe obesity due to excess calories with serious comorbidity and body mass index (BMI) of 39.0 to 39.9 in adult    Hyperlipidemia associated with type 2 diabetes mellitus    Elevated liver enzymes    Nicotine dependence, cigarettes, uncomplicated    Dependence on nocturnal oxygen therapy    Urinary retention    Calcified granuloma of lung    Calcification of aorta    Type 2 diabetes mellitus with microalbuminuria, with long-term current use of insulin    Hyperglycemia due to diabetes mellitus    Noncompliance with diabetes treatment     Review of patient's allergies  indicates:  No Known Allergies    Medications:  Current Outpatient Medications on File Prior to Visit   Medication Sig Dispense Refill    amLODIPine (NORVASC) 5 MG tablet Take 1 tablet (5 mg total) by mouth once daily. For high blood pressure 90 tablet 3    aspirin 81 MG Chew Take 1 tablet (81 mg total) by mouth once daily. 100 tablet 0    atorvastatin (LIPITOR) 80 MG tablet Take 1 tablet (80 mg total) by mouth once daily. 90 tablet 3    blood sugar diagnostic (ACCU-CHEK NOELLE PLUS TEST STRP) Strp Check blood sugar 3 times daily. 300 each 3    blood-glucose meter (ACCU-CHEK NOELLE PLUS METER) Misc Check blood sugar 3 times daily 1 each 0    calcipotriene (DOVONOX) 0.005 % cream APPLY TO AFFECTED AREA(S) TWICE DAILY 60 g 4    clobetasoL (OLUX) 0.05 % Foam AAA of scalp and behind ears twice daily.  Do not use on face, underarms or groin. 100 g 3    dulaglutide (TRULICITY) 4.5 mg/0.5 mL pen injector Inject 4.5 mg into the skin every 7 days. 4 pen 5    empagliflozin (JARDIANCE) 25 mg tablet Take 1 tablet (25 mg total) by mouth once daily. 90 tablet 1    folic acid (FOLVITE) 1 MG tablet TAKE 1 TABLET ONE TIME DAILY 90 tablet 1    glipiZIDE (GLUCOTROL) 5 MG tablet TAKE 1 TABLET BY MOUTH TWICE A  tablet 1    insulin aspart U-100 (NOVOLOG FLEXPEN U-100 INSULIN) 100 unit/mL (3 mL) InPn pen INJECT 6 UNITS INTO THE SKIN 3 (THREE) TIMES DAILY WITH MEALS. 15 each 1    insulin aspart U-100 (NOVOLOG U-100 INSULIN ASPART) 100 unit/mL injection INJECT 200 UNITS PER CEQUR DEVICE EVERY 3 DAYS 60 mL 1    ketoconazole (NIZORAL) 2 % shampoo WASH HAIR WITH MEDICATED SHAMPOO AT LEAST 2X/WEEK - LET SIT ON SCALP AT LEAST 5 MINUTES PRIOR TO RINSING 120 mL 3    lancets (ACCU-CHEK SOFTCLIX LANCETS) Misc Check blood sugar 3 times daily 300 each 3    LANTUS SOLOSTAR U-100 INSULIN glargine 100 units/mL SubQ pen INJECT 20 UNITS INTO THE SKIN ONCE DAILY. 15 each 5    losartan (COZAAR) 25 MG tablet Take 1 tablet (25 mg total) by mouth once  "daily. 90 tablet 3    nicotine polacrilex 2 MG Lozg Take 1 lozenge (2 mg total) by mouth as needed (Use up to 10 lozenges per day in the place of cigarettes). 216 lozenge 0    pen needle, diabetic 32 gauge x 5/32" Ndle Use with Lantus once daily and Humalog 3 times daily. 200 each 5    PROAIR HFA 90 mcg/actuation inhaler Inhale 2 puffs into the lungs every 4 (four) hours as needed for Wheezing. Rescue 18 g 11    secukinumab (COSENTYX PEN, 2 PENS,) 150 mg/mL PnIj Inject 300 mg into the skin every 14 (fourteen) days. 4 mL 3    traMADoL (ULTRAM) 50 mg tablet TAKE 1 TABLET BY MOUTH THREE TIMES A DAY AS NEEDED FOR MODERATE PAIN Strength: 50 mg 21 tablet 0    metFORMIN (GLUCOPHAGE-XR) 500 MG XR 24hr tablet Take 2 tablets (1,000 mg total) by mouth 2 (two) times daily with meals. 360 tablet 1    methotrexate 2.5 MG Tab TAKE 8 TABLETS BY MOUTH EVERY 7 DAYS. (Patient not taking: Reported on 3/15/2023) 96 tablet 2    spironolactone (ALDACTONE) 25 MG tablet Take 1 tablet (25 mg total) by mouth once daily. 30 tablet 11    [DISCONTINUED] cephalexin (KEFTAB) 500 mg tablet Take 500 mg by mouth 4 (four) times daily.       No current facility-administered medications on file prior to visit.       Medications have been reviewed and reconciled with patient at visit today.          Exam:  Vitals:    03/15/23 0959   BP: 132/75   Pulse: 89   Temp: 97.7 °F (36.5 °C)     Weight: 116.8 kg (257 lb 8 oz)   Body mass index is 40.33 kg/m².      BP Readings from Last 3 Encounters:   03/15/23 132/75   03/09/23 122/68   01/25/23 132/69     Wt Readings from Last 3 Encounters:   03/15/23 0959 116.8 kg (257 lb 8 oz)   03/09/23 1321 117.2 kg (258 lb 4.8 oz)   01/25/23 1032 115.1 kg (253 lb 12.8 oz)            Physical Exam  Vitals reviewed.   Constitutional:       Appearance: He is well-developed. He is obese. He is not ill-appearing.      Comments: Unkempt appearance   HENT:      Head: Normocephalic and atraumatic.      Nose: Nose normal. No " congestion.      Mouth/Throat:      Mouth: Mucous membranes are moist.   Eyes:      General: No scleral icterus.     Conjunctiva/sclera: Conjunctivae normal.   Cardiovascular:      Rate and Rhythm: Normal rate and regular rhythm.      Heart sounds: Normal heart sounds. No murmur heard.  Pulmonary:      Effort: Pulmonary effort is normal.      Breath sounds: Normal breath sounds.   Abdominal:      General: Bowel sounds are normal. There is no distension.      Palpations: Abdomen is soft.      Tenderness: There is no abdominal tenderness.   Musculoskeletal:         General: No tenderness. Normal range of motion.      Cervical back: Normal range of motion and neck supple.      Right lower leg: No edema.      Left lower leg: No edema.   Skin:     General: Skin is warm and dry.      Comments: Psoriatic rash to bilateral forearms, dorsum of hands,  pale pink skin with red appearing scabs scattered throughout - scaly, dry skin   Neurological:      General: No focal deficit present.      Mental Status: He is alert and oriented to person, place, and time.   Psychiatric:         Behavior: Behavior normal.       Laboratory Reviewed:   Lab Results   Component Value Date    WBC 7.46 08/23/2022    HGB 15.7 08/23/2022    HCT 47.2 08/23/2022     08/23/2022    CHOL 135 04/11/2022    TRIG 153 (H) 04/11/2022    HDL 35 (L) 04/11/2022    ALT 51 (H) 08/23/2022    AST 61 (H) 08/23/2022     (L) 12/08/2022    K 4.6 12/08/2022    CL 99 12/08/2022    CREATININE 1.2 12/08/2022    BUN 17 12/08/2022    CO2 25 12/08/2022    TSH 1.798 04/11/2022    PSA 0.24 04/11/2022    INR 1.0 08/14/2021    HGBA1C >14.0 (H) 12/08/2022           Health Maintenance  Health Maintenance Topics with due status: Not Due       Topic Last Completion Date    TETANUS VACCINE 02/24/2016    PROSTATE-SPECIFIC ANTIGEN 04/11/2022    Lipid Panel 04/11/2022    Diabetes Urine Screening 12/08/2022    Foot Exam 12/29/2022    High Dose Statin 03/15/2023     Health  Maintenance Due   Topic Date Due    Sign Pain Contract  Never done    Complete Opioid Risk Tool  Never done    Shingles Vaccine (1 of 2) Never done    Colorectal Cancer Screening  10/24/2017    COVID-19 Vaccine (3 - Booster for Pfizer series) 12/02/2021    Hemoglobin A1c  03/08/2023    Eye Exam  03/14/2023       Assessment and Plan:  1. Hyperglycemia due to diabetes mellitus  Assessment & Plan:  Pt states he has not eaten and has not taken medication today      Orders:  -     POCT Glucose, Hand-Held Device    2. Noncompliance with diabetes treatment  Assessment & Plan:  Continue home health services  Encouraged compliance with medication  Provider supplied patient with daily calendar with diabetic meds and when to take - pt states he will place on his refrigerator and by his chair                   -Patient's lab results were reviewed and discussed with patient  -Treatment options and alternatives were discussed with the patient. Patient expressed understanding. Patient was given the opportunity to ask questions and be an active participant in their medical care. Patient had no further questions or concerns at this time.         Future Appointments   Date Time Provider Department Center   3/29/2023  9:30 AM Kaia King LCSW [facility]   3/29/2023 10:00 AM Nkechi Rao NP [facility]   3/30/2023  2:00 PM Joy Hadley DPM [facility] [place]   4/6/2023  9:30 AM PULMONARY LAB 2, [facility]   4/6/2023 10:00 AM PULMONARY LAB 2, [facility]   4/6/2023 10:40 AM Charlene Felix PA-C [facility]  [facility]   4/14/2023 10:30 [facility] [place]   4/25/2023  9:40 AM Kaia Spencer MD [facility]          After visit summary printed and given to patient upon discharge.  Patient goals and care plan are included in After visit summary.    Total medical decision making time was 30 min.    The following  issues were discussed: The primary encounter diagnosis was Hyperglycemia due to diabetes mellitus. A diagnosis of Noncompliance with diabetes treatment was also pertinent to this visit.    Health maintenance needs, recent test results and goals of care discussed with pt and questions answered.           ERENDIRA Murguia, NP-C  [facility] [address] [place] [address]

## 2023-03-15 NOTE — ASSESSMENT & PLAN NOTE
Continue home health services  Encouraged compliance with medication  Provider supplied patient with daily calendar with diabetic meds and when to take - pt states he will place on his refrigerator and by his chair

## 2023-03-15 NOTE — PROGRESS NOTES
"[facility] BEHAVIORAL HEALTH INTAKE    DATE:  3/15/2023  REFERRAL SOURCE:  Brittanie Kaba MD  TYPE OF VISIT:  In person  LENGTH OF SESSION: 45  .  HISTORY OF PRESENTING ILLNESS:  Trey Stevens, a 67 y.o. male with history of diabetes mellitus  morbid obesity.    CHIEF COMPLAINT/REASON FOR ENCOUNTER: Pt presented for initial assessment. Pt's chief complaint includes the following: difficulty managing diabetes; He also reports chronic insomnia and fatigue.     PSYCHIATRIC HISTORY:  Patient does not currently have a psychiatrist.    Patient does not currently have a therapist.     Patient is not on any psychiatric medications. They are not interested in medication changes.  Previous Psychiatric Hospitalizations:  No  History of Trauma:  No  History of Violence:  No  Access to a gun/weapon:  Yes  Previous Suicide Attempts:  No    Risk assessment:  Patient reports no suicidal ideation  Patient reports no homicidal ideation  Patient reports no self-injurious behavior  Patient reports no violent behavior    PSYCHIATRIC FAMILY HISTORY: none    SUBSTANCE ABUSE HISTORY:  Tobacco:  Yes - Patient reports he started smoking "about 4-5 years ago; smokes about 1 pack every 3 days.   Alcohol: infrequent  Illicit Substances: No  Misuse of Prescription Medications:  No    MEDICAL HISTORY:  Past Medical History:   Diagnosis Date    Arthritis     Diabetes mellitus     Diabetes mellitus type 2, uncontrolled 2016    DM (diabetes mellitus) 2015     2017    Gall stones     Obesity     Pneumonia of right middle lobe due to infectious organism 2021    Psoriasis (a type of skin inflammation)     Rheumatoid arthritis of foot          SOCIAL HISTORY (MARRIAGE, EMPLOYMENT, etc.):  Living Situation: Lives alone; on property with his 94 yo mom.   Relationship status ; wife, Rosibel,  about 3 years ago. Was  36 years.   Children/Family: No children. Has a brother and 4 sisters; not close. Sees Mom " "daily.  Supports:Minimal support, mostly from Mom.  Speaks w/ siblings, but not close to any; has few acquaintances.  Education/Vocation: High School Grad; worked as  and in the plant. Retired w/ disability  Orthodoxy/Spirituality: None   Hobbies and Interests: His dog, Patches; his cat, KitKat; hunting, fishing, working on property.     Current social stressors:   Patient denies social stressors. He does have minimal social support from family, is unable to name a sibling he would rely on if he needed help.  He has no close friends.  He may be grieving the loss of his wife, Rosibel, as his sole source of support previously.  Will explore further in future sessions.     Current symptoms:  Depression: insomnia and fatigue.  Anxiety: denies.  Insomnia: frequent night time awakening and non-restful sleep.  Jennifer:  denies.  Psychosis: denies .    MENTAL HEALTH STATUS EXAM  General Appearance:  older than stated age, disheveled, overweight   Speech: normal tone, normal rate, normal pitch, normal volume      Level of Cooperation: cooperative      Thought Processes: normal and logical   Mood: steady      Thought Content: normal, no suicidality, no homicidality, delusions, or paranoia   Affect: congruent and appropriate, blunted   Orientation: Oriented x3   Memory: No memory issues noted   Attention Span & Concentration: intact   Fund of General Knowledge: intact and appropriate to age and level of education   Judgment & Insight: fair   Language  intact     Session Content/Presenting Problem Hx: Met patient; about taking his insulin he says "Sometimes I just don't feel like doing it." He gets up early and starts working on property, picking up debris, cutting grass, etc.  Then he does not want to go inside and check his BS and take his insulin.  He has had many changes just in the last 3-5 years: his wife of 36 years became ill and ; he moved onto his mother's property; he retired on disability after working " "his entire life. He also reports starting smoking cigarettes in just the last few years; he denies ever smoking in his younger years.  He suffers with arthritis pain in his feet and ankles and cannot stand or walk for long periods without pain. Patient reports a constant sense of fatigue. He goes to bed around 10:30 pm and gets up for the day about 5:30 a.m.  He reports waking up every few hours during the night and having no long periods of sustained sleep.  He then "dozes" during the day, but does not usually take a true nap. He has a CPAP machine which he does use at night. Patient appears tired during this session.  He smells strongly of cigarettes.  Patient spends most of his day alone, with his dog and cat, but he does eat meals often with his mom.  He buys groceries, and she will prepare meals. He says he mostly "snacks" during the day.  He drinks water and decaf coffee but is also drinking tea sweetened with splenda. He drinks about a gallon of tea every 3 days. He denies any close relationships. He and his wife had no kids.           STRENGTHS AND LIABILITIES: Strength: Patient accepts guidance/feedback, Liability: Patient has no suport network., Liability: Patient has poor health.    IMPRESSION:   My diagnostic impression is Persistent insomnia with other symptoms [G47.00], as evidenced by patient report of inability to sleep, frequent awakening, drowsy during the day, every day. Possible unresolved grief issues.     TREATMENT GOALS:  Improve restful sleep; encourage smoking cessation; encourage healthy behaviors.    PLAN: In this session a psychosocial evaluation was conducted to get history and determine a treatment plan. CBT and Motivational Interviewing will be utilized in future individual  therapy sessions to increase insight and behavior modification.  Discussed switching to Decaf Tea and purposely trying to take a daytime nap.    NEXT APPOINTMENT: 2 weeks.     "

## 2023-03-16 ENCOUNTER — OUTPATIENT CASE MANAGEMENT (OUTPATIENT)
Dept: ADMINISTRATIVE | Facility: OTHER | Age: 68
End: 2023-03-16
Payer: COMMERCIAL

## 2023-03-16 NOTE — PROGRESS NOTES
Outpatient Care Management  Plan of Care Follow Up Visit    Patient: Trey Stevens  MRN: [id number]  Date of Service: 03/16/2023  Completed by: Louise Aguayo RN  Referral Date: 01/25/2023    Reason for Visit   Patient presents with    OPCM Chart Review    Nursing Assessment    Update Plan Of Care    OPCM RN First Follow-Up Attempt     03/16/23       Brief Summary: OPCM visit completed. Care plan reviewed and updated.   Next Steps: Plan to follow up in approximately one week.   GOVIND Aguayo RN

## 2023-03-17 ENCOUNTER — SPECIALTY PHARMACY (OUTPATIENT)
Dept: PHARMACY | Facility: CLINIC | Age: 68
End: 2023-03-17
Payer: COMMERCIAL

## 2023-03-17 DIAGNOSIS — L40.9 PSORIASIS: ICD-10-CM

## 2023-03-17 DIAGNOSIS — L40.0 PLAQUE PSORIASIS: ICD-10-CM

## 2023-03-17 RX ORDER — SECUKINUMAB 150 MG/ML
300 INJECTION SUBCUTANEOUS
Qty: 4 ML | Refills: 3 | Status: SHIPPED | OUTPATIENT
Start: 2023-03-17 | End: 2023-06-07

## 2023-03-17 NOTE — TELEPHONE ENCOUNTER
Spoke with pt- he injects Cosentyx 300 mg every other Friday.  Next dose due 3/24/23.  Advised pt Osp reached out to provider for refill approval.  Pt aware of OSP follow up once new rx received.

## 2023-03-21 ENCOUNTER — OUTPATIENT CASE MANAGEMENT (OUTPATIENT)
Dept: ADMINISTRATIVE | Facility: OTHER | Age: 68
End: 2023-03-21
Payer: COMMERCIAL

## 2023-03-21 NOTE — PROGRESS NOTES
"Outpatient Care Management  Plan of Care Follow Up Visit    Patient: Trey Stevens  MRN: [id number]  Date of Service: 03/21/2023  Completed by: Louise Aguayo RN  Referral Date: 01/25/2023    Reason for Visit   Patient presents with    OPCM Chart Review    OPCM RN Second Follow-Up Attempt    Nursing Assessment    Update Plan Of Care     03/22/23       Brief Summary: OPCM visit completed. Care plan reviewed and updated.   Next Steps: Plan to follow up with patient in approximately one week.  GOVIND Aguayo RN              3/21/2023 1st attempt to complete Follow-Up  for Outpatient Care Management, recording "the mailbox is full and cannot accept any new messages." Plan to try to reach later in the week. GOVIND Aguayo RN    "

## 2023-03-29 ENCOUNTER — DOCUMENTATION ONLY (OUTPATIENT)
Dept: PRIMARY CARE CLINIC | Facility: CLINIC | Age: 68
End: 2023-03-29
Payer: COMMERCIAL

## 2023-03-29 ENCOUNTER — OUTPATIENT CASE MANAGEMENT (OUTPATIENT)
Dept: ADMINISTRATIVE | Facility: OTHER | Age: 68
End: 2023-03-29
Payer: COMMERCIAL

## 2023-03-29 NOTE — PROGRESS NOTES
"Outpatient Care Management  Plan of Care Follow Up Visit    Patient: Trey Stevens  MRN: [id number]  Date of Service: 03/29/2023  Completed by: Louise Aguayo RN  Referral Date: 01/25/2023    Reason for Visit   Patient presents with    OPCM Chart Review    OPCM RN Third Follow-Up Attempt    Nursing Assessment    Update Plan Of Care     04/03/23       Brief Summary: 04/03/23 OPCM visit completed. Care plan reviewed and updated.  Next Steps: Plan to call again at end of week.  GOVIND Aguayo RN    3/30/2023 2nd attempt to complete Follow-Up  for Outpatient Care Management. Recording "mailbox is full and cannot accept any messages at this time." GOVIND Aguayo RN    03/29/23 Evening- patient returned luli and left voicemail. GOVIND Aguayo RN    3/29/2023 1st attempt to complete Follow-Up  for Outpatient Care Management. Recording "mailbox is full and cannot accept any messages at this time." GOVIND Aguayo RN    "

## 2023-03-29 NOTE — PROGRESS NOTES
Spoke w/patient on phone; patient's appointment was cancelled yesterday. He will return to clinic tomorrow to see N-P.  He is okay with seeing LCSW after that appointment.  Scheduled for March 30th at 3:45p.m.

## 2023-03-30 ENCOUNTER — OFFICE VISIT (OUTPATIENT)
Dept: PRIMARY CARE CLINIC | Facility: CLINIC | Age: 68
End: 2023-03-30
Payer: COMMERCIAL

## 2023-03-30 ENCOUNTER — CLINICAL SUPPORT (OUTPATIENT)
Dept: PRIMARY CARE CLINIC | Facility: CLINIC | Age: 68
End: 2023-03-30
Payer: COMMERCIAL

## 2023-03-30 ENCOUNTER — OFFICE VISIT (OUTPATIENT)
Dept: PODIATRY | Facility: CLINIC | Age: 68
End: 2023-03-30
Payer: COMMERCIAL

## 2023-03-30 VITALS
WEIGHT: 255.5 LBS | HEART RATE: 83 BPM | DIASTOLIC BLOOD PRESSURE: 62 MMHG | SYSTOLIC BLOOD PRESSURE: 111 MMHG | OXYGEN SATURATION: 96 % | HEIGHT: 67 IN | BODY MASS INDEX: 40.1 KG/M2

## 2023-03-30 VITALS — HEIGHT: 67 IN | BODY MASS INDEX: 40.34 KG/M2 | WEIGHT: 257 LBS

## 2023-03-30 DIAGNOSIS — R23.4 SKIN FISSURES: ICD-10-CM

## 2023-03-30 DIAGNOSIS — N18.31 TYPE 2 DIABETES MELLITUS WITH STAGE 3A CHRONIC KIDNEY DISEASE, WITH LONG-TERM CURRENT USE OF INSULIN: ICD-10-CM

## 2023-03-30 DIAGNOSIS — E11.29 TYPE 2 DIABETES MELLITUS WITH MICROALBUMINURIA, WITH LONG-TERM CURRENT USE OF INSULIN: ICD-10-CM

## 2023-03-30 DIAGNOSIS — R80.9 TYPE 2 DIABETES MELLITUS WITH MICROALBUMINURIA, WITH LONG-TERM CURRENT USE OF INSULIN: ICD-10-CM

## 2023-03-30 DIAGNOSIS — E11.49 TYPE II DIABETES MELLITUS WITH NEUROLOGICAL MANIFESTATIONS: Primary | ICD-10-CM

## 2023-03-30 DIAGNOSIS — E66.01 SEVERE OBESITY (BMI 35.0-35.9 WITH COMORBIDITY): ICD-10-CM

## 2023-03-30 DIAGNOSIS — F17.210 NICOTINE DEPENDENCE, CIGARETTES, UNCOMPLICATED: ICD-10-CM

## 2023-03-30 DIAGNOSIS — E11.22 TYPE 2 DIABETES MELLITUS WITH STAGE 3A CHRONIC KIDNEY DISEASE, WITH LONG-TERM CURRENT USE OF INSULIN: ICD-10-CM

## 2023-03-30 DIAGNOSIS — Z79.4 TYPE 2 DIABETES MELLITUS WITH MICROALBUMINURIA, WITH LONG-TERM CURRENT USE OF INSULIN: ICD-10-CM

## 2023-03-30 DIAGNOSIS — E11.65 HYPERGLYCEMIA DUE TO DIABETES MELLITUS: Primary | ICD-10-CM

## 2023-03-30 DIAGNOSIS — Z91.199 NONCOMPLIANCE WITH DIABETES TREATMENT: ICD-10-CM

## 2023-03-30 DIAGNOSIS — Z91.199 NONCOMPLIANCE WITH DIABETES TREATMENT: Primary | ICD-10-CM

## 2023-03-30 DIAGNOSIS — B35.1 DERMATOPHYTOSIS OF NAIL: ICD-10-CM

## 2023-03-30 DIAGNOSIS — Z79.4 TYPE 2 DIABETES MELLITUS WITH STAGE 3A CHRONIC KIDNEY DISEASE, WITH LONG-TERM CURRENT USE OF INSULIN: ICD-10-CM

## 2023-03-30 DIAGNOSIS — L84 CORN OR CALLUS: ICD-10-CM

## 2023-03-30 LAB — GLUCOSE SERPL-MCNC: 249 MG/DL (ref 70–110)

## 2023-03-30 PROCEDURE — 4010F PR ACE/ARB THEARPY RXD/TAKEN: ICD-10-PCS | Mod: CPTII,S$GLB,, | Performed by: PODIATRIST

## 2023-03-30 PROCEDURE — 3074F PR MOST RECENT SYSTOLIC BLOOD PRESSURE < 130 MM HG: ICD-10-PCS | Mod: CPTII,S$GLB,, | Performed by: NURSE PRACTITIONER

## 2023-03-30 PROCEDURE — 3008F BODY MASS INDEX DOCD: CPT | Mod: CPTII,S$GLB,, | Performed by: NURSE PRACTITIONER

## 2023-03-30 PROCEDURE — 1160F PR REVIEW ALL MEDS BY PRESCRIBER/CLIN PHARMACIST DOCUMENTED: ICD-10-PCS | Mod: CPTII,S$GLB,, | Performed by: PODIATRIST

## 2023-03-30 PROCEDURE — 11056 PR TRIM BENIGN HYPERKERATOTIC SKIN LESION,2-4: ICD-10-PCS | Mod: Q9,S$GLB,, | Performed by: PODIATRIST

## 2023-03-30 PROCEDURE — 99499 NO LOS: ICD-10-PCS | Mod: S$GLB,,, | Performed by: PODIATRIST

## 2023-03-30 PROCEDURE — 1159F PR MEDICATION LIST DOCUMENTED IN MEDICAL RECORD: ICD-10-PCS | Mod: CPTII,S$GLB,, | Performed by: NURSE PRACTITIONER

## 2023-03-30 PROCEDURE — 3074F SYST BP LT 130 MM HG: CPT | Mod: CPTII,S$GLB,, | Performed by: NURSE PRACTITIONER

## 2023-03-30 PROCEDURE — 1101F PR PT FALLS ASSESS DOC 0-1 FALLS W/OUT INJ PAST YR: ICD-10-PCS | Mod: CPTII,S$GLB,, | Performed by: NURSE PRACTITIONER

## 2023-03-30 PROCEDURE — 3008F BODY MASS INDEX DOCD: CPT | Mod: CPTII,S$GLB,, | Performed by: PODIATRIST

## 2023-03-30 PROCEDURE — 99499 UNLISTED E&M SERVICE: CPT | Mod: S$GLB,,,

## 2023-03-30 PROCEDURE — 3078F DIAST BP <80 MM HG: CPT | Mod: CPTII,S$GLB,, | Performed by: NURSE PRACTITIONER

## 2023-03-30 PROCEDURE — 99999 PR PBB SHADOW E&M-EST. PATIENT-LVL IV: CPT | Mod: PBBFAC,,, | Performed by: PODIATRIST

## 2023-03-30 PROCEDURE — 1159F MED LIST DOCD IN RCRD: CPT | Mod: CPTII,S$GLB,, | Performed by: NURSE PRACTITIONER

## 2023-03-30 PROCEDURE — 4010F ACE/ARB THERAPY RXD/TAKEN: CPT | Mod: CPTII,S$GLB,, | Performed by: PODIATRIST

## 2023-03-30 PROCEDURE — 3288F FALL RISK ASSESSMENT DOCD: CPT | Mod: CPTII,S$GLB,, | Performed by: NURSE PRACTITIONER

## 2023-03-30 PROCEDURE — 1126F AMNT PAIN NOTED NONE PRSNT: CPT | Mod: CPTII,S$GLB,, | Performed by: PODIATRIST

## 2023-03-30 PROCEDURE — 99999 PR PBB SHADOW E&M-EST. PATIENT-LVL IV: ICD-10-PCS | Mod: PBBFAC,,, | Performed by: PODIATRIST

## 2023-03-30 PROCEDURE — 99213 OFFICE O/P EST LOW 20 MIN: CPT | Mod: S$GLB,,, | Performed by: NURSE PRACTITIONER

## 2023-03-30 PROCEDURE — 1159F MED LIST DOCD IN RCRD: CPT | Mod: CPTII,S$GLB,, | Performed by: PODIATRIST

## 2023-03-30 PROCEDURE — 3288F PR FALLS RISK ASSESSMENT DOCUMENTED: ICD-10-PCS | Mod: CPTII,S$GLB,, | Performed by: NURSE PRACTITIONER

## 2023-03-30 PROCEDURE — 99213 PR OFFICE/OUTPT VISIT, EST, LEVL III, 20-29 MIN: ICD-10-PCS | Mod: S$GLB,,, | Performed by: NURSE PRACTITIONER

## 2023-03-30 PROCEDURE — 3288F PR FALLS RISK ASSESSMENT DOCUMENTED: ICD-10-PCS | Mod: CPTII,S$GLB,, | Performed by: PODIATRIST

## 2023-03-30 PROCEDURE — 11721 PR DEBRIDEMENT OF NAILS, 6 OR MORE: ICD-10-PCS | Mod: Q9,59,S$GLB, | Performed by: PODIATRIST

## 2023-03-30 PROCEDURE — 99499 NO LOS: ICD-10-PCS | Mod: S$GLB,,,

## 2023-03-30 PROCEDURE — 3008F PR BODY MASS INDEX (BMI) DOCUMENTED: ICD-10-PCS | Mod: CPTII,S$GLB,, | Performed by: NURSE PRACTITIONER

## 2023-03-30 PROCEDURE — 3078F PR MOST RECENT DIASTOLIC BLOOD PRESSURE < 80 MM HG: ICD-10-PCS | Mod: CPTII,S$GLB,, | Performed by: NURSE PRACTITIONER

## 2023-03-30 PROCEDURE — 11056 PARNG/CUTG B9 HYPRKR LES 2-4: CPT | Mod: Q9,S$GLB,, | Performed by: PODIATRIST

## 2023-03-30 PROCEDURE — 11721 DEBRIDE NAIL 6 OR MORE: CPT | Mod: Q9,59,S$GLB, | Performed by: PODIATRIST

## 2023-03-30 PROCEDURE — 1160F RVW MEDS BY RX/DR IN RCRD: CPT | Mod: CPTII,S$GLB,, | Performed by: PODIATRIST

## 2023-03-30 PROCEDURE — 82962 GLUCOSE BLOOD TEST: CPT | Mod: S$GLB,,, | Performed by: NURSE PRACTITIONER

## 2023-03-30 PROCEDURE — 4010F ACE/ARB THERAPY RXD/TAKEN: CPT | Mod: CPTII,S$GLB,, | Performed by: NURSE PRACTITIONER

## 2023-03-30 PROCEDURE — 1125F AMNT PAIN NOTED PAIN PRSNT: CPT | Mod: CPTII,S$GLB,, | Performed by: NURSE PRACTITIONER

## 2023-03-30 PROCEDURE — 1157F PR ADVANCE CARE PLAN OR EQUIV PRESENT IN MEDICAL RECORD: ICD-10-PCS | Mod: CPTII,S$GLB,, | Performed by: PODIATRIST

## 2023-03-30 PROCEDURE — 1125F PR PAIN SEVERITY QUANTIFIED, PAIN PRESENT: ICD-10-PCS | Mod: CPTII,S$GLB,, | Performed by: NURSE PRACTITIONER

## 2023-03-30 PROCEDURE — 1157F ADVNC CARE PLAN IN RCRD: CPT | Mod: CPTII,S$GLB,, | Performed by: NURSE PRACTITIONER

## 2023-03-30 PROCEDURE — 82962 POCT GLUCOSE, HAND-HELD DEVICE: ICD-10-PCS | Mod: S$GLB,,, | Performed by: NURSE PRACTITIONER

## 2023-03-30 PROCEDURE — 1101F PT FALLS ASSESS-DOCD LE1/YR: CPT | Mod: CPTII,S$GLB,, | Performed by: NURSE PRACTITIONER

## 2023-03-30 PROCEDURE — 1157F PR ADVANCE CARE PLAN OR EQUIV PRESENT IN MEDICAL RECORD: ICD-10-PCS | Mod: CPTII,S$GLB,, | Performed by: NURSE PRACTITIONER

## 2023-03-30 PROCEDURE — 1101F PT FALLS ASSESS-DOCD LE1/YR: CPT | Mod: CPTII,S$GLB,, | Performed by: PODIATRIST

## 2023-03-30 PROCEDURE — 1159F PR MEDICATION LIST DOCUMENTED IN MEDICAL RECORD: ICD-10-PCS | Mod: CPTII,S$GLB,, | Performed by: PODIATRIST

## 2023-03-30 PROCEDURE — 1101F PR PT FALLS ASSESS DOC 0-1 FALLS W/OUT INJ PAST YR: ICD-10-PCS | Mod: CPTII,S$GLB,, | Performed by: PODIATRIST

## 2023-03-30 PROCEDURE — 99999 PR PBB SHADOW E&M-EST. PATIENT-LVL V: CPT | Mod: PBBFAC,,, | Performed by: NURSE PRACTITIONER

## 2023-03-30 PROCEDURE — 1126F PR PAIN SEVERITY QUANTIFIED, NO PAIN PRESENT: ICD-10-PCS | Mod: CPTII,S$GLB,, | Performed by: PODIATRIST

## 2023-03-30 PROCEDURE — 3288F FALL RISK ASSESSMENT DOCD: CPT | Mod: CPTII,S$GLB,, | Performed by: PODIATRIST

## 2023-03-30 PROCEDURE — 99999 PR PBB SHADOW E&M-EST. PATIENT-LVL V: ICD-10-PCS | Mod: PBBFAC,,, | Performed by: NURSE PRACTITIONER

## 2023-03-30 PROCEDURE — 4010F PR ACE/ARB THEARPY RXD/TAKEN: ICD-10-PCS | Mod: CPTII,S$GLB,, | Performed by: NURSE PRACTITIONER

## 2023-03-30 PROCEDURE — 99499 UNLISTED E&M SERVICE: CPT | Mod: S$GLB,,, | Performed by: PODIATRIST

## 2023-03-30 PROCEDURE — 1157F ADVNC CARE PLAN IN RCRD: CPT | Mod: CPTII,S$GLB,, | Performed by: PODIATRIST

## 2023-03-30 PROCEDURE — 3008F PR BODY MASS INDEX (BMI) DOCUMENTED: ICD-10-PCS | Mod: CPTII,S$GLB,, | Performed by: PODIATRIST

## 2023-03-30 NOTE — ASSESSMENT & PLAN NOTE
Will continue to follow up with patient every 2 weeks until trend noted with improvement in medication compliance and blood glucose readings  Pt is seeing LCSW  Will repeat Hgb A1 C

## 2023-03-30 NOTE — TELEPHONE ENCOUNTER
Specialty Pharmacy - Refill Coordination    Specialty Medication Orders Linked to Encounter      Flowsheet Row Most Recent Value   Medication #1 secukinumab (COSENTYX PEN, 2 PENS,) 150 mg/mL PnIj (Order#[id number], Rx#[id number])     Pt was due to inject Cosentyx 3/24/23. [facility] requested refill from provider but then was unable to get in touch with patient.  Spoke with pt today-  he will receive his shipment tomorrow and inject Cosentyx 2 pens on 3/31 and every other Friday moving forward.  Reviewed importance of adherence and staying in touch with [facility].  Encouraged use of calendar.       Refill Questions - Documented Responses      Flowsheet Row Most Recent Value   Patient Availability and HIPAA Verification    Does patient want to proceed with activity? Yes   HIPAA/medical authority confirmed? Yes   Relationship to patient of person spoken to? Self   Refill Screening Questions    Changes to allergies? No   Changes to medications? No   New conditions since last clinic visit? No   Unplanned office visit, urgent care, ED, or hospital admission in the last 4 weeks? No   How does patient/caregiver feel medication is working? Good   Financial problems or insurance changes? No   How many doses of your specialty medications were missed in the last 4 weeks? 1   Why were doses missed? Busy with other things   Would patient like to speak to a pharmacist? Yes   When does the patient need to receive the medication? 03/31/23   Refill Delivery Questions    How will the patient receive the medication? [facility]   When does the patient need to receive the medication? 03/31/23   Shipping Address Home   Address in [facility] confirmed and updated if neccessary? Yes   Expected Copay ($) 0   Is the patient able to afford the medication copay? Yes   Payment Method zero copay   Days supply of Refill 28   Supplies needed? No supplies needed   Refill activity completed? Yes   Refill activity plan Refill scheduled   Shipment/Pickup Date:  03/30/23            Current Outpatient Medications   Medication Sig    amLODIPine (NORVASC) 5 MG tablet Take 1 tablet (5 mg total) by mouth once daily. For high blood pressure    aspirin 81 MG Chew Take 1 tablet (81 mg total) by mouth once daily.    atorvastatin (LIPITOR) 80 MG tablet Take 1 tablet (80 mg total) by mouth once daily.    blood sugar diagnostic (ACCU-CHEK NOELLE PLUS TEST STRP) Strp Check blood sugar 3 times daily.    blood-glucose meter (ACCU-CHEK NOELLE PLUS METER) Misc Check blood sugar 3 times daily    calcipotriene (DOVONOX) 0.005 % cream APPLY TO AFFECTED AREA(S) TWICE DAILY    clobetasoL (OLUX) 0.05 % Foam AAA of scalp and behind ears twice daily.  Do not use on face, underarms or groin.    dulaglutide (TRULICITY) 4.5 mg/0.5 mL pen injector Inject 4.5 mg into the skin every 7 days.    empagliflozin (JARDIANCE) 25 mg tablet Take 1 tablet (25 mg total) by mouth once daily.    folic acid (FOLVITE) 1 MG tablet TAKE 1 TABLET ONE TIME DAILY    glipiZIDE (GLUCOTROL) 5 MG tablet TAKE 1 TABLET BY MOUTH TWICE A DAY    insulin aspart U-100 (NOVOLOG FLEXPEN U-100 INSULIN) 100 unit/mL (3 mL) InPn pen INJECT 6 UNITS INTO THE SKIN 3 (THREE) TIMES DAILY WITH MEALS.    insulin aspart U-100 (NOVOLOG U-100 INSULIN ASPART) 100 unit/mL injection INJECT 200 UNITS PER CEQUR DEVICE EVERY 3 DAYS    ketoconazole (NIZORAL) 2 % shampoo WASH HAIR WITH MEDICATED SHAMPOO AT LEAST 2X/WEEK - LET SIT ON SCALP AT LEAST 5 MINUTES PRIOR TO RINSING    lancets (ACCU-CHEK SOFTCLIX LANCETS) Misc Check blood sugar 3 times daily    LANTUS SOLOSTAR U-100 INSULIN glargine 100 units/mL SubQ pen INJECT 20 UNITS INTO THE SKIN ONCE DAILY.    losartan (COZAAR) 25 MG tablet Take 1 tablet (25 mg total) by mouth once daily.    metFORMIN (GLUCOPHAGE-XR) 500 MG XR 24hr tablet Take 2 tablets (1,000 mg total) by mouth 2 (two) times daily with meals.    methotrexate 2.5 MG Tab TAKE 8 TABLETS BY MOUTH EVERY 7 DAYS. (Patient not taking: Reported on  "3/15/2023)    nicotine polacrilex 2 MG Lozg Take 1 lozenge (2 mg total) by mouth as needed (Use up to 10 lozenges per day in the place of cigarettes).    pen needle, diabetic 32 gauge x 5/32" Ndle Use with Lantus once daily and Humalog 3 times daily.    PROAIR HFA 90 mcg/actuation inhaler Inhale 2 puffs into the lungs every 4 (four) hours as needed for Wheezing. Rescue    secukinumab (COSENTYX PEN, 2 PENS,) 150 mg/mL PnIj Inject 300 mg into the skin every 14 (fourteen) days.    spironolactone (ALDACTONE) 25 MG tablet Take 1 tablet (25 mg total) by mouth once daily.    traMADoL (ULTRAM) 50 mg tablet TAKE 1 TABLET BY MOUTH THREE TIMES A DAY AS NEEDED FOR MODERATE PAIN Strength: 50 mg   Last reviewed on 3/15/2023  9:59 AM by Leandro Millan MA    Review of patient's allergies indicates:  No Known Allergies Last reviewed on  3/15/2023 9:58 AM by Leandro Millan      Tasks added this encounter   4/21/2023 - Refill Call (Auto Added)   Tasks due within next 3 months   No tasks due.     Maggy Perkins, PharmD  [facility]  [address] [place], [place] [address]erson kwan  [facility] [address]  Phone: [phone]  Fax: [phone]        "

## 2023-03-30 NOTE — PROGRESS NOTES
Individual Psychotherapy (LCSW)  Trey Stevens,  3/30/2023  DATE:  3/30/2023  TYPE OF VISIT:  In person  LENGTH OF SESSION: 30    Therapeutic Intervention: Met with patient for individual psychotherapy.    Chief complaint/reason for encounter: sleep and behavior       Session Content/Presenting Problem:    Met patient for second session.  Patient has bought decaf tea; he has not yet noticed much difference.  He appears better rested today; he does not smell like cigarettes today.  Discussed goals of better sleep, losing weight, and cutting down or quitting smoking.  [place] he was used to sleeping on a boat with very loud engine noise.  Discussed using white noise or TV noise to try to help him sleep.  Patient agrees to try a box fan for white noise and see if that may make a difference.  Discussed eating patterns; patient appears to snack throughout the day - lots of rice. He is using Splenda and trying to avoid sugar in general.  He does not currently get much exercise.      Current symptoms:  Depression: denies.  Anxiety: denies.  Insomnia: frequent night time awakening and non-restful sleep.  Jennifer:  denies.  Psychosis: denies .      Risk parameters:  Patient reports no suicidal ideation  Patient reports no homicidal ideation  Patient reports no self-injurious behavior  Patient reports no violent behavior    MENTAL HEALTH STATUS EXAM  General Appearance:  disheveled, obese   Speech: normal tone, normal rate, normal pitch, normal volume, articulation error      Level of Cooperation: cooperative      Thought Processes: normal and logical   Mood: steady      Thought Content: normal, no suicidality, no homicidality, delusions, or paranoia   Affect: congruent and appropriate   Orientation: Oriented x3   Attention Span & Concentration: intact   Fund of General Knowledge: intact and appropriate to age and level of education   Judgment & Insight: limited     Language  intact        STRENGTHS AND LIABILITIES: Liability: Patient has no suport network., Liability: Patient has poor health.    IMPRESSION:   My diagnostic impression is Persistent insomnia with other symptoms [G47.00], as evidenced by patient report of frequent nighttime awakening and daytime drowsiness.     TREATMENT GOALS (per patient):    Treatment plan:  Target symptoms:  Chronic Insomnia  Why chosen therapy is appropriate versus another modality: relevant to diagnosis, patient responds to this modality  Outcome monitoring methods: self-report, observation  Therapeutic intervention type: behavior modifying psychotherapy    Patient's response to intervention:  The patient's response to intervention is accepting.    Progress toward goals and other mental status changes:  The patient's progress toward goals is limited.    Plan: Pt plans to continue individual psychotherapy Problem-solving Therapy will be utilized in future individual therapy sessions to increase interaction, insight, support, and behavior modification.  Will try box fan for white noise.  Drinking decaf iced tea.     Return to clinic: 2 weeks April 11th

## 2023-03-30 NOTE — PROGRESS NOTES
Trey Stevens  03/30/2023  [id number]    Brittanie Chaparro MD  Patient Care Team:  Brittanie Chaparro MD as PCP - General (Internal Medicine)  Shira Kelly LPN as Care Coordinator  Rachelle Huertas RD, CDE as Dietitian (Diabetes)  Winter Garcia RD, CDE as Dietitian (Diabetes)  Michelle Rossi, RN as Diabetes Educator (Diabetes)  Bee Stinson NP as Nurse Practitioner (Endocrinology)  Charlette Wilcox PA-C as Physician Assistant (Rheumatology)  Aminata Martinez NP as Nurse Practitioner (Pulmonary Disease)  Tigist Cole MD as Consulting Physician (Ophthalmology)  Joy Hadley DPM as Consulting Physician (Podiatry)  Louise Aguayo RN as Outpatient       [facility]      Chief Complaint:  Chief Complaint   Patient presents with    2 week f/u        History of Present Illness:  Returns today for follow up for uncontrolled diabetes  He states he took all of oral medication but not his insulin  Had home health nurse this AM for care of left hip - wound greatly improved  For breakfast he ate 2 wieners, has not eaten other food today    Remembers taking all his medications  He is marking meds off the provided calendar listing all of his diabetic meds    Taking Trulicity weekly on Saturday now    Blood sugars - 192 this AM, 214  States blood sugar has been lower than usual    Feeling okay lately, has been working in the yard + fatigue    Denies fever, chills, URI symptoms, chest pain, SOB, palpitations, vomiting, diarrhea, no gross neuro deficits, urinary symptoms and leg swelling             The following were reviewed: Active problem list, medication list, allergies, family history, social history, and Health Maintenance.     History:  Past Medical History:   Diagnosis Date    Arthritis     Diabetes mellitus     Diabetes mellitus type 2, uncontrolled 7/19/2016    DM (diabetes mellitus) 2015     09/04/2017    Gall stones     Obesity     Pneumonia of right middle  lobe due to infectious organism 8/13/2021    Psoriasis (a type of skin inflammation)     Rheumatoid arthritis of foot      Past Surgical History:   Procedure Laterality Date    APPENDECTOMY      CHOLECYSTECTOMY       Family History   Problem Relation Age of Onset    Cancer Mother     Hypertension Mother     Diabetes Mother     Cataracts Mother     Stroke Father     Psoriasis Neg Hx      Patient Active Problem List   Diagnosis    Psoriatic arthritis, destructive type    Vitamin D deficiency    Multiple lung nodules on CT    Immunosuppressed status    Long-term use of immunosuppressant medication    Mixed type COPD (chronic obstructive pulmonary disease)    Type 2 diabetes mellitus with chronic kidney disease, with long-term current use of insulin    Hypertension associated with diabetes    Vitiligo    SCHUYLER on CPAP    Class 2 severe obesity due to excess calories with serious comorbidity and body mass index (BMI) of 39.0 to 39.9 in adult    Hyperlipidemia associated with type 2 diabetes mellitus    Elevated liver enzymes    Nicotine dependence, cigarettes, uncomplicated    Dependence on nocturnal oxygen therapy    Urinary retention    Calcified granuloma of lung    Calcification of aorta    Type 2 diabetes mellitus with microalbuminuria, with long-term current use of insulin    Hyperglycemia due to diabetes mellitus    Noncompliance with diabetes treatment     Review of patient's allergies indicates:  No Known Allergies    Medications:  Current Outpatient Medications on File Prior to Visit   Medication Sig Dispense Refill    amLODIPine (NORVASC) 5 MG tablet Take 1 tablet (5 mg total) by mouth once daily. For high blood pressure 90 tablet 3    aspirin 81 MG Chew Take 1 tablet (81 mg total) by mouth once daily. 100 tablet 0    atorvastatin (LIPITOR) 80 MG tablet Take 1 tablet (80 mg total) by mouth once daily. 90 tablet 3    blood sugar diagnostic (ACCU-CHEK NOELLE PLUS TEST STRP) Strp Check blood sugar 3 times daily. 300  "each 3    blood-glucose meter (ACCU-CHEK NOELLE PLUS METER) Misc Check blood sugar 3 times daily 1 each 0    calcipotriene (DOVONOX) 0.005 % cream APPLY TO AFFECTED AREA(S) TWICE DAILY 60 g 4    clobetasoL (OLUX) 0.05 % Foam AAA of scalp and behind ears twice daily.  Do not use on face, underarms or groin. 100 g 3    dulaglutide (TRULICITY) 4.5 mg/0.5 mL pen injector Inject 4.5 mg into the skin every 7 days. 4 pen 5    empagliflozin (JARDIANCE) 25 mg tablet Take 1 tablet (25 mg total) by mouth once daily. 90 tablet 1    folic acid (FOLVITE) 1 MG tablet TAKE 1 TABLET ONE TIME DAILY 90 tablet 1    glipiZIDE (GLUCOTROL) 5 MG tablet TAKE 1 TABLET BY MOUTH TWICE A  tablet 1    insulin aspart U-100 (NOVOLOG FLEXPEN U-100 INSULIN) 100 unit/mL (3 mL) InPn pen INJECT 6 UNITS INTO THE SKIN 3 (THREE) TIMES DAILY WITH MEALS. 15 each 1    insulin aspart U-100 (NOVOLOG U-100 INSULIN ASPART) 100 unit/mL injection INJECT 200 UNITS PER CEQUR DEVICE EVERY 3 DAYS 60 mL 1    ketoconazole (NIZORAL) 2 % shampoo WASH HAIR WITH MEDICATED SHAMPOO AT LEAST 2X/WEEK - LET SIT ON SCALP AT LEAST 5 MINUTES PRIOR TO RINSING 120 mL 3    lancets (ACCU-CHEK SOFTCLIX LANCETS) Misc Check blood sugar 3 times daily 300 each 3    LANTUS SOLOSTAR U-100 INSULIN glargine 100 units/mL SubQ pen INJECT 20 UNITS INTO THE SKIN ONCE DAILY. 15 each 5    losartan (COZAAR) 25 MG tablet Take 1 tablet (25 mg total) by mouth once daily. 90 tablet 3    metFORMIN (GLUCOPHAGE-XR) 500 MG XR 24hr tablet Take 2 tablets (1,000 mg total) by mouth 2 (two) times daily with meals. 360 tablet 1    methotrexate 2.5 MG Tab TAKE 8 TABLETS BY MOUTH EVERY 7 DAYS. 96 tablet 2    nicotine polacrilex 2 MG Lozg Take 1 lozenge (2 mg total) by mouth as needed (Use up to 10 lozenges per day in the place of cigarettes). 216 lozenge 0    pen needle, diabetic 32 gauge x 5/32" Ndle Use with Lantus once daily and Humalog 3 times daily. 200 each 5    PROAIR HFA 90 mcg/actuation inhaler Inhale " 2 puffs into the lungs every 4 (four) hours as needed for Wheezing. Rescue 18 g 11    secukinumab (COSENTYX PEN, 2 PENS,) 150 mg/mL PnIj Inject 300 mg into the skin every 14 (fourteen) days. 4 mL 3    spironolactone (ALDACTONE) 25 MG tablet Take 1 tablet (25 mg total) by mouth once daily. 30 tablet 11    traMADoL (ULTRAM) 50 mg tablet TAKE 1 TABLET BY MOUTH THREE TIMES A DAY AS NEEDED FOR MODERATE PAIN Strength: 50 mg 21 tablet 0     No current facility-administered medications on file prior to visit.       Medications have been reviewed and reconciled with patient at visit today.          Exam:  Vitals:    03/30/23 1508   BP: 111/62   Pulse: 83     Weight: 115.9 kg (255 lb 8 oz)   Body mass index is 40.02 kg/m².      BP Readings from Last 3 Encounters:   03/30/23 111/62   03/15/23 132/75   03/09/23 122/68     Wt Readings from Last 3 Encounters:   03/30/23 1508 115.9 kg (255 lb 8 oz)   03/30/23 1349 116.6 kg (257 lb)   03/15/23 0959 116.8 kg (257 lb 8 oz)            Physical Exam  Vitals reviewed.   Constitutional:       Appearance: He is well-developed. He is obese. He is not ill-appearing.      Comments: Unkempt appearance   HENT:      Head: Normocephalic and atraumatic.      Nose: Nose normal. No congestion.      Mouth/Throat:      Mouth: Mucous membranes are moist.   Eyes:      General: No scleral icterus.     Conjunctiva/sclera: Conjunctivae normal.   Cardiovascular:      Rate and Rhythm: Normal rate and regular rhythm.      Heart sounds: Normal heart sounds. No murmur heard.  Pulmonary:      Effort: Pulmonary effort is normal.      Breath sounds: Normal breath sounds.   Abdominal:      General: Bowel sounds are normal. There is no distension.      Palpations: Abdomen is soft.      Tenderness: There is no abdominal tenderness.   Musculoskeletal:         General: No tenderness. Normal range of motion.      Cervical back: Normal range of motion and neck supple.      Right lower leg: No edema.      Left lower leg:  No edema.   Skin:     General: Skin is warm and dry.      Comments: Psoriatic rash to bilateral forearms, dorsum of hands,  pale pink skin with red appearing scabs scattered throughout - scaly, dry skin   Neurological:      General: No focal deficit present.      Mental Status: He is alert and oriented to person, place, and time.   Psychiatric:         Behavior: Behavior normal.         Thought Content: Thought content normal.       Laboratory Reviewed:   Lab Results   Component Value Date    WBC 7.46 08/23/2022    HGB 15.7 08/23/2022    HCT 47.2 08/23/2022     08/23/2022    CHOL 135 04/11/2022    TRIG 153 (H) 04/11/2022    HDL 35 (L) 04/11/2022    ALT 51 (H) 08/23/2022    AST 61 (H) 08/23/2022     (L) 12/08/2022    K 4.6 12/08/2022    CL 99 12/08/2022    CREATININE 1.2 12/08/2022    BUN 17 12/08/2022    CO2 25 12/08/2022    TSH 1.798 04/11/2022    PSA 0.24 04/11/2022    INR 1.0 08/14/2021    HGBA1C >14.0 (H) 12/08/2022           Health Maintenance  Health Maintenance Topics with due status: Not Due       Topic Last Completion Date    TETANUS VACCINE 02/24/2016    PROSTATE-SPECIFIC ANTIGEN 04/11/2022    Lipid Panel 04/11/2022    Diabetes Urine Screening 12/08/2022    Foot Exam 12/29/2022    High Dose Statin 03/30/2023     Health Maintenance Due   Topic Date Due    Shingles Vaccine (1 of 2) Never done    Colorectal Cancer Screening  10/24/2017    COVID-19 Vaccine (3 - Booster for Pfizer series) 12/02/2021    Hemoglobin A1c  03/08/2023    Eye Exam  03/14/2023       Assessment and Plan:  1. Hyperglycemia due to diabetes mellitus  Assessment & Plan:  Reported CBG reading are improved from last visit  Encouraged appropriate diabetic diet  He is managing his medications more closely  No mention of noncompliance    Orders:  -     POCT Glucose, Hand-Held Device    2. Type 2 diabetes mellitus with stage 3a chronic kidney disease, with long-term current use of insulin    3. Type 2 diabetes mellitus with  microalbuminuria, with long-term current use of insulin    4. Noncompliance with diabetes treatment  Assessment & Plan:  Will continue to follow up with patient every 2 weeks until trend noted with improvement in medication compliance and blood glucose readings  Pt is seeing LCSW  Will repeat Hgb A1 C                   -Patient's lab results were reviewed and discussed with patient  -Treatment options and alternatives were discussed with the patient. Patient expressed understanding. Patient was given the opportunity to ask questions and be an active participant in their medical care. Patient had no further questions or concerns at this time.         Future Appointments   Date Time Provider Department Center   4/6/2023  9:30 AM PULMONARY LAB 2, [facility]   4/6/2023 10:00 AM PULMONARY LAB 2, [facility]   4/6/2023 10:40 AM Charlene Felix PA-C [facility]   4/11/2023  1:00 PM Nkechi Rao NP [facility]   4/14/2023 10:30 AM Bee Stinson NP [facility] [place]   4/25/2023  9:40 AM Kaia Spencer MD [facility]   7/6/2023  1:30 PM Joy Hadley DPM [facility]  [place]          After visit summary printed and given to patient upon discharge.  Patient goals and care plan are included in After visit summary.    Total medical decision making time was 20 min.    The following issues were discussed: The primary encounter diagnosis was Hyperglycemia due to diabetes mellitus. Diagnoses of Type 2 diabetes mellitus with stage 3a chronic kidney disease, with long-term current use of insulin, Type 2 diabetes mellitus with microalbuminuria, with long-term current use of insulin, and Noncompliance with diabetes treatment were also pertinent to this visit.    Health maintenance needs, recent test results and goals of care discussed with pt and questions answered.           ERENDIRA Murguia, NP-C  [facility] [address], [place] [address]

## 2023-03-30 NOTE — ASSESSMENT & PLAN NOTE
Reported CBG reading are improved from last visit  Encouraged appropriate diabetic diet  He is managing his medications more closely  No mention of noncompliance

## 2023-04-05 NOTE — PROGRESS NOTES
Subjective:     Patient ID: Trey Stevens is a 68 y.o. male.    Chief Complaint: Nail Care (No c/o pain, wears tennis shoes with socks, diabetic Pt, last seen on 03/15/23 with Nkechi Rao NP)      Trey is a 68 y.o. male who presents to the clinic for evaluation and treatment of high risk feet. Trey has a past medical history of Arthritis, Diabetes mellitus, Diabetes mellitus type 2, uncontrolled (7/19/2016), DM (diabetes mellitus) (2015), Gall stones, Obesity, Pneumonia of right middle lobe due to infectious organism (8/13/2021), Psoriasis (a type of skin inflammation), and Rheumatoid arthritis of foot. The patient's chief complaint is long, thick toenails. This patient has documented high risk feet requiring routine maintenance secondary to diabetes mellitis and those secondary complications of diabetes, as mentioned..    PCP: Brittanie Kaba MD    Date Last Seen by PCP: 03/15/2023    Current shoe gear:  Affected Foot: Casual shoes     Unaffected Foot: Casual shoes    Hemoglobin A1C   Date Value Ref Range Status   12/08/2022 >14.0 (H) 4.0 - 5.6 % Final     Comment:     ADA Screening Guidelines:  5.7-6.4%  Consistent with prediabetes  >or=6.5%  Consistent with diabetes    High levels of fetal hemoglobin interfere with the HbA1C  assay. Heterozygous hemoglobin variants (HbS, HgC, etc)do  not significantly interfere with this assay.   However, presence of multiple variants may affect accuracy.     08/01/2022 >14.0 (H) 4.0 - 5.6 % Final     Comment:     ADA Screening Guidelines:  5.7-6.4%  Consistent with prediabetes  >or=6.5%  Consistent with diabetes    High levels of fetal hemoglobin interfere with the HbA1C  assay. Heterozygous hemoglobin variants (HbS, HgC, etc)do  not significantly interfere with this assay.   However, presence of multiple variants may affect accuracy.     04/11/2022 11.1 (H) 4.0 - 5.6 % Final     Comment:     ADA Screening Guidelines:  5.7-6.4%  Consistent with prediabetes  >or=6.5%   Consistent with diabetes    High levels of fetal hemoglobin interfere with the HbA1C  assay. Heterozygous hemoglobin variants (HbS, HgC, etc)do  not significantly interfere with this assay.   However, presence of multiple variants may affect accuracy.         Patient Active Problem List   Diagnosis    Psoriatic arthritis, destructive type    Vitamin D deficiency    Multiple lung nodules on CT    Immunosuppressed status    Long-term use of immunosuppressant medication    Mixed type COPD (chronic obstructive pulmonary disease)    Type 2 diabetes mellitus with chronic kidney disease, with long-term current use of insulin    Hypertension associated with diabetes    Vitiligo    SCHUYLER on CPAP    Class 2 severe obesity due to excess calories with serious comorbidity and body mass index (BMI) of 39.0 to 39.9 in adult    Hyperlipidemia associated with type 2 diabetes mellitus    Elevated liver enzymes    Nicotine dependence, cigarettes, uncomplicated    Dependence on nocturnal oxygen therapy    Urinary retention    Calcified granuloma of lung    Calcification of aorta    Type 2 diabetes mellitus with microalbuminuria, with long-term current use of insulin    Hyperglycemia due to diabetes mellitus    Noncompliance with diabetes treatment       Medication List with Changes/Refills   Current Medications    AMLODIPINE (NORVASC) 5 MG TABLET    Take 1 tablet (5 mg total) by mouth once daily. For high blood pressure    ASPIRIN 81 MG CHEW    Take 1 tablet (81 mg total) by mouth once daily.    ATORVASTATIN (LIPITOR) 80 MG TABLET    Take 1 tablet (80 mg total) by mouth once daily.    BLOOD SUGAR DIAGNOSTIC (ACCU-CHEK NOELLE PLUS TEST STRP) STRP    Check blood sugar 3 times daily.    BLOOD-GLUCOSE METER (ACCU-CHEK NOELLE PLUS METER) MISC    Check blood sugar 3 times daily    CALCIPOTRIENE (DOVONOX) 0.005 % CREAM    APPLY TO AFFECTED AREA(S) TWICE DAILY    CLOBETASOL (OLUX) 0.05 % FOAM    AAA of scalp and behind ears twice daily.  Do not  "use on face, underarms or groin.    DULAGLUTIDE (TRULICITY) 4.5 MG/0.5 ML PEN INJECTOR    Inject 4.5 mg into the skin every 7 days.    EMPAGLIFLOZIN (JARDIANCE) 25 MG TABLET    Take 1 tablet (25 mg total) by mouth once daily.    FOLIC ACID (FOLVITE) 1 MG TABLET    TAKE 1 TABLET ONE TIME DAILY    GLIPIZIDE (GLUCOTROL) 5 MG TABLET    TAKE 1 TABLET BY MOUTH TWICE A DAY    INSULIN ASPART U-100 (NOVOLOG FLEXPEN U-100 INSULIN) 100 UNIT/ML (3 ML) INPN PEN    INJECT 6 UNITS INTO THE SKIN 3 (THREE) TIMES DAILY WITH MEALS.    INSULIN ASPART U-100 (NOVOLOG U-100 INSULIN ASPART) 100 UNIT/ML INJECTION    INJECT 200 UNITS PER CEQUR DEVICE EVERY 3 DAYS    KETOCONAZOLE (NIZORAL) 2 % SHAMPOO    WASH HAIR WITH MEDICATED SHAMPOO AT LEAST 2X/WEEK - LET SIT ON SCALP AT LEAST 5 MINUTES PRIOR TO RINSING    LANCETS (ACCU-CHEK SOFTCLIX LANCETS) MISC    Check blood sugar 3 times daily    LANTUS SOLOSTAR U-100 INSULIN GLARGINE 100 UNITS/ML SUBQ PEN    INJECT 20 UNITS INTO THE SKIN ONCE DAILY.    LOSARTAN (COZAAR) 25 MG TABLET    Take 1 tablet (25 mg total) by mouth once daily.    METFORMIN (GLUCOPHAGE-XR) 500 MG XR 24HR TABLET    Take 2 tablets (1,000 mg total) by mouth 2 (two) times daily with meals.    METHOTREXATE 2.5 MG TAB    TAKE 8 TABLETS BY MOUTH EVERY 7 DAYS.    NICOTINE POLACRILEX 2 MG LOZG    Take 1 lozenge (2 mg total) by mouth as needed (Use up to 10 lozenges per day in the place of cigarettes).    PEN NEEDLE, DIABETIC 32 GAUGE X 5/32" NDLE    Use with Lantus once daily and Humalog 3 times daily.    PROAIR HFA 90 MCG/ACTUATION INHALER    Inhale 2 puffs into the lungs every 4 (four) hours as needed for Wheezing. Rescue    SECUKINUMAB (COSENTYX PEN, 2 PENS,) 150 MG/ML PNIJ    Inject 300 mg into the skin every 14 (fourteen) days.    SPIRONOLACTONE (ALDACTONE) 25 MG TABLET    Take 1 tablet (25 mg total) by mouth once daily.    TRAMADOL (ULTRAM) 50 MG TABLET    TAKE 1 TABLET BY MOUTH THREE TIMES A DAY AS NEEDED FOR MODERATE PAIN " Strength: 50 mg       Review of patient's allergies indicates:  No Known Allergies    Past Surgical History:   Procedure Laterality Date    APPENDECTOMY      CHOLECYSTECTOMY         Family History   Problem Relation Age of Onset    Cancer Mother     Hypertension Mother     Diabetes Mother     Cataracts Mother     Stroke Father     Psoriasis Neg Hx        Social History     Socioeconomic History    Marital status:    Tobacco Use    Smoking status: Former     Packs/day: 0.50     Years: 28.00     Pack years: 14.00     Types: Cigarettes     Start date:      Quit date: 2022     Years since quittin.3    Smokeless tobacco: Never    Tobacco comments:     Quit smoking 1 mo ago (2022)   Substance and Sexual Activity    Alcohol use: No     Alcohol/week: 0.0 standard drinks    Drug use: No    Sexual activity: Never     Social Determinants of Health     Financial Resource Strain: Low Risk     Difficulty of Paying Living Expenses: Not hard at all   Food Insecurity: No Food Insecurity    Worried About Running Out of Food in the Last Year: Never true    Ran Out of Food in the Last Year: Never true   Transportation Needs: No Transportation Needs    Lack of Transportation (Medical): No    Lack of Transportation (Non-Medical): No   Physical Activity: Inactive    Days of Exercise per Week: 0 days    Minutes of Exercise per Session: 0 min   Stress: No Stress Concern Present    Feeling of Stress : Not at all   Social Connections: Moderately Isolated    Frequency of Communication with Friends and Family: Twice a week    Frequency of Social Gatherings with Friends and Family: More than three times a week    Attends [employer] Services: More than 4 times per year    Active Member of Clubs or Organizations: No    Attends Club or Organization Meetings: Never    Marital Status:    Housing Stability: Low Risk     Unable to Pay for Housing in the Last Year: No    Number of Places Lived in the Last Year: 1     "Unstable Housing in the Last Year: No       Vitals:    03/30/23 1349   Weight: 116.6 kg (257 lb)   Height: 5' 7" (1.702 m)   PainSc: 0-No pain   PainLoc: Foot       Hemoglobin A1C   Date Value Ref Range Status   12/08/2022 >14.0 (H) 4.0 - 5.6 % Final     Comment:     ADA Screening Guidelines:  5.7-6.4%  Consistent with prediabetes  >or=6.5%  Consistent with diabetes    High levels of fetal hemoglobin interfere with the HbA1C  assay. Heterozygous hemoglobin variants (HbS, HgC, etc)do  not significantly interfere with this assay.   However, presence of multiple variants may affect accuracy.     08/01/2022 >14.0 (H) 4.0 - 5.6 % Final     Comment:     ADA Screening Guidelines:  5.7-6.4%  Consistent with prediabetes  >or=6.5%  Consistent with diabetes    High levels of fetal hemoglobin interfere with the HbA1C  assay. Heterozygous hemoglobin variants (HbS, HgC, etc)do  not significantly interfere with this assay.   However, presence of multiple variants may affect accuracy.     04/11/2022 11.1 (H) 4.0 - 5.6 % Final     Comment:     ADA Screening Guidelines:  5.7-6.4%  Consistent with prediabetes  >or=6.5%  Consistent with diabetes    High levels of fetal hemoglobin interfere with the HbA1C  assay. Heterozygous hemoglobin variants (HbS, HgC, etc)do  not significantly interfere with this assay.   However, presence of multiple variants may affect accuracy.         Review of Systems   Constitutional:  Negative for chills and fever.   Respiratory:  Negative for shortness of breath.    Cardiovascular:  Negative for chest pain, palpitations, orthopnea, claudication and leg swelling.   Gastrointestinal:  Negative for diarrhea, nausea and vomiting.   Musculoskeletal:  Negative for joint pain.   Skin:  Negative for rash.   Neurological:  Positive for tingling and sensory change.   Psychiatric/Behavioral: Negative.             Objective:      PHYSICAL EXAM: Apperance: Alert and orient in no distress,well developed, and with good " attention to grooming and body habits  Patient presents ambulating in extra depth shoes.   LOWER EXTREMITY EXAM:  VASCULAR: Dorsalis pedis pulses 0/4 bilateral and Posterior Tibial pulses 1/4 bilateral. Capillary fill time <4 seconds bilateral. Mild edema observed bilateral. Varicosities present bilateral. Skin temperature of the lower extremities is warm to warm, proximal to distal. Hair growth absent bilateral.  DERMATOLOGICAL: No skin rashes, subcutaneous nodules, lesions, or ulcers observed bilateral. Nails 1,2,3,4,5 bilateral elongated, thickened, and discolored with subungual debris. Webspaces 1,2,3,4 clean, dry and without evidence of break in skin integrity bilateral. Moderate dry and hyperkeratotic tissue noted to bilateral heels and medial 1st MPJ. Skin fissures noted to bilateral heels. No erythema, drainage, or increased temp noted to area.   NEUROLOGICAL: Light touch, sharp-dull, proprioception all present and equal bilaterally.  Vibratory sensation absent at bilateral hallux. Protective sensation absent at 6/10 sites as tested with a Irwin-Rusty 5.07 monofilament.   MUSCULOSKELETAL: Muscle strength is 5/5 for foot inverters, everters, plantarflexors, and dorsiflexors. Muscle tone is normal.       Assessment:       ICD-10-CM ICD-9-CM   1. Type II diabetes mellitus with neurological manifestations  E11.49 250.60   2. Dermatophytosis of nail  B35.1 110.1   3. Corn or callus  L84 700   4. Skin fissures  R23.4 709.8   5. Severe obesity (BMI 35.0-35.9 with comorbidity)  E66.01 278.01    Z68.35 V85.35         Plan:   Type II diabetes mellitus with neurological manifestations    Dermatophytosis of nail    Corn or callus    Skin fissures    Severe obesity (BMI 35.0-35.9 with comorbidity)        I counseled the patient on his conditions, regarding findings of my examination, my impressions, and usual treatment plan.   Appointment spent on education about the diabetic foot, neuropathy, and prevention of limb  loss.  Shoe inspection. Diabetic Foot Education. Patient reminded of the importance of good nutrition and blood sugar control to help prevent podiatric complications of diabetes. Patient instructed on proper foot hygeine. We discussed wearing proper shoe gear, daily foot inspections, never walking without protective shoe gear, never putting sharp instruments to feet.    With patient's permission, nails 1-5 bilateral were debrided/trimmed in length and thickness aggressively to their soft tissue attachment mechanically and with electric , removing all offending nail and debris. Patient relates relief following the procedure.  With patient's permission, bilateral callus trimmed in thickness with #15 blade in thickness without incident.   Patient  will continue to monitor the areas daily, inspect feet, wear protective shoe gear when ambulatory, moisturizer to maintain skin integrity. Patient reminded of the importance of good nutrition and blood sugar control to help prevent podiatric complications of diabetes.  Patient to return 3 months or sooner if needed.      Joy Hadley DPM  [facility]

## 2023-04-06 ENCOUNTER — OUTPATIENT CASE MANAGEMENT (OUTPATIENT)
Dept: ADMINISTRATIVE | Facility: OTHER | Age: 68
End: 2023-04-06
Payer: MEDICARE

## 2023-04-06 ENCOUNTER — TELEPHONE (OUTPATIENT)
Dept: PULMONOLOGY | Facility: CLINIC | Age: 68
End: 2023-04-06
Payer: MEDICARE

## 2023-04-06 NOTE — PROGRESS NOTES
Outpatient Care Management  Plan of Care Follow Up Visit    Patient: Trey Stevens  MRN: [id number]  Date of Service: 04/06/2023  Completed by: Louise Aguayo RN  Referral Date: 01/25/2023    Reason for Visit   Patient presents with    OPCM Chart Review    Nursing Assessment    Update Plan Of Care    OPCM RN First Follow-Up Attempt     04/06/23       Brief Summary: OPCM visit completed. Care plan and reviewed and updated.   Next Steps: plan to follow up in approximately one week.   GOVIND Aguayo RN

## 2023-04-11 ENCOUNTER — TELEPHONE (OUTPATIENT)
Dept: PRIMARY CARE CLINIC | Facility: CLINIC | Age: 68
End: 2023-04-11
Payer: MEDICARE

## 2023-04-11 ENCOUNTER — OFFICE VISIT (OUTPATIENT)
Dept: PRIMARY CARE CLINIC | Facility: CLINIC | Age: 68
End: 2023-04-11
Payer: MEDICARE

## 2023-04-11 ENCOUNTER — LAB VISIT (OUTPATIENT)
Dept: LAB | Facility: HOSPITAL | Age: 68
End: 2023-04-11
Attending: NURSE PRACTITIONER
Payer: MEDICARE

## 2023-04-11 ENCOUNTER — CLINICAL SUPPORT (OUTPATIENT)
Dept: PRIMARY CARE CLINIC | Facility: CLINIC | Age: 68
End: 2023-04-11
Payer: MEDICARE

## 2023-04-11 VITALS
HEART RATE: 92 BPM | BODY MASS INDEX: 40.55 KG/M2 | DIASTOLIC BLOOD PRESSURE: 60 MMHG | WEIGHT: 258.88 LBS | OXYGEN SATURATION: 95 % | SYSTOLIC BLOOD PRESSURE: 120 MMHG

## 2023-04-11 DIAGNOSIS — Z91.199 NONCOMPLIANCE WITH DIABETES TREATMENT: Primary | ICD-10-CM

## 2023-04-11 DIAGNOSIS — F17.210 NICOTINE DEPENDENCE, CIGARETTES, UNCOMPLICATED: ICD-10-CM

## 2023-04-11 DIAGNOSIS — E11.59 HYPERTENSION ASSOCIATED WITH DIABETES: ICD-10-CM

## 2023-04-11 DIAGNOSIS — E11.29 TYPE 2 DIABETES MELLITUS WITH MICROALBUMINURIA, WITH LONG-TERM CURRENT USE OF INSULIN: ICD-10-CM

## 2023-04-11 DIAGNOSIS — N18.31 TYPE 2 DIABETES MELLITUS WITH STAGE 3A CHRONIC KIDNEY DISEASE, WITH LONG-TERM CURRENT USE OF INSULIN: Primary | ICD-10-CM

## 2023-04-11 DIAGNOSIS — Z91.199 NONCOMPLIANCE WITH DIABETES TREATMENT: ICD-10-CM

## 2023-04-11 DIAGNOSIS — N18.31 TYPE 2 DIABETES MELLITUS WITH STAGE 3A CHRONIC KIDNEY DISEASE, WITH LONG-TERM CURRENT USE OF INSULIN: ICD-10-CM

## 2023-04-11 DIAGNOSIS — E11.22 TYPE 2 DIABETES MELLITUS WITH STAGE 3A CHRONIC KIDNEY DISEASE, WITH LONG-TERM CURRENT USE OF INSULIN: ICD-10-CM

## 2023-04-11 DIAGNOSIS — Z79.4 TYPE 2 DIABETES MELLITUS WITH MICROALBUMINURIA, WITH LONG-TERM CURRENT USE OF INSULIN: ICD-10-CM

## 2023-04-11 DIAGNOSIS — E11.22 TYPE 2 DIABETES MELLITUS WITH STAGE 3A CHRONIC KIDNEY DISEASE, WITH LONG-TERM CURRENT USE OF INSULIN: Primary | ICD-10-CM

## 2023-04-11 DIAGNOSIS — Z79.4 TYPE 2 DIABETES MELLITUS WITH STAGE 3A CHRONIC KIDNEY DISEASE, WITH LONG-TERM CURRENT USE OF INSULIN: ICD-10-CM

## 2023-04-11 DIAGNOSIS — I15.2 HYPERTENSION ASSOCIATED WITH DIABETES: ICD-10-CM

## 2023-04-11 DIAGNOSIS — R80.9 TYPE 2 DIABETES MELLITUS WITH MICROALBUMINURIA, WITH LONG-TERM CURRENT USE OF INSULIN: ICD-10-CM

## 2023-04-11 DIAGNOSIS — E11.65 HYPERGLYCEMIA DUE TO DIABETES MELLITUS: ICD-10-CM

## 2023-04-11 DIAGNOSIS — Z79.4 TYPE 2 DIABETES MELLITUS WITH STAGE 3A CHRONIC KIDNEY DISEASE, WITH LONG-TERM CURRENT USE OF INSULIN: Primary | ICD-10-CM

## 2023-04-11 LAB
ESTIMATED AVG GLUCOSE: 352 MG/DL (ref 68–131)
GLUCOSE SERPL-MCNC: >500 MG/DL (ref 70–110)
HBA1C MFR BLD: 13.9 % (ref 4–5.6)

## 2023-04-11 PROCEDURE — 1126F PR PAIN SEVERITY QUANTIFIED, NO PAIN PRESENT: ICD-10-PCS | Mod: CPTII,S$GLB,, | Performed by: NURSE PRACTITIONER

## 2023-04-11 PROCEDURE — 1159F PR MEDICATION LIST DOCUMENTED IN MEDICAL RECORD: ICD-10-PCS | Mod: CPTII,S$GLB,, | Performed by: NURSE PRACTITIONER

## 2023-04-11 PROCEDURE — 3288F PR FALLS RISK ASSESSMENT DOCUMENTED: ICD-10-PCS | Mod: CPTII,S$GLB,, | Performed by: NURSE PRACTITIONER

## 2023-04-11 PROCEDURE — 3008F BODY MASS INDEX DOCD: CPT | Mod: CPTII,S$GLB,, | Performed by: NURSE PRACTITIONER

## 2023-04-11 PROCEDURE — 1159F MED LIST DOCD IN RCRD: CPT | Mod: CPTII,S$GLB,, | Performed by: NURSE PRACTITIONER

## 2023-04-11 PROCEDURE — 3074F SYST BP LT 130 MM HG: CPT | Mod: CPTII,S$GLB,, | Performed by: NURSE PRACTITIONER

## 2023-04-11 PROCEDURE — 4010F PR ACE/ARB THEARPY RXD/TAKEN: ICD-10-PCS | Mod: CPTII,S$GLB,, | Performed by: NURSE PRACTITIONER

## 2023-04-11 PROCEDURE — 99499 NO LOS: ICD-10-PCS | Mod: S$GLB,,,

## 2023-04-11 PROCEDURE — 99499 UNLISTED E&M SERVICE: CPT | Mod: S$GLB,,,

## 2023-04-11 PROCEDURE — 3288F FALL RISK ASSESSMENT DOCD: CPT | Mod: CPTII,S$GLB,, | Performed by: NURSE PRACTITIONER

## 2023-04-11 PROCEDURE — 3008F PR BODY MASS INDEX (BMI) DOCUMENTED: ICD-10-PCS | Mod: CPTII,S$GLB,, | Performed by: NURSE PRACTITIONER

## 2023-04-11 PROCEDURE — 1101F PT FALLS ASSESS-DOCD LE1/YR: CPT | Mod: CPTII,S$GLB,, | Performed by: NURSE PRACTITIONER

## 2023-04-11 PROCEDURE — 99214 PR OFFICE/OUTPT VISIT, EST, LEVL IV, 30-39 MIN: ICD-10-PCS | Mod: S$GLB,,, | Performed by: NURSE PRACTITIONER

## 2023-04-11 PROCEDURE — 1157F PR ADVANCE CARE PLAN OR EQUIV PRESENT IN MEDICAL RECORD: ICD-10-PCS | Mod: CPTII,S$GLB,, | Performed by: NURSE PRACTITIONER

## 2023-04-11 PROCEDURE — 82962 GLUCOSE BLOOD TEST: CPT | Mod: S$GLB,,, | Performed by: NURSE PRACTITIONER

## 2023-04-11 PROCEDURE — 99999 PR PBB SHADOW E&M-EST. PATIENT-LVL IV: CPT | Mod: PBBFAC,,, | Performed by: NURSE PRACTITIONER

## 2023-04-11 PROCEDURE — 4010F ACE/ARB THERAPY RXD/TAKEN: CPT | Mod: CPTII,S$GLB,, | Performed by: NURSE PRACTITIONER

## 2023-04-11 PROCEDURE — 99999 PR PBB SHADOW E&M-EST. PATIENT-LVL IV: ICD-10-PCS | Mod: PBBFAC,,, | Performed by: NURSE PRACTITIONER

## 2023-04-11 PROCEDURE — 3074F PR MOST RECENT SYSTOLIC BLOOD PRESSURE < 130 MM HG: ICD-10-PCS | Mod: CPTII,S$GLB,, | Performed by: NURSE PRACTITIONER

## 2023-04-11 PROCEDURE — 82962 POCT GLUCOSE, HAND-HELD DEVICE: ICD-10-PCS | Mod: S$GLB,,, | Performed by: NURSE PRACTITIONER

## 2023-04-11 PROCEDURE — 1101F PR PT FALLS ASSESS DOC 0-1 FALLS W/OUT INJ PAST YR: ICD-10-PCS | Mod: CPTII,S$GLB,, | Performed by: NURSE PRACTITIONER

## 2023-04-11 PROCEDURE — 36415 COLL VENOUS BLD VENIPUNCTURE: CPT | Mod: PO | Performed by: NURSE PRACTITIONER

## 2023-04-11 PROCEDURE — 83036 HEMOGLOBIN GLYCOSYLATED A1C: CPT | Performed by: NURSE PRACTITIONER

## 2023-04-11 PROCEDURE — 99214 OFFICE O/P EST MOD 30 MIN: CPT | Mod: S$GLB,,, | Performed by: NURSE PRACTITIONER

## 2023-04-11 PROCEDURE — 1126F AMNT PAIN NOTED NONE PRSNT: CPT | Mod: CPTII,S$GLB,, | Performed by: NURSE PRACTITIONER

## 2023-04-11 PROCEDURE — 3078F PR MOST RECENT DIASTOLIC BLOOD PRESSURE < 80 MM HG: ICD-10-PCS | Mod: CPTII,S$GLB,, | Performed by: NURSE PRACTITIONER

## 2023-04-11 PROCEDURE — 3078F DIAST BP <80 MM HG: CPT | Mod: CPTII,S$GLB,, | Performed by: NURSE PRACTITIONER

## 2023-04-11 PROCEDURE — 1157F ADVNC CARE PLAN IN RCRD: CPT | Mod: CPTII,S$GLB,, | Performed by: NURSE PRACTITIONER

## 2023-04-11 NOTE — PROGRESS NOTES
Trey Stevens  04/11/2023  [id number]    Brittanie Chaparro MD  Patient Care Team:  Brittanie Chaparro MD as PCP - General (Internal Medicine)  Shira Kelly LPN as Care Coordinator  Rachelle Huertas RD, CDE as Dietitian (Diabetes)  Winter Garcia RD, CDE as Dietitian (Diabetes)  Michelle Rossi, RN as Diabetes Educator (Diabetes)  Bee Stinson NP as Nurse Practitioner (Endocrinology)  Charlette Wilcox PA-C as Physician Assistant (Rheumatology)  Aminata Martinez NP as Nurse Practitioner (Pulmonary Disease)  Tigist Cole MD as Consulting Physician (Ophthalmology)  Joy Hadley DPM as Consulting Physician (Podiatry)  Louise Aguayo RN as Outpatient       [facility]      Chief Complaint:  Chief Complaint   Patient presents with    Follow-up       History of Present Illness:    Forgot his meds this AM - got busy doing chores  and ran out of time prior to coming to clinic  Blood sugar currently elevated - he denies any symptoms    Missed Novolog dose a couple times this week  Describes being thirsty  Admits to noncompliance with diet    Documented blood sugars:      Denies fever, chills, URI symptoms, chest pain, SOB, palpitations, vomiting, diarrhea, no gross neuro deficits, urinary symptoms and leg swelling       The following were reviewed: Active problem list, medication list, allergies, family history, social history, and Health Maintenance.     History:  Past Medical History:   Diagnosis Date    Arthritis     Diabetes mellitus     Diabetes mellitus type 2, uncontrolled 7/19/2016    DM (diabetes mellitus) 2015     09/04/2017    Gall stones     Obesity     Pneumonia of right middle lobe due to infectious organism 8/13/2021    Psoriasis (a type of skin inflammation)     Rheumatoid arthritis of foot      Past Surgical History:   Procedure Laterality Date    APPENDECTOMY      CHOLECYSTECTOMY       Family History   Problem Relation Age of Onset    Cancer  Mother     Hypertension Mother     Diabetes Mother     Cataracts Mother     Stroke Father     Psoriasis Neg Hx      Patient Active Problem List   Diagnosis    Psoriatic arthritis, destructive type    Vitamin D deficiency    Multiple lung nodules on CT    Immunosuppressed status    Long-term use of immunosuppressant medication    Mixed type COPD (chronic obstructive pulmonary disease)    Type 2 diabetes mellitus with chronic kidney disease, with long-term current use of insulin    Hypertension associated with diabetes    Vitiligo    SCHUYELR on CPAP    Class 2 severe obesity due to excess calories with serious comorbidity and body mass index (BMI) of 39.0 to 39.9 in adult    Hyperlipidemia associated with type 2 diabetes mellitus    Elevated liver enzymes    Nicotine dependence, cigarettes, uncomplicated    Dependence on nocturnal oxygen therapy    Urinary retention    Calcified granuloma of lung    Calcification of aorta    Type 2 diabetes mellitus with microalbuminuria, with long-term current use of insulin    Hyperglycemia due to diabetes mellitus    Noncompliance with diabetes treatment     Review of patient's allergies indicates:  No Known Allergies    Medications:  Current Outpatient Medications on File Prior to Visit   Medication Sig Dispense Refill    amLODIPine (NORVASC) 5 MG tablet Take 1 tablet (5 mg total) by mouth once daily. For high blood pressure 90 tablet 3    aspirin 81 MG Chew Take 1 tablet (81 mg total) by mouth once daily. 100 tablet 0    atorvastatin (LIPITOR) 80 MG tablet Take 1 tablet (80 mg total) by mouth once daily. 90 tablet 3    calcipotriene (DOVONOX) 0.005 % cream APPLY TO AFFECTED AREA(S) TWICE DAILY 60 g 4    clobetasoL (OLUX) 0.05 % Foam AAA of scalp and behind ears twice daily.  Do not use on face, underarms or groin. 100 g 3    dulaglutide (TRULICITY) 4.5 mg/0.5 mL pen injector Inject 4.5 mg into the skin every 7 days. 4 pen 5    empagliflozin (JARDIANCE) 25 mg tablet Take 1 tablet (25  mg total) by mouth once daily. 90 tablet 1    folic acid (FOLVITE) 1 MG tablet TAKE 1 TABLET ONE TIME DAILY 90 tablet 1    glipiZIDE (GLUCOTROL) 5 MG tablet TAKE 1 TABLET BY MOUTH TWICE A  tablet 1    insulin aspart U-100 (NOVOLOG FLEXPEN U-100 INSULIN) 100 unit/mL (3 mL) InPn pen INJECT 6 UNITS INTO THE SKIN 3 (THREE) TIMES DAILY WITH MEALS. 15 each 1    ketoconazole (NIZORAL) 2 % shampoo WASH HAIR WITH MEDICATED SHAMPOO AT LEAST 2X/WEEK - LET SIT ON SCALP AT LEAST 5 MINUTES PRIOR TO RINSING 120 mL 3    LANTUS SOLOSTAR U-100 INSULIN glargine 100 units/mL SubQ pen INJECT 20 UNITS INTO THE SKIN ONCE DAILY. 15 each 5    losartan (COZAAR) 25 MG tablet Take 1 tablet (25 mg total) by mouth once daily. 90 tablet 3    metFORMIN (GLUCOPHAGE-XR) 500 MG XR 24hr tablet Take 2 tablets (1,000 mg total) by mouth 2 (two) times daily with meals. 360 tablet 1    nicotine polacrilex 2 MG Lozg Take 1 lozenge (2 mg total) by mouth as needed (Use up to 10 lozenges per day in the place of cigarettes). 216 lozenge 0    PROAIR HFA 90 mcg/actuation inhaler Inhale 2 puffs into the lungs every 4 (four) hours as needed for Wheezing. Rescue 18 g 11    secukinumab (COSENTYX PEN, 2 PENS,) 150 mg/mL PnIj Inject 300 mg into the skin every 14 (fourteen) days. 4 mL 3    traMADoL (ULTRAM) 50 mg tablet TAKE 1 TABLET BY MOUTH THREE TIMES A DAY AS NEEDED FOR MODERATE PAIN Strength: 50 mg 21 tablet 0    blood sugar diagnostic (ACCU-CHEK NOELLE PLUS TEST STRP) Strp Check blood sugar 3 times daily. 300 each 3    blood-glucose meter (ACCU-CHEK NOELLE PLUS METER) Misc Check blood sugar 3 times daily 1 each 0    insulin aspart U-100 (NOVOLOG U-100 INSULIN ASPART) 100 unit/mL injection INJECT 200 UNITS PER CEQUR DEVICE EVERY 3 DAYS 60 mL 1    lancets (ACCU-CHEK SOFTCLIX LANCETS) Misc Check blood sugar 3 times daily 300 each 3    methotrexate 2.5 MG Tab TAKE 8 TABLETS BY MOUTH EVERY 7 DAYS. (Patient not taking: Reported on 4/11/2023) 96 tablet 2    pen  "needle, diabetic 32 gauge x 5/32" Ndle Use with Lantus once daily and Humalog 3 times daily. 200 each 5    spironolactone (ALDACTONE) 25 MG tablet Take 1 tablet (25 mg total) by mouth once daily. 30 tablet 11     No current facility-administered medications on file prior to visit.       Medications have been reviewed and reconciled with patient at visit today.          Exam:  Vitals:    04/11/23 1303   BP: 120/60   Pulse: 92     Weight: 117.4 kg (258 lb 14.4 oz)   Body mass index is 40.55 kg/m².      BP Readings from Last 3 Encounters:   04/11/23 120/60   03/30/23 111/62   03/15/23 132/75     Wt Readings from Last 3 Encounters:   04/11/23 1303 117.4 kg (258 lb 14.4 oz)   03/30/23 1508 115.9 kg (255 lb 8 oz)   03/30/23 1349 116.6 kg (257 lb)            Physical Exam  Vitals reviewed.   Constitutional:       Appearance: He is well-developed. He is obese. He is not ill-appearing.      Comments: Unkempt appearance   HENT:      Head: Normocephalic and atraumatic.      Nose: Nose normal. No congestion.      Mouth/Throat:      Mouth: Mucous membranes are moist.   Eyes:      General: No scleral icterus.     Conjunctiva/sclera: Conjunctivae normal.   Cardiovascular:      Rate and Rhythm: Normal rate and regular rhythm.      Heart sounds: Normal heart sounds. No murmur heard.  Pulmonary:      Effort: Pulmonary effort is normal.      Breath sounds: Normal breath sounds.   Abdominal:      General: Bowel sounds are normal. There is no distension.      Palpations: Abdomen is soft.      Tenderness: There is no abdominal tenderness.   Musculoskeletal:         General: No tenderness. Normal range of motion.      Cervical back: Normal range of motion and neck supple.      Right lower leg: No edema.      Left lower leg: No edema.   Skin:     General: Skin is warm and dry.      Comments: Psoriatic rash to bilateral forearms, dorsum of hands,  pale pink skin with red appearing scabs scattered throughout - scaly, dry skin   Neurological: "      General: No focal deficit present.      Mental Status: He is alert and oriented to person, place, and time.   Psychiatric:         Behavior: Behavior normal.         Thought Content: Thought content normal.       Laboratory Reviewed:   Lab Results   Component Value Date    WBC 7.46 08/23/2022    HGB 15.7 08/23/2022    HCT 47.2 08/23/2022     08/23/2022    CHOL 135 04/11/2022    TRIG 153 (H) 04/11/2022    HDL 35 (L) 04/11/2022    ALT 51 (H) 08/23/2022    AST 61 (H) 08/23/2022     (L) 12/08/2022    K 4.6 12/08/2022    CL 99 12/08/2022    CREATININE 1.2 12/08/2022    BUN 17 12/08/2022    CO2 25 12/08/2022    TSH 1.798 04/11/2022    PSA 0.24 04/11/2022    INR 1.0 08/14/2021    HGBA1C >14.0 (H) 12/08/2022           Health Maintenance  Health Maintenance Topics with due status: Not Due       Topic Last Completion Date    TETANUS VACCINE 02/24/2016    Diabetes Urine Screening 12/08/2022    Foot Exam 12/29/2022    High Dose Statin 04/11/2023     Health Maintenance Due   Topic Date Due    Shingles Vaccine (1 of 2) Never done    Colorectal Cancer Screening  10/24/2017    COVID-19 Vaccine (3 - Booster for Pfizer series) 12/02/2021    Hemoglobin A1c  03/08/2023    Eye Exam  03/14/2023    PROSTATE-SPECIFIC ANTIGEN  04/11/2023    Lipid Panel  04/11/2023       Assessment and Plan:  1. Type 2 diabetes mellitus with stage 3a chronic kidney disease, with long-term current use of insulin  -     POCT Glucose, Hand-Held Device - > 500    2. Noncompliance with diabetes treatment - encouraged compliance    3. Hyperglycemia due to diabetes mellitus - form provided to patient to provide visual clues about medications and times    4. Type 2 diabetes mellitus with microalbuminuria, with long-term current use of insulin    5. Hypertension associated with diabetes - controlled                 -Patient's lab results were reviewed and discussed with patient  -Treatment options and alternatives were discussed with the patient.  Patient expressed understanding. Patient was given the opportunity to ask questions and be an active participant in their medical care. Patient had no further questions or concerns at this time.         Future Appointments   Date Time Provider Department Center   4/11/2023  2:00 PM Kaia King LCSW [facility]   4/14/2023 10:30 AM Bee Stinson NP [facility] [place]   4/25/2023  9:40 AM Kaia Spencer MD [facility]   7/6/2023  1:30 PM Joy Hadley DPM [facility] [place]          After visit summary printed and given to patient upon discharge.  Patient goals and care plan are included in After visit summary.    Total medical decision making time was 30 min.    The following issues were discussed: The primary encounter diagnosis was Type 2 diabetes mellitus with stage 3a chronic kidney disease, with long-term current use of insulin. Diagnoses of Noncompliance with diabetes treatment, Hyperglycemia due to diabetes mellitus, Type 2 diabetes mellitus with microalbuminuria, with long-term current use of insulin, and Hypertension associated with diabetes were also pertinent to this visit.    Health maintenance needs, recent test results and goals of care discussed with pt and questions answered.           ERENDIRA Murguia, NP-C  [facility] [address] [place] [address]

## 2023-04-11 NOTE — PROGRESS NOTES
Individual Psychotherapy (LCSW)  Trey Stevens,  4/11/2023  DATE:  4/11/2023  TYPE OF VISIT:  In person  LENGTH OF SESSION: 30    Therapeutic Intervention: Met with patient for individual psychotherapy.    Chief complaint/reason for encounter: sleep and appetite       Session Content/Presenting Problem:     Met with patient after his appointment with the NP today. He had not taken his meds this morning and his blood sugars were very high. Patient does not give a reason for not taking his meds today. Discussed his sleep; patient says the fans he has do not make enough noise to help him sleep; he may use a radio he has for white noise.  Discussed increasing use of his push mower instead of the riding mower to increase his exercise. Discussed attempting to limit calories while increasing exercise. Patient is open to trying some behavior modifications; he has switched to decaf tea; he reports that he is down to smoking about 3 cigarettes/day.  However, he is resistant to making more substantial lifestyle changes at this time.     Current symptoms:  Depression: denies.  Anxiety: denies.  Insomnia: difficulty falling asleep and non-restful sleep.  Jennifer:  denies.  Psychosis: denies .      Risk parameters:  Patient reports no suicidal ideation  Patient reports no homicidal ideation  Patient reports no self-injurious behavior  Patient reports no violent behavior    MENTAL HEALTH STATUS EXAM  General Appearance:  disheveled, obese   Speech: normal tone, normal rate, normal pitch, normal volume, articulation error      Level of Cooperation: cooperative      Thought Processes: concrete   Mood: steady      Thought Content: normal, no suicidality, no homicidality, delusions, or paranoia   Affect: congruent and appropriate   Orientation: Oriented x3   Attention Span & Concentration: intact   Fund of General Knowledge: intact and appropriate to age and level of education   Judgment & Insight: poor     Language  intact        STRENGTHS AND LIABILITIES: Liability: Patient has no suport network., Liability: Patient has poor health.     IMPRESSION:   My diagnostic impression is Persistent insomnia with other symptoms [G47.00], as evidenced by patient report of difficulty sleeping through the night; chronic sleepiness during the day.     TREATMENT GOALS (per patient):  Sleep better; eat better (less sugar).      Treatment plan:  Target symptoms:  Insomnia  Why chosen therapy is appropriate versus another modality: relevant to diagnosis  Outcome monitoring methods: self-report, observation  Therapeutic intervention type: behavior modifying psychotherapy    Patient's response to intervention:  The patient's response to intervention is accepting.    Progress toward goals and other mental status changes:  The patient's progress toward goals is not progressing.    Plan: Pt plans to continue individual psychotherapy CBT will be utilized in future individual therapy sessions to increase insight, support, and behavior modification.     Return to clinic: as needed-will see at next scheduled MD appointment

## 2023-04-12 ENCOUNTER — TELEPHONE (OUTPATIENT)
Dept: RHEUMATOLOGY | Facility: CLINIC | Age: 68
End: 2023-04-12
Payer: MEDICARE

## 2023-04-13 ENCOUNTER — OUTPATIENT CASE MANAGEMENT (OUTPATIENT)
Dept: ADMINISTRATIVE | Facility: OTHER | Age: 68
End: 2023-04-13
Payer: MEDICARE

## 2023-04-13 NOTE — PROGRESS NOTES
Outpatient Care Management  Plan of Care Follow Up Visit    Patient: Trey Stevens  MRN: [id number]  Date of Service: 04/13/2023  Completed by: Louise Aguayo RN  Referral Date: 01/25/2023    Reason for Visit   Patient presents with    Nursing Assessment    OPCM RN Follow Up Call     04/13/23       Brief Summary: OPCM visit completed. Care plan reviewed and updated. Reminded patient of scheduled virtual visit tomorrow.   Next Steps: plan to follow up in approximately one week.  GOVIND Aguayo RN

## 2023-04-14 ENCOUNTER — TELEPHONE (OUTPATIENT)
Dept: PULMONOLOGY | Facility: CLINIC | Age: 68
End: 2023-04-14
Payer: MEDICARE

## 2023-04-14 ENCOUNTER — OFFICE VISIT (OUTPATIENT)
Dept: DIABETES | Facility: CLINIC | Age: 68
End: 2023-04-14
Payer: MEDICARE

## 2023-04-14 DIAGNOSIS — E11.69 HYPERLIPIDEMIA ASSOCIATED WITH TYPE 2 DIABETES MELLITUS: ICD-10-CM

## 2023-04-14 DIAGNOSIS — Z79.4 UNCONTROLLED TYPE 2 DIABETES MELLITUS WITH HYPERGLYCEMIA, WITH LONG-TERM CURRENT USE OF INSULIN: Primary | ICD-10-CM

## 2023-04-14 DIAGNOSIS — I15.2 HYPERTENSION ASSOCIATED WITH DIABETES: ICD-10-CM

## 2023-04-14 DIAGNOSIS — L40.52 PSORIATIC ARTHRITIS, DESTRUCTIVE TYPE: ICD-10-CM

## 2023-04-14 DIAGNOSIS — E11.59 HYPERTENSION ASSOCIATED WITH DIABETES: ICD-10-CM

## 2023-04-14 DIAGNOSIS — E11.65 UNCONTROLLED TYPE 2 DIABETES MELLITUS WITH HYPERGLYCEMIA, WITH LONG-TERM CURRENT USE OF INSULIN: Primary | ICD-10-CM

## 2023-04-14 DIAGNOSIS — E78.5 HYPERLIPIDEMIA ASSOCIATED WITH TYPE 2 DIABETES MELLITUS: ICD-10-CM

## 2023-04-14 PROCEDURE — 1157F PR ADVANCE CARE PLAN OR EQUIV PRESENT IN MEDICAL RECORD: ICD-10-PCS | Mod: CPTII,95,, | Performed by: NURSE PRACTITIONER

## 2023-04-14 PROCEDURE — 99442 PR PHYSICIAN TELEPHONE EVALUATION 11-20 MIN: CPT | Mod: 95,,, | Performed by: NURSE PRACTITIONER

## 2023-04-14 PROCEDURE — 1159F MED LIST DOCD IN RCRD: CPT | Mod: CPTII,95,, | Performed by: NURSE PRACTITIONER

## 2023-04-14 PROCEDURE — 1160F RVW MEDS BY RX/DR IN RCRD: CPT | Mod: CPTII,95,, | Performed by: NURSE PRACTITIONER

## 2023-04-14 PROCEDURE — 1159F PR MEDICATION LIST DOCUMENTED IN MEDICAL RECORD: ICD-10-PCS | Mod: CPTII,95,, | Performed by: NURSE PRACTITIONER

## 2023-04-14 PROCEDURE — 4010F ACE/ARB THERAPY RXD/TAKEN: CPT | Mod: CPTII,95,, | Performed by: NURSE PRACTITIONER

## 2023-04-14 PROCEDURE — 3046F HEMOGLOBIN A1C LEVEL >9.0%: CPT | Mod: CPTII,95,, | Performed by: NURSE PRACTITIONER

## 2023-04-14 PROCEDURE — 99442 PR PHYSICIAN TELEPHONE EVALUATION 11-20 MIN: ICD-10-PCS | Mod: 95,,, | Performed by: NURSE PRACTITIONER

## 2023-04-14 PROCEDURE — 1160F PR REVIEW ALL MEDS BY PRESCRIBER/CLIN PHARMACIST DOCUMENTED: ICD-10-PCS | Mod: CPTII,95,, | Performed by: NURSE PRACTITIONER

## 2023-04-14 PROCEDURE — 1157F ADVNC CARE PLAN IN RCRD: CPT | Mod: CPTII,95,, | Performed by: NURSE PRACTITIONER

## 2023-04-14 PROCEDURE — 4010F PR ACE/ARB THEARPY RXD/TAKEN: ICD-10-PCS | Mod: CPTII,95,, | Performed by: NURSE PRACTITIONER

## 2023-04-14 PROCEDURE — 3046F PR MOST RECENT HEMOGLOBIN A1C LEVEL > 9.0%: ICD-10-PCS | Mod: CPTII,95,, | Performed by: NURSE PRACTITIONER

## 2023-04-14 RX ORDER — TIRZEPATIDE 10 MG/.5ML
10 INJECTION, SOLUTION SUBCUTANEOUS
Qty: 4 PEN | Refills: 5 | Status: SHIPPED | OUTPATIENT
Start: 2023-04-14 | End: 2023-10-06 | Stop reason: SDUPTHER

## 2023-04-14 RX ORDER — INSULIN ASPART 100 [IU]/ML
8 INJECTION, SOLUTION INTRAVENOUS; SUBCUTANEOUS
Qty: 15 ML | Refills: 5 | Status: SHIPPED | OUTPATIENT
Start: 2023-04-14 | End: 2023-09-05

## 2023-04-14 RX ORDER — INSULIN GLARGINE 100 [IU]/ML
24 INJECTION, SOLUTION SUBCUTANEOUS DAILY
Qty: 15 ML | Refills: 5 | Status: SHIPPED | OUTPATIENT
Start: 2023-04-14 | End: 2023-10-06 | Stop reason: SDUPTHER

## 2023-04-14 NOTE — Clinical Note
Schedule an 8 week in person appt with me in [place]. Can we get him a follow up with Jose next month for re-check in [place]? Put another referral in- recommend 60 min follow up.

## 2023-04-14 NOTE — PROGRESS NOTES
Established Patient - Audio Only Telehealth Visit     The patient location is: [place]  The chief complaint leading to consultation is: diabetes management follow up   Visit type: Virtual visit with audio only (telephone)  Total time spent with patient: 19 minutes       The reason for the audio only service rather than synchronous audio and video virtual visit was related to technical difficulties or patient preference/necessity.     Each patient to whom I provide medical services by telemedicine is:  (1) informed of the relationship between the physician and patient and the respective role of any other health care provider with respect to management of the patient; and (2) notified that they may decline to receive medical services by telemedicine and may withdraw from such care at any time. Patient verbally consented to receive this service via voice-only telephone call.    HPI: Remote follow up visit -  has been about 1 year since last seen (04/2022). Between visits, trained on CeQur - he is using the patch only when he is away from the house. When home, he is using Novolog pens. He prefers this method. Reports one of his meds will need prior authorization or switch due to formulary - he is not sure which medication this is and will call us back. Reports tolerating Trulicity well- we discussed Mounjaro and will switch to this. States blood sugars have been around [id number] when taking insulin consistently, but the highest he has seen near 500 when seeing PCP recently (he had eaten and did not take Novolog). Discussed with patient that Bgs are still very high based on current A1c and need to increase all insulins. He verbalized understanding.    Hyperlipidemia- on Lipitor.     Psoriatic arthritis- Followed by rheumatology.      Sleep apnea- on CPAP.     Past failed treatment include: Metformin, Victoza,Trulicity,Glipizide,Jardiance- failure to control diabetes    Any episodes of hypoglycemia? Denies      Complications related to diabetes: nephropathy and peripheral neuropathy    Diabetes Medications               dulaglutide (TRULICITY) 4.5 mg/0.5 mL pen injector Inject 4.5 mg into the skin every 7 days. Plan to switch to Mounjaro today    empagliflozin (JARDIANCE) 25 mg tablet Take 1 tablet (25 mg total) by mouth once daily.    glipiZIDE (GLUCOTROL) 5 MG tablet TAKE 1 TABLET BY MOUTH TWICE A DAY    insulin aspart U-100 (NOVOLOG FLEXPEN U-100 INSULIN) 100 unit/mL (3 mL) InPn pen INJECT 6 UNITS INTO THE SKIN 3 (THREE) TIMES DAILY WITH MEALS. Increasing to 8 units     insulin aspart U-100 (NOVOLOG U-100 INSULIN ASPART) 100 unit/mL injection INJECT 200 UNITS PER CEQUR DEVICE EVERY 3 DAYS    LANTUS SOLOSTAR U-100 INSULIN glargine 100 units/mL SubQ pen INJECT 20 UNITS INTO THE SKIN ONCE DAILY. Increasing to 24 units once daily     metFORMIN (GLUCOPHAGE-XR) 500 MG XR 24hr tablet Take 2 tablets (1,000 mg total) by mouth 2 (two) times daily with meals.           Diabetes Management Status    Statin: Taking  ACE/ARB: Taking    Screening or Prevention Patient's value Goal Complete/Controlled?   HgA1C Testing and Control   Lab Results   Component Value Date    HGBA1C 13.9 (H) 04/11/2023      Annually/Less than 8% No     Lipid profile : 04/11/2022 Annually No     LDL control Lab Results   Component Value Date    LDLCALC 69.4 04/11/2022    Annually/Less than 100 mg/dl  No     Nephropathy screening Lab Results   Component Value Date    LABMICR 302.0 12/08/2022     Lab Results   Component Value Date    PROTEINUA 2+ (A) 08/13/2021     No results found for: UTPCR   Annually Yes     Blood pressure BP Readings from Last 1 Encounters:   04/11/23 120/60    Less than 140/90 Yes     Dilated retinal exam : 03/14/2022 Annually No     Foot exam   : 12/29/2022 Annually Yes           Assessment and plan:      Trey was seen today for diabetes mellitus.    Diagnoses and all orders for this visit:    Uncontrolled type 2 diabetes mellitus with  hyperglycemia, with long-term current use of insulin  -     tirzepatide (MOUNJARO) 10 mg/0.5 mL PnIj; Inject 10 mg into the skin every 7 days. STOP TRULICITY AND START MOUNJARO ON THE DAY TRULICITY WOULD HAVE BEEN DUE.  -     LANTUS SOLOSTAR U-100 INSULIN glargine 100 units/mL SubQ pen; Inject 24 Units into the skin once daily.  -     NOVOLOG FLEXPEN U-100 INSULIN 100 unit/mL (3 mL) InPn pen; Inject 8 Units into the skin 3 (three) times daily with meals.    Chronic-    -- Medications adjusted for today's visit include:    Increase Lantus to 24 units once daily.    Increase Novolog 8 units three times a day before each main meal, or 4 clicks per CeQur before each meal.    Continue Metformin 2 tablets twice a day, Jardiance once a day, and finish Trulicity. On the day Trulicity would have been due, start Mounjaro 10 mg once a week.     Hyperlipidemia associated with type 2 diabetes mellitus    Hypertension associated with diabetes    Psoriatic arthritis, destructive type                              This service was not originating from a related E/M service provided within the previous 7 days nor will  to an E/M service or procedure within the next 24 hours or my soonest available appointment.  Prevailing standard of care was able to be met in this audio-only visit.

## 2023-04-14 NOTE — TELEPHONE ENCOUNTER
Returned patients call back. Rescheduled patients appts----- Message from Maty Robertson sent at 4/14/2023  8:25 AM CDT -----  Contact: [phone]  Pt is calling in regards to rescheduling his 4/6 appts. Please call pt back at [phone]. Thanks KB

## 2023-04-21 ENCOUNTER — SPECIALTY PHARMACY (OUTPATIENT)
Dept: PHARMACY | Facility: CLINIC | Age: 68
End: 2023-04-21
Payer: MEDICARE

## 2023-04-21 NOTE — TELEPHONE ENCOUNTER
Specialty Pharmacy - Refill Coordination    Specialty Medication Orders Linked to Encounter      Flowsheet Row Most Recent Value   Medication #1 secukinumab (COSENTYX PEN, 2 PENS,) 150 mg/mL PnIj (Order#[id number], Rx#[id number])            Refill Questions - Documented Responses      Flowsheet Row Most Recent Value   Patient Availability and HIPAA Verification    Does patient want to proceed with activity? Yes   HIPAA/medical authority confirmed? Yes   Relationship to patient of person spoken to? Self   Refill Screening Questions    Changes to allergies? No   Changes to medications? No   New conditions since last clinic visit? No   Unplanned office visit, urgent care, ED, or hospital admission in the last 4 weeks? No   How does patient/caregiver feel medication is working? Good   Financial problems or insurance changes? No   How many doses of your specialty medications were missed in the last 4 weeks? 0   Would patient like to speak to a pharmacist? No   When does the patient need to receive the medication? 04/28/23   Refill Delivery Questions    How will the patient receive the medication? [facility]   When does the patient need to receive the medication? 04/28/23   Shipping Address Home   Address in [facility] confirmed and updated if neccessary? Yes   Expected Copay ($) 0   Is the patient able to afford the medication copay? Yes   Payment Method zero copay   Days supply of Refill 28   Supplies needed? No supplies needed   Refill activity completed? Yes   Refill activity plan Refill scheduled   Shipment/Pickup Date: 04/25/23            Current Outpatient Medications   Medication Sig    amLODIPine (NORVASC) 5 MG tablet Take 1 tablet (5 mg total) by mouth once daily. For high blood pressure    aspirin 81 MG Chew Take 1 tablet (81 mg total) by mouth once daily.    atorvastatin (LIPITOR) 80 MG tablet Take 1 tablet (80 mg total) by mouth once daily.    blood sugar diagnostic (ACCU-CHEK NOELLE PLUS TEST STRP) Strp  "Check blood sugar 3 times daily.    blood-glucose meter (ACCU-CHEK NOELLE PLUS METER) Misc Check blood sugar 3 times daily    calcipotriene (DOVONOX) 0.005 % cream APPLY TO AFFECTED AREA(S) TWICE DAILY    clobetasoL (OLUX) 0.05 % Foam AAA of scalp and behind ears twice daily.  Do not use on face, underarms or groin.    empagliflozin (JARDIANCE) 25 mg tablet Take 1 tablet (25 mg total) by mouth once daily.    folic acid (FOLVITE) 1 MG tablet TAKE 1 TABLET ONE TIME DAILY    glipiZIDE (GLUCOTROL) 5 MG tablet TAKE 1 TABLET BY MOUTH TWICE A DAY    insulin aspart U-100 (NOVOLOG U-100 INSULIN ASPART) 100 unit/mL injection INJECT 200 UNITS PER CEQUR DEVICE EVERY 3 DAYS    ketoconazole (NIZORAL) 2 % shampoo WASH HAIR WITH MEDICATED SHAMPOO AT LEAST 2X/WEEK - LET SIT ON SCALP AT LEAST 5 MINUTES PRIOR TO RINSING    lancets (ACCU-CHEK SOFTCLIX LANCETS) Misc Check blood sugar 3 times daily    LANTUS SOLOSTAR U-100 INSULIN glargine 100 units/mL SubQ pen Inject 24 Units into the skin once daily.    losartan (COZAAR) 25 MG tablet Take 1 tablet (25 mg total) by mouth once daily.    metFORMIN (GLUCOPHAGE-XR) 500 MG XR 24hr tablet Take 2 tablets (1,000 mg total) by mouth 2 (two) times daily with meals.    methotrexate 2.5 MG Tab TAKE 8 TABLETS BY MOUTH EVERY 7 DAYS. (Patient not taking: Reported on 4/11/2023)    nicotine polacrilex 2 MG Lozg Take 1 lozenge (2 mg total) by mouth as needed (Use up to 10 lozenges per day in the place of cigarettes).    NOVOLOG FLEXPEN U-100 INSULIN 100 unit/mL (3 mL) InPn pen Inject 8 Units into the skin 3 (three) times daily with meals.    pen needle, diabetic 32 gauge x 5/32" Ndle Use with Lantus once daily and Humalog 3 times daily.    PROAIR HFA 90 mcg/actuation inhaler Inhale 2 puffs into the lungs every 4 (four) hours as needed for Wheezing. Rescue    secukinumab (COSENTYX PEN, 2 PENS,) 150 mg/mL PnIj Inject 300 mg into the skin every 14 (fourteen) days.    spironolactone (ALDACTONE) 25 MG tablet " Take 1 tablet (25 mg total) by mouth once daily.    tirzepatide (MOUNJARO) 10 mg/0.5 mL PnIj Inject 10 mg into the skin every 7 days. STOP TRULICITY AND START MOUNJARO ON THE DAY TRULICITY WOULD HAVE BEEN DUE.    traMADoL (ULTRAM) 50 mg tablet TAKE 1 TABLET BY MOUTH THREE TIMES A DAY AS NEEDED FOR MODERATE PAIN Strength: 50 mg   Last reviewed on 4/14/2023 10:38 AM by Bee Stinson NP    Review of patient's allergies indicates:  No Known Allergies Last reviewed on  4/14/2023 10:38 AM by Bee Stinson      Tasks added this encounter   5/19/2023 - Refill Call (Auto Added)   Tasks due within next 3 months   No tasks due.     [facility]  [address] [place] [id number]  Phone: [phone]  Fax: [phone]

## 2023-04-24 ENCOUNTER — OUTPATIENT CASE MANAGEMENT (OUTPATIENT)
Dept: ADMINISTRATIVE | Facility: OTHER | Age: 68
End: 2023-04-24
Payer: MEDICARE

## 2023-04-24 NOTE — PROGRESS NOTES
Outpatient Care Management  Plan of Care Follow Up Visit    Patient: Trey Stevens  MRN: [id number]  Date of Service: 04/24/2023  Completed by: Louise Aguayo RN  Referral Date: 01/25/2023    Reason for Visit   Patient presents with    OPCM RN Follow Up Call       Brief Summary: OPCM visit completed. Care plan reviewed and updated.   Next Steps: Plan to call in approximately 2 weeks - patient in agreement.   What has blood sugar been? How often are you checking? What foods   Have been eaten? What is your goal in regards to managing your   Diabetes?  GOVIND Aguayo, RN

## 2023-04-25 ENCOUNTER — TELEPHONE (OUTPATIENT)
Dept: PRIMARY CARE CLINIC | Facility: CLINIC | Age: 68
End: 2023-04-25
Payer: MEDICARE

## 2023-04-25 NOTE — TELEPHONE ENCOUNTER
Patient cancelled his appointment for today due to car trouble. Rescheduled LCSW appointment for next week, before his appointment with Dr. Spencer on Wed, May 3rd.  
86

## 2023-05-01 ENCOUNTER — CLINICAL SUPPORT (OUTPATIENT)
Dept: DIABETES | Facility: CLINIC | Age: 68
End: 2023-05-01
Payer: MEDICARE

## 2023-05-01 VITALS — BODY MASS INDEX: 40.1 KG/M2 | HEIGHT: 67 IN | WEIGHT: 255.5 LBS

## 2023-05-01 DIAGNOSIS — Z79.4 UNCONTROLLED TYPE 2 DIABETES MELLITUS WITH HYPERGLYCEMIA, WITH LONG-TERM CURRENT USE OF INSULIN: Primary | ICD-10-CM

## 2023-05-01 DIAGNOSIS — J44.9 MIXED TYPE COPD (CHRONIC OBSTRUCTIVE PULMONARY DISEASE): Chronic | ICD-10-CM

## 2023-05-01 DIAGNOSIS — E11.65 UNCONTROLLED TYPE 2 DIABETES MELLITUS WITH HYPERGLYCEMIA, WITH LONG-TERM CURRENT USE OF INSULIN: Primary | ICD-10-CM

## 2023-05-01 PROCEDURE — 99999 PR PBB SHADOW E&M-EST. PATIENT-LVL III: ICD-10-PCS | Mod: PBBFAC,,, | Performed by: DIETITIAN, REGISTERED

## 2023-05-01 PROCEDURE — G0108 PR DIAB MANAGE TRN  PER INDIV: ICD-10-PCS | Mod: S$GLB,,, | Performed by: DIETITIAN, REGISTERED

## 2023-05-01 PROCEDURE — 99999 PR PBB SHADOW E&M-EST. PATIENT-LVL III: CPT | Mod: PBBFAC,,, | Performed by: DIETITIAN, REGISTERED

## 2023-05-01 PROCEDURE — G0108 DIAB MANAGE TRN  PER INDIV: HCPCS | Mod: S$GLB,,, | Performed by: DIETITIAN, REGISTERED

## 2023-05-01 RX ORDER — ALBUTEROL SULFATE 90 UG/1
AEROSOL, METERED RESPIRATORY (INHALATION)
Qty: 18 G | Refills: 11 | Status: SHIPPED | OUTPATIENT
Start: 2023-05-01

## 2023-05-01 NOTE — PROGRESS NOTES
"Diabetes Care Specialist Progress Note  Author: Jose Dickinson RD  Date: 5/1/2023    Program Intake  Reason for Diabetes Program Visit:: Initial Diabetes Assessment  Current diabetes risk level:: high  In the last 12 months, have you:: used emergency room services    Lab Results   Component Value Date    HGBA1C 13.9 (H) 04/11/2023     CURRENT DM MEDICATIONS:   Metformin ER, 500 mg  Glipizide, 5 mg  Jardiance, 25 mg  Mounjaro, 10 mg weekly  Lantus, 24 units at night   Novolog - uses CeQur patch when he is away from home - takes 30 min before eating   2-4 clicks depending on meal  Dials pen to 4 units     Clinical    Weight: 115.9 kg (255 lb 8.2 oz)   Height: 5' 7" (170.2 cm)   Body mass index is 40.02 kg/m².    Patient Health Rating  Compared to other people your age, how would you rate your health?: Fair    Problem Review  Reviewed Problem List with Patient: yes  Active comorbidities affecting diabetes self-care.: yes  Comorbidities: Hypertension, Neuropathy, Other (comment)  Reviewed health maintenance: yes (discussed need for eye exam)    Clinical Assessment  Current Diabetes Treatment: Oral Medication, Diet, Injectable, Insulin  Have you ever experienced hypoglycemia (low blood sugar)?: no  Have you ever experienced hyperglycemia (high blood sugar)?: yes  In the last month, how often have you experienced high blood sugar?: once a day  Are you able to tell when your blood sugar is high?: No (comment)  Have you ever been hospitalized because your blood sugar was high?: no    Medication Information  How do you obtain your medications?: Patient drives  How many days a week do you miss your medications?: Never  Do you sometimes have difficulty refilling your medications?: No  Medication adherence impacting ability to self-manage diabetes?: No    Labs  Do you have regular lab work to monitor your medications?: Yes  Type of Regular Lab Work: A1c  Where do you get your labs drawn?: [facility]  Lab Compliance Barriers: " No    Nutritional Status  Diet: Regular (pt cut out rice, bread)  Meal Plan 24 Hour Recall: Breakfast, Lunch, Dinner  Meal Plan 24 Hour Recall - Breakfast: none  Meal Plan 24 Hour Recall - Lunch: 2p - fried chicken, broccoli, green beans; SF tea  Meal Plan 24 Hour Recall - Dinner: 10p - bowl of Raisin Bran with whole milk  Change in appetite?: No  Dentation:: Wears Dentures  Recent Changes in Weight: No Recent Weight Change  Current nutritional status an area of need that is impacting patient's ability to self-manage diabetes?: No    Additional Social History    Support  Does anyone support you with your diabetes care?: yes  Who supports you?: self  Who takes you to your medical appointments?: self  Does the current support meet the patient's needs?: Yes  Is Support an area impacting ability to self-manage diabetes?: No    Access to Mass Media & Technology  Does the patient have access to any of the following devices or technologies?:  (pt has a flip phone)  Media or technology needs impacting ability to self-manage diabetes?: No    Cognitive/Behavioral Health  Alert and Oriented: Yes  Difficulty Thinking: No  Requires Prompting: No  Requires assistance for routine expression?: No  Cognitive or behavioral barriers impacting ability to self-manage diabetes?: Yes    Culture/Yarsanism  Culture or Congregational beliefs that may impact ability to access healthcare: No    Communication  Language preference: English  Hearing Problems: No  Vision Problems: Yes  Vision problem type:: Decreased Vision  Vision Assistance:  (needs glasses)  Communication needs impacting ability to self-manage diabetes?: No    Health Literacy  Preferred Learning Method: Face to Face, Hands On, Demonstration, Reading Materials  How often do you need to have someone help you read instructions, pamphlets, or written material from your doctor or pharmacy?: Never  Health literacy needs impacting ability to self-manage diabetes?:  (unable to assess)    Pt  with poor hygiene - hands and fingernails       Diabetes Self-Management Skills Assessment    Diabetes Disease Process/Treatment Options  Patient/caregiver able to state what happens when someone has diabetes.: yes  Patient/caregiver knows what type of diabetes they have.: yes  Diabetes Type : Type II  Diabetes Disease Process/Treatment Options: Skills Assessment Completed: Yes  Assessment indicates:: Knowledge deficit  Area of need?: Yes    Nutrition/Healthy Eating  Challenges to healthy eating:: portion control  Method of carbohydrate measurement:: no method  Patient can identify foods that impact blood sugar.: yes  Patient-identified foods:: starches (bread, pasta, rice, cereal), other (see comments), milk (rice, bread, sweet potatoes, milk)  Nutrition/Healthy Eating Skills Assessment Completed:: Yes  Assessment indicates:: Instruction Needed  Area of need?: Yes    Physical Activity/Exercise  Patient's daily activity level:: moderately active (pt is always working in his yard)  Patient formally exercises outside of work.: no  Reasons for not exercising:: other (see comments) (he consistently does yardwork. Walks around a lot picking up limbs, etc)  Physical Activity/Exercise Skills Assessment Completed: : Yes  Assessment indicates:: Adequate understanding  Area of need?: No    Medications  Patient is able to describe current diabetes management routine.: no (pt could not remember the doses for insulin and got confused about how many injections and how often he takes Mounjaro.)  Patient is able to identify current diabetes medications, dosages, and appropriate timing of medications.: no  Patient understands the purpose of the medications taken for diabetes.: no  Patient reports problems or concerns with current medication regimen.: no  Medication Skills Assessment Completed:: Yes  Assessment indicates:: Instruction Needed  Area of need?: Yes    Home Blood Glucose Monitoring  Patient states that blood sugar is  "checked at home daily.: yes  Monitoring Method:: home glucometer  How often do you check your blood sugar?: Once a day  When do you check your blood sugar?: Before breakfast  When you check what is your typical blood sugar range? : 180-220 //  if fasting BG is high, he might check again later in the day  Blood glucose logs:: no  Blood glucose logs reviewed today?: no  Home Blood Glucose Monitoring Skills Assessment Completed: : Yes  Assessment indicates:: Instruction Needed  Area of need?: Yes    Acute Complications  Patient is able to identify types of acute complications: No  Acute Complications Skills Assessment Completed: : Yes  Assessment indicates:: Knowledge deficit, Instruction Needed  Area of need?: Yes    Chronic Complications  Chronic Complications Skills Assessment Completed: : No  Deferred due to:: Time  Area of need?:  (deferred)    Psychosocial/Coping  Patient can identify ways of coping with chronic disease.: yes  Patient-stated ways of coping with chronic disease:: support from loved ones, other (see comments) (goes out in his yard with his dog)  Psychosocial/Coping Skills Assessment Completed: : Yes  Assessment indicates:: Adequate understanding  Area of need?: No      Assessment Summary and Plan    Based on today's diabetes care assessment, the following areas of need were identified:      Social 5/1/2023   Support No   Access to Mass Media/Tech No   Cognitive/Behavioral Health Yes   Culture/Tenriism No   Communication No   Health Literacy (No Data)        Clinical 5/1/2023   Medication Adherence No   Lab Compliance No   Nutritional Status No        Diabetes Self-Management Skills 5/1/2023   Diabetes Disease Process/Treatment Options Yes - Gave brief explanation of how eating too much raises blood sugar.      Nutrition/Healthy Eating Yes - Reviewed "healthy meal planning guide"   Limit carb portions per meal      Physical Activity/Exercise No   Medication Yes - care plan added      Home Blood " Glucose Monitoring Yes - care plan added      Acute Complications Yes - Reviewed blood glucose goals, prevention, detection, signs and symptoms, and treatment of hypoglycemia and hyperglycemia, and when to contact the clinic.     Chronic Complications Deferred    Psychosocial/Coping No          Today's interventions were provided through individual discussion, instruction, and written materials were provided.      Patient verbalized understanding of instruction and written materials.  Pt was able to return back demonstration of instructions today. Patient understood key points, needs reinforcement and further instruction.     Diabetes Self-Management Care Plan:    Today's Diabetes Self-Management Care Plan was developed with Trey's input. Trey has agreed to work toward the following goal(s) to improve his/her overall diabetes control.      Care Plan: Diabetes Management   Updates made since 4/1/2023 12:00 AM        Problem: Blood Glucose Self-Monitoring         Goal: Patient agrees to check and record blood sugars 2 times per day.    Start Date: 5/1/2023   Expected End Date: 11/1/2023   Priority: Medium   Barriers: Other (comments)   Note:    Pt is currently checking fasting BG daily and only occasionally checks a second time.   Fasting BG ranges from 180-220  Advised to cont to check fasting BG and also check before dinner.   Bring in his notebook with BG readings next appt.        Task: Reviewed the importance of self-monitoring blood glucose and keeping logs. Completed 5/1/2023   Note:    Stressed importance of cleaning his fingers with alcohol wipes before finger stick.        Problem: Medications         Goal: Patient Agrees to take Diabetes Medication(s) as prescribed.    Start Date: 5/1/2023   Expected End Date: 11/1/2023   Priority: High   Barriers: Cognitive Deficits   Note:    Pt was getting confused with how he has been taking his insulin and Mounjaro.   He said that someone told him to take 2 shots of  one of his meds every two weeks, but then someone else told him to take 1 shot every week. He thinks this was Mounjaro.   Instructed to continue to take 1 shot every week.     For Novolog, when using pens, he is dialing to 4 units, but when using Cequr, he uses 2-4 clicks.   Per instructions from Bee Stinson, he is to take 4 clicks before meals. This would be 8 units using the pen.       Task: Instructed patient on how to self-administer Novolog using pen and/or Cequr Completed 5/1/2023          Follow Up Plan     Follow up in about 10 weeks (around 7/10/2023).  Will complete assessment and review carb serving sizes; will evaluate goals    Today's care plan and follow up schedule was discussed with patient.  Trey verbalized understanding of the care plan, goals, and agrees to follow up plan.        The patient was encouraged to communicate with his/her health care provider/physician and care team regarding his/her condition(s) and treatment.  I provided the patient with my contact information today and encouraged to contact me via phone or [facility] Patient Portal as needed.     Length of Visit   Total Time: 60 Minutes

## 2023-05-01 NOTE — PATIENT INSTRUCTIONS
Take Novolog 15 min before each meal  Dial to 8 units with pen  When using CeQur, click 4 times    Keep a log of blood sugar readings and bring to next appointment

## 2023-05-01 NOTE — Clinical Note
As discussed previously, pt was confused about doses of insulin and Mounjaro.  Gave written instructions and explained.  4 clicks on Cequr equal 8 units so he should dial pen to 8 Clarified taking 1 injection of mounjaro every week.

## 2023-05-03 ENCOUNTER — CLINICAL SUPPORT (OUTPATIENT)
Dept: PRIMARY CARE CLINIC | Facility: CLINIC | Age: 68
End: 2023-05-03
Payer: MEDICARE

## 2023-05-03 ENCOUNTER — OFFICE VISIT (OUTPATIENT)
Dept: PRIMARY CARE CLINIC | Facility: CLINIC | Age: 68
End: 2023-05-03
Payer: MEDICARE

## 2023-05-03 VITALS
BODY MASS INDEX: 40.29 KG/M2 | OXYGEN SATURATION: 95 % | WEIGHT: 256.69 LBS | HEART RATE: 86 BPM | DIASTOLIC BLOOD PRESSURE: 65 MMHG | HEIGHT: 67 IN | SYSTOLIC BLOOD PRESSURE: 124 MMHG | TEMPERATURE: 98 F

## 2023-05-03 DIAGNOSIS — Z55.6 PROBLEMS RELATED TO HEALTH LITERACY: ICD-10-CM

## 2023-05-03 DIAGNOSIS — N18.31 TYPE 2 DIABETES MELLITUS WITH STAGE 3A CHRONIC KIDNEY DISEASE, WITH LONG-TERM CURRENT USE OF INSULIN: Primary | ICD-10-CM

## 2023-05-03 DIAGNOSIS — Z91.199 NONCOMPLIANCE WITH DIABETES TREATMENT: ICD-10-CM

## 2023-05-03 DIAGNOSIS — E66.01 SEVERE OBESITY (BMI 35.0-35.9 WITH COMORBIDITY): Primary | ICD-10-CM

## 2023-05-03 DIAGNOSIS — E11.69 HYPERLIPIDEMIA ASSOCIATED WITH TYPE 2 DIABETES MELLITUS: ICD-10-CM

## 2023-05-03 DIAGNOSIS — E11.22 TYPE 2 DIABETES MELLITUS WITH STAGE 3A CHRONIC KIDNEY DISEASE, WITH LONG-TERM CURRENT USE OF INSULIN: Primary | ICD-10-CM

## 2023-05-03 DIAGNOSIS — E78.5 HYPERLIPIDEMIA ASSOCIATED WITH TYPE 2 DIABETES MELLITUS: ICD-10-CM

## 2023-05-03 DIAGNOSIS — E66.01 CLASS 2 SEVERE OBESITY DUE TO EXCESS CALORIES WITH SERIOUS COMORBIDITY AND BODY MASS INDEX (BMI) OF 39.0 TO 39.9 IN ADULT: ICD-10-CM

## 2023-05-03 DIAGNOSIS — Z79.4 TYPE 2 DIABETES MELLITUS WITH STAGE 3A CHRONIC KIDNEY DISEASE, WITH LONG-TERM CURRENT USE OF INSULIN: Primary | ICD-10-CM

## 2023-05-03 PROCEDURE — 4010F ACE/ARB THERAPY RXD/TAKEN: CPT | Mod: CPTII,S$GLB,, | Performed by: FAMILY MEDICINE

## 2023-05-03 PROCEDURE — 1159F MED LIST DOCD IN RCRD: CPT | Mod: CPTII,S$GLB,, | Performed by: FAMILY MEDICINE

## 2023-05-03 PROCEDURE — 3046F PR MOST RECENT HEMOGLOBIN A1C LEVEL > 9.0%: ICD-10-PCS | Mod: CPTII,S$GLB,, | Performed by: FAMILY MEDICINE

## 2023-05-03 PROCEDURE — 1160F RVW MEDS BY RX/DR IN RCRD: CPT | Mod: CPTII,S$GLB,, | Performed by: FAMILY MEDICINE

## 2023-05-03 PROCEDURE — 99999 PR PBB SHADOW E&M-EST. PATIENT-LVL V: ICD-10-PCS | Mod: PBBFAC,,, | Performed by: FAMILY MEDICINE

## 2023-05-03 PROCEDURE — 3074F SYST BP LT 130 MM HG: CPT | Mod: CPTII,S$GLB,, | Performed by: FAMILY MEDICINE

## 2023-05-03 PROCEDURE — 1157F PR ADVANCE CARE PLAN OR EQUIV PRESENT IN MEDICAL RECORD: ICD-10-PCS | Mod: CPTII,S$GLB,, | Performed by: FAMILY MEDICINE

## 2023-05-03 PROCEDURE — 1160F PR REVIEW ALL MEDS BY PRESCRIBER/CLIN PHARMACIST DOCUMENTED: ICD-10-PCS | Mod: CPTII,S$GLB,, | Performed by: FAMILY MEDICINE

## 2023-05-03 PROCEDURE — 3008F BODY MASS INDEX DOCD: CPT | Mod: CPTII,S$GLB,, | Performed by: FAMILY MEDICINE

## 2023-05-03 PROCEDURE — 3288F FALL RISK ASSESSMENT DOCD: CPT | Mod: CPTII,S$GLB,, | Performed by: FAMILY MEDICINE

## 2023-05-03 PROCEDURE — 3008F PR BODY MASS INDEX (BMI) DOCUMENTED: ICD-10-PCS | Mod: CPTII,S$GLB,, | Performed by: FAMILY MEDICINE

## 2023-05-03 PROCEDURE — 99214 OFFICE O/P EST MOD 30 MIN: CPT | Mod: S$GLB,,, | Performed by: FAMILY MEDICINE

## 2023-05-03 PROCEDURE — 4010F PR ACE/ARB THEARPY RXD/TAKEN: ICD-10-PCS | Mod: CPTII,S$GLB,, | Performed by: FAMILY MEDICINE

## 2023-05-03 PROCEDURE — 99499 NO LOS: ICD-10-PCS | Mod: S$GLB,,,

## 2023-05-03 PROCEDURE — 3078F DIAST BP <80 MM HG: CPT | Mod: CPTII,S$GLB,, | Performed by: FAMILY MEDICINE

## 2023-05-03 PROCEDURE — 1126F AMNT PAIN NOTED NONE PRSNT: CPT | Mod: CPTII,S$GLB,, | Performed by: FAMILY MEDICINE

## 2023-05-03 PROCEDURE — 99214 PR OFFICE/OUTPT VISIT, EST, LEVL IV, 30-39 MIN: ICD-10-PCS | Mod: S$GLB,,, | Performed by: FAMILY MEDICINE

## 2023-05-03 PROCEDURE — 3288F PR FALLS RISK ASSESSMENT DOCUMENTED: ICD-10-PCS | Mod: CPTII,S$GLB,, | Performed by: FAMILY MEDICINE

## 2023-05-03 PROCEDURE — 3074F PR MOST RECENT SYSTOLIC BLOOD PRESSURE < 130 MM HG: ICD-10-PCS | Mod: CPTII,S$GLB,, | Performed by: FAMILY MEDICINE

## 2023-05-03 PROCEDURE — 99499 UNLISTED E&M SERVICE: CPT | Mod: S$GLB,,,

## 2023-05-03 PROCEDURE — 3046F HEMOGLOBIN A1C LEVEL >9.0%: CPT | Mod: CPTII,S$GLB,, | Performed by: FAMILY MEDICINE

## 2023-05-03 PROCEDURE — 1159F PR MEDICATION LIST DOCUMENTED IN MEDICAL RECORD: ICD-10-PCS | Mod: CPTII,S$GLB,, | Performed by: FAMILY MEDICINE

## 2023-05-03 PROCEDURE — 99999 PR PBB SHADOW E&M-EST. PATIENT-LVL V: CPT | Mod: PBBFAC,,, | Performed by: FAMILY MEDICINE

## 2023-05-03 PROCEDURE — 1126F PR PAIN SEVERITY QUANTIFIED, NO PAIN PRESENT: ICD-10-PCS | Mod: CPTII,S$GLB,, | Performed by: FAMILY MEDICINE

## 2023-05-03 PROCEDURE — 1157F ADVNC CARE PLAN IN RCRD: CPT | Mod: CPTII,S$GLB,, | Performed by: FAMILY MEDICINE

## 2023-05-03 PROCEDURE — 1101F PT FALLS ASSESS-DOCD LE1/YR: CPT | Mod: CPTII,S$GLB,, | Performed by: FAMILY MEDICINE

## 2023-05-03 PROCEDURE — 3078F PR MOST RECENT DIASTOLIC BLOOD PRESSURE < 80 MM HG: ICD-10-PCS | Mod: CPTII,S$GLB,, | Performed by: FAMILY MEDICINE

## 2023-05-03 PROCEDURE — 1101F PR PT FALLS ASSESS DOC 0-1 FALLS W/OUT INJ PAST YR: ICD-10-PCS | Mod: CPTII,S$GLB,, | Performed by: FAMILY MEDICINE

## 2023-05-03 SDOH — SOCIAL DETERMINANTS OF HEALTH (SDOH): PROBLEMS RELATED TO HEALTH LITERACY: Z55.6

## 2023-05-03 NOTE — PROGRESS NOTES
"Met with patient prior to his MD visit today. Discussed his goals of better sleep, more exercise and better eating habits. Patient reports continued difficulty with sleep. He continues to "close his eyes" in the afternoon and apparently sleeps for a few hours at that time.  He tried to use his radio for some background noise while sleeping, but apparently the radio stopped working. He is considering turning his TV in the bedroom around to face the wall with low volume on to help him sleep.  Patient wakes up about 5:30a.m. every morning. He drinks coffee, but does not eat anything usually. Says he is not a breakfast eater. He eats dinner around 12:30 p.m.  Discussed that that is a long time to go without any food, and may be contributing to late night snacking. Encouraged pt to try to start eating something for breakfast. He does not cook for himself.  Discussed eating a sandwich, leftovers, nuts and fruit,  or cereal with milk for breakfast. Patient does say he eats wheat bread but does not know if it is whole grain bread.  He agrees that he can try to start eating something in the morning.  Discussed his activity level also; had previously discussed using the push mower more often, rather than the riding mower. Patient has not done so yet, but says he can start using the push mower. Suggested doing a daily walk that is not a part of doing yardwork; an uninterrupted walk. Discussed the route he can take to walk his property uninterrupted. Patient agrees that he can walk his property. Discussed how this may be good for his knees, as he feels very stiff and is slow to get up from a chair.  Gave patient some written suggestions which he reviewed during appointment. LCSW will see patient again at his next MD visit.     "
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
## 2023-05-03 NOTE — PATIENT INSTRUCTIONS
BRING your pill boxes and all of your medication, even the shots, to your weekly visit.   Expect a daily phone call to remind you to take your pills.     Nkechi or I will see you every other week to check up on you.     Send your Cologuard sample into the mail.     If you are feeling unwell, we'd like to be the first ones to know! Please give us a call. Same day appointments are our top priority to keep you well and out of the emergency rooms and hospitals. Call [phone] for our direct line.

## 2023-05-08 ENCOUNTER — OUTPATIENT CASE MANAGEMENT (OUTPATIENT)
Dept: ADMINISTRATIVE | Facility: OTHER | Age: 68
End: 2023-05-08
Payer: MEDICARE

## 2023-05-08 NOTE — PROGRESS NOTES
Outpatient Care Management  Plan of Care Follow Up Visit    Patient: Trey Stevens  MRN: [id number]  Date of Service: 05/08/2023  Completed by: Louise Aguayo RN  Referral Date: 01/25/2023    Reason for Visit   Patient presents with    Nursing Assessment    OPCM RN Follow Up Call       Brief Summary: OPCM visit completed, care plan reviewed and updated.   Next Steps: plan to follow up in approximately one week. What has he been eating? What are blood sugars?  Taking meds as ordered? How are feet?  GOVIND Aguayo RN

## 2023-05-11 ENCOUNTER — OFFICE VISIT (OUTPATIENT)
Dept: OPHTHALMOLOGY | Facility: CLINIC | Age: 68
End: 2023-05-11
Payer: MEDICARE

## 2023-05-11 DIAGNOSIS — E11.36 DIABETIC CATARACT: Primary | ICD-10-CM

## 2023-05-11 DIAGNOSIS — N18.31 TYPE 2 DIABETES MELLITUS WITH STAGE 3A CHRONIC KIDNEY DISEASE, WITH LONG-TERM CURRENT USE OF INSULIN: ICD-10-CM

## 2023-05-11 DIAGNOSIS — Z79.4 TYPE 2 DIABETES MELLITUS WITH STAGE 3A CHRONIC KIDNEY DISEASE, WITH LONG-TERM CURRENT USE OF INSULIN: ICD-10-CM

## 2023-05-11 DIAGNOSIS — H52.13 MYOPIA OF BOTH EYES: ICD-10-CM

## 2023-05-11 DIAGNOSIS — E11.22 TYPE 2 DIABETES MELLITUS WITH STAGE 3A CHRONIC KIDNEY DISEASE, WITH LONG-TERM CURRENT USE OF INSULIN: ICD-10-CM

## 2023-05-11 DIAGNOSIS — E11.9 DIABETES MELLITUS WITHOUT COMPLICATION: ICD-10-CM

## 2023-05-11 PROCEDURE — 4010F ACE/ARB THERAPY RXD/TAKEN: CPT | Mod: CPTII,S$GLB,, | Performed by: OPTOMETRIST

## 2023-05-11 PROCEDURE — 1157F PR ADVANCE CARE PLAN OR EQUIV PRESENT IN MEDICAL RECORD: ICD-10-PCS | Mod: CPTII,S$GLB,, | Performed by: OPTOMETRIST

## 2023-05-11 PROCEDURE — 3046F HEMOGLOBIN A1C LEVEL >9.0%: CPT | Mod: CPTII,S$GLB,, | Performed by: OPTOMETRIST

## 2023-05-11 PROCEDURE — 99999 PR PBB SHADOW E&M-EST. PATIENT-LVL III: ICD-10-PCS | Mod: PBBFAC,,, | Performed by: OPTOMETRIST

## 2023-05-11 PROCEDURE — 2023F DILAT RTA XM W/O RTNOPTHY: CPT | Mod: CPTII,S$GLB,, | Performed by: OPTOMETRIST

## 2023-05-11 PROCEDURE — 3046F PR MOST RECENT HEMOGLOBIN A1C LEVEL > 9.0%: ICD-10-PCS | Mod: CPTII,S$GLB,, | Performed by: OPTOMETRIST

## 2023-05-11 PROCEDURE — 1159F PR MEDICATION LIST DOCUMENTED IN MEDICAL RECORD: ICD-10-PCS | Mod: CPTII,S$GLB,, | Performed by: OPTOMETRIST

## 2023-05-11 PROCEDURE — 1159F MED LIST DOCD IN RCRD: CPT | Mod: CPTII,S$GLB,, | Performed by: OPTOMETRIST

## 2023-05-11 PROCEDURE — 1157F ADVNC CARE PLAN IN RCRD: CPT | Mod: CPTII,S$GLB,, | Performed by: OPTOMETRIST

## 2023-05-11 PROCEDURE — 92004 COMPRE OPH EXAM NEW PT 1/>: CPT | Mod: S$GLB,,, | Performed by: OPTOMETRIST

## 2023-05-11 PROCEDURE — 4010F PR ACE/ARB THEARPY RXD/TAKEN: ICD-10-PCS | Mod: CPTII,S$GLB,, | Performed by: OPTOMETRIST

## 2023-05-11 PROCEDURE — 99999 PR PBB SHADOW E&M-EST. PATIENT-LVL III: CPT | Mod: PBBFAC,,, | Performed by: OPTOMETRIST

## 2023-05-11 PROCEDURE — 92004 PR EYE EXAM, NEW PATIENT,COMPREHESV: ICD-10-PCS | Mod: S$GLB,,, | Performed by: OPTOMETRIST

## 2023-05-11 PROCEDURE — 2023F PR DILATED RETINAL EXAM W/O EVID OF RETINOPATHY: ICD-10-PCS | Mod: CPTII,S$GLB,, | Performed by: OPTOMETRIST

## 2023-05-11 NOTE — PROGRESS NOTES
HPI    NP to DKT  Patient here today for yearly DM eye exam  Diagnosed with diabetes in 2015  Lab Results       Component                Value               Date                       HGBA1C                   13.9 (H)            04/11/2023            Vision changes since last eye exam?: Yes at distance  No correction      Any eye pain today: No    Other ocular symptoms: No    Interested in contact lens fitting today? No              Last edited by Mara Rankin, PCT on 5/11/2023  8:51 AM.            Assessment /Plan     For exam results, see Encounter Report.    Diabetic cataract  Visually significant, has seen Dr MARS, will defer CE/IOL eval until A1C <10 per Dr CREWS. Continue close observation at this time.     Type 2 diabetes mellitus with stage 3a chronic kidney disease, with long-term current use of insulin  -     Ambulatory referral/consult to Ophthalmology    Diabetes mellitus without complication  8 years, last A1c 13.9 Stressed importance of DM control to preserve vision. No diabetic retinopathy was seen in either eye today. Continue strict blood glucose control.  Reviewed importance of yearly dilated eye exams. Continue close care with PCP regarding diabetes.    Myopia of both eyes  Eyeglass Final Rx       Eyeglass Final Rx         Sphere Cylinder Add    Right -2.25 DS +2.50    Left -2.25 DS +2.50      Type: PAL    Expiration Date: 5/11/2024                    RTC 1 yr for dilated eye exam or sooner if any changes to vision.   Discussed above and answered questions.

## 2023-05-15 ENCOUNTER — OFFICE VISIT (OUTPATIENT)
Dept: PULMONOLOGY | Facility: CLINIC | Age: 68
End: 2023-05-15
Payer: MEDICARE

## 2023-05-15 ENCOUNTER — CLINICAL SUPPORT (OUTPATIENT)
Dept: PULMONOLOGY | Facility: CLINIC | Age: 68
End: 2023-05-15
Payer: MEDICARE

## 2023-05-15 VITALS
SYSTOLIC BLOOD PRESSURE: 127 MMHG | OXYGEN SATURATION: 97 % | DIASTOLIC BLOOD PRESSURE: 61 MMHG | HEIGHT: 67 IN | BODY MASS INDEX: 41.07 KG/M2 | HEART RATE: 92 BPM | WEIGHT: 261.69 LBS | BODY MASS INDEX: 41.07 KG/M2 | HEIGHT: 67 IN | WEIGHT: 261.69 LBS | RESPIRATION RATE: 18 BRPM

## 2023-05-15 DIAGNOSIS — G47.33 OSA ON CPAP: ICD-10-CM

## 2023-05-15 DIAGNOSIS — E66.01 MORBID OBESITY: ICD-10-CM

## 2023-05-15 DIAGNOSIS — J44.9 MIXED TYPE COPD (CHRONIC OBSTRUCTIVE PULMONARY DISEASE): Chronic | ICD-10-CM

## 2023-05-15 DIAGNOSIS — Z99.81 DEPENDENCE ON NOCTURNAL OXYGEN THERAPY: ICD-10-CM

## 2023-05-15 DIAGNOSIS — I15.2 HYPERTENSION ASSOCIATED WITH DIABETES: ICD-10-CM

## 2023-05-15 DIAGNOSIS — F17.210 NICOTINE DEPENDENCE, CIGARETTES, UNCOMPLICATED: ICD-10-CM

## 2023-05-15 DIAGNOSIS — E11.59 HYPERTENSION ASSOCIATED WITH DIABETES: ICD-10-CM

## 2023-05-15 DIAGNOSIS — R91.8 MULTIPLE LUNG NODULES ON CT: Primary | Chronic | ICD-10-CM

## 2023-05-15 LAB
BRPFT: NORMAL
FEF 25 75 CHG: 9.7 %
FEF 25 75 LLN: 1.12
FEF 25 75 POST REF: 42.8 %
FEF 25 75 PRE REF: 39 %
FEF 25 75 REF: 2.45
FET100 CHG: -32.2 %
FEV1 CHG: 3.4 %
FEV1 FVC CHG: 0.4 %
FEV1 FVC LLN: 64
FEV1 FVC POST REF: 94.3 %
FEV1 FVC PRE REF: 94 %
FEV1 FVC REF: 77
FEV1 LLN: 2.23
FEV1 POST REF: 52.4 %
FEV1 PRE REF: 50.7 %
FEV1 REF: 3.04
FVC CHG: 3.1 %
FVC LLN: 2.96
FVC POST REF: 55.5 %
FVC PRE REF: 53.8 %
FVC REF: 3.95
PEF CHG: 13 %
PEF LLN: 5.94
PEF POST REF: 49.6 %
PEF PRE REF: 43.9 %
PEF REF: 8.06
POST FEF 25 75: 1.05 L/S
POST FET 100: 8.94 SEC
POST FEV1 FVC: 72.71 %
POST FEV1: 1.59 L
POST FVC: 2.19 L
POST PEF: 4 L/S
PRE FEF 25 75: 0.96 L/S
PRE FET 100: 13.19 SEC
PRE FEV1 FVC: 72.44 %
PRE FEV1: 1.54 L
PRE FVC: 2.13 L
PRE PEF: 3.54 L/S

## 2023-05-15 PROCEDURE — 3008F BODY MASS INDEX DOCD: CPT | Mod: CPTII,S$GLB,, | Performed by: PHYSICIAN ASSISTANT

## 2023-05-15 PROCEDURE — 3046F HEMOGLOBIN A1C LEVEL >9.0%: CPT | Mod: CPTII,S$GLB,, | Performed by: PHYSICIAN ASSISTANT

## 2023-05-15 PROCEDURE — 1159F MED LIST DOCD IN RCRD: CPT | Mod: CPTII,S$GLB,, | Performed by: PHYSICIAN ASSISTANT

## 2023-05-15 PROCEDURE — 1157F ADVNC CARE PLAN IN RCRD: CPT | Mod: CPTII,S$GLB,, | Performed by: PHYSICIAN ASSISTANT

## 2023-05-15 PROCEDURE — 99999 PR PBB SHADOW E&M-EST. PATIENT-LVL V: CPT | Mod: PBBFAC,,, | Performed by: PHYSICIAN ASSISTANT

## 2023-05-15 PROCEDURE — 4010F ACE/ARB THERAPY RXD/TAKEN: CPT | Mod: CPTII,S$GLB,, | Performed by: PHYSICIAN ASSISTANT

## 2023-05-15 PROCEDURE — 3074F PR MOST RECENT SYSTOLIC BLOOD PRESSURE < 130 MM HG: ICD-10-PCS | Mod: CPTII,S$GLB,, | Performed by: PHYSICIAN ASSISTANT

## 2023-05-15 PROCEDURE — 1160F PR REVIEW ALL MEDS BY PRESCRIBER/CLIN PHARMACIST DOCUMENTED: ICD-10-PCS | Mod: CPTII,S$GLB,, | Performed by: PHYSICIAN ASSISTANT

## 2023-05-15 PROCEDURE — 94618 PULMONARY STRESS TESTING: CPT | Mod: S$GLB,,, | Performed by: INTERNAL MEDICINE

## 2023-05-15 PROCEDURE — 1157F PR ADVANCE CARE PLAN OR EQUIV PRESENT IN MEDICAL RECORD: ICD-10-PCS | Mod: CPTII,S$GLB,, | Performed by: PHYSICIAN ASSISTANT

## 2023-05-15 PROCEDURE — 1160F RVW MEDS BY RX/DR IN RCRD: CPT | Mod: CPTII,S$GLB,, | Performed by: PHYSICIAN ASSISTANT

## 2023-05-15 PROCEDURE — 4010F PR ACE/ARB THEARPY RXD/TAKEN: ICD-10-PCS | Mod: CPTII,S$GLB,, | Performed by: PHYSICIAN ASSISTANT

## 2023-05-15 PROCEDURE — 3078F PR MOST RECENT DIASTOLIC BLOOD PRESSURE < 80 MM HG: ICD-10-PCS | Mod: CPTII,S$GLB,, | Performed by: PHYSICIAN ASSISTANT

## 2023-05-15 PROCEDURE — 99999 PR PBB SHADOW E&M-EST. PATIENT-LVL V: ICD-10-PCS | Mod: PBBFAC,,, | Performed by: PHYSICIAN ASSISTANT

## 2023-05-15 PROCEDURE — 94618 PULMONARY STRESS TESTING: ICD-10-PCS | Mod: S$GLB,,, | Performed by: INTERNAL MEDICINE

## 2023-05-15 PROCEDURE — 99214 PR OFFICE/OUTPT VISIT, EST, LEVL IV, 30-39 MIN: ICD-10-PCS | Mod: 25,S$GLB,, | Performed by: PHYSICIAN ASSISTANT

## 2023-05-15 PROCEDURE — 3046F PR MOST RECENT HEMOGLOBIN A1C LEVEL > 9.0%: ICD-10-PCS | Mod: CPTII,S$GLB,, | Performed by: PHYSICIAN ASSISTANT

## 2023-05-15 PROCEDURE — 3288F PR FALLS RISK ASSESSMENT DOCUMENTED: ICD-10-PCS | Mod: CPTII,S$GLB,, | Performed by: PHYSICIAN ASSISTANT

## 2023-05-15 PROCEDURE — 94060 PR EVAL OF BRONCHOSPASM: ICD-10-PCS | Mod: 59,S$GLB,, | Performed by: INTERNAL MEDICINE

## 2023-05-15 PROCEDURE — 3074F SYST BP LT 130 MM HG: CPT | Mod: CPTII,S$GLB,, | Performed by: PHYSICIAN ASSISTANT

## 2023-05-15 PROCEDURE — 1101F PR PT FALLS ASSESS DOC 0-1 FALLS W/OUT INJ PAST YR: ICD-10-PCS | Mod: CPTII,S$GLB,, | Performed by: PHYSICIAN ASSISTANT

## 2023-05-15 PROCEDURE — 1101F PT FALLS ASSESS-DOCD LE1/YR: CPT | Mod: CPTII,S$GLB,, | Performed by: PHYSICIAN ASSISTANT

## 2023-05-15 PROCEDURE — 3288F FALL RISK ASSESSMENT DOCD: CPT | Mod: CPTII,S$GLB,, | Performed by: PHYSICIAN ASSISTANT

## 2023-05-15 PROCEDURE — 1159F PR MEDICATION LIST DOCUMENTED IN MEDICAL RECORD: ICD-10-PCS | Mod: CPTII,S$GLB,, | Performed by: PHYSICIAN ASSISTANT

## 2023-05-15 PROCEDURE — 99214 OFFICE O/P EST MOD 30 MIN: CPT | Mod: 25,S$GLB,, | Performed by: PHYSICIAN ASSISTANT

## 2023-05-15 PROCEDURE — 3008F PR BODY MASS INDEX (BMI) DOCUMENTED: ICD-10-PCS | Mod: CPTII,S$GLB,, | Performed by: PHYSICIAN ASSISTANT

## 2023-05-15 PROCEDURE — 94060 EVALUATION OF WHEEZING: CPT | Mod: 59,S$GLB,, | Performed by: INTERNAL MEDICINE

## 2023-05-15 PROCEDURE — 3078F DIAST BP <80 MM HG: CPT | Mod: CPTII,S$GLB,, | Performed by: PHYSICIAN ASSISTANT

## 2023-05-15 NOTE — PROCEDURES
"[facility] - Pulmonary Function  Six Minute Walk     SUMMARY     Ordering Provider: DARREL Felix   Interpreting Provider: MD Danny  Performing nurse/tech/RT: BENITO Yanez CRT  Diagnosis: COPD (Mixed type)  Height: 5' 7" (170.2 cm)  Weight: 118.7 kg (261 lb 11 oz)  BMI (Calculated): 41   Patient Race:             Phase Oxygen Assessment Supplemental O2 Heart   Rate Blood Pressure Christian Dyspnea Scale Rating   Resting 97 % Room Air 92 bpm 127/61 2   Exercise        Minute        1 95 % Room Air 105 bpm     2 95 % Room Air 113 bpm     3 95 % Room Air 117 bpm     4 95 % Room Air 120 bpm     5 96 % Room Air 123 bpm     6  96 % Room Air 123 bpm 149/72 5-6   Recovery        Minute        1 95 % Room Air 110 bpm     2 96 % Room Air 99 bpm     3 96 % Room Air 96 bpm     4 95 % Room Air 95 bpm 114/57 2     Six Minute Walk Summary  6MWT Status: completed without stopping  Patient Reported: Dyspnea     Interpretation:  Did the patient stop or pause?: No            Total Time Walked (Calculated): 360 seconds  Final Partial Lap Distance (feet): 100 feet  Total Distance Meters (Calculated): 335.28 meters  Predicted Distance Meters (Calculated): 439.18 meters  Percentage of Predicted (Calculated): 76.34  Peak VO2 (Calculated): 14.04  Mets: 4.01  Has The Patient Had a Previous Six Minute Walk Test?: Yes       Previous 6MWT Results  Has The Patient Had a Previous Six Minute Walk Test?: Yes  Date of Previous Test: 04/11/22  Total Time Walked: 360 seconds  Total Distance (meters): 297.18  Predicted Distance (meters): 465.08 meters  Percentage of Predicted: 63.9  Percent Change (Calculated): -0.13      Six minute walk distance is 335.28m /439.18 meters (76.34 % predicted) with somewhat heavy.Patient did complete the study, walking 360 seconds of the 360 second test . During exercise, there was no  significant desaturation while breathing room air .Lowest oxygen saturation was 95% .Maximum heart rate during exercise was 123 bpm which " is 79 % of maximum predicted heart rate of 154 bpm. Blood pressure remained stable and Heart rate increased significantly Based upon age and body mass index, exercise capacity is normal.  Peak VO2 during walking was 14.04 ml/kg/min which is 50 % of predicted Peak VO2 max of 28 ml/kg/min based on a resting heart rate of 92/min.    Patient had a previous study. Since the previous study in 04/11/2022, exercise capacity may be somewhat improved.

## 2023-05-15 NOTE — PROGRESS NOTES
Subjective:       Patient ID: Trey Stevens is a 68 y.o. male.    Chief Complaint: COPD    5/15/2023  66yo male here for follow up of COPD/lung nodules  28 pack year current smoker, down to half a pack a day  No signs of infection today  No cough, wheezing, or chest pain  Stable SOB with moderate exertion  CAT score 13  Using CPAP nightly and benefits from use  Completed spirometry and 6mwd today    12/6/2022  66yo male here for follow up of COPD/lung nodules  28 pack year current smoker  Stable COPD controlled on albuterol prn, he does not want to use a daily inhaler  No signs of infection today  No cough, wheezing, or chest pain  Stable SOB with moderate exertion  CAT score 19  Using CPAP nightly and benefits from use    Chest CT 11/8/22  FINDINGS:  There is a partially calcified pulmonary nodule in the right upper lobe measuring up to 9 mm.  Multiple sub 6 mm pulmonary nodules are seen bilaterally which are similar to the exam from 2017.  Calcified hilar lymph nodes are seen on the left.  No mediastinal lymphadenopathy.  Coronary artery calcifications are present.  Aortic atherosclerotic calcifications are seen.  No axillary lymphadenopathy.  Multilevel degenerative changes of the spine.  No acute or suspicious osseous finding.  Impression:  Multiple bilateral pulmonary nodules, the largest of which is stable to 5 years ago and has a calcification consistent with granulomatous disease.  Calcified hilar lymph nodes go along with this diagnosis.  However, some pulmonary nodules may be new but they are less than 6 mm.  Follow-up chest CT in 1 year is recommended.        COPD Questionnaire  How often do you cough?: A little of the time  How often do you have phlegm (mucus) in your chest?: Some of the time  How often does your chest feel tight?: Never  When you walk up a hill or one flight of stairs, how often are you breathless?: Most of the time  How often are you limited doing any activities at home?: Never  How  often are you confident leaving the house despite your lung condition?: All of the time  How often do you sleep soundly?: Almost never  How often do you have energy?: All of the time  Total score: 13    EPWORTH SLEEPINESS SCALE 12/6/2022   Sitting and reading 0   Watching TV 3   Sitting, inactive in a public place (e.g. a theatre or a meeting) 0   As a passenger in a car for an hour without a break 1   Lying down to rest in the afternoon when circumstances permit 1   Sitting and talking to someone 0   Sitting quietly after a lunch without alcohol 2   In a car, while stopped for a few minutes in traffic 0   Total score 7       4/2021 Aminata Martinez NP:  HPI:   Trey Stevens is a 67 y.o. male here for follow up COPD/SCHUYLER on CPAP with 2 L blended in.   CPAP compliance download reviewed with patient who voiced understanding.   Follow up for SCHUYLER and CPAP complaince assessment.  He is on AUTOPAP  of  4 - 20 CMWP with OXYGEN AT 2 L /MIN BLENDED IN .   He is adherent with PAP therapy.  He definitely thinks PAP is beneficial to his health and He wants to continue with PAP therapy  Patient denies snoring, headaches, excessive daytime sleepiness     Immunization History   Administered Date(s) Administered    COVID-19, MRNA, LN-S, PF (Pfizer) (Purple Cap) 09/16/2021, 10/07/2021    Hepatitis A, Adult 12/20/2018    Influenza (FLUAD) - Quadrivalent - Adjuvanted - PF *Preferred* (65+) 12/08/2022    Influenza - High Dose - PF (65 years and older) 10/18/2018    Influenza - Quadrivalent 10/24/2016    Influenza - Quadrivalent - PF *Preferred* (6 months and older) 10/30/2017, 11/07/2019, 11/04/2020, 01/18/2022    PPD Test 08/05/2013    Pneumococcal Conjugate - 13 Valent 02/24/2016    Pneumococcal Conjugate - 20 Valent 08/01/2022    Pneumococcal Polysaccharide - 23 Valent 04/09/2013    Tdap 02/24/2016      Tobacco Use: Medium Risk    Smoking Tobacco Use: Former    Smokeless Tobacco Use: Never    Passive Exposure: Not on file      Past  Medical History:   Diagnosis Date    Arthritis     Diabetes mellitus     Diabetes mellitus type 2, uncontrolled 7/19/2016    DM (diabetes mellitus) 2015     09/04/2017    Gall stones     Obesity     Pneumonia of right middle lobe due to infectious organism 8/13/2021    Psoriasis (a type of skin inflammation)     Rheumatoid arthritis of foot       Current Outpatient Medications on File Prior to Visit   Medication Sig Dispense Refill    amLODIPine (NORVASC) 5 MG tablet Take 1 tablet (5 mg total) by mouth once daily. For high blood pressure 90 tablet 3    aspirin 81 MG Chew Take 1 tablet (81 mg total) by mouth once daily. 100 tablet 0    atorvastatin (LIPITOR) 80 MG tablet Take 1 tablet (80 mg total) by mouth once daily. 90 tablet 3    blood sugar diagnostic (ACCU-CHEK NOELLE PLUS TEST STRP) Strp Check blood sugar 3 times daily. 300 each 3    blood-glucose meter (ACCU-CHEK NOELLE PLUS METER) Misc Check blood sugar 3 times daily 1 each 0    calcipotriene (DOVONOX) 0.005 % cream APPLY TO AFFECTED AREA(S) TWICE DAILY 60 g 4    clobetasoL (OLUX) 0.05 % Foam AAA of scalp and behind ears twice daily.  Do not use on face, underarms or groin. 100 g 3    empagliflozin (JARDIANCE) 25 mg tablet Take 1 tablet (25 mg total) by mouth once daily. 90 tablet 1    folic acid (FOLVITE) 1 MG tablet TAKE 1 TABLET ONE TIME DAILY 90 tablet 1    glipiZIDE (GLUCOTROL) 5 MG tablet TAKE 1 TABLET BY MOUTH TWICE A  tablet 1    insulin aspart U-100 (NOVOLOG U-100 INSULIN ASPART) 100 unit/mL injection INJECT 200 UNITS PER CEQUR DEVICE EVERY 3 DAYS 60 mL 1    ketoconazole (NIZORAL) 2 % shampoo WASH HAIR WITH MEDICATED SHAMPOO AT LEAST 2X/WEEK - LET SIT ON SCALP AT LEAST 5 MINUTES PRIOR TO RINSING 120 mL 3    lancets (ACCU-CHEK SOFTCLIX LANCETS) Misc Check blood sugar 3 times daily 300 each 3    LANTUS SOLOSTAR U-100 INSULIN glargine 100 units/mL SubQ pen Inject 24 Units into the skin once daily. 15 mL 5    losartan (COZAAR) 25 MG tablet  "Take 1 tablet (25 mg total) by mouth once daily. 90 tablet 3    methotrexate 2.5 MG Tab TAKE 8 TABLETS BY MOUTH EVERY 7 DAYS. 96 tablet 2    nicotine polacrilex 2 MG Lozg Take 1 lozenge (2 mg total) by mouth as needed (Use up to 10 lozenges per day in the place of cigarettes). 216 lozenge 0    NOVOLOG FLEXPEN U-100 INSULIN 100 unit/mL (3 mL) InPn pen Inject 8 Units into the skin 3 (three) times daily with meals. 15 mL 5    pen needle, diabetic 32 gauge x 5/32" Ndle Use with Lantus once daily and Humalog 3 times daily. 200 each 5    secukinumab (COSENTYX PEN, 2 PENS,) 150 mg/mL PnIj Inject 300 mg into the skin every 14 (fourteen) days. 4 mL 3    tirzepatide (MOUNJARO) 10 mg/0.5 mL PnIj Inject 10 mg into the skin every 7 days. STOP TRULICITY AND START MOUNJARO ON THE DAY TRULICITY WOULD HAVE BEEN DUE. 4 pen 5    traMADoL (ULTRAM) 50 mg tablet TAKE 1 TABLET BY MOUTH THREE TIMES A DAY AS NEEDED FOR MODERATE PAIN Strength: 50 mg 21 tablet 0    VENTOLIN HFA 90 mcg/actuation inhaler INHALE 2 PUFFS INTO THE LUNGS EVERY 4 (FOUR) HOURS AS NEEDED FOR WHEEZING. RESCUE 18 g 11    metFORMIN (GLUCOPHAGE-XR) 500 MG XR 24hr tablet Take 2 tablets (1,000 mg total) by mouth 2 (two) times daily with meals. 360 tablet 1    spironolactone (ALDACTONE) 25 MG tablet Take 1 tablet (25 mg total) by mouth once daily. 30 tablet 11     No current facility-administered medications on file prior to visit.        Review of Systems   Constitutional:  Negative for fever, weight loss, appetite change, fatigue and weakness.   HENT:  Negative for postnasal drip, rhinorrhea, sinus pressure, trouble swallowing and congestion.    Respiratory:  Positive for shortness of breath and dyspnea on extertion. Negative for cough, sputum production, choking, chest tightness and wheezing.    Cardiovascular:  Negative for chest pain and leg swelling.   Musculoskeletal:  Positive for arthralgias. Negative for gait problem and joint swelling.   Gastrointestinal:  " "Negative for nausea, vomiting and abdominal pain.   Neurological:  Negative for dizziness, weakness and headaches.   All other systems reviewed and are negative.    Objective:       Vitals:    05/15/23 1449   BP: 127/61   Pulse: 92   Resp: 18   SpO2: 97%   Weight: 118.7 kg (261 lb 11 oz)   Height: 5' 7" (1.702 m)         Physical Exam   Constitutional: He is oriented to person, place, and time. He appears well-developed and well-nourished. No distress. He is obese.   HENT:   Head: Normocephalic.   Nose: Nose normal.   Mouth/Throat: Oropharynx is clear and moist.   Cardiovascular: Normal rate and regular rhythm.   Pulmonary/Chest: Effort normal. No respiratory distress. He has no wheezes. He has no rhonchi. He has no rales.   Musculoskeletal:         General: No edema.      Cervical back: Normal range of motion and neck supple.   Neurological: He is alert and oriented to person, place, and time. Gait normal.   Skin: Skin is warm and dry.   Psychiatric: He has a normal mood and affect.   Vitals reviewed.  Personal Diagnostic Review    X-Ray Hip 2 or 3 views Left (with Pelvis when performed)  Narrative: EXAMINATION:  XR HIP WITH PELVIS WHEN PERFORMED, 2 OR 3 VIEWS LEFT    CLINICAL HISTORY:  - Pain in left hip.    COMPARISON:  None    FINDINGS:  Mild bilateral hip DJD.  Moderate bilateral sacroiliac DJD.  Left-sided cam deformity.  No acute fracture.  Alignment is satisfactory.  Impression: No acute findings.  Degenerative changes.    Electronically signed by: Jose Swanson MD  Date:    01/21/2023  Time:    10:42     Oxygen Assessment Supplemental O2 Heart   Rate Blood Pressure Christian Dyspnea Scale Rating   Resting 97 % Room Air 92 bpm 127/61 2   Exercise             Minute             1 95 % Room Air 105 bpm       2 95 % Room Air 113 bpm       3 95 % Room Air 117 bpm       4 95 % Room Air 120 bpm       5 96 % Room Air 123 bpm       6  96 % Room Air 123 bpm 149/72 5-6   Recovery             Minute             1 95 % Room Air " 110 bpm       2 96 % Room Air 99 bpm       3 96 % Room Air 96 bpm       4 95 % Room Air 95 bpm 114/57 2      Six Minute Walk Summary  6MWT Status: completed without stopping  Patient Reported: Dyspnea         Interpretation:  Did the patient stop or pause?: No  Total Time Walked (Calculated): 360 seconds  Final Partial Lap Distance (feet): 100 feet  Total Distance Meters (Calculated): 335.28 meters  Predicted Distance Meters (Calculated): 439.18 meters  Percentage of Predicted (Calculated): 76.34  Peak VO2 (Calculated): 14.04  Mets: 4.01  Has The Patient Had a Previous Six Minute Walk Test?: Yes     Previous 6MWT Results  Has The Patient Had a Previous Six Minute Walk Test?: Yes  Date of Previous Test: 04/11/22  Total Time Walked: 360 seconds  Total Distance (meters): 297.18  Predicted Distance (meters): 465.08 meters  Percentage of Predicted: 63.9  Percent Change (Calculated): -0.13       5/15/23: spirometry reviewed, agree with physician interpretation; Spirometry shows a reduced FVC consistent with moderate restriction. In addition the TDH74-17 is reduced suggesting obstruction may also be present. Spirometry remains unimproved following bronchodilator.        Assessment/Plan:       Problem List Items Addressed This Visit          Pulmonary    Multiple lung nodules on CT - Primary (Chronic)     Stable 12/2022, repeat in 1 year 12/2023  Smoking cessation           Relevant Orders    CT Chest Without Contrast    Mixed type COPD (chronic obstructive pulmonary disease) (Chronic)     Stable, no signs of infection today  Recommend daily controller inhaler, patient declines  Stressed smoking cessation  UTD on vaccines         Dependence on nocturnal oxygen therapy     Benefits and compliant Auto CPAP 4-20 cm with 2 L oxygen blended in.  AHI 1.4                Cardiac/Vascular    Hypertension associated with diabetes     Stable, continue current medication management               Endocrine    Morbid obesity     Weight  loss and exercise to improve overall health.                Other    SCHUYLER on CPAP     Benefits and compliant Auto CPAP 4-20 cm with 2 L oxygen blended in.  AHI 1.4  Full face mask  HME: [facility]   Continue APAP 4-20 cm   Compliance download reviewed, days with usage > 4 hours is 95%             Relevant Orders    CPAP/BIPAP SUPPLIES    Nicotine dependence, cigarettes, uncomplicated     Down to half a pack a day   Assistance with smoking cessation was offered, including:  []  Medications  [x]  Counseling  []  Printed Information on Smoking Cessation  [x]  Referral to a Smoking Cessation Program    Patient was counseled regarding smoking for >10 minutes.            Follow up in about 6 months (around 11/15/2023) for chest ct, COPD follow up.    Discussed diagnosis, its evaluation, treatment and usual course. All questions answered.    Patient verbalized understanding of plan and left in no acute distress    Thank you for the courtesy of participating in the care of this patient    Elizabeth G Blough, PA-C [facility]

## 2023-05-16 ENCOUNTER — SPECIALTY PHARMACY (OUTPATIENT)
Dept: PHARMACY | Facility: CLINIC | Age: 68
End: 2023-05-16
Payer: MEDICARE

## 2023-05-16 ENCOUNTER — OUTPATIENT CASE MANAGEMENT (OUTPATIENT)
Dept: ADMINISTRATIVE | Facility: OTHER | Age: 68
End: 2023-05-16
Payer: MEDICARE

## 2023-05-16 NOTE — ASSESSMENT & PLAN NOTE
Benefits and compliant Auto CPAP 4-20 cm with 2 L oxygen blended in.  AHI 1.4  Full face mask  HME: [facility]   Continue APAP 4-20 cm   Compliance download reviewed, days with usage > 4 hours is 95%

## 2023-05-16 NOTE — TELEPHONE ENCOUNTER
Specialty Pharmacy - Refill Coordination    Specialty Medication Orders Linked to Encounter      Flowsheet Row Most Recent Value   Medication #1 secukinumab (COSENTYX PEN, 2 PENS,) 150 mg/mL PnIj (Order#[id number], Rx#[id number])            Refill Questions - Documented Responses      Flowsheet Row Most Recent Value   Refill Screening Questions    Changes to allergies? No   Changes to medications? No   New conditions since last clinic visit? No   Unplanned office visit, urgent care, ED, or hospital admission in the last 4 weeks? No   How does patient/caregiver feel medication is working? Good   Financial problems or insurance changes? No   How many doses of your specialty medications were missed in the last 4 weeks? 0   Would patient like to speak to a pharmacist? No   When does the patient need to receive the medication? 05/19/23   Refill Delivery Questions    How will the patient receive the medication? [facility]   When does the patient need to receive the medication? 05/19/23   Shipping Address Home   Address in [facility] confirmed and updated if neccessary? Yes   Expected Copay ($) 0   Is the patient able to afford the medication copay? Yes   Payment Method zero copay   Days supply of Refill 28   Supplies needed? No supplies needed   Refill activity completed? Yes   Refill activity plan Refill scheduled   Shipment/Pickup Date: 05/17/23            Current Outpatient Medications   Medication Sig    amLODIPine (NORVASC) 5 MG tablet Take 1 tablet (5 mg total) by mouth once daily. For high blood pressure    aspirin 81 MG Chew Take 1 tablet (81 mg total) by mouth once daily.    atorvastatin (LIPITOR) 80 MG tablet Take 1 tablet (80 mg total) by mouth once daily.    blood sugar diagnostic (ACCU-CHEK NOELLE PLUS TEST STRP) Strp Check blood sugar 3 times daily.    blood-glucose meter (ACCU-CHEK NOELLE PLUS METER) Misc Check blood sugar 3 times daily    calcipotriene (DOVONOX) 0.005 % cream APPLY TO AFFECTED AREA(S)  "TWICE DAILY    clobetasoL (OLUX) 0.05 % Foam AAA of scalp and behind ears twice daily.  Do not use on face, underarms or groin.    empagliflozin (JARDIANCE) 25 mg tablet Take 1 tablet (25 mg total) by mouth once daily.    folic acid (FOLVITE) 1 MG tablet TAKE 1 TABLET ONE TIME DAILY    glipiZIDE (GLUCOTROL) 5 MG tablet TAKE 1 TABLET BY MOUTH TWICE A DAY    insulin aspart U-100 (NOVOLOG U-100 INSULIN ASPART) 100 unit/mL injection INJECT 200 UNITS PER CEQUR DEVICE EVERY 3 DAYS    ketoconazole (NIZORAL) 2 % shampoo WASH HAIR WITH MEDICATED SHAMPOO AT LEAST 2X/WEEK - LET SIT ON SCALP AT LEAST 5 MINUTES PRIOR TO RINSING    lancets (ACCU-CHEK SOFTCLIX LANCETS) Misc Check blood sugar 3 times daily    LANTUS SOLOSTAR U-100 INSULIN glargine 100 units/mL SubQ pen Inject 24 Units into the skin once daily.    losartan (COZAAR) 25 MG tablet Take 1 tablet (25 mg total) by mouth once daily.    metFORMIN (GLUCOPHAGE-XR) 500 MG XR 24hr tablet Take 2 tablets (1,000 mg total) by mouth 2 (two) times daily with meals.    methotrexate 2.5 MG Tab TAKE 8 TABLETS BY MOUTH EVERY 7 DAYS.    nicotine polacrilex 2 MG Lozg Take 1 lozenge (2 mg total) by mouth as needed (Use up to 10 lozenges per day in the place of cigarettes).    NOVOLOG FLEXPEN U-100 INSULIN 100 unit/mL (3 mL) InPn pen Inject 8 Units into the skin 3 (three) times daily with meals.    pen needle, diabetic 32 gauge x 5/32" Ndle Use with Lantus once daily and Humalog 3 times daily.    secukinumab (COSENTYX PEN, 2 PENS,) 150 mg/mL PnIj Inject 300 mg into the skin every 14 (fourteen) days.    spironolactone (ALDACTONE) 25 MG tablet Take 1 tablet (25 mg total) by mouth once daily.    tirzepatide (MOUNJARO) 10 mg/0.5 mL PnIj Inject 10 mg into the skin every 7 days. STOP TRULICITY AND START MOUNJARO ON THE DAY TRULICITY WOULD HAVE BEEN DUE.    traMADoL (ULTRAM) 50 mg tablet TAKE 1 TABLET BY MOUTH THREE TIMES A DAY AS NEEDED FOR MODERATE PAIN Strength: 50 mg    VENTOLIN HFA 90 " mcg/actuation inhaler INHALE 2 PUFFS INTO THE LUNGS EVERY 4 (FOUR) HOURS AS NEEDED FOR WHEEZING. RESCUE   Last reviewed on 5/16/2023  9:09 AM by Charlene Felix PA-C    Review of patient's allergies indicates:  No Known Allergies Last reviewed on  5/16/2023 9:09 AM by Charlene Felix      Tasks added this encounter   No tasks added.   Tasks due within next 3 months   5/19/2023 - Refill Coordination Outreach (1 time occurrence)  5/16/2023 - Refill Coordination Outreach (1 time occurrence)     Karen Beaver [facility]  [address] [place] [id number]  Phone: [phone]  Fax: [phone]

## 2023-05-16 NOTE — ASSESSMENT & PLAN NOTE
Down to half a pack a day   Assistance with smoking cessation was offered, including:  []  Medications  [x]  Counseling  []  Printed Information on Smoking Cessation  [x]  Referral to a Smoking Cessation Program    Patient was counseled regarding smoking for >10 minutes.

## 2023-05-16 NOTE — ASSESSMENT & PLAN NOTE
Stable, no signs of infection today  Recommend daily controller inhaler, patient declines  Stressed smoking cessation  UTD on vaccines

## 2023-05-16 NOTE — PROGRESS NOTES
Outpatient Care Management  Plan of Care Follow Up Visit    Patient: Trey Stevens  MRN: [id number]  Date of Service: 05/16/2023  Completed by: Louise Aguayo RN  Referral Date: 01/25/2023    Reason for Visit   Patient presents with    Nursing Assessment    OPCM RN Follow Up Call       Brief Summary: OPCM visit completed, care plan reviewed and updated.   Next Steps: plan to call in approximately one week - patient in agreement. How are blood sugars? What has he been eating?   Has he been checking his feet daily? Taking meds as ordered?  GOVIND Aguayo, RN

## 2023-05-17 ENCOUNTER — OFFICE VISIT (OUTPATIENT)
Dept: PRIMARY CARE CLINIC | Facility: CLINIC | Age: 68
End: 2023-05-17
Payer: MEDICARE

## 2023-05-17 ENCOUNTER — TELEPHONE (OUTPATIENT)
Dept: PRIMARY CARE CLINIC | Facility: CLINIC | Age: 68
End: 2023-05-17
Payer: MEDICARE

## 2023-05-17 VITALS
WEIGHT: 260.38 LBS | DIASTOLIC BLOOD PRESSURE: 61 MMHG | HEIGHT: 67 IN | HEART RATE: 83 BPM | BODY MASS INDEX: 40.87 KG/M2 | TEMPERATURE: 98 F | SYSTOLIC BLOOD PRESSURE: 122 MMHG | OXYGEN SATURATION: 95 %

## 2023-05-17 DIAGNOSIS — N18.31 TYPE 2 DIABETES MELLITUS WITH STAGE 3A CHRONIC KIDNEY DISEASE, WITH LONG-TERM CURRENT USE OF INSULIN: Primary | ICD-10-CM

## 2023-05-17 DIAGNOSIS — R13.10 DYSPHAGIA, UNSPECIFIED TYPE: ICD-10-CM

## 2023-05-17 DIAGNOSIS — Z91.199 NONCOMPLIANCE WITH DIABETES TREATMENT: ICD-10-CM

## 2023-05-17 DIAGNOSIS — Z79.4 TYPE 2 DIABETES MELLITUS WITH STAGE 3A CHRONIC KIDNEY DISEASE, WITH LONG-TERM CURRENT USE OF INSULIN: Primary | ICD-10-CM

## 2023-05-17 DIAGNOSIS — E11.22 TYPE 2 DIABETES MELLITUS WITH STAGE 3A CHRONIC KIDNEY DISEASE, WITH LONG-TERM CURRENT USE OF INSULIN: Primary | ICD-10-CM

## 2023-05-17 PROBLEM — L01.00 IMPETIGO: Status: ACTIVE | Noted: 2023-01-16

## 2023-05-17 PROBLEM — G62.9 NEUROPATHY: Status: ACTIVE | Noted: 2023-05-17

## 2023-05-17 LAB — GLUCOSE SERPL-MCNC: 209 MG/DL (ref 70–110)

## 2023-05-17 PROCEDURE — 3074F PR MOST RECENT SYSTOLIC BLOOD PRESSURE < 130 MM HG: ICD-10-PCS | Mod: CPTII,,, | Performed by: FAMILY MEDICINE

## 2023-05-17 PROCEDURE — 4010F PR ACE/ARB THEARPY RXD/TAKEN: ICD-10-PCS | Mod: CPTII,,, | Performed by: FAMILY MEDICINE

## 2023-05-17 PROCEDURE — 1157F PR ADVANCE CARE PLAN OR EQUIV PRESENT IN MEDICAL RECORD: ICD-10-PCS | Mod: CPTII,,, | Performed by: FAMILY MEDICINE

## 2023-05-17 PROCEDURE — 1160F PR REVIEW ALL MEDS BY PRESCRIBER/CLIN PHARMACIST DOCUMENTED: ICD-10-PCS | Mod: CPTII,,, | Performed by: FAMILY MEDICINE

## 2023-05-17 PROCEDURE — 1159F PR MEDICATION LIST DOCUMENTED IN MEDICAL RECORD: ICD-10-PCS | Mod: CPTII,,, | Performed by: FAMILY MEDICINE

## 2023-05-17 PROCEDURE — 3078F DIAST BP <80 MM HG: CPT | Mod: CPTII,,, | Performed by: FAMILY MEDICINE

## 2023-05-17 PROCEDURE — 3046F PR MOST RECENT HEMOGLOBIN A1C LEVEL > 9.0%: ICD-10-PCS | Mod: CPTII,,, | Performed by: FAMILY MEDICINE

## 2023-05-17 PROCEDURE — 99999 PR PBB SHADOW E&M-EST. PATIENT-LVL III: CPT | Mod: PBBFAC,,, | Performed by: FAMILY MEDICINE

## 2023-05-17 PROCEDURE — 1101F PR PT FALLS ASSESS DOC 0-1 FALLS W/OUT INJ PAST YR: ICD-10-PCS | Mod: CPTII,,, | Performed by: FAMILY MEDICINE

## 2023-05-17 PROCEDURE — 4010F ACE/ARB THERAPY RXD/TAKEN: CPT | Mod: CPTII,,, | Performed by: FAMILY MEDICINE

## 2023-05-17 PROCEDURE — 1157F ADVNC CARE PLAN IN RCRD: CPT | Mod: CPTII,,, | Performed by: FAMILY MEDICINE

## 2023-05-17 PROCEDURE — 3078F PR MOST RECENT DIASTOLIC BLOOD PRESSURE < 80 MM HG: ICD-10-PCS | Mod: CPTII,,, | Performed by: FAMILY MEDICINE

## 2023-05-17 PROCEDURE — 3288F FALL RISK ASSESSMENT DOCD: CPT | Mod: CPTII,,, | Performed by: FAMILY MEDICINE

## 2023-05-17 PROCEDURE — 82962 POCT GLUCOSE, HAND-HELD DEVICE: ICD-10-PCS | Mod: ,,, | Performed by: FAMILY MEDICINE

## 2023-05-17 PROCEDURE — 3008F BODY MASS INDEX DOCD: CPT | Mod: CPTII,,, | Performed by: FAMILY MEDICINE

## 2023-05-17 PROCEDURE — 99214 PR OFFICE/OUTPT VISIT, EST, LEVL IV, 30-39 MIN: ICD-10-PCS | Mod: ,,, | Performed by: FAMILY MEDICINE

## 2023-05-17 PROCEDURE — 3008F PR BODY MASS INDEX (BMI) DOCUMENTED: ICD-10-PCS | Mod: CPTII,,, | Performed by: FAMILY MEDICINE

## 2023-05-17 PROCEDURE — 82962 GLUCOSE BLOOD TEST: CPT | Mod: ,,, | Performed by: FAMILY MEDICINE

## 2023-05-17 PROCEDURE — 99214 OFFICE O/P EST MOD 30 MIN: CPT | Mod: ,,, | Performed by: FAMILY MEDICINE

## 2023-05-17 PROCEDURE — 3074F SYST BP LT 130 MM HG: CPT | Mod: CPTII,,, | Performed by: FAMILY MEDICINE

## 2023-05-17 PROCEDURE — 1101F PT FALLS ASSESS-DOCD LE1/YR: CPT | Mod: CPTII,,, | Performed by: FAMILY MEDICINE

## 2023-05-17 PROCEDURE — 99999 PR PBB SHADOW E&M-EST. PATIENT-LVL III: ICD-10-PCS | Mod: PBBFAC,,, | Performed by: FAMILY MEDICINE

## 2023-05-17 PROCEDURE — 3288F PR FALLS RISK ASSESSMENT DOCUMENTED: ICD-10-PCS | Mod: CPTII,,, | Performed by: FAMILY MEDICINE

## 2023-05-17 PROCEDURE — 3046F HEMOGLOBIN A1C LEVEL >9.0%: CPT | Mod: CPTII,,, | Performed by: FAMILY MEDICINE

## 2023-05-17 PROCEDURE — 1159F MED LIST DOCD IN RCRD: CPT | Mod: CPTII,,, | Performed by: FAMILY MEDICINE

## 2023-05-17 PROCEDURE — 1160F RVW MEDS BY RX/DR IN RCRD: CPT | Mod: CPTII,,, | Performed by: FAMILY MEDICINE

## 2023-05-17 NOTE — PROGRESS NOTES
"Subjective:       Patient ID: Trey Stevens is a 68 y.o. male.    Chief Complaint: Follow-up (Two weeks follow up. No issues or concerns.)    HPI:     Home health visiting on Thursdays and administering medications. Has a pill box as well that home health nurse is helping to manage. He reports lowest AC blood sugar 178 up to 214 or so. He had a "high" reading with home health which we were notified about. He thinks he didn't take his medication that day and doesn't remember having eaten anything that morning, only coffee with Splenda. He's quit eating bread except for cornbread.  He recently saw the eye doctor and was counseled on worsening diabetic cataracts. He is motivated by his love of fishing and doesn't want to lose his vision. Feels like pills are getting "hung up" on the right side of his throat and this a big barrier to taking his medication. Also feels he gets "choked" on corn, rice, cornbread, anything with small grains, sandwiches.  Sometimes has coughing and gagging during swallowing.     Updated/ annual due 8/23:  HM:  12/22 today fluvax, 10/21 covid vaccines, 12/18 HAV, 2/16 [id number], 4/13 [id number], 8/22 [id number], 2/16 TDaP, Cscope in the past normal and pt refuses more, 6/22 PSA, 2/16 HCV neg, 2/22 Eye Dr. Cole, 11/18 chest CT stable, Pod Dr. Hadley.     Health Maintenance Due   Topic Date Due    Shingles Vaccine (1 of 2) Never done    Colorectal Cancer Screening  10/24/2017    COVID-19 Vaccine (3 - Booster for Pfizer series) 12/02/2021    PROSTATE-SPECIFIC ANTIGEN  04/11/2023    Lipid Panel  04/11/2023       Objective:     Vitals:    05/17/23 0957   BP: 122/61   Pulse: 83   Temp: 98 °F (36.7 °C)        Physical Exam  Vitals and nursing note reviewed.   Constitutional:       General: He is not in acute distress.     Appearance: He is well-developed. He is obese. He is not ill-appearing.   HENT:      Head: Normocephalic and atraumatic.      Nose: Nose normal.      Mouth/Throat:      Mouth: Mucous " membranes are moist.      Pharynx: No oropharyngeal exudate.   Eyes:      General: No scleral icterus.     Pupils: Pupils are equal, round, and reactive to light.   Cardiovascular:      Rate and Rhythm: Normal rate and regular rhythm.   Pulmonary:      Effort: Pulmonary effort is normal.      Breath sounds: Normal breath sounds.   Abdominal:      General: Bowel sounds are normal.      Palpations: Abdomen is soft.      Tenderness: There is no abdominal tenderness.      Comments: Significant abdominal obesity   Musculoskeletal:         General: No deformity. Normal range of motion.      Cervical back: Normal range of motion and neck supple.   Skin:     General: Skin is warm and dry.   Neurological:      Mental Status: He is alert and oriented to person, place, and time. Mental status is at baseline.   Psychiatric:         Mood and Affect: Mood normal.         Behavior: Behavior normal.         Thought Content: Thought content normal.         Lab Results   Component Value Date    HGBA1C 13.9 (H) 04/11/2023         Assessment:       1. Type 2 diabetes mellitus with stage 3a chronic kidney disease, with long-term current use of insulin    2. Dysphagia, unspecified type    3. Noncompliance with diabetes treatment        Plan:           Problem List Items Addressed This Visit          Endocrine    Type 2 diabetes mellitus with chronic kidney disease, with long-term current use of insulin - Primary    Relevant Orders    POCT Glucose, Hand-Held Device    Noncompliance with diabetes treatment    Relevant Orders    Ambulatory referral/consult to Speech Therapy     Other Visit Diagnoses       Dysphagia, unspecified type        Relevant Orders    Ambulatory referral/consult to Speech Therapy          Referral to speech therapy for symptom of dysphagia. Will continue to work with home health and have frequent visits and phone call reminders in hopes of getting his blood glucose better controlled. See back in 2 weeks.

## 2023-05-17 NOTE — PATIENT INSTRUCTIONS
If you are feeling unwell, we'd like to be the first ones to know here at [facility]! Please give us a call. Same day appointments are our top priority to keep you well and out of the emergency rooms and hospitals. Call [phone] for our direct line. After hours advice is always available. Please call [phone] after hours to speak to the on-call team.     Bring your medication to your visits with us.     Please send in your Cologuard test.     Start putting your insulin shots on the table like you used to and in the drawer at night after you take them.

## 2023-05-18 ENCOUNTER — TELEPHONE (OUTPATIENT)
Dept: PRIMARY CARE CLINIC | Facility: CLINIC | Age: 68
End: 2023-05-18
Payer: MEDICARE

## 2023-05-19 ENCOUNTER — DOCUMENTATION ONLY (OUTPATIENT)
Dept: PRIMARY CARE CLINIC | Facility: CLINIC | Age: 68
End: 2023-05-19
Payer: MEDICARE

## 2023-05-19 NOTE — PROGRESS NOTES
"Met with patient during his MD visit with Dr. Spencer.   He continues to have issues with managing his blood sugar. He says he knows when he is supposed to check his sugar and administer his insulin, but he often ignores it. He says he goes outside, gets busy and forgets.  He says "I need to take better care of myself."  Patient appears to be easily distracted. He appears to tell medical professionals what he knows they want to hear.  Discussed with him that he is good about coming to his appointments, he appears motivated to come to the clinic.  At home though, he is having a hard time changing his habits.  He agrees that he is not very motivated to change. He is focused on his love of going fishing and being outside. Will continue to see patient during his regular doctor visits here at the clinic.     "

## 2023-05-23 ENCOUNTER — OUTPATIENT CASE MANAGEMENT (OUTPATIENT)
Dept: ADMINISTRATIVE | Facility: OTHER | Age: 68
End: 2023-05-23
Payer: MEDICARE

## 2023-05-23 ENCOUNTER — TELEPHONE (OUTPATIENT)
Dept: PRIMARY CARE CLINIC | Facility: CLINIC | Age: 68
End: 2023-05-23
Payer: MEDICARE

## 2023-05-23 NOTE — PROGRESS NOTES
Outpatient Care Management  Plan of Care Follow Up Visit    Patient: Trey Stevens  MRN: [id number]  Date of Service: 05/23/2023  Completed by: Louise Aguayo RN  Referral Date: 01/25/2023    Reason for Visit   Patient presents with    Nursing Assessment    OPCM RN Follow Up Call       Brief Summary: OPCM visit completed, care plan reviewed and updated.   Next Steps: Plan to follow up with patient in approximately one week - patient agrees. How are his blood sugars?   What has he been eating? Taking meds as ordered?   GOVIND Aguayo RN

## 2023-05-31 ENCOUNTER — CLINICAL SUPPORT (OUTPATIENT)
Dept: PRIMARY CARE CLINIC | Facility: CLINIC | Age: 68
End: 2023-05-31
Payer: MEDICARE

## 2023-05-31 ENCOUNTER — OFFICE VISIT (OUTPATIENT)
Dept: PRIMARY CARE CLINIC | Facility: CLINIC | Age: 68
End: 2023-05-31
Payer: MEDICARE

## 2023-05-31 VITALS
WEIGHT: 257.31 LBS | HEIGHT: 67 IN | HEART RATE: 88 BPM | OXYGEN SATURATION: 94 % | BODY MASS INDEX: 40.38 KG/M2 | TEMPERATURE: 98 F | SYSTOLIC BLOOD PRESSURE: 115 MMHG | DIASTOLIC BLOOD PRESSURE: 61 MMHG

## 2023-05-31 DIAGNOSIS — E11.59 HYPERTENSION ASSOCIATED WITH DIABETES: ICD-10-CM

## 2023-05-31 DIAGNOSIS — I15.2 HYPERTENSION ASSOCIATED WITH DIABETES: ICD-10-CM

## 2023-05-31 DIAGNOSIS — B35.1 ONYCHOMYCOSIS OF MULTIPLE TOENAILS WITH TYPE 2 DIABETES MELLITUS AND PERIPHERAL NEUROPATHY: ICD-10-CM

## 2023-05-31 DIAGNOSIS — G47.00 INSOMNIA, UNSPECIFIED TYPE: Primary | ICD-10-CM

## 2023-05-31 DIAGNOSIS — L40.9 PSORIASIS: ICD-10-CM

## 2023-05-31 DIAGNOSIS — L40.52 PSORIATIC ARTHRITIS, DESTRUCTIVE TYPE: ICD-10-CM

## 2023-05-31 DIAGNOSIS — E11.69 ONYCHOMYCOSIS OF MULTIPLE TOENAILS WITH TYPE 2 DIABETES MELLITUS AND PERIPHERAL NEUROPATHY: ICD-10-CM

## 2023-05-31 DIAGNOSIS — L40.50 PSORIATIC ARTHRITIS: ICD-10-CM

## 2023-05-31 DIAGNOSIS — E11.42 ONYCHOMYCOSIS OF MULTIPLE TOENAILS WITH TYPE 2 DIABETES MELLITUS AND PERIPHERAL NEUROPATHY: ICD-10-CM

## 2023-05-31 DIAGNOSIS — Z91.199 NONCOMPLIANCE WITH DIABETES TREATMENT: ICD-10-CM

## 2023-05-31 DIAGNOSIS — N18.31 TYPE 2 DIABETES MELLITUS WITH STAGE 3A CHRONIC KIDNEY DISEASE, WITH LONG-TERM CURRENT USE OF INSULIN: Primary | ICD-10-CM

## 2023-05-31 DIAGNOSIS — K76.0 FATTY LIVER: ICD-10-CM

## 2023-05-31 DIAGNOSIS — Z79.4 TYPE 2 DIABETES MELLITUS WITH STAGE 3A CHRONIC KIDNEY DISEASE, WITH LONG-TERM CURRENT USE OF INSULIN: Primary | ICD-10-CM

## 2023-05-31 DIAGNOSIS — E11.22 TYPE 2 DIABETES MELLITUS WITH STAGE 3A CHRONIC KIDNEY DISEASE, WITH LONG-TERM CURRENT USE OF INSULIN: Primary | ICD-10-CM

## 2023-05-31 DIAGNOSIS — E55.9 VITAMIN D DEFICIENCY: ICD-10-CM

## 2023-05-31 LAB — GLUCOSE SERPL-MCNC: 182 MG/DL (ref 70–110)

## 2023-05-31 PROCEDURE — 99999 PR PBB SHADOW E&M-EST. PATIENT-LVL V: CPT | Mod: PBBFAC,,, | Performed by: FAMILY MEDICINE

## 2023-05-31 PROCEDURE — 1160F RVW MEDS BY RX/DR IN RCRD: CPT | Mod: CPTII,S$GLB,, | Performed by: FAMILY MEDICINE

## 2023-05-31 PROCEDURE — 82962 GLUCOSE BLOOD TEST: CPT | Mod: S$GLB,,, | Performed by: FAMILY MEDICINE

## 2023-05-31 PROCEDURE — 99499 UNLISTED E&M SERVICE: CPT | Mod: S$GLB,,,

## 2023-05-31 PROCEDURE — 3078F PR MOST RECENT DIASTOLIC BLOOD PRESSURE < 80 MM HG: ICD-10-PCS | Mod: CPTII,S$GLB,, | Performed by: FAMILY MEDICINE

## 2023-05-31 PROCEDURE — 1101F PR PT FALLS ASSESS DOC 0-1 FALLS W/OUT INJ PAST YR: ICD-10-PCS | Mod: CPTII,S$GLB,, | Performed by: FAMILY MEDICINE

## 2023-05-31 PROCEDURE — 1101F PT FALLS ASSESS-DOCD LE1/YR: CPT | Mod: CPTII,S$GLB,, | Performed by: FAMILY MEDICINE

## 2023-05-31 PROCEDURE — 99999 PR PBB SHADOW E&M-EST. PATIENT-LVL V: ICD-10-PCS | Mod: PBBFAC,,, | Performed by: FAMILY MEDICINE

## 2023-05-31 PROCEDURE — 1157F PR ADVANCE CARE PLAN OR EQUIV PRESENT IN MEDICAL RECORD: ICD-10-PCS | Mod: CPTII,S$GLB,, | Performed by: FAMILY MEDICINE

## 2023-05-31 PROCEDURE — 3078F DIAST BP <80 MM HG: CPT | Mod: CPTII,S$GLB,, | Performed by: FAMILY MEDICINE

## 2023-05-31 PROCEDURE — 1157F ADVNC CARE PLAN IN RCRD: CPT | Mod: CPTII,S$GLB,, | Performed by: FAMILY MEDICINE

## 2023-05-31 PROCEDURE — 3074F PR MOST RECENT SYSTOLIC BLOOD PRESSURE < 130 MM HG: ICD-10-PCS | Mod: CPTII,S$GLB,, | Performed by: FAMILY MEDICINE

## 2023-05-31 PROCEDURE — 1160F PR REVIEW ALL MEDS BY PRESCRIBER/CLIN PHARMACIST DOCUMENTED: ICD-10-PCS | Mod: CPTII,S$GLB,, | Performed by: FAMILY MEDICINE

## 2023-05-31 PROCEDURE — 3074F SYST BP LT 130 MM HG: CPT | Mod: CPTII,S$GLB,, | Performed by: FAMILY MEDICINE

## 2023-05-31 PROCEDURE — 3288F FALL RISK ASSESSMENT DOCD: CPT | Mod: CPTII,S$GLB,, | Performed by: FAMILY MEDICINE

## 2023-05-31 PROCEDURE — 1159F PR MEDICATION LIST DOCUMENTED IN MEDICAL RECORD: ICD-10-PCS | Mod: CPTII,S$GLB,, | Performed by: FAMILY MEDICINE

## 2023-05-31 PROCEDURE — 3288F PR FALLS RISK ASSESSMENT DOCUMENTED: ICD-10-PCS | Mod: CPTII,S$GLB,, | Performed by: FAMILY MEDICINE

## 2023-05-31 PROCEDURE — 82962 POCT GLUCOSE, HAND-HELD DEVICE: ICD-10-PCS | Mod: S$GLB,,, | Performed by: FAMILY MEDICINE

## 2023-05-31 PROCEDURE — 4010F ACE/ARB THERAPY RXD/TAKEN: CPT | Mod: CPTII,S$GLB,, | Performed by: FAMILY MEDICINE

## 2023-05-31 PROCEDURE — 1159F MED LIST DOCD IN RCRD: CPT | Mod: CPTII,S$GLB,, | Performed by: FAMILY MEDICINE

## 2023-05-31 PROCEDURE — 99499 NO LOS: ICD-10-PCS | Mod: S$GLB,,,

## 2023-05-31 PROCEDURE — 3008F BODY MASS INDEX DOCD: CPT | Mod: CPTII,S$GLB,, | Performed by: FAMILY MEDICINE

## 2023-05-31 PROCEDURE — 3046F HEMOGLOBIN A1C LEVEL >9.0%: CPT | Mod: CPTII,S$GLB,, | Performed by: FAMILY MEDICINE

## 2023-05-31 PROCEDURE — 3046F PR MOST RECENT HEMOGLOBIN A1C LEVEL > 9.0%: ICD-10-PCS | Mod: CPTII,S$GLB,, | Performed by: FAMILY MEDICINE

## 2023-05-31 PROCEDURE — 99214 PR OFFICE/OUTPT VISIT, EST, LEVL IV, 30-39 MIN: ICD-10-PCS | Mod: S$GLB,,, | Performed by: FAMILY MEDICINE

## 2023-05-31 PROCEDURE — 3008F PR BODY MASS INDEX (BMI) DOCUMENTED: ICD-10-PCS | Mod: CPTII,S$GLB,, | Performed by: FAMILY MEDICINE

## 2023-05-31 PROCEDURE — 99214 OFFICE O/P EST MOD 30 MIN: CPT | Mod: S$GLB,,, | Performed by: FAMILY MEDICINE

## 2023-05-31 PROCEDURE — 4010F PR ACE/ARB THEARPY RXD/TAKEN: ICD-10-PCS | Mod: CPTII,S$GLB,, | Performed by: FAMILY MEDICINE

## 2023-05-31 RX ORDER — METHOTREXATE 2.5 MG/1
TABLET ORAL
Qty: 96 TABLET | Refills: 0 | Status: SHIPPED | OUTPATIENT
Start: 2023-05-31 | End: 2023-07-10

## 2023-05-31 NOTE — ASSESSMENT & PLAN NOTE
Lab Results   Component Value Date    HGBA1C 13.9 (H) 04/11/2023     Last podiatry visit 3/23/23; return yearly and PRN for nail care

## 2023-05-31 NOTE — ASSESSMENT & PLAN NOTE
BP well-controlled    BP Readings from Last 3 Encounters:   05/31/23 115/61   05/17/23 122/61   05/15/23 127/61

## 2023-05-31 NOTE — PATIENT INSTRUCTIONS
If you are feeling unwell, we'd like to be the first ones to know here at [facility]! Please give us a call. Same day appointments are our top priority to keep you well and out of the emergency rooms and hospitals. Call [phone] for our direct line. After hours advice is always available. Please call [phone] after hours to speak to the on-call team.     Send in your Colon Cancer Screening test.

## 2023-05-31 NOTE — PROGRESS NOTES
Subjective:       Patient ID: Trey Stevens is a 68 y.o. male.    Chief Complaint: Follow-up (Two weeks follow up. No issues or concerns.)    HPI:     Here for 2 week f/u of uncontrolled diabetes. Today he CBG is 182, which is great for him--he's had 4 boiled eggs for breakfast and coffee with creamer and splenda.  nurse is administering his Mounjaro, insulin, Cosentyx (presumably) when she comes once a week; [id number] RN is calling Mon-Fri for medication reminders. He reports that he mostly takes his medication when she calls. Psoriasis has been improving; he reports using his topical medications twice daily. He reports pharmacy couldn't refill MTX; has been out for about 1 week. Still hasn't done Cologuard    Updated/ annual due 8/23:  HM:  12/22 today fluvax, 10/21 covid vaccines, 12/18 HAV, 2/16 [id number], 4/13 syapsq03, 8/22 [id number], 2/16 TDaP, Cscope in the past normal and pt refuses more, 6/22 PSA, 2/16 HCV neg, 5/23 Eye, 11/18 chest CT stable, Pod Dr. Hadley.    Health Maintenance Due   Topic Date Due    Shingles Vaccine (1 of 2) Never done    Colorectal Cancer Screening  10/24/2017    COVID-19 Vaccine (3 - Pfizer series) 12/02/2021    Lipid Panel  04/11/2023         Objective:     Vitals:    05/31/23 1058   BP: 115/61   Pulse: 88   Temp: 97.9 °F (36.6 °C)     Wt Readings from Last 3 Encounters:   05/31/23 116.7 kg (257 lb 4.8 oz)   05/17/23 118.1 kg (260 lb 6.4 oz)   05/15/23 118.7 kg (261 lb 11 oz)     Temp Readings from Last 3 Encounters:   05/31/23 97.9 °F (36.6 °C) (Oral)   05/17/23 98 °F (36.7 °C) (Oral)   05/03/23 97.7 °F (36.5 °C) (Oral)     BP Readings from Last 3 Encounters:   05/31/23 115/61   05/17/23 122/61   05/15/23 127/61     Pulse Readings from Last 3 Encounters:   05/31/23 88   05/17/23 83   05/15/23 92          Physical Exam  Vitals and nursing note reviewed.   Constitutional:       General: He is not in acute distress.     Appearance: He is well-developed. He is not ill-appearing.   HENT:       Head: Normocephalic and atraumatic.      Nose: Nose normal.      Mouth/Throat:      Mouth: Mucous membranes are moist.      Pharynx: No oropharyngeal exudate.   Eyes:      General: No scleral icterus.     Pupils: Pupils are equal, round, and reactive to light.   Cardiovascular:      Rate and Rhythm: Normal rate and regular rhythm.   Pulmonary:      Effort: Pulmonary effort is normal.      Breath sounds: Normal breath sounds.   Abdominal:      General: Bowel sounds are normal.      Palpations: Abdomen is soft.      Comments: Abdominal obesity   Musculoskeletal:         General: No deformity. Normal range of motion.      Cervical back: Normal range of motion and neck supple.   Skin:     General: Skin is warm and dry.   Neurological:      Mental Status: He is alert and oriented to person, place, and time.   Psychiatric:         Mood and Affect: Mood normal.         Behavior: Behavior normal.         Thought Content: Thought content normal.           Assessment:       1. Type 2 diabetes mellitus with stage 3a chronic kidney disease, with long-term current use of insulin    2. Onychomycosis of multiple toenails with type 2 diabetes mellitus and peripheral neuropathy    3. Hypertension associated with diabetes    4. Fatty liver    5. Vitamin D deficiency    6. Psoriatic arthritis, destructive type    7. Psoriasis    8. Psoriatic arthritis        Plan:           Problem List Items Addressed This Visit          Derm    Onychomycosis of multiple toenails with type 2 diabetes mellitus and peripheral neuropathy    Current Assessment & Plan     Lab Results   Component Value Date    HGBA1C 13.9 (H) 04/11/2023   Last podiatry visit 3/23/23; return yearly and PRN for nail care            Cardiac/Vascular    Hypertension associated with diabetes    Current Assessment & Plan     BP well-controlled    BP Readings from Last 3 Encounters:   05/31/23 115/61   05/17/23 122/61   05/15/23 127/61               Endocrine    Vitamin D  "deficiency    Relevant Orders    Vitamin D    Type 2 diabetes mellitus with chronic kidney disease, with long-term current use of insulin - Primary    Relevant Orders    POCT Glucose, Hand-Held Device (Completed)       GI    Fatty liver    Overview     CT 2021: "There is fatty infiltration of the visualized portions of the liver."               Current Assessment & Plan     Continuing to work on DM2 control and medication compliance            Orthopedic    Psoriatic arthritis, destructive type    Current Assessment & Plan     Last visit with rheumatology 8/22:     Getting Cosentyx 300 mg weekly, currently out of methotrexate, daily folic acid.  Also using topical Dovonex Persistent severe rash nestor arms and legs; Thickened indurated plaques on forearms and elbows with diffuse rash.         Relevant Medications    methotrexate 2.5 MG Tab     Other Visit Diagnoses       Psoriasis        Relevant Medications    methotrexate 2.5 MG Tab    Psoriatic arthritis        Relevant Medications    methotrexate 2.5 MG Tab    Other Relevant Orders    Ambulatory referral/consult to Rheumatology          Repeat a1c and lipids will be due in July. Will continue q2 week visits and daily calls. Will ask home health ST to eval pill swallowing if able; otherwise will keep June ST appointment. Overdue to see rheumatology back. Will schedule f/u and clarify with home health RN which injectables are being given/monitored during home visits.   "

## 2023-05-31 NOTE — PROGRESS NOTES
"Met with patient after his MD appointment.  He is happy to report that his blood sugar is better today, and he has a lost a few pounds. He is unable to attribute the weight loss to anything other than "I eat less in the Summer."  Encouraged him to continue to try to limit his portion sizes. Discussed his sleep; he is wearing his CPAP. Is still waking up during the night; encouraged him to not watch TV when he wakes up as this may be keeping him awake for longer period of time. Encouraged him to take a midday nap as needed. Discussed the death of his Dad last year; he had been in a nursing home for about six years. Trey says he is the only child that was close with his parents. He says the other kids do not come to visit their mom.  Discussed how much his mom relies on him to be there.     "

## 2023-05-31 NOTE — ASSESSMENT & PLAN NOTE
Last visit with rheumatology 8/22:     Getting Cosentyx 300 mg weekly, currently out of methotrexate, daily folic acid.  Also using topical Dovonex Persistent severe rash nestor arms and legs; Thickened indurated plaques on forearms and elbows with diffuse rash.

## 2023-06-01 ENCOUNTER — OUTPATIENT CASE MANAGEMENT (OUTPATIENT)
Dept: ADMINISTRATIVE | Facility: OTHER | Age: 68
End: 2023-06-01
Payer: MEDICARE

## 2023-06-01 NOTE — PROGRESS NOTES
Outpatient Care Management  Plan of Care Follow Up Visit    Patient: Trey Stevens  MRN: [id number]  Date of Service: 06/01/2023  Completed by: Louise Aguayo RN  Referral Date: 01/25/2023    Reason for Visit   Patient presents with    Nursing Assessment    OPCM RN Follow Up Call       Brief Summary: OPCM visit completed, care plan reviewed and updated.   Next Steps: plan to follow up in approximately one week - patient agrees. Diet? Activity? Water intake?   Safety - fall prevention, How are feet? Taking meds as ordered?  GOVIND Aguayo, RN

## 2023-06-05 DIAGNOSIS — R80.9 PROTEINURIA DUE TO TYPE 2 DIABETES MELLITUS: ICD-10-CM

## 2023-06-05 DIAGNOSIS — E11.29 PROTEINURIA DUE TO TYPE 2 DIABETES MELLITUS: ICD-10-CM

## 2023-06-05 DIAGNOSIS — L40.9 GENERALIZED PSORIASIS: ICD-10-CM

## 2023-06-05 RX ORDER — LOSARTAN POTASSIUM 25 MG/1
TABLET ORAL
Qty: 90 TABLET | Refills: 3 | Status: SHIPPED | OUTPATIENT
Start: 2023-06-05

## 2023-06-05 NOTE — TELEPHONE ENCOUNTER
Care Due:                  Date            Visit Type   Department     Provider  --------------------------------------------------------------------------------                                ESTABLISHED                              PATIENT                     Brittanie Simmons  Last Visit: 12-      EXTENDED     None Found     Shankar  Next Visit: None Scheduled  None         None Found                                                            Last  Test          Frequency    Reason                     Performed    Due Date  --------------------------------------------------------------------------------    CMP.........  12 months..  atorvastatin.............  08- 08-    Lipid Panel.  12 months..  atorvastatin.............  04- 04-    [facility] Embedded Care Due Messages. Reference number: [id number].   6/05/2023 9:04:42 AM CDT

## 2023-06-06 RX ORDER — CLOBETASOL PROPIONATE 0.5 MG/G
AEROSOL, FOAM TOPICAL
Qty: 100 G | Refills: 3 | Status: SHIPPED | OUTPATIENT
Start: 2023-06-06

## 2023-06-07 ENCOUNTER — SPECIALTY PHARMACY (OUTPATIENT)
Dept: PHARMACY | Facility: CLINIC | Age: 68
End: 2023-06-07
Payer: MEDICARE

## 2023-06-07 ENCOUNTER — HOSPITAL ENCOUNTER (OUTPATIENT)
Dept: RADIOLOGY | Facility: HOSPITAL | Age: 68
Discharge: HOME | End: 2023-06-07
Attending: PHYSICIAN ASSISTANT
Payer: MEDICARE

## 2023-06-07 ENCOUNTER — OFFICE VISIT (OUTPATIENT)
Dept: RHEUMATOLOGY | Facility: CLINIC | Age: 68
End: 2023-06-07
Payer: MEDICARE

## 2023-06-07 VITALS
WEIGHT: 260.13 LBS | HEART RATE: 96 BPM | SYSTOLIC BLOOD PRESSURE: 135 MMHG | DIASTOLIC BLOOD PRESSURE: 73 MMHG | BODY MASS INDEX: 40.83 KG/M2 | HEIGHT: 67 IN

## 2023-06-07 DIAGNOSIS — L40.0 PLAQUE PSORIASIS: ICD-10-CM

## 2023-06-07 DIAGNOSIS — D84.9 IMMUNOSUPPRESSED STATUS: ICD-10-CM

## 2023-06-07 DIAGNOSIS — L40.50 PSORIATIC ARTHRITIS: ICD-10-CM

## 2023-06-07 DIAGNOSIS — Z79.631 METHOTREXATE, LONG TERM, CURRENT USE: ICD-10-CM

## 2023-06-07 DIAGNOSIS — Z79.899 HIGH RISK MEDICATION USE: ICD-10-CM

## 2023-06-07 DIAGNOSIS — Z51.81 MEDICATION MONITORING ENCOUNTER: ICD-10-CM

## 2023-06-07 DIAGNOSIS — R91.8 PULMONARY NODULES: ICD-10-CM

## 2023-06-07 DIAGNOSIS — R53.83 FATIGUE, UNSPECIFIED TYPE: ICD-10-CM

## 2023-06-07 DIAGNOSIS — L40.50 PSORIATIC ARTHRITIS: Primary | ICD-10-CM

## 2023-06-07 PROCEDURE — 3008F PR BODY MASS INDEX (BMI) DOCUMENTED: ICD-10-PCS | Mod: CPTII,S$GLB,, | Performed by: PHYSICIAN ASSISTANT

## 2023-06-07 PROCEDURE — 1101F PT FALLS ASSESS-DOCD LE1/YR: CPT | Mod: CPTII,S$GLB,, | Performed by: PHYSICIAN ASSISTANT

## 2023-06-07 PROCEDURE — 99999 PR PBB SHADOW E&M-EST. PATIENT-LVL V: ICD-10-PCS | Mod: PBBFAC,,, | Performed by: PHYSICIAN ASSISTANT

## 2023-06-07 PROCEDURE — 3288F FALL RISK ASSESSMENT DOCD: CPT | Mod: CPTII,S$GLB,, | Performed by: PHYSICIAN ASSISTANT

## 2023-06-07 PROCEDURE — 3075F SYST BP GE 130 - 139MM HG: CPT | Mod: CPTII,S$GLB,, | Performed by: PHYSICIAN ASSISTANT

## 2023-06-07 PROCEDURE — 4010F PR ACE/ARB THEARPY RXD/TAKEN: ICD-10-PCS | Mod: CPTII,S$GLB,, | Performed by: PHYSICIAN ASSISTANT

## 2023-06-07 PROCEDURE — 99215 OFFICE O/P EST HI 40 MIN: CPT | Mod: S$GLB,,, | Performed by: PHYSICIAN ASSISTANT

## 2023-06-07 PROCEDURE — 3046F PR MOST RECENT HEMOGLOBIN A1C LEVEL > 9.0%: ICD-10-PCS | Mod: CPTII,S$GLB,, | Performed by: PHYSICIAN ASSISTANT

## 2023-06-07 PROCEDURE — 1101F PR PT FALLS ASSESS DOC 0-1 FALLS W/OUT INJ PAST YR: ICD-10-PCS | Mod: CPTII,S$GLB,, | Performed by: PHYSICIAN ASSISTANT

## 2023-06-07 PROCEDURE — 73630 XR FOOT COMPLETE 3 VIEW BILATERAL: ICD-10-PCS | Mod: 26,50,, | Performed by: RADIOLOGY

## 2023-06-07 PROCEDURE — 3075F PR MOST RECENT SYSTOLIC BLOOD PRESS GE 130-139MM HG: ICD-10-PCS | Mod: CPTII,S$GLB,, | Performed by: PHYSICIAN ASSISTANT

## 2023-06-07 PROCEDURE — 73130 XR HAND COMPLETE 3 VIEWS BILATERAL: ICD-10-PCS | Mod: 26,50,, | Performed by: RADIOLOGY

## 2023-06-07 PROCEDURE — 99999 PR PBB SHADOW E&M-EST. PATIENT-LVL V: CPT | Mod: PBBFAC,,, | Performed by: PHYSICIAN ASSISTANT

## 2023-06-07 PROCEDURE — 4010F ACE/ARB THERAPY RXD/TAKEN: CPT | Mod: CPTII,S$GLB,, | Performed by: PHYSICIAN ASSISTANT

## 2023-06-07 PROCEDURE — 1160F PR REVIEW ALL MEDS BY PRESCRIBER/CLIN PHARMACIST DOCUMENTED: ICD-10-PCS | Mod: CPTII,S$GLB,, | Performed by: PHYSICIAN ASSISTANT

## 2023-06-07 PROCEDURE — 1159F PR MEDICATION LIST DOCUMENTED IN MEDICAL RECORD: ICD-10-PCS | Mod: CPTII,S$GLB,, | Performed by: PHYSICIAN ASSISTANT

## 2023-06-07 PROCEDURE — 1159F MED LIST DOCD IN RCRD: CPT | Mod: CPTII,S$GLB,, | Performed by: PHYSICIAN ASSISTANT

## 2023-06-07 PROCEDURE — 1157F PR ADVANCE CARE PLAN OR EQUIV PRESENT IN MEDICAL RECORD: ICD-10-PCS | Mod: CPTII,S$GLB,, | Performed by: PHYSICIAN ASSISTANT

## 2023-06-07 PROCEDURE — 73130 X-RAY EXAM OF HAND: CPT | Mod: 26,50,, | Performed by: RADIOLOGY

## 2023-06-07 PROCEDURE — 1125F AMNT PAIN NOTED PAIN PRSNT: CPT | Mod: CPTII,S$GLB,, | Performed by: PHYSICIAN ASSISTANT

## 2023-06-07 PROCEDURE — 99215 PR OFFICE/OUTPT VISIT, EST, LEVL V, 40-54 MIN: ICD-10-PCS | Mod: S$GLB,,, | Performed by: PHYSICIAN ASSISTANT

## 2023-06-07 PROCEDURE — 73630 X-RAY EXAM OF FOOT: CPT | Mod: 26,50,, | Performed by: RADIOLOGY

## 2023-06-07 PROCEDURE — 3288F PR FALLS RISK ASSESSMENT DOCUMENTED: ICD-10-PCS | Mod: CPTII,S$GLB,, | Performed by: PHYSICIAN ASSISTANT

## 2023-06-07 PROCEDURE — 3046F HEMOGLOBIN A1C LEVEL >9.0%: CPT | Mod: CPTII,S$GLB,, | Performed by: PHYSICIAN ASSISTANT

## 2023-06-07 PROCEDURE — 1157F ADVNC CARE PLAN IN RCRD: CPT | Mod: CPTII,S$GLB,, | Performed by: PHYSICIAN ASSISTANT

## 2023-06-07 PROCEDURE — 73130 X-RAY EXAM OF HAND: CPT | Mod: TC,50

## 2023-06-07 PROCEDURE — 3078F PR MOST RECENT DIASTOLIC BLOOD PRESSURE < 80 MM HG: ICD-10-PCS | Mod: CPTII,S$GLB,, | Performed by: PHYSICIAN ASSISTANT

## 2023-06-07 PROCEDURE — 3008F BODY MASS INDEX DOCD: CPT | Mod: CPTII,S$GLB,, | Performed by: PHYSICIAN ASSISTANT

## 2023-06-07 PROCEDURE — 1160F RVW MEDS BY RX/DR IN RCRD: CPT | Mod: CPTII,S$GLB,, | Performed by: PHYSICIAN ASSISTANT

## 2023-06-07 PROCEDURE — 3078F DIAST BP <80 MM HG: CPT | Mod: CPTII,S$GLB,, | Performed by: PHYSICIAN ASSISTANT

## 2023-06-07 PROCEDURE — 1125F PR PAIN SEVERITY QUANTIFIED, PAIN PRESENT: ICD-10-PCS | Mod: CPTII,S$GLB,, | Performed by: PHYSICIAN ASSISTANT

## 2023-06-07 PROCEDURE — 73630 X-RAY EXAM OF FOOT: CPT | Mod: TC,50

## 2023-06-07 RX ORDER — USTEKINUMAB 90 MG/ML
90 INJECTION, SOLUTION SUBCUTANEOUS
Qty: 2 ML | Refills: 0 | Status: ACTIVE | OUTPATIENT
Start: 2023-06-07 | End: 2023-10-16 | Stop reason: SDUPTHER

## 2023-06-07 NOTE — TELEPHONE ENCOUNTER
Outgoing call to pt regarding Cosentyx refill. Pt was at doctor's appt.  Got on call to inform that he would be discontinuing Cosentyx and starting on a new med (Stelara). Doctor is waiting on labs to switch to Stelara. Pt has some on hand to hold him over until Stelara prescription is received. Routing to [facility] to make aware.

## 2023-06-07 NOTE — TELEPHONE ENCOUNTER
OV is not signed yet, but confirmed that provider has discontinued Cosentyx and ordered Stelara.  De-enrolling Cosentyx at this time.

## 2023-06-07 NOTE — PROGRESS NOTES
Subjective:      Patient ID: Trey Stevens is a 68 y.o. male.    Chief Complaint: Psoriatic Arthritis and Disease Management      HPI   Trey Stevens  is a 68 y.o. male seen for follow-up on PsA w severe plaque PsO.  He has been followed by my colleagues in the past.  This is a new patient to my clinic.  On Cosentyx 300 mg subQ every 14 days.  Recently he went to dosing it 150 mg subcu every 7 days at the advise of one of his other providers per his report.  Also on methotrexate 10 mg weekly split dose.  Prescription in his chart says 20 mg weekly split dose but patient states he is following the instructions on his current bottle.  Tolerating both medications well.    Complaining of increasing symptoms bilateral feet.  Pain level 7/10.  Complains of a.m. stiffness lasting the majority of the day.  He also has severe psoriasis.  It is fairly active over the bilateral forearms, bilateral knees, bilateral elbows, bilateral hands.  Using topical Dovonex regularly.  1+ years since he last saw derm.  Previously failed Humira, Enbrel, methotrexate monotherapy.      Has COPD and pulmonary nodules.  The largest nodule appears stable based on CT results from November 2022.  According to the same CT scan it appears he has some new nodules as well.  He is followed by the pulmonology department.      He is also an uncontrolled Diabetic.  On insuline.  He has neuropathy.  Most recent hemoglobin A1c done 2 months ago 13.9%.    Patient denies h/o malignancy, fevers, chills, photosensitivity, eye pain, shortness of breath, chest pain, hematuria, blood in the stool, rash, sicca symptoms, raynauds, finger ulcerations.  Rheumatologic systems otherwise negative.    Serologies/Labs:  Neg SANJANA, RF, CCP  Current Treatment:  Cosentyx 300 mg q 28 days  Previous Treatment:   MTX monotherapy  Topicals  UV  Humira  Enbrel      Current Outpatient Medications:     amLODIPine (NORVASC) 5 MG tablet, Take 1 tablet (5 mg total) by mouth once daily. For  high blood pressure, Disp: 90 tablet, Rfl: 3    aspirin 81 MG Chew, Take 1 tablet (81 mg total) by mouth once daily., Disp: 100 tablet, Rfl: 0    atorvastatin (LIPITOR) 80 MG tablet, Take 1 tablet (80 mg total) by mouth once daily., Disp: 90 tablet, Rfl: 3    blood sugar diagnostic (ACCU-CHEK NOELLE PLUS TEST STRP) Strp, Check blood sugar 3 times daily., Disp: 300 each, Rfl: 3    blood-glucose meter (ACCU-CHEK NOELLE PLUS METER) Misc, Check blood sugar 3 times daily, Disp: 1 each, Rfl: 0    calcipotriene (DOVONOX) 0.005 % cream, APPLY TO AFFECTED AREA(S) TWICE DAILY, Disp: 60 g, Rfl: 4    clobetasoL (OLUX) 0.05 % Foam, APPLY TO AFFECTED AREA OF SCALP AND BEHIND EARS TWICE DAILY. DONT USE ON FACE,UNDERARMS,OR GROIN, Disp: 100 g, Rfl: 3    empagliflozin (JARDIANCE) 25 mg tablet, Take 1 tablet (25 mg total) by mouth once daily., Disp: 90 tablet, Rfl: 1    folic acid (FOLVITE) 1 MG tablet, TAKE 1 TABLET ONE TIME DAILY, Disp: 90 tablet, Rfl: 1    glipiZIDE (GLUCOTROL) 5 MG tablet, TAKE 1 TABLET BY MOUTH TWICE A DAY, Disp: 180 tablet, Rfl: 1    insulin aspart U-100 (NOVOLOG U-100 INSULIN ASPART) 100 unit/mL injection, INJECT 200 UNITS PER CEQUR DEVICE EVERY 3 DAYS, Disp: 60 mL, Rfl: 1    ketoconazole (NIZORAL) 2 % shampoo, WASH HAIR WITH MEDICATED SHAMPOO AT LEAST 2X/WEEK - LET SIT ON SCALP AT LEAST 5 MINUTES PRIOR TO RINSING, Disp: 120 mL, Rfl: 3    lancets (ACCU-CHEK SOFTCLIX LANCETS) Misc, Check blood sugar 3 times daily, Disp: 300 each, Rfl: 3    LANTUS SOLOSTAR U-100 INSULIN glargine 100 units/mL SubQ pen, Inject 24 Units into the skin once daily., Disp: 15 mL, Rfl: 5    losartan (COZAAR) 25 MG tablet, TAKE 1 TABLET BY MOUTH EVERY DAY, Disp: 90 tablet, Rfl: 3    methotrexate 2.5 MG Tab, TAKE 8 TABLETS BY MOUTH EVERY 7 DAYS., Disp: 96 tablet, Rfl: 0    nicotine polacrilex 2 MG Lozg, Take 1 lozenge (2 mg total) by mouth as needed (Use up to 10 lozenges per day in the place of cigarettes)., Disp: 216 lozenge, Rfl: 0     "NOVOLOG FLEXPEN U-100 INSULIN 100 unit/mL (3 mL) InPn pen, Inject 8 Units into the skin 3 (three) times daily with meals., Disp: 15 mL, Rfl: 5    pen needle, diabetic 32 gauge x 5/32" Ndle, Use with Lantus once daily and Humalog 3 times daily., Disp: 200 each, Rfl: 5    tirzepatide (MOUNJARO) 10 mg/0.5 mL PnIj, Inject 10 mg into the skin every 7 days. STOP TRULICITY AND START MOUNJARO ON THE DAY TRULICITY WOULD HAVE BEEN DUE., Disp: 4 pen, Rfl: 5    traMADoL (ULTRAM) 50 mg tablet, TAKE 1 TABLET BY MOUTH THREE TIMES A DAY AS NEEDED FOR MODERATE PAIN Strength: 50 mg, Disp: 21 tablet, Rfl: 0    VENTOLIN HFA 90 mcg/actuation inhaler, INHALE 2 PUFFS INTO THE LUNGS EVERY 4 (FOUR) HOURS AS NEEDED FOR WHEEZING. RESCUE, Disp: 18 g, Rfl: 11    metFORMIN (GLUCOPHAGE-XR) 500 MG XR 24hr tablet, Take 2 tablets (1,000 mg total) by mouth 2 (two) times daily with meals., Disp: 360 tablet, Rfl: 1    spironolactone (ALDACTONE) 25 MG tablet, Take 1 tablet (25 mg total) by mouth once daily., Disp: 30 tablet, Rfl: 11    ustekinumab (STELARA) 90 mg/mL Syrg syringe, Inject 1 mL (90 mg total) into the skin every 28 days. for 2 doses, Disp: 2 mL, Rfl: 0    Past Medical History:   Diagnosis Date    Arthritis     Diabetes mellitus     Diabetes mellitus type 2, uncontrolled 2016    DM (diabetes mellitus)      2017    Gall stones     Obesity     Pneumonia of right middle lobe due to infectious organism 2021    Psoriasis (a type of skin inflammation)     Rheumatoid arthritis of foot      Family History   Problem Relation Age of Onset    Cancer Mother     Hypertension Mother     Diabetes Mother     Cataracts Mother     Stroke Father     Psoriasis Neg Hx      Social History     Socioeconomic History    Marital status:    Tobacco Use    Smoking status: Former     Packs/day: 0.50     Years: 28.00     Pack years: 14.00     Types: Cigarettes     Start date:      Quit date: 2022     Years since quittin.5    " "Smokeless tobacco: Never    Tobacco comments:     Quit smoking 1 mo ago (11/2022)   Substance and Sexual Activity    Alcohol use: No     Alcohol/week: 0.0 standard drinks    Drug use: No    Sexual activity: Never     Social Determinants of Health     Financial Resource Strain: Low Risk     Difficulty of Paying Living Expenses: Not hard at all   Food Insecurity: No Food Insecurity    Worried About Running Out of Food in the Last Year: Never true    Ran Out of Food in the Last Year: Never true   Transportation Needs: No Transportation Needs    Lack of Transportation (Medical): No    Lack of Transportation (Non-Medical): No   Physical Activity: Inactive    Days of Exercise per Week: 0 days    Minutes of Exercise per Session: 0 min   Stress: No Stress Concern Present    Feeling of Stress : Not at all   Social Connections: Moderately Isolated    Frequency of Communication with Friends and Family: Twice a week    Frequency of Social Gatherings with Friends and Family: More than three times a week    Attends Taoism Services: More than 4 times per year    Active Member of Clubs or Organizations: No    Attends Club or Organization Meetings: Never    Marital Status:    Housing Stability: Low Risk     Unable to Pay for Housing in the Last Year: No    Number of Places Lived in the Last Year: 1    Unstable Housing in the Last Year: No     Review of patient's allergies indicates:  No Known Allergies    Objective:   /73   Pulse 96   Ht 5' 7" (1.702 m)   Wt 118 kg (260 lb 2.3 oz)   BMI 40.74 kg/m²   Immunization History   Administered Date(s) Administered    COVID-19, MRNA, LN-S, PF (Pfizer) (Purple Cap) 09/16/2021, 10/07/2021    Hepatitis A, Adult 12/20/2018    Influenza (FLUAD) - Quadrivalent - Adjuvanted - PF *Preferred* (65+) 12/08/2022    Influenza - High Dose - PF (65 years and older) 10/18/2018    Influenza - Quadrivalent 10/24/2016    Influenza - Quadrivalent - PF *Preferred* (6 months and older) " 10/30/2017, 11/07/2019, 11/04/2020, 01/18/2022    PPD Test 08/05/2013    Pneumococcal Conjugate - 13 Valent 02/24/2016    Pneumococcal Conjugate - 20 Valent 08/01/2022    Pneumococcal Polysaccharide - 23 Valent 04/09/2013    Tdap 02/24/2016       Physical Exam   Constitutional: He is oriented to person, place, and time. No distress.   HENT:   Head: Normocephalic and atraumatic.   Pulmonary/Chest: Effort normal.   Musculoskeletal:         General: Tenderness present.   Neurological: He is alert and oriented to person, place, and time.   Skin: Rash noted.   Psychiatric: His behavior is normal. Mood normal.   Nursing note and vitals reviewed.      Right Side Rheumatological Exam     Examination finds the 1st PIP, 1st MCP, 2nd PIP, 2nd MCP, 3rd PIP, 3rd MCP, 4th PIP, 4th MCP, 5th PIP and 5th MCP normal.    Shoulder Exam   Tenderness Location: no tenderness    Range of Motion   Forward Flexion:  normal   External Rotation 0 degrees:  normal   Internal rotation 0 degrees:  normal     Knee Exam     Range of Motion   The patient has normal right knee ROM.    Elbow/Wrist Exam   Tenderness Location: no elbow tenderness and no wrist tenderness    Range of Motion   Elbow   Extension:  normal   Flexion:  normal   Pronation:  normal   Supination:  normal     Range of Motion   Wrist   Extension:  normal   Flexion:  normal     Left Side Rheumatological Exam     Examination finds the 1st PIP, 1st MCP, 2nd PIP, 2nd MCP, 3rd PIP, 3rd MCP, 4th PIP, 4th MCP, 5th PIP and 5th MCP normal.    Shoulder Exam   Tenderness Location: no tenderness    Range of Motion   Forward Flexion:  normal   External Rotation 0 degrees:  normal   Internal rotation 0 degrees:  normal     Knee Exam     Range of Motion   The patient has normal left knee ROM.    Elbow/Wrist Exam   Tenderness Location: no elbow tenderness and no wrist tenderness    Range of Motion   Elbow   Extension:  normal   Flexion:  normal   Pronation:  normal   Supination:  normal     Range  of Motion   Wrist   Extension:  normal   Flexion:  normal       Enthesitis bilat achilles  No dactylitis  Active PsO                  Recent Results (from the past 672 hour(s))   Spirometry with/without bronchodilator    Collection Time: 05/15/23  4:40 PM   Result Value Ref Range    Interpretation       Spirometry shows a reduced FVC consistent with moderate restriction. In addition the CUR52-13 is reduced suggesting obstruction may also be present. Spirometry remains unimproved following bronchodilator.   Â   Notes:  Â   The failure to demonstrate improvement in spirometry does not preclude a clinical response to a trial of bronchodilators. Consider lung volume determination to help explain the etiology for the reduced FVC (restriction and/or air trapping). Also consider   DLCO determination if clinically indicated.       Post FVC 2.19 L    Post FEV1 1.59 L    Post FEV1 FVC 72.71 %    Post FEF 25 75 1.05 L/s    Post PEF 4.00 L/s    Post  8.94 sec    Pre FVC 2.13 L    Pre FEV1 1.54 L    Pre FEV1 FVC 72.44 %    Pre FEF 25 75 0.96 L/s    Pre PEF 3.54 L/s    Pre  13.19 sec    FVC Ref 3.95     FVC LLN 2.96     FVC Pre Ref 53.8 %    FVC Post Ref 55.5 %    FVC Chg 3.1 %    FEV1 Ref 3.04     FEV1 LLN 2.23     FEV1 Pre Ref 50.7 %    FEV1 Post Ref 52.4 %    FEV1 Chg 3.4 %    FEV1 FVC Ref 77     FEV1 FVC LLN 64     FEV1 FVC Pre Ref 94.0 %    FEV1 FVC Post Ref 94.3 %    FEV1 FVC Chg 0.4 %    FEF 25 75 Ref 2.45     FEF 25 75 LLN 1.12     FEF 25 75 Pre Ref 39.0 %    FEF 25 75 Post Ref 42.8 %    FEF 25 75 Chg 9.7 %    PEF Ref 8.06     PEF LLN 5.94     PEF Pre Ref 43.9 %    PEF Post Ref 49.6 %    PEF Chg 13.0 %    ENU706 Chg -32.2 %   POCT Glucose, Hand-Held Device    Collection Time: 05/17/23 10:45 AM   Result Value Ref Range    POC Glucose 209 (A) 70 - 110 MG/DL   POCT Glucose, Hand-Held Device    Collection Time: 05/31/23 11:09 AM   Result Value Ref Range    POC Glucose 182 (A) 70 - 110 MG/DL   CBC Auto  Differential    Collection Time: 06/07/23  3:05 PM   Result Value Ref Range    WBC 7.38 3.90 - 12.70 K/uL    RBC 5.66 4.60 - 6.20 M/uL    Hemoglobin 15.6 14.0 - 18.0 g/dL    Hematocrit 46.6 40.0 - 54.0 %    MCV 82 82 - 98 fL    MCH 27.6 27.0 - 31.0 pg    MCHC 33.5 32.0 - 36.0 g/dL    RDW 15.5 (H) 11.5 - 14.5 %    Platelets 218 150 - 450 K/uL    MPV 10.4 9.2 - 12.9 fL    Immature Granulocytes 0.3 0.0 - 0.5 %    Gran # (ANC) 3.9 1.8 - 7.7 K/uL    Immature Grans (Abs) 0.02 0.00 - 0.04 K/uL    Lymph # 2.4 1.0 - 4.8 K/uL    Mono # 0.6 0.3 - 1.0 K/uL    Eos # 0.4 0.0 - 0.5 K/uL    Baso # 0.05 0.00 - 0.20 K/uL    nRBC 0 0 /100 WBC    Gran % 53.1 38.0 - 73.0 %    Lymph % 32.8 18.0 - 48.0 %    Mono % 7.7 4.0 - 15.0 %    Eosinophil % 5.4 0.0 - 8.0 %    Basophil % 0.7 0.0 - 1.9 %    Differential Method Automated    Comprehensive Metabolic Panel    Collection Time: 06/07/23  3:05 PM   Result Value Ref Range    Sodium 140 136 - 145 mmol/L    Potassium 4.1 3.5 - 5.1 mmol/L    Chloride 103 95 - 110 mmol/L    CO2 23 23 - 29 mmol/L    Glucose 200 (H) 70 - 110 mg/dL    BUN 17 8 - 23 mg/dL    Creatinine 1.1 0.5 - 1.4 mg/dL    Calcium 9.3 8.7 - 10.5 mg/dL    Total Protein 7.8 6.0 - 8.4 g/dL    Albumin 3.6 3.5 - 5.2 g/dL    Total Bilirubin 0.4 0.1 - 1.0 mg/dL    Alkaline Phosphatase 95 55 - 135 U/L    AST 22 10 - 40 U/L    ALT 32 10 - 44 U/L    Anion Gap 14 8 - 16 mmol/L    eGFR >60 >60 mL/min/1.73 m^2   C-Reactive Protein    Collection Time: 06/07/23  3:05 PM   Result Value Ref Range    CRP 1.9 0.0 - 8.2 mg/L       Lab Results   Component Value Date    TBGOLDPLUS Negative 08/23/2022      Lab Results   Component Value Date    HEPAIGM Negative 06/19/2018    HEPBIGM Negative 06/19/2018    HEPBCAB Negative 04/04/2016    HEPCAB Negative 07/22/2019        Imaging  I have personally reviewed images and reports as below.  I agree with the interpretation.  CT Chest Without Contrast  Order: [id number]  Status: Final result     Visible to patient:  No (inaccessible in Patient Portal)     Next appt: 06/09/2023 at 11:30 AM in Diabetes (Bee Stinson NP)     Dx: Mixed type COPD (chronic obstructive ...     1 Result Note  Details    Reading Physician Reading Date Result Priority   Tu Guerra MD  [phone] 11/8/2022 Routine     Narrative & Impression  EXAMINATION:  CT CHEST WITHOUT CONTRAST     CLINICAL HISTORY:  Chronic obstructive pulmonary disease, unspecifiedLung nodule, > 8mm;     COMPARISON:  CT chest November 10, 2017.     TECHNIQUE:  Axial CT images were obtained. Iterative reconstruction technique was used.     FINDINGS:  There is a partially calcified pulmonary nodule in the right upper lobe measuring up to 9 mm.     Multiple sub 6 mm pulmonary nodules are seen bilaterally which are similar to the exam from 2017.     Calcified hilar lymph nodes are seen on the left.  No mediastinal lymphadenopathy.  Coronary artery calcifications are present.  Aortic atherosclerotic calcifications are seen.  No axillary lymphadenopathy.  Multilevel degenerative changes of the spine.  No acute or suspicious osseous finding.     Impression:     Multiple bilateral pulmonary nodules, the largest of which is stable to 5 years ago and has a calcification consistent with granulomatous disease.  Calcified hilar lymph nodes go along with this diagnosis.  However, some pulmonary nodules may be new but they are less than 6 mm.  Follow-up chest CT in 1 year is recommended.     The CT exam was performed using one or more of the following dose reduction techniques- Automated exposure control, adjustment of the mA and/or kV according to patient size, and/or use of iterative reconstructed technique.        Electronically signed by: Tu Guerra  Date:                                            11/08/2022  Time:                                           12:07         Assessment:     1. Psoriatic arthritis    2. Plaque psoriasis    3. Methotrexate, long term, current use    4.  High risk medication use    5. Immunosuppressed status    6. Medication monitoring encounter    7. Pulmonary nodules    8. Fatigue, unspecified type            Plan:     Trey was seen today for psoriatic arthritis and disease management.    Diagnoses and all orders for this visit:    Psoriatic arthritis  -     Ambulatory referral/consult to Rheumatology  -     X-Ray Hand 3 View Bilateral; Future  -     X-Ray Foot Complete Bilateral; Future  -     Ambulatory referral/consult to Dermatology; Future  -     CBC Auto Differential; Standing  -     Comprehensive Metabolic Panel; Standing  -     Sedimentation rate; Standing  -     C-Reactive Protein; Standing  -     Quantiferon Gold TB; Standing  -     Hepatitis Panel, Acute; Standing  -     ustekinumab (STELARA) 90 mg/mL Syrg syringe; Inject 1 mL (90 mg total) into the skin every 28 days. for 2 doses    Plaque psoriasis  -     X-Ray Hand 3 View Bilateral; Future  -     X-Ray Foot Complete Bilateral; Future  -     Ambulatory referral/consult to Dermatology; Future  -     CBC Auto Differential; Standing  -     Comprehensive Metabolic Panel; Standing  -     Sedimentation rate; Standing  -     C-Reactive Protein; Standing  -     Quantiferon Gold TB; Standing  -     Hepatitis Panel, Acute; Standing  -     ustekinumab (STELARA) 90 mg/mL Syrg syringe; Inject 1 mL (90 mg total) into the skin every 28 days. for 2 doses    Methotrexate, long term, current use    High risk medication use  -     CBC Auto Differential; Standing  -     Comprehensive Metabolic Panel; Standing  -     Sedimentation rate; Standing  -     C-Reactive Protein; Standing  -     Quantiferon Gold TB; Standing  -     Hepatitis Panel, Acute; Standing    Immunosuppressed status  -     CBC Auto Differential; Standing  -     Comprehensive Metabolic Panel; Standing  -     Sedimentation rate; Standing  -     C-Reactive Protein; Standing  -     Quantiferon Gold TB; Standing  -     Hepatitis Panel, Acute;  Standing    Medication monitoring encounter  -     CBC Auto Differential; Standing  -     Comprehensive Metabolic Panel; Standing  -     Sedimentation rate; Standing  -     C-Reactive Protein; Standing    Pulmonary nodules    Fatigue, unspecified type  -     Hepatitis Panel, Acute; Standing        Severe worsening PsA w PsO  Reg 4, TB and hep panel today  DC cosentyx  Stelara 90 mg sq pending labs  Discussed risks and benefits  Literature provided to the patient  CBC stable  CMP w elevated glucose (200) o/w stable  Pulmonary nodules  New nodules suspected on CT scan 11/20/2022  Consider discontinuation of methotrexate.  I will reach out to pulmonology re their recs  Consider switching to sulfasalazine as an alternative  Update hand and foot x-rays today  Drug therapy requiring intensive monitoring for toxicity  High Risk Medication Monitoring encounter  No current medication related issues, no evidence of toxicity  I ordered labs for toxicity monitoring, have personally reviewed the findings, and discussed them with the patient.  Pending labs will be sent via the portal  Compromised immune system secondary to autoimmune disease and/or use of immunosuppressive drugs.  Monitor carefully for infections.  Advised patient to get immediate medical care if any infection arises.  Also advised strict adherence age-appropriate vaccinations and cancer screenings with PCP.  Patient advised to hold DMARD and/or biologic therapy for signs of infection or for surgery. If you are unsure what to do please call our office for instruction.[facility] [phone]  Return to clinic: 3 mos w Charlette, reg 4 prior; Labs only (CBC, CMP) 4 weeks    65 minutes of total time spent on the encounter, which includes face to face time and non-face to face time preparing to see the patient (eg, review of tests), Obtaining and/or reviewing separately obtained history, Documenting clinical information in the electronic or other  health record, Independently interpreting results (not separately reported) and communicating results to the patient/family/caregiver, or Care coordination (not separately reported).       No follow-ups on file.    The patient understands, chooses and consents to this plan and accepts all the risks which include but are not limited to the risks mentioned above.     Disclaimer: This note was prepared using a voice recognition system and is likely to have sound alike errors within the text.

## 2023-06-09 ENCOUNTER — TELEPHONE (OUTPATIENT)
Dept: DIABETES | Facility: CLINIC | Age: 68
End: 2023-06-09
Payer: MEDICARE

## 2023-06-09 ENCOUNTER — OUTPATIENT CASE MANAGEMENT (OUTPATIENT)
Dept: ADMINISTRATIVE | Facility: OTHER | Age: 68
End: 2023-06-09
Payer: MEDICARE

## 2023-06-09 ENCOUNTER — OFFICE VISIT (OUTPATIENT)
Dept: DIABETES | Facility: CLINIC | Age: 68
End: 2023-06-09
Payer: MEDICARE

## 2023-06-09 ENCOUNTER — TELEPHONE (OUTPATIENT)
Dept: RHEUMATOLOGY | Facility: CLINIC | Age: 68
End: 2023-06-09
Payer: MEDICARE

## 2023-06-09 DIAGNOSIS — E11.69 HYPERLIPIDEMIA ASSOCIATED WITH TYPE 2 DIABETES MELLITUS: ICD-10-CM

## 2023-06-09 DIAGNOSIS — I15.2 HYPERTENSION ASSOCIATED WITH DIABETES: ICD-10-CM

## 2023-06-09 DIAGNOSIS — L40.52 PSORIATIC ARTHRITIS, DESTRUCTIVE TYPE: ICD-10-CM

## 2023-06-09 DIAGNOSIS — E11.59 HYPERTENSION ASSOCIATED WITH DIABETES: ICD-10-CM

## 2023-06-09 DIAGNOSIS — E11.65 UNCONTROLLED TYPE 2 DIABETES MELLITUS WITH HYPERGLYCEMIA, WITH LONG-TERM CURRENT USE OF INSULIN: Primary | ICD-10-CM

## 2023-06-09 DIAGNOSIS — Z79.4 UNCONTROLLED TYPE 2 DIABETES MELLITUS WITH HYPERGLYCEMIA, WITH LONG-TERM CURRENT USE OF INSULIN: Primary | ICD-10-CM

## 2023-06-09 DIAGNOSIS — E78.5 HYPERLIPIDEMIA ASSOCIATED WITH TYPE 2 DIABETES MELLITUS: ICD-10-CM

## 2023-06-09 DIAGNOSIS — R07.89 LEFT-SIDED CHEST WALL PAIN: ICD-10-CM

## 2023-06-09 PROCEDURE — 4010F PR ACE/ARB THEARPY RXD/TAKEN: ICD-10-PCS | Mod: CPTII,95,, | Performed by: NURSE PRACTITIONER

## 2023-06-09 PROCEDURE — 1157F PR ADVANCE CARE PLAN OR EQUIV PRESENT IN MEDICAL RECORD: ICD-10-PCS | Mod: CPTII,95,, | Performed by: NURSE PRACTITIONER

## 2023-06-09 PROCEDURE — 1159F MED LIST DOCD IN RCRD: CPT | Mod: CPTII,95,, | Performed by: NURSE PRACTITIONER

## 2023-06-09 PROCEDURE — 1160F RVW MEDS BY RX/DR IN RCRD: CPT | Mod: CPTII,95,, | Performed by: NURSE PRACTITIONER

## 2023-06-09 PROCEDURE — 99442 PR PHYSICIAN TELEPHONE EVALUATION 11-20 MIN: ICD-10-PCS | Mod: 95,,, | Performed by: NURSE PRACTITIONER

## 2023-06-09 PROCEDURE — 99442 PR PHYSICIAN TELEPHONE EVALUATION 11-20 MIN: CPT | Mod: 95,,, | Performed by: NURSE PRACTITIONER

## 2023-06-09 PROCEDURE — 1157F ADVNC CARE PLAN IN RCRD: CPT | Mod: CPTII,95,, | Performed by: NURSE PRACTITIONER

## 2023-06-09 PROCEDURE — 3046F PR MOST RECENT HEMOGLOBIN A1C LEVEL > 9.0%: ICD-10-PCS | Mod: CPTII,95,, | Performed by: NURSE PRACTITIONER

## 2023-06-09 PROCEDURE — 1159F PR MEDICATION LIST DOCUMENTED IN MEDICAL RECORD: ICD-10-PCS | Mod: CPTII,95,, | Performed by: NURSE PRACTITIONER

## 2023-06-09 PROCEDURE — 3046F HEMOGLOBIN A1C LEVEL >9.0%: CPT | Mod: CPTII,95,, | Performed by: NURSE PRACTITIONER

## 2023-06-09 PROCEDURE — 4010F ACE/ARB THERAPY RXD/TAKEN: CPT | Mod: CPTII,95,, | Performed by: NURSE PRACTITIONER

## 2023-06-09 PROCEDURE — 1160F PR REVIEW ALL MEDS BY PRESCRIBER/CLIN PHARMACIST DOCUMENTED: ICD-10-PCS | Mod: CPTII,95,, | Performed by: NURSE PRACTITIONER

## 2023-06-09 RX ORDER — TRAMADOL HYDROCHLORIDE 50 MG/1
TABLET ORAL
Qty: 21 TABLET | Refills: 0 | Status: SHIPPED | OUTPATIENT
Start: 2023-06-09

## 2023-06-09 NOTE — TELEPHONE ENCOUNTER
Patient was notified of lab/imaging results per provider. Patient verbalized understanding. All questions answered.  Patient was grateful for the call. -DD      Patient would like the podiatry referral

## 2023-06-09 NOTE — Clinical Note
He currently has labs scheduled 7/12. Can we move them up to 7/10 when he is seeing Jose? Have these results Cc'd to me. Schedule to see me about 2 weeks after he sees Craigcalvin. Notes: No devices. Also- he was asking about a refill of Tramadol. Can we send a message to Dr. Spencer's pool ?

## 2023-06-09 NOTE — PROGRESS NOTES
Established Patient - Audio Only Telehealth Visit     The patient location is: [place]  The chief complaint leading to consultation is: diabetes management follow up   Visit type: Virtual visit with audio only (telephone)  Total time spent with patient: 12 minutes       The reason for the audio only service rather than synchronous audio and video virtual visit was related to technical difficulties or patient preference/necessity.     Each patient to whom I provide medical services by telemedicine is:  (1) informed of the relationship between the physician and patient and the respective role of any other health care provider with respect to management of the patient; and (2) notified that they may decline to receive medical services by telemedicine and may withdraw from such care at any time. Patient verbally consented to receive this service via voice-only telephone call.       HPI: Requested audio call in place of virtual. Between visits, he was trained on CeQur. He is only using when he is away from home - prefers to use Novolog pens when at home, and reports consistently dosing with Novolog. He states Bgs have overall improved- no readings to review today. He is tolerating Mounjaro 10 mg weekly without GI side effects. We discussed need to follow up in person in 2 months. A1c due 07/2023.     Diabetes Medications               empagliflozin (JARDIANCE) 25 mg tablet Take 1 tablet (25 mg total) by mouth once daily.    glipiZIDE (GLUCOTROL) 5 MG tablet TAKE 1 TABLET BY MOUTH TWICE A DAY    insulin aspart U-100 (NOVOLOG U-100 INSULIN ASPART) 100 unit/mL injection INJECT 200 UNITS PER CEQUR DEVICE EVERY 3 DAYS    LANTUS SOLOSTAR U-100 INSULIN glargine 100 units/mL SubQ pen Inject 24 Units into the skin once daily.    metFORMIN (GLUCOPHAGE-XR) 500 MG XR 24hr tablet Take 2 tablets (1,000 mg total) by mouth 2 (two) times daily with meals.    NOVOLOG FLEXPEN U-100 INSULIN 100 unit/mL (3 mL) InPn pen Inject 8 Units into  the skin 3 (three) times daily with meals.    tirzepatide (MOUNJARO) 10 mg/0.5 mL PnIj Inject 10 mg into the skin every 7 days. STOP TRULICITY AND START MOUNJARO ON THE DAY TRULICITY WOULD HAVE BEEN DUE.           Diabetes Management Status    Statin: Taking  ACE/ARB: Taking    Screening or Prevention Patient's value Goal Complete/Controlled?   HgA1C Testing and Control   Lab Results   Component Value Date    HGBA1C 13.9 (H) 04/11/2023      Annually/Less than 8% No   Lipid profile : 04/11/2022 Annually No   LDL control Lab Results   Component Value Date    LDLCALC 69.4 04/11/2022    Annually/Less than 100 mg/dl  No   Nephropathy screening Lab Results   Component Value Date    LABMICR 302.0 12/08/2022     Lab Results   Component Value Date    PROTEINUA 2+ (A) 08/13/2021     No results found for: UTPCR   Annually Yes   Blood pressure BP Readings from Last 1 Encounters:   06/07/23 135/73    Less than 140/90 Yes   Dilated retinal exam : 05/11/2023 Annually Yes   Foot exam   : 12/29/2022 Annually Yes         Assessment and plan:      Trey was seen today for diabetes mellitus.    Diagnoses and all orders for this visit:    Uncontrolled type 2 diabetes mellitus with hyperglycemia, with long-term current use of insulin    Chronic,patient reports improving:    -- Medications adjusted for today's visit include:    Continue Lantus to 24 units once daily.    Continue Novolog 8 units three times a day before each main meal, or 4 clicks per CeQur before each meal.    Continue Metformin 2 tablets twice a day, Jardiance once a day and Mounjaro once a week.    Hyperlipidemia associated with type 2 diabetes mellitus    Hypertension associated with diabetes    Psoriatic arthritis, destructive type                              This service was not originating from a related E/M service provided within the previous 7 days nor will  to an E/M service or procedure within the next 24 hours or my soonest available appointment.   Prevailing standard of care was able to be met in this audio-only visit.

## 2023-06-09 NOTE — PATIENT INSTRUCTIONS
-- Medications adjusted for today's visit include:    Continue Lantus to 24 units once daily.    Continue Novolog 8 units three times a day before each main meal, or 4 clicks per CeQur before each meal.    Continue Metformin 2 tablets twice a day, Jardiance once a day and Mounjaro once a week.    -- Start checking blood sugar 3 times daily: Fasting blood sugar and vary your next 2 readings: Before lunch, before supper, 2 hours after any meal, or bedtime.   -Blood sugar goals should be a fasting blood sugar below 130, and no blood sugars throughout the day over 180 is good.    --Carry glucose tablets/soft peppermints/regular juice or Coke product with you at all times to treat a low blood sugar episode (less than 70). If your blood sugar is between 50-70, Chew 4 tablets or drink 1/2 cup of juice or regular Coke product. If your blood sugar is below 50, double the treatment. Re-check blood sugar in 15 minutes. If still low, repeat this. Always call the clinic to give an update for any low blood sugar episodes.    --Follow-up for your next visit in 8 weeks with logs.    --Please either drop off, fax, or MyChart your readings to me in a couple of weeks if you can.

## 2023-06-09 NOTE — TELEPHONE ENCOUNTER
----- Message from Winter Young PA-C sent at 6/8/2023 10:58 AM CDT -----  Erosive changes noted in bilateral feet consistent with psoriatic arthritis.  If he would like to go see Podiatry for selective injections, I am happy to put in a referral.    Hand x-rays show some arthritic changes.  Blood work shows stable blood counts as well as a stable kidney and liver function.  Blood sugar is quite elevated but he is following closely with primary care.    Advise follow-up with Charlette as discussed.

## 2023-06-09 NOTE — PROGRESS NOTES
Outpatient Care Management  Plan of Care Follow Up Visit    Patient: Trey Stevens  MRN: [id number]  Date of Service: 06/09/2023  Completed by: Louise Aguayo RN  Referral Date: 01/25/2023    Reason for Visit   Patient presents with    Nursing Assessment    OPCM RN Follow Up Call       Brief Summary: OPCM visit completed, care plan reviewed and updated.   Next Steps: plan to follow up in approximately 2 weeks - patient agrees. Foods consumed? Diet choices, blood sugars? How are feet?   Taking meds as ordered?  GOVIND Aguayo RN

## 2023-06-09 NOTE — TELEPHONE ENCOUNTER
Spoke with pt regarding education appointment and labs following pt confirmed and understood pt was also confirmed 2 weeks follow up with marcellus on 07/31/23

## 2023-06-09 NOTE — TELEPHONE ENCOUNTER
----- Message from Bee Stinson NP sent at 6/9/2023 11:39 AM CDT -----  He currently has labs scheduled 7/12. Can we move them up to 7/10 when he is seeing Craigcalvin? Have these results Cc'd to me. Schedule to see me about 2 weeks after he sees Jose. Notes: No devices. Also- he was asking about a refill of Tramadol. Can we send a message to Dr. Spencer's pool ?

## 2023-06-12 ENCOUNTER — TELEPHONE (OUTPATIENT)
Dept: PRIMARY CARE CLINIC | Facility: CLINIC | Age: 68
End: 2023-06-12
Payer: MEDICARE

## 2023-06-14 ENCOUNTER — CLINICAL SUPPORT (OUTPATIENT)
Dept: SMOKING CESSATION | Facility: CLINIC | Age: 68
End: 2023-06-14
Payer: COMMERCIAL

## 2023-06-14 ENCOUNTER — TELEPHONE (OUTPATIENT)
Dept: PHARMACY | Facility: CLINIC | Age: 68
End: 2023-06-14
Payer: MEDICARE

## 2023-06-14 DIAGNOSIS — F17.200 NICOTINE DEPENDENCE: Primary | ICD-10-CM

## 2023-06-14 PROCEDURE — 99407 PR TOBACCO USE CESSATION INTENSIVE >10 MINUTES: ICD-10-PCS | Mod: S$GLB,,, | Performed by: GENERAL PRACTICE

## 2023-06-14 PROCEDURE — 99407 BEHAV CHNG SMOKING > 10 MIN: CPT | Mod: S$GLB,,, | Performed by: GENERAL PRACTICE

## 2023-06-14 NOTE — PROGRESS NOTES
Spoke with patient today in regard to smoking cessation progress 3/6/12 month telephone follow up, he states that he is tobacco free.  Commended patient on the accomplishments thus far.  Informed patient of benefit period, future follow up, and contact information if any further help or support is needed.  Will complete smart form for 3/6/12 month follow up on Quit attempt #1 and resolve episode.

## 2023-06-14 NOTE — TELEPHONE ENCOUNTER
Erica, this is Campos Sunshine, clinical pharmacist with [facility] that is part of your care team.  We have begun working on your prescription that your doctor has sent us. Our next steps include:     Working with your insurance company to obtain approval for your medication  Working with you to ensure your medication is affordable     We will be calling you along the way with updates on your medication but if you have any concerns or receive information that you would like to discuss please reach us at [phone].    Welcome call outcome: No answer/Unable to leave voicemail

## 2023-06-15 ENCOUNTER — SPECIALTY PHARMACY (OUTPATIENT)
Dept: PHARMACY | Facility: CLINIC | Age: 68
End: 2023-06-15
Payer: MEDICARE

## 2023-06-15 DIAGNOSIS — L40.52 PSORIATIC ARTHRITIS, DESTRUCTIVE TYPE: Primary | ICD-10-CM

## 2023-06-16 ENCOUNTER — SPECIALTY PHARMACY (OUTPATIENT)
Dept: PHARMACY | Facility: CLINIC | Age: 68
End: 2023-06-16
Payer: MEDICARE

## 2023-06-16 DIAGNOSIS — L40.52 PSORIATIC ARTHRITIS, DESTRUCTIVE TYPE: Primary | ICD-10-CM

## 2023-06-16 NOTE — TELEPHONE ENCOUNTER
Patient requested call back Monday, 6/19 to complete initial clinical assessment for Stelara. MTP opened for start date clarification, staff message sent to prescriber.

## 2023-06-19 NOTE — TELEPHONE ENCOUNTER
Specialty Pharmacy - Initial Clinical Assessment    Specialty Medication Orders Linked to Encounter      Flowsheet Row Most Recent Value   Medication #1 ustekinumab (STELARA) 90 mg/mL Syrg syringe (Order#[id number], Rx#[id number])          Patient Diagnosis   L40.52 - Psoriatic arthritis, destructive type    Subjective    Trey Stevens is a 68 y.o. male, who is followed by the specialty pharmacy service for management and education.    Recent Encounters       Date Type Provider Description    06/16/2023 Specialty Pharmacy Campos Sunshine PharmD Initial Clinical Assessment    06/15/2023 Specialty Pharmacy Campos Sunshine PharmD Referral Authorization    06/07/2023 Specialty Pharmacy Maritza Wilde Refill Coordination    05/16/2023 Specialty Pharmacy Karen Zimmer Refill Coordination    04/21/2023 Specialty Pharmacy Karen Zimmer Refill Coordination            Current Outpatient Medications   Medication Sig    amLODIPine (NORVASC) 5 MG tablet Take 1 tablet (5 mg total) by mouth once daily. For high blood pressure    aspirin 81 MG Chew Take 1 tablet (81 mg total) by mouth once daily.    atorvastatin (LIPITOR) 80 MG tablet Take 1 tablet (80 mg total) by mouth once daily.    blood sugar diagnostic (ACCU-CHEK NOELLE PLUS TEST STRP) Strp Check blood sugar 3 times daily.    blood-glucose meter (ACCU-CHEK NOELLE PLUS METER) Misc Check blood sugar 3 times daily    calcipotriene (DOVONOX) 0.005 % cream APPLY TO AFFECTED AREA(S) TWICE DAILY    clobetasoL (OLUX) 0.05 % Foam APPLY TO AFFECTED AREA OF SCALP AND BEHIND EARS TWICE DAILY. DONT USE ON FACE,UNDERARMS,OR GROIN    empagliflozin (JARDIANCE) 25 mg tablet Take 1 tablet (25 mg total) by mouth once daily.    folic acid (FOLVITE) 1 MG tablet TAKE 1 TABLET ONE TIME DAILY    glipiZIDE (GLUCOTROL) 5 MG tablet TAKE 1 TABLET BY MOUTH TWICE A DAY    insulin aspart U-100 (NOVOLOG U-100 INSULIN ASPART) 100 unit/mL injection INJECT 200 UNITS PER CEQUR DEVICE EVERY 3 DAYS    ketoconazole  "(NIZORAL) 2 % shampoo WASH HAIR WITH MEDICATED SHAMPOO AT LEAST 2X/WEEK - LET SIT ON SCALP AT LEAST 5 MINUTES PRIOR TO RINSING    lancets (ACCU-CHEK SOFTCLIX LANCETS) Misc Check blood sugar 3 times daily    LANTUS SOLOSTAR U-100 INSULIN glargine 100 units/mL SubQ pen Inject 24 Units into the skin once daily.    losartan (COZAAR) 25 MG tablet TAKE 1 TABLET BY MOUTH EVERY DAY    metFORMIN (GLUCOPHAGE-XR) 500 MG XR 24hr tablet Take 2 tablets (1,000 mg total) by mouth 2 (two) times daily with meals.    methotrexate 2.5 MG Tab TAKE 8 TABLETS BY MOUTH EVERY 7 DAYS.    nicotine polacrilex 2 MG Lozg Take 1 lozenge (2 mg total) by mouth as needed (Use up to 10 lozenges per day in the place of cigarettes).    NOVOLOG FLEXPEN U-100 INSULIN 100 unit/mL (3 mL) InPn pen Inject 8 Units into the skin 3 (three) times daily with meals.    pen needle, diabetic 32 gauge x 5/32" Ndle Use with Lantus once daily and Humalog 3 times daily.    spironolactone (ALDACTONE) 25 MG tablet Take 1 tablet (25 mg total) by mouth once daily.    tirzepatide (MOUNJARO) 10 mg/0.5 mL PnIj Inject 10 mg into the skin every 7 days. STOP TRULICITY AND START MOUNJARO ON THE DAY TRULICITY WOULD HAVE BEEN DUE.    traMADoL (ULTRAM) 50 mg tablet TAKE 1 TABLET BY MOUTH THREE TIMES A DAY AS NEEDED FOR MODERATE PAIN Strength: 50 mg    ustekinumab (STELARA) 90 mg/mL Syrg syringe Inject 1 mL (90 mg total) into the skin every 28 days for 2 doses.    VENTOLIN HFA 90 mcg/actuation inhaler INHALE 2 PUFFS INTO THE LUNGS EVERY 4 (FOUR) HOURS AS NEEDED FOR WHEEZING. RESCUE   Last reviewed on 6/19/2023 11:47 AM by Campos Sunshine, Angel    Review of patient's allergies indicates:  No Known AllergiesLast reviewed on  6/19/2023 11:47 AM by Campos Sunshine    Drug Interactions    Drug interactions evaluated: yes  Clinically relevant drug interactions identified: no  Provided the patient with educational material regarding drug interactions: not applicable       Medication " Adherence    Adherence tools used: calendar  Support network for adherence: family member       Adverse Effects    Rash: Pos  Arthralgias: Pos  *All other systems reviewed and are negative       Assessment Questions - Documented Responses      Flowsheet Row Most Recent Value   Assessment    Medication Reconciliation completed for patient Yes   During the past 4 weeks, has patient missed any activities due to condition or medication? No   Goals of Therapy Status Discussed (new start)   Status of the patients ability to self-administer: Caregiver to administer   All education points have been covered with patient? No, patient declined- printed education provided  [Patient has nurse that comes weekly to assist with administering medications.]   Welcome packet contents reviewed and discussed with patient? No  [Patient not new to [facility].]   Assesment completed? Yes   Plan Therapy being initiated   Do you need to open a clinical intervention (i-vent)? No   Do you want to schedule first shipment? Yes          Refill Questions - Documented Responses      Flowsheet Row Most Recent Value   Patient Availability and HIPAA Verification    Does patient want to proceed with activity? Yes   HIPAA/medical authority confirmed? Yes   Relationship to patient of person spoken to? Self   Refill Screening Questions    When does the patient need to receive the medication? 06/22/23   Refill Delivery Questions    How will the patient receive the medication? MEDRx   When does the patient need to receive the medication? 06/22/23   Shipping Address Home   Address in [facility] confirmed and updated if neccessary? Yes   Expected Copay ($) 0   Is the patient able to afford the medication copay? Yes   Payment Method zero copay   Days supply of Refill 28   Supplies needed? No supplies needed   Refill activity completed? Yes   Refill activity plan Refill scheduled   Shipment/Pickup Date: 06/20/23            Objective    He has a past medical  "history of Arthritis, Diabetes mellitus, Diabetes mellitus type 2, uncontrolled (7/19/2016), DM (diabetes mellitus) (2015), Gall stones, Obesity, Pneumonia of right middle lobe due to infectious organism (8/13/2021), Psoriasis (a type of skin inflammation), and Rheumatoid arthritis of foot.    Tried/failed medications: Humira, Enbrel, topicals, methotrexate    BP Readings from Last 4 Encounters:   06/07/23 135/73   05/31/23 115/61   05/17/23 122/61   05/15/23 127/61     Ht Readings from Last 4 Encounters:   06/07/23 5' 7" (1.702 m)   05/31/23 5' 7" (1.702 m)   05/17/23 5' 7" (1.702 m)   05/15/23 5' 7" (1.702 m)     Wt Readings from Last 4 Encounters:   06/07/23 118 kg (260 lb 2.3 oz)   05/31/23 116.7 kg (257 lb 4.8 oz)   05/17/23 118.1 kg (260 lb 6.4 oz)   05/15/23 118.7 kg (261 lb 11 oz)       The goals of prescribed drug therapy management include:  Supporting patient to meet the prescriber's medical treatment objectives  Improving or maintaining quality of life  Maintaining optimal therapy adherence  Minimizing and managing side effects      Goals of Therapy Status: Discussed (new start)    Assessment/Plan  Patient plans to start therapy on 06/22/23      Indication, dosage, appropriateness, effectiveness, safety and convenience of his specialty medication(s) were reviewed today.     Patient Education   Pharmacist offer to  patient was declined. Printed educational materials will be provided with medication.    Patient endorses being familiar with injection technique with syringes and has a sharps container. Patient has a nurse that visits every Thursday to assist in administering medications. Patient advised to contact [facility] with any questions/concerns.    Tasks added this encounter   3/19/2024 - Clinical Assessment (1 year recurrence)   Tasks due within next 3 months   No tasks due.     Campos Sunshine, PharmD  [facility]  [address] [facility] [address]  Phone: " [phone]  Fax: [phone]

## 2023-06-20 ENCOUNTER — OFFICE VISIT (OUTPATIENT)
Dept: PRIMARY CARE CLINIC | Facility: CLINIC | Age: 68
End: 2023-06-20
Payer: MEDICARE

## 2023-06-20 VITALS
SYSTOLIC BLOOD PRESSURE: 122 MMHG | OXYGEN SATURATION: 94 % | DIASTOLIC BLOOD PRESSURE: 68 MMHG | HEIGHT: 67 IN | BODY MASS INDEX: 40.59 KG/M2 | WEIGHT: 258.63 LBS | TEMPERATURE: 98 F | HEART RATE: 84 BPM

## 2023-06-20 DIAGNOSIS — E11.22 TYPE 2 DIABETES MELLITUS WITH STAGE 3A CHRONIC KIDNEY DISEASE, WITH LONG-TERM CURRENT USE OF INSULIN: Primary | ICD-10-CM

## 2023-06-20 DIAGNOSIS — Z79.4 TYPE 2 DIABETES MELLITUS WITH STAGE 3A CHRONIC KIDNEY DISEASE, WITH LONG-TERM CURRENT USE OF INSULIN: Primary | ICD-10-CM

## 2023-06-20 DIAGNOSIS — N18.31 TYPE 2 DIABETES MELLITUS WITH STAGE 3A CHRONIC KIDNEY DISEASE, WITH LONG-TERM CURRENT USE OF INSULIN: Primary | ICD-10-CM

## 2023-06-20 LAB — GLUCOSE SERPL-MCNC: 161 MG/DL (ref 70–110)

## 2023-06-20 PROCEDURE — 82962 POCT GLUCOSE, HAND-HELD DEVICE: ICD-10-PCS | Mod: S$GLB,,, | Performed by: FAMILY MEDICINE

## 2023-06-20 PROCEDURE — 1159F PR MEDICATION LIST DOCUMENTED IN MEDICAL RECORD: ICD-10-PCS | Mod: CPTII,S$GLB,, | Performed by: FAMILY MEDICINE

## 2023-06-20 PROCEDURE — 1160F RVW MEDS BY RX/DR IN RCRD: CPT | Mod: CPTII,S$GLB,, | Performed by: FAMILY MEDICINE

## 2023-06-20 PROCEDURE — 3288F PR FALLS RISK ASSESSMENT DOCUMENTED: ICD-10-PCS | Mod: CPTII,S$GLB,, | Performed by: FAMILY MEDICINE

## 2023-06-20 PROCEDURE — 3008F BODY MASS INDEX DOCD: CPT | Mod: CPTII,S$GLB,, | Performed by: FAMILY MEDICINE

## 2023-06-20 PROCEDURE — 99999 PR PBB SHADOW E&M-EST. PATIENT-LVL V: ICD-10-PCS | Mod: PBBFAC,,, | Performed by: FAMILY MEDICINE

## 2023-06-20 PROCEDURE — 1157F PR ADVANCE CARE PLAN OR EQUIV PRESENT IN MEDICAL RECORD: ICD-10-PCS | Mod: CPTII,S$GLB,, | Performed by: FAMILY MEDICINE

## 2023-06-20 PROCEDURE — 1159F MED LIST DOCD IN RCRD: CPT | Mod: CPTII,S$GLB,, | Performed by: FAMILY MEDICINE

## 2023-06-20 PROCEDURE — 1101F PT FALLS ASSESS-DOCD LE1/YR: CPT | Mod: CPTII,S$GLB,, | Performed by: FAMILY MEDICINE

## 2023-06-20 PROCEDURE — 1157F ADVNC CARE PLAN IN RCRD: CPT | Mod: CPTII,S$GLB,, | Performed by: FAMILY MEDICINE

## 2023-06-20 PROCEDURE — 3074F PR MOST RECENT SYSTOLIC BLOOD PRESSURE < 130 MM HG: ICD-10-PCS | Mod: CPTII,S$GLB,, | Performed by: FAMILY MEDICINE

## 2023-06-20 PROCEDURE — 3078F PR MOST RECENT DIASTOLIC BLOOD PRESSURE < 80 MM HG: ICD-10-PCS | Mod: CPTII,S$GLB,, | Performed by: FAMILY MEDICINE

## 2023-06-20 PROCEDURE — 4010F ACE/ARB THERAPY RXD/TAKEN: CPT | Mod: CPTII,S$GLB,, | Performed by: FAMILY MEDICINE

## 2023-06-20 PROCEDURE — 1160F PR REVIEW ALL MEDS BY PRESCRIBER/CLIN PHARMACIST DOCUMENTED: ICD-10-PCS | Mod: CPTII,S$GLB,, | Performed by: FAMILY MEDICINE

## 2023-06-20 PROCEDURE — 3078F DIAST BP <80 MM HG: CPT | Mod: CPTII,S$GLB,, | Performed by: FAMILY MEDICINE

## 2023-06-20 PROCEDURE — 3046F PR MOST RECENT HEMOGLOBIN A1C LEVEL > 9.0%: ICD-10-PCS | Mod: CPTII,S$GLB,, | Performed by: FAMILY MEDICINE

## 2023-06-20 PROCEDURE — 3008F PR BODY MASS INDEX (BMI) DOCUMENTED: ICD-10-PCS | Mod: CPTII,S$GLB,, | Performed by: FAMILY MEDICINE

## 2023-06-20 PROCEDURE — 99213 OFFICE O/P EST LOW 20 MIN: CPT | Mod: S$GLB,,, | Performed by: FAMILY MEDICINE

## 2023-06-20 PROCEDURE — 99213 PR OFFICE/OUTPT VISIT, EST, LEVL III, 20-29 MIN: ICD-10-PCS | Mod: S$GLB,,, | Performed by: FAMILY MEDICINE

## 2023-06-20 PROCEDURE — 4010F PR ACE/ARB THEARPY RXD/TAKEN: ICD-10-PCS | Mod: CPTII,S$GLB,, | Performed by: FAMILY MEDICINE

## 2023-06-20 PROCEDURE — 82962 GLUCOSE BLOOD TEST: CPT | Mod: S$GLB,,, | Performed by: FAMILY MEDICINE

## 2023-06-20 PROCEDURE — 1101F PR PT FALLS ASSESS DOC 0-1 FALLS W/OUT INJ PAST YR: ICD-10-PCS | Mod: CPTII,S$GLB,, | Performed by: FAMILY MEDICINE

## 2023-06-20 PROCEDURE — 3074F SYST BP LT 130 MM HG: CPT | Mod: CPTII,S$GLB,, | Performed by: FAMILY MEDICINE

## 2023-06-20 PROCEDURE — 99999 PR PBB SHADOW E&M-EST. PATIENT-LVL V: CPT | Mod: PBBFAC,,, | Performed by: FAMILY MEDICINE

## 2023-06-20 PROCEDURE — 3046F HEMOGLOBIN A1C LEVEL >9.0%: CPT | Mod: CPTII,S$GLB,, | Performed by: FAMILY MEDICINE

## 2023-06-20 PROCEDURE — 3288F FALL RISK ASSESSMENT DOCD: CPT | Mod: CPTII,S$GLB,, | Performed by: FAMILY MEDICINE

## 2023-06-20 RX ORDER — ERGOCALCIFEROL 1.25 MG/1
50000 CAPSULE ORAL
COMMUNITY
Start: 2023-06-05 | End: 2023-12-28

## 2023-06-20 NOTE — PATIENT INSTRUCTIONS
If you are feeling unwell, we'd like to be the first ones to know here at [facility]! Please give us a call. Same day appointments are our top priority to keep you well and out of the emergency rooms and hospitals. Call [phone] for our direct line. After hours advice is always available. Please call [phone] after hours to speak to the on-call team.

## 2023-06-20 NOTE — PROGRESS NOTES
Subjective:       Patient ID: Trey Stevens is a 68 y.o. male.    Chief Complaint: Follow-up (Two weeks follow up. Pt states he's still having trouble sleeping.)    HPI:     Here for 2 week f/u. Complains that he's been having trouble sleeping. Home health still working with him to improve medication compliance; evidently doesn't like swallowing pills and home health ST evaluating. Recently saw dermatology for f/u or psoriasis with change from Cosentyx to Stelara which he hasn't started yet. Continue on methotrexate. He has podiatry visit coming up as well as pulmonology f/u (nodules and COPD). He is seeing diabetes educator, smoking cessation counselor (successfully quit), and dermatology. C/o difficulty sleeping but watches lots of television in bed. He reports that he's taking his pills but sometimes breaks them in half. Hasn't had breakfast yet today. Fasting  today. Weight down 2 pounds.     Updated/ annual due 8/23:  HM:  12/22 today fluvax, 10/21 covid vaccines, 12/18 HAV, 2/16 [id number], 4/13 [id number], 8/22 [id number], 2/16 TDaP, Cscope in the past normal and pt refuses more, 6/22 PSA, 2/16 HCV neg, 5/23 Eye, 11/18 chest CT stable, Pod Dr. Hadley.    Objective:     Vitals:    06/20/23 1028   BP: 122/68   Pulse: 84   Temp: 97.8 °F (36.6 °C)     Wt Readings from Last 3 Encounters:   06/20/23 117.3 kg (258 lb 9.6 oz)   06/07/23 118 kg (260 lb 2.3 oz)   05/31/23 116.7 kg (257 lb 4.8 oz)     Temp Readings from Last 3 Encounters:   06/20/23 97.8 °F (36.6 °C) (Oral)   05/31/23 97.9 °F (36.6 °C) (Oral)   05/17/23 98 °F (36.7 °C) (Oral)     BP Readings from Last 3 Encounters:   06/20/23 122/68   06/07/23 135/73   05/31/23 115/61     Pulse Readings from Last 3 Encounters:   06/20/23 84   06/07/23 96   05/31/23 88     Lab Results   Component Value Date    HGBA1C 13.9 (H) 04/11/2023          Physical Exam  Vitals and nursing note reviewed.   Constitutional:       General: He is not in acute distress.     Appearance:  Normal appearance. He is well-developed. He is obese. He is not ill-appearing.      Comments: Poor personal hygiene   HENT:      Head: Normocephalic and atraumatic.      Nose: Nose normal.      Mouth/Throat:      Mouth: Mucous membranes are moist.      Pharynx: No oropharyngeal exudate.   Eyes:      General: No scleral icterus.     Pupils: Pupils are equal, round, and reactive to light.   Cardiovascular:      Rate and Rhythm: Normal rate and regular rhythm.   Pulmonary:      Effort: Pulmonary effort is normal.      Breath sounds: Normal breath sounds.   Abdominal:      General: Bowel sounds are normal.      Palpations: Abdomen is soft.      Comments: Significant abdominal obesity   Musculoskeletal:         General: No deformity. Normal range of motion.      Cervical back: Normal range of motion and neck supple.   Skin:     General: Skin is warm and dry.   Neurological:      Mental Status: He is alert and oriented to person, place, and time.   Psychiatric:         Mood and Affect: Mood normal.         Behavior: Behavior normal.         Albumin   Date Value Ref Range Status   06/07/2023 3.6 3.5 - 5.2 g/dL Final     eGFR   Date Value Ref Range Status   06/07/2023 >60 >60 mL/min/1.73 m^2 Final       Assessment:       1. Type 2 diabetes mellitus with stage 3a chronic kidney disease, with long-term current use of insulin        Plan:           Problem List Items Addressed This Visit          Endocrine    Type 2 diabetes mellitus with chronic kidney disease, with long-term current use of insulin - Primary    Relevant Orders    POCT Glucose, Hand-Held Device (Completed)         Repeat a1c and lipids will be due in July. Will continue q2 week visits and daily calls. Has upcoming appts with derm, pulm, podiatry, and f/u with rheum. Will follow along with specialist recommendations and continue to communicate with home health team.

## 2023-06-21 ENCOUNTER — OUTPATIENT CASE MANAGEMENT (OUTPATIENT)
Dept: ADMINISTRATIVE | Facility: OTHER | Age: 68
End: 2023-06-21
Payer: MEDICARE

## 2023-06-21 NOTE — PROGRESS NOTES
Outpatient Care Management  Plan of Care Follow Up Visit    Patient: Trey Stevens  MRN: [id number]  Date of Service: 06/21/2023  Completed by: Louise Aguayo RN  Referral Date: 01/25/2023    Reason for Visit   Patient presents with    Nursing Assessment    OPCM RN Follow Up Call       Brief Summary: OPCM visit completed, care plan reviewed and updated.   Next Steps: plan to follow up with patient in approximately 2 weeks - patient agrees. Blood sugars? Taking meds as ordered?   Swallowing difficulty? How are feet?   GOVIND Aguayo RN

## 2023-07-03 ENCOUNTER — OFFICE VISIT (OUTPATIENT)
Dept: PRIMARY CARE CLINIC | Facility: CLINIC | Age: 68
End: 2023-07-03
Payer: MEDICARE

## 2023-07-03 VITALS
OXYGEN SATURATION: 95 % | SYSTOLIC BLOOD PRESSURE: 119 MMHG | HEIGHT: 67 IN | DIASTOLIC BLOOD PRESSURE: 63 MMHG | TEMPERATURE: 98 F | BODY MASS INDEX: 40.73 KG/M2 | WEIGHT: 259.5 LBS | HEART RATE: 84 BPM

## 2023-07-03 DIAGNOSIS — Z79.4 TYPE 2 DIABETES MELLITUS WITH STAGE 3A CHRONIC KIDNEY DISEASE, WITH LONG-TERM CURRENT USE OF INSULIN: Primary | ICD-10-CM

## 2023-07-03 DIAGNOSIS — N18.31 TYPE 2 DIABETES MELLITUS WITH STAGE 3A CHRONIC KIDNEY DISEASE, WITH LONG-TERM CURRENT USE OF INSULIN: Primary | ICD-10-CM

## 2023-07-03 DIAGNOSIS — E11.22 TYPE 2 DIABETES MELLITUS WITH STAGE 3A CHRONIC KIDNEY DISEASE, WITH LONG-TERM CURRENT USE OF INSULIN: Primary | ICD-10-CM

## 2023-07-03 LAB — GLUCOSE SERPL-MCNC: 166 MG/DL (ref 70–110)

## 2023-07-03 PROCEDURE — 99999 PR PBB SHADOW E&M-EST. PATIENT-LVL V: CPT | Mod: PBBFAC,,, | Performed by: FAMILY MEDICINE

## 2023-07-03 PROCEDURE — 1160F PR REVIEW ALL MEDS BY PRESCRIBER/CLIN PHARMACIST DOCUMENTED: ICD-10-PCS | Mod: CPTII,S$GLB,, | Performed by: FAMILY MEDICINE

## 2023-07-03 PROCEDURE — 4010F PR ACE/ARB THEARPY RXD/TAKEN: ICD-10-PCS | Mod: CPTII,S$GLB,, | Performed by: FAMILY MEDICINE

## 2023-07-03 PROCEDURE — 3078F PR MOST RECENT DIASTOLIC BLOOD PRESSURE < 80 MM HG: ICD-10-PCS | Mod: CPTII,S$GLB,, | Performed by: FAMILY MEDICINE

## 2023-07-03 PROCEDURE — 1101F PT FALLS ASSESS-DOCD LE1/YR: CPT | Mod: CPTII,S$GLB,, | Performed by: FAMILY MEDICINE

## 2023-07-03 PROCEDURE — 1101F PR PT FALLS ASSESS DOC 0-1 FALLS W/OUT INJ PAST YR: ICD-10-PCS | Mod: CPTII,S$GLB,, | Performed by: FAMILY MEDICINE

## 2023-07-03 PROCEDURE — 1159F PR MEDICATION LIST DOCUMENTED IN MEDICAL RECORD: ICD-10-PCS | Mod: CPTII,S$GLB,, | Performed by: FAMILY MEDICINE

## 2023-07-03 PROCEDURE — 99213 PR OFFICE/OUTPT VISIT, EST, LEVL III, 20-29 MIN: ICD-10-PCS | Mod: S$GLB,,, | Performed by: FAMILY MEDICINE

## 2023-07-03 PROCEDURE — 3288F FALL RISK ASSESSMENT DOCD: CPT | Mod: CPTII,S$GLB,, | Performed by: FAMILY MEDICINE

## 2023-07-03 PROCEDURE — 3078F DIAST BP <80 MM HG: CPT | Mod: CPTII,S$GLB,, | Performed by: FAMILY MEDICINE

## 2023-07-03 PROCEDURE — 1159F MED LIST DOCD IN RCRD: CPT | Mod: CPTII,S$GLB,, | Performed by: FAMILY MEDICINE

## 2023-07-03 PROCEDURE — 82962 POCT GLUCOSE, HAND-HELD DEVICE: ICD-10-PCS | Mod: S$GLB,,, | Performed by: FAMILY MEDICINE

## 2023-07-03 PROCEDURE — 3008F PR BODY MASS INDEX (BMI) DOCUMENTED: ICD-10-PCS | Mod: CPTII,S$GLB,, | Performed by: FAMILY MEDICINE

## 2023-07-03 PROCEDURE — 3074F PR MOST RECENT SYSTOLIC BLOOD PRESSURE < 130 MM HG: ICD-10-PCS | Mod: CPTII,S$GLB,, | Performed by: FAMILY MEDICINE

## 2023-07-03 PROCEDURE — 99213 OFFICE O/P EST LOW 20 MIN: CPT | Mod: S$GLB,,, | Performed by: FAMILY MEDICINE

## 2023-07-03 PROCEDURE — 1157F ADVNC CARE PLAN IN RCRD: CPT | Mod: CPTII,S$GLB,, | Performed by: FAMILY MEDICINE

## 2023-07-03 PROCEDURE — 4010F ACE/ARB THERAPY RXD/TAKEN: CPT | Mod: CPTII,S$GLB,, | Performed by: FAMILY MEDICINE

## 2023-07-03 PROCEDURE — 3288F PR FALLS RISK ASSESSMENT DOCUMENTED: ICD-10-PCS | Mod: CPTII,S$GLB,, | Performed by: FAMILY MEDICINE

## 2023-07-03 PROCEDURE — 3046F HEMOGLOBIN A1C LEVEL >9.0%: CPT | Mod: CPTII,S$GLB,, | Performed by: FAMILY MEDICINE

## 2023-07-03 PROCEDURE — 82962 GLUCOSE BLOOD TEST: CPT | Mod: S$GLB,,, | Performed by: FAMILY MEDICINE

## 2023-07-03 PROCEDURE — 3074F SYST BP LT 130 MM HG: CPT | Mod: CPTII,S$GLB,, | Performed by: FAMILY MEDICINE

## 2023-07-03 PROCEDURE — 3008F BODY MASS INDEX DOCD: CPT | Mod: CPTII,S$GLB,, | Performed by: FAMILY MEDICINE

## 2023-07-03 PROCEDURE — 1160F RVW MEDS BY RX/DR IN RCRD: CPT | Mod: CPTII,S$GLB,, | Performed by: FAMILY MEDICINE

## 2023-07-03 PROCEDURE — 3046F PR MOST RECENT HEMOGLOBIN A1C LEVEL > 9.0%: ICD-10-PCS | Mod: CPTII,S$GLB,, | Performed by: FAMILY MEDICINE

## 2023-07-03 PROCEDURE — 99999 PR PBB SHADOW E&M-EST. PATIENT-LVL V: ICD-10-PCS | Mod: PBBFAC,,, | Performed by: FAMILY MEDICINE

## 2023-07-03 PROCEDURE — 1157F PR ADVANCE CARE PLAN OR EQUIV PRESENT IN MEDICAL RECORD: ICD-10-PCS | Mod: CPTII,S$GLB,, | Performed by: FAMILY MEDICINE

## 2023-07-03 NOTE — PROGRESS NOTES
Subjective:       Patient ID: Trey Stevens is a 68 y.o. male.    Chief Complaint: Follow-up (Pt is here for a two weeks follow up. No issues or concerns. )    HPI:     Here for 2 week f/u. Has had first dose of Stelara for psoriasis and has noted some improvement in his skin symptoms. Home health still working with him to improve medication compliance. Rheum change from Cosentyx to Stelara has been implemented it seems without problem with home health assistance. He has podiatry visit coming up as well as pulmonology f/u (nodules and COPD). He is seeing diabetes educator, smoking cessation counselor (still with successful cessation), and dermatology. Was scheduled with ST to eval swallowing due to complaint of choking with swallowing pills and some foods but missed the appointment. Has had ST eval with . He reports that he's taking his pills but sometimes breaks them in half. Had 2 corn dogs this morning for breakfast,  today. Weight up 1 pound.     Updated/ annual due 8/23:  HM:  12/22 today fluvax, 10/21 covid vaccines, 12/18 HAV, 2/16 [id number], 4/13 [id number], 8/22 [id number], 2/16 TDaP, Cscope in the past normal and pt refuses more, 6/22 PSA, 2/16 HCV neg, 5/23 Eye, 11/18 chest CT stable, Pod Dr. Hadley.    Objective:     Vitals:    07/03/23 1021   BP: 119/63   Pulse: 84   Temp: 97.7 °F (36.5 °C)     Wt Readings from Last 3 Encounters:   07/03/23 117.7 kg (259 lb 8 oz)   06/20/23 117.3 kg (258 lb 9.6 oz)   06/07/23 118 kg (260 lb 2.3 oz)     Temp Readings from Last 3 Encounters:   07/03/23 97.7 °F (36.5 °C) (Oral)   06/20/23 97.8 °F (36.6 °C) (Oral)   05/31/23 97.9 °F (36.6 °C) (Oral)     BP Readings from Last 3 Encounters:   07/03/23 119/63   06/20/23 122/68   06/07/23 135/73     Pulse Readings from Last 3 Encounters:   07/03/23 84   06/20/23 84   06/07/23 96          Physical Exam  Vitals and nursing note reviewed.   Constitutional:       General: He is not in acute distress.     Appearance: He is  well-developed. He is obese. He is not ill-appearing.   HENT:      Head: Normocephalic and atraumatic.      Nose: Nose normal.      Mouth/Throat:      Mouth: Mucous membranes are moist.      Pharynx: No oropharyngeal exudate.   Eyes:      General: No scleral icterus.     Pupils: Pupils are equal, round, and reactive to light.   Cardiovascular:      Rate and Rhythm: Normal rate and regular rhythm.   Pulmonary:      Effort: Pulmonary effort is normal.      Breath sounds: Normal breath sounds.   Abdominal:      General: Bowel sounds are normal.      Palpations: Abdomen is soft.      Comments: Significant abdominal obesity; umbilical hernia   Musculoskeletal:         General: No deformity. Normal range of motion.      Cervical back: Normal range of motion and neck supple.   Skin:     General: Skin is warm and dry.      Comments: Large psoriatic plaques with some improvement today; dryer in appearance, no areas of weeping   Neurological:      Mental Status: He is alert and oriented to person, place, and time.   Psychiatric:         Mood and Affect: Mood normal.         Behavior: Behavior normal.         Thought Content: Thought content normal.         Albumin   Date Value Ref Range Status   06/07/2023 3.6 3.5 - 5.2 g/dL Final     eGFR   Date Value Ref Range Status   06/07/2023 >60 >60 mL/min/1.73 m^2 Final     Lab Results   Component Value Date    HGBA1C 13.9 (H) 04/11/2023       Assessment:       1. Type 2 diabetes mellitus with stage 3a chronic kidney disease, with long-term current use of insulin        Plan:           Problem List Items Addressed This Visit          Endocrine    Type 2 diabetes mellitus with chronic kidney disease, with long-term current use of insulin - Primary    Relevant Orders    POCT Glucose, Hand-Held Device (Completed)     His next hgba1c lab draw is scheduled 7/10 so we will have these results to discuss at our next visit. I am very hopeful that we will see an improvement with our  interventions.

## 2023-07-03 NOTE — PATIENT INSTRUCTIONS
If you are feeling unwell, we'd like to be the first ones to know here at [facility]! Please give us a call. Same day appointments are our top priority to keep you well and out of the emergency rooms and hospitals. Call [phone] for our direct line. After hours advice is always available. Please call [phone] after hours to speak to the on-call team.     Start walking early in the morning for 30 minutes 5-7 days per week. It's a great way to exercise and may help your blood sugar and your weight loss.

## 2023-07-08 DIAGNOSIS — E11.65 UNCONTROLLED TYPE 2 DIABETES MELLITUS WITH HYPERGLYCEMIA, WITH LONG-TERM CURRENT USE OF INSULIN: ICD-10-CM

## 2023-07-08 DIAGNOSIS — Z79.4 UNCONTROLLED TYPE 2 DIABETES MELLITUS WITH HYPERGLYCEMIA, WITH LONG-TERM CURRENT USE OF INSULIN: ICD-10-CM

## 2023-07-10 ENCOUNTER — LAB VISIT (OUTPATIENT)
Dept: LAB | Facility: HOSPITAL | Age: 68
End: 2023-07-10
Attending: FAMILY MEDICINE
Payer: MEDICARE

## 2023-07-10 ENCOUNTER — CLINICAL SUPPORT (OUTPATIENT)
Dept: DIABETES | Facility: CLINIC | Age: 68
End: 2023-07-10
Payer: MEDICARE

## 2023-07-10 VITALS — HEIGHT: 67 IN | BODY MASS INDEX: 41.25 KG/M2 | WEIGHT: 262.81 LBS

## 2023-07-10 DIAGNOSIS — Z79.4 TYPE 2 DIABETES MELLITUS WITH STAGE 3A CHRONIC KIDNEY DISEASE, WITH LONG-TERM CURRENT USE OF INSULIN: ICD-10-CM

## 2023-07-10 DIAGNOSIS — Z79.4 UNCONTROLLED TYPE 2 DIABETES MELLITUS WITH HYPERGLYCEMIA, WITH LONG-TERM CURRENT USE OF INSULIN: Primary | ICD-10-CM

## 2023-07-10 DIAGNOSIS — E55.9 VITAMIN D DEFICIENCY: ICD-10-CM

## 2023-07-10 DIAGNOSIS — E11.65 UNCONTROLLED TYPE 2 DIABETES MELLITUS WITH HYPERGLYCEMIA, WITH LONG-TERM CURRENT USE OF INSULIN: Primary | ICD-10-CM

## 2023-07-10 DIAGNOSIS — E11.22 TYPE 2 DIABETES MELLITUS WITH STAGE 3A CHRONIC KIDNEY DISEASE, WITH LONG-TERM CURRENT USE OF INSULIN: ICD-10-CM

## 2023-07-10 DIAGNOSIS — N18.31 TYPE 2 DIABETES MELLITUS WITH STAGE 3A CHRONIC KIDNEY DISEASE, WITH LONG-TERM CURRENT USE OF INSULIN: ICD-10-CM

## 2023-07-10 PROCEDURE — 83036 HEMOGLOBIN GLYCOSYLATED A1C: CPT | Performed by: FAMILY MEDICINE

## 2023-07-10 PROCEDURE — 36415 COLL VENOUS BLD VENIPUNCTURE: CPT | Mod: PO | Performed by: FAMILY MEDICINE

## 2023-07-10 PROCEDURE — 82306 VITAMIN D 25 HYDROXY: CPT | Performed by: FAMILY MEDICINE

## 2023-07-10 PROCEDURE — G0108 DIAB MANAGE TRN  PER INDIV: HCPCS | Mod: S$GLB,,, | Performed by: DIETITIAN, REGISTERED

## 2023-07-10 PROCEDURE — G0108 PR DIAB MANAGE TRN  PER INDIV: ICD-10-PCS | Mod: S$GLB,,, | Performed by: DIETITIAN, REGISTERED

## 2023-07-10 PROCEDURE — 99999 PR PBB SHADOW E&M-EST. PATIENT-LVL II: ICD-10-PCS | Mod: PBBFAC,,, | Performed by: DIETITIAN, REGISTERED

## 2023-07-10 PROCEDURE — 99999 PR PBB SHADOW E&M-EST. PATIENT-LVL II: CPT | Mod: PBBFAC,,, | Performed by: DIETITIAN, REGISTERED

## 2023-07-10 PROCEDURE — 80061 LIPID PANEL: CPT | Performed by: FAMILY MEDICINE

## 2023-07-10 RX ORDER — GLIPIZIDE 5 MG/1
TABLET ORAL
Qty: 180 TABLET | Refills: 1 | Status: SHIPPED | OUTPATIENT
Start: 2023-07-10 | End: 2023-08-11 | Stop reason: SDUPTHER

## 2023-07-10 NOTE — PROGRESS NOTES
"Diabetes Care Specialist Progress Note  Author: Jose Dickinson RD  Date: 7/10/2023    Program Intake  Reason for Diabetes Program Visit:: Intervention  Type of Intervention:: Individual  Individual: Education    Lab Results   Component Value Date    HGBA1C 13.9 (H) 04/11/2023     CURRENT DM MEDICATIONS:   Metformin ER, 500 mg  Glipizide, 5 mg  Jardiance, 25 mg  Mounjaro, 10 mg weekly  Lantus, 24 units at night   Novolog - uses CeQur patch when he is away from home - takes 30 min before eating   2-4 clicks depending on meal  Dials pen to 4 units      Clinical    Weight: 119.2 kg (262 lb 12.6 oz)   Height: 5' 7" (170.2 cm)   Body mass index is 41.16 kg/m².  Wt gain of 7 lbs since last visit on 5/1/2023    Nutritional Status  Meal Plan 24 Hour Recall - Breakfast: this am - potato chips; Crush grape  //  yesterday at 11a - woods, biscuit; coffee  Meal Plan 24 Hour Recall - Lunch: late breakfast  Meal Plan 24 Hour Recall - Dinner: bowl of red beans; tea sweetened with Sweetnlow    Physical activity/Exercise:   No formal exercise  Pt cuts grass and does things around his mom's house and yard    SMBG: checking BG 1-2 times daily       Diabetes Self-Management Skills Assessment    Chronic Complications  Patient can identify major chronic complications of diabetes.: no  Patient can identify ways to prevent or delay diabetes complications.: no  Patient is aware that having diabetes increases risk of heart disease?: No  Patient is aware that heart disease is the leading cause of death and disability in people with diabetes?: No  Patient able to state risk factors for heart disease?: No  Chronic Complications Skills Assessment Completed: : Yes  Assessment indicates:: Knowledge deficit  Area of need?: Yes      Assessment Summary and Plan    Based on today's diabetes care assessment, the following areas of need were identified:      Social 7/10/2023   Support -   Access to Mass Media/Tech -   Cognitive/Behavioral Health - "   Culture/Presybeterian -   Communication -   Health Literacy Yes - will keep instructions basic          Clinical 2023   Medication Adherence No   Lab Compliance No   Nutritional Status No        Diabetes Self-Management Skills 7/10/2023   Diabetes Disease Process/Treatment Options -   Nutrition/Healthy Eating Added care plan    Physical Activity/Exercise -   Medication See care plan    Home Blood Glucose Monitoring See care plan    Acute Complications -   Chronic Complications Yes - Discussed importance of keeping BG under control to reduce risk of micro and macro complications, including nephropathy, neuropathy, retinopathy, heart attack and stroke.   Reviewed the importance of controlled Blood Pressure and Cholesterol Lab Values in preventing disease.   Health maintenance reviewed    Explained importance of watching diet to prevent stroke and heart attack and damage to his feet.   Pt has pain in his feet now. He stated that his dad  of several strokes. Controlling BG will help in preventing these in patient.      Psychosocial/Coping -          Today's interventions were provided through individual discussion, instruction, and written materials were provided.      Patient verbalized understanding of instruction and written materials.  Pt was able to return back demonstration of instructions today. Patient understood key points, needs reinforcement and further instruction.     Diabetes Self-Management Care Plan:    Today's Diabetes Self-Management Care Plan was developed with Trey's input. Trey has agreed to work toward the following goal(s) to improve his/her overall diabetes control.      Care Plan: Diabetes Management   Updates made since 6/10/2023 12:00 AM        Problem: Blood Glucose Self-Monitoring         Goal: Patient agrees to check and record blood sugars 2 times per day.    Start Date: 2023   Expected End Date: 2023   Priority: Medium   Barriers: Other (comments)   Note:    Pt is  currently checking fasting BG daily and only occasionally checks a second time.   Fasting BG ranges from 180-220  Advised to cont to check fasting BG and also check before dinner.   Bring in his notebook with BG readings next appt.     7/10/23  Checking every morning after drinking coffee   It ranges from 174-185, sometimes just over 200  At 7-8p, he checks again and it's usually in the 200s, but has been in the 300s       Problem: Medications         Goal: Patient Agrees to take Diabetes Medication(s) as prescribed.    Start Date: 5/1/2023   Expected End Date: 11/1/2023   This Visit's Progress: On track   Priority: High   Barriers: Cognitive Deficits   Note:    Pt was getting confused with how he has been taking his insulin and Mounjaro.   He said that someone told him to take 2 shots of one of his meds every two weeks, but then someone else told him to take 1 shot every week. He thinks this was Mounjaro.   Instructed to continue to take 1 shot every week.     For Novolog, when using pens, he is dialing to 4 units, but when using Cequr, he uses 2-4 clicks.   Per instructions from Bee Stinson, he is to take 4 clicks before meals. This would be 8 units using the pen.    7/10/23   nurse comes once a week. She helps with his meds when she is there. He now takes Mounjaro on Thursdays when she is there.   He first thought that he took 20 units of Lantus, but we looked at the pen and he said he dials it to 24   He cont to use CeQur when he is away from home and pens when home. Encouraged to always use Cequr. He is taking Novolog 15-30 min before meals          Problem: Healthy Eating         Goal: Pt will only drink SF beverages and water.    Start Date: 7/10/2023   Expected End Date: 1/10/2024   Priority: High   Barriers: Lack of Motivation to Change   Note:    Pt came in today with a bag of potato chips and 2 Crush grape drinks.   Gave alternatives to high sugar beverages.   Pt states that he does drink a lot of  water and may only drink beverages with sugar 1-2 times per week.   Explained that each time he drinks these, his BG will be high.   Gave alternatives that would not affect BG.          Task: Recommended replacing beverages containing high sugar content with noncaloric/sugar free options and/or water. Completed 7/10/2023            Follow Up Plan     Follow up in 4 months (on 11/10/2023) for Post program visit.    Today's care plan and follow up schedule was discussed with patient.  Trey verbalized understanding of the care plan, goals, and agrees to follow up plan.        The patient was encouraged to communicate with his/her health care provider/physician and care team regarding his/her condition(s) and treatment.  I provided the patient with my contact information today and encouraged to contact me via phone or [facility] Portal as needed.     Length of Visit   Total Time: 60 Minutes

## 2023-07-10 NOTE — PATIENT INSTRUCTIONS
Check your blood sugar every morning before drinking your coffee    Only have drinks with zero sugar   Examples:  Water   Diet Coke or Coke Zero  Diet Sprite or Sprite Zero  Crush Zero  Gatorade Zero or Powerade Zero   Unsweet tea with Sweet N Low

## 2023-07-11 ENCOUNTER — SPECIALTY PHARMACY (OUTPATIENT)
Dept: PHARMACY | Facility: CLINIC | Age: 68
End: 2023-07-11
Payer: MEDICARE

## 2023-07-11 LAB
25(OH)D3+25(OH)D2 SERPL-MCNC: 38 NG/ML (ref 30–96)
CHOLEST SERPL-MCNC: 192 MG/DL (ref 120–199)
CHOLEST/HDLC SERPL: 4.7 {RATIO} (ref 2–5)
ESTIMATED AVG GLUCOSE: 237 MG/DL (ref 68–131)
HBA1C MFR BLD: 9.9 % (ref 4–5.6)
HDLC SERPL-MCNC: 41 MG/DL (ref 40–75)
HDLC SERPL: 21.4 % (ref 20–50)
LDLC SERPL CALC-MCNC: 100 MG/DL (ref 63–159)
NONHDLC SERPL-MCNC: 151 MG/DL
TRIGL SERPL-MCNC: 255 MG/DL (ref 30–150)

## 2023-07-11 NOTE — TELEPHONE ENCOUNTER
Patient took first dose of Stelara injection with nurse on 7/6. Next dose due to inject on 8/3. Will pend refill call out appropriately.

## 2023-07-12 ENCOUNTER — OFFICE VISIT (OUTPATIENT)
Dept: PODIATRY | Facility: CLINIC | Age: 68
End: 2023-07-12
Payer: MEDICARE

## 2023-07-12 ENCOUNTER — OUTPATIENT CASE MANAGEMENT (OUTPATIENT)
Dept: ADMINISTRATIVE | Facility: OTHER | Age: 68
End: 2023-07-12
Payer: MEDICARE

## 2023-07-12 VITALS — BODY MASS INDEX: 41.12 KG/M2 | WEIGHT: 262 LBS | HEIGHT: 67 IN

## 2023-07-12 DIAGNOSIS — E11.49 TYPE II DIABETES MELLITUS WITH NEUROLOGICAL MANIFESTATIONS: Primary | ICD-10-CM

## 2023-07-12 DIAGNOSIS — R23.4 SKIN FISSURES: ICD-10-CM

## 2023-07-12 DIAGNOSIS — B35.1 DERMATOPHYTOSIS OF NAIL: ICD-10-CM

## 2023-07-12 DIAGNOSIS — L84 CORN OR CALLUS: ICD-10-CM

## 2023-07-12 PROCEDURE — 11721 PR DEBRIDEMENT OF NAILS, 6 OR MORE: ICD-10-PCS | Mod: 59,Q9,S$GLB, | Performed by: PODIATRIST

## 2023-07-12 PROCEDURE — 99999 PR PBB SHADOW E&M-EST. PATIENT-LVL III: CPT | Mod: PBBFAC,,, | Performed by: PODIATRIST

## 2023-07-12 PROCEDURE — 99999 PR PBB SHADOW E&M-EST. PATIENT-LVL III: ICD-10-PCS | Mod: PBBFAC,,, | Performed by: PODIATRIST

## 2023-07-12 PROCEDURE — 99499 UNLISTED E&M SERVICE: CPT | Mod: S$GLB,,, | Performed by: PODIATRIST

## 2023-07-12 PROCEDURE — 99499 NO LOS: ICD-10-PCS | Mod: S$GLB,,, | Performed by: PODIATRIST

## 2023-07-12 PROCEDURE — 11056 PARNG/CUTG B9 HYPRKR LES 2-4: CPT | Mod: Q9,S$GLB,, | Performed by: PODIATRIST

## 2023-07-12 PROCEDURE — 11056 PR TRIM BENIGN HYPERKERATOTIC SKIN LESION,2-4: ICD-10-PCS | Mod: Q9,S$GLB,, | Performed by: PODIATRIST

## 2023-07-12 PROCEDURE — 11721 DEBRIDE NAIL 6 OR MORE: CPT | Mod: 59,Q9,S$GLB, | Performed by: PODIATRIST

## 2023-07-19 ENCOUNTER — OFFICE VISIT (OUTPATIENT)
Dept: PRIMARY CARE CLINIC | Facility: CLINIC | Age: 68
End: 2023-07-19
Payer: MEDICARE

## 2023-07-19 ENCOUNTER — TELEPHONE (OUTPATIENT)
Dept: PRIMARY CARE CLINIC | Facility: CLINIC | Age: 68
End: 2023-07-19

## 2023-07-19 VITALS
DIASTOLIC BLOOD PRESSURE: 68 MMHG | BODY MASS INDEX: 41.42 KG/M2 | HEIGHT: 67 IN | SYSTOLIC BLOOD PRESSURE: 122 MMHG | TEMPERATURE: 98 F | WEIGHT: 263.88 LBS

## 2023-07-19 DIAGNOSIS — Z79.4 TYPE 2 DIABETES MELLITUS WITH STAGE 3A CHRONIC KIDNEY DISEASE, WITH LONG-TERM CURRENT USE OF INSULIN: Primary | ICD-10-CM

## 2023-07-19 DIAGNOSIS — N18.31 TYPE 2 DIABETES MELLITUS WITH STAGE 3A CHRONIC KIDNEY DISEASE, WITH LONG-TERM CURRENT USE OF INSULIN: Primary | ICD-10-CM

## 2023-07-19 DIAGNOSIS — Z12.11 COLON CANCER SCREENING: ICD-10-CM

## 2023-07-19 DIAGNOSIS — E11.22 TYPE 2 DIABETES MELLITUS WITH STAGE 3A CHRONIC KIDNEY DISEASE, WITH LONG-TERM CURRENT USE OF INSULIN: Primary | ICD-10-CM

## 2023-07-19 LAB — GLUCOSE SERPL-MCNC: 291 MG/DL (ref 70–110)

## 2023-07-19 PROCEDURE — 1160F PR REVIEW ALL MEDS BY PRESCRIBER/CLIN PHARMACIST DOCUMENTED: ICD-10-PCS | Mod: CPTII,S$GLB,, | Performed by: FAMILY MEDICINE

## 2023-07-19 PROCEDURE — 3074F SYST BP LT 130 MM HG: CPT | Mod: CPTII,S$GLB,, | Performed by: FAMILY MEDICINE

## 2023-07-19 PROCEDURE — 1157F ADVNC CARE PLAN IN RCRD: CPT | Mod: CPTII,S$GLB,, | Performed by: FAMILY MEDICINE

## 2023-07-19 PROCEDURE — 3074F PR MOST RECENT SYSTOLIC BLOOD PRESSURE < 130 MM HG: ICD-10-PCS | Mod: CPTII,S$GLB,, | Performed by: FAMILY MEDICINE

## 2023-07-19 PROCEDURE — 3046F PR MOST RECENT HEMOGLOBIN A1C LEVEL > 9.0%: ICD-10-PCS | Mod: CPTII,S$GLB,, | Performed by: FAMILY MEDICINE

## 2023-07-19 PROCEDURE — 99999 PR PBB SHADOW E&M-EST. PATIENT-LVL V: ICD-10-PCS | Mod: PBBFAC,,, | Performed by: FAMILY MEDICINE

## 2023-07-19 PROCEDURE — 1101F PT FALLS ASSESS-DOCD LE1/YR: CPT | Mod: CPTII,S$GLB,, | Performed by: FAMILY MEDICINE

## 2023-07-19 PROCEDURE — 99999 PR PBB SHADOW E&M-EST. PATIENT-LVL V: CPT | Mod: PBBFAC,,, | Performed by: FAMILY MEDICINE

## 2023-07-19 PROCEDURE — 3288F FALL RISK ASSESSMENT DOCD: CPT | Mod: CPTII,S$GLB,, | Performed by: FAMILY MEDICINE

## 2023-07-19 PROCEDURE — 1159F MED LIST DOCD IN RCRD: CPT | Mod: CPTII,S$GLB,, | Performed by: FAMILY MEDICINE

## 2023-07-19 PROCEDURE — 3008F PR BODY MASS INDEX (BMI) DOCUMENTED: ICD-10-PCS | Mod: CPTII,S$GLB,, | Performed by: FAMILY MEDICINE

## 2023-07-19 PROCEDURE — 4010F PR ACE/ARB THEARPY RXD/TAKEN: ICD-10-PCS | Mod: CPTII,S$GLB,, | Performed by: FAMILY MEDICINE

## 2023-07-19 PROCEDURE — 3008F BODY MASS INDEX DOCD: CPT | Mod: CPTII,S$GLB,, | Performed by: FAMILY MEDICINE

## 2023-07-19 PROCEDURE — 82962 GLUCOSE BLOOD TEST: CPT | Mod: S$GLB,,, | Performed by: FAMILY MEDICINE

## 2023-07-19 PROCEDURE — 82962 POCT GLUCOSE, HAND-HELD DEVICE: ICD-10-PCS | Mod: S$GLB,,, | Performed by: FAMILY MEDICINE

## 2023-07-19 PROCEDURE — 3288F PR FALLS RISK ASSESSMENT DOCUMENTED: ICD-10-PCS | Mod: CPTII,S$GLB,, | Performed by: FAMILY MEDICINE

## 2023-07-19 PROCEDURE — 3078F DIAST BP <80 MM HG: CPT | Mod: CPTII,S$GLB,, | Performed by: FAMILY MEDICINE

## 2023-07-19 PROCEDURE — 4010F ACE/ARB THERAPY RXD/TAKEN: CPT | Mod: CPTII,S$GLB,, | Performed by: FAMILY MEDICINE

## 2023-07-19 PROCEDURE — 3078F PR MOST RECENT DIASTOLIC BLOOD PRESSURE < 80 MM HG: ICD-10-PCS | Mod: CPTII,S$GLB,, | Performed by: FAMILY MEDICINE

## 2023-07-19 PROCEDURE — 99213 OFFICE O/P EST LOW 20 MIN: CPT | Mod: S$GLB,,, | Performed by: FAMILY MEDICINE

## 2023-07-19 PROCEDURE — 1159F PR MEDICATION LIST DOCUMENTED IN MEDICAL RECORD: ICD-10-PCS | Mod: CPTII,S$GLB,, | Performed by: FAMILY MEDICINE

## 2023-07-19 PROCEDURE — 1160F RVW MEDS BY RX/DR IN RCRD: CPT | Mod: CPTII,S$GLB,, | Performed by: FAMILY MEDICINE

## 2023-07-19 PROCEDURE — 1157F PR ADVANCE CARE PLAN OR EQUIV PRESENT IN MEDICAL RECORD: ICD-10-PCS | Mod: CPTII,S$GLB,, | Performed by: FAMILY MEDICINE

## 2023-07-19 PROCEDURE — 3046F HEMOGLOBIN A1C LEVEL >9.0%: CPT | Mod: CPTII,S$GLB,, | Performed by: FAMILY MEDICINE

## 2023-07-19 PROCEDURE — 1101F PR PT FALLS ASSESS DOC 0-1 FALLS W/OUT INJ PAST YR: ICD-10-PCS | Mod: CPTII,S$GLB,, | Performed by: FAMILY MEDICINE

## 2023-07-19 PROCEDURE — 99213 PR OFFICE/OUTPT VISIT, EST, LEVL III, 20-29 MIN: ICD-10-PCS | Mod: S$GLB,,, | Performed by: FAMILY MEDICINE

## 2023-07-19 NOTE — PATIENT INSTRUCTIONS
If you are feeling unwell, we'd like to be the first ones to know here at [facility]! Please give us a call. Same day appointments are our top priority to keep you well and out of the emergency rooms and hospitals. Call [phone] for our direct line. After hours advice is always available. Please call [phone] after hours to speak to the on-call team.     There are 15 teaspoons worth of sugar in the sprite you're drinking today. Your blood sugar today is 291, which is too high. Please make better choices.     Quit drinking sugar! Opt for water or diet sodas.

## 2023-07-19 NOTE — Clinical Note
Will you confirm that he's getting Mounjaro 10 weekly? He seems to have plenty of appetite and not losing weight.

## 2023-07-19 NOTE — PROGRESS NOTES
Subjective:       Patient ID: Trey Stevens is a 68 y.o. male.    Chief Complaint: Follow-up (Two weeks follow up. No issues or concerns.)    HPI:     Here for 2 week f/u. Drinking sprite this morning and ate a sausage egg biscuit that he bought from a gas station this morning. Psoriasis is looking much better with switch to Stelara. Switched from Trulicity to Mounjaro 10 by Bee Stinson, diabetes clinic. Home health still working with him to improve medication compliance; we will alert them to change in medication regirmn. He is seeing diabetes educator, smoking cessation counselor (still with successful cessation), and dermatology. Recently saw podiatry but note not yet entered by MD. Recently saw pulmonology with no changes to care plan. He reports that he's taking his pills but sometimes breaks them in half.  today. Weight up 1 pound. He has not been successful in completing home cologuard. Will refer for cscope and work with home health to coordinate bowel prep.      Updated/ annual due 8/23:  HM:  12/22 today fluvax, 10/21 covid vaccines, 12/18 HAV, 2/16 [id number], 4/13 [id number], 8/22 [id number], 2/16 TDaP, Cscope in the past normal and pt refuses more, 6/22 PSA, 2/16 HCV neg, 5/23 Eye, 11/18 chest CT stable, Pod Dr. Hadley.    Objective:     Vitals:    07/19/23 1032   BP: 122/68   Temp: 97.9 °F (36.6 °C)     Wt Readings from Last 3 Encounters:   07/19/23 119.7 kg (263 lb 14.4 oz)   07/12/23 118.8 kg (262 lb)   07/10/23 119.2 kg (262 lb 12.6 oz)     Temp Readings from Last 3 Encounters:   07/19/23 97.9 °F (36.6 °C) (Oral)   07/03/23 97.7 °F (36.5 °C) (Oral)   06/20/23 97.8 °F (36.6 °C) (Oral)     BP Readings from Last 3 Encounters:   07/19/23 122/68   07/03/23 119/63   06/20/23 122/68     Pulse Readings from Last 3 Encounters:   07/03/23 84   06/20/23 84   06/07/23 96          Physical Exam  Vitals and nursing note reviewed.   Constitutional:       General: He is not in acute distress.     Appearance: Normal  appearance. He is well-developed. He is obese. He is not ill-appearing.   HENT:      Head: Normocephalic and atraumatic.      Nose: Nose normal.      Mouth/Throat:      Mouth: Mucous membranes are moist.      Pharynx: No oropharyngeal exudate.   Eyes:      General: No scleral icterus.     Pupils: Pupils are equal, round, and reactive to light.   Cardiovascular:      Rate and Rhythm: Normal rate and regular rhythm.   Pulmonary:      Effort: Pulmonary effort is normal.      Breath sounds: Normal breath sounds.   Abdominal:      General: Bowel sounds are normal.      Palpations: Abdomen is soft.      Comments: Significant abdominal obesity   Musculoskeletal:         General: No deformity. Normal range of motion.      Cervical back: Normal range of motion and neck supple.   Skin:     General: Skin is warm and dry.   Neurological:      Mental Status: He is alert and oriented to person, place, and time.      Comments: Cognitive deficit   Psychiatric:         Mood and Affect: Mood normal.         Behavior: Behavior normal.         Thought Content: Thought content normal.         Albumin   Date Value Ref Range Status   06/07/2023 3.6 3.5 - 5.2 g/dL Final     eGFR   Date Value Ref Range Status   06/07/2023 >60 >60 mL/min/1.73 m^2 Final      Latest Reference Range & Units 07/10/23 11:40   Cholesterol 120 - 199 mg/dL 192   HDL 40 - 75 mg/dL 41   HDL/Cholesterol Ratio 20.0 - 50.0 % 21.4   LDL Cholesterol External 63.0 - 159.0 mg/dL 100.0   Non-HDL Cholesterol mg/dL 151   Total Cholesterol/HDL Ratio 2.0 - 5.0  4.7   Triglycerides 30 - 150 mg/dL 255 (H)   Vit D, 25-Hydroxy 30 - 96 ng/mL 38   Hemoglobin A1C External 4.0 - 5.6 % 9.9 (H)   Estimated Avg Glucose 68 - 131 mg/dL 237 (H)       Assessment:       1. Type 2 diabetes mellitus with stage 3a chronic kidney disease, with long-term current use of insulin    2. Colon cancer screening        Plan:           Problem List Items Addressed This Visit          Endocrine    Type 2  diabetes mellitus with chronic kidney disease, with long-term current use of insulin - Primary    Relevant Orders    POCT Glucose, Hand-Held Device (Completed)     Other Visit Diagnoses       Colon cancer screening        Relevant Orders    Ambulatory referral/consult to Endo Procedure           2 week f/u; will reach out to diabetes clinic to loop into our coordination efforts of medication organization and supervised administration with home health

## 2023-07-20 ENCOUNTER — TELEPHONE (OUTPATIENT)
Dept: PRIMARY CARE CLINIC | Facility: CLINIC | Age: 68
End: 2023-07-20
Payer: MEDICARE

## 2023-07-20 DIAGNOSIS — N18.2 TYPE 2 DIABETES MELLITUS WITH STAGE 2 CHRONIC KIDNEY DISEASE, WITH LONG-TERM CURRENT USE OF INSULIN: Primary | ICD-10-CM

## 2023-07-20 DIAGNOSIS — E11.22 TYPE 2 DIABETES MELLITUS WITH STAGE 2 CHRONIC KIDNEY DISEASE, WITH LONG-TERM CURRENT USE OF INSULIN: Primary | ICD-10-CM

## 2023-07-20 DIAGNOSIS — Z79.4 TYPE 2 DIABETES MELLITUS WITH STAGE 2 CHRONIC KIDNEY DISEASE, WITH LONG-TERM CURRENT USE OF INSULIN: Primary | ICD-10-CM

## 2023-07-21 ENCOUNTER — TELEPHONE (OUTPATIENT)
Dept: PRIMARY CARE CLINIC | Facility: CLINIC | Age: 68
End: 2023-07-21
Payer: MEDICARE

## 2023-07-21 ENCOUNTER — HOSPITAL ENCOUNTER (OUTPATIENT)
Dept: PREADMISSION TESTING | Facility: HOSPITAL | Age: 68
Discharge: HOME | End: 2023-07-21
Attending: INTERNAL MEDICINE
Payer: MEDICARE

## 2023-07-21 DIAGNOSIS — Z12.11 COLON CANCER SCREENING: Primary | ICD-10-CM

## 2023-07-21 RX ORDER — POLYETHYLENE GLYCOL 3350, SODIUM SULFATE ANHYDROUS, SODIUM BICARBONATE, SODIUM CHLORIDE, POTASSIUM CHLORIDE 236; 22.74; 6.74; 5.86; 2.97 G/4L; G/4L; G/4L; G/4L; G/4L
4 POWDER, FOR SOLUTION ORAL ONCE
Qty: 4000 ML | Refills: 0 | Status: SHIPPED | OUTPATIENT
Start: 2023-07-21 | End: 2023-07-21

## 2023-07-23 NOTE — PROGRESS NOTES
Subjective:     Patient ID: Trey Stevens is a 68 y.o. male.    Chief Complaint: Nail Care (Pt states he is here to get his toe nails cut. Pt states that his feet hurts all the time . Pt. States his pain is a 6/10 today. Pt is wearing slippers on today. Pt states he take tramadol for pain . Pt states take up to three pills because the pain is so bad. /Diabetic Pt, last seen on 07/03/23 with PCP Dr. Spencer)      Trey is a 68 y.o. male who presents to the clinic for evaluation and treatment of high risk feet. Trey has a past medical history of Arthritis, Diabetes mellitus, Diabetes mellitus type 2, uncontrolled (7/19/2016), DM (diabetes mellitus) (2015), Gall stones, Obesity, Pneumonia of right middle lobe due to infectious organism (8/13/2021), Psoriasis (a type of skin inflammation), and Rheumatoid arthritis of foot. The patient's chief complaint is long, thick toenails. This patient has documented high risk feet requiring routine maintenance secondary to diabetes mellitis and those secondary complications of diabetes, as mentioned..    PCP: Brittanie Kaba MD    Date Last Seen by PCP: 07/03/2023    Current shoe gear:  Affected Foot: Casual shoes     Unaffected Foot: Casual shoes    Hemoglobin A1C   Date Value Ref Range Status   07/10/2023 9.9 (H) 4.0 - 5.6 % Final     Comment:     ADA Screening Guidelines:  5.7-6.4%  Consistent with prediabetes  >or=6.5%  Consistent with diabetes    High levels of fetal hemoglobin interfere with the HbA1C  assay. Heterozygous hemoglobin variants (HbS, HgC, etc)do  not significantly interfere with this assay.   However, presence of multiple variants may affect accuracy.     04/11/2023 13.9 (H) 4.0 - 5.6 % Final     Comment:     ADA Screening Guidelines:  5.7-6.4%  Consistent with prediabetes  >or=6.5%  Consistent with diabetes    High levels of fetal hemoglobin interfere with the HbA1C  assay. Heterozygous hemoglobin variants (HbS, HgC, etc)do  not significantly interfere with  this assay.   However, presence of multiple variants may affect accuracy.     12/08/2022 >14.0 (H) 4.0 - 5.6 % Final     Comment:     ADA Screening Guidelines:  5.7-6.4%  Consistent with prediabetes  >or=6.5%  Consistent with diabetes    High levels of fetal hemoglobin interfere with the HbA1C  assay. Heterozygous hemoglobin variants (HbS, HgC, etc)do  not significantly interfere with this assay.   However, presence of multiple variants may affect accuracy.         Patient Active Problem List   Diagnosis    Psoriatic arthritis, destructive type    Vitamin D deficiency    Multiple lung nodules on CT    Immunosuppressed status    Long-term use of immunosuppressant medication    Mixed type COPD (chronic obstructive pulmonary disease)    Type 2 diabetes mellitus with chronic kidney disease, with long-term current use of insulin    Hypertension associated with diabetes    Vitiligo    SCHUYLER on CPAP    Morbid obesity    Hyperlipidemia associated with type 2 diabetes mellitus    Elevated liver enzymes    Nicotine dependence, cigarettes, uncomplicated    Dependence on nocturnal oxygen therapy    Urinary retention    Calcified granuloma of lung    Calcification of aorta    Type 2 diabetes mellitus with microalbuminuria, with long-term current use of insulin    Hyperglycemia due to diabetes mellitus    Noncompliance with diabetes treatment    Problems related to health literacy    Impetigo    Neuropathy    Onychomycosis of multiple toenails with type 2 diabetes mellitus and peripheral neuropathy    Fatty liver       Medication List with Changes/Refills   Current Medications    AMLODIPINE (NORVASC) 5 MG TABLET    Take 1 tablet (5 mg total) by mouth once daily. For high blood pressure    ASPIRIN 81 MG CHEW    Take 1 tablet (81 mg total) by mouth once daily.    ATORVASTATIN (LIPITOR) 80 MG TABLET    Take 1 tablet (80 mg total) by mouth once daily.    BLOOD SUGAR DIAGNOSTIC (ACCU-CHEK NOELLE PLUS TEST STRP) STRP    Check blood sugar  "3 times daily.    BLOOD-GLUCOSE METER (ACCU-CHEK NOELLE PLUS METER) MISC    Check blood sugar 3 times daily    CALCIPOTRIENE (DOVONOX) 0.005 % CREAM    APPLY TO AFFECTED AREA(S) TWICE DAILY    CLOBETASOL (OLUX) 0.05 % FOAM    APPLY TO AFFECTED AREA OF SCALP AND BEHIND EARS TWICE DAILY. DONT USE ON FACE,UNDERARMS,OR GROIN    EMPAGLIFLOZIN (JARDIANCE) 25 MG TABLET    Take 1 tablet (25 mg total) by mouth once daily.    ERGOCALCIFEROL (ERGOCALCIFEROL) 50,000 UNIT CAP    Take 50,000 Units by mouth twice a week.    FOLIC ACID (FOLVITE) 1 MG TABLET    TAKE 1 TABLET ONE TIME DAILY    GLIPIZIDE (GLUCOTROL) 5 MG TABLET    TAKE 1 TABLET BY MOUTH TWICE A DAY    INSULIN ASPART U-100 (NOVOLOG U-100 INSULIN ASPART) 100 UNIT/ML INJECTION    INJECT 200 UNITS PER CEQUR DEVICE EVERY 3 DAYS    KETOCONAZOLE (NIZORAL) 2 % SHAMPOO    WASH HAIR WITH MEDICATED SHAMPOO AT LEAST 2X/WEEK - LET SIT ON SCALP AT LEAST 5 MINUTES PRIOR TO RINSING    LANCETS (ACCU-CHEK SOFTCLIX LANCETS) MISC    Check blood sugar 3 times daily    LANTUS SOLOSTAR U-100 INSULIN GLARGINE 100 UNITS/ML SUBQ PEN    Inject 24 Units into the skin once daily.    LOSARTAN (COZAAR) 25 MG TABLET    TAKE 1 TABLET BY MOUTH EVERY DAY    METFORMIN (GLUCOPHAGE-XR) 500 MG XR 24HR TABLET    Take 2 tablets (1,000 mg total) by mouth 2 (two) times daily with meals.    METHOTREXATE 2.5 MG TAB    TAKE 8 TABLETS BY MOUTH EVERY 7 DAYS    NICOTINE POLACRILEX 2 MG LOZG    Take 1 lozenge (2 mg total) by mouth as needed (Use up to 10 lozenges per day in the place of cigarettes).    NOVOLOG FLEXPEN U-100 INSULIN 100 UNIT/ML (3 ML) INPN PEN    Inject 8 Units into the skin 3 (three) times daily with meals.    PEN NEEDLE, DIABETIC 32 GAUGE X 5/32" NDLE    Use with Lantus once daily and Humalog 3 times daily.    SPIRONOLACTONE (ALDACTONE) 25 MG TABLET    Take 1 tablet (25 mg total) by mouth once daily.    TIRZEPATIDE (MOUNJARO) 10 MG/0.5 ML PNIJ    Inject 10 mg into the skin every 7 days. STOP " TRULICITY AND START MOUNJARO ON THE DAY TRULICITY WOULD HAVE BEEN DUE.    TRAMADOL (ULTRAM) 50 MG TABLET    TAKE 1 TABLET BY MOUTH THREE TIMES A DAY AS NEEDED FOR MODERATE PAIN Strength: 50 mg    VENTOLIN HFA 90 MCG/ACTUATION INHALER    INHALE 2 PUFFS INTO THE LUNGS EVERY 4 (FOUR) HOURS AS NEEDED FOR WHEEZING. RESCUE       Review of patient's allergies indicates:  No Known Allergies    Past Surgical History:   Procedure Laterality Date    APPENDECTOMY      CHOLECYSTECTOMY         Family History   Problem Relation Age of Onset    Cancer Mother     Hypertension Mother     Diabetes Mother     Cataracts Mother     Stroke Father     Psoriasis Neg Hx        Social History     Socioeconomic History    Marital status:    Tobacco Use    Smoking status: Former     Packs/day: 0.50     Years: 28.00     Pack years: 14.00     Types: Cigarettes     Start date:      Quit date: 2022     Years since quittin.6    Smokeless tobacco: Never    Tobacco comments:     Quit smoking 1 mo ago (2022)   Substance and Sexual Activity    Alcohol use: No     Alcohol/week: 0.0 standard drinks    Drug use: No    Sexual activity: Never     Social Determinants of Health     Financial Resource Strain: Low Risk     Difficulty of Paying Living Expenses: Not hard at all   Food Insecurity: No Food Insecurity    Worried About Running Out of Food in the Last Year: Never true    Ran Out of Food in the Last Year: Never true   Transportation Needs: No Transportation Needs    Lack of Transportation (Medical): No    Lack of Transportation (Non-Medical): No   Physical Activity: Inactive    Days of Exercise per Week: 0 days    Minutes of Exercise per Session: 0 min   Stress: No Stress Concern Present    Feeling of Stress : Not at all   Social Connections: Moderately Isolated    Frequency of Communication with Friends and Family: Twice a week    Frequency of Social Gatherings with Friends and Family: More than three times a week    Attends  "[employer] Services: More than 4 times per year    Active Member of Clubs or Organizations: No    Attends Club or Organization Meetings: Never    Marital Status:    Housing Stability: Low Risk     Unable to Pay for Housing in the Last Year: No    Number of Places Lived in the Last Year: 1    Unstable Housing in the Last Year: No       Vitals:    07/12/23 1441   Weight: 118.8 kg (262 lb)   Height: 5' 7" (1.702 m)   PainSc:   6   PainLoc: Foot       Hemoglobin A1C   Date Value Ref Range Status   07/10/2023 9.9 (H) 4.0 - 5.6 % Final     Comment:     ADA Screening Guidelines:  5.7-6.4%  Consistent with prediabetes  >or=6.5%  Consistent with diabetes    High levels of fetal hemoglobin interfere with the HbA1C  assay. Heterozygous hemoglobin variants (HbS, HgC, etc)do  not significantly interfere with this assay.   However, presence of multiple variants may affect accuracy.     04/11/2023 13.9 (H) 4.0 - 5.6 % Final     Comment:     ADA Screening Guidelines:  5.7-6.4%  Consistent with prediabetes  >or=6.5%  Consistent with diabetes    High levels of fetal hemoglobin interfere with the HbA1C  assay. Heterozygous hemoglobin variants (HbS, HgC, etc)do  not significantly interfere with this assay.   However, presence of multiple variants may affect accuracy.     12/08/2022 >14.0 (H) 4.0 - 5.6 % Final     Comment:     ADA Screening Guidelines:  5.7-6.4%  Consistent with prediabetes  >or=6.5%  Consistent with diabetes    High levels of fetal hemoglobin interfere with the HbA1C  assay. Heterozygous hemoglobin variants (HbS, HgC, etc)do  not significantly interfere with this assay.   However, presence of multiple variants may affect accuracy.         Review of Systems   Constitutional:  Negative for chills and fever.   Respiratory:  Negative for shortness of breath.    Cardiovascular:  Negative for chest pain, palpitations, orthopnea, claudication and leg swelling.   Gastrointestinal:  Negative for diarrhea, nausea and " vomiting.   Musculoskeletal:  Negative for joint pain.   Skin:  Negative for rash.   Neurological:  Positive for tingling and sensory change.   Psychiatric/Behavioral: Negative.             Objective:      PHYSICAL EXAM: Apperance: Alert and orient in no distress,well developed, and with good attention to grooming and body habits  Patient presents ambulating in extra depth shoes.   LOWER EXTREMITY EXAM:  VASCULAR: Dorsalis pedis pulses 0/4 bilateral and Posterior Tibial pulses 1/4 bilateral. Capillary fill time <4 seconds bilateral. Mild edema observed bilateral. Varicosities present bilateral. Skin temperature of the lower extremities is warm to warm, proximal to distal. Hair growth absent bilateral.  DERMATOLOGICAL: No skin rashes, subcutaneous nodules, lesions, or ulcers observed bilateral. Nails 1,2,3,4,5 bilateral elongated, thickened, and discolored with subungual debris. Webspaces 1,2,3,4 clean, dry and without evidence of break in skin integrity bilateral. Moderate dry and hyperkeratotic tissue noted to bilateral heels and medial 1st MPJ. Skin fissures noted to bilateral heels. No erythema, drainage, or increased temp noted to area.   NEUROLOGICAL: Light touch, sharp-dull, proprioception all present and equal bilaterally.  Vibratory sensation absent at bilateral hallux. Protective sensation absent at 6/10 sites as tested with a [place] 5.07 monofilament.   MUSCULOSKELETAL: Muscle strength is 5/5 for foot inverters, everters, plantarflexors, and dorsiflexors. Muscle tone is normal.       Assessment:       ICD-10-CM ICD-9-CM   1. Type II diabetes mellitus with neurological manifestations  E11.49 250.60   2. Dermatophytosis of nail  B35.1 110.1   3. Corn or callus  L84 700   4. Skin fissures  R23.4 709.8         Plan:   Type II diabetes mellitus with neurological manifestations    Dermatophytosis of nail    Corn or callus    Skin fissures    I counseled the patient on his conditions, regarding findings  of my examination, my impressions, and usual treatment plan.   Appointment spent on education about the diabetic foot, neuropathy, and prevention of limb loss.  Shoe inspection. Diabetic Foot Education. Patient reminded of the importance of good nutrition and blood sugar control to help prevent podiatric complications of diabetes. Patient instructed on proper foot hygeine. We discussed wearing proper shoe gear, daily foot inspections, never walking without protective shoe gear, never putting sharp instruments to feet.    With patient's permission, nails 1-5 bilateral were debrided/trimmed in length and thickness aggressively to their soft tissue attachment mechanically and with electric , removing all offending nail and debris. Patient relates relief following the procedure.  With patient's permission, bilateral callus trimmed in thickness with #15 blade in thickness without incident.   Patient  will continue to monitor the areas daily, inspect feet, wear protective shoe gear when ambulatory, moisturizer to maintain skin integrity. Patient reminded of the importance of good nutrition and blood sugar control to help prevent podiatric complications of diabetes.  Patient to return 3 months or sooner if needed.      Joy Hadley DPM  [facility]

## 2023-07-24 ENCOUNTER — TELEPHONE (OUTPATIENT)
Dept: PRIMARY CARE CLINIC | Facility: CLINIC | Age: 68
End: 2023-07-24
Payer: MEDICARE

## 2023-07-26 NOTE — TELEPHONE ENCOUNTER
Specialty Pharmacy - Refill Coordination    Specialty Medication Orders Linked to Encounter      Flowsheet Row Most Recent Value   Medication #1 ustekinumab (STELARA) 90 mg/mL Syrg syringe (Order#[id number], Rx#[id number])            Refill Questions - Documented Responses      Flowsheet Row Most Recent Value   Patient Availability and HIPAA Verification    Does patient want to proceed with activity? Yes   HIPAA/medical authority confirmed? Yes   Relationship to patient of person spoken to? Self   Refill Screening Questions    Changes to allergies? No   Changes to medications? No   New conditions since last clinic visit? No   Unplanned office visit, urgent care, ED, or hospital admission in the last 4 weeks? No   How does patient/caregiver feel medication is working? Too soon to tell   Financial problems or insurance changes? No   How many doses of your specialty medications were missed in the last 4 weeks? 0   Would patient like to speak to a pharmacist? No   When does the patient need to receive the medication? 08/03/23   Refill Delivery Questions    How will the patient receive the medication? [facility]   When does the patient need to receive the medication? 08/03/23   Shipping Address Home   Address in [facility] confirmed and updated if neccessary? Yes   Expected Copay ($) 0   Is the patient able to afford the medication copay? Yes   Payment Method zero copay   Days supply of Refill 84   Supplies needed? No supplies needed   Refill activity completed? Yes   Refill activity plan Refill scheduled   Shipment/Pickup Date: 07/31/23            Current Outpatient Medications   Medication Sig    amLODIPine (NORVASC) 5 MG tablet Take 1 tablet (5 mg total) by mouth once daily. For high blood pressure    aspirin 81 MG Chew Take 1 tablet (81 mg total) by mouth once daily.    atorvastatin (LIPITOR) 80 MG tablet Take 1 tablet (80 mg total) by mouth once daily.    blood sugar diagnostic (ACCU-CHEK NOELLE PLUS TEST STRP)  "Strp Check blood sugar 3 times daily.    blood-glucose meter (ACCU-CHEK NOELLE PLUS METER) Misc Check blood sugar 3 times daily    calcipotriene (DOVONOX) 0.005 % cream APPLY TO AFFECTED AREA(S) TWICE DAILY    clobetasoL (OLUX) 0.05 % Foam APPLY TO AFFECTED AREA OF SCALP AND BEHIND EARS TWICE DAILY. DONT USE ON FACE,UNDERARMS,OR GROIN    empagliflozin (JARDIANCE) 25 mg tablet Take 1 tablet (25 mg total) by mouth once daily.    ergocalciferol (ERGOCALCIFEROL) 50,000 unit Cap Take 50,000 Units by mouth twice a week.    folic acid (FOLVITE) 1 MG tablet TAKE 1 TABLET ONE TIME DAILY    glipiZIDE (GLUCOTROL) 5 MG tablet TAKE 1 TABLET BY MOUTH TWICE A DAY    insulin aspart U-100 (NOVOLOG U-100 INSULIN ASPART) 100 unit/mL injection INJECT 200 UNITS PER CEQUR DEVICE EVERY 3 DAYS    ketoconazole (NIZORAL) 2 % shampoo WASH HAIR WITH MEDICATED SHAMPOO AT LEAST 2X/WEEK - LET SIT ON SCALP AT LEAST 5 MINUTES PRIOR TO RINSING    lancets (ACCU-CHEK SOFTCLIX LANCETS) Misc Check blood sugar 3 times daily    LANTUS SOLOSTAR U-100 INSULIN glargine 100 units/mL SubQ pen Inject 24 Units into the skin once daily.    losartan (COZAAR) 25 MG tablet TAKE 1 TABLET BY MOUTH EVERY DAY    metFORMIN (GLUCOPHAGE-XR) 500 MG XR 24hr tablet Take 2 tablets (1,000 mg total) by mouth 2 (two) times daily with meals.    methotrexate 2.5 MG Tab TAKE 8 TABLETS BY MOUTH EVERY 7 DAYS    nicotine polacrilex 2 MG Lozg Take 1 lozenge (2 mg total) by mouth as needed (Use up to 10 lozenges per day in the place of cigarettes).    NOVOLOG FLEXPEN U-100 INSULIN 100 unit/mL (3 mL) InPn pen Inject 8 Units into the skin 3 (three) times daily with meals.    pen needle, diabetic 32 gauge x 5/32" Ndle Use with Lantus once daily and Humalog 3 times daily.    spironolactone (ALDACTONE) 25 MG tablet Take 1 tablet (25 mg total) by mouth once daily.    tirzepatide (MOUNJARO) 10 mg/0.5 mL PnIj Inject 10 mg into the skin every 7 days. STOP TRULICITY AND START MOUNJARO ON THE DAY " TRULICITY WOULD HAVE BEEN DUE.    traMADoL (ULTRAM) 50 mg tablet TAKE 1 TABLET BY MOUTH THREE TIMES A DAY AS NEEDED FOR MODERATE PAIN Strength: 50 mg    ustekinumab (STELARA) 90 mg/mL Syrg syringe Inject 1 mL (90 mg total) into the skin every 28 days for 2 doses.    VENTOLIN HFA 90 mcg/actuation inhaler INHALE 2 PUFFS INTO THE LUNGS EVERY 4 (FOUR) HOURS AS NEEDED FOR WHEEZING. RESCUE   Last reviewed on 7/22/2023 10:34 PM by Joy Hadley DPM    Review of patient's allergies indicates:  No Known Allergies Last reviewed on  7/22/2023 10:34 PM by Joy Hadley      Tasks added this encounter   No tasks added.   Tasks due within next 3 months   No tasks due.     Campos Sunshine, PharmD  [facility]  [address] [place] [id number]  Phone: [phone]  Fax: [phone]

## 2023-07-29 DIAGNOSIS — L40.9 GENERALIZED PSORIASIS: ICD-10-CM

## 2023-07-31 ENCOUNTER — OUTPATIENT CASE MANAGEMENT (OUTPATIENT)
Dept: ADMINISTRATIVE | Facility: OTHER | Age: 68
End: 2023-07-31
Payer: MEDICARE

## 2023-07-31 RX ORDER — KETOCONAZOLE 20 MG/ML
SHAMPOO, SUSPENSION TOPICAL
Qty: 120 ML | Refills: 3 | Status: SHIPPED | OUTPATIENT
Start: 2023-07-31 | End: 2023-12-13

## 2023-07-31 NOTE — PROGRESS NOTES
Outpatient Care Management  Plan of Care Follow Up Visit    Patient: Trey Stevens  MRN: [id number]  Date of Service: 07/31/2023  Completed by: Louise Aguayo RN  Referral Date: 01/25/2023    Reason for Visit   Patient presents with    Nursing Assessment    OPCM RN Follow Up Call       Brief Summary: OPCM visit completed, care plan reviewed and updated.   Next Steps: plan to follow up in approximately 2 weeks - patient agrees. How are blood sugars? Foods he has been eating? Activity? Water intake? How are his feet? Taking meds as ordered? Is home health visiting?  GOVNID Aguayo RN

## 2023-08-03 ENCOUNTER — TELEPHONE (OUTPATIENT)
Dept: DIABETES | Facility: CLINIC | Age: 68
End: 2023-08-03
Payer: MEDICARE

## 2023-08-03 NOTE — TELEPHONE ENCOUNTER
Called pt to confirmed that pt needed to come in for in person visit pt stated that he will call back to see if he can get a ride pt has not been answering

## 2023-08-04 ENCOUNTER — OFFICE VISIT (OUTPATIENT)
Dept: DIABETES | Facility: CLINIC | Age: 68
End: 2023-08-04
Payer: MEDICARE

## 2023-08-04 DIAGNOSIS — E11.69 HYPERLIPIDEMIA ASSOCIATED WITH TYPE 2 DIABETES MELLITUS: ICD-10-CM

## 2023-08-04 DIAGNOSIS — E78.5 HYPERLIPIDEMIA ASSOCIATED WITH TYPE 2 DIABETES MELLITUS: ICD-10-CM

## 2023-08-04 DIAGNOSIS — Z79.4 UNCONTROLLED TYPE 2 DIABETES MELLITUS WITH HYPERGLYCEMIA, WITH LONG-TERM CURRENT USE OF INSULIN: Primary | ICD-10-CM

## 2023-08-04 DIAGNOSIS — E11.65 UNCONTROLLED TYPE 2 DIABETES MELLITUS WITH HYPERGLYCEMIA, WITH LONG-TERM CURRENT USE OF INSULIN: Primary | ICD-10-CM

## 2023-08-04 DIAGNOSIS — I15.2 HYPERTENSION ASSOCIATED WITH DIABETES: ICD-10-CM

## 2023-08-04 DIAGNOSIS — E11.59 HYPERTENSION ASSOCIATED WITH DIABETES: ICD-10-CM

## 2023-08-04 DIAGNOSIS — L40.52 PSORIATIC ARTHRITIS, DESTRUCTIVE TYPE: ICD-10-CM

## 2023-08-04 PROCEDURE — 99441 PR PHYSICIAN TELEPHONE EVALUATION 5-10 MIN: ICD-10-PCS | Mod: 95,,, | Performed by: NURSE PRACTITIONER

## 2023-08-04 PROCEDURE — 3046F HEMOGLOBIN A1C LEVEL >9.0%: CPT | Mod: CPTII,95,, | Performed by: NURSE PRACTITIONER

## 2023-08-04 PROCEDURE — 1157F PR ADVANCE CARE PLAN OR EQUIV PRESENT IN MEDICAL RECORD: ICD-10-PCS | Mod: CPTII,95,, | Performed by: NURSE PRACTITIONER

## 2023-08-04 PROCEDURE — 1160F RVW MEDS BY RX/DR IN RCRD: CPT | Mod: CPTII,95,, | Performed by: NURSE PRACTITIONER

## 2023-08-04 PROCEDURE — 1159F PR MEDICATION LIST DOCUMENTED IN MEDICAL RECORD: ICD-10-PCS | Mod: CPTII,95,, | Performed by: NURSE PRACTITIONER

## 2023-08-04 PROCEDURE — 99441 PR PHYSICIAN TELEPHONE EVALUATION 5-10 MIN: CPT | Mod: 95,,, | Performed by: NURSE PRACTITIONER

## 2023-08-04 PROCEDURE — 1159F MED LIST DOCD IN RCRD: CPT | Mod: CPTII,95,, | Performed by: NURSE PRACTITIONER

## 2023-08-04 PROCEDURE — 1160F PR REVIEW ALL MEDS BY PRESCRIBER/CLIN PHARMACIST DOCUMENTED: ICD-10-PCS | Mod: CPTII,95,, | Performed by: NURSE PRACTITIONER

## 2023-08-04 PROCEDURE — 4010F ACE/ARB THERAPY RXD/TAKEN: CPT | Mod: CPTII,95,, | Performed by: NURSE PRACTITIONER

## 2023-08-04 PROCEDURE — 4010F PR ACE/ARB THEARPY RXD/TAKEN: ICD-10-PCS | Mod: CPTII,95,, | Performed by: NURSE PRACTITIONER

## 2023-08-04 PROCEDURE — 1157F ADVNC CARE PLAN IN RCRD: CPT | Mod: CPTII,95,, | Performed by: NURSE PRACTITIONER

## 2023-08-04 PROCEDURE — 3046F PR MOST RECENT HEMOGLOBIN A1C LEVEL > 9.0%: ICD-10-PCS | Mod: CPTII,95,, | Performed by: NURSE PRACTITIONER

## 2023-08-04 NOTE — PATIENT INSTRUCTIONS
-- Medications adjusted for today's visit include:    Continue Lantus to 24 units once daily. If fasting blood sugars are above 130 consistently, increase to 26 units daily and let me know.     Continue Novolog 8 units three times a day before each main meal, or 4 clicks per CeQur before each meal.    Continue Metformin 2 tablets twice a day, Jardiance once a day and Mounjaro once a week.    -- Start checking blood sugar 3 times daily: Fasting blood sugar and vary your next 2 readings: Before lunch, before supper, 2 hours after any meal, or bedtime.   -Blood sugar goals should be a fasting blood sugar below 130, and no blood sugars throughout the day over 180 is good.    --Carry glucose tablets/soft peppermints/regular juice or Coke product with you at all times to treat a low blood sugar episode (less than 70). If your blood sugar is between 50-70, Chew 4 tablets or drink 1/2 cup of juice or regular Coke product. If your blood sugar is below 50, double the treatment. Re-check blood sugar in 15 minutes. If still low, repeat this. Always call the clinic to give an update for any low blood sugar episodes.    --Follow-up for your next visit in 8 weeks with logs.    --Please either drop off, fax, or MyChart your readings to me in a couple of weeks if you can.

## 2023-08-04 NOTE — Clinical Note
[facility] Notes: No dev

## 2023-08-04 NOTE — PROGRESS NOTES
Established Patient - Audio Only Telehealth Visit     The patient location is: [place]  The chief complaint leading to consultation is: diabetes management follow up   Visit type: Virtual visit with audio only (telephone)  Total time spent with patient: 8 minutes       The reason for the audio only service rather than synchronous audio and video virtual visit was related to technical difficulties or patient preference/necessity.     Each patient to whom I provide medical services by telemedicine is:  (1) informed of the relationship between the physician and patient and the respective role of any other health care provider with respect to management of the patient; and (2) notified that they may decline to receive medical services by telemedicine and may withdraw from such care at any time. Patient verbally consented to receive this service via voice-only telephone call.       HPI: Forgot to check blood sugar this morning. States Bgs running 170s-190s. Occasionally > 200, but less than before. Working on portion control - not eating as much as before. He is wearing CeQur consistently when away from home (prefers to use Novolog injections while at home). Tolerating Mounjaro well without side effects. When asked about his fasting blood sugars, he states he has them written down at home but cannot tell me for sure what they are running. We discussed that if fasting sugars are > 130 consistently, need to increase Lantus to 26 units. He will let us know. Otherwise, he is feeling well - no further questions/concerns. A1c has improved from 13.9% to 9.9%.      Diabetes Medications               empagliflozin (JARDIANCE) 25 mg tablet Take 1 tablet (25 mg total) by mouth once daily.    glipiZIDE (GLUCOTROL) 5 MG tablet TAKE 1 TABLET BY MOUTH TWICE A DAY    insulin aspart U-100 (NOVOLOG U-100 INSULIN ASPART) 100 unit/mL injection INJECT 200 UNITS PER CEQUR DEVICE EVERY 3 DAYS    LANTUS SOLOSTAR U-100 INSULIN glargine 100  "units/mL SubQ pen Inject 24 Units into the skin once daily.    metFORMIN (GLUCOPHAGE-XR) 500 MG XR 24hr tablet Take 2 tablets (1,000 mg total) by mouth 2 (two) times daily with meals.    NOVOLOG FLEXPEN U-100 INSULIN 100 unit/mL (3 mL) InPn pen Inject 8 Units into the skin 3 (three) times daily with meals.    tirzepatide (MOUNJARO) 10 mg/0.5 mL PnIj Inject 10 mg into the skin every 7 days. STOP TRULICITY AND START MOUNJARO ON THE DAY TRULICITY WOULD HAVE BEEN DUE.           Diabetes Management Status    Statin: Taking  ACE/ARB: Taking    Screening or Prevention Patient's value Goal Complete/Controlled?   HgA1C Testing and Control   Lab Results   Component Value Date    HGBA1C 9.9 (H) 07/10/2023      Annually/Less than 8% No   Lipid profile : 07/10/2023 Annually Yes   LDL control Lab Results   Component Value Date    LDLCALC 100.0 07/10/2023    Annually/Less than 100 mg/dl  No   Nephropathy screening Lab Results   Component Value Date    LABMICR 302.0 12/08/2022     Lab Results   Component Value Date    PROTEINUA 2+ (A) 08/13/2021     No results found for: "UTPCR"   Annually Yes   Blood pressure BP Readings from Last 1 Encounters:   07/19/23 122/68    Less than 140/90 Yes   Dilated retinal exam : 05/11/2023 Annually Yes   Foot exam   : 12/29/2022 Annually Yes      Assessment and plan:      Diagnoses and all orders for this visit:    Uncontrolled type 2 diabetes mellitus with hyperglycemia, with long-term current use of insulin    Chronic-    -- Medications adjusted for today's visit include:    Continue Lantus to 24 units once daily. If fasting blood sugars are above 130 consistently, increase to 26 units daily and let me know.     Continue Novolog 8 units three times a day before each main meal, or 4 clicks per CeQur before each meal.    Continue Metformin 2 tablets twice a day, Jardiance once a day and Mounjaro once a week.    Hyperlipidemia associated with type 2 diabetes mellitus    Hypertension associated with " diabetes    Psoriatic arthritis, destructive type                              This service was not originating from a related E/M service provided within the previous 7 days nor will  to an E/M service or procedure within the next 24 hours or my soonest available appointment.  Prevailing standard of care was able to be met in this audio-only visit.

## 2023-08-07 ENCOUNTER — TELEPHONE (OUTPATIENT)
Dept: DERMATOLOGY | Facility: CLINIC | Age: 68
End: 2023-08-07
Payer: MEDICARE

## 2023-08-07 NOTE — PROGRESS NOTES
Spoke with pt's mother confirmed that he has non fasting labs to do at the [facility] location confirmed and understood

## 2023-08-07 NOTE — TELEPHONE ENCOUNTER
Returned  call to patient. No answer. Unable to LVM. Mailbox is full     ----- Message from Kristi Villar sent at 8/7/2023 12:40 PM CDT -----  Contact: Trey  .Type:  Patient Returning Call    Who Called:Trey   Who Left Message for Patient:Janna   Does the patient know what this is regarding?:rescheduling 08/10/2023   Would the patient rather a call back or a response via [facility]? call  Best Call Back Number:.[phone]   Additional Information:   Thanks  LR

## 2023-08-10 ENCOUNTER — LAB VISIT (OUTPATIENT)
Dept: LAB | Facility: HOSPITAL | Age: 68
End: 2023-08-10
Attending: NURSE PRACTITIONER
Payer: MEDICARE

## 2023-08-10 DIAGNOSIS — E11.65 UNCONTROLLED TYPE 2 DIABETES MELLITUS WITH HYPERGLYCEMIA, WITH LONG-TERM CURRENT USE OF INSULIN: ICD-10-CM

## 2023-08-10 DIAGNOSIS — Z79.4 UNCONTROLLED TYPE 2 DIABETES MELLITUS WITH HYPERGLYCEMIA, WITH LONG-TERM CURRENT USE OF INSULIN: ICD-10-CM

## 2023-08-10 PROCEDURE — 80048 BASIC METABOLIC PNL TOTAL CA: CPT | Performed by: NURSE PRACTITIONER

## 2023-08-10 PROCEDURE — 83036 HEMOGLOBIN GLYCOSYLATED A1C: CPT | Performed by: NURSE PRACTITIONER

## 2023-08-10 PROCEDURE — 36415 COLL VENOUS BLD VENIPUNCTURE: CPT | Mod: PO | Performed by: NURSE PRACTITIONER

## 2023-08-10 PROCEDURE — 84443 ASSAY THYROID STIM HORMONE: CPT | Performed by: NURSE PRACTITIONER

## 2023-08-11 ENCOUNTER — TELEPHONE (OUTPATIENT)
Dept: DIABETES | Facility: CLINIC | Age: 68
End: 2023-08-11
Payer: MEDICARE

## 2023-08-11 ENCOUNTER — HOSPITAL ENCOUNTER (EMERGENCY)
Facility: HOSPITAL | Age: 68
Discharge: HOME | End: 2023-08-11
Attending: EMERGENCY MEDICINE
Payer: MEDICARE

## 2023-08-11 ENCOUNTER — TELEPHONE (OUTPATIENT)
Dept: INTERNAL MEDICINE | Facility: CLINIC | Age: 68
End: 2023-08-11
Payer: MEDICARE

## 2023-08-11 VITALS
TEMPERATURE: 98 F | HEART RATE: 85 BPM | HEIGHT: 67 IN | RESPIRATION RATE: 21 BRPM | SYSTOLIC BLOOD PRESSURE: 147 MMHG | WEIGHT: 264.88 LBS | DIASTOLIC BLOOD PRESSURE: 82 MMHG | BODY MASS INDEX: 41.57 KG/M2 | OXYGEN SATURATION: 94 %

## 2023-08-11 DIAGNOSIS — R73.9 HYPERGLYCEMIA: ICD-10-CM

## 2023-08-11 DIAGNOSIS — E11.65 SEVERE HYPERGLYCEMIA DUE TO DIABETES MELLITUS: Primary | ICD-10-CM

## 2023-08-11 DIAGNOSIS — R06.82 TACHYPNEA: ICD-10-CM

## 2023-08-11 DIAGNOSIS — Z79.4 UNCONTROLLED TYPE 2 DIABETES MELLITUS WITH HYPERGLYCEMIA, WITH LONG-TERM CURRENT USE OF INSULIN: ICD-10-CM

## 2023-08-11 DIAGNOSIS — Z91.119 DIETARY NONCOMPLIANCE: ICD-10-CM

## 2023-08-11 DIAGNOSIS — E11.65 UNCONTROLLED TYPE 2 DIABETES MELLITUS WITH HYPERGLYCEMIA, WITH LONG-TERM CURRENT USE OF INSULIN: ICD-10-CM

## 2023-08-11 LAB
ALBUMIN SERPL BCP-MCNC: 3.4 G/DL (ref 3.5–5.2)
ALLENS TEST: ABNORMAL
ALP SERPL-CCNC: 127 U/L (ref 55–135)
ALT SERPL W/O P-5'-P-CCNC: 46 U/L (ref 10–44)
ANION GAP SERPL CALC-SCNC: 13 MMOL/L (ref 8–16)
ANION GAP SERPL CALC-SCNC: 19 MMOL/L (ref 8–16)
AST SERPL-CCNC: 54 U/L (ref 10–40)
B-OH-BUTYR BLD STRIP-SCNC: 0.1 MMOL/L (ref 0–0.5)
BACTERIA #/AREA URNS HPF: NORMAL /HPF
BASOPHILS # BLD AUTO: 0.08 K/UL (ref 0–0.2)
BASOPHILS NFR BLD: 0.9 % (ref 0–1.9)
BILIRUB SERPL-MCNC: 0.4 MG/DL (ref 0.1–1)
BILIRUB UR QL STRIP: NEGATIVE
BUN SERPL-MCNC: 14 MG/DL (ref 8–23)
BUN SERPL-MCNC: 15 MG/DL (ref 8–23)
CALCIUM SERPL-MCNC: 9.4 MG/DL (ref 8.7–10.5)
CALCIUM SERPL-MCNC: 9.7 MG/DL (ref 8.7–10.5)
CHLORIDE SERPL-SCNC: 94 MMOL/L (ref 95–110)
CHLORIDE SERPL-SCNC: 99 MMOL/L (ref 95–110)
CLARITY UR: CLEAR
CO2 SERPL-SCNC: 19 MMOL/L (ref 23–29)
CO2 SERPL-SCNC: 21 MMOL/L (ref 23–29)
COLOR UR: YELLOW
CREAT SERPL-MCNC: 1.4 MG/DL (ref 0.5–1.4)
CREAT SERPL-MCNC: 1.4 MG/DL (ref 0.5–1.4)
DELSYS: ABNORMAL
DIFFERENTIAL METHOD: ABNORMAL
EOSINOPHIL # BLD AUTO: 0.4 K/UL (ref 0–0.5)
EOSINOPHIL NFR BLD: 4.9 % (ref 0–8)
ERYTHROCYTE [DISTWIDTH] IN BLOOD BY AUTOMATED COUNT: 15.2 % (ref 11.5–14.5)
EST. GFR  (NO RACE VARIABLE): 55 ML/MIN/1.73 M^2
EST. GFR  (NO RACE VARIABLE): 55.4 ML/MIN/1.73 M^2
ESTIMATED AVG GLUCOSE: 280 MG/DL (ref 68–131)
GLUCOSE SERPL-MCNC: 502 MG/DL (ref 70–110)
GLUCOSE SERPL-MCNC: 698 MG/DL (ref 70–110)
GLUCOSE UR QL STRIP: ABNORMAL
HBA1C MFR BLD: 11.4 % (ref 4–5.6)
HCO3 UR-SCNC: 29.6 MMOL/L (ref 24–28)
HCT VFR BLD AUTO: 48 % (ref 40–54)
HGB BLD-MCNC: 15.8 G/DL (ref 14–18)
HGB UR QL STRIP: NEGATIVE
HYALINE CASTS #/AREA URNS LPF: 0 /LPF
IMM GRANULOCYTES # BLD AUTO: 0.05 K/UL (ref 0–0.04)
IMM GRANULOCYTES NFR BLD AUTO: 0.6 % (ref 0–0.5)
KETONES UR QL STRIP: NEGATIVE
LEUKOCYTE ESTERASE UR QL STRIP: NEGATIVE
LYMPHOCYTES # BLD AUTO: 1.9 K/UL (ref 1–4.8)
LYMPHOCYTES NFR BLD: 22.2 % (ref 18–48)
MCH RBC QN AUTO: 27.1 PG (ref 27–31)
MCHC RBC AUTO-ENTMCNC: 32.9 G/DL (ref 32–36)
MCV RBC AUTO: 83 FL (ref 82–98)
MICROSCOPIC COMMENT: NORMAL
MODE: ABNORMAL
MONOCYTES # BLD AUTO: 0.6 K/UL (ref 0.3–1)
MONOCYTES NFR BLD: 6.6 % (ref 4–15)
NEUTROPHILS # BLD AUTO: 5.7 K/UL (ref 1.8–7.7)
NEUTROPHILS NFR BLD: 64.8 % (ref 38–73)
NITRITE UR QL STRIP: NEGATIVE
NRBC BLD-RTO: 0 /100 WBC
PCO2 BLDA: 46.8 MMHG (ref 35–45)
PH SMN: 7.41 [PH] (ref 7.35–7.45)
PH UR STRIP: 6 [PH] (ref 5–8)
PLATELET # BLD AUTO: 236 K/UL (ref 150–450)
PMV BLD AUTO: 10.8 FL (ref 9.2–12.9)
PO2 BLDA: 56 MMHG (ref 40–60)
POC BE: 5 MMOL/L
POC SATURATED O2: 88 % (ref 95–100)
POCT GLUCOSE: 264 MG/DL (ref 70–110)
POCT GLUCOSE: 296 MG/DL (ref 70–110)
POCT GLUCOSE: >500 MG/DL (ref 70–110)
POTASSIUM SERPL-SCNC: 4.3 MMOL/L (ref 3.5–5.1)
POTASSIUM SERPL-SCNC: 4.8 MMOL/L (ref 3.5–5.1)
PROT SERPL-MCNC: 8.1 G/DL (ref 6–8.4)
PROT UR QL STRIP: ABNORMAL
RBC # BLD AUTO: 5.82 M/UL (ref 4.6–6.2)
RBC #/AREA URNS HPF: 0 /HPF (ref 0–4)
SAMPLE: ABNORMAL
SITE: ABNORMAL
SODIUM SERPL-SCNC: 132 MMOL/L (ref 136–145)
SODIUM SERPL-SCNC: 133 MMOL/L (ref 136–145)
SP GR UR STRIP: >1.03 (ref 1–1.03)
SQUAMOUS #/AREA URNS HPF: 0 /HPF
TROPONIN I SERPL DL<=0.01 NG/ML-MCNC: <0.006 NG/ML (ref 0–0.03)
TSH SERPL DL<=0.005 MIU/L-ACNC: 2.1 UIU/ML (ref 0.4–4)
URN SPEC COLLECT METH UR: ABNORMAL
UROBILINOGEN UR STRIP-ACNC: NEGATIVE EU/DL
WBC # BLD AUTO: 8.74 K/UL (ref 3.9–12.7)
WBC #/AREA URNS HPF: 0 /HPF (ref 0–5)
YEAST URNS QL MICRO: NORMAL

## 2023-08-11 PROCEDURE — 93010 ELECTROCARDIOGRAM REPORT: CPT | Mod: ,,, | Performed by: INTERNAL MEDICINE

## 2023-08-11 PROCEDURE — 63600175 PHARM REV CODE 636 W HCPCS: Performed by: EMERGENCY MEDICINE

## 2023-08-11 PROCEDURE — 96374 THER/PROPH/DIAG INJ IV PUSH: CPT

## 2023-08-11 PROCEDURE — 82010 KETONE BODYS QUAN: CPT | Performed by: EMERGENCY MEDICINE

## 2023-08-11 PROCEDURE — 81000 URINALYSIS NONAUTO W/SCOPE: CPT | Performed by: EMERGENCY MEDICINE

## 2023-08-11 PROCEDURE — 93005 ELECTROCARDIOGRAM TRACING: CPT

## 2023-08-11 PROCEDURE — 99285 EMERGENCY DEPT VISIT HI MDM: CPT

## 2023-08-11 PROCEDURE — 84484 ASSAY OF TROPONIN QUANT: CPT | Performed by: EMERGENCY MEDICINE

## 2023-08-11 PROCEDURE — 80053 COMPREHEN METABOLIC PANEL: CPT | Performed by: EMERGENCY MEDICINE

## 2023-08-11 PROCEDURE — 83036 HEMOGLOBIN GLYCOSYLATED A1C: CPT | Performed by: EMERGENCY MEDICINE

## 2023-08-11 PROCEDURE — 99900035 HC TECH TIME PER 15 MIN (STAT)

## 2023-08-11 PROCEDURE — 96361 HYDRATE IV INFUSION ADD-ON: CPT

## 2023-08-11 PROCEDURE — 93010 EKG 12-LEAD: ICD-10-PCS | Mod: ,,, | Performed by: INTERNAL MEDICINE

## 2023-08-11 PROCEDURE — 85025 COMPLETE CBC W/AUTO DIFF WBC: CPT | Performed by: EMERGENCY MEDICINE

## 2023-08-11 PROCEDURE — 82803 BLOOD GASES ANY COMBINATION: CPT

## 2023-08-11 RX ORDER — GLIPIZIDE 10 MG/1
10 TABLET ORAL
Qty: 30 TABLET | Refills: 0 | Status: SHIPPED | OUTPATIENT
Start: 2023-08-11 | End: 2023-10-06 | Stop reason: ALTCHOICE

## 2023-08-11 RX ADMIN — SODIUM CHLORIDE, POTASSIUM CHLORIDE, SODIUM LACTATE AND CALCIUM CHLORIDE 1000 ML: 600; 310; 30; 20 INJECTION, SOLUTION INTRAVENOUS at 12:08

## 2023-08-11 RX ADMIN — SODIUM CHLORIDE, POTASSIUM CHLORIDE, SODIUM LACTATE AND CALCIUM CHLORIDE 1000 ML: 600; 310; 30; 20 INJECTION, SOLUTION INTRAVENOUS at 01:08

## 2023-08-11 RX ADMIN — INSULIN HUMAN 12 UNITS: 100 INJECTION, SOLUTION PARENTERAL at 01:08

## 2023-08-11 NOTE — TELEPHONE ENCOUNTER
Spoke with Bee, she received a critical glucose alert on Mr. Stevens, and his anion gap is also positive. Patient needs to get to the ER ASAP, attempted to call patient's phone number [phone] times 4, goes straight to voicemail and voicemail is full. Called mom's number [phone] times 4, voicemail is full. No other contact information in chart.

## 2023-08-11 NOTE — ED PROVIDER NOTES
SCRIBE #1 NOTE: I, Ross Albarran, am scribing for, and in the presence of, Federico Alves MD. I have scribed the entire note.       History     Chief Complaint   Patient presents with    Hyperglycemia     Pt called by MD office to come to ER for high blood sugar, CBG in triage >500     HPI  8/11/2023, 12:13 PM  History obtained from the patient    HPI:  Trey Stevens is a 68 y.o. male with a PMH of arthritis, uncontrolled DM2, obesity, PNA, and psoriasis who presents to the [facility] emergency department for evaluation of hyperglycemia which onset earlier today. Pt's CBG in triage was >500. Associated symptoms include SOB. Exacerbating factors: none. No prior treatment. Pt reports a diet consisting of beans and rice. Pt had a half-empty bottle of Coca-Cola during evaluation. Pt also notes only some compliance with medications, taking medicine this am. No other complaints or concerns.     Arrival mode: Personal vehicle      External medical record reviewed: Note from tele-visit, including labs, with an NP on 8/10/23 shows a CBG of 698 mg/dL with an anion gap elevated at 19, so pt was sent to the ED.    PCP: Brittanie Chaparro MD    Review of patient's allergies indicates:  No Known Allergies   Past Medical History:   Diagnosis Date    Arthritis     Diabetes mellitus     Diabetes mellitus type 2, uncontrolled 7/19/2016    DM (diabetes mellitus) 2015     09/04/2017    Gall stones     Obesity     Pneumonia of right middle lobe due to infectious organism 8/13/2021    Psoriasis (a type of skin inflammation)     Rheumatoid arthritis of foot      Past Surgical History:   Procedure Laterality Date    APPENDECTOMY      CHOLECYSTECTOMY         Family History   Problem Relation Age of Onset    Cancer Mother     Hypertension Mother     Diabetes Mother     Cataracts Mother     Stroke Father     Psoriasis Neg Hx      Social History     Tobacco Use    Smoking status: Former     Current packs/day: 0.00      Average packs/day: 0.5 packs/day for 28.9 years (14.5 ttl pk-yrs)     Types: Cigarettes     Start date:      Quit date: 2022     Years since quittin.7    Smokeless tobacco: Never    Tobacco comments:     Quit smoking 1 mo ago (2022)   Substance and Sexual Activity    Alcohol use: No     Alcohol/week: 0.0 standard drinks of alcohol    Drug use: No    Sexual activity: Never      Review of Systems     Review of Systems   Constitutional: Negative.  Negative for fever.        (-) Malaise   HENT: Negative.     Eyes: Negative.    Respiratory:  Positive for shortness of breath.    Cardiovascular: Negative.    Gastrointestinal: Negative.    Endocrine: Negative.    Genitourinary: Negative.  Negative for dysuria.   Musculoskeletal: Negative.    Skin: Negative.    Allergic/Immunologic: Negative.    Neurological: Negative.    Hematological:         (+) Hyperglycemia   Psychiatric/Behavioral: Negative.     All other systems reviewed and are negative.         Physical Exam     Initial Vitals [23 1119]   BP Pulse Resp Temp SpO2   (!) 145/68 102 20 98.1 °F (36.7 °C) 99 %      MAP       --          Physical Exam  Nursing notes and vital signs reviewed.  Constitutional: Patient is in mild distress.   Head: Normocephalic. Atraumatic.   Eyes: Conjunctivae are not pale. No scleral icterus.   ENT: Mucous membranes moist.   Neck: Supple.   Cardiovascular: Regular rate. Regular rhythm.   Pulmonary: Tachypneic. Lungs clear bilaterally.  Abdominal: Non-distended.   Musculoskeletal: Moves all extremities. No obvious deformities. No edema.   Skin: Mildly diaphoretic.   Neurological:  Alert, awake, and appropriate. Normal speech. No acute lateralizing neurologic deficits appreciated.   Psychiatric: Normal affect.       ED Course   Critical Care    Date/Time: 2023 12:41 PM    Performed by: Federico Alves MD  Authorized by: Federico Alves MD  Direct patient critical care time: 10 minutes  Additional history  "critical care time: 10 minutes  Ordering / reviewing critical care time: 10 minutes  Documentation critical care time: 5 minutes  Total critical care time (exclusive of procedural time) : 35 minutes  Critical care time was exclusive of separately billable procedures and treating other patients and teaching time.  Critical care was necessary to treat or prevent imminent or life-threatening deterioration of the following conditions: Severe hyperglycemia.  Critical care was time spent personally by me on the following activities: blood draw for specimens, development of treatment plan with patient or surrogate, interpretation of cardiac output measurements, evaluation of patient's response to treatment, examination of patient, obtaining history from patient or surrogate, ordering and performing treatments and interventions, ordering and review of laboratory studies, ordering and review of radiographic studies, pulse oximetry, re-evaluation of patient's condition and review of old charts.        Vitals:    08/11/23 1119 08/11/23 1200 08/11/23 1300 08/11/23 1400   BP: (!) 145/68 137/74 130/64 (!) 143/86   Pulse: 102 93 86 86   Resp: 20 20 (!) 22 (!) 23   Temp: 98.1 °F (36.7 °C)      TempSrc: Oral      SpO2: 99% 95% 95% (!) 94%   Weight: 120.1 kg (264 lb 14.1 oz)      Height: 5' 7" (1.702 m)       08/11/23 1500   BP: (!) 147/82   Pulse: 85   Resp: (!) 21   Temp:    TempSrc:    SpO2: (!) 94%   Weight:    Height:      Lab Results Interpreted as Abnormal:  Labs Reviewed   CBC W/ AUTO DIFFERENTIAL - Abnormal; Notable for the following components:       Result Value    RDW 15.2 (*)     Immature Granulocytes 0.6 (*)     Immature Grans (Abs) 0.05 (*)     All other components within normal limits   COMPREHENSIVE METABOLIC PANEL - Abnormal; Notable for the following components:    Sodium 133 (*)     CO2 21 (*)     Glucose 502 (*)     Albumin 3.4 (*)     AST 54 (*)     ALT 46 (*)     eGFR 55 (*)     All other components within " normal limits    Narrative:     GLU critical result(s) called and verbal readback obtained from MICHELLE ANTON RN by CP5 08/11/2023 13:11   HEMOGLOBIN A1C - Abnormal; Notable for the following components:    Hemoglobin A1C 11.4 (*)     Estimated Avg Glucose 280 (*)     All other components within normal limits   URINALYSIS, REFLEX TO URINE CULTURE - Abnormal; Notable for the following components:    Specific Gravity, UA >1.030 (*)     Protein, UA 1+ (*)     Glucose, UA 4+ (*)     All other components within normal limits    Narrative:     Specimen Source->Urine   POCT GLUCOSE - Abnormal; Notable for the following components:    POCT Glucose >500 (*)     All other components within normal limits   ISTAT PROCEDURE - Abnormal; Notable for the following components:    POC PCO2 46.8 (*)     POC HCO3 29.6 (*)     POC SATURATED O2 88 (*)     All other components within normal limits   POCT GLUCOSE - Abnormal; Notable for the following components:    POCT Glucose 296 (*)     All other components within normal limits   POCT GLUCOSE - Abnormal; Notable for the following components:    POCT Glucose 264 (*)     All other components within normal limits   TROPONIN I   BETA - HYDROXYBUTYRATE, SERUM   URINALYSIS MICROSCOPIC    Narrative:     Specimen Source->Urine      All Lab Results:  Results for orders placed or performed during the hospital encounter of 08/11/23   CBC auto differential   Result Value Ref Range    WBC 8.74 3.90 - 12.70 K/uL    RBC 5.82 4.60 - 6.20 M/uL    Hemoglobin 15.8 14.0 - 18.0 g/dL    Hematocrit 48.0 40.0 - 54.0 %    MCV 83 82 - 98 fL    MCH 27.1 27.0 - 31.0 pg    MCHC 32.9 32.0 - 36.0 g/dL    RDW 15.2 (H) 11.5 - 14.5 %    Platelets 236 150 - 450 K/uL    MPV 10.8 9.2 - 12.9 fL    Immature Granulocytes 0.6 (H) 0.0 - 0.5 %    Gran # (ANC) 5.7 1.8 - 7.7 K/uL    Immature Grans (Abs) 0.05 (H) 0.00 - 0.04 K/uL    Lymph # 1.9 1.0 - 4.8 K/uL    Mono # 0.6 0.3 - 1.0 K/uL    Eos # 0.4 0.0 - 0.5 K/uL    Baso # 0.08  0.00 - 0.20 K/uL    nRBC 0 0 /100 WBC    Gran % 64.8 38.0 - 73.0 %    Lymph % 22.2 18.0 - 48.0 %    Mono % 6.6 4.0 - 15.0 %    Eosinophil % 4.9 0.0 - 8.0 %    Basophil % 0.9 0.0 - 1.9 %    Differential Method Automated    Comprehensive metabolic panel   Result Value Ref Range    Sodium 133 (L) 136 - 145 mmol/L    Potassium 4.8 3.5 - 5.1 mmol/L    Chloride 99 95 - 110 mmol/L    CO2 21 (L) 23 - 29 mmol/L    Glucose 502 (HH) 70 - 110 mg/dL    BUN 15 8 - 23 mg/dL    Creatinine 1.4 0.5 - 1.4 mg/dL    Calcium 9.7 8.7 - 10.5 mg/dL    Total Protein 8.1 6.0 - 8.4 g/dL    Albumin 3.4 (L) 3.5 - 5.2 g/dL    Total Bilirubin 0.4 0.1 - 1.0 mg/dL    Alkaline Phosphatase 127 55 - 135 U/L    AST 54 (H) 10 - 40 U/L    ALT 46 (H) 10 - 44 U/L    eGFR 55 (A) >60 mL/min/1.73 m^2    Anion Gap 13 8 - 16 mmol/L   Hemoglobin A1c   Result Value Ref Range    Hemoglobin A1C 11.4 (H) 4.0 - 5.6 %    Estimated Avg Glucose 280 (H) 68 - 131 mg/dL   Urinalysis, Reflex to Urine Culture Urine, Clean Catch    Specimen: Urine   Result Value Ref Range    Specimen UA Urine, Clean Catch     Color, UA Yellow Yellow, Straw, Jazmin    Appearance, UA Clear Clear    pH, UA 6.0 5.0 - 8.0    Specific Gravity, UA >1.030 (A) 1.005 - 1.030    Protein, UA 1+ (A) Negative    Glucose, UA 4+ (A) Negative    Ketones, UA Negative Negative    Bilirubin (UA) Negative Negative    Occult Blood UA Negative Negative    Nitrite, UA Negative Negative    Urobilinogen, UA Negative <2.0 EU/dL    Leukocytes, UA Negative Negative   Troponin I   Result Value Ref Range    Troponin I <0.006 0.000 - 0.026 ng/mL   Beta-Hydroxybutyrate, Serum   Result Value Ref Range    Beta-Hydroxybutyrate 0.1 0.0 - 0.5 mmol/L   Urinalysis Microscopic   Result Value Ref Range    RBC, UA 0 0 - 4 /hpf    WBC, UA 0 0 - 5 /hpf    Bacteria None None-Occ /hpf    Yeast, UA None None    Squam Epithel, UA 0 /hpf    Hyaline Casts, UA 0 0-1/lpf /lpf    Microscopic Comment SEE COMMENT    POCT glucose   Result Value Ref  Range    POCT Glucose >500 (HH) 70 - 110 mg/dL   ISTAT PROCEDURE   Result Value Ref Range    POC PH 7.410 7.35 - 7.45    POC PCO2 46.8 (H) 35 - 45 mmHg    POC PO2 56 40 - 60 mmHg    POC HCO3 29.6 (H) 24 - 28 mmol/L    POC BE 5 -2 to 2 mmol/L    POC SATURATED O2 88 (L) 95 - 100 %    Sample VENOUS     Site Other     Allens Test N/A     DelSys Room Air     Mode SPONT    POCT glucose   Result Value Ref Range    POCT Glucose 296 (H) 70 - 110 mg/dL   POCT glucose   Result Value Ref Range    POCT Glucose 264 (H) 70 - 110 mg/dL     *Note: Due to a large number of results and/or encounters for the requested time period, some results have not been displayed. A complete set of results can be found in Results Review.     Imaging Results    None        ED Physician's independent review of the above imaging: agree with radiologist,     The EKG was ordered, reviewed, and independently interpreted by the ED Physician:  Interpretation time: 12:35  Rate: 92 bpm  Rhythm: normal sinus rhythm  Interpretation: No acute ST changes. No STEMI.      The emergency physician reviewed the vital signs and test results, which are outlined above.     ED Discussion     2:55 PM: Patient's evaluation in the ED does not suggest any emergent or life-threatening medical conditions requiring immediate intervention beyond what was provided in the ED, and I believe patient is safe for discharge.  Regardless, an unremarkable evaluation in the ED does not preclude the development or presence of a serious or life-threatening condition. As such, patient was given return instructions for any change or worsening of symptoms.     Medical Decision Making:   Clinical Tests:   Lab Tests: Ordered and Reviewed  Medical Tests: Ordered and Reviewed         ED Medication(s) Administered:  Medications   lactated ringers bolus 1,000 mL (0 mLs Intravenous Stopped 8/11/23 1319)   insulin regular injection 12 Units 0.12 mL (12 Units Intravenous Given 8/11/23 1355)   lactated  ringers bolus 1,000 mL (0 mLs Intravenous Stopped 8/11/23 7807)       Prescription Management: I performed a review of the patient's current Rx medication list as input by nursing staff.    Discharge Medication List as of 8/11/2023  2:58 PM        CONTINUE these medications which have CHANGED    Details   glipiZIDE (GLUCOTROL) 10 MG tablet Take 1 tablet (10 mg total) by mouth daily with breakfast., Starting Fri 8/11/2023, Until Sun 9/10/2023, Normal           CONTINUE these medications which have NOT CHANGED    Details   amLODIPine (NORVASC) 5 MG tablet Take 1 tablet (5 mg total) by mouth once daily. For high blood pressure, Starting Wed 1/25/2023, Until Thu 1/25/2024, Normal      aspirin 81 MG Chew Take 1 tablet (81 mg total) by mouth once daily., Starting Tue 4/10/2018, Print      atorvastatin (LIPITOR) 80 MG tablet Take 1 tablet (80 mg total) by mouth once daily., Starting Thu 8/25/2022, Normal      empagliflozin (JARDIANCE) 25 mg tablet Take 1 tablet (25 mg total) by mouth once daily., Starting Tue 12/20/2022, Normal      folic acid (FOLVITE) 1 MG tablet TAKE 1 TABLET ONE TIME DAILY, Normal      insulin aspart U-100 (NOVOLOG U-100 INSULIN ASPART) 100 unit/mL injection INJECT 200 UNITS PER CEQUR DEVICE EVERY 3 DAYS, Normal      losartan (COZAAR) 25 MG tablet TAKE 1 TABLET BY MOUTH EVERY DAY, Normal      metFORMIN (GLUCOPHAGE-XR) 500 MG XR 24hr tablet Take 2 tablets (1,000 mg total) by mouth 2 (two) times daily with meals., Starting Fri 4/17/2020, Until Fri 8/11/2023, Normal      traMADoL (ULTRAM) 50 mg tablet TAKE 1 TABLET BY MOUTH THREE TIMES A DAY AS NEEDED FOR MODERATE PAIN Strength: 50 mg, Normal      ustekinumab (STELARA) 90 mg/mL Syrg syringe Inject 1 mL (90 mg total) into the skin every 28 days for 2 doses., Starting Wed 6/7/2023, Until Mon 10/23/2023, Fill Later      blood sugar diagnostic (ACCU-CHEK NOELLE PLUS TEST STRP) Strp Check blood sugar 3 times daily., Normal      blood-glucose meter (ACCU-CHEK  "NOELLE PLUS METER) Misc Check blood sugar 3 times daily, Normal      calcipotriene (DOVONOX) 0.005 % cream APPLY TO AFFECTED AREA(S) TWICE DAILY, Normal      clobetasoL (OLUX) 0.05 % Foam APPLY TO AFFECTED AREA OF SCALP AND BEHIND EARS TWICE DAILY. DONT USE ON FACE,UNDERARMS,OR GROIN, Normal      ergocalciferol (ERGOCALCIFEROL) 50,000 unit Cap Take 50,000 Units by mouth twice a week., Starting Mon 6/5/2023, Historical Med      ketoconazole (NIZORAL) 2 % shampoo WASH HAIR WITH MEDICATED SHAMPOO AT LEAST 2 TIMES A WEEK - LET SIT ON SCALP AT LEAST 5 MINUTES PRIOR TO RINSING, Normal      lancets (ACCU-CHEK SOFTCLIX LANCETS) Misc Check blood sugar 3 times daily, Normal      LANTUS SOLOSTAR U-100 INSULIN glargine 100 units/mL SubQ pen Inject 24 Units into the skin once daily., Starting Fri 4/14/2023, Normal      methotrexate 2.5 MG Tab TAKE 8 TABLETS BY MOUTH EVERY 7 DAYS, Normal      nicotine polacrilex 2 MG Lozg Take 1 lozenge (2 mg total) by mouth as needed (Use up to 10 lozenges per day in the place of cigarettes)., Starting Fri 7/22/2022, Normal      NOVOLOG FLEXPEN U-100 INSULIN 100 unit/mL (3 mL) InPn pen Inject 8 Units into the skin 3 (three) times daily with meals., Starting Fri 4/14/2023, Normal      pen needle, diabetic 32 gauge x 5/32" Ndle Use with Lantus once daily and Humalog 3 times daily., Normal      spironolactone (ALDACTONE) 25 MG tablet Take 1 tablet (25 mg total) by mouth once daily., Starting Sat 8/21/2021, Until Wed 7/12/2023, Normal      tirzepatide (MOUNJARO) 10 mg/0.5 mL PnIj Inject 10 mg into the skin every 7 days. STOP TRULICITY AND START MOUNJARO ON THE DAY TRULICITY WOULD HAVE BEEN DUE., Starting Fri 4/14/2023, Normal      VENTOLIN HFA 90 mcg/actuation inhaler INHALE 2 PUFFS INTO THE LUNGS EVERY 4 (FOUR) HOURS AS NEEDED FOR WHEEZING. RESCUE, Normal                 Follow-up Information       Brittanie Chaparro MD. Schedule an appointment as soon as possible for a visit in 3 days.  "   Specialty: Internal Medicine  Contact information:  [address]  [facility] [address]  [phone]               [facility] Emergency Dept..    Specialty: Emergency Medicine  Why: As needed, If symptoms worsen  Contact information:  [address]  [facility] [address] [phone]                           Scribe Attestation:   Scribe #1: I performed the above scribed service and the documentation accurately describes the services I performed. I attest to the accuracy of the note.     Attending:   Physician Attestation Statement for Scribe #1: I, Federico Alves MD, personally performed the services described in this documentation, as scribed by Ross Albarran, in my presence, and it is both accurate and complete. As with other dictation methods such as dictation software, small errors or inconsistencies may be overlooked due to the goal of spending more face-to-face time with patients.      Clinical Impression       ICD-10-CM ICD-9-CM   1. Severe hyperglycemia due to diabetes mellitus  E11.65 250.00   2. Tachypnea  R06.82 786.06   3. Hyperglycemia  R73.9 790.29   4. Uncontrolled type 2 diabetes mellitus with hyperglycemia, with long-term current use of insulin  E11.65 250.02    Z79.4 V58.67   5. Dietary noncompliance  Z91.119 V15.81      ED Disposition Condition    Discharge Stable               Federico Alves MD  08/16/23 7400

## 2023-08-11 NOTE — PROGRESS NOTES
Glucose 698, Anion gap 19. To ER advised. Pcp took the urgent call overnight - patient was notified this AM to go to the ER.

## 2023-08-11 NOTE — TELEPHONE ENCOUNTER
Called patient to see if he would be available for an appointment today at 11:00am at the [facility] location. His phone went straight to voicemail and the mailbox is full

## 2023-08-11 NOTE — TELEPHONE ENCOUNTER
Notified of critical glucose on patient around 2:00 am.    Chart reviewed. Appears to be chronically poorly controlled diabetic. Please have Ms. Stinson or Dr. Chaparro address. Thank you.

## 2023-08-11 NOTE — TELEPHONE ENCOUNTER
Patient returned my call, informed him that he would need to report to the ER ASAP due to critical labs, I asked patient if he had any symptoms, dizziness, lightheaded, weak, nauseated or vomiting, he stated he felt fine and would be driving himself, and it should take him about 30 minutes to get to [facility] [place]. Pt verbalized the understanding of how urgent this could be. Spoke with Bee and informed her.

## 2023-08-12 LAB
ESTIMATED AVG GLUCOSE: 280 MG/DL (ref 68–131)
HBA1C MFR BLD: 11.4 % (ref 4–5.6)

## 2023-08-18 ENCOUNTER — OUTPATIENT CASE MANAGEMENT (OUTPATIENT)
Dept: ADMINISTRATIVE | Facility: OTHER | Age: 68
End: 2023-08-18
Payer: MEDICARE

## 2023-08-18 NOTE — PROGRESS NOTES
Outpatient Care Management  Plan of Care Follow Up Visit    Patient: Trey Stevens  MRN: [id number]  Date of Service: 08/18/2023  Completed by: Louise gAuayo RN  Referral Date: 01/25/2023    Reason for Visit   Patient presents with    OPCM RN Follow Up Call       Brief Summary: OPCM visit completed, care plan reviewed and updated.   Next Steps: plan to follow up in approximately one week - patient agrees. How are blood sugars? Diet? Skin and feet? Activity? Review medications he is taking. Consulting with KRISTEN Victoria about possible food resources or anything else available that may assist patient on limited income.   GOVIND Aguayo, RN

## 2023-08-23 ENCOUNTER — OUTPATIENT CASE MANAGEMENT (OUTPATIENT)
Dept: ADMINISTRATIVE | Facility: OTHER | Age: 68
End: 2023-08-23
Payer: MEDICARE

## 2023-08-23 NOTE — PROGRESS NOTES
Outpatient Care Management   - High Risk Patient Assessment    Patient: Trey Stevens  MRN:  [id number]  Date of Service:  8/23/2023  Completed by:  Damaris Magallanes LMSW  Referral Date: 01/25/2023    Reason for Visit   Patient presents with    Social Work Assessment - High Risk     8/23/2023       Brief Summary:  received a referral from OPCM YOLA Aguayo for the following patient identified psycho-social needs:financial and food resources. SW completed social assessment with pt via telephone. He lives alone but his mother lives across the street. Pt sees her daily. Pt is independent with his ADLs but cannot stay on his feet long. Pt can cook, clean and bathe without assistance. He reported sometimes his mom cooks as well. Pt also set ups his own medications in 2 pill boxes. Pt receives a $100 card monthly from [employer] and $20 a month in [employer]. Pt also utilizes 2 food [place] in his area. Pt receives a $200 OTC allowance per quarter. SW encouraged patient to utilize this benefit to help balance cost of other things. Pt reported gas is getting expensive due to multiple MD appointments. Pt does drive himself. SW informed he does have transportation benefits with his insurance. Pt reported that there is long  times using that. Pt is also not behind on any of his utility bills. SW and pt discussed [facility] to do activities and also get a meal. Pt interested in this. SW will call [facility] and put in referral for him. Pt agreeable to follow up in 1 week.  Care plan was created in collaboration with patient/caregiver input.  completed the SDOH questionnaire.

## 2023-08-25 ENCOUNTER — OUTPATIENT CASE MANAGEMENT (OUTPATIENT)
Dept: ADMINISTRATIVE | Facility: OTHER | Age: 68
End: 2023-08-25
Payer: MEDICARE

## 2023-08-25 NOTE — PROGRESS NOTES
Outpatient Care Management  Plan of Care Follow Up Visit    Patient: Trey Stevens  MRN: [id number]  Date of Service: 08/25/2023  Completed by: Louise Aguayo RN  Referral Date: 01/25/2023    No chief complaint on file.      Brief Summary: OPCM visit completed, care plan reviewed and updated. Discussed rescheduling colonoscopy or consider doing Cologuard test at home.  Next Steps: plan to follow up in approximately one week - patient agrees. Plan to discuss blood sugars, diet, activity, water intake, feet/skin care, and review meds.  GOVIND Aguayo RN

## 2023-08-30 ENCOUNTER — OUTPATIENT CASE MANAGEMENT (OUTPATIENT)
Dept: ADMINISTRATIVE | Facility: OTHER | Age: 68
End: 2023-08-30
Payer: MEDICARE

## 2023-08-30 NOTE — PROGRESS NOTES
"Outpatient Care Management   - Care Plan Follow Up    Patient: Trey Stevens  MRN:  [id number]  Date of Service:  8/30/2023  Completed by:  Damaris Magallanes LMSW  Referral Date: 01/25/2023    Reason for Visit   Patient presents with    OPCM SW Follow Up Call     8/30/2023       Brief Summary: SW followed up with pt today via telephone. Pt stated he is "doing alright". SW informed him that she called his referral into the [facility] on   8/25/2023 for getting set up with a senior center and meals. SW also encouraged him to talk to them about transportation to the senior center to help him save on gas. SW gave him the information from [facility]: to go to their center on [id number] [facility] Between 8:30-9AM and ask for Ms. Wise, bring ID, med list and PCP information. Pt reported it is always difficult at the end of the month with his funds. Pt reported he has had to stay home to save gas. Pt again stated he does not want to use [employer] transportation due to long wait times to get home and he's "got things to do during the day". Pt is doing fine with bill and always pays those first. Pt also stated he currently has food. Pt agreeable to follow up in 1 week.  Complex Care Plan     Care plan was discussed and completed today with input from patient and/or caregiver.    Patient Instructions     No follow-ups on file.    "

## 2023-08-31 ENCOUNTER — OUTPATIENT CASE MANAGEMENT (OUTPATIENT)
Dept: ADMINISTRATIVE | Facility: OTHER | Age: 68
End: 2023-08-31
Payer: MEDICARE

## 2023-08-31 NOTE — PROGRESS NOTES
Outpatient Care Management  Plan of Care Follow Up Visit    Patient: Trey Stevens  MRN: [id number]  Date of Service: 08/31/2023  Completed by: Louise Aguayo RN  Referral Date: 01/25/2023    Reason for Visit   Patient presents with    OPCM RN Follow Up Call       Brief Summary: OPCM visit completed. Care plan reviewed and updated. Message sent to PCP informing of sleep difficulties and patient not changing diet habits/food choices.   Next Steps: plan to follow up in approximately 2 weeks - patient agrees. Consider discharge from OPCM due to reaching max potential.  GOVIND Aguayo, RN

## 2023-09-04 DIAGNOSIS — E11.65 UNCONTROLLED TYPE 2 DIABETES MELLITUS WITH HYPERGLYCEMIA, WITH LONG-TERM CURRENT USE OF INSULIN: ICD-10-CM

## 2023-09-04 DIAGNOSIS — Z79.4 UNCONTROLLED TYPE 2 DIABETES MELLITUS WITH HYPERGLYCEMIA, WITH LONG-TERM CURRENT USE OF INSULIN: ICD-10-CM

## 2023-09-05 RX ORDER — INSULIN ASPART 100 [IU]/ML
6 INJECTION, SOLUTION INTRAVENOUS; SUBCUTANEOUS
Qty: 15 EACH | Refills: 1 | Status: SHIPPED | OUTPATIENT
Start: 2023-09-05 | End: 2023-10-06 | Stop reason: SDUPTHER

## 2023-09-07 ENCOUNTER — OFFICE VISIT (OUTPATIENT)
Dept: RHEUMATOLOGY | Facility: CLINIC | Age: 68
End: 2023-09-07
Payer: MEDICARE

## 2023-09-07 ENCOUNTER — OUTPATIENT CASE MANAGEMENT (OUTPATIENT)
Dept: ADMINISTRATIVE | Facility: OTHER | Age: 68
End: 2023-09-07
Payer: MEDICARE

## 2023-09-07 VITALS — BODY MASS INDEX: 41.55 KG/M2 | WEIGHT: 264.75 LBS | RESPIRATION RATE: 16 BRPM | HEIGHT: 67 IN

## 2023-09-07 DIAGNOSIS — Z79.899 HIGH RISK MEDICATION USE: ICD-10-CM

## 2023-09-07 DIAGNOSIS — D84.9 IMMUNOSUPPRESSED STATUS: ICD-10-CM

## 2023-09-07 DIAGNOSIS — L40.0 PLAQUE PSORIASIS: ICD-10-CM

## 2023-09-07 DIAGNOSIS — E55.9 VITAMIN D DEFICIENCY: ICD-10-CM

## 2023-09-07 DIAGNOSIS — R91.8 PULMONARY NODULES: ICD-10-CM

## 2023-09-07 DIAGNOSIS — L40.50 PSORIATIC ARTHRITIS: Primary | ICD-10-CM

## 2023-09-07 DIAGNOSIS — Z79.631 METHOTREXATE, LONG TERM, CURRENT USE: ICD-10-CM

## 2023-09-07 PROCEDURE — 1160F PR REVIEW ALL MEDS BY PRESCRIBER/CLIN PHARMACIST DOCUMENTED: ICD-10-PCS | Mod: CPTII,S$GLB,,

## 2023-09-07 PROCEDURE — 4010F ACE/ARB THERAPY RXD/TAKEN: CPT | Mod: CPTII,S$GLB,,

## 2023-09-07 PROCEDURE — 1157F PR ADVANCE CARE PLAN OR EQUIV PRESENT IN MEDICAL RECORD: ICD-10-PCS | Mod: CPTII,S$GLB,,

## 2023-09-07 PROCEDURE — 3008F BODY MASS INDEX DOCD: CPT | Mod: CPTII,S$GLB,,

## 2023-09-07 PROCEDURE — 3046F HEMOGLOBIN A1C LEVEL >9.0%: CPT | Mod: CPTII,S$GLB,,

## 2023-09-07 PROCEDURE — 3288F FALL RISK ASSESSMENT DOCD: CPT | Mod: CPTII,S$GLB,,

## 2023-09-07 PROCEDURE — 1159F PR MEDICATION LIST DOCUMENTED IN MEDICAL RECORD: ICD-10-PCS | Mod: CPTII,S$GLB,,

## 2023-09-07 PROCEDURE — 1159F MED LIST DOCD IN RCRD: CPT | Mod: CPTII,S$GLB,,

## 2023-09-07 PROCEDURE — 1126F AMNT PAIN NOTED NONE PRSNT: CPT | Mod: CPTII,S$GLB,,

## 2023-09-07 PROCEDURE — 1101F PR PT FALLS ASSESS DOC 0-1 FALLS W/OUT INJ PAST YR: ICD-10-PCS | Mod: CPTII,S$GLB,,

## 2023-09-07 PROCEDURE — 1157F ADVNC CARE PLAN IN RCRD: CPT | Mod: CPTII,S$GLB,,

## 2023-09-07 PROCEDURE — 3046F PR MOST RECENT HEMOGLOBIN A1C LEVEL > 9.0%: ICD-10-PCS | Mod: CPTII,S$GLB,,

## 2023-09-07 PROCEDURE — 99215 OFFICE O/P EST HI 40 MIN: CPT | Mod: S$GLB,,,

## 2023-09-07 PROCEDURE — 99215 PR OFFICE/OUTPT VISIT, EST, LEVL V, 40-54 MIN: ICD-10-PCS | Mod: S$GLB,,,

## 2023-09-07 PROCEDURE — 3008F PR BODY MASS INDEX (BMI) DOCUMENTED: ICD-10-PCS | Mod: CPTII,S$GLB,,

## 2023-09-07 PROCEDURE — 1101F PT FALLS ASSESS-DOCD LE1/YR: CPT | Mod: CPTII,S$GLB,,

## 2023-09-07 PROCEDURE — 99999 PR PBB SHADOW E&M-EST. PATIENT-LVL V: ICD-10-PCS | Mod: PBBFAC,,,

## 2023-09-07 PROCEDURE — 3288F PR FALLS RISK ASSESSMENT DOCUMENTED: ICD-10-PCS | Mod: CPTII,S$GLB,,

## 2023-09-07 PROCEDURE — 99999 PR PBB SHADOW E&M-EST. PATIENT-LVL V: CPT | Mod: PBBFAC,,,

## 2023-09-07 PROCEDURE — 1126F PR PAIN SEVERITY QUANTIFIED, NO PAIN PRESENT: ICD-10-PCS | Mod: CPTII,S$GLB,,

## 2023-09-07 PROCEDURE — 1160F RVW MEDS BY RX/DR IN RCRD: CPT | Mod: CPTII,S$GLB,,

## 2023-09-07 PROCEDURE — 4010F PR ACE/ARB THEARPY RXD/TAKEN: ICD-10-PCS | Mod: CPTII,S$GLB,,

## 2023-09-07 NOTE — PROGRESS NOTES
Outpatient Care Management   - Care Plan Follow Up    Patient: Trey Stevens  MRN:  [id number]  Date of Service:  9/7/2023  Completed by:  Damaris Magallanes LMSW  Referral Date: 01/25/2023    Reason for Visit   Patient presents with    OPCM SW Follow Up Call     9/7/2023       Brief Summary: SW followed up with pt via telephone. Pt stated he is doing okay. Pt reported he has been keeping his house cool and he has food. Pt continues to visit with his mother everyday. Pt stays busy by doing some yard work in the evenings at his house. Pt has yet to make it to the [facility]. He said he plans on going early next week. SW and pt discussed his rheum appointment that was today. Pt agreeable to follow up luli in 2 weeks.     Complex Care Plan     Care plan was discussed and completed today with input from patient and/or caregiver.    Patient Instructions     No follow-ups on file.

## 2023-09-07 NOTE — PROGRESS NOTES
"         RHEUMATOLOGY OUTPATIENT CLINIC NOTE    09/07/2023    Subjective:       Patient ID: Trey Stevens is a 68 y.o. male.    Chief Complaint: Psoriatic Arthritis      HPI   Trey Stevens is a 68 y.o. pleasant male here for rheumatology follow up for psoriatic arthritis.     He has chronic psoriatic arthritis and severe plaque psoriasis.  Currently on stelara q 28 days and methotrexate 20 mg per week, daily folic acid.  He does not recall for certain which medications he is on. Also using topical Dovonex. Persistent severe rash nestor arms and legs; Thickened indurated plaques on forearms and elbows with diffuse rash.  Pain 0/10 today. From a joint standpoint he reports doing well. Occ stiffness and aches in his nestor hands lasting <60 minutes in the am.      Prior therapies:  mtx monotherapy,   topicals and UV  Humira and Enbrel    He has multiple lung nodules which are followed by pulm and stable per his last pulmonary April 2022, his breathing is stable     Denies SOB, no cough.  No dactylitis, no enthesitis.  Still with significant skin involvement, psoriasis on abdomen pruritic.  No fevers, no chills, no unexpected weight loss.  Rheumatologic review of systems negative otherwise.     Physical exam with thickened indurated plaques on forearms, nestor knees. Active PsO on nestor ears, left abdomen.  No dactylitis, no enthesitis, no tenosynovitis.    Objective:   Resp 16   Ht 5' 7" (1.702 m)   Wt 120.1 kg (264 lb 12.4 oz)   BMI 41.47 kg/m²     Photos from 8/23/2022                      Photos from January 2022                    Photos from June 2021                Recent Results (from the past 672 hour(s))   POCT glucose    Collection Time: 08/11/23 11:18 AM   Result Value Ref Range    POCT Glucose >500 (HH) 70 - 110 mg/dL   CBC auto differential    Collection Time: 08/11/23 12:16 PM   Result Value Ref Range    WBC 8.74 3.90 - 12.70 K/uL    RBC 5.82 4.60 - 6.20 M/uL    Hemoglobin 15.8 14.0 - 18.0 g/dL    Hematocrit 48.0 " 40.0 - 54.0 %    MCV 83 82 - 98 fL    MCH 27.1 27.0 - 31.0 pg    MCHC 32.9 32.0 - 36.0 g/dL    RDW 15.2 (H) 11.5 - 14.5 %    Platelets 236 150 - 450 K/uL    MPV 10.8 9.2 - 12.9 fL    Immature Granulocytes 0.6 (H) 0.0 - 0.5 %    Gran # (ANC) 5.7 1.8 - 7.7 K/uL    Immature Grans (Abs) 0.05 (H) 0.00 - 0.04 K/uL    Lymph # 1.9 1.0 - 4.8 K/uL    Mono # 0.6 0.3 - 1.0 K/uL    Eos # 0.4 0.0 - 0.5 K/uL    Baso # 0.08 0.00 - 0.20 K/uL    nRBC 0 0 /100 WBC    Gran % 64.8 38.0 - 73.0 %    Lymph % 22.2 18.0 - 48.0 %    Mono % 6.6 4.0 - 15.0 %    Eosinophil % 4.9 0.0 - 8.0 %    Basophil % 0.9 0.0 - 1.9 %    Differential Method Automated    Comprehensive metabolic panel    Collection Time: 08/11/23 12:16 PM   Result Value Ref Range    Sodium 133 (L) 136 - 145 mmol/L    Potassium 4.8 3.5 - 5.1 mmol/L    Chloride 99 95 - 110 mmol/L    CO2 21 (L) 23 - 29 mmol/L    Glucose 502 (HH) 70 - 110 mg/dL    BUN 15 8 - 23 mg/dL    Creatinine 1.4 0.5 - 1.4 mg/dL    Calcium 9.7 8.7 - 10.5 mg/dL    Total Protein 8.1 6.0 - 8.4 g/dL    Albumin 3.4 (L) 3.5 - 5.2 g/dL    Total Bilirubin 0.4 0.1 - 1.0 mg/dL    Alkaline Phosphatase 127 55 - 135 U/L    AST 54 (H) 10 - 40 U/L    ALT 46 (H) 10 - 44 U/L    eGFR 55 (A) >60 mL/min/1.73 m^2    Anion Gap 13 8 - 16 mmol/L   Hemoglobin A1c    Collection Time: 08/11/23 12:16 PM   Result Value Ref Range    Hemoglobin A1C 11.4 (H) 4.0 - 5.6 %    Estimated Avg Glucose 280 (H) 68 - 131 mg/dL   Troponin I    Collection Time: 08/11/23 12:16 PM   Result Value Ref Range    Troponin I <0.006 0.000 - 0.026 ng/mL   Beta-Hydroxybutyrate, Serum    Collection Time: 08/11/23 12:16 PM   Result Value Ref Range    Beta-Hydroxybutyrate 0.1 0.0 - 0.5 mmol/L   ISTAT PROCEDURE    Collection Time: 08/11/23 12:30 PM   Result Value Ref Range    POC PH 7.410 7.35 - 7.45    POC PCO2 46.8 (H) 35 - 45 mmHg    POC PO2 56 40 - 60 mmHg    POC HCO3 29.6 (H) 24 - 28 mmol/L    POC BE 5 -2 to 2 mmol/L    POC SATURATED O2 88 (L) 95 - 100 %    Sample  VENOUS     Site Other     Allens Test N/A     DelSys Room Air     Mode SPONT    Urinalysis, Reflex to Urine Culture Urine, Clean Catch    Collection Time: 08/11/23  1:19 PM    Specimen: Urine   Result Value Ref Range    Specimen UA Urine, Clean Catch     Color, UA Yellow Yellow, Straw, Jazmin    Appearance, UA Clear Clear    pH, UA 6.0 5.0 - 8.0    Specific Gravity, UA >1.030 (A) 1.005 - 1.030    Protein, UA 1+ (A) Negative    Glucose, UA 4+ (A) Negative    Ketones, UA Negative Negative    Bilirubin (UA) Negative Negative    Occult Blood UA Negative Negative    Nitrite, UA Negative Negative    Urobilinogen, UA Negative <2.0 EU/dL    Leukocytes, UA Negative Negative   Urinalysis Microscopic    Collection Time: 08/11/23  1:19 PM   Result Value Ref Range    RBC, UA 0 0 - 4 /hpf    WBC, UA 0 0 - 5 /hpf    Bacteria None None-Occ /hpf    Yeast, UA None None    Squam Epithel, UA 0 /hpf    Hyaline Casts, UA 0 0-1/lpf /lpf    Microscopic Comment SEE COMMENT    POCT glucose    Collection Time: 08/11/23  2:32 PM   Result Value Ref Range    POCT Glucose 296 (H) 70 - 110 mg/dL   POCT glucose    Collection Time: 08/11/23  3:03 PM   Result Value Ref Range    POCT Glucose 264 (H) 70 - 110 mg/dL          Lab Results   Component Value Date    TBGOLDPLUS Negative 06/07/2023      Lab Results   Component Value Date    HEPAIGM Non-reactive 06/07/2023    HEPBIGM Non-reactive 06/07/2023    HEPBCAB Negative 04/04/2016    HEPCAB Non-reactive 06/07/2023        Assessment:       1. Psoriatic arthritis    2. Plaque psoriasis    3. Methotrexate, long term, current use    4. High risk medication use    5. Immunosuppressed status    6. Vitamin D deficiency    7. Pulmonary nodules            Impression:     Chronic refractory Psoriatic arthritis and skin  Psoriasis:  On stelara, methotrexate 20 mg weekly.      Msk - Doing okay from a joint standpoint currently  Skin not controlled was improved on higher dose of Cosentyx x 6 weeks but was worsening  so was switched to Stelara. Still with significant skin involvement. Has appt coming up with dermatology.   On background mtx 20 mg week split dose-       In the past   mtx monotherapy;   Enbrel and  humira   Cosentyx 300 mg Q 14 days  Failed topicals and UV trt      Drug therapy requiring intensive monitoring for toxicity  - High Risk Medication Monitoring encounter  -No current medication related issues, no evidence of toxicity  -I ordered lab results, reviewed and interpreted results as well as discussed findings with the patient       Immunosuppressed Status  - Compromised immune system secondary to autoimmune disease and use of immunosuppressive drugs.   - Monitor carefully for infections and toxicities- Currently denies issues with recurrent infections      - Advised to get immediate medical care if any infection.   - Also advised strict adherence to age appropriate vaccinations and cancer screenings with PCP.  - Recommend keeping Up to date on all recommended Vaccinations akira. ShingRx, Pneumovax, Prevnar, yearly flu vaccine       Vit D deficiency:  66947 units weekly.  Last level 38 on 07/10/2023    Chronic lung nodules- stable monitored by pulmonology- seen yearly, upcoming appointment later this year.     Mildly elevated LFTs      Plan:       Psoriasis/psoriatic arthritis   Medication  Continue stelara  Continue mtx 20 mg weekly  Bring bottle with you at next appointment so we can confirm the amount you are taking  May need to consider d/c mtx due to elevated LFT, pulmonary nodules.   Continue folic acid   Would appreciate derm input from a skin standpoint. Joints currently well controlled. Consider alternate treatment Skyrizi as a different MOA for active PsO. His joints are well controlled on current regimen but has significant skin involvement.   Drug toxicity monitoring  Discussed importance of medication compliance. He does not recall how he takes methotrexate, stelara.   Elevated LFT  Stable. Continue  to monitor.   Vitamin D deficiency  Continue 50 K weekly  Pulmonary nodules  Continue following with pulm  Will see them in November. Would appreciate their input on if they feel we should d/c mtx   Uncontrolled diabetes  Discussed at length importance of diabetes management   Discussed importance of taking DM medications appropriately and risks of severly high glucose including death. Advised patient to take his medication as soon as he gets home and to recheck his glucose level after. If he experiences any symptoms, advised he seek medical care immediately.       Please call or send portal message with any questions or concerns     Return in 16 weeks w/reg 4 labs       Charlette Wilcox PA-C  Rheumatology Department   [facility]        40 minutes of total time spent on the encounter, which includes face to face time and non-face to face time preparing to see the patient (eg, review of tests), Obtaining and/or reviewing separately obtained history, Documenting clinical information in the electronic or other health record, Independently interpreting results (not separately reported) and communicating results to the patient/family/caregiver, or Care coordination (not separately reported).    Disclaimer: This note was prepared using voice recognition system and is likely to have sound alike errors and is not proof read.  Please call me with any questions

## 2023-09-07 NOTE — PATIENT INSTRUCTIONS
Methotrexate:  This is important.   When you get home look at the bottle to see how you are taking this medication.  You should only be taking it one day a week, NOT daily.   Current prescription says 8 tablets every 7 days.     So for example, if you take it on Thursdays then every Thursday take 4 tablets Thursday morning and 4 tablets Thursday evening.     Stelara injection should only be every 28 days.     Bring the bottle of methotrexate with you at your next appointment.   Also bring your calendar of when you take your medication.

## 2023-09-19 ENCOUNTER — OUTPATIENT CASE MANAGEMENT (OUTPATIENT)
Dept: ADMINISTRATIVE | Facility: OTHER | Age: 68
End: 2023-09-19
Payer: MEDICARE

## 2023-09-21 ENCOUNTER — OUTPATIENT CASE MANAGEMENT (OUTPATIENT)
Dept: ADMINISTRATIVE | Facility: OTHER | Age: 68
End: 2023-09-21
Payer: MEDICARE

## 2023-09-21 NOTE — PROGRESS NOTES
Outpatient Care Management  Plan of Care Follow Up Visit    Patient: Trey Stevens  MRN: [id number]  Date of Service: 09/19/2023  Completed by: Louise Aguayo RN  Referral Date: 01/25/2023    Reason for Visit   Patient presents with    OPCM RN Follow Up Call       Brief Summary: OPCM visit completed, care plan reviewed and updated. Discussed discharge from OPCM at next call.   Next Steps: plan to follow up in approximately 2 weeks - patient agrees. Blood sugars? Diet? Activity? Checking feet? Discharge from OPCM unless new issues arise.  GOVIND Aguayo, RN

## 2023-09-21 NOTE — PROGRESS NOTES
Outpatient Care Management   - Care Plan Follow Up    Patient: Trey Stevens  MRN:  [id number]  Date of Service:  9/21/2023  Completed by:  Damaris Magallanes LMSW  Referral Date: 01/25/2023    Reason for Visit   Patient presents with    Case Closure     9/21/2023       Brief Summary: SW followed up with pt via telephone. Pt stated he has been doing well, keeping busy around his house. Pt has yet to visit the [place] [facility]. He stated he is hoping to make time for it in the morning. SW highly encouraged him to do so. Pt stated he went grocery shopping yesterday and is not behind on any bills. SW reminded him of the services [facility] provides if needs resources in the future. SW and pt discussed case closure. SW closed case and notified OPCM RN.     Complex Care Plan     Care plan was discussed and completed today with input from patient and/or caregiver.    Patient Instructions     No follow-ups on file.

## 2023-10-04 ENCOUNTER — TELEPHONE (OUTPATIENT)
Dept: PRIMARY CARE CLINIC | Facility: CLINIC | Age: 68
End: 2023-10-04

## 2023-10-04 ENCOUNTER — OFFICE VISIT (OUTPATIENT)
Dept: PRIMARY CARE CLINIC | Facility: CLINIC | Age: 68
End: 2023-10-04
Payer: MEDICARE

## 2023-10-04 ENCOUNTER — LAB VISIT (OUTPATIENT)
Dept: LAB | Facility: HOSPITAL | Age: 68
End: 2023-10-04
Attending: FAMILY MEDICINE
Payer: MEDICARE

## 2023-10-04 VITALS
BODY MASS INDEX: 41.22 KG/M2 | HEIGHT: 67 IN | OXYGEN SATURATION: 94 % | SYSTOLIC BLOOD PRESSURE: 125 MMHG | WEIGHT: 262.63 LBS | TEMPERATURE: 98 F | DIASTOLIC BLOOD PRESSURE: 62 MMHG | HEART RATE: 93 BPM

## 2023-10-04 DIAGNOSIS — E78.5 HYPERLIPIDEMIA ASSOCIATED WITH TYPE 2 DIABETES MELLITUS: ICD-10-CM

## 2023-10-04 DIAGNOSIS — Z91.148 MEDICATION NONCOMPLIANCE DUE TO COGNITIVE IMPAIRMENT: ICD-10-CM

## 2023-10-04 DIAGNOSIS — Z55.6 PROBLEMS RELATED TO HEALTH LITERACY: ICD-10-CM

## 2023-10-04 DIAGNOSIS — E11.69 HYPERLIPIDEMIA ASSOCIATED WITH TYPE 2 DIABETES MELLITUS: ICD-10-CM

## 2023-10-04 DIAGNOSIS — R41.9 MEDICATION NONCOMPLIANCE DUE TO COGNITIVE IMPAIRMENT: ICD-10-CM

## 2023-10-04 DIAGNOSIS — N18.2 TYPE 2 DIABETES MELLITUS WITH STAGE 2 CHRONIC KIDNEY DISEASE, WITH LONG-TERM CURRENT USE OF INSULIN: Primary | ICD-10-CM

## 2023-10-04 DIAGNOSIS — Z79.4 TYPE 2 DIABETES MELLITUS WITH STAGE 2 CHRONIC KIDNEY DISEASE, WITH LONG-TERM CURRENT USE OF INSULIN: ICD-10-CM

## 2023-10-04 DIAGNOSIS — E11.22 TYPE 2 DIABETES MELLITUS WITH STAGE 2 CHRONIC KIDNEY DISEASE, WITH LONG-TERM CURRENT USE OF INSULIN: Primary | ICD-10-CM

## 2023-10-04 DIAGNOSIS — N18.2 TYPE 2 DIABETES MELLITUS WITH STAGE 2 CHRONIC KIDNEY DISEASE, WITH LONG-TERM CURRENT USE OF INSULIN: ICD-10-CM

## 2023-10-04 DIAGNOSIS — E11.22 TYPE 2 DIABETES MELLITUS WITH STAGE 2 CHRONIC KIDNEY DISEASE, WITH LONG-TERM CURRENT USE OF INSULIN: ICD-10-CM

## 2023-10-04 DIAGNOSIS — F33.9 DEPRESSION, RECURRENT: ICD-10-CM

## 2023-10-04 DIAGNOSIS — Z79.4 TYPE 2 DIABETES MELLITUS WITH STAGE 2 CHRONIC KIDNEY DISEASE, WITH LONG-TERM CURRENT USE OF INSULIN: Primary | ICD-10-CM

## 2023-10-04 LAB
ALBUMIN/CREAT UR: 1179.2 UG/MG (ref 0–30)
CHOLEST SERPL-MCNC: 144 MG/DL (ref 120–199)
CHOLEST/HDLC SERPL: 3.7 {RATIO} (ref 2–5)
CREAT UR-MCNC: 24 MG/DL (ref 23–375)
ESTIMATED AVG GLUCOSE: ABNORMAL MG/DL (ref 68–131)
HBA1C MFR BLD: >14 % (ref 4–5.6)
HDLC SERPL-MCNC: 39 MG/DL (ref 40–75)
HDLC SERPL: 27.1 % (ref 20–50)
LDLC SERPL CALC-MCNC: 64.6 MG/DL (ref 63–159)
MICROALBUMIN UR DL<=1MG/L-MCNC: 283 UG/ML
NONHDLC SERPL-MCNC: 105 MG/DL
TRIGL SERPL-MCNC: 202 MG/DL (ref 30–150)

## 2023-10-04 PROCEDURE — 3288F PR FALLS RISK ASSESSMENT DOCUMENTED: ICD-10-PCS | Mod: CPTII,S$GLB,, | Performed by: FAMILY MEDICINE

## 2023-10-04 PROCEDURE — 1159F PR MEDICATION LIST DOCUMENTED IN MEDICAL RECORD: ICD-10-PCS | Mod: CPTII,S$GLB,, | Performed by: FAMILY MEDICINE

## 2023-10-04 PROCEDURE — 3046F HEMOGLOBIN A1C LEVEL >9.0%: CPT | Mod: CPTII,S$GLB,, | Performed by: FAMILY MEDICINE

## 2023-10-04 PROCEDURE — G0008 ADMIN INFLUENZA VIRUS VAC: HCPCS | Mod: S$GLB,,, | Performed by: FAMILY MEDICINE

## 2023-10-04 PROCEDURE — 90694 VACC AIIV4 NO PRSRV 0.5ML IM: CPT | Mod: S$GLB,,, | Performed by: FAMILY MEDICINE

## 2023-10-04 PROCEDURE — 82043 UR ALBUMIN QUANTITATIVE: CPT | Performed by: FAMILY MEDICINE

## 2023-10-04 PROCEDURE — 99214 OFFICE O/P EST MOD 30 MIN: CPT | Mod: S$GLB,,, | Performed by: FAMILY MEDICINE

## 2023-10-04 PROCEDURE — G0008 FLU VACCINE - QUADRIVALENT - ADJUVANTED: ICD-10-PCS | Mod: S$GLB,,, | Performed by: FAMILY MEDICINE

## 2023-10-04 PROCEDURE — 3078F PR MOST RECENT DIASTOLIC BLOOD PRESSURE < 80 MM HG: ICD-10-PCS | Mod: CPTII,S$GLB,, | Performed by: FAMILY MEDICINE

## 2023-10-04 PROCEDURE — 3078F DIAST BP <80 MM HG: CPT | Mod: CPTII,S$GLB,, | Performed by: FAMILY MEDICINE

## 2023-10-04 PROCEDURE — 1160F RVW MEDS BY RX/DR IN RCRD: CPT | Mod: CPTII,S$GLB,, | Performed by: FAMILY MEDICINE

## 2023-10-04 PROCEDURE — 3008F PR BODY MASS INDEX (BMI) DOCUMENTED: ICD-10-PCS | Mod: CPTII,S$GLB,, | Performed by: FAMILY MEDICINE

## 2023-10-04 PROCEDURE — 4010F PR ACE/ARB THEARPY RXD/TAKEN: ICD-10-PCS | Mod: CPTII,S$GLB,, | Performed by: FAMILY MEDICINE

## 2023-10-04 PROCEDURE — 99999 PR PBB SHADOW E&M-EST. PATIENT-LVL V: CPT | Mod: PBBFAC,,, | Performed by: FAMILY MEDICINE

## 2023-10-04 PROCEDURE — 1157F ADVNC CARE PLAN IN RCRD: CPT | Mod: CPTII,S$GLB,, | Performed by: FAMILY MEDICINE

## 2023-10-04 PROCEDURE — 80061 LIPID PANEL: CPT | Performed by: FAMILY MEDICINE

## 2023-10-04 PROCEDURE — 1159F MED LIST DOCD IN RCRD: CPT | Mod: CPTII,S$GLB,, | Performed by: FAMILY MEDICINE

## 2023-10-04 PROCEDURE — 99214 PR OFFICE/OUTPT VISIT, EST, LEVL IV, 30-39 MIN: ICD-10-PCS | Mod: S$GLB,,, | Performed by: FAMILY MEDICINE

## 2023-10-04 PROCEDURE — 3074F SYST BP LT 130 MM HG: CPT | Mod: CPTII,S$GLB,, | Performed by: FAMILY MEDICINE

## 2023-10-04 PROCEDURE — 3046F PR MOST RECENT HEMOGLOBIN A1C LEVEL > 9.0%: ICD-10-PCS | Mod: CPTII,S$GLB,, | Performed by: FAMILY MEDICINE

## 2023-10-04 PROCEDURE — 1157F PR ADVANCE CARE PLAN OR EQUIV PRESENT IN MEDICAL RECORD: ICD-10-PCS | Mod: CPTII,S$GLB,, | Performed by: FAMILY MEDICINE

## 2023-10-04 PROCEDURE — 1101F PT FALLS ASSESS-DOCD LE1/YR: CPT | Mod: CPTII,S$GLB,, | Performed by: FAMILY MEDICINE

## 2023-10-04 PROCEDURE — 3074F PR MOST RECENT SYSTOLIC BLOOD PRESSURE < 130 MM HG: ICD-10-PCS | Mod: CPTII,S$GLB,, | Performed by: FAMILY MEDICINE

## 2023-10-04 PROCEDURE — 3008F BODY MASS INDEX DOCD: CPT | Mod: CPTII,S$GLB,, | Performed by: FAMILY MEDICINE

## 2023-10-04 PROCEDURE — 99999 PR PBB SHADOW E&M-EST. PATIENT-LVL V: ICD-10-PCS | Mod: PBBFAC,,, | Performed by: FAMILY MEDICINE

## 2023-10-04 PROCEDURE — 1101F PR PT FALLS ASSESS DOC 0-1 FALLS W/OUT INJ PAST YR: ICD-10-PCS | Mod: CPTII,S$GLB,, | Performed by: FAMILY MEDICINE

## 2023-10-04 PROCEDURE — 3288F FALL RISK ASSESSMENT DOCD: CPT | Mod: CPTII,S$GLB,, | Performed by: FAMILY MEDICINE

## 2023-10-04 PROCEDURE — 1160F PR REVIEW ALL MEDS BY PRESCRIBER/CLIN PHARMACIST DOCUMENTED: ICD-10-PCS | Mod: CPTII,S$GLB,, | Performed by: FAMILY MEDICINE

## 2023-10-04 PROCEDURE — 90694 FLU VACCINE - QUADRIVALENT - ADJUVANTED: ICD-10-PCS | Mod: S$GLB,,, | Performed by: FAMILY MEDICINE

## 2023-10-04 PROCEDURE — 4010F ACE/ARB THERAPY RXD/TAKEN: CPT | Mod: CPTII,S$GLB,, | Performed by: FAMILY MEDICINE

## 2023-10-04 PROCEDURE — 83036 HEMOGLOBIN GLYCOSYLATED A1C: CPT | Performed by: FAMILY MEDICINE

## 2023-10-04 SDOH — SOCIAL DETERMINANTS OF HEALTH (SDOH): PROBLEMS RELATED TO HEALTH LITERACY: Z55.6

## 2023-10-04 NOTE — PATIENT INSTRUCTIONS
Trey,     You HAVE to take your diabetes medications. At the very least take your Lantus 24 unit shot every night and your tirzepatide 10 mg shot once a week.

## 2023-10-04 NOTE — PROGRESS NOTES
Subjective:       Patient ID: Trey Stevens is a 68 y.o. male.    Chief Complaint: Follow-up (Follow up.Pt states he feeling good, but unable to make appointments because he doesn't have the money.)    HPI:     Had ER visit due to critical lab glucose in 8/2023; was asymptomatic and did not require hospitalization after IV fluids and insulin given. He continues to eat high sugar diet with intermittent medication use. CBG over 400 today. Diet is very high in sugar and processed foods[place] physically active throughout the day--likes to go out in his boat and be outdoors.     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 No documented history of cognitive disability but we have done home health for medication management without much success increasing his independence with his medication.     Objective:     Vitals:    10/04/23 1039   BP: 125/62   Pulse: 93   Temp: 98.3 °F (36.8 °C)     Wt Readings from Last 3 Encounters:   10/04/23 119.1 kg (262 lb 9.6 oz)   09/07/23 120.1 kg (264 lb 12.4 oz)   08/11/23 120.1 kg (264 lb 14.1 oz)     Temp Readings from Last 3 Encounters:   10/04/23 98.3 °F (36.8 °C) (Oral)   08/11/23 98.1 °F (36.7 °C) (Oral)   07/19/23 97.9 °F (36.6 °C) (Oral)     BP Readings from Last 3 Encounters:   10/04/23 125/62   08/11/23 (!) 147/82   07/19/23 122/68     Pulse Readings from Last 3 Encounters:   10/04/23 93   08/11/23 85   07/03/23 84            Physical Exam  Vitals and nursing note reviewed.   Constitutional:       General: He is not in acute distress.     Appearance: He is well-developed. He is obese. He is not ill-appearing.   HENT:      Head: Normocephalic and atraumatic.      Nose: Nose normal.      Mouth/Throat:      Mouth: Mucous membranes are moist.      Pharynx: No oropharyngeal exudate.   Eyes:      General: No scleral icterus.     Pupils: Pupils are equal, round, and reactive  to light.   Cardiovascular:      Rate and Rhythm: Normal rate and regular rhythm.   Pulmonary:      Effort: Pulmonary effort is normal.      Breath sounds: Normal breath sounds.   Abdominal:      General: Bowel sounds are normal.      Palpations: Abdomen is soft.      Comments: Excess abdominal adiposity     Musculoskeletal:         General: No deformity. Normal range of motion.      Cervical back: Normal range of motion and neck supple.   Skin:     General: Skin is warm and dry.   Neurological:      General: No focal deficit present.      Mental Status: He is alert and oriented to person, place, and time.      Motor: No weakness.   Psychiatric:         Mood and Affect: Mood normal.         Behavior: Behavior normal.      Comments: Poor judgement            Albumin   Date Value Ref Range Status   08/11/2023 3.4 (L) 3.5 - 5.2 g/dL Final     eGFR   Date Value Ref Range Status   08/11/2023 55 (A) >60 mL/min/1.73 m^2 Final     Lab Results   Component Value Date    HGBA1C 11.4 (H) 08/11/2023   `       Assessment:       1. Type 2 diabetes mellitus with stage 2 chronic kidney disease, with long-term current use of insulin    2. Hyperlipidemia associated with type 2 diabetes mellitus    3. Problems related to health literacy    4. Medication noncompliance due to cognitive impairment    5. Depression, recurrent        Plan:           Problem List Items Addressed This Visit          Psychiatric    Problems related to health literacy    Overview     Will increase frequency of visits and assistance with pill box fills and reminders to take medication         Relevant Orders    Ambulatory referral/consult to Adult Neuropsychology    Depression, recurrent    Current Assessment & Plan     In remission            Cardiac/Vascular    Hyperlipidemia associated with type 2 diabetes mellitus    Relevant Orders    Lipid Panel       Endocrine    Type 2 diabetes mellitus with chronic kidney disease, with long-term current use of insulin -  Primary    Relevant Orders    Hemoglobin A1C    Microalbumin/Creatinine Ratio, Urine    Ambulatory referral/consult to Outpatient Case Management     Other Visit Diagnoses       Medication noncompliance due to cognitive impairment        Relevant Orders    Ambulatory referral/consult to Adult Neuropsychology          4 week f/u; will reach out to case management, will refer for neuropsych testing in case extra services for med administration may be available.

## 2023-10-04 NOTE — Clinical Note
Will you set him up to see Shabbir Solitario in neuropsych? Also sending a referral to case management; hoping you can follow up on that too

## 2023-10-06 ENCOUNTER — TELEPHONE (OUTPATIENT)
Dept: DIABETES | Facility: CLINIC | Age: 68
End: 2023-10-06
Payer: MEDICARE

## 2023-10-06 ENCOUNTER — OFFICE VISIT (OUTPATIENT)
Dept: DIABETES | Facility: CLINIC | Age: 68
End: 2023-10-06
Payer: MEDICARE

## 2023-10-06 ENCOUNTER — LAB VISIT (OUTPATIENT)
Dept: LAB | Facility: HOSPITAL | Age: 68
End: 2023-10-06
Attending: NURSE PRACTITIONER
Payer: MEDICARE

## 2023-10-06 VITALS
DIASTOLIC BLOOD PRESSURE: 68 MMHG | BODY MASS INDEX: 41.62 KG/M2 | HEIGHT: 67 IN | WEIGHT: 265.19 LBS | HEART RATE: 94 BPM | SYSTOLIC BLOOD PRESSURE: 134 MMHG

## 2023-10-06 DIAGNOSIS — E11.65 UNCONTROLLED TYPE 2 DIABETES MELLITUS WITH HYPERGLYCEMIA, WITH LONG-TERM CURRENT USE OF INSULIN: ICD-10-CM

## 2023-10-06 DIAGNOSIS — E78.5 HYPERLIPIDEMIA ASSOCIATED WITH TYPE 2 DIABETES MELLITUS: ICD-10-CM

## 2023-10-06 DIAGNOSIS — L40.52 PSORIATIC ARTHRITIS, DESTRUCTIVE TYPE: ICD-10-CM

## 2023-10-06 DIAGNOSIS — Z79.4 UNCONTROLLED TYPE 2 DIABETES MELLITUS WITH HYPERGLYCEMIA, WITH LONG-TERM CURRENT USE OF INSULIN: Primary | ICD-10-CM

## 2023-10-06 DIAGNOSIS — R73.9 ACUTE HYPERGLYCEMIA: ICD-10-CM

## 2023-10-06 DIAGNOSIS — I15.2 HYPERTENSION ASSOCIATED WITH DIABETES: ICD-10-CM

## 2023-10-06 DIAGNOSIS — Z79.4 UNCONTROLLED TYPE 2 DIABETES MELLITUS WITH HYPERGLYCEMIA, WITH LONG-TERM CURRENT USE OF INSULIN: ICD-10-CM

## 2023-10-06 DIAGNOSIS — E11.69 HYPERLIPIDEMIA ASSOCIATED WITH TYPE 2 DIABETES MELLITUS: ICD-10-CM

## 2023-10-06 DIAGNOSIS — E11.65 UNCONTROLLED TYPE 2 DIABETES MELLITUS WITH HYPERGLYCEMIA, WITH LONG-TERM CURRENT USE OF INSULIN: Primary | ICD-10-CM

## 2023-10-06 DIAGNOSIS — E11.59 HYPERTENSION ASSOCIATED WITH DIABETES: ICD-10-CM

## 2023-10-06 LAB
ANION GAP SERPL CALC-SCNC: 14 MMOL/L (ref 8–16)
BASOPHILS # BLD AUTO: 0.07 K/UL (ref 0–0.2)
BASOPHILS NFR BLD: 0.8 % (ref 0–1.9)
BUN SERPL-MCNC: 20 MG/DL (ref 8–23)
CALCIUM SERPL-MCNC: 9.5 MG/DL (ref 8.7–10.5)
CHLORIDE SERPL-SCNC: 93 MMOL/L (ref 95–110)
CO2 SERPL-SCNC: 24 MMOL/L (ref 23–29)
CREAT SERPL-MCNC: 1.2 MG/DL (ref 0.5–1.4)
DIFFERENTIAL METHOD: ABNORMAL
EOSINOPHIL # BLD AUTO: 0.4 K/UL (ref 0–0.5)
EOSINOPHIL NFR BLD: 3.9 % (ref 0–8)
ERYTHROCYTE [DISTWIDTH] IN BLOOD BY AUTOMATED COUNT: 14.6 % (ref 11.5–14.5)
EST. GFR  (NO RACE VARIABLE): >60 ML/MIN/1.73 M^2
GLUCOSE SERPL-MCNC: 500 MG/DL (ref 70–110)
GLUCOSE SERPL-MCNC: 579 MG/DL (ref 70–110)
HCT VFR BLD AUTO: 45.9 % (ref 40–54)
HGB BLD-MCNC: 15.2 G/DL (ref 14–18)
IMM GRANULOCYTES # BLD AUTO: 0.04 K/UL (ref 0–0.04)
IMM GRANULOCYTES NFR BLD AUTO: 0.4 % (ref 0–0.5)
LYMPHOCYTES # BLD AUTO: 1.8 K/UL (ref 1–4.8)
LYMPHOCYTES NFR BLD: 19.4 % (ref 18–48)
MCH RBC QN AUTO: 27.8 PG (ref 27–31)
MCHC RBC AUTO-ENTMCNC: 33.1 G/DL (ref 32–36)
MCV RBC AUTO: 84 FL (ref 82–98)
MONOCYTES # BLD AUTO: 0.6 K/UL (ref 0.3–1)
MONOCYTES NFR BLD: 6.2 % (ref 4–15)
NEUTROPHILS # BLD AUTO: 6.3 K/UL (ref 1.8–7.7)
NEUTROPHILS NFR BLD: 69.3 % (ref 38–73)
NRBC BLD-RTO: 0 /100 WBC
PLATELET # BLD AUTO: 210 K/UL (ref 150–450)
PMV BLD AUTO: 11.8 FL (ref 9.2–12.9)
POTASSIUM SERPL-SCNC: 4.5 MMOL/L (ref 3.5–5.1)
RBC # BLD AUTO: 5.46 M/UL (ref 4.6–6.2)
SODIUM SERPL-SCNC: 131 MMOL/L (ref 136–145)
WBC # BLD AUTO: 9.14 K/UL (ref 3.9–12.7)

## 2023-10-06 PROCEDURE — 1159F PR MEDICATION LIST DOCUMENTED IN MEDICAL RECORD: ICD-10-PCS | Mod: CPTII,S$GLB,, | Performed by: NURSE PRACTITIONER

## 2023-10-06 PROCEDURE — 1101F PT FALLS ASSESS-DOCD LE1/YR: CPT | Mod: CPTII,S$GLB,, | Performed by: NURSE PRACTITIONER

## 2023-10-06 PROCEDURE — 3062F PR POS MACROALBUMINURIA RESULT DOCUMENTED/REVIEW: ICD-10-PCS | Mod: CPTII,S$GLB,, | Performed by: NURSE PRACTITIONER

## 2023-10-06 PROCEDURE — 3288F PR FALLS RISK ASSESSMENT DOCUMENTED: ICD-10-PCS | Mod: CPTII,S$GLB,, | Performed by: NURSE PRACTITIONER

## 2023-10-06 PROCEDURE — 3288F FALL RISK ASSESSMENT DOCD: CPT | Mod: CPTII,S$GLB,, | Performed by: NURSE PRACTITIONER

## 2023-10-06 PROCEDURE — 3046F PR MOST RECENT HEMOGLOBIN A1C LEVEL > 9.0%: ICD-10-PCS | Mod: CPTII,S$GLB,, | Performed by: NURSE PRACTITIONER

## 2023-10-06 PROCEDURE — 85025 COMPLETE CBC W/AUTO DIFF WBC: CPT | Performed by: NURSE PRACTITIONER

## 2023-10-06 PROCEDURE — 36415 COLL VENOUS BLD VENIPUNCTURE: CPT | Mod: PO | Performed by: NURSE PRACTITIONER

## 2023-10-06 PROCEDURE — 80048 BASIC METABOLIC PNL TOTAL CA: CPT | Performed by: NURSE PRACTITIONER

## 2023-10-06 PROCEDURE — 99999 PR PBB SHADOW E&M-EST. PATIENT-LVL V: CPT | Mod: PBBFAC,,, | Performed by: NURSE PRACTITIONER

## 2023-10-06 PROCEDURE — 1126F PR PAIN SEVERITY QUANTIFIED, NO PAIN PRESENT: ICD-10-PCS | Mod: CPTII,S$GLB,, | Performed by: NURSE PRACTITIONER

## 2023-10-06 PROCEDURE — 1159F MED LIST DOCD IN RCRD: CPT | Mod: CPTII,S$GLB,, | Performed by: NURSE PRACTITIONER

## 2023-10-06 PROCEDURE — 99214 PR OFFICE/OUTPT VISIT, EST, LEVL IV, 30-39 MIN: ICD-10-PCS | Mod: S$GLB,,, | Performed by: NURSE PRACTITIONER

## 2023-10-06 PROCEDURE — 3078F PR MOST RECENT DIASTOLIC BLOOD PRESSURE < 80 MM HG: ICD-10-PCS | Mod: CPTII,S$GLB,, | Performed by: NURSE PRACTITIONER

## 2023-10-06 PROCEDURE — 1160F RVW MEDS BY RX/DR IN RCRD: CPT | Mod: CPTII,S$GLB,, | Performed by: NURSE PRACTITIONER

## 2023-10-06 PROCEDURE — 82962 POCT GLUCOSE, HAND-HELD DEVICE: ICD-10-PCS | Mod: S$GLB,,, | Performed by: NURSE PRACTITIONER

## 2023-10-06 PROCEDURE — 3008F BODY MASS INDEX DOCD: CPT | Mod: CPTII,S$GLB,, | Performed by: NURSE PRACTITIONER

## 2023-10-06 PROCEDURE — 82962 GLUCOSE BLOOD TEST: CPT | Mod: S$GLB,,, | Performed by: NURSE PRACTITIONER

## 2023-10-06 PROCEDURE — 99214 OFFICE O/P EST MOD 30 MIN: CPT | Mod: S$GLB,,, | Performed by: NURSE PRACTITIONER

## 2023-10-06 PROCEDURE — 4010F ACE/ARB THERAPY RXD/TAKEN: CPT | Mod: CPTII,S$GLB,, | Performed by: NURSE PRACTITIONER

## 2023-10-06 PROCEDURE — 3075F PR MOST RECENT SYSTOLIC BLOOD PRESS GE 130-139MM HG: ICD-10-PCS | Mod: CPTII,S$GLB,, | Performed by: NURSE PRACTITIONER

## 2023-10-06 PROCEDURE — 1101F PR PT FALLS ASSESS DOC 0-1 FALLS W/OUT INJ PAST YR: ICD-10-PCS | Mod: CPTII,S$GLB,, | Performed by: NURSE PRACTITIONER

## 2023-10-06 PROCEDURE — 3078F DIAST BP <80 MM HG: CPT | Mod: CPTII,S$GLB,, | Performed by: NURSE PRACTITIONER

## 2023-10-06 PROCEDURE — 3008F PR BODY MASS INDEX (BMI) DOCUMENTED: ICD-10-PCS | Mod: CPTII,S$GLB,, | Performed by: NURSE PRACTITIONER

## 2023-10-06 PROCEDURE — 3066F PR DOCUMENTATION OF TREATMENT FOR NEPHROPATHY: ICD-10-PCS | Mod: CPTII,S$GLB,, | Performed by: NURSE PRACTITIONER

## 2023-10-06 PROCEDURE — 4010F PR ACE/ARB THEARPY RXD/TAKEN: ICD-10-PCS | Mod: CPTII,S$GLB,, | Performed by: NURSE PRACTITIONER

## 2023-10-06 PROCEDURE — 3066F NEPHROPATHY DOC TX: CPT | Mod: CPTII,S$GLB,, | Performed by: NURSE PRACTITIONER

## 2023-10-06 PROCEDURE — 3062F POS MACROALBUMINURIA REV: CPT | Mod: CPTII,S$GLB,, | Performed by: NURSE PRACTITIONER

## 2023-10-06 PROCEDURE — 3046F HEMOGLOBIN A1C LEVEL >9.0%: CPT | Mod: CPTII,S$GLB,, | Performed by: NURSE PRACTITIONER

## 2023-10-06 PROCEDURE — 1157F PR ADVANCE CARE PLAN OR EQUIV PRESENT IN MEDICAL RECORD: ICD-10-PCS | Mod: CPTII,S$GLB,, | Performed by: NURSE PRACTITIONER

## 2023-10-06 PROCEDURE — 3075F SYST BP GE 130 - 139MM HG: CPT | Mod: CPTII,S$GLB,, | Performed by: NURSE PRACTITIONER

## 2023-10-06 PROCEDURE — 1157F ADVNC CARE PLAN IN RCRD: CPT | Mod: CPTII,S$GLB,, | Performed by: NURSE PRACTITIONER

## 2023-10-06 PROCEDURE — 1126F AMNT PAIN NOTED NONE PRSNT: CPT | Mod: CPTII,S$GLB,, | Performed by: NURSE PRACTITIONER

## 2023-10-06 PROCEDURE — 99999 PR PBB SHADOW E&M-EST. PATIENT-LVL V: ICD-10-PCS | Mod: PBBFAC,,, | Performed by: NURSE PRACTITIONER

## 2023-10-06 PROCEDURE — 1160F PR REVIEW ALL MEDS BY PRESCRIBER/CLIN PHARMACIST DOCUMENTED: ICD-10-PCS | Mod: CPTII,S$GLB,, | Performed by: NURSE PRACTITIONER

## 2023-10-06 RX ORDER — BOLUS INSULIN PUMP, 200 UNIT 2 UNIT
EACH MISCELLANEOUS
Qty: 30 EACH | Refills: 3 | Status: SHIPPED | OUTPATIENT
Start: 2023-10-06

## 2023-10-06 RX ORDER — TIRZEPATIDE 10 MG/.5ML
10 INJECTION, SOLUTION SUBCUTANEOUS
Qty: 4 PEN | Refills: 5 | Status: SHIPPED | OUTPATIENT
Start: 2023-10-06

## 2023-10-06 RX ORDER — INSULIN ASPART 100 [IU]/ML
10 INJECTION, SOLUTION INTRAVENOUS; SUBCUTANEOUS
Qty: 15 ML | Refills: 5 | Status: SHIPPED | OUTPATIENT
Start: 2023-10-06

## 2023-10-06 RX ORDER — INSULIN GLARGINE 100 [IU]/ML
32 INJECTION, SOLUTION SUBCUTANEOUS DAILY
Qty: 15 ML | Refills: 5 | Status: SHIPPED | OUTPATIENT
Start: 2023-10-06

## 2023-10-06 RX ORDER — INSULIN ASPART 100 [IU]/ML
INJECTION, SOLUTION INTRAVENOUS; SUBCUTANEOUS
Qty: 60 ML | Refills: 1 | Status: SHIPPED | OUTPATIENT
Start: 2023-10-06

## 2023-10-06 RX ORDER — PEN NEEDLE, DIABETIC 30 GX3/16"
NEEDLE, DISPOSABLE MISCELLANEOUS
Qty: 200 EACH | Refills: 5 | Status: SHIPPED | OUTPATIENT
Start: 2023-10-06

## 2023-10-06 NOTE — Clinical Note
Can you do me a favor? Can you contact the pharmacy to see which meds he has been filling for his diabetes? He says he is taking them all but I strongly suspect that is not the case. I appreciate your help. Trying to figure out why his blood sugar is always so high and why I'm having to send him to the ER!

## 2023-10-06 NOTE — PROGRESS NOTES
Subjective:         Patient ID: Trey Stevens is a 68 y.o. male.  Patient's current PCP is Brittanie Chaparro MD.     Chief Complaint: Diabetes Mellitus    HPI  Trey Stevens is a 68 y.o. White male presenting for a follow up for diabetes. Patient has been diagnosed with type 2 diabetes since 2016 .    CURRENT DM MEDICATIONS:   Diabetes Medications               empagliflozin (JARDIANCE) 25 mg tablet Take 1 tablet (25 mg total) by mouth once daily.    insulin aspart U-100 (NOVOLOG U-100 INSULIN ASPART) 100 unit/mL injection INJECT 200 UNITS PER CEQUR DEVICE EVERY 3 DAYS    LANTUS SOLOSTAR U-100 INSULIN glargine 100 units/mL SubQ pen Inject 24 Units into the skin once daily.    NOVOLOG FLEXPEN U-100 INSULIN 100 unit/mL (3 mL) InPn pen INJECT 6 UNITS INTO THE SKIN 3 (THREE) TIMES DAILY WITH MEALS.    tirzepatide (MOUNJARO) 10 mg/0.5 mL PnIj Inject 10 mg into the skin every 7 days. STOP TRULICITY AND START MOUNJARO ON THE DAY TRULICITY WOULD HAVE BEEN DUE.    glipiZIDE (GLUCOTROL) 10 MG tablet Take 1 tablet (10 mg total) by mouth daily with breakfast.    metFORMIN (GLUCOPHAGE-XR) 500 MG XR 24hr tablet Take 2 tablets (1,000 mg total) by mouth 2 (two) times daily with meals.           Past failed treatment include: Victoza- failure to control diabetes    Blood glucose testing is performed regularly. Patient is testing 1-2 times per day.  Meter:  One Touch Ultra  Preferred lab: [facility]    Any episodes of hypoglycemia? Denies    Complications related to diabetes: nephropathy and peripheral neuropathy    His blood sugar in the clinic today was:   Lab Results   Component Value Date    POCGLU 500 (A) 10/06/2023     Trey Stevens presents today for follow up visit to discuss diabetes management. No meter/logs today.   He did not bring his medications to this visit. Reports he is taking his medications faithfully, but he is not able to tell me the names of any of his medications. Glucose today markedly elevated -  "500. He states he feels very thirsty but otherwise "feels fine." He states he has not taken his insulin today. He reports he wears CeQur to administer Novolog when he is away from the house, but prefers Novolog pens when at his house. He stays very active in the yard. After review of his medications on his profile, several have not been renewed in months. Will have nurse contact the pharmacy to see which medications he has filled recently - strongly suspect he is not taking his medications correctly. He is agreeable to a sooner visit- will see CDE on Monday, and again with me in 1 week. Advised ER for severe hyperglycemia - he declines [facility]. We discussed it is not safe to drive and to have a family or friend pick him up to bring him. He again declines against medical advice.     Psoriatic arthritis- Followed by rheumatology.     Sleep apnea- on CPAP.     Current diet: No specific diet  Activity Level: Walking    Lab Results   Component Value Date    HGBA1C >14.0 (H) 10/04/2023    HGBA1C 11.4 (H) 08/11/2023    HGBA1C 11.4 (H) 08/10/2023     STANDARDS OF CARE  Diabetes Management Status    Statin: Taking  ACE/ARB: Taking    Screening or Prevention Patient's value Goal Complete/Controlled?   HgA1C Testing and Control   Lab Results   Component Value Date    HGBA1C >14.0 (H) 10/04/2023      Annually/Less than 8% Yes   Lipid profile : 10/04/2023 Annually Yes   LDL control Lab Results   Component Value Date    LDLCALC 64.6 10/04/2023    Annually/Less than 100 mg/dl  Yes   Nephropathy screening Lab Results   Component Value Date    LABMICR 283.0 10/04/2023     Lab Results   Component Value Date    PROTEINUA 1+ (A) 08/11/2023     No results found for: "UTPCR"   Annually Yes   Blood pressure BP Readings from Last 1 Encounters:   10/06/23 134/68    Less than 140/90 Yes   Dilated retinal exam : 05/11/2023 Annually Yes   Foot exam   : 12/29/2022 Annually Yes      Labs reviewed and are noted below.    Lab Results " "  Component Value Date    WBC 8.74 08/11/2023    HGB 15.8 08/11/2023    HCT 48.0 08/11/2023     08/11/2023    CHOL 144 10/04/2023    TRIG 202 (H) 10/04/2023    HDL 39 (L) 10/04/2023    LDLCALC 64.6 10/04/2023    ALT 46 (H) 08/11/2023    AST 54 (H) 08/11/2023     (L) 08/11/2023    K 4.8 08/11/2023    CL 99 08/11/2023    ANIONGAP 13 08/11/2023    CREATININE 1.4 08/11/2023    ESTGFRAFRICA >60 04/27/2022    EGFRNONAA >60 04/27/2022    BUN 15 08/11/2023    CO2 21 (L) 08/11/2023    TSH 2.102 08/10/2023    PSA 0.24 04/11/2022    INR 1.0 08/14/2021     (HH) 08/11/2023     Lab Results   Component Value Date    TSH 2.102 08/10/2023    CALCIUM 9.7 08/11/2023    PHOS 3.0 08/14/2021     No results found for: "CPEPTIDE"  No results found for: "GLUTAMICACID"  Glucose   Date Value Ref Range Status   08/11/2023 502 () 70 - 110 mg/dL Final     Comment:     GLU critical result(s) called and verbal readback obtained from MICHELLE ANTON RN by CP5 08/11/2023 13:11       Anion Gap   Date Value Ref Range Status   08/11/2023 13 8 - 16 mmol/L Final     eGFR if    Date Value Ref Range Status   04/27/2022 >60 >60 mL/min/1.73 m^2 Final     eGFR if non    Date Value Ref Range Status   04/27/2022 >60 >60 mL/min/1.73 m^2 Final     Comment:     Calculation used to obtain the estimated glomerular filtration  rate (eGFR) is the CKD-EPI equation.          The following portions of the patient's history were reviewed and updated as appropriate: allergies, current medications, past family history, past medical history, past social history, past surgical history and problem list.    Review of patient's allergies indicates:  No Known Allergies  Social History     Socioeconomic History    Marital status:    Tobacco Use    Smoking status: Former     Current packs/day: 0.00     Average packs/day: 0.5 packs/day for 28.9 years (14.5 ttl pk-yrs)     Types: Cigarettes     Start date: 1994     Quit " date: 2022     Years since quittin.8    Smokeless tobacco: Never    Tobacco comments:     Quit smoking 1 mo ago (2022)   Substance and Sexual Activity    Alcohol use: No     Alcohol/week: 0.0 standard drinks of alcohol    Drug use: No    Sexual activity: Never     Social Determinants of Health     Financial Resource Strain: High Risk (10/4/2023)    Overall Financial Resource Strain (CARDIA)     Difficulty of Paying Living Expenses: Hard   Food Insecurity: Food Insecurity Present (10/4/2023)    Hunger Vital Sign     Worried About Running Out of Food in the Last Year: Sometimes true     Ran Out of Food in the Last Year: Sometimes true   Transportation Needs: Unmet Transportation Needs (10/4/2023)    PRAPARE - Transportation     Lack of Transportation (Medical): Yes     Lack of Transportation (Non-Medical): Yes   Physical Activity: Inactive (10/4/2023)    Exercise Vital Sign     Days of Exercise per Week: 0 days     Minutes of Exercise per Session: 0 min   Stress: Stress Concern Present (10/4/2023)    [employer] - Occupational Stress Questionnaire     Feeling of Stress : To some extent   Social Connections: Socially Isolated (10/4/2023)    Social Connection and Isolation Panel [NHANES]     Frequency of Communication with Friends and Family: Once a week     Frequency of Social Gatherings with Friends and Family: Never     Attends Buddhism Services: Never     Active Member of Clubs or Organizations: No     Attends Club or Organization Meetings: Never     Marital Status:    Housing Stability: Unknown (10/4/2023)    Housing Stability Vital Sign     Unable to Pay for Housing in the Last Year: No     Unstable Housing in the Last Year: No     Past Medical History:   Diagnosis Date    Arthritis     Diabetes mellitus     Diabetes mellitus type 2, uncontrolled 2016    DM (diabetes mellitus)      2017    Gall stones     Obesity     Pneumonia of right middle  lobe due to infectious organism 8/13/2021    Psoriasis (a type of skin inflammation)     Rheumatoid arthritis of foot      REVIEW OF SYSTEMS:  Eyes No history of DR. Needs cataract surgery.  Cardiovascular: History of hyperlipidemia.  GI: Denies any GI complaints.   : History of nephropathy.  SKIN:Positive for psoriasis  Neuro: Positive neuropathy.  PSYCH: Current tobacco use.  ENDO: See HPI.        Objective:      Vitals:    10/06/23 1001   BP: 134/68   Pulse: 94     RESPIRATORY: No respiratory distress  NEUROLOGIC: Cranial nerves II-XII grossly intact.   PSYCHIATRIC: Alert & oriented x3. Normal mood and affect.  FOOT EXAMINATION:  UTD  Assessment:       1. Uncontrolled type 2 diabetes mellitus with hyperglycemia, with long-term current use of insulin    2. Hyperlipidemia associated with type 2 diabetes mellitus    3. Hypertension associated with diabetes    4. Psoriatic arthritis, destructive type        Plan:   Trey was seen today for diabetes mellitus.    Diagnoses and all orders for this visit:    Uncontrolled type 2 diabetes mellitus with hyperglycemia, with long-term current use of insulin  -     POCT Glucose, Hand-Held Device  -     LANTUS SOLOSTAR U-100 INSULIN glargine 100 units/mL SubQ pen; Inject 32 Units into the skin once daily.  -     insulin aspart U-100 (NOVOLOG FLEXPEN U-100 INSULIN) 100 unit/mL (3 mL) InPn pen; Inject 10 Units into the skin 3 (three) times daily with meals.  -     insulin aspart U-100 (NOVOLOG U-100 INSULIN ASPART) 100 unit/mL injection; INJECT 200 UNITS PER CEQUR DEVICE EVERY 3 DAYS  -     bolus insulin pump, 200 unit (CEQUR SIMPLICITY) 2 unit Rhianna; Change CeQur patch every 3 days  -     Basic Metabolic Panel; Future  -     CBC Auto Differential; Future  -     empagliflozin (JARDIANCE) 25 mg tablet; Take 1 tablet (25 mg total) by mouth once daily.  -     tirzepatide (MOUNJARO) 10 mg/0.5 mL PnIj; Inject 10 mg into the skin every 7 days.  -     pen needle, diabetic 32 gauge x  "5/32" Ndle; Use with Lantus once daily and Humalog 3 times daily.    Chronic -     Advised ER for severe hyperglycemia - see HPI.    I strongly suspect he is not taking his medications or insulin as prescribed. He was instructed to bring all medications to his appointment on Monday for review. Will have the pharmacy called to see which meds he has been filling.    Take Lantus 32 units once daily.     If using Novolog pens: Take 10 units of Novolog before EACH main meal  If using CeQur: Take 5 clicks before EACH main meal    Take Jardiance once a day.     Take Mounjaro once a week.     Hyperlipidemia associated with type 2 diabetes mellitus    Hypertension associated with diabetes    Psoriatic arthritis, destructive type    Acute hyperglycemia  -     Basic Metabolic Panel; Future  -     CBC Auto Differential; Future    Stat labs ordered.     - Follow up: 1 week     Portions of this note may have been created with voice recognition software. Occasional "wrong-word" or "sound-a-like" substitutions may have occurred due to the inherent limitations of voice recognition software. Please, read the note carefully and recognize, using context, where substitutions have occurred.           REECE AndreaC, BC-ADM  [facility]    "

## 2023-10-06 NOTE — TELEPHONE ENCOUNTER
Called patient to check on him pt confirmed that he will be going to er today he was about to go in a while expressed how he should go to the er confirmed that I will call to check on patient

## 2023-10-06 NOTE — PATIENT INSTRUCTIONS
-- Medications adjusted for today's visit include:    Take Lantus 32 units once daily.     If using Novolog pens: Take 10 units of Novolog before EACH main meal  If using CeQur: Take 5 clicks before EACH main meal    Take Jardiance once a day.     Take Mounjaro once a week.     Bring ALL meds to your next appointment in 1 week.     -- Start checking blood sugar 3 times daily: Fasting blood sugar and vary your next 2 readings: Before lunch, before supper, 2 hours after any meal, or bedtime.     -Blood sugar goals should be a fasting blood sugar below 130, and no blood sugars throughout the day over 180 is good.    --Carry glucose tablets/soft peppermints/regular juice or Coke product with you at all times to treat a low blood sugar episode (less than 70). If your blood sugar is between 50-70, Chew 4 tablets or drink 1/2 cup of juice or regular Coke product. If your blood sugar is below 50, double the treatment. Re-check blood sugar in 15 minutes. If still low, repeat this. Always call the clinic to give an update for any low blood sugar episodes.    --Follow-up for your next visit in 1 week.     --Please either drop off, fax, or MyChart your readings to me in a couple of weeks if you can.

## 2023-10-09 ENCOUNTER — TELEPHONE (OUTPATIENT)
Dept: DIABETES | Facility: CLINIC | Age: 68
End: 2023-10-09
Payer: MEDICARE

## 2023-10-09 ENCOUNTER — OUTPATIENT CASE MANAGEMENT (OUTPATIENT)
Dept: ADMINISTRATIVE | Facility: OTHER | Age: 68
End: 2023-10-09
Payer: MEDICARE

## 2023-10-09 NOTE — TELEPHONE ENCOUNTER
Pt is scheduled to come in today for 3:30 pm explain to pt as well that he needs to also bring all of his medication in to both appointments

## 2023-10-09 NOTE — PROGRESS NOTES
10/09/23 Discussed importance of self care with patient in regards to diabetes and risks involved of uncontrolled diabetes. Reminded him to bring meds with him to appointment this afternoon. We discussed the care plan and agree that maximum goals have been met. Reminded of recommended health maintenance screenings. We reviewed that care management is provided on a temporary basis and that it is time for discharge. Instructed patient to call PCP office directly for any non emergent health issues. Verbalized understanding.  GOVIND Aguayo RN

## 2023-10-09 NOTE — TELEPHONE ENCOUNTER
----- Message from Jose Dickinson RD sent at 10/6/2023  3:54 PM CDT -----  He is not scheduled with me until November.   Michelle, was he supposed to be scheduled with me?     ----- Message -----  From: Bee Stinson NP  Sent: 10/6/2023   1:50 PM CDT  To: Jose Dickinson RD    You may want to call him on Monday and remind him to bring his meds- thanks for your help!

## 2023-10-11 ENCOUNTER — TELEPHONE (OUTPATIENT)
Dept: PRIMARY CARE CLINIC | Facility: CLINIC | Age: 68
End: 2023-10-11
Payer: MEDICARE

## 2023-10-16 ENCOUNTER — OFFICE VISIT (OUTPATIENT)
Dept: DERMATOLOGY | Facility: CLINIC | Age: 68
End: 2023-10-16
Payer: MEDICARE

## 2023-10-16 DIAGNOSIS — L98.9 SKIN EROSION: Primary | ICD-10-CM

## 2023-10-16 DIAGNOSIS — L40.50 PSORIATIC ARTHRITIS: ICD-10-CM

## 2023-10-16 DIAGNOSIS — L40.0 PLAQUE PSORIASIS: ICD-10-CM

## 2023-10-16 PROCEDURE — 3066F PR DOCUMENTATION OF TREATMENT FOR NEPHROPATHY: ICD-10-PCS | Mod: CPTII,S$GLB,, | Performed by: PHYSICIAN ASSISTANT

## 2023-10-16 PROCEDURE — 1159F PR MEDICATION LIST DOCUMENTED IN MEDICAL RECORD: ICD-10-PCS | Mod: CPTII,S$GLB,, | Performed by: PHYSICIAN ASSISTANT

## 2023-10-16 PROCEDURE — 1159F MED LIST DOCD IN RCRD: CPT | Mod: CPTII,S$GLB,, | Performed by: PHYSICIAN ASSISTANT

## 2023-10-16 PROCEDURE — 99204 OFFICE O/P NEW MOD 45 MIN: CPT | Mod: S$GLB,,, | Performed by: PHYSICIAN ASSISTANT

## 2023-10-16 PROCEDURE — 99999 PR PBB SHADOW E&M-EST. PATIENT-LVL IV: ICD-10-PCS | Mod: PBBFAC,,, | Performed by: PHYSICIAN ASSISTANT

## 2023-10-16 PROCEDURE — 4010F PR ACE/ARB THEARPY RXD/TAKEN: ICD-10-PCS | Mod: CPTII,S$GLB,, | Performed by: PHYSICIAN ASSISTANT

## 2023-10-16 PROCEDURE — 1160F RVW MEDS BY RX/DR IN RCRD: CPT | Mod: CPTII,S$GLB,, | Performed by: PHYSICIAN ASSISTANT

## 2023-10-16 PROCEDURE — 3046F HEMOGLOBIN A1C LEVEL >9.0%: CPT | Mod: CPTII,S$GLB,, | Performed by: PHYSICIAN ASSISTANT

## 2023-10-16 PROCEDURE — 4010F ACE/ARB THERAPY RXD/TAKEN: CPT | Mod: CPTII,S$GLB,, | Performed by: PHYSICIAN ASSISTANT

## 2023-10-16 PROCEDURE — 3062F PR POS MACROALBUMINURIA RESULT DOCUMENTED/REVIEW: ICD-10-PCS | Mod: CPTII,S$GLB,, | Performed by: PHYSICIAN ASSISTANT

## 2023-10-16 PROCEDURE — 1160F PR REVIEW ALL MEDS BY PRESCRIBER/CLIN PHARMACIST DOCUMENTED: ICD-10-PCS | Mod: CPTII,S$GLB,, | Performed by: PHYSICIAN ASSISTANT

## 2023-10-16 PROCEDURE — 1126F PR PAIN SEVERITY QUANTIFIED, NO PAIN PRESENT: ICD-10-PCS | Mod: CPTII,S$GLB,, | Performed by: PHYSICIAN ASSISTANT

## 2023-10-16 PROCEDURE — 1126F AMNT PAIN NOTED NONE PRSNT: CPT | Mod: CPTII,S$GLB,, | Performed by: PHYSICIAN ASSISTANT

## 2023-10-16 PROCEDURE — 1157F PR ADVANCE CARE PLAN OR EQUIV PRESENT IN MEDICAL RECORD: ICD-10-PCS | Mod: CPTII,S$GLB,, | Performed by: PHYSICIAN ASSISTANT

## 2023-10-16 PROCEDURE — 99204 PR OFFICE/OUTPT VISIT, NEW, LEVL IV, 45-59 MIN: ICD-10-PCS | Mod: S$GLB,,, | Performed by: PHYSICIAN ASSISTANT

## 2023-10-16 PROCEDURE — 1157F ADVNC CARE PLAN IN RCRD: CPT | Mod: CPTII,S$GLB,, | Performed by: PHYSICIAN ASSISTANT

## 2023-10-16 PROCEDURE — 99999 PR PBB SHADOW E&M-EST. PATIENT-LVL IV: CPT | Mod: PBBFAC,,, | Performed by: PHYSICIAN ASSISTANT

## 2023-10-16 PROCEDURE — 3046F PR MOST RECENT HEMOGLOBIN A1C LEVEL > 9.0%: ICD-10-PCS | Mod: CPTII,S$GLB,, | Performed by: PHYSICIAN ASSISTANT

## 2023-10-16 PROCEDURE — 3062F POS MACROALBUMINURIA REV: CPT | Mod: CPTII,S$GLB,, | Performed by: PHYSICIAN ASSISTANT

## 2023-10-16 PROCEDURE — 3066F NEPHROPATHY DOC TX: CPT | Mod: CPTII,S$GLB,, | Performed by: PHYSICIAN ASSISTANT

## 2023-10-16 RX ORDER — MUPIROCIN 20 MG/G
OINTMENT TOPICAL
Qty: 60 G | Refills: 0 | Status: SHIPPED | OUTPATIENT
Start: 2023-10-16

## 2023-10-16 NOTE — PROGRESS NOTES
"  Subjective:      Patient ID:  Trey Stevens is a 68 y.o. male who presents for   Chief Complaint   Patient presents with    Rash     On both arms ( psoriasis) tx clobetsol foam.      Hx of PsO and PsA, previously seen in 2016 by Dr. Foss, here to re-establish with derm. Currently following with rheum.  Here for c/o arm rash.  He states rash has been recurrent and persistent for years. Notes it was doing well "where it was light, and it all cleared up and left the light colored spots", but when stopped using "the cream" it came back.  Uncertain of the name of the cream.  Current tx: Stelara,q 28 days and methotrexate 20 mg per week, daily folic acid.  He does not recall for certain which medications he is on. Also using topical Dovonex. Endorses some occasional stiffness of joints (hands) and occasional pain of knees, but denies daily arthralgias. For skin, +occasional itching, +dryness. In med list, recent topicals include dovonex, clobeatsol foam (he stays he uses on scalp), and keto shampoo.    In the past   mtx monotherapy;   Enbrel and  humira   Cosentyx 300 mg Q 14 days  Failed topicals and UV trt   Cosentyx  Humira    PMHX: poorly controlled DM, last glucose 579 (10/6/23)        Review of Systems   Constitutional:  Negative for fever and chills.   Gastrointestinal:  Negative for nausea and vomiting.   Skin:  Positive for itching, rash, dry skin and activity-related sunscreen use. Negative for sun sensitivity, daily sunscreen use, recent sunburn, dry lips and abscesses.   Hematologic/Lymphatic: Does not bruise/bleed easily.       Objective:   Physical Exam   Constitutional: He appears well-developed and well-nourished. No distress.   Neurological: He is alert and oriented to person, place, and time. He is not disoriented.   Psychiatric: He has a normal mood and affect.   Skin:   Areas Examined (abnormalities noted in diagram):   Scalp / Hair Palpated and Inspected  Head / Face Inspection Performed  Neck " Inspection Performed  Chest / Axilla Inspection Performed  Back Inspection Performed  RUE Inspected  LUE Inspection Performed                 Diagram Legend     Erythematous scaling macule/papule c/w actinic keratosis       Vascular papule c/w angioma      Pigmented verrucoid papule/plaque c/w seborrheic keratosis      Yellow umbilicated papule c/w sebaceous hyperplasia      Irregularly shaped tan macule c/w lentigo     1-2 mm smooth white papules consistent with Milia      Movable subcutaneous cyst with punctum c/w epidermal inclusion cyst      Subcutaneous movable cyst c/w pilar cyst      Firm pink to brown papule c/w dermatofibroma      Pedunculated fleshy papule(s) c/w skin tag(s)      Evenly pigmented macule c/w junctional nevus     Mildly variegated pigmented, slightly irregular-bordered macule c/w mildly atypical nevus      Flesh colored to evenly pigmented papule c/w intradermal nevus       Pink pearly papule/plaque c/w basal cell carcinoma      Erythematous hyperkeratotic cursted plaque c/w SCC      Surgical scar with no sign of skin cancer recurrence      Open and closed comedones      Inflammatory papules and pustules      Verrucoid papule consistent consistent with wart     Erythematous eczematous patches and plaques     Dystrophic onycholytic nail with subungual debris c/w onychomycosis     Umbilicated papule    Erythematous-base heme-crusted tan verrucoid plaque consistent with inflamed seborrheic keratosis     Erythematous Silvery Scaling Plaque c/w Psoriasis     See annotation      Assessment / Plan:        Skin erosion  -     mupirocin (BACTROBAN) 2 % ointment; AAA BID for broken skin.  Dispense: 60 g; Refill: 0  Will add above rx.     Psoriatic arthritis  -     Ambulatory referral/consult to Dermatology  Continue recs per rheum.    Plaque psoriasis  -     Ambulatory referral/consult to Dermatology  -     mupirocin (BACTROBAN) 2 % ointment; AAA BID for broken skin.  Dispense: 60 g; Refill: 0  Failed  numerous treatments per HPI. Recommend derm MD evaluation prior to any further consideration of treatment alternatives for both skin/ joints.           Follow up for psoriasis, to see Dermatology MD.

## 2023-10-17 RX ORDER — USTEKINUMAB 90 MG/ML
90 INJECTION, SOLUTION SUBCUTANEOUS
Qty: 2 ML | Refills: 0 | Status: ACTIVE | OUTPATIENT
Start: 2023-10-17 | End: 2023-10-19

## 2023-10-17 NOTE — TELEPHONE ENCOUNTER
Attempt to contact patient . Mail box full . Patient does not have My [facility].. patient is scheduled to see Podiatry tomorrow. Will add labs to office visit tomorrow.

## 2023-10-17 NOTE — TELEPHONE ENCOUNTER
----- Message from Winter Young PA-C sent at 10/17/2023  8:18 AM CDT -----  Please have him get CMP/CBC asap.  Please put under kenia as this is her pt.  Had increased LFTs in Aug and she recently saw him.  Thanks.

## 2023-10-19 DIAGNOSIS — L40.0 PLAQUE PSORIASIS: ICD-10-CM

## 2023-10-19 DIAGNOSIS — L40.50 PSORIATIC ARTHRITIS: ICD-10-CM

## 2023-10-19 RX ORDER — USTEKINUMAB 90 MG/ML
90 INJECTION, SOLUTION SUBCUTANEOUS
Qty: 2 ML | Refills: 3 | Status: ACTIVE | OUTPATIENT
Start: 2023-10-19

## 2023-10-19 RX ORDER — USTEKINUMAB 90 MG/ML
90 INJECTION, SOLUTION SUBCUTANEOUS
Qty: 2 ML | Refills: 3 | Status: SHIPPED | OUTPATIENT
Start: 2023-10-19 | End: 2023-10-19 | Stop reason: SDUPTHER

## 2023-11-07 ENCOUNTER — OFFICE VISIT (OUTPATIENT)
Dept: PRIMARY CARE CLINIC | Facility: CLINIC | Age: 68
End: 2023-11-07
Payer: MEDICARE

## 2023-11-07 DIAGNOSIS — E11.22 TYPE 2 DIABETES MELLITUS WITH STAGE 2 CHRONIC KIDNEY DISEASE, WITH LONG-TERM CURRENT USE OF INSULIN: Primary | ICD-10-CM

## 2023-11-07 DIAGNOSIS — R80.9 TYPE 2 DIABETES MELLITUS WITH MICROALBUMINURIA, WITH LONG-TERM CURRENT USE OF INSULIN: ICD-10-CM

## 2023-11-07 DIAGNOSIS — N18.2 TYPE 2 DIABETES MELLITUS WITH STAGE 2 CHRONIC KIDNEY DISEASE, WITH LONG-TERM CURRENT USE OF INSULIN: Primary | ICD-10-CM

## 2023-11-07 DIAGNOSIS — Z79.4 TYPE 2 DIABETES MELLITUS WITH MICROALBUMINURIA, WITH LONG-TERM CURRENT USE OF INSULIN: ICD-10-CM

## 2023-11-07 DIAGNOSIS — E11.29 TYPE 2 DIABETES MELLITUS WITH MICROALBUMINURIA, WITH LONG-TERM CURRENT USE OF INSULIN: ICD-10-CM

## 2023-11-07 DIAGNOSIS — Z79.4 TYPE 2 DIABETES MELLITUS WITH STAGE 2 CHRONIC KIDNEY DISEASE, WITH LONG-TERM CURRENT USE OF INSULIN: Primary | ICD-10-CM

## 2023-11-07 PROCEDURE — 3046F PR MOST RECENT HEMOGLOBIN A1C LEVEL > 9.0%: ICD-10-PCS | Mod: CPTII,S$GLB,, | Performed by: FAMILY MEDICINE

## 2023-11-07 PROCEDURE — 99999 PR PBB SHADOW E&M-EST. PATIENT-LVL III: ICD-10-PCS | Mod: PBBFAC,,, | Performed by: FAMILY MEDICINE

## 2023-11-07 PROCEDURE — 1101F PR PT FALLS ASSESS DOC 0-1 FALLS W/OUT INJ PAST YR: ICD-10-PCS | Mod: CPTII,S$GLB,, | Performed by: FAMILY MEDICINE

## 2023-11-07 PROCEDURE — 3062F POS MACROALBUMINURIA REV: CPT | Mod: CPTII,S$GLB,, | Performed by: FAMILY MEDICINE

## 2023-11-07 PROCEDURE — 3066F PR DOCUMENTATION OF TREATMENT FOR NEPHROPATHY: ICD-10-PCS | Mod: CPTII,S$GLB,, | Performed by: FAMILY MEDICINE

## 2023-11-07 PROCEDURE — 99214 OFFICE O/P EST MOD 30 MIN: CPT | Mod: S$GLB,,, | Performed by: FAMILY MEDICINE

## 2023-11-07 PROCEDURE — 3288F FALL RISK ASSESSMENT DOCD: CPT | Mod: CPTII,S$GLB,, | Performed by: FAMILY MEDICINE

## 2023-11-07 PROCEDURE — 1160F RVW MEDS BY RX/DR IN RCRD: CPT | Mod: CPTII,S$GLB,, | Performed by: FAMILY MEDICINE

## 2023-11-07 PROCEDURE — 1160F PR REVIEW ALL MEDS BY PRESCRIBER/CLIN PHARMACIST DOCUMENTED: ICD-10-PCS | Mod: CPTII,S$GLB,, | Performed by: FAMILY MEDICINE

## 2023-11-07 PROCEDURE — 82962 POCT GLUCOSE, HAND-HELD DEVICE: ICD-10-PCS | Mod: 59,S$GLB,, | Performed by: FAMILY MEDICINE

## 2023-11-07 PROCEDURE — 3066F NEPHROPATHY DOC TX: CPT | Mod: CPTII,S$GLB,, | Performed by: FAMILY MEDICINE

## 2023-11-07 PROCEDURE — 1157F ADVNC CARE PLAN IN RCRD: CPT | Mod: CPTII,S$GLB,, | Performed by: FAMILY MEDICINE

## 2023-11-07 PROCEDURE — 82962 GLUCOSE BLOOD TEST: CPT | Mod: 59,S$GLB,, | Performed by: FAMILY MEDICINE

## 2023-11-07 PROCEDURE — 1101F PT FALLS ASSESS-DOCD LE1/YR: CPT | Mod: CPTII,S$GLB,, | Performed by: FAMILY MEDICINE

## 2023-11-07 PROCEDURE — 3062F PR POS MACROALBUMINURIA RESULT DOCUMENTED/REVIEW: ICD-10-PCS | Mod: CPTII,S$GLB,, | Performed by: FAMILY MEDICINE

## 2023-11-07 PROCEDURE — 1157F PR ADVANCE CARE PLAN OR EQUIV PRESENT IN MEDICAL RECORD: ICD-10-PCS | Mod: CPTII,S$GLB,, | Performed by: FAMILY MEDICINE

## 2023-11-07 PROCEDURE — 4010F ACE/ARB THERAPY RXD/TAKEN: CPT | Mod: CPTII,S$GLB,, | Performed by: FAMILY MEDICINE

## 2023-11-07 PROCEDURE — 99214 PR OFFICE/OUTPT VISIT, EST, LEVL IV, 30-39 MIN: ICD-10-PCS | Mod: S$GLB,,, | Performed by: FAMILY MEDICINE

## 2023-11-07 PROCEDURE — 1159F PR MEDICATION LIST DOCUMENTED IN MEDICAL RECORD: ICD-10-PCS | Mod: CPTII,S$GLB,, | Performed by: FAMILY MEDICINE

## 2023-11-07 PROCEDURE — 3046F HEMOGLOBIN A1C LEVEL >9.0%: CPT | Mod: CPTII,S$GLB,, | Performed by: FAMILY MEDICINE

## 2023-11-07 PROCEDURE — 1159F MED LIST DOCD IN RCRD: CPT | Mod: CPTII,S$GLB,, | Performed by: FAMILY MEDICINE

## 2023-11-07 PROCEDURE — 4010F PR ACE/ARB THEARPY RXD/TAKEN: ICD-10-PCS | Mod: CPTII,S$GLB,, | Performed by: FAMILY MEDICINE

## 2023-11-07 PROCEDURE — 3288F PR FALLS RISK ASSESSMENT DOCUMENTED: ICD-10-PCS | Mod: CPTII,S$GLB,, | Performed by: FAMILY MEDICINE

## 2023-11-07 PROCEDURE — 99999 PR PBB SHADOW E&M-EST. PATIENT-LVL III: CPT | Mod: PBBFAC,,, | Performed by: FAMILY MEDICINE

## 2023-11-08 LAB
GLUCOSE SERPL-MCNC: 495 MG/DL (ref 70–110)
GLUCOSE SERPL-MCNC: 500> MG/DL (ref 70–110)

## 2023-11-08 NOTE — PROGRESS NOTES
Subjective:       Patient ID: Trey Stevens is a 68 y.o. male.    Chief Complaint: Follow-up (One month follow up. No issues or concerns. )    HPI:     Mr. Stevens returns for f/u; his blood sugar remains uncontrolled and psoriasis has significantly worsened. He has had steady weight gain with poor dietary compliance; very low health literacy and limited support makes supporting him extremely challenging. He was doing better with home health coming out to organize meds and oversee injectables but this could not be continued due to  agencies x 2 dismissing him due to unclean home and non-adherence to their instructions. Today his POCT is >500 and he reports feeling thirsty but otherwise ok. He brought 2 boxes of insulin pens and 1 vial of insulin aspart. Aspart given today in clinic per sliding scale with improvement in CBG and oral hydration encouraged. Lantus 32 units administered. He is agreeable with coming weekly with medication for us to assist with administering as long as he can afford the gas to get here. His goals are unclear; he derives pleasure from camping and spending time on his boat with his dog. His mother lives in a trailer near to his and he has social connections with his family and [place] community. He isn't particularly motivated to pursue improved health and does not frequent the hospital.     Updated/ annual due 8/23:  HM:  12/22 today fluvax, 10/21 covid vaccines, 12/18 HAV, 2/16 [id number], 4/13 [id number], 8/22 [id number], 2/16 TDaP, Cscope in the past normal and pt refuses more, 6/22 PSA, 2/16 HCV neg, 5/23 Eye, 11/18 chest CT stable, Pod Dr. Hadley.    Objective:   There were no vitals filed for this visit.     Wt Readings from Last 3 Encounters:   10/06/23 120.3 kg (265 lb 3.4 oz)   10/04/23 119.1 kg (262 lb 9.6 oz)   09/07/23 120.1 kg (264 lb 12.4 oz)     Temp Readings from Last 3 Encounters:   10/04/23 98.3 °F (36.8 °C) (Oral)   08/11/23 98.1 °F (36.7 °C) (Oral)   07/19/23 97.9 °F (36.6 °C)  (Oral)     BP Readings from Last 3 Encounters:   10/06/23 134/68   10/04/23 125/62   08/11/23 (!) 147/82     Pulse Readings from Last 3 Encounters:   10/06/23 94   10/04/23 93   08/11/23 85       Physical Exam  Vitals and nursing note reviewed.   Constitutional:       General: He is not in acute distress.     Appearance: He is well-developed. He is obese. He is not ill-appearing.      Comments: Poor personal hygiene     HENT:      Head: Normocephalic and atraumatic.      Nose: Nose normal.      Mouth/Throat:      Mouth: Mucous membranes are moist.      Pharynx: No oropharyngeal exudate.   Eyes:      General: No scleral icterus.     Pupils: Pupils are equal, round, and reactive to light.   Cardiovascular:      Rate and Rhythm: Normal rate and regular rhythm.   Pulmonary:      Effort: Pulmonary effort is normal.      Breath sounds: Normal breath sounds. No rhonchi.   Abdominal:      General: Bowel sounds are normal.      Palpations: Abdomen is soft.      Comments: Extremely rotund abdomen   Musculoskeletal:         General: No deformity. Normal range of motion.      Cervical back: Normal range of motion and neck supple.   Skin:     General: Skin is warm and dry.      Findings: Rash present.      Comments: Multiple plaques with excoriations and bleeding   Neurological:      General: No focal deficit present.      Mental Status: He is alert and oriented to person, place, and time. Mental status is at baseline.   Psychiatric:         Mood and Affect: Mood normal.         Thought Content: Thought content normal.           Albumin   Date Value Ref Range Status   08/11/2023 3.4 (L) 3.5 - 5.2 g/dL Final     eGFR   Date Value Ref Range Status   10/06/2023 >60 >60 mL/min/1.73 m^2 Final       Assessment:       1. Type 2 diabetes mellitus with stage 2 chronic kidney disease, with long-term current use of insulin    2. Type 2 diabetes mellitus with microalbuminuria, with long-term current use of insulin        Plan:            Problem List Items Addressed This Visit          Endocrine    Type 2 diabetes mellitus with chronic kidney disease, with long-term current use of insulin - Primary    Relevant Orders    POCT Glucose, Hand-Held Device    POCT Glucose, Hand-Held Device    Type 2 diabetes mellitus with microalbuminuria, with long-term current use of insulin    Current Assessment & Plan     Lab Results   Component Value Date    HGBA1C >14.0 (H) 10/04/2023   Will try weekly visits in clinic with meds; really struggling to help him manage his medication regimen

## 2023-11-16 ENCOUNTER — TELEPHONE (OUTPATIENT)
Dept: PULMONOLOGY | Facility: CLINIC | Age: 68
End: 2023-11-16
Payer: MEDICARE

## 2023-12-07 ENCOUNTER — OUTPATIENT CASE MANAGEMENT (OUTPATIENT)
Dept: ADMINISTRATIVE | Facility: OTHER | Age: 68
End: 2023-12-07
Payer: MEDICARE

## 2023-12-07 DIAGNOSIS — E11.29 TYPE 2 DIABETES MELLITUS WITH MICROALBUMINURIA, WITH LONG-TERM CURRENT USE OF INSULIN: Primary | ICD-10-CM

## 2023-12-07 DIAGNOSIS — E11.65 HYPERGLYCEMIA DUE TO DIABETES MELLITUS: ICD-10-CM

## 2023-12-07 DIAGNOSIS — Z79.4 TYPE 2 DIABETES MELLITUS WITH MICROALBUMINURIA, WITH LONG-TERM CURRENT USE OF INSULIN: Primary | ICD-10-CM

## 2023-12-07 DIAGNOSIS — R80.9 TYPE 2 DIABETES MELLITUS WITH MICROALBUMINURIA, WITH LONG-TERM CURRENT USE OF INSULIN: Primary | ICD-10-CM

## 2023-12-07 NOTE — LETTER
Trey Stevens  [id number] [facility] [id number]    Dear Trey Stevens,     Welcome to [facility] Out[facility]. We are here to assist patients with multiple long-term (chronic) conditions who often need more personalized healthcare.    It was a pleasure talking with you today. My name is Louise Aguayo RN. I look forward to working with you as your Care Manager. I will be contacting you by telephone routinely to help coordinate care and resolve issues.    My goal is to help you function at the healthiest and highest level possible. You can contact me directly at [phone].    As an [facility] patient with [employer], some of the services we provide, at no cost to you, include:     Development of an individualized care plan with a Registered Nurse   Connection with a   Assistance from a Community Health Worker  Connection with available resources and services    Coordinate communication among your care team members   Provide coaching and education  Help you understand your doctor's treatment plan  Help you obtain information about your insurance coverage.    All services provided by [facility]s Outpatient Care Managers and other care team members are coordinated with and communicated to your primary care team.      As part of your enrollment, you will be receiving education materials and more information about these services in your [facility] account, by phone, or through the mail. If you do not wish to participate or receive information, you can Opt Out by contacting our office at [phone].      Sincerely,        Louise Aguayo RN  [facility]   Outpatient Care Management

## 2023-12-08 ENCOUNTER — HOSPITAL ENCOUNTER (OUTPATIENT)
Dept: RADIOLOGY | Facility: HOSPITAL | Age: 68
Discharge: HOME | End: 2023-12-08
Attending: PHYSICIAN ASSISTANT
Payer: MEDICARE

## 2023-12-08 ENCOUNTER — OFFICE VISIT (OUTPATIENT)
Dept: PULMONOLOGY | Facility: CLINIC | Age: 68
End: 2023-12-08
Attending: PHYSICIAN ASSISTANT
Payer: MEDICARE

## 2023-12-08 VITALS
HEIGHT: 67 IN | WEIGHT: 252.88 LBS | OXYGEN SATURATION: 95 % | DIASTOLIC BLOOD PRESSURE: 68 MMHG | RESPIRATION RATE: 19 BRPM | HEART RATE: 101 BPM | SYSTOLIC BLOOD PRESSURE: 132 MMHG | BODY MASS INDEX: 39.69 KG/M2

## 2023-12-08 DIAGNOSIS — J44.9 MIXED TYPE COPD (CHRONIC OBSTRUCTIVE PULMONARY DISEASE): Primary | Chronic | ICD-10-CM

## 2023-12-08 DIAGNOSIS — R91.8 MULTIPLE LUNG NODULES ON CT: Chronic | ICD-10-CM

## 2023-12-08 DIAGNOSIS — G47.33 OSA ON CPAP: ICD-10-CM

## 2023-12-08 DIAGNOSIS — F17.210 NICOTINE DEPENDENCE, CIGARETTES, UNCOMPLICATED: ICD-10-CM

## 2023-12-08 PROCEDURE — 3078F PR MOST RECENT DIASTOLIC BLOOD PRESSURE < 80 MM HG: ICD-10-PCS | Mod: HCNC,CPTII,S$GLB, | Performed by: PHYSICIAN ASSISTANT

## 2023-12-08 PROCEDURE — 71250 CT THORAX DX C-: CPT | Mod: TC,HCNC

## 2023-12-08 PROCEDURE — 99214 OFFICE O/P EST MOD 30 MIN: CPT | Mod: HCNC,S$GLB,, | Performed by: PHYSICIAN ASSISTANT

## 2023-12-08 PROCEDURE — 3066F PR DOCUMENTATION OF TREATMENT FOR NEPHROPATHY: ICD-10-PCS | Mod: HCNC,CPTII,S$GLB, | Performed by: PHYSICIAN ASSISTANT

## 2023-12-08 PROCEDURE — 1159F MED LIST DOCD IN RCRD: CPT | Mod: HCNC,CPTII,S$GLB, | Performed by: PHYSICIAN ASSISTANT

## 2023-12-08 PROCEDURE — 3062F POS MACROALBUMINURIA REV: CPT | Mod: HCNC,CPTII,S$GLB, | Performed by: PHYSICIAN ASSISTANT

## 2023-12-08 PROCEDURE — 3046F HEMOGLOBIN A1C LEVEL >9.0%: CPT | Mod: HCNC,CPTII,S$GLB, | Performed by: PHYSICIAN ASSISTANT

## 2023-12-08 PROCEDURE — 1157F ADVNC CARE PLAN IN RCRD: CPT | Mod: HCNC,CPTII,S$GLB, | Performed by: PHYSICIAN ASSISTANT

## 2023-12-08 PROCEDURE — 3288F FALL RISK ASSESSMENT DOCD: CPT | Mod: HCNC,CPTII,S$GLB, | Performed by: PHYSICIAN ASSISTANT

## 2023-12-08 PROCEDURE — 71250 CT THORAX DX C-: CPT | Mod: 26,HCNC,, | Performed by: RADIOLOGY

## 2023-12-08 PROCEDURE — 3075F SYST BP GE 130 - 139MM HG: CPT | Mod: HCNC,CPTII,S$GLB, | Performed by: PHYSICIAN ASSISTANT

## 2023-12-08 PROCEDURE — 1101F PT FALLS ASSESS-DOCD LE1/YR: CPT | Mod: HCNC,CPTII,S$GLB, | Performed by: PHYSICIAN ASSISTANT

## 2023-12-08 PROCEDURE — 3066F NEPHROPATHY DOC TX: CPT | Mod: HCNC,CPTII,S$GLB, | Performed by: PHYSICIAN ASSISTANT

## 2023-12-08 PROCEDURE — 1160F RVW MEDS BY RX/DR IN RCRD: CPT | Mod: HCNC,CPTII,S$GLB, | Performed by: PHYSICIAN ASSISTANT

## 2023-12-08 PROCEDURE — 3288F PR FALLS RISK ASSESSMENT DOCUMENTED: ICD-10-PCS | Mod: HCNC,CPTII,S$GLB, | Performed by: PHYSICIAN ASSISTANT

## 2023-12-08 PROCEDURE — 1159F PR MEDICATION LIST DOCUMENTED IN MEDICAL RECORD: ICD-10-PCS | Mod: HCNC,CPTII,S$GLB, | Performed by: PHYSICIAN ASSISTANT

## 2023-12-08 PROCEDURE — 3008F BODY MASS INDEX DOCD: CPT | Mod: HCNC,CPTII,S$GLB, | Performed by: PHYSICIAN ASSISTANT

## 2023-12-08 PROCEDURE — 3078F DIAST BP <80 MM HG: CPT | Mod: HCNC,CPTII,S$GLB, | Performed by: PHYSICIAN ASSISTANT

## 2023-12-08 PROCEDURE — 1101F PR PT FALLS ASSESS DOC 0-1 FALLS W/OUT INJ PAST YR: ICD-10-PCS | Mod: HCNC,CPTII,S$GLB, | Performed by: PHYSICIAN ASSISTANT

## 2023-12-08 PROCEDURE — 3008F PR BODY MASS INDEX (BMI) DOCUMENTED: ICD-10-PCS | Mod: HCNC,CPTII,S$GLB, | Performed by: PHYSICIAN ASSISTANT

## 2023-12-08 PROCEDURE — 71250 CT CHEST WITHOUT CONTRAST: ICD-10-PCS | Mod: 26,HCNC,, | Performed by: RADIOLOGY

## 2023-12-08 PROCEDURE — 1160F PR REVIEW ALL MEDS BY PRESCRIBER/CLIN PHARMACIST DOCUMENTED: ICD-10-PCS | Mod: HCNC,CPTII,S$GLB, | Performed by: PHYSICIAN ASSISTANT

## 2023-12-08 PROCEDURE — 99999 PR PBB SHADOW E&M-EST. PATIENT-LVL V: CPT | Mod: PBBFAC,HCNC,, | Performed by: PHYSICIAN ASSISTANT

## 2023-12-08 PROCEDURE — 3062F PR POS MACROALBUMINURIA RESULT DOCUMENTED/REVIEW: ICD-10-PCS | Mod: HCNC,CPTII,S$GLB, | Performed by: PHYSICIAN ASSISTANT

## 2023-12-08 PROCEDURE — 99214 PR OFFICE/OUTPT VISIT, EST, LEVL IV, 30-39 MIN: ICD-10-PCS | Mod: HCNC,S$GLB,, | Performed by: PHYSICIAN ASSISTANT

## 2023-12-08 PROCEDURE — 4010F PR ACE/ARB THEARPY RXD/TAKEN: ICD-10-PCS | Mod: HCNC,CPTII,S$GLB, | Performed by: PHYSICIAN ASSISTANT

## 2023-12-08 PROCEDURE — 99999 PR PBB SHADOW E&M-EST. PATIENT-LVL V: ICD-10-PCS | Mod: PBBFAC,HCNC,, | Performed by: PHYSICIAN ASSISTANT

## 2023-12-08 PROCEDURE — 3046F PR MOST RECENT HEMOGLOBIN A1C LEVEL > 9.0%: ICD-10-PCS | Mod: HCNC,CPTII,S$GLB, | Performed by: PHYSICIAN ASSISTANT

## 2023-12-08 PROCEDURE — 3075F PR MOST RECENT SYSTOLIC BLOOD PRESS GE 130-139MM HG: ICD-10-PCS | Mod: HCNC,CPTII,S$GLB, | Performed by: PHYSICIAN ASSISTANT

## 2023-12-08 PROCEDURE — 4010F ACE/ARB THERAPY RXD/TAKEN: CPT | Mod: HCNC,CPTII,S$GLB, | Performed by: PHYSICIAN ASSISTANT

## 2023-12-08 PROCEDURE — 1157F PR ADVANCE CARE PLAN OR EQUIV PRESENT IN MEDICAL RECORD: ICD-10-PCS | Mod: HCNC,CPTII,S$GLB, | Performed by: PHYSICIAN ASSISTANT

## 2023-12-08 NOTE — PROGRESS NOTES
Subjective:       Patient ID: Trey Stevens is a 68 y.o. male.    Chief Complaint: COPD    12/8/23  Here for COPD follow up  LDCT today  Doing well  Uses albuterol prn   No signs of infection today  No cough, wheezing, or chest pain  Stable SOB with moderate exertion  CAT score 13    5/15/2023  66yo male here for follow up of COPD/lung nodules  28 pack year current smoker, down to half a pack a day  No signs of infection today  No cough, wheezing, or chest pain  Stable SOB with moderate exertion  CAT score 13  Using CPAP nightly and benefits from use  Completed spirometry and 6mwd today    12/6/2022  66yo male here for follow up of COPD/lung nodules  28 pack year current smoker  Stable COPD controlled on albuterol prn, he does not want to use a daily inhaler  No signs of infection today  No cough, wheezing, or chest pain  Stable SOB with moderate exertion  CAT score 19  Using CPAP nightly and benefits from use    Chest CT 11/8/22  FINDINGS:  There is a partially calcified pulmonary nodule in the right upper lobe measuring up to 9 mm.  Multiple sub 6 mm pulmonary nodules are seen bilaterally which are similar to the exam from 2017.  Calcified hilar lymph nodes are seen on the left.  No mediastinal lymphadenopathy.  Coronary artery calcifications are present.  Aortic atherosclerotic calcifications are seen.  No axillary lymphadenopathy.  Multilevel degenerative changes of the spine.  No acute or suspicious osseous finding.  Impression:  Multiple bilateral pulmonary nodules, the largest of which is stable to 5 years ago and has a calcification consistent with granulomatous disease.  Calcified hilar lymph nodes go along with this diagnosis.  However, some pulmonary nodules may be new but they are less than 6 mm.  Follow-up chest CT in 1 year is recommended.          COPD Questionnaire  How often do you cough?: A little of the time  How often do you have phlegm (mucus) in your chest?: A little of the time  How often does  your chest feel tight?: Never  When you walk up a hill or one flight of stairs, how often are you breathless?: Some of the time  How often are you limited doing any activities at home?: Never  How often are you confident leaving the house despite your lung condition?: All of the time  How often do you sleep soundly?: Never  How often do you have energy?: Some of the time  Total score: 14    EPWORTH SLEEPINESS SCALE 12/6/2022   Sitting and reading 0   Watching TV 3   Sitting, inactive in a public place (e.g. a theatre or a meeting) 0   As a passenger in a car for an hour without a break 1   Lying down to rest in the afternoon when circumstances permit 1   Sitting and talking to someone 0   Sitting quietly after a lunch without alcohol 2   In a car, while stopped for a few minutes in traffic 0   Total score 7       4/2021 Aminata Martinez NP:  HPI:   Trey Stevens is a 67 y.o. male here for follow up COPD/SCHUYLER on CPAP with 2 L blended in.   CPAP compliance download reviewed with patient who voiced understanding.   Follow up for SCHUYLER and CPAP complaince assessment.  He is on AUTOPAP  of  4 - 20 CMWP with OXYGEN AT 2 L /MIN BLENDED IN .   He is adherent with PAP therapy.  He definitely thinks PAP is beneficial to his health and He wants to continue with PAP therapy  Patient denies snoring, headaches, excessive daytime sleepiness     Immunization History   Administered Date(s) Administered    COVID-19, MRNA, LN-S, PF (Pfizer) (Purple Cap) 09/16/2021, 10/07/2021    Hepatitis A, Adult 12/20/2018    Influenza (FLUAD) - Quadrivalent - Adjuvanted - PF *Preferred* (65+) 12/08/2022, 10/04/2023    Influenza - High Dose - PF (65 years and older) 10/18/2018    Influenza - Quadrivalent 10/24/2016    Influenza - Quadrivalent - PF *Preferred* (6 months and older) 10/30/2017, 11/07/2019, 11/04/2020, 01/18/2022    PPD Test 08/05/2013    Pneumococcal Conjugate - 13 Valent 02/24/2016    Pneumococcal Conjugate - 20 Valent 08/01/2022     Pneumococcal Polysaccharide - 23 Valent 04/09/2013    Tdap 02/24/2016      Tobacco Use: Medium Risk (12/8/2023)    Patient History     Smoking Tobacco Use: Former     Smokeless Tobacco Use: Never     Passive Exposure: Not on file      Past Medical History:   Diagnosis Date    Arthritis     Diabetes mellitus     Diabetes mellitus type 2, uncontrolled 7/19/2016    DM (diabetes mellitus) 2015     09/04/2017    Gall stones     Obesity     Pneumonia of right middle lobe due to infectious organism 8/13/2021    Psoriasis (a type of skin inflammation)     Rheumatoid arthritis of foot       Current Outpatient Medications on File Prior to Visit   Medication Sig Dispense Refill    amLODIPine (NORVASC) 5 MG tablet Take 1 tablet (5 mg total) by mouth once daily. For high blood pressure 90 tablet 3    aspirin 81 MG Chew Take 1 tablet (81 mg total) by mouth once daily. 100 tablet 0    atorvastatin (LIPITOR) 80 MG tablet TAKE 1 TABLET (80 MG TOTAL) BY MOUTH ONCE DAILY. 90 tablet 3    blood sugar diagnostic (ACCU-CHEK NOELLE PLUS TEST STRP) Strp Check blood sugar 3 times daily. 300 each 3    blood-glucose meter (ACCU-CHEK NOELLE PLUS METER) Misc Check blood sugar 3 times daily 1 each 0    bolus insulin pump, 200 unit (CEQUR SIMPLICITY) 2 unit Rhianna Change CeQur patch every 3 days 30 each 3    calcipotriene (DOVONOX) 0.005 % cream APPLY TO AFFECTED AREA(S) TWICE DAILY 60 g 4    clobetasoL (OLUX) 0.05 % Foam APPLY TO AFFECTED AREA OF SCALP AND BEHIND EARS TWICE DAILY. DONT USE ON FACE,UNDERARMS,OR GROIN 100 g 3    empagliflozin (JARDIANCE) 25 mg tablet Take 1 tablet (25 mg total) by mouth once daily. 90 tablet 1    ergocalciferol (ERGOCALCIFEROL) 50,000 unit Cap Take 50,000 Units by mouth twice a week.      folic acid (FOLVITE) 1 MG tablet TAKE 1 TABLET ONE TIME DAILY 90 tablet 1    insulin aspart U-100 (NOVOLOG FLEXPEN U-100 INSULIN) 100 unit/mL (3 mL) InPn pen Inject 10 Units into the skin 3 (three) times daily with meals. 15 mL  "5    insulin aspart U-100 (NOVOLOG U-100 INSULIN ASPART) 100 unit/mL injection INJECT 200 UNITS PER CEQUR DEVICE EVERY 3 DAYS 60 mL 1    ketoconazole (NIZORAL) 2 % shampoo WASH HAIR WITH MEDICATED SHAMPOO AT LEAST 2 TIMES A WEEK - LET SIT ON SCALP AT LEAST 5 MINUTES PRIOR TO RINSING 120 mL 3    lancets (ACCU-CHEK SOFTCLIX LANCETS) Misc Check blood sugar 3 times daily 300 each 3    LANTUS SOLOSTAR U-100 INSULIN glargine 100 units/mL SubQ pen Inject 32 Units into the skin once daily. 15 mL 5    losartan (COZAAR) 25 MG tablet TAKE 1 TABLET BY MOUTH EVERY DAY 90 tablet 3    metFORMIN (GLUCOPHAGE-XR) 500 MG XR 24hr tablet Take 2 tablets (1,000 mg total) by mouth 2 (two) times daily with meals. 360 tablet 1    methotrexate 2.5 MG Tab TAKE 8 TABLETS BY MOUTH EVERY 7 DAYS 96 tablet 0    mupirocin (BACTROBAN) 2 % ointment AAA BID for broken skin. 60 g 0    nicotine polacrilex 2 MG Lozg Take 1 lozenge (2 mg total) by mouth as needed (Use up to 10 lozenges per day in the place of cigarettes). 216 lozenge 0    pen needle, diabetic 32 gauge x 5/32" Ndle Use with Lantus once daily and Humalog 3 times daily. 200 each 5    spironolactone (ALDACTONE) 25 MG tablet Take 1 tablet (25 mg total) by mouth once daily. 30 tablet 11    tirzepatide (MOUNJARO) 10 mg/0.5 mL PnIj Inject 10 mg into the skin every 7 days. 4 pen 5    traMADoL (ULTRAM) 50 mg tablet TAKE 1 TABLET BY MOUTH THREE TIMES A DAY AS NEEDED FOR MODERATE PAIN Strength: 50 mg 21 tablet 0    ustekinumab (STELARA) 90 mg/mL Syrg syringe Inject 1 mL (90 mg total) into the skin every 12 weeks. 2 mL 3    VENTOLIN HFA 90 mcg/actuation inhaler INHALE 2 PUFFS INTO THE LUNGS EVERY 4 (FOUR) HOURS AS NEEDED FOR WHEEZING. RESCUE 18 g 11     No current facility-administered medications on file prior to visit.        Review of Systems   Constitutional:  Negative for fever, weight loss, appetite change, fatigue and weakness.   HENT:  Negative for postnasal drip, rhinorrhea, sinus pressure, " "trouble swallowing and congestion.    Respiratory:  Positive for shortness of breath and dyspnea on extertion. Negative for cough, sputum production, choking, chest tightness and wheezing.    Cardiovascular:  Negative for chest pain and leg swelling.   Musculoskeletal:  Positive for arthralgias. Negative for gait problem and joint swelling.   Gastrointestinal:  Negative for nausea, vomiting and abdominal pain.   Neurological:  Negative for dizziness, weakness and headaches.   All other systems reviewed and are negative.      Objective:       Vitals:    12/08/23 1416   BP: 132/68   Pulse: 101   Resp: 19   SpO2: 95%   Weight: 114.7 kg (252 lb 13.9 oz)   Height: 5' 7" (1.702 m)           Physical Exam   Constitutional: He is oriented to person, place, and time. He appears well-developed and well-nourished. No distress. He is obese.   HENT:   Head: Normocephalic.   Mouth/Throat: Oropharynx is clear and moist.   Cardiovascular: Normal rate and regular rhythm.   Pulmonary/Chest: Effort normal. No respiratory distress. He has decreased breath sounds. He has no wheezes. He has no rhonchi. He has no rales.   Musculoskeletal:         General: No edema.      Cervical back: Normal range of motion and neck supple.   Neurological: He is alert and oriented to person, place, and time. Gait normal.   Skin: Skin is warm.   Psychiatric: He has a normal mood and affect.   Vitals reviewed.    Personal Diagnostic Review    CT Chest Without Contrast  Narrative: EXAMINATION:  CT CHEST WITHOUT CONTRAST    CLINICAL HISTORY:  Other nonspecific abnormal finding of lung fieldpulmonary nodule;    TECHNIQUE:  Standard noncontrast CT of the chest.  All CT scans at this facility use dose modulation, iterative reconstruction and/or weight based dosing when appropriate to reduce radiation dose to as low as reasonably achievable.    COMPARISON:  CT scan chest, 11/08/2022    FINDINGS:  There is several stable subcentimeter nodules present involving the " lungs bilaterally they are most numerous involving the right lung.  A few are calcified.  No new lung nodules.  Stable calcified granuloma in the left upper lobe measuring 9 mm.    No evidence of mediastinal or hilar adenopathy there is a calcified left hilar lymph node.    The heart is normal in size.  Coronary artery calcifications are present.  No pericardial effusion.    No pleural fluid or pneumothorax.    No adenopathy is identified.    No significant osseous abnormality.  Impression: Stable chest.  Stable lung nodules.    Electronically signed by: Sangeetha Mcknight MD  Date:    12/08/2023  Time:    14:55     Oxygen Assessment Supplemental O2 Heart   Rate Blood Pressure Christian Dyspnea Scale Rating   Resting 97 % Room Air 92 bpm 127/61 2   Exercise             Minute             1 95 % Room Air 105 bpm       2 95 % Room Air 113 bpm       3 95 % Room Air 117 bpm       4 95 % Room Air 120 bpm       5 96 % Room Air 123 bpm       6  96 % Room Air 123 bpm 149/72 5-6   Recovery             Minute             1 95 % Room Air 110 bpm       2 96 % Room Air 99 bpm       3 96 % Room Air 96 bpm       4 95 % Room Air 95 bpm 114/57 2      Six Minute Walk Summary  6MWT Status: completed without stopping  Patient Reported: Dyspnea         Interpretation:  Did the patient stop or pause?: No  Total Time Walked (Calculated): 360 seconds  Final Partial Lap Distance (feet): 100 feet  Total Distance Meters (Calculated): 335.28 meters  Predicted Distance Meters (Calculated): 439.18 meters  Percentage of Predicted (Calculated): 76.34  Peak VO2 (Calculated): 14.04  Mets: 4.01  Has The Patient Had a Previous Six Minute Walk Test?: Yes     Previous 6MWT Results  Has The Patient Had a Previous Six Minute Walk Test?: Yes  Date of Previous Test: 04/11/22  Total Time Walked: 360 seconds  Total Distance (meters): 297.18  Predicted Distance (meters): 465.08 meters  Percentage of Predicted: 63.9  Percent Change (Calculated): -0.13       5/15/23:  spirometry reviewed, agree with physician interpretation; Spirometry shows a reduced FVC consistent with moderate restriction. In addition the AZY56-62 is reduced suggesting obstruction may also be present. Spirometry remains unimproved following bronchodilator.        Assessment/Plan:       Problem List Items Addressed This Visit          Pulmonary    Multiple lung nodules on CT (Chronic)     Stable chest CT 12/2024, repeat 1 year         Relevant Orders    CT Chest Lung Screening Low Dose    Mixed type COPD (chronic obstructive pulmonary disease) - Primary (Chronic)     Stable, no signs of infection today  Recommend daily controller inhaler, patient declines  Stressed smoking cessation  UTD on vaccines         Relevant Orders    Complete PFT without bronchodilator - Clinic    Stress test, pulmonary       Other    SCHUYLER on CPAP     Benefits and compliant Auto CPAP 4-20 cm with 2 L oxygen blended in.  AHI 1.4  Full face mask  HME: [facility]   Continue APAP 4-20 cm   Compliance download reviewed, days with usage > 4 hours is 95%             Nicotine dependence, cigarettes, uncomplicated     Down to half a pack a day   Assistance with smoking cessation was offered, including:  []  Medications  [x]  Counseling  []  Printed Information on Smoking Cessation  [x]  Referral to a Smoking Cessation Program    Patient was counseled regarding smoking for >10 minutes.         Relevant Orders    CT Chest Lung Screening Low Dose     Follow up 1 year.    Discussed diagnosis, its evaluation, treatment and usual course. All questions answered.    Patient verbalized understanding of plan and left in no acute distress    Thank you for the courtesy of participating in the care of this patient    Elizabeth G Blough, PA-C [facility]

## 2023-12-12 NOTE — PROGRESS NOTES
Outpatient Care Management  Initial Patient Assessment    Patient: Trey Stevens  MRN: [id number]  Date of Service: 12/07/2023  Completed by: Louise Aguayo RN  Referral Date: 12/07/2023  Date of Eligibility: 12/7/2023  Program: High Risk  Status: Ongoing  Effective Dates: 12/7/2023 - present  Responsible Staff: Louise Aguayo RN        Reason for Visit   Patient presents with    OPCM Chart Review    OPCM Enrollment Call    Nursing Assessment       Brief Summary:  Trey Stevens was referred by being high risk for hospitalization for uncontrolled diabetes and noncompliance (Dr. ELLIS Chaparro PCP).  Patient qualifies for program based on 68.7% risk score.   Active problem list, medical, surgical and social history reviewed. Active comorbidities include psoriatic arthritis, psoriasis, obesity, RA, HTN, HLD, depression, noncompliance. Areas of need identified by patient include controlling diabetes and improving compliance and self care.   Next steps: messaging PCP in regards to possibly ordering a continuous glucose monitor, plan to follow up with patient in approximately one week - patient agrees. Discuss blood glucose results, how often is he checking? Diet? Self care? Skin care?    Disability Status  Is the patient alert and oriented (person, place, time, and situation)?: Alert and oriented x 4  Hearing Difficulty or Deaf: yes  Hearing Management: other (had hearing checked recently, says office is checking with insurance to see if a hearing aid is covered.)  Visual Difficulty or Blind: yes  Visual and Hearing Needs Conclusion: wears prescription glasses  Difficulty Concentrating, Remembering or Making Decisions: no  Communication Difficulty: no  Eating/Swallowing Difficulty: no  Walking or Climbing Stairs Difficulty: no  Dressing/Bathing Difficulty: no  Toileting : Independent  Continence : Continence - Not a problem  Difficulty Managing Errands Independently: no  Equipment Currently Used at Home: blood pressure  machine; CPAP; glucometer; oxygen  ADL Conclusion Statement: ADL independent, says he showers every night before bed.  Change in Functional Status Since Onset of Current Illness/Injury: no        Spiritual Beliefs  Spiritual, Cultural Beliefs, Judaism Practices, Values that Affect Care: no (non denom [employer] on Sundays)      Social History     Socioeconomic History    Marital status:    Tobacco Use    Smoking status: Former     Current packs/day: 0.00     Average packs/day: 0.5 packs/day for 28.9 years (14.5 ttl pk-yrs)     Types: Cigarettes     Start date:      Quit date: 2022     Years since quittin.0    Smokeless tobacco: Never    Tobacco comments:     Quit smoking 1 mo ago (2022)   Substance and Sexual Activity    Alcohol use: No     Alcohol/week: 0.0 standard drinks of alcohol    Drug use: No    Sexual activity: Never     Social Determinants of Health     Financial Resource Strain: Low Risk  (2023)    Overall Financial Resource Strain (CARDIA)     Difficulty of Paying Living Expenses: Not very hard   Recent Concern: Financial Resource Strain - High Risk (10/4/2023)    Overall Financial Resource Strain (CARDIA)     Difficulty of Paying Living Expenses: Hard   Food Insecurity: No Food Insecurity (2023)    Hunger Vital Sign     Worried About Running Out of Food in the Last Year: Never true     Ran Out of Food in the Last Year: Never true   Recent Concern: Food Insecurity - Food Insecurity Present (10/4/2023)    Hunger Vital Sign     Worried About Running Out of Food in the Last Year: Sometimes true     Ran Out of Food in the Last Year: Sometimes true   Transportation Needs: No Transportation Needs (2023)    PRAPARE - Transportation     Lack of Transportation (Medical): No     Lack of Transportation (Non-Medical): No   Recent Concern: Transportation Needs - Unmet Transportation Needs (10/4/2023)    PRAPARE - Transportation     Lack of Transportation (Medical): Yes     Lack of  Transportation (Non-Medical): Yes   Physical Activity: Sufficiently Active (12/7/2023)    Exercise Vital Sign     Days of Exercise per Week: 4 days     Minutes of Exercise per Session: 60 min   Recent Concern: Physical Activity - Inactive (10/4/2023)    Exercise Vital Sign     Days of Exercise per Week: 0 days     Minutes of Exercise per Session: 0 min   Stress: No Stress Concern Present (12/7/2023)    [employer] [place] [employer] - Occupational Stress Questionnaire     Feeling of Stress : Not at all   Recent Concern: Stress - Stress Concern Present (10/4/2023)    [employer] [place] [employer] - Occupational Stress Questionnaire     Feeling of Stress : To some extent   Social Connections: Moderately Isolated (12/7/2023)    Social Connection and Isolation Panel [NHANES]     Frequency of Communication with Friends and Family: More than three times a week     Frequency of Social Gatherings with Friends and Family: More than three times a week     Attends Shinto Services: More than 4 times per year     Active Member of Clubs or Organizations: No     Attends Club or Organization Meetings: Never     Marital Status:    Housing Stability: Low Risk  (12/7/2023)    Housing Stability Vital Sign     Unable to Pay for Housing in the Last Year: No     Number of Places Lived in the Last Year: 1     Unstable Housing in the Last Year: No       Roles and Relationships  Primary Source of Support/Comfort: parent  Name of Support/Comfort Primary Source: Mother, Alexa  Secondary Source of Support/Comfort: -- (sister, Na)      Advance Directives (For Healthcare)  Advance Directive  (If Adv Dir status is received, view document under Adv Dir in header or Chart Review Media tab): Advance Directive currently in Epic.        Patient Reported Insurance  Verified current insurance plan:: [employer] Advantage  [employer] benefits discussed:: OTC Prescription Discounts; Well Dine; Transportation; Mail Order  Pharmacy            12/7/2023     4:12 PM 3/15/2023     4:39 PM 8/23/2022     9:26 AM 1/18/2022    10:00 AM 3/21/2019    10:37 AM   Depression Patient Health Questionnaire   Over the last two weeks how often have you been bothered by little interest or pleasure in doing things Not at all Not at all Not at all Not at all Not at all   Over the last two weeks how often have you been bothered by feeling down, depressed or hopeless Not at all Not at all Not at all Not at all Not at all   PHQ-2 Total Score 0 0 0 0 0   Over the last two weeks how often have you been bothered by trouble falling or staying asleep, or sleeping too much  Nearly every day      Over the last two weeks how often have you been bothered by feeling tired or having little energy  More than half the days      Over the last two weeks how often have you been bothered by a poor appetite or overeating  Several days      Over the last two weeks how often have you been bothered by feeling bad about yourself - or that you are a failure or have let yourself or your family down  Not at all      Over the last two weeks how often have you been bothered by trouble concentrating on things, such as reading the newspaper or watching television  Not at all      Over the last two weeks how often have you been bothered by moving or speaking so slowly that other people could have noticed. Or the opposite - being so fidgety or restless that you have been moving around a lot more than usual.  Not at all      Over the last two weeks how often have you been bothered by thoughts that you would be better off dead, or of hurting yourself  Not at all      If you checked off any problems, how difficult have these problems made it for you to do your work, take care of things at home or get along with other people?  Not difficult at all      PHQ-9 Score  6      PHQ-9 Interpretation  Mild          Learning Assessment       12/08/2023 1417 [facility] (12/8/2023 -  Present)   Created by Jenni Simmons MA - MA Status: Complete                 PRIMARY LEARNER     Primary Learner Name:  self WR - 12/08/2023 1417    Relationship:  Patient WR - 12/08/2023 1417    Does the primary learner have any barriers to learning?:  Other WR - 12/08/2023 1417    Comment: seems to require more time and reinforcement to learn     What is the preferred language of the primary learner?:  English WR - 12/08/2023 1417    Is an  required?:  No WR - 12/08/2023 1417    How does the primary learner prefer to learn new concepts?:  Listening, Reading, Demonstration, Pictures/Video WR - 12/08/2023 1417    How often do you need to have someone help you read instructions, pamphlets, or written material from your doctor or pharmacy?:  Never WR - 12/08/2023 1417        CO-LEARNER #1     Relationship:  Patient WR - 12/08/2023 1417    Does the co-learner have any barriers to learning?:  Other WR - 12/08/2023 1417    What is the preferred language of the co-learner?:  English WR - 12/08/2023 1417    Is an  required?:  No WR - 12/08/2023 1417    How does the co-learner prefer to learn new concepts?:  Listening, Reading, Demonstration, Pictures/Video WR - 12/08/2023 1417        CO-LEARNER #2     Relationship:  Patient WR - 12/08/2023 1417    Does the co-learner have any barriers to learning?:  Other WR - 12/08/2023 1417    What is the preferred language of the co-learner?:  English WR - 12/08/2023 1417    Is an  required?:  No WR - 12/08/2023 1417    How does the co-learner prefer to learn new concepts?:  Listening, Reading, Demonstration, Pictures/Video WR - 12/08/2023 1417        SPECIAL TOPICS     No question answered        ANSWERED BY:     -:  Patient WR - 12/08/2023 1417        Edit History       Jenni Simmons MA - MA   12/08/2023 1417

## 2023-12-13 DIAGNOSIS — L40.9 GENERALIZED PSORIASIS: ICD-10-CM

## 2023-12-13 RX ORDER — KETOCONAZOLE 20 MG/ML
SHAMPOO, SUSPENSION TOPICAL
Qty: 120 ML | Refills: 3 | Status: SHIPPED | OUTPATIENT
Start: 2023-12-13

## 2023-12-19 ENCOUNTER — OUTPATIENT CASE MANAGEMENT (OUTPATIENT)
Dept: ADMINISTRATIVE | Facility: OTHER | Age: 68
End: 2023-12-19
Payer: MEDICARE

## 2023-12-28 RX ORDER — ERGOCALCIFEROL 1.25 MG/1
50000 CAPSULE ORAL
Qty: 12 CAPSULE | Refills: 7 | Status: SHIPPED | OUTPATIENT
Start: 2023-12-28

## 2024-01-02 ENCOUNTER — OUTPATIENT CASE MANAGEMENT (OUTPATIENT)
Dept: ADMINISTRATIVE | Facility: OTHER | Age: 69
End: 2024-01-02
Payer: MEDICARE

## 2024-01-17 DIAGNOSIS — E11.9 TYPE 2 DIABETES MELLITUS WITHOUT COMPLICATION: ICD-10-CM

## 2024-01-27 DIAGNOSIS — L40.50 PSORIATIC ARTHRITIS: ICD-10-CM

## 2024-01-27 DIAGNOSIS — L40.9 PSORIASIS: ICD-10-CM

## 2024-01-29 ENCOUNTER — PATIENT OUTREACH (OUTPATIENT)
Dept: ADMINISTRATIVE | Facility: HOSPITAL | Age: 69
End: 2024-01-29
Payer: MEDICARE

## 2024-01-29 RX ORDER — METHOTREXATE 2.5 MG/1
TABLET ORAL
Qty: 96 TABLET | Refills: 0 | Status: SHIPPED | OUTPATIENT
Start: 2024-01-29

## 2024-02-02 DIAGNOSIS — E11.59 HYPERTENSION ASSOCIATED WITH DIABETES: Chronic | ICD-10-CM

## 2024-02-02 DIAGNOSIS — I15.2 HYPERTENSION ASSOCIATED WITH DIABETES: Chronic | ICD-10-CM

## 2024-02-02 RX ORDER — AMLODIPINE BESYLATE 5 MG/1
5 TABLET ORAL DAILY
Qty: 90 TABLET | Refills: 3 | Status: SHIPPED | OUTPATIENT
Start: 2024-02-02 | End: 2025-02-01

## 2024-02-26 NOTE — PROGRESS NOTES
"Diabetes Education  Author: Winter Garcia RD, CDE  Date: 8/4/2020    Diabetes Care Management Summary  Diabetes Education Record Assessment/Progress: Comprehensive/Group  Current Diabetes Risk Level: Moderate       Referring Provider: Cherry Mike, *  65 y.o. male in clinic today for diabetes education follow-up visit. Patient did not bring BG log today. He reports high FBG, sometimes as high as 200 if he eats or drinks during the night. He reports having trouble sleeping and will drink milk or koolaid. Patient reports inconsistent eating patterns and non-compliance to diabetic diet.     Weight: 122.8 kg (270 lb 11.6 oz)   Height: 5' 7" (170.2 cm)   Body mass index is 42.4 kg/m².      Last A1c:   Lab Results   Component Value Date    HGBA1C 7.4 (H) 05/26/2020     Last visit with Diabetes Educator: 6/9/2020.     Diabetes Type  Diabetes Type : Type II    Diabetes History  Diabetes Diagnosis: 5-10 years  Current Treatment: Oral Medication, Diet, Injectable, Insulin  Reviewed Problem List with Patient: Yes     Changes in Medications/ Instructions today by Cherry Mike:     Change Lantus 15 units in morning  Continue Jardiance 25 mg  Continue Metformin XR 1000 mg twice a day  Continue Victoza 1.8 mg daily      Diabetic protein shake - Glucerna or Boost Glucose Control       Health Maintenance was reviewed today with patient. Discussed with patient importance of routine eye exams, foot exams/foot care, blood work (i.e.: A1c, microalbumin, and lipid), dental visits, yearly flu vaccine, and pneumonia vaccine as indicated by PCP. Patient verbalized understanding.     Health Maintenance Topics with due status: Not Due       Topic Last Completion Date    TETANUS VACCINE 02/24/2016    Colorectal Cancer Screening 10/24/2016    Influenza Vaccine 11/07/2019    Eye Exam 02/06/2020    Lipid Panel 05/26/2020    Hemoglobin A1c 05/26/2020    Foot Exam 06/26/2020    Low Dose Statin 08/04/2020     Health Maintenance Due "   Topic Date Due    Shingles Vaccine (1 of 2) 2007       Nutrition  Meal Planning: skipping meals(usually skips breakfast)  What type of sweetener do you use?: Splenda  What type of beverages do you drink?: milk, water, sport drinks(Koolaid during the night // Powerade Zero)  Meal Plan 24 Hour Recall - Breakfast: Nothing  Meal Plan 24 Hour Recall - Lunch: 2 cups of white rice, squash, and gravy, powerade zero  Meal Plan 24 Hour Recall - Dinner: [place] (white/wheat bread), Powerade zero  Meal Plan 24 Hour Recall - Snack: Peanut butter sandwich  (white/wheat bread), Powerade zero    Monitoring   Self Monitoring : FB-130-140 // HS: 140, 200  Blood Glucose Logs: No  Do you use a personal continuous glucose monitor?: No  In the last month, how often have you had a low blood sugar reaction?: never  Can you tell when your blood sugar is too high?: no    Exercise   Exercise Type: (Yard work- almost every day)    Current Diabetes Treatment   Current Treatment: Oral Medication, Diet, Injectable, Insulin    Social History  Preferred Learning Method: Face to Face  Primary Support: Self  Smoking Status: Current Smoker  Alcohol Use: Never                                Barriers to Change  Barriers to Change: None  Learning Challenges : None    Readiness to Learn   Readiness to Learn : Acceptance    Cultural Influences  Cultural Influences: No    Diabetes Education Assessment/Progress  Diabetes Disease Process (diabetes disease process and treatment options): Discussion, Individual Session  Nutrition (Incorporating nutritional management into one's lifestyle): Discussion, Individual Session, Written Materials Provided(Recommended Smart Balance, Benecol, or Take Control instead of butter or margarine.)  Physical Activity (incorporating physical activity into one's lifestyle): Discussion, Individual Session  Medications (states correct name, dose, onset, peak, duration, side effects & timing of meds): Individual  Session, Discussion  Monitoring (monitoring blood glucose/other parameters & using results): Discussion, Individual Session  Acute Complications (preventing, detecting, and treating acute complications): Discussion, Individual Session  Chronic Complications (preventing, detecting, and treating chronic complications): Discussion, Individual Session  Clinical (diabetes, other pertinent medical history, and relevant comorbidities reviewed during visit): Discussion, Individual Session  Cognitive (knowledge of self-management skills, functional health literacy): Discussion, Needs Review, Individual Session  Psychosocial (emotional response to diabetes): Discussion, Individual Session  Diabetes Distress and Support Systems: Not Covered/Deferred  Behavioral (readiness for change, lifestyle practices, self-care behaviors): Discussion, Individual Session    Goals  Patient has selected/evaluated goals during today's session: Yes, selected  Healthy Eating: Set(Patient will follow plate method for meal plan, limit portion of CHO foods, drink Glucerna or Boost Carb control for snack or meal replacement and avoid sugary beverages.)  Start Date: 08/04/20  Target Date: 01/04/21    Reviewed plate method for meal planning and sources of CHO, importance of portion control.        Diabetes Care Plan/Intervention  Education Plan/Intervention: Individual Follow-Up DSMT(6 month follow-up)    Diabetes Meal Plan  Restrictions: Restricted Carbohydrate  Calories: 1600  Carbohydrate Per Meal: 30-45g  Carbohydrate Per Snack : 7-15g    Today's Self-Management Care Plan was developed with the patient's input and is based on barriers identified during today's assessment.    The long and short-term goals in the care plan were written with the patient/caregiver's input. The patient has agreed to work toward these goals to improve his overall diabetes control.      The patient received a copy of today's self-management plan and verbalized  understanding of the care plan, goals, and all of today's instructions.      The patient was encouraged to communicate with his physician and care team regarding his condition(s) and treatment.  I provided the patient with my contact information today and encouraged him to contact me via phone or patient portal as needed.     Education Units of Time   Time Spent: 60 min     No/Unable to asses

## 2024-05-15 ENCOUNTER — TELEPHONE (OUTPATIENT)
Dept: ADMINISTRATIVE | Facility: OTHER | Age: 69
End: 2024-05-15
Payer: MEDICARE

## 2024-05-15 ENCOUNTER — OUTPATIENT CASE MANAGEMENT (OUTPATIENT)
Dept: ADMINISTRATIVE | Facility: OTHER | Age: 69
End: 2024-05-15
Payer: MEDICARE

## 2024-05-20 ENCOUNTER — OUTPATIENT CASE MANAGEMENT (OUTPATIENT)
Dept: ADMINISTRATIVE | Facility: OTHER | Age: 69
End: 2024-05-20
Payer: MEDICARE

## 2024-05-23 ENCOUNTER — OUTPATIENT CASE MANAGEMENT (OUTPATIENT)
Dept: ADMINISTRATIVE | Facility: OTHER | Age: 69
End: 2024-05-23
Payer: MEDICARE

## 2024-06-04 ENCOUNTER — PATIENT OUTREACH (OUTPATIENT)
Dept: ADMINISTRATIVE | Facility: CLINIC | Age: 69
End: 2024-06-04
Payer: MEDICARE

## 2024-06-04 NOTE — PROGRESS NOTES
C3 nurse attempted to contact Trey Stevens for a TCC post hospital discharge follow up call. The only number on file for the patient is for his mother and the number is not in service.  Unable to contact patient.  The patient does not have a scheduled HOSFU appointment, and the pt does not have an [facility] PCP.

## 2024-06-07 ENCOUNTER — TELEPHONE (OUTPATIENT)
Dept: PRIMARY CARE CLINIC | Facility: CLINIC | Age: 69
End: 2024-06-07
Payer: MEDICARE

## 2024-06-13 ENCOUNTER — TELEPHONE (OUTPATIENT)
Dept: PRIMARY CARE CLINIC | Facility: CLINIC | Age: 69
End: 2024-06-13
Payer: MEDICARE

## 2024-06-13 PROCEDURE — G0180 MD CERTIFICATION HHA PATIENT: HCPCS | Mod: ,,, | Performed by: INTERNAL MEDICINE

## 2024-06-14 ENCOUNTER — OFFICE VISIT (OUTPATIENT)
Dept: PRIMARY CARE CLINIC | Facility: CLINIC | Age: 69
End: 2024-06-14
Payer: MEDICARE

## 2024-06-14 VITALS
SYSTOLIC BLOOD PRESSURE: 137 MMHG | BODY MASS INDEX: 38.4 KG/M2 | WEIGHT: 244.69 LBS | DIASTOLIC BLOOD PRESSURE: 77 MMHG | TEMPERATURE: 98 F | HEART RATE: 95 BPM | HEIGHT: 67 IN | OXYGEN SATURATION: 97 %

## 2024-06-14 DIAGNOSIS — Z55.6 PROBLEMS RELATED TO HEALTH LITERACY: Primary | ICD-10-CM

## 2024-06-14 DIAGNOSIS — D84.9 IMMUNOSUPPRESSED STATUS: ICD-10-CM

## 2024-06-14 DIAGNOSIS — M86.072 ACUTE HEMATOGENOUS OSTEOMYELITIS OF LEFT FOOT: ICD-10-CM

## 2024-06-14 DIAGNOSIS — Z91.199 NONCOMPLIANCE WITH DIABETES TREATMENT: ICD-10-CM

## 2024-06-14 DIAGNOSIS — E11.22 TYPE 2 DIABETES MELLITUS WITH STAGE 3A CHRONIC KIDNEY DISEASE, WITH LONG-TERM CURRENT USE OF INSULIN: ICD-10-CM

## 2024-06-14 DIAGNOSIS — J44.9 MIXED TYPE COPD (CHRONIC OBSTRUCTIVE PULMONARY DISEASE): Chronic | ICD-10-CM

## 2024-06-14 DIAGNOSIS — Z79.4 TYPE 2 DIABETES MELLITUS WITH MICROALBUMINURIA, WITH LONG-TERM CURRENT USE OF INSULIN: ICD-10-CM

## 2024-06-14 DIAGNOSIS — L40.52 PSORIATIC ARTHRITIS, DESTRUCTIVE TYPE: ICD-10-CM

## 2024-06-14 DIAGNOSIS — R80.9 TYPE 2 DIABETES MELLITUS WITH MICROALBUMINURIA, WITH LONG-TERM CURRENT USE OF INSULIN: ICD-10-CM

## 2024-06-14 DIAGNOSIS — E11.29 TYPE 2 DIABETES MELLITUS WITH MICROALBUMINURIA, WITH LONG-TERM CURRENT USE OF INSULIN: ICD-10-CM

## 2024-06-14 DIAGNOSIS — F33.9 DEPRESSION, RECURRENT: ICD-10-CM

## 2024-06-14 DIAGNOSIS — Z79.4 TYPE 2 DIABETES MELLITUS WITH STAGE 3A CHRONIC KIDNEY DISEASE, WITH LONG-TERM CURRENT USE OF INSULIN: ICD-10-CM

## 2024-06-14 DIAGNOSIS — N18.31 TYPE 2 DIABETES MELLITUS WITH STAGE 3A CHRONIC KIDNEY DISEASE, WITH LONG-TERM CURRENT USE OF INSULIN: ICD-10-CM

## 2024-06-14 DIAGNOSIS — I70.0 CALCIFICATION OF AORTA: ICD-10-CM

## 2024-06-14 PROCEDURE — 3046F HEMOGLOBIN A1C LEVEL >9.0%: CPT | Mod: CPTII,S$GLB,, | Performed by: NURSE PRACTITIONER

## 2024-06-14 PROCEDURE — 99215 OFFICE O/P EST HI 40 MIN: CPT | Mod: S$GLB,,, | Performed by: NURSE PRACTITIONER

## 2024-06-14 PROCEDURE — 1101F PT FALLS ASSESS-DOCD LE1/YR: CPT | Mod: CPTII,S$GLB,, | Performed by: NURSE PRACTITIONER

## 2024-06-14 PROCEDURE — 99999 PR PBB SHADOW E&M-EST. PATIENT-LVL V: CPT | Mod: PBBFAC,,, | Performed by: NURSE PRACTITIONER

## 2024-06-14 PROCEDURE — 3008F BODY MASS INDEX DOCD: CPT | Mod: CPTII,S$GLB,, | Performed by: NURSE PRACTITIONER

## 2024-06-14 PROCEDURE — 3075F SYST BP GE 130 - 139MM HG: CPT | Mod: CPTII,S$GLB,, | Performed by: NURSE PRACTITIONER

## 2024-06-14 PROCEDURE — 1125F AMNT PAIN NOTED PAIN PRSNT: CPT | Mod: CPTII,S$GLB,, | Performed by: NURSE PRACTITIONER

## 2024-06-14 PROCEDURE — 1159F MED LIST DOCD IN RCRD: CPT | Mod: CPTII,S$GLB,, | Performed by: NURSE PRACTITIONER

## 2024-06-14 PROCEDURE — 3072F LOW RISK FOR RETINOPATHY: CPT | Mod: CPTII,S$GLB,, | Performed by: NURSE PRACTITIONER

## 2024-06-14 PROCEDURE — 1157F ADVNC CARE PLAN IN RCRD: CPT | Mod: CPTII,S$GLB,, | Performed by: NURSE PRACTITIONER

## 2024-06-14 PROCEDURE — 3288F FALL RISK ASSESSMENT DOCD: CPT | Mod: CPTII,S$GLB,, | Performed by: NURSE PRACTITIONER

## 2024-06-14 PROCEDURE — 3078F DIAST BP <80 MM HG: CPT | Mod: CPTII,S$GLB,, | Performed by: NURSE PRACTITIONER

## 2024-06-14 PROCEDURE — G2211 COMPLEX E/M VISIT ADD ON: HCPCS | Mod: S$GLB,,, | Performed by: NURSE PRACTITIONER

## 2024-06-14 RX ORDER — AMOXICILLIN AND CLAVULANATE POTASSIUM 875; 125 MG/1; MG/1
1 TABLET, FILM COATED ORAL
COMMUNITY
Start: 2024-05-31 | End: 2024-06-30

## 2024-06-14 RX ORDER — DOXYCYCLINE HYCLATE 100 MG
100 TABLET ORAL
COMMUNITY
Start: 2024-05-31 | End: 2024-06-30

## 2024-06-14 RX ORDER — OXYCODONE AND ACETAMINOPHEN 10; 325 MG/1; MG/1
1 TABLET ORAL EVERY 4 HOURS PRN
COMMUNITY
Start: 2024-05-31

## 2024-06-14 SDOH — SOCIAL DETERMINANTS OF HEALTH (SDOH): PROBLEMS RELATED TO HEALTH LITERACY: Z55.6

## 2024-06-14 NOTE — ASSESSMENT & PLAN NOTE
Home health - does not know home health  Prescribed Doxy and Augmentin at time of discharge  Likely not taking medications after speaking with sister  He has plans to follow with ID at [facility]
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
## 2024-06-19 ENCOUNTER — PATIENT OUTREACH (OUTPATIENT)
Dept: ADMINISTRATIVE | Facility: HOSPITAL | Age: 69
End: 2024-06-19
Payer: MEDICARE

## 2024-06-19 DIAGNOSIS — Z79.4 TYPE 2 DIABETES MELLITUS WITH STAGE 3A CHRONIC KIDNEY DISEASE, WITH LONG-TERM CURRENT USE OF INSULIN: Primary | ICD-10-CM

## 2024-06-19 DIAGNOSIS — E11.22 TYPE 2 DIABETES MELLITUS WITH STAGE 3A CHRONIC KIDNEY DISEASE, WITH LONG-TERM CURRENT USE OF INSULIN: Primary | ICD-10-CM

## 2024-06-19 DIAGNOSIS — N18.31 TYPE 2 DIABETES MELLITUS WITH STAGE 3A CHRONIC KIDNEY DISEASE, WITH LONG-TERM CURRENT USE OF INSULIN: Primary | ICD-10-CM

## 2024-06-19 NOTE — PROGRESS NOTES
Health Maintenance Due   Topic Date Due    Shingles Vaccine (1 of 2) Never done    RSV Vaccine (Age 60+ and Pregnant patients) (1 - 1-dose 60+ series) Never done    COVID-19 Vaccine (3 - Pfizer risk series) 11/04/2021    Foot Exam  12/29/2023    Eye Exam  05/11/2024    Eye 07/02/2024.

## 2024-06-26 ENCOUNTER — TELEPHONE (OUTPATIENT)
Dept: PRIMARY CARE CLINIC | Facility: CLINIC | Age: 69
End: 2024-06-26
Payer: MEDICARE

## 2024-06-28 ENCOUNTER — OFFICE VISIT (OUTPATIENT)
Dept: PODIATRY | Facility: CLINIC | Age: 69
End: 2024-06-28
Payer: MEDICARE

## 2024-06-28 VITALS — WEIGHT: 244.69 LBS | BODY MASS INDEX: 38.4 KG/M2 | HEIGHT: 67 IN

## 2024-06-28 DIAGNOSIS — E11.49 TYPE II DIABETES MELLITUS WITH NEUROLOGICAL MANIFESTATIONS: ICD-10-CM

## 2024-06-28 DIAGNOSIS — E66.01 SEVERE OBESITY (BMI 35.0-35.9 WITH COMORBIDITY): ICD-10-CM

## 2024-06-28 DIAGNOSIS — L84 CORN OR CALLUS: ICD-10-CM

## 2024-06-28 DIAGNOSIS — B35.1 DERMATOPHYTOSIS OF NAIL: ICD-10-CM

## 2024-06-28 DIAGNOSIS — R23.4 SKIN FISSURES: ICD-10-CM

## 2024-06-28 DIAGNOSIS — E11.9 ENCOUNTER FOR COMPREHENSIVE DIABETIC FOOT EXAMINATION, TYPE 2 DIABETES MELLITUS: Primary | ICD-10-CM

## 2024-06-28 PROCEDURE — 1160F RVW MEDS BY RX/DR IN RCRD: CPT | Mod: CPTII,S$GLB,, | Performed by: PODIATRIST

## 2024-06-28 PROCEDURE — 3046F HEMOGLOBIN A1C LEVEL >9.0%: CPT | Mod: CPTII,S$GLB,, | Performed by: PODIATRIST

## 2024-06-28 PROCEDURE — 99999 PR PBB SHADOW E&M-EST. PATIENT-LVL IV: CPT | Mod: PBBFAC,,, | Performed by: PODIATRIST

## 2024-06-28 PROCEDURE — 1101F PT FALLS ASSESS-DOCD LE1/YR: CPT | Mod: CPTII,S$GLB,, | Performed by: PODIATRIST

## 2024-06-28 PROCEDURE — 11721 DEBRIDE NAIL 6 OR MORE: CPT | Mod: Q9,59,S$GLB, | Performed by: PODIATRIST

## 2024-06-28 PROCEDURE — 1126F AMNT PAIN NOTED NONE PRSNT: CPT | Mod: CPTII,S$GLB,, | Performed by: PODIATRIST

## 2024-06-28 PROCEDURE — 11056 PARNG/CUTG B9 HYPRKR LES 2-4: CPT | Mod: Q9,S$GLB,, | Performed by: PODIATRIST

## 2024-06-28 PROCEDURE — 99213 OFFICE O/P EST LOW 20 MIN: CPT | Mod: 25,S$GLB,, | Performed by: PODIATRIST

## 2024-06-28 PROCEDURE — 1159F MED LIST DOCD IN RCRD: CPT | Mod: CPTII,S$GLB,, | Performed by: PODIATRIST

## 2024-06-28 PROCEDURE — 1157F ADVNC CARE PLAN IN RCRD: CPT | Mod: CPTII,S$GLB,, | Performed by: PODIATRIST

## 2024-06-28 PROCEDURE — 3008F BODY MASS INDEX DOCD: CPT | Mod: CPTII,S$GLB,, | Performed by: PODIATRIST

## 2024-06-28 PROCEDURE — 3288F FALL RISK ASSESSMENT DOCD: CPT | Mod: CPTII,S$GLB,, | Performed by: PODIATRIST

## 2024-07-02 ENCOUNTER — OFFICE VISIT (OUTPATIENT)
Dept: OPHTHALMOLOGY | Facility: CLINIC | Age: 69
End: 2024-07-02
Payer: MEDICARE

## 2024-07-02 ENCOUNTER — TELEPHONE (OUTPATIENT)
Dept: OPHTHALMOLOGY | Facility: CLINIC | Age: 69
End: 2024-07-02
Payer: MEDICARE

## 2024-07-02 DIAGNOSIS — E11.9 DIABETES MELLITUS WITHOUT COMPLICATION: Primary | ICD-10-CM

## 2024-07-02 DIAGNOSIS — E11.36 DIABETIC CATARACT: ICD-10-CM

## 2024-07-02 DIAGNOSIS — Z79.4 TYPE 2 DIABETES MELLITUS WITH STAGE 3A CHRONIC KIDNEY DISEASE, WITH LONG-TERM CURRENT USE OF INSULIN: ICD-10-CM

## 2024-07-02 DIAGNOSIS — H25.813 COMBINED FORM OF AGE-RELATED CATARACT, BOTH EYES: ICD-10-CM

## 2024-07-02 DIAGNOSIS — H52.13 MYOPIA OF BOTH EYES: ICD-10-CM

## 2024-07-02 DIAGNOSIS — N18.31 TYPE 2 DIABETES MELLITUS WITH STAGE 3A CHRONIC KIDNEY DISEASE, WITH LONG-TERM CURRENT USE OF INSULIN: ICD-10-CM

## 2024-07-02 DIAGNOSIS — E11.22 TYPE 2 DIABETES MELLITUS WITH STAGE 3A CHRONIC KIDNEY DISEASE, WITH LONG-TERM CURRENT USE OF INSULIN: ICD-10-CM

## 2024-07-02 PROCEDURE — 99999 PR PBB SHADOW E&M-EST. PATIENT-LVL II: CPT | Mod: PBBFAC,,, | Performed by: OPTOMETRIST

## 2024-07-02 PROCEDURE — 3046F HEMOGLOBIN A1C LEVEL >9.0%: CPT | Mod: CPTII,S$GLB,, | Performed by: OPTOMETRIST

## 2024-07-02 PROCEDURE — 1157F ADVNC CARE PLAN IN RCRD: CPT | Mod: CPTII,S$GLB,, | Performed by: OPTOMETRIST

## 2024-07-02 PROCEDURE — 92014 COMPRE OPH EXAM EST PT 1/>: CPT | Mod: S$GLB,,, | Performed by: OPTOMETRIST

## 2024-07-02 PROCEDURE — 2023F DILAT RTA XM W/O RTNOPTHY: CPT | Mod: CPTII,S$GLB,, | Performed by: OPTOMETRIST

## 2024-07-02 PROCEDURE — 1159F MED LIST DOCD IN RCRD: CPT | Mod: CPTII,S$GLB,, | Performed by: OPTOMETRIST

## 2024-07-02 NOTE — TELEPHONE ENCOUNTER
----- Message from Mateus Coppola OD sent at 7/2/2024  3:00 PM CDT -----  Please call to schedule patient for CE/IOL, referral in epic.    Thanks,  DT

## 2024-07-02 NOTE — TELEPHONE ENCOUNTER
LVM for pt to call back to schedule CAT Evl at his leisure with Dr Jones next available at [facility] location

## 2024-07-02 NOTE — PROGRESS NOTES
HPI    1. DM  2. Diabetic Cataract OU       Hemoglobin A1C       Date                     Value               Ref Range             Status                05/29/2024               11.1 (H)            <=6.5 %               Final                      Vision changes since last eye exam?: Pt has been experiencing worsening   near and far vision per pt. Pt has not been using eyeglasses due to   cracked lenses and is using OTC lubricating eyedrops OU PRN for dry eye.     Any eye pain today: No.    Other ocular symptoms: None noticed by pt.     Interested in contact lens fitting today? No.     Last edited by Rahda Romero on 7/2/2024  2:21 PM.            Assessment /Plan     For exam results, see Encounter Report.    1. Diabetes mellitus without complication  5 years, last A1c 11.1 There was no diabetic retinopathy present on either eye on examination today. Recommend good blood pressure control, strict blood glucose control, and good cholesterol control.  Continue close care with Dr. Chaparro regarding diabetes.    2. Diabetic cataract  Combined form of age-related cataract, both eyes  -     Ambulatory referral/consult to Ophthalmology; Future; Expected date: 07/09/2024  Cataract accounts for vision change. New Rx for glasses/contacts will not improve vision.   Refer to Dr. Jones for cataract evaluation.      5. Myopia of both eyes  See #2.      RTC with Dr. Jones for CE/IOL consult, sooner if changes to vision or worsening symptoms.  Discussed above and answered questions.

## 2024-07-14 NOTE — PROGRESS NOTES
Subjective:     Patient ID: Trey Stevens is a 69 y.o. male.    Chief Complaint: Nail Care (Diabetic pt wears, PCP Dr. Chaparro last seen 11-7-23) and Wound Care (Left foot wound, pt c/o 0/10 pain)      Trey is a 69 y.o. male who presents to the clinic for evaluation and treatment of high risk feet. Trey has a past medical history of Arthritis, Diabetes mellitus, Diabetes mellitus type 2, uncontrolled (7/19/2016), DM (diabetes mellitus) (2015), Gall stones, Obesity, Pneumonia of right middle lobe due to infectious organism (8/13/2021), Psoriasis (a type of skin inflammation), and Rheumatoid arthritis of foot. The patient's chief complaint is long, thick toenails. This patient has documented high risk feet requiring routine maintenance secondary to diabetes mellitis and those secondary complications of diabetes, as mentioned. Patient states he was recently hospitalized for left leg cellulitis. Patient states he has finished oral antibiotics. Patient also admits to home health nurse coming out.     PCP: Brittanie Chaparro MD    Date Last Seen by PCP: 11/07/2023(07/16/2024)    Current shoe gear:  Affected Foot: Casual shoes     Unaffected Foot: Casual shoes    Hemoglobin A1C   Date Value Ref Range Status   05/29/2024 11.1 (H) <=6.5 % Final   10/04/2023 >14.0 (H) 4.0 - 5.6 % Final     Comment:     ADA Screening Guidelines:  5.7-6.4%  Consistent with prediabetes  >or=6.5%  Consistent with diabetes    High levels of fetal hemoglobin interfere with the HbA1C  assay. Heterozygous hemoglobin variants (HbS, HgC, etc)do  not significantly interfere with this assay.   However, presence of multiple variants may affect accuracy.     08/11/2023 11.4 (H) 4.0 - 5.6 % Final     Comment:     ADA Screening Guidelines:  5.7-6.4%  Consistent with prediabetes  >or=6.5%  Consistent with diabetes    High levels of fetal hemoglobin interfere with the HbA1C  assay. Heterozygous hemoglobin variants (HbS, HgC, etc)do  not significantly  interfere with this assay.   However, presence of multiple variants may affect accuracy.     08/10/2023 11.4 (H) 4.0 - 5.6 % Final     Comment:     ADA Screening Guidelines:  5.7-6.4%  Consistent with prediabetes  >or=6.5%  Consistent with diabetes    High levels of fetal hemoglobin interfere with the HbA1C  assay. Heterozygous hemoglobin variants (HbS, HgC, etc)do  not significantly interfere with this assay.   However, presence of multiple variants may affect accuracy.         Patient Active Problem List   Diagnosis    Psoriatic arthritis, destructive type    Vitamin D deficiency    Multiple lung nodules on CT    Immunosuppressed status    Long-term use of immunosuppressant medication    Mixed type COPD (chronic obstructive pulmonary disease)    Type 2 diabetes mellitus with chronic kidney disease, with long-term current use of insulin    Hypertension associated with diabetes    Vitiligo    SCHUYLER on CPAP    Morbid obesity    Hyperlipidemia associated with type 2 diabetes mellitus    Elevated liver enzymes    Nicotine dependence, cigarettes, uncomplicated    Dependence on nocturnal oxygen therapy    Urinary retention    Calcified granuloma of lung    Calcification of aorta    Type 2 diabetes mellitus with microalbuminuria, with long-term current use of insulin    Hyperglycemia due to diabetes mellitus    Noncompliance with diabetes treatment    Problems related to health literacy    Impetigo    Neuropathy    Onychomycosis of multiple toenails with type 2 diabetes mellitus and peripheral neuropathy    Fatty liver    Depression, recurrent    Type 2 diabetes mellitus with stage 3a chronic kidney disease, with long-term current use of insulin    Acute hematogenous osteomyelitis of left foot       Medication List with Changes/Refills   Current Medications    AMLODIPINE (NORVASC) 5 MG TABLET    TAKE 1 TABLET (5 MG TOTAL) BY MOUTH ONCE DAILY. FOR HIGH BLOOD PRESSURE    ASPIRIN 81 MG CHEW    Take 1 tablet (81 mg total) by  mouth once daily.    ATORVASTATIN (LIPITOR) 80 MG TABLET    TAKE 1 TABLET (80 MG TOTAL) BY MOUTH ONCE DAILY.    BLOOD SUGAR DIAGNOSTIC (ACCU-CHEK NOELLE PLUS TEST STRP) STRP    Check blood sugar 3 times daily.    BLOOD-GLUCOSE METER (ACCU-CHEK NOELLE PLUS METER) MISC    Check blood sugar 3 times daily    BOLUS INSULIN PUMP, 200 UNIT (CEQUR SIMPLICITY) 2 UNIT SRINI    Change CeQur patch every 3 days    CALCIPOTRIENE (DOVONOX) 0.005 % CREAM    APPLY TO AFFECTED AREA(S) TWICE DAILY    CLOBETASOL (OLUX) 0.05 % FOAM    APPLY TO AFFECTED AREA OF SCALP AND BEHIND EARS TWICE DAILY. DONT USE ON FACE,UNDERARMS,OR GROIN    EMPAGLIFLOZIN (JARDIANCE) 25 MG TABLET    Take 1 tablet (25 mg total) by mouth once daily.    ERGOCALCIFEROL (ERGOCALCIFEROL) 50,000 UNIT CAP    TAKE 1 CAPSULE (50,000 UNITS TOTAL) BY MOUTH TWICE A WEEK.    FOLIC ACID (FOLVITE) 1 MG TABLET    TAKE 1 TABLET ONE TIME DAILY    INSULIN ASPART U-100 (NOVOLOG FLEXPEN U-100 INSULIN) 100 UNIT/ML (3 ML) INPN PEN    Inject 10 Units into the skin 3 (three) times daily with meals.    INSULIN ASPART U-100 (NOVOLOG U-100 INSULIN ASPART) 100 UNIT/ML INJECTION    INJECT 200 UNITS PER CEQUR DEVICE EVERY 3 DAYS    KETOCONAZOLE (NIZORAL) 2 % SHAMPOO    WASH HAIR WITH MEDICATED SHAMPOO AT LEAST 2 TIMES A WEEK - LET SIT ON SCALP AT LEAST 5 MINUTES PRIOR TO RINSING    LANCETS (ACCU-CHEK SOFTCLIX LANCETS) MISC    Check blood sugar 3 times daily    LANTUS SOLOSTAR U-100 INSULIN GLARGINE 100 UNITS/ML SUBQ PEN    Inject 32 Units into the skin once daily.    LOSARTAN (COZAAR) 25 MG TABLET    TAKE 1 TABLET BY MOUTH EVERY DAY    METFORMIN (GLUCOPHAGE-XR) 500 MG XR 24HR TABLET    Take 2 tablets (1,000 mg total) by mouth 2 (two) times daily with meals.    METHOTREXATE 2.5 MG TAB    TAKE 8 TABLETS BY MOUTH EVERY 7 DAYS    MUPIROCIN (BACTROBAN) 2 % OINTMENT    AAA BID for broken skin.    NICOTINE POLACRILEX 2 MG LOZG    Take 1 lozenge (2 mg total) by mouth as needed (Use up to 10 lozenges per  "day in the place of cigarettes).    OXYCODONE-ACETAMINOPHEN (PERCOCET)  MG PER TABLET    Take 1 tablet by mouth every 4 (four) hours as needed.    PEN NEEDLE, DIABETIC 32 GAUGE X " NDLE    Use with Lantus once daily and Humalog 3 times daily.    SPIRONOLACTONE (ALDACTONE) 25 MG TABLET    Take 1 tablet (25 mg total) by mouth once daily.    TIRZEPATIDE (MOUNJARO) 10 MG/0.5 ML PNIJ    Inject 10 mg into the skin every 7 days.    TRAMADOL (ULTRAM) 50 MG TABLET    TAKE 1 TABLET BY MOUTH THREE TIMES A DAY AS NEEDED FOR MODERATE PAIN Strength: 50 mg    USTEKINUMAB (STELARA) 90 MG/ML SYRG SYRINGE    Inject 1 mL (90 mg total) into the skin every 12 weeks.    VENTOLIN HFA 90 MCG/ACTUATION INHALER    INHALE 2 PUFFS INTO THE LUNGS EVERY 4 (FOUR) HOURS AS NEEDED FOR WHEEZING. RESCUE       Review of patient's allergies indicates:  No Known Allergies    Past Surgical History:   Procedure Laterality Date    APPENDECTOMY      CHOLECYSTECTOMY         Family History   Problem Relation Name Age of Onset    Cancer Mother      Hypertension Mother      Diabetes Mother      Cataracts Mother      Stroke Father      Psoriasis Neg Hx         Social History     Socioeconomic History    Marital status:    Tobacco Use    Smoking status: Former     Current packs/day: 0.00     Average packs/day: 0.5 packs/day for 28.9 years (14.5 ttl pk-yrs)     Types: Cigarettes     Start date:      Quit date: 2022     Years since quittin.6    Smokeless tobacco: Never    Tobacco comments:     Quit smoking 1 mo ago (2022)   Substance and Sexual Activity    Alcohol use: No     Alcohol/week: 0.0 standard drinks of alcohol    Drug use: No    Sexual activity: Never     Social Determinants of Health     Financial Resource Strain: Medium Risk (2024)    Received from [facility] and Its Subsidiaries and Affiliates    Overall Financial Resource Strain (CARDIA)     Difficulty of Paying Living " "Expenses: Somewhat hard   Food Insecurity: No Food Insecurity (5/29/2024)    Received from [facility] and Its Subsidiaries and Affiliates    Hunger Vital Sign     Worried About Running Out of Food in the Last Year: Never true     Ran Out of Food in the Last Year: Never true   Transportation Needs: No Transportation Needs (5/29/2024)    Received from [facility] and Its Subsidiaries and Affiliates    PRAPARE - Transportation     Lack of Transportation (Medical): No     Lack of Transportation (Non-Medical): No   Physical Activity: Sufficiently Active (12/7/2023)    Exercise Vital Sign     Days of Exercise per Week: 4 days     Minutes of Exercise per Session: 60 min   Recent Concern: Physical Activity - Inactive (10/4/2023)    Exercise Vital Sign     Days of Exercise per Week: 0 days     Minutes of Exercise per Session: 0 min   Stress: No Stress Concern Present (12/7/2023)    [employer] - Occupational Stress Questionnaire     Feeling of Stress : Not at all   Recent Concern: Stress - Stress Concern Present (10/4/2023)    [employer] - Occupational Stress Questionnaire     Feeling of Stress : To some extent   Housing Stability: Low Risk  (12/7/2023)    Housing Stability Vital Sign     Unable to Pay for Housing in the Last Year: No     Number of Places Lived in the Last Year: 1     Unstable Housing in the Last Year: No       Vitals:    06/28/24 1008   Weight: 111 kg (244 lb 11.4 oz)   Height: 5' 7" (1.702 m)   PainSc: 0-No pain       Hemoglobin A1C   Date Value Ref Range Status   05/29/2024 11.1 (H) <=6.5 % Final   10/04/2023 >14.0 (H) 4.0 - 5.6 % Final     Comment:     ADA Screening Guidelines:  5.7-6.4%  Consistent with prediabetes  >or=6.5%  Consistent with diabetes    High levels of fetal hemoglobin interfere with the HbA1C  assay. Heterozygous hemoglobin variants (HbS, HgC, " etc)do  not significantly interfere with this assay.   However, presence of multiple variants may affect accuracy.     08/11/2023 11.4 (H) 4.0 - 5.6 % Final     Comment:     ADA Screening Guidelines:  5.7-6.4%  Consistent with prediabetes  >or=6.5%  Consistent with diabetes    High levels of fetal hemoglobin interfere with the HbA1C  assay. Heterozygous hemoglobin variants (HbS, HgC, etc)do  not significantly interfere with this assay.   However, presence of multiple variants may affect accuracy.     08/10/2023 11.4 (H) 4.0 - 5.6 % Final     Comment:     ADA Screening Guidelines:  5.7-6.4%  Consistent with prediabetes  >or=6.5%  Consistent with diabetes    High levels of fetal hemoglobin interfere with the HbA1C  assay. Heterozygous hemoglobin variants (HbS, HgC, etc)do  not significantly interfere with this assay.   However, presence of multiple variants may affect accuracy.         Review of Systems   Constitutional:  Negative for chills and fever.   Respiratory:  Negative for shortness of breath.    Cardiovascular:  Negative for chest pain, palpitations, orthopnea, claudication and leg swelling.   Gastrointestinal:  Negative for diarrhea, nausea and vomiting.   Musculoskeletal:  Negative for joint pain.   Skin:  Negative for rash.   Neurological:  Positive for tingling and sensory change.   Psychiatric/Behavioral: Negative.           Objective:      PHYSICAL EXAM: Apperance: Alert and orient in no distress,well developed, and with good attention to grooming and body habits  Patient presents ambulating in extra depth shoes.   LOWER EXTREMITY EXAM:  VASCULAR: Dorsalis pedis pulses 0/4 bilateral and Posterior Tibial pulses 1/4 bilateral. Capillary fill time <4 seconds bilateral. Mild edema observed bilateral. Varicosities present bilateral. Skin temperature of the lower extremities is warm to warm, proximal to distal. Hair growth absent bilateral.  DERMATOLOGICAL: No skin rashes, subcutaneous nodules, lesions, or  ulcers observed bilateral. Nails 1,2,3,4,5 bilateral elongated, thickened, and discolored with subungual debris. Webspaces 1,2,3,4 clean, dry and without evidence of break in skin integrity bilateral. Moderate dry and hyperkeratotic tissue noted to left plantar 4th submetatarsal, bilateral heels and medial 1st MPJ. No open areas noted after debridement. Skin fissures noted to bilateral heels. No erythema, drainage, or increased temp noted to area.   NEUROLOGICAL: Light touch, sharp-dull, proprioception all present and equal bilaterally.  Vibratory sensation absent at bilateral hallux. Protective sensation absent at 6/10 sites as tested with a [place] 5.07 monofilament.   MUSCULOSKELETAL: Muscle strength is 5/5 for foot inverters, everters, plantarflexors, and dorsiflexors. Muscle tone is normal.             Assessment:       ICD-10-CM ICD-9-CM   1. Encounter for comprehensive diabetic foot examination, type 2 diabetes mellitus  E11.9 250.00   2. Type II diabetes mellitus with neurological manifestations  E11.49 250.60   3. Dermatophytosis of nail  B35.1 110.1   4. Corn or callus  L84 700   5. Skin fissures  R23.4 709.8   6. Severe obesity (BMI 35.0-35.9 with comorbidity)  E66.01 278.01    Z68.35 V85.35         Plan:   Encounter for comprehensive diabetic foot examination, type 2 diabetes mellitus    Type II diabetes mellitus with neurological manifestations    Dermatophytosis of nail    Corn or callus    Skin fissures    Severe obesity (BMI 35.0-35.9 with comorbidity)    I counseled the patient on his conditions, regarding findings of my examination, my impressions, and usual treatment plan.   Appointment spent on education about the diabetic foot, neuropathy, and prevention of limb loss.  Shoe inspection. Diabetic Foot Education. Patient reminded of the importance of good nutrition and blood sugar control to help prevent podiatric complications of diabetes. Patient instructed on proper foot hygeine. We  discussed wearing proper shoe gear, daily foot inspections, never walking without protective shoe gear, never putting sharp instruments to feet.    With patient's permission, nails 1-5 bilateral were debrided/trimmed in length and thickness aggressively to their soft tissue attachment mechanically and with electric , removing all offending nail and debris. Patient relates relief following the procedure.  With patient's permission, bilateral callus trimmed in thickness with #15 blade in thickness without incident.   Patient  will continue to monitor the areas daily, inspect feet, wear protective shoe gear when ambulatory, moisturizer to maintain skin integrity. Patient reminded of the importance of good nutrition and blood sugar control to help prevent podiatric complications of diabetes.  Patient to return 3 months or sooner if needed.      Joy Hadley DPM  [facility]

## 2024-07-17 ENCOUNTER — EXTERNAL HOME HEALTH (OUTPATIENT)
Dept: HOME HEALTH SERVICES | Facility: HOSPITAL | Age: 69
End: 2024-07-17
Payer: MEDICARE

## 2024-07-18 ENCOUNTER — OFFICE VISIT (OUTPATIENT)
Dept: NEUROLOGY | Facility: CLINIC | Age: 69
End: 2024-07-18
Payer: MEDICARE

## 2024-07-18 DIAGNOSIS — G31.84 MILD NEUROCOGNITIVE DISORDER: Primary | ICD-10-CM

## 2024-07-18 DIAGNOSIS — Z55.6 PROBLEMS RELATED TO HEALTH LITERACY: Primary | ICD-10-CM

## 2024-07-18 DIAGNOSIS — Z91.148 MEDICATION NONCOMPLIANCE DUE TO COGNITIVE IMPAIRMENT: ICD-10-CM

## 2024-07-18 DIAGNOSIS — R41.9 MEDICATION NONCOMPLIANCE DUE TO COGNITIVE IMPAIRMENT: ICD-10-CM

## 2024-07-18 DIAGNOSIS — F70 MILD INTELLECTUAL DISABILITY: ICD-10-CM

## 2024-07-18 PROCEDURE — 99499 UNLISTED E&M SERVICE: CPT | Mod: S$GLB,,, | Performed by: STUDENT IN AN ORGANIZED HEALTH CARE EDUCATION/TRAINING PROGRAM

## 2024-07-18 PROCEDURE — 99999 PR PBB SHADOW E&M-EST. PATIENT-LVL I: CPT | Mod: PBBFAC,,, | Performed by: STUDENT IN AN ORGANIZED HEALTH CARE EDUCATION/TRAINING PROGRAM

## 2024-07-18 SDOH — SOCIAL DETERMINANTS OF HEALTH (SDOH): PROBLEMS RELATED TO HEALTH LITERACY: Z55.6

## 2024-07-18 NOTE — PROGRESS NOTES
NEUROPSYCHOLOGY CONSULT    Referral Information  NAME:  Trey Stevens DATE OF SERVICE: 2024   MRN#:  [id number] EDUCATION: 12   AGE: 69 y.o. HANDEDNESS: Right    : 1955 RACE: White   SEX: Male REFERRAL: Kaia Spencer MD;  Family Medicine, [facility]     Evaluation methods: I had the pleasure of seeing Trey Stevens on 2024 in person at the [facility], Department of Neurology. Data sources for the below report include review of the available medical record, an interview with the patient, and administration of a series of neuropsychological tests listed in the Results section of this report. At the outset of the appointment, the undersigned explained the rationale for the evaluation along with the limits of confidentiality; and verbal informed consent for this evaluation was obtained.    I requested to speak to his sister who transported him to the evaluation to provide collateral information, and Mr. Stevens preferred for me not to speak with her for the purposes of this evaluation.     The chief complaint/medical necessity leading to consultation/medical necessity is: cognitive decline.     NEUROPSYCHOLOGICAL EVALUATION - CONFIDENTIAL    SUMMARY/TREATMENT PLAN   Summary of History:  Mr. Stevens is a 69 y.o., right-handed, White, male with 12 years of formal education. He was referred by his family medicine physician due to concern that cognitive difficulties have been affecting his ability to adhere to his treatment plan for diabetes type 2. In the time prior to his referral, Mr. Stevens was seen in the emergency department on 2023 for elevated blood glucose (>500), with subsequent medical visits with his care team revealing persisting poor control of blood sugar. He has received home health services for assistance, which also has resulted in sub-optimal benefit to his medication adherence. Medical history is additionally notable for frequent hospital and ED visits due  to health complications, depression, reduced health literacy, aortic calcification, hyperlipidemia, SCHUYLER (on CPAP and supplemental oxygen), hypertension, multiple lung nodules, and COPD. Most-recent brain imaging are a head CT and CTA completed on 10/06 and 10/07 of 2017 due to severe headache without trauma; CT Head was notable for a 2mm right frontal subdural hematoma, and a left orbital wall fracture of unknown age. CTA did not identify a vascular or intracranial abnormality. He transferred to [facility], at which time neuroradiology and neurosurgery specialists did not concur with the above findings on independent review of imaging. Most-recent laboratory studies drawn on 05/31/2024 document improved management of blood glucose; and are not otherwise contributory.    During interview, Mr. Stevens reported the gradual onset and variable course of cognitive concerns beginning over the last 1.5 years. He reported some longstanding and unchanged cognitive difficulties with sustained attention and persistence on tasks, slowed processing speed for things people tell him, and difficulties communicating with others verbally. He discussed worsening of memory for recent information, greater difficulty finding his way in the woods in spite of having lived off of and fished/ hunted the same region for all of his life, and greater difficulty retrieving nouns such as the names of animals he is very familiar with.    Emotionally, Mr. Stevens discussed intermittent symptoms of depressed mood, although he denied a duration or intensity of symptoms meeting criteria for a major depressive episode now or in the past. He denied anxiety, hallucinations, delusions, or other concerns concerning for neuropsychiatric changes.    Functionally, Mr. Stevens denied changes in independence related to his above cognitive concerns. There is some evidence of longstanding need for support in select functional activities, and evidence that he may make impulsive  "decisions that may affect his health. For example, when asked what led up to his above visit for elevated blood sugar, he reported a friend had brought him donuts and he didn't want them to go to waste. Although he presented as unhygienic, provider notes document hygiene concerns since at least 2016, suggesting this is not a new concern.    Test Results:    Bernal Findings:   Mr. Stevens is estimated to be a man of below-average to low-average baseline cognitive functioning on the basis of performance-based measures. Given his report of early-educational difficulties, need to repeat a grade, and early educational disruption during the ninth grade, it is most likely that demographic estimates are an overestimate of his baseline.   Current intellectual functioning (prorated WAIS-IV FSIQ = 70; 2nd %ile rank) is below-average with good internal consistency among indices; not significantly discrepant from his baseline.  Performances in the following areas were either within normal limits or consistent with his baseline, suggesting against declines in relevant domains:  Information processing speed  Attention and working memory  Naming/ word-finding  Visuospatial skills  Executive functions  Practical judgment skills about health, financial, and safety matters  Performances in the following areas were below normal limits and below the expectations of Mr. Stevens's baseline, suggesting decline in relevant domains:  Learning, retention, recall, and recognition for verbal information, with notable source-discrimination errors on immediate recall of a story memory task  Learning and recall of visual information with intact retention and recognition.   Bilaterally slow and lateralized left-hand (6 pegs placed in 300") slower than right (25 pegs in 195") fine motor dexterity  Verbal fluency for semantic and phonemic information  Mr. Stevens was impulsive and had difficulties with social norms during testing, e.g. removing his shoes to " scratch his feet or remarking disparagingly about women to the female examiner without appearing to recognize that this may be unsuitable for the setting.   On screening measures of depression and anxiety, Mr. Stevens did not endorse clinically significant depression or anxiety.    Data Synthesis: Cognitive test results, motor testing, and behavioral observations are most consistent with frontal-subcortical systems dysfunction.     Diagnostic Considerations: Mr. Stevens has evidence for clinically significant cognitive decline relative to his baseline, not better explained by low baseline cognitive functions. Although there has been cognitive decline, evidence from interview does not clearly raise concern for clinically significant functional decline related to changes in cognition. As such, he qualifies for a mild neurocognitive disorder diagnosis. I suspect based on his reported educational history and more-longstanding difficulties with health literacy that mild neurocognitive disorder is superimposed on the below longstanding difficulties.     Mr. Stevens's current intellectual performances are internally consistent and are within the range of a mild intellectual disability. Cognitive test performances in other domains are also at a level consistent with this baseline history. Mr. Stevens has had difficulties with independent management of his medical care and hygiene/ self-care needs that predate his more-recent cognitive declines; he has difficulties with aspects of higher-order social reciprocity and comportment; and there is evidence he had difficulties with understanding verbal information that were identified during the early developmental period and which resulted in his needing to repeat the sixth grade. As such, it is possible that a previously undiagnosed mild intellectual disability is germane to his ongoing difficulties with care management, particularly as his care needs have increased over time. Given the time  and scope of this assessment an intellectual disability diagnosis is provisional particularly without the benefit of a collateral interview; however, his current intellectual functions are such that they would very likely impede his independent management of complex medical care regardless of a neurodevelopmental etiology.    Etiologic Considerations: Given frontal-subcortical systems dysfunction and his above cerebrovascular risk factors, I suspect a cerebrovascular etiology for his cognitive concerns. Although suspicion is low for Alzheimer's disease at present, this cannot be fully ruled out due to the level of memory difficulty he has exhibited and there is higher risk of developing Alzheimer's disease among those with cerebrovascular disease.    Diagnoses  1. Mild neurocognitive disorder        2. Mild intellectual disability      Provisional          Provider Recommendations:   Mr. Stevens will be encouraged to follow up with his referring provider in order to integrate these findings into the overall context of his clinical care, and to implement any of the below recommendations as appropriate.     Mr. Stevens's current cognitive functioning is such that it will be difficult for him to adapt to changes in his care, problem-solve in novel situations, and make consistent and appropriate judgments about his healthcare. While this assessment does not suggest that he lacks the capacity to make decisions independently, supports will be crucial for his long-term health outcome.     Consider referral to social work or discussion with case management to determine whether possible mild intellectual disability may qualify Mr. Stevens for additional supports to manage his medical care.     Brain MRI may help confirm the presence of chronic ischemic or other vascular changes, and rule out medial temporal lobe atrophy given this assessment.    Repeat assessment is recommended in 12 months, in order to assess for changes over time  and update his treatment plan. Scores from this assessment should be used as a baseline for comparison at that time.     Patient Recommendations:  The next step in your care is to follow up with your referring provider in order to help with continued management of your care. The below recommendations may help you and your family compensate for your difficulties and better understand the reasons for your cognitive changes.     I believe that a combination of baseline difficulties with cognitive/intellectual skills as well as more-recent changes most likely due to your medical challenges are causing difficulties day to day.     At this time the single most important thing you can do to reduce your risk of future cognitive decline is to reduce your risk of heart attack and stroke, and control your blood sugar.  Home health services may be helpful to assist in management of your healthcare.   I recommend that you work with someone to help be sure medications and diabetes are managed appropriately.   Due to memory difficulties, I recommend working with your home health team to assist in diabetes/ insulin management in particular.    There are steps you can take to improve your long-term brain health and reduce your risk for cognitive decline in the future. I encourage you to follow the recommendations in your daily life:  Engage in physical activity (i.e., something that gets your heart rate up) totaling at least 15-30 minutes per day. Regular exercise is very important for both long-term health and stress management. Walking, hiking, elliptical, stationary biking, dancing, and yoga are all good options.   Work closely with your doctor(s) to manage any vascular conditions such as high blood pressure, high cholesterol, and diabetes. Follow the management guidelines from the American Heart Association at www.heart.org.  Remain mentally engaged by keeping socially active, reading, learning new skills, playing games, or  participating in a hobby.  Get a good night's sleep by avoiding screens 30-60 minutes before bed and sticking to a regular sleep schedule.  Eat a balanced, heart-healthy diet that is low in salt, saturated fats, and added sugar. The Mediterranean diet has been shown to be especially healthy; studies show that it may reduce the risk of developing dementia and slow the rate of age-related cognitive decline.      Repeat assessment is indicated in 12 months, or sooner in the event that you or your family notice significant cognitive or functional declines. At that time scores from this assessment should be used as a baseline for comparison.      Repeat assessment is indicated in 12 months, or sooner in the event that you or your family notice significant cognitive or functional declines. At that time scores from this assessment should be used as a baseline for comparison.     Mr. Stevens will be provided the results of the evaluation.     Thank you for allowing me to participate in Mr. Soto care.  If you have any questions, please contact me at [phone].        _____________________  Shabbir Bearden, Ph.D.  Neuropsychologist  Department of Neuropsychology  [facility], [place]    HISTORY OF PRESENT ILLNESS: Mr. Trey Stevens is an 69 y.o., right-handed,  male with 12 years of education who was referred for a neuropsychological evaluation in the setting of difficulties managing his medical care thought related to cognitive difficulties.    Onset and Course of Cognitive Concerns: He discussed occasional memory errors, over the last year or year and a half. He can get a lot on his mind, which leads to forgetting.     Characterization of Cognitive Concerns  Attention/ working memory: Mr. Stevens denied attention or working memory changes. He has always bounced from task to task before finishing them; though ultimately will finish what he is working on.   Processing speed: Mr. Stevens discussed that sometimes it  takes him more time to understand others but this is longstanding since childhood and unchanged.  Language: Mr. Stevens discussed difficulties with word-finding, which has been a longstanding concern for him. He uses circumlocution to good effect. He discussed difficulty recalling the names of animals at times.   Visual-spatial/ navigation: Mr. Stevens denied visual-perceptual changes, though did discuss it's harder for him to orientate than it used to be in spite of having hunted the same woods for years.  Psychomotor: Mr. Stevens discussed that he has lost feeling to his feet and has frequent foot infections spreading to his bones. This makes it hard to walk. He denied changes in his coordination.  Memory: Mr. Stevens has difficulty with memory for recent information, and difficulties finding his way to previously familiar places when fishing or hunting.   Executive Functions: Mr. Stevens denied changes in this domain.    Neuropsychiatric Symptoms:  Hallucinations: Denied  Delusional/Paranoid Thinking: Denied  Apathy: Denied  Irritability/Agitation: Denied  Disinhibition: Denied  Depression/Labile Mood: Endorsed intermittent depression symptoms lasting a day or two at most. He discussed a history of depression during a breakup during his late teens.   Anxiety: Denied    DAILY FUNCTIONING:  Living Environment: He lives alone with his dog.     BASIC ADLS:  Feeding: independent  Dressing: independent  Bathing: independent  Toileting: independent    IADLS:  Support System: He discussed that his mom often comes with him to doctor's appointments since his vehicle broke down over the last 1-2 years.   Appointment Management: independent through use of his appointment sheet and a calendar on his refrigerator.   Medication Compliance: Mr. Stevens places his medicines on a table by his chair. He takes medications in the mornings, and then puts the morning medication bottles in the drawer; and does the same for his evening medicines. then  puts medicines back on the dresser at night. He reported checking blood sugar three times a day, reporting ranges from 126-152 over the last several months. He attributed his past high blood sugar to someone bringing him donuts. When blood sugar is high he changes his eating but does not change how much insulin he uses. He denied ever double dosing or accidentally overdosing on insulin.  Financial Management: independent - he pays bills in two batches monthly. He denied errors in bill-paying, except for one instance when he was out of town.   Cooking: independent, he cooks three days a week and cooks enough for two days at a time.   Driving: He has not been driving recently, though perceives that he could do so.       BRAIN HEALTH RISK FACTORS:  Hearing Loss: Denied  Vision Loss: Denied  Physical Activity: He remains active hunting, riding his bicycle.   Social Isolation: Denied  Falls: Denied  Sleep: He discussed that his sleep has been poor for years, and sleeps in fits and starts for 45 minutes at a time at most; achieving up to 4 hours a night in this context. He uses his CPAP and supplemental oxygen every night. He uses 2 LPM per night. He naps during the day.     MEDICAL HISTORY: Mr. Stevens  has a past medical history of Arthritis, Diabetes mellitus, Diabetes mellitus type 2, uncontrolled (7/19/2016), DM (diabetes mellitus) (2015), Gall stones, Obesity, Pneumonia of right middle lobe due to infectious organism (8/13/2021), Psoriasis (a type of skin inflammation), and Rheumatoid arthritis of foot.    Orbital fracture history: Mr. Stevens was unsure when the orbital fracture identified on head CT occurred. He discussed falling on a trampoline striking his face on the bar as the most likely reason for this injury. He denied loss of consciousness, reported being dazed, and denied other clear symptoms of concussion at that time but did report facial injury and swelling.     NEUROIMAGING:    CTA HEAD  Order:  [id number]  Impression    Normal CTA of the head and neck. No evidence of subdural hematoma or other intracranial hemorrhage on the current or prior exam.  Narrative    EXAM:  CT ANGIOGRAM HEAD    CLINICAL INDICATION:  SUB-DURAL HEMORRHAGE    COMPARISON: Outside head CT 10/6/2017.    FINDINGS:  Thin section multislice axial CT images were obtained through the neck following the administration of IV contrast. Coronal and oblique MIP images were obtained.  3-D volume rendered reconstructions obtained. Automated exposure control was used for dose reduction. NASCET criteria used to estimate stenosis.    Head CT without and with contrast: No evidence of intracranial hemorrhage on the current or prior exam. No abnormal extra-axial collection. No CT evidence of acute cortical based infarction, hydrocephalus, mass effect. Basilar cisterns are preserved. Basal ganglia calcifications. No suspicious enhancing lesions. Old medially depressed fracture of the left medial orbital wall.    CTA HEAD: Proximal intracranial arteries are patent without evidence of stenosis or aneurysm. Visualized more distal intracranial arteries appear symmetric. No evidence of venous sinus thrombosis.    CTA NECK:  A left aortic arch with 3 branch vessels is seen.    Right: The right common carotid artery is patent and normal caliber.  Minimal calcification in the region the right carotid bifurcation.  The extracranial portion of the right internal carotid artery is patent and normal caliber. The right vertebral artery arises from the right subclavian artery. The artery is patent and normal caliber.    Left:  The left common carotid artery is patent and normal caliber.  The extracranial portion of the left internal carotid artery is patent and normal caliber. The left vertebral artery arises from the left subclavian artery. The artery is patent and normal caliber.    The parotid, submandibular, and thyroid glands are unremarkable. The airway is  midline and patent. Multilevel degenerative change is present in the spine.  Exam End: 10/07/17 06:56 Last Resulted: 10/07/17 09:47   Received From: [facility]  Result Received: 12/08/23 13:09     Current medications: Mr. Stevens has a current medication list which includes the following prescription(s): amlodipine, aspirin, atorvastatin, blood sugar diagnostic, blood-glucose meter, cequr simplicity, calcipotriene, clobetasol, empagliflozin, ergocalciferol, folic acid, insulin aspart u-100, insulin aspart u-100, ketoconazole, lancets, lantus solostar u-100 insulin, losartan, metformin, methotrexate, mupirocin, nicotine polacrilex, oxycodone-acetaminophen, pen needle, diabetic, spironolactone, mounjaro, tramadol, stelara, and ventolin hfa.     Review of patient's allergies indicates:  No Known Allergies    PSYCHIATRIC HISTORY: Denied except as above    SUICIDAL IDEATION:  History: Denied  Current Stressors: Denied  Active Suicidal Ideation:Denied  Plan/Intent: Denied    FAMILY HISTORY: family history includes Cancer in his mother; Cataracts in his mother; Diabetes in his mother; Hypertension in his mother; Stroke in his father.    PSYCHOSOCIAL HISTORY:   Education:   Level Attained: He was the only one of his siblings to graduate high school. He did not complete additional schooling.    Learning Difficulties: He discussed early life difficulties with comprehension that consistently affected his education.    Special Education: Denied   Repeated Grade: Endorsed repeating the 6th grade due to difficulties succeeding in school. He took a year away from school in the 9th grade after his father was shot.      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    Retired: 8 years ago due to his feet and walking difficulties.     Relationship  Status/ Support Network:   : No   : No but  36 years.    Children: 0    SUBSTANCE USE:   Alcohol: He reported a remote history of heavy alcohol use over 36 years ago, which he ceased because it was costly.   Recreational Substances: Denied.   Tobacco Products: He reported that he is a former smoker who quit approximately 2 years ago, after smoking since approximately 1994.    MENTAL STATUS AND OBSERVATIONS:  APPEARANCE: Mr. Stevens arrived dressed in a stained luis shirt and pants; with poor hygiene and grooming. Scaling and scabs on his forearms appeared to bother him some, and he picked or pulled at these during interview, with some associated bleeding.   ALERTNESS/ORIENTATION: Attentive and alert. Mr. Stevens was fully oriented to person and situation, though was not oriented to time, stating that it was a Thursday in April, 2014; he was roughly oriented to location, correctly stating the clinic name though believing we were in the adjacent city of [place], [place].   GAIT/MOTOR: Mr. Stevens walked slowly with grossly-normal gait. No fine motor abnormalities were evident.   SENSORY: Vision and hearing were adequate for testing purposes.   SPEECH/LANGUAGE: Normal in rate, rhythm, tone, and volume with mild dysarthria and a 'heavy-tongue' manner of speaking. Expressive and receptive language were grossly intact. He appropriately asserted a preference for simple, straightforward vocabulary.   MOOD/AFFECT: Mood was generally euthymic and affect was mood-congruent.   INTERPERSONAL BEHAVIOR: Rapport was quickly and easily established.  THOUGHT PROCESSES: Thoughts seemed tangential and he had difficulties articulating specific details about aspects of his personal history.  TESTING OBSERVATIONS: Mr. Stevens cooperated during testing and completed tasks at a steady pace. He often required clarification of task instructions particularly those with complexity; however, he did not often require repetition due  to forgetting. He engaged in socially atypical behaviors throughout testing, including removing his shoes and scratching his feet, making disparaging comments about women in the presence of the female examiner, or scratching/ picking at his arms - apparently without understanding that these behaviors may be inappropriate in the setting.     RESULTS     Tests Administered (manual norms used unless otherwise indicated): [employer] - Test of Premorbid Functioning (TOPF), Dot Counting Test (DCT), [facility] Intelligence Scale 4th Ed. (WAIS-IV) select subtests, Controlled Oral Word Association Test (COWAT; [facility] norms), Semantic Fluency (Animals; [facility] norms), Neuropsychological Assessment Battery (NAB) Naming and Auditory Comprehension , Sweet Verbal Learning Test, Revised (HVLT-R), Wechsler Memory Scale, 4th Ed. Logical Memory (WMS IV-LM), Brief Visuospatial Memory Test, Revised (BVMT-R), Trail Making Test (TMT A/B; [facility] norms), Test of Practical Judgment (TOP-J) , Grooved Pegboard Test ([facility] norms), Geriatric Depression Scale (GDS) and Gallego Anxiety Inventory (BUDDY).    Score Label T-Score Standard Score Z-Score Scaled Score %ile Rank   Exceptionally High > 70 > 130 > 2.0  > 16 > 98   Above Average 64-69 120-129 1.4-1.9 15 91-97   High Average 57-63 110-119 0.7-1.3 12-14 75-90   Average 44-56  0.6 to -0.6 8-11 25-74   Low Average 37-43 80-89 -1.3 to -0.7 6-7 9-24   Below Average 30-36 70-79 -2.0 to -1.4 4-5 2-8   Exceptionally Low < 30 < 70  < -2.0 < 4 < 2       Performance Validity: Mr. Stevens completed both stand-alone and embedded measures of task engagement. Below-cutoff performance on any one stand-alone measure and/ or any two embedded measures of task engagement is highly unlikely to occur outside the context of poor or inconsistent effort during testing. Mr. Stevens scored above predetermined cutoffs on all administered measures of task engagement. As such, there is no evidence to  suggest poor or inconsistent engagement and their performance is deemed to be a reasonable reflection of their day-to-day cognitive status.       PREMORBID FUNCTIONING Raw Score Type of Standardized Score Standardized Score Percentile/CP Descriptor   TOPF simple dem. eFSIQ -  53 Average   TOPF pred. eFSIQ - SS 85 16 Low Average   TOPF simple + pred. eFSIQ - SS 93 32 Average   INTELLECTUAL FUNCTIONING Raw Score Type of Standardized Score Standardized Score Percentile/CP Descriptor   WAIS-IV         VCI - SS 72 3 Below Average   HAYES - SS 79 8 Below Average   WMI - SS 77 6 Below Average   PSI - SS 71 3 Below Average   FSIQ - SS 70 2 Below Average   LANGUAGE FUNCTIONING Raw Score Type of Standardized Score Standardized Score Percentile/CP Descriptor   WAIS-IV Vocabulary 15 ss 5 5 Below Average   TOPF Word Reading 23 SS 83 13 Low Average   NAB Auditory Comprehension 85 Tscore 43 25 Average   NAB Auditory Comprehension Colors 13 - - 100 WNL   NAB Auditory Comprehension Shapes 22 - - 100 WNL   NAB Auditory Comprehension Colors/Shapes/Numbers 20 - - 15 Low Average   NAB Auditory Comprehension Pointing 6 - - 100 WNL   NAB Auditory Comprehension Yes/No 8 - - 23 Low Average   NAB Auditory Comprehension Paper Folding 16 - - 41 Average   NAB Naming 28 Tscore 39 14 Low Average   NAB Naming Percent Correct After Semantic Cuing 0 - - 38 Average   NAB Naming Percent Correct After Phonemic Cuing 0 - - 20 Low Average   FAS 14 Tscore 26 1 Exceptionally Low    Animal Naming 9 Tscore 28 1 Exceptionally Low    VISUOSPATIAL FUNCTIONING Raw Score Type of Standardized Score Standardized Score Percentile/CP Descriptor   WAIS-IV Block Design 16 ss 5 5 Below Average   BVMT-R Copy 11 - - - WNL   LEARNING & MEMORY Raw Score Type of Standardized Score Standardized Score Percentile/CP Descriptor   HVLT-R         Total Immediate (3, 3, 3/ 12) 9 Tscore <20 0.1 Exceptionally Low    Delayed Recall 1 Tscore 21 0.2 Exceptionally Low    Retention %  33 Tscore 23 0.3 Exceptionally Low    Hits 7 - - - -   False Positives 2 - - - -   Discrimination  5 Tscore <20 0.1 Exceptionally Low    WMS-IV Subtests         LM I 11 ss 3 1 Exceptionally Low    LM II 0 ss 1 0.1 Exceptionally Low    LM Recognition 12 - - <2 Exceptionally Low   BVMT-R         IR (1, 1, 1/ 12) 3 Tscore 20 0.1 Exceptionally Low    DR 1 Tscore 20 0.1 Exceptionally Low    Discrimination Index 5 - - >16 WNL   ATTENTION/WORKING MEMORY Raw Score Type of Standardized Score Standardized Score Percentile/CP Descriptor   WAIS-IV Digit Span 19 ss 7 16 Low Average         DS Forward 7 ss 7 16 Low Average         DS Backward 6 ss 8 25 Average         DS Sequence 6 ss 8 25 Average         Longest Digit Forward 5 - - - -         Longest Digit Backward 3 - - - -         Longest Digit Sequence 4 - - - -   WAIS-IV Arithmetic 8 ss 5 5 Below Average   MENTAL PROCESSING SPEED Raw Score Type of Standardized Score Standardized Score Percentile/CP Descriptor   WAIS-IV Symbol Search 11 ss 4 2 Below Average   WAIS-IV Coding 27 ss 5 5 Below Average   TMT A  52 Tscore 40 16 Low Average   TMT A errors 1 - - - -   EXECUTIVE FUNCTIONING Raw Score Type of Standardized Score Standardized Score Percentile/CP Descriptor   TMT B 198 Tscore 35 7 Below Average   TMT B errors 2 - - - -   TOP-J 9 Item 20 zscore -1.25 11 Low Average   WAIS-IV Similarities 14 ss 5 5 Below Average   WAIS-IV Matrix Reasoning 8 ss 7 16 Low Average   FRONTOMOTOR  Raw Score Type of Standardized Score Standardized Score Percentile/CP Descriptor   GPT  Tscore 21 0.2 Exceptionally Low    GPD  Tscore 21 0.2 Exceptionally Low    MOOD & PERSONALITY Raw Score Type of Standardized Score Standardized Score Percentile/CP Descriptor   GDS-30 9 - - - WNL   BUDDY 8 - - - Minimal   ss = scaled score (mean = 10, SD = 3); SS = standard score (mean = 100, SD = 15); Tscore mean = 50, SD = 10; zscore (mean = 0.00, SD = 1)     Billing Documentation     Time spent  conducting face to face interview with the patient: 54 minutes; [id number].  Time on review of neuropsychological test data and relevant records, data interpretation, providing feedback about these results, and writing/ documentation: 227 minutes; [id number] &[id number] (x3).  Psychometrist time spent in the administration and scoring of 2 or more neuropsychological tests 263 minutes; 17520 & [id number] (x8).     Referral Diagnoses:  Z55.6 (ICD-10-CM) - Problems related to health literacy   R41.9,Z91.148 (ICD-10-CM) - Medication noncompliance due to cognitive impairment

## 2024-07-19 NOTE — PROGRESS NOTES
Mr. Stevens was seen during this appointment for the testing component of his neuropsychological evaluation. Please see my visit note from this same date of service for documentation of his encounter.        _____________________  Shabbir Bearden, Ph.D.  Neuropsychologist  Department of Neuropsychology  [facility], [place]

## 2024-07-31 ENCOUNTER — OFFICE VISIT (OUTPATIENT)
Dept: PRIMARY CARE CLINIC | Facility: CLINIC | Age: 69
End: 2024-07-31
Payer: MEDICARE

## 2024-07-31 DIAGNOSIS — L40.9 PSORIASIS: ICD-10-CM

## 2024-07-31 DIAGNOSIS — E11.29 PROTEINURIA DUE TO TYPE 2 DIABETES MELLITUS: ICD-10-CM

## 2024-07-31 DIAGNOSIS — I15.2 HYPERTENSION ASSOCIATED WITH DIABETES: Chronic | ICD-10-CM

## 2024-07-31 DIAGNOSIS — M86.072 ACUTE HEMATOGENOUS OSTEOMYELITIS OF LEFT FOOT: ICD-10-CM

## 2024-07-31 DIAGNOSIS — Z79.4 UNCONTROLLED TYPE 2 DIABETES MELLITUS WITH HYPERGLYCEMIA, WITH LONG-TERM CURRENT USE OF INSULIN: ICD-10-CM

## 2024-07-31 DIAGNOSIS — R41.89 COGNITIVE DECLINE: Primary | ICD-10-CM

## 2024-07-31 DIAGNOSIS — L40.52 PSORIATIC ARTHRITIS, DESTRUCTIVE TYPE: ICD-10-CM

## 2024-07-31 DIAGNOSIS — E11.21 TYPE 2 DIABETES MELLITUS WITH NEPHROPATHY: Chronic | ICD-10-CM

## 2024-07-31 DIAGNOSIS — I10 HYPERTENSION, UNSPECIFIED TYPE: Chronic | ICD-10-CM

## 2024-07-31 DIAGNOSIS — J44.9 MIXED TYPE COPD (CHRONIC OBSTRUCTIVE PULMONARY DISEASE): Chronic | ICD-10-CM

## 2024-07-31 DIAGNOSIS — E11.65 UNCONTROLLED TYPE 2 DIABETES MELLITUS WITH HYPERGLYCEMIA, WITH LONG-TERM CURRENT USE OF INSULIN: ICD-10-CM

## 2024-07-31 DIAGNOSIS — Z91.199 NONCOMPLIANCE WITH DIABETES TREATMENT: ICD-10-CM

## 2024-07-31 DIAGNOSIS — Z79.631 METHOTREXATE, LONG TERM, CURRENT USE: ICD-10-CM

## 2024-07-31 DIAGNOSIS — E11.69 HYPERLIPIDEMIA ASSOCIATED WITH TYPE 2 DIABETES MELLITUS: ICD-10-CM

## 2024-07-31 DIAGNOSIS — E11.59 HYPERTENSION ASSOCIATED WITH DIABETES: Chronic | ICD-10-CM

## 2024-07-31 DIAGNOSIS — R80.9 PROTEINURIA DUE TO TYPE 2 DIABETES MELLITUS: ICD-10-CM

## 2024-07-31 DIAGNOSIS — H26.9 CATARACT OF BOTH EYES, UNSPECIFIED CATARACT TYPE: ICD-10-CM

## 2024-07-31 DIAGNOSIS — L40.50 PSORIATIC ARTHRITIS: ICD-10-CM

## 2024-07-31 DIAGNOSIS — E78.5 HYPERLIPIDEMIA ASSOCIATED WITH TYPE 2 DIABETES MELLITUS: ICD-10-CM

## 2024-07-31 PROCEDURE — 1157F ADVNC CARE PLAN IN RCRD: CPT | Mod: CPTII,95,, | Performed by: NURSE PRACTITIONER

## 2024-07-31 PROCEDURE — 1159F MED LIST DOCD IN RCRD: CPT | Mod: CPTII,95,, | Performed by: NURSE PRACTITIONER

## 2024-07-31 PROCEDURE — 3046F HEMOGLOBIN A1C LEVEL >9.0%: CPT | Mod: CPTII,95,, | Performed by: NURSE PRACTITIONER

## 2024-07-31 PROCEDURE — 99443 PR PHYSICIAN TELEPHONE EVALUATION 21-30 MIN: CPT | Mod: 95,,, | Performed by: NURSE PRACTITIONER

## 2024-07-31 PROCEDURE — 4010F ACE/ARB THERAPY RXD/TAKEN: CPT | Mod: CPTII,95,, | Performed by: NURSE PRACTITIONER

## 2024-07-31 PROCEDURE — 1160F RVW MEDS BY RX/DR IN RCRD: CPT | Mod: CPTII,95,, | Performed by: NURSE PRACTITIONER

## 2024-07-31 NOTE — PROGRESS NOTES
"Established Patient - Audio Only Telehealth Visit     The patient location is: Home  The chief complaint leading to consultation is: Follow up  Visit type: Virtual visit with audio only (telephone)  Total time spent with patient: 22 minutes       The reason for the audio only service rather than synchronous audio and video virtual visit was related to technical difficulties or patient preference/necessity.     Each patient to whom I provide medical services by telemedicine is:  (1) informed of the relationship between the physician and patient and the respective role of any other health care provider with respect to management of the patient; and (2) notified that they may decline to receive medical services by telemedicine and may withdraw from such care at any time. Patient verbally consented to receive this service via voice-only telephone call.       HPI: F/U       Audio visit with Mr. Stevens today. At last visit, described decreased vision.   Per Ophthalmology:  Cataract accounts for vision change. New Rx for glasses/contacts will not improve vision.   Refer to Dr. Jones for cataract evaluation.      Assessment per Shabbir Bearden, pHD :   Below-average to low-average baseline cognitive   Suggesting decline in relevant domains:  Learning, retention, recall, and recognition for verbal information, with notable source-discrimination errors on immediate recall of a story memory task  Learning and recall of visual information with intact retention and recognition.   Bilaterally slow and lateralized left-hand (6 pegs placed in 300") slower than right (25 pegs in 195") fine motor dexterity  Verbal fluency for semantic and phonemic information    He denies any specific complaints. Requests refill on prescriptions  Does not have transportation to MD appts.  Relies on family and friends  Denies further foot wounds.     Denies fever, chills, URI symptoms, chest pain, cough, SOB, palpitations, vomiting, diarrhea, no gross neuro " deficits, urinary symptoms and leg swelling.           This service was not originating from a related E/M service provided within the previous 7 days nor will  to an E/M service or procedure within the next 24 hours or my soonest available appointment.  Prevailing standard of care was able to be met in this audio-only visit.

## 2024-08-02 ENCOUNTER — TELEPHONE (OUTPATIENT)
Dept: OPHTHALMOLOGY | Facility: CLINIC | Age: 69
End: 2024-08-02
Payer: MEDICARE

## 2024-08-02 RX ORDER — TIRZEPATIDE 10 MG/.5ML
10 INJECTION, SOLUTION SUBCUTANEOUS
Qty: 4 PEN | Refills: 5 | Status: SHIPPED | OUTPATIENT
Start: 2024-08-02

## 2024-08-02 RX ORDER — FOLIC ACID 1 MG/1
1000 TABLET ORAL DAILY
Qty: 90 TABLET | Refills: 1 | Status: SHIPPED | OUTPATIENT
Start: 2024-08-02

## 2024-08-02 RX ORDER — INSULIN GLARGINE 100 [IU]/ML
32 INJECTION, SOLUTION SUBCUTANEOUS DAILY
Qty: 15 ML | Refills: 5 | Status: SHIPPED | OUTPATIENT
Start: 2024-08-02

## 2024-08-02 RX ORDER — INSULIN ASPART 100 [IU]/ML
10 INJECTION, SOLUTION INTRAVENOUS; SUBCUTANEOUS
Qty: 15 ML | Refills: 5 | Status: SHIPPED | OUTPATIENT
Start: 2024-08-02

## 2024-08-02 RX ORDER — METHOTREXATE 2.5 MG/1
TABLET ORAL
Qty: 96 TABLET | Refills: 0 | Status: SHIPPED | OUTPATIENT
Start: 2024-08-02

## 2024-08-02 RX ORDER — ATORVASTATIN CALCIUM 80 MG/1
80 TABLET, FILM COATED ORAL DAILY
Qty: 90 TABLET | Refills: 3 | Status: SHIPPED | OUTPATIENT
Start: 2024-08-02

## 2024-08-02 RX ORDER — NAPROXEN SODIUM 220 MG/1
81 TABLET, FILM COATED ORAL DAILY
Qty: 100 TABLET | Refills: 0 | Status: SHIPPED | OUTPATIENT
Start: 2024-08-02

## 2024-08-02 RX ORDER — METFORMIN HYDROCHLORIDE 500 MG/1
1000 TABLET, EXTENDED RELEASE ORAL 2 TIMES DAILY WITH MEALS
Qty: 360 TABLET | Refills: 1 | Status: SHIPPED | OUTPATIENT
Start: 2024-08-02 | End: 2025-08-02

## 2024-08-02 RX ORDER — LOSARTAN POTASSIUM 25 MG/1
25 TABLET ORAL DAILY
Qty: 90 TABLET | Refills: 3 | Status: SHIPPED | OUTPATIENT
Start: 2024-08-02

## 2024-08-02 RX ORDER — SPIRONOLACTONE 25 MG/1
25 TABLET ORAL DAILY
Qty: 30 TABLET | Refills: 11 | Status: SHIPPED | OUTPATIENT
Start: 2024-08-02 | End: 2025-08-02

## 2024-08-02 RX ORDER — AMLODIPINE BESYLATE 5 MG/1
5 TABLET ORAL DAILY
Qty: 90 TABLET | Refills: 3 | Status: SHIPPED | OUTPATIENT
Start: 2024-08-02 | End: 2025-08-02

## 2024-08-02 NOTE — TELEPHONE ENCOUNTER
----- Message from Nkechi Rao NP sent at 7/31/2024  2:10 PM CDT -----  Good afternoon,    Please try to call this patient again about appointment with Dr. Jones.  I spoke to him earlier on the phone today.    Thank you,  Nkechi Rao NP  [facility]

## 2024-08-03 PROBLEM — H26.9 CATARACTS, BILATERAL: Status: ACTIVE | Noted: 2024-08-03

## 2024-08-03 PROBLEM — R41.89 COGNITIVE DECLINE: Status: ACTIVE | Noted: 2024-08-03

## 2024-08-03 NOTE — ASSESSMENT & PLAN NOTE
Had follow up with Podiatry and ID  Wound healed  (5/2024) TASHI: Right = 1.29 Left = 1.55  TBI: Right = 0.78 Left = 0.93  - no obvious wound or residual s/s of infection on exam  - will cmpleted 4 week course of augmentin 875mg po BID  + doxycycline 100mg po BID as planned  - will refer to podiatry to follow  ? ABX compliance

## 2024-10-09 ENCOUNTER — TELEPHONE (OUTPATIENT)
Dept: PRIMARY CARE CLINIC | Facility: CLINIC | Age: 69
End: 2024-10-09
Payer: MEDICARE

## 2025-03-03 ENCOUNTER — TELEPHONE (OUTPATIENT)
Dept: PRIMARY CARE CLINIC | Facility: CLINIC | Age: 70
End: 2025-03-03
Payer: MEDICARE

## 2025-07-08 NOTE — TELEPHONE ENCOUNTER
Spoke with pt sister- she is concerned about pt- states that he is not going to the Dr or taking care of himself.  Asking for advice to have pt admitted somewhere.  Advised her that she can call the police in his area and voice her concerns.  Pt has not been to this clinic since June 2024.

## 2025-08-24 PROBLEM — R22.42 LOCALIZED SWELLING OF LEFT FOOT: Status: ACTIVE | Noted: 2025-08-24

## 2025-08-25 ENCOUNTER — ANESTHESIA EVENT (OUTPATIENT)
Dept: SURGERY | Facility: HOSPITAL | Age: 70
End: 2025-08-25
Payer: MEDICARE

## 2025-08-25 ENCOUNTER — ANESTHESIA (OUTPATIENT)
Dept: SURGERY | Facility: HOSPITAL | Age: 70
End: 2025-08-25
Payer: MEDICARE

## 2025-08-25 PROBLEM — E11.9 TYPE 2 DIABETES MELLITUS, WITH LONG-TERM CURRENT USE OF INSULIN: Chronic | Status: ACTIVE | Noted: 2025-08-25

## 2025-08-25 PROBLEM — E78.5 HYPERLIPIDEMIA ASSOCIATED WITH TYPE 2 DIABETES MELLITUS: Chronic | Status: ACTIVE | Noted: 2018-06-21

## 2025-08-25 PROBLEM — Z79.4 TYPE 2 DIABETES MELLITUS, WITH LONG-TERM CURRENT USE OF INSULIN: Chronic | Status: ACTIVE | Noted: 2025-08-25

## 2025-08-25 PROBLEM — R53.1 GENERALIZED WEAKNESS: Status: ACTIVE | Noted: 2025-08-25

## 2025-08-25 PROBLEM — R74.8 ELEVATED CPK: Status: ACTIVE | Noted: 2025-08-25

## 2025-08-25 PROBLEM — M86.9 OSTEOMYELITIS OF RIGHT FOOT: Status: ACTIVE | Noted: 2025-08-25

## 2025-08-25 PROBLEM — E11.9 TYPE 2 DIABETES MELLITUS, WITH LONG-TERM CURRENT USE OF INSULIN: Status: ACTIVE | Noted: 2025-08-25

## 2025-08-25 PROBLEM — N17.9 ACUTE KIDNEY INJURY SUPERIMPOSED ON CHRONIC KIDNEY DISEASE: Status: ACTIVE | Noted: 2025-08-25

## 2025-08-25 PROBLEM — N18.9 ACUTE KIDNEY INJURY SUPERIMPOSED ON CHRONIC KIDNEY DISEASE: Status: ACTIVE | Noted: 2025-08-25

## 2025-08-25 PROBLEM — Z79.4 TYPE 2 DIABETES MELLITUS, WITH LONG-TERM CURRENT USE OF INSULIN: Status: ACTIVE | Noted: 2025-08-25

## 2025-08-25 PROBLEM — E11.69 HYPERLIPIDEMIA ASSOCIATED WITH TYPE 2 DIABETES MELLITUS: Chronic | Status: ACTIVE | Noted: 2018-06-21

## 2025-08-25 PROBLEM — M86.9 OSTEOMYELITIS OF LEFT FOOT: Status: ACTIVE | Noted: 2025-08-25

## 2025-08-25 PROBLEM — E66.01 SEVERE OBESITY (BMI >= 40): Chronic | Status: ACTIVE | Noted: 2025-08-25

## 2025-08-25 PROBLEM — E66.01 SEVERE OBESITY (BMI >= 40): Status: ACTIVE | Noted: 2025-08-25

## 2025-08-25 PROCEDURE — 25000003 PHARM REV CODE 250: Mod: HCNC | Performed by: STUDENT IN AN ORGANIZED HEALTH CARE EDUCATION/TRAINING PROGRAM

## 2025-08-25 PROCEDURE — 63600175 PHARM REV CODE 636 W HCPCS: Mod: HCNC | Performed by: STUDENT IN AN ORGANIZED HEALTH CARE EDUCATION/TRAINING PROGRAM

## 2025-08-25 RX ORDER — ONDANSETRON HYDROCHLORIDE 2 MG/ML
INJECTION, SOLUTION INTRAVENOUS
Status: DISCONTINUED | OUTPATIENT
Start: 2025-08-25 | End: 2025-08-25

## 2025-08-25 RX ORDER — PROPOFOL 10 MG/ML
VIAL (ML) INTRAVENOUS
Status: DISCONTINUED | OUTPATIENT
Start: 2025-08-25 | End: 2025-08-25

## 2025-08-25 RX ORDER — SUCCINYLCHOLINE CHLORIDE 20 MG/ML
INJECTION INTRAMUSCULAR; INTRAVENOUS
Status: DISCONTINUED | OUTPATIENT
Start: 2025-08-25 | End: 2025-08-25

## 2025-08-25 RX ORDER — MIDAZOLAM HYDROCHLORIDE 1 MG/ML
INJECTION INTRAMUSCULAR; INTRAVENOUS
Status: DISCONTINUED | OUTPATIENT
Start: 2025-08-25 | End: 2025-08-25

## 2025-08-25 RX ORDER — KETAMINE HCL IN 0.9 % NACL 50 MG/5 ML
SYRINGE (ML) INTRAVENOUS
Status: DISCONTINUED | OUTPATIENT
Start: 2025-08-25 | End: 2025-08-25

## 2025-08-25 RX ORDER — FENTANYL CITRATE 50 UG/ML
INJECTION, SOLUTION INTRAMUSCULAR; INTRAVENOUS
Status: DISCONTINUED | OUTPATIENT
Start: 2025-08-25 | End: 2025-08-25

## 2025-08-25 RX ORDER — LIDOCAINE HYDROCHLORIDE 20 MG/ML
INJECTION INTRAVENOUS
Status: DISCONTINUED | OUTPATIENT
Start: 2025-08-25 | End: 2025-08-25

## 2025-08-25 RX ORDER — DEXAMETHASONE SODIUM PHOSPHATE 4 MG/ML
INJECTION, SOLUTION INTRA-ARTICULAR; INTRALESIONAL; INTRAMUSCULAR; INTRAVENOUS; SOFT TISSUE
Status: DISCONTINUED | OUTPATIENT
Start: 2025-08-25 | End: 2025-08-25

## 2025-08-25 RX ADMIN — PROPOFOL 200 MG: 10 INJECTION, EMULSION INTRAVENOUS at 05:08

## 2025-08-25 RX ADMIN — SUCCINYLCHOLINE CHLORIDE 150 MG: 20 INJECTION, SOLUTION INTRAMUSCULAR; INTRAVENOUS; PARENTERAL at 05:08

## 2025-08-25 RX ADMIN — DEXAMETHASONE SODIUM PHOSPHATE 4 MG: 4 INJECTION, SOLUTION INTRA-ARTICULAR; INTRALESIONAL; INTRAMUSCULAR; INTRAVENOUS; SOFT TISSUE at 05:08

## 2025-08-25 RX ADMIN — ONDANSETRON 4 MG: 2 INJECTION INTRAMUSCULAR; INTRAVENOUS at 05:08

## 2025-08-25 RX ADMIN — MIDAZOLAM HYDROCHLORIDE 2 MG: 1 INJECTION, SOLUTION INTRAMUSCULAR; INTRAVENOUS at 05:08

## 2025-08-25 RX ADMIN — LIDOCAINE HYDROCHLORIDE 100 MG: 20 INJECTION INTRAVENOUS at 05:08

## 2025-08-25 RX ADMIN — FENTANYL CITRATE 100 MCG: 50 INJECTION, SOLUTION INTRAMUSCULAR; INTRAVENOUS at 05:08

## 2025-08-25 RX ADMIN — SODIUM CHLORIDE, SODIUM LACTATE, POTASSIUM CHLORIDE, AND CALCIUM CHLORIDE: .6; .31; .03; .02 INJECTION, SOLUTION INTRAVENOUS at 05:08

## 2025-08-25 RX ADMIN — Medication 20 MG: at 05:08

## 2025-08-26 PROBLEM — E87.5 HYPERKALEMIA: Status: ACTIVE | Noted: 2025-08-26

## 2025-08-30 PROBLEM — A48.0 GAS GANGRENE OF FOOT: Status: ACTIVE | Noted: 2025-08-30

## 2025-08-31 PROBLEM — I47.10 SVT (SUPRAVENTRICULAR TACHYCARDIA): Status: ACTIVE | Noted: 2025-08-31

## 2025-09-01 PROBLEM — I35.0 NONRHEUMATIC AORTIC (VALVE) STENOSIS: Status: ACTIVE | Noted: 2025-09-01
